# Patient Record
Sex: MALE | Race: WHITE | NOT HISPANIC OR LATINO | Employment: OTHER | URBAN - METROPOLITAN AREA
[De-identification: names, ages, dates, MRNs, and addresses within clinical notes are randomized per-mention and may not be internally consistent; named-entity substitution may affect disease eponyms.]

---

## 2017-01-03 ENCOUNTER — TELEPHONE (OUTPATIENT)
Dept: NEUROSURGERY | Facility: CLINIC | Age: 72
End: 2017-01-03

## 2017-01-03 DIAGNOSIS — M54.16 LUMBAR RADICULAR PAIN: Primary | ICD-10-CM

## 2017-01-04 ENCOUNTER — TELEPHONE (OUTPATIENT)
Dept: PALLIATIVE MEDICINE | Facility: CLINIC | Age: 72
End: 2017-01-04

## 2017-01-04 ENCOUNTER — MYC MEDICAL ADVICE (OUTPATIENT)
Dept: INTERNAL MEDICINE | Facility: CLINIC | Age: 72
End: 2017-01-04

## 2017-01-04 ENCOUNTER — RADIOLOGY INJECTION OFFICE VISIT (OUTPATIENT)
Dept: PALLIATIVE MEDICINE | Facility: CLINIC | Age: 72
End: 2017-01-04
Payer: COMMERCIAL

## 2017-01-04 ENCOUNTER — RADIANT APPOINTMENT (OUTPATIENT)
Dept: GENERAL RADIOLOGY | Facility: CLINIC | Age: 72
End: 2017-01-04
Attending: ANESTHESIOLOGY
Payer: COMMERCIAL

## 2017-01-04 VITALS — SYSTOLIC BLOOD PRESSURE: 139 MMHG | HEART RATE: 61 BPM | DIASTOLIC BLOOD PRESSURE: 85 MMHG | OXYGEN SATURATION: 96 %

## 2017-01-04 DIAGNOSIS — M54.16 LUMBAR RADICULAR PAIN: ICD-10-CM

## 2017-01-04 DIAGNOSIS — M54.16 LUMBAR RADICULOPATHY: Primary | ICD-10-CM

## 2017-01-04 PROCEDURE — 62323 NJX INTERLAMINAR LMBR/SAC: CPT | Mod: 25 | Performed by: ANESTHESIOLOGY

## 2017-01-04 ASSESSMENT — PAIN SCALES - GENERAL: PAINLEVEL: SEVERE PAIN (7)

## 2017-01-04 NOTE — TELEPHONE ENCOUNTER
Spoke with Sindy Jones CNP, who would like to refer patient to receive second KYLE. Will order through [facility]. Patient and his wife were notified and in agreement.

## 2017-01-04 NOTE — PROGRESS NOTES
[facility] - Procedure Note    Date of Visit: 1/4/2017    Procedure performed: Lumbar L4-5 interlaminar epidural steroid injection  Diagnosis: Lumbar spondylosis; Lumbar radiculitis/radiculopathy  : Gill Garcia MD   Anesthesia: none    Indications: Elier Barber is a 71 year old male who is seen at the request of Sindy Jones CNP for a lumbar epidural steroid injection. The patient describes low back pain that radiates into the left leg. The patient has been exhibiting symptoms consistent with lumbar intraspinal inflammation and radiculopathy. Symptoms have been persistent, disabling, and intermittently severe. The patient reports minimal improvement with conservative treatment, including PT and medications.  Previous lumbar KYLE about a year ago at an outside facility gave good relief until now.   .  Lumbar MRI was done on 1/14/2016 which showed   FINDINGS:  Five lumbar vertebrae are assumed. There are multilevel  degenerative disc disease changes, most prominent at L2-L3, L3-L4.  There is grade 1 degenerative anterolisthesis at L4-L5, L5-S1.     Findings by specific level:     There is facet degenerative change as follows:       Mild bilateral L1-L2, mild bilateral L3-L4, moderate bilateral L4-L5,  prominent right and moderate left L5-S1.     T12-L1: No disc herniation or stenosis.     L1-L2: Mild ligamentum flavum thickening. Left asymmetric disc bulging  including a foraminal component. There is mild-moderate left foraminal  stenosis. The right neural foramen appears adequate and there is no  significant central stenosis.     L2-L3: Disc bulging and osteophytosis with bilateral foraminal  osteophyte-disc complexes. There is a small right posterocentral disc  extrusion with caudal extension of disc material 1.1 cm below the disc  level. There is some mass effect with respect to the right L3 nerve  root. There is no significant overall central stenosis. There is  moderate to  "moderate-severe bilateral foraminal stenosis.     L3-L4: Disc bulging and osteophytosis with bilateral foraminal  osteophyte-disc complexes. Ligamentum flavum thickening. No  significant overall central stenosis. Moderate to moderate-severe  bilateral foraminal stenosis.     L4-L5: Ligamentum flavum flavum thickening. Centrally there is a  broad-based disc herniation which is slightly right asymmetric. In the  left parasagittal region, there is cranial extension of disc material  extending 1.6 cm above the disc level. There is some mass effect with  respect to both L5 nerve roots. There is disc bulging elsewhere  including foraminal components. There is mild central stenosis. There  is severe left foraminal stenosis medially. There is moderate-severe  right foraminal stenosis. There is some synovial cyst formation at the  posterior aspect of the left facet joint.     L5-S1: Mild disc bulging. There is mild synovial cyst formation at the  anterior aspect of the left facet joint. This contributes to moderate  left foraminal stenosis. There is no central or right foraminal  stenosis.       IMPRESSION:  1. Multilevel degenerative disc and facet disease.  2. L2-L3: Small right posterocentral disc extrusion, with mass effect  on the L3 nerve root.  3. L4-L5: Moderate-large left asymmetric disc extrusion. Mass effect  on both L5 nerve roots. Mild central stenosis. Severe left foraminal  stenosis. Moderate-severe right foraminal stenosis.    Allergies:      Allergies   Allergen Reactions     Bee Venom      Penicillins      \"HIVES\"        Vitals:  /77 mmHg  Pulse 69  SpO2 96%    Review of Systems: The patient denies recent fever, chills, illness, use of antibiotics or anticoagulants. All other 10-point review of systems negative.     Procedure: The procedure and risks were explained, and informed written consent was obtained from the patient. Risks include but are not limited to: infection, bleeding, increased pain, " and damage to soft tissue, nerve, muscle, and vasculature structures. After getting informed consent, patient was brought into the procedure suite and was placed in a prone position on the procedure table. A Pause for the Cause was performed. Patient was prepped and draped in sterile fashion.     The L4-5 interspace was identified with use of fluoroscopy in AP view. A 25-gauge, 1.5 inch needle was used to anesthetize the skin and subcutaneous tissue entry site with a total of 2 ml of 1% lidocaine. Under fluoroscopic visualization, a 22-gauge, 4.5 inch Tuohy epidural needle was slowly advanced towards the epidural space a few millimeters left of midline. The latter part of the needle advancement was guided with fluoroscopy in the lateral view. The epidural space was identified using loss of resistance technique. After negative aspiration for heme and cerebrospinal fluid, a total of 1 mL of non-ionic contrast was injected to confirm needle placement with 9 mL of contrast wasted. Epidurogram confirmed spread within the posterior epidural space. 2 ml of 40mg/ml of triamcinolone, 2 ml of 1% lidocaine, and 1 ml of preservative free saline was injected. The needle was removed.  Images were saved to PACS.    The patient tolerated the procedure well, and there was no evidence of procedural complications. No new sensory or motor deficits were noted following the procedure. The patient was stable and able to ambulate on discharge home. Post-procedure instructions were provided.     Pre-procedure pain score: 7/10 in the back, 3/10 in the leg  Post-procedure pain score: 2/10 in the back, 0/10 in the leg    Assessment/Plan: Elier Barber is a 71 year old male s/p lumbar interlaminar epidural steroid injection today for lumbar spondylosis and radiculitis/radiculopathy.     1. Following today's procedure, the patient was advised to contact the [facility] for any of the following:   Fever, chills, or night  sweats   New onset of pain, numbness, or weakness   Any questions/concerns regarding the procedure  If unable to contact the Pain Center, the patient was instructed to go to a local Emergency Room for any complications.   2. The patient will receive a follow-up call in 1 week.   3. Follow-up with Dr. Blanc's office in 2 weeks for post-procedure evaluation.    [facility] 1/4/2017

## 2017-01-04 NOTE — TELEPHONE ENCOUNTER
Contacted patient and spoke to his spouse, Kelli. Patient is experiencing right leg pain. This happened last year at this time as well, and he received an KYLE that provided relief. Patient and Kelli would like to have the KYLE again if possible. They are open to going to [place]. Will communicate message with Sindy Jones CNP for recommendation.

## 2017-01-04 NOTE — NURSING NOTE
Discharge Information    IV Discontiued Time:  NA    Amount of Fluid Infused:  NA    Discharge Criteria = When patient returns to baseline or as per MD order    Consciousness:  Pt is fully awake    Circulation:  BP +/- 20% of pre-procedure level    Respiration:  Patient is able to breathe deeply    O2 Sat:  Patient is able to maintain O2 Sat >92% on room air    Activity:  Moves 4 extremities on command    Ambulation:  Patient is able to stand and walk     Dressing:  Clean/dry or No Dressing    Notes:   Discharge instructions and AVS given to patient    Patient meets criteria for discharge?  YES    Admitted to PCU?  No    Responsible adult present to accompany patient home?  Yes    Signature/Title:    Lucero Nava RN Care Coordinator  [facility]

## 2017-01-04 NOTE — MR AVS SNAPSHOT
After Visit Summary   1/4/2017    Elier Barber    MRN: [id number]           Patient Information     Date Of Birth          1945        Visit Information        Provider Department      1/4/2017 1:30 PM Gill Garcia MD [facility]        Care Instructions    [facility] Procedure Discharge Instructions    Today you saw:  Dr. Gill Garcia    Your procedure:  Lumbar epidural steroid injection       Medications used:  Lidocaine (anesthetic)    Kenalog (steroid)    Omnipaque (contrast)               Be cautious when walking as numbness and/or weakness in the legs may occur up to 6-8 hours after the procedure due to effect of the local anesthetic    Do not drive for 6 hours. The effect of the local anesthetic could slow your reflexes.     Avoid strenuous activity for the first 24 hours. You may resume your regular activities after that.     You may shower, however avoid swimming, tub baths or hot tubs for 24 hours following your procedure    You may have a mild to moderate increase in pain for several days following the injection.      You may use ice packs for 10-15 minutes, 3 to 4 times a day at the injection site for comfort    You may use anti-inflammatory medications (such as Ibuprofen/Advil or Aleve) or Tylenol for pain control if necessary    It may take up to 14 days for the steroid medication to start working although you may feel the effect as early as a few days after the procedure.       If you experience any of the following, call the pain center nursing line during work hours at [phone] or on-call physician after hours at [phone]:  -Fever over 100 degree F  -Swelling, bleeding, redness, drainage, warmth at the injection site  -Progressive weakness or numbness in your legs   -Loss of bowel or bladder function  -Unusual headache that is not relieved by Tylenol  -Unusual new onset of pain that is not improving    Phone #s:  Appointment scheduling line:  [phone]          Follow-ups after your visit        Your next 10 appointments already scheduled     May 18, 2017 10:00 AM   PFT VISIT with ADAM CHUN   [facility] Pulmonary Function Testing ([facility])    [address] [address] [phone]            May 18, 2017 10:15 AM   (Arrive by 10:00 AM)   Return ALS/Motor [place] with Gary Tejada MD   [facility] Neurology ([facility])    [address] [address] [phone]              Who to contact     If you have questions or need follow up information about today's clinic visit or your schedule please contact [facility] directly at [phone].  Normal or non-critical lab and imaging results will be communicated to you by MyChart, letter or phone within 4 business days after the clinic has received the results. If you do not hear from us within 7 days, please contact the clinic through [employer] or phone. If you have a critical or abnormal lab result, we will notify you by phone as soon as possible.  Submit refill requests through [employer] or call your pharmacy and they will forward the refill request to us. Please allow 3 business days for your refill to be completed.          Additional Information About Your Visit        [employer] Information     [employer] gives you secure access to your electronic health record. If you see a primary care provider, you can also send messages to your care team and make appointments. If you have questions, please call your primary care clinic.  If you do not have a primary care provider, please call [phone] and they will assist you.        Care EveryWhere ID     This is your Care EveryWhere ID. This could be used by other organizations to access your [facility] medical records  [id number]        Your Vitals Were     Pulse Pulse Oximetry                69 96%           Blood Pressure from Last 3  Encounters:   01/04/17 128/77   11/03/16 129/69   09/27/16 118/78    Weight from Last 3 Encounters:   11/03/16 73.483 kg (162 lb)   09/27/16 71.668 kg (158 lb)   06/07/16 72.122 kg (159 lb)              Today, you had the following     No orders found for display       Primary Care Provider Office Phone # Fax #    Lida Ray [phone]       [facility] [address] [facility] [address]  [facility] [address]        Thank you!     Thank you for choosing [facility]  for your care. Our goal is always to provide you with excellent care. Hearing back from our patients is one way we can continue to improve our services. Please take a few minutes to complete the written survey that you may receive in the mail after your visit with us. Thank you!             Your Updated Medication List - Protect others around you: Learn how to safely use, store and throw away your medicines at www.disposemymeds.org.          This list is accurate as of: 1/4/17  1:30 PM.  Always use your most recent med list.                   Brand Name Dispense Instructions for use    albuterol 108 (90 BASE) MCG/ACT Inhaler    PROAIR HFA/PROVENTIL HFA/VENTOLIN HFA    1 Inhaler    Inhale 2 puffs into the lungs every 6 hours as needed for shortness of breath / dyspnea or wheezing       ASPIRIN PO      Take 325 mg by mouth       baclofen (LIORESAL) in NaCl 0.9% 100 mL intrathecal infusion      by Intrathecal route continuous Pump filled by [facility] [phone] Pump Serial Number: [id number], Pump Model Number: 8637-20 Last fill:  3/25/2014 Next fill: 6/10/2014 [place] Alarm Date: 6/28/2014 [place] Volume: 4 mL Conc: 2000 mcg/ml Flex schedule delivers 264.8 mcg/day       enalapril 5 MG tablet    VASOTEC    90 tablet    Take 1 tablet (5 mg) by mouth daily       EPINEPHrine 0.3 MG/0.3ML injection    EPIPEN    1 each    Inject 0.3 mLs (0.3 mg) into the muscle once as needed for  anaphylaxis       gentamicin 0.1 % ointment    GARAMYCIN     Apply topically 2 times daily       ketoconazole 2 % shampoo    NIZORAL    120 mL    Shampoo every 2-3 days as needed       oxybutynin 10 MG 24 hr tablet    DITROPAN XL    90 tablet    Take 1 tablet (10 mg) by mouth daily       potassium chloride 10 MEQ tablet    K-TAB,KLOR-CON    180 tablet    Take 2 tablets (20 mEq) by mouth daily GIVE THE GENERIC: THIS IS NOT A REQUEST FOR BRAND NAME

## 2017-01-04 NOTE — TELEPHONE ENCOUNTER
Pre-screening questions for Radiology Injections:    Injection to be done at which interventional clinic site? [facility]    Procedure ordered by Dr. Sindy Jones     Procedure ordered? Lumbar Epidural Steroid Injection    What insurance would patient like us to bill for this procedure? [employer]      Worker's comp-Any injection DO NOT SCHEDULE and route to Michelle Hilliard.      [employer] insurance - If scheduling an SI joint injection DO NOT SCHEDULE and route Michelle Hilliard.    [employer]- MBB's must be scheduled at LEAST two weeks apart      [employer] - Any injection besides hip/shoulder/knee joint DO NOT SCHEDULE and route to Michelle Hilliard. She will obtain PA and call pt back to schedule procedure or notify pt of denial.     Is an  needed? No     Patient has a drive home? (mandatory) YES     Is patient taking any blood thinners (plavix, coumadin, jantoven, warfarin, heparin, pradaxa or dabigatran )? No   (If so, do not schedule, contact RN and/or MD)     Is patient taking any aspirin products? No   (If more than 325mg/day do not schedule; Contact RN/MD. For all non-cervical interventional procedures if patient is taking MORE than 325mg/day, limit aspirin to 81-325mg/day x 1 week. No hold required day of procedure.  For CERVICAL procedures, hold all aspirin products for 6 days.)      Does the patient have a bleeding or clotting disorder? No   (If yes, okay to schedule, but contact RN/MD).  **For any patients with platelet count <100, must be forwarded to provider**    Is patient diabetic?  No  If YES, have them bring their glucometer.    Does patient have an active infection or treated for one within the past week? No     Is patient currently taking any antibiotics?  No   For patients on chronic, preventative, or prophylactic antibiotics, procedures can be scheduled.   For patients on antibiotics for active or recent infection:  Andrew Hall, Grzegorz-antibiotic course must have been  completed for 4 days  Padmini Laura-antibiotic course must have been completed for 7 days    Is patient currently taking any steroid medications? (i.e. Prednisone, Medrol)  No   For patients on steroid medications:  Andrew Hall [place]-steroid course must have been completed for 4 days  Padmini Laura-steroid course must have been completed for 7 days  Review with patient:  If you are started on any steroids or antibiotics between now and your appointment, you must contact us because it may affect our ability to perform your procedure informed    Is patient actively being treated for cancer or immunocompromised, including the spleen having been removed? No  **For Dr. Bundy patients without spleens should have the chart sent to her**  (If YES, do NOT schedule and route to RN)    Are you able to get on and off an exam table with minimal or no assistance? No  (If NO, do NOT schedule and route to RN)  Are you able to roll over and lay on your stomach with minimal or no assistance? Yes  (If NO, do NOT schedule and route to RN)         Any allergies to contrast dye, iodine, shellfish, or numbing and steroid medications? No  (If so, inform nursing and note in scheduling comments.)    Allergies: Bee venom and Penicillins      Any chance of pregnancy?NO    Has the patient had a flu shot or any other vaccinations within 7 days before or after the procedure.  No       Does patient have an MRI/CT?  YES  (SI joint, hip injections, lumbar sympathetic blocks, and stellate ganglion blocks do not require an MRI)    If so, was it done at [facility]? Yes      If not, where was it done? N/A     Was the MRI done w/in the last 3 years?  Yes   If MRI was not done at [facility], [facility] or [facility] do NOT schedule. Route to nursing.  (If pt has disc the injection can be scheduled but pt has to bring disc to appt. If they show up w/out disc the injection cannot be done)    Reminders (please tell patient if  applicable):       Instructed pt to arrive 30 minutes early for IV start if this is for a cervical procedure, ALL sympathetic (stellate ganglion, hypogastric, or lumbar sympathetic block) and all sedation procedures (RFA, spinal cord stimulation trials).  Not Applicable    -IVs are not routinely placed for [place] and [place] cervical case       If NPO for sedation, informed patient that it is okay to take medications with sips of water (except if they are to hold blood thinners).  Not Applicable   *DO take blood pressure medication if it is prescribed*      If this is for a cervical KYLE, informed patient that aspirin needs to be held for 6 days.   Not Applicable      Do not schedule procedures requiring IV placement in the first appointment of the day or first appointment after lunch na        For patients 85 or older we recommend having an adult stay w/ them for the remainder of the day.   na      Does the patient have any questions?  NO      Juana Ernandez  [facility]

## 2017-01-04 NOTE — NURSING NOTE
Injection intake:    If this procedure is requiring IV sedation has patient been NPO for 6  Hours? NA    Is patient on coumadin, plavix or other prescribed blood thinner?   No    If patient is on coumadin was it held for 5 days?   NA    If patient is on plavix was it held for 7 days?    NA     Does patient take aspirin?  Yes -    mg  If this is for a cervical procedure and patient is on aspirin has it been held for 6 days?   NA    Any allergies to contrast dye, iodine, steroid and/or numbing medications?  NO    Is patient currently taking antibiotics or have an active infection?  NO    Does patient have a ? Yes       Is patient pregnant or breastfeeding?  Not Applicable    Are the vital signs normal?  Yes

## 2017-01-04 NOTE — PATIENT INSTRUCTIONS
[facility] Procedure Discharge Instructions    Today you saw:  Dr. Gill Garcia    Your procedure:  Lumbar epidural steroid injection       Medications used:  Lidocaine (anesthetic)    Kenalog (steroid)    Omnipaque (contrast)               Be cautious when walking as numbness and/or weakness in the legs may occur up to 6-8 hours after the procedure due to effect of the local anesthetic    Do not drive for 6 hours. The effect of the local anesthetic could slow your reflexes.     Avoid strenuous activity for the first 24 hours. You may resume your regular activities after that.     You may shower, however avoid swimming, tub baths or hot tubs for 24 hours following your procedure    You may have a mild to moderate increase in pain for several days following the injection.      You may use ice packs for 10-15 minutes, 3 to 4 times a day at the injection site for comfort    You may use anti-inflammatory medications (such as Ibuprofen/Advil or Aleve) or Tylenol for pain control if necessary    It may take up to 14 days for the steroid medication to start working although you may feel the effect as early as a few days after the procedure.       If you experience any of the following, call the pain center nursing line during work hours at [phone] or on-call physician after hours at [phone]:  -Fever over 100 degree F  -Swelling, bleeding, redness, drainage, warmth at the injection site  -Progressive weakness or numbness in your legs   -Loss of bowel or bladder function  -Unusual headache that is not relieved by Tylenol  -Unusual new onset of pain that is not improving    Phone #s:  Appointment scheduling line: [phone]

## 2017-01-11 ENCOUNTER — TELEPHONE (OUTPATIENT)
Dept: PALLIATIVE MEDICINE | Facility: CLINIC | Age: 72
End: 2017-01-11

## 2017-01-11 NOTE — TELEPHONE ENCOUNTER
Patient had a lumbar epidural injection on 01/04/17.  Called patient for an update.      Pt reported the following details:  Patient states that he is feeling pain relief from the injection.   Told patient that the information will be forwarded to her provider.  Also explained that, if a steroid medication was used, it could take up to 14 days to feel the full effect and if pt has any further questions or concerns pt should call the nurse line at [phone].    Radha MEHTA)

## 2017-01-13 ENCOUNTER — TELEPHONE (OUTPATIENT)
Dept: INTERNAL MEDICINE | Facility: CLINIC | Age: 72
End: 2017-01-13

## 2017-01-13 ENCOUNTER — TELEPHONE (OUTPATIENT)
Dept: NEUROSURGERY | Facility: CLINIC | Age: 72
End: 2017-01-13

## 2017-01-13 ENCOUNTER — HOSPITAL ENCOUNTER (EMERGENCY)
Facility: CLINIC | Age: 72
Discharge: HOME | End: 2017-01-13
Attending: NURSE PRACTITIONER | Admitting: NURSE PRACTITIONER
Payer: COMMERCIAL

## 2017-01-13 ENCOUNTER — APPOINTMENT (OUTPATIENT)
Dept: CT IMAGING | Facility: CLINIC | Age: 72
End: 2017-01-13
Attending: NURSE PRACTITIONER
Payer: COMMERCIAL

## 2017-01-13 VITALS
TEMPERATURE: 97.6 F | OXYGEN SATURATION: 97 % | SYSTOLIC BLOOD PRESSURE: 141 MMHG | RESPIRATION RATE: 18 BRPM | HEART RATE: 62 BPM | DIASTOLIC BLOOD PRESSURE: 86 MMHG

## 2017-01-13 DIAGNOSIS — G12.23 PRIMARY LATERAL SCLEROSES (H): ICD-10-CM

## 2017-01-13 DIAGNOSIS — R35.0 URINARY FREQUENCY: ICD-10-CM

## 2017-01-13 LAB
ALBUMIN UR-MCNC: NEGATIVE MG/DL
ANION GAP SERPL CALCULATED.3IONS-SCNC: 9 MMOL/L (ref 3–14)
APPEARANCE UR: CLEAR
BILIRUB UR QL STRIP: NEGATIVE
BUN SERPL-MCNC: 22 MG/DL (ref 7–30)
CALCIUM SERPL-MCNC: 8.7 MG/DL (ref 8.5–10.1)
CHLORIDE SERPL-SCNC: 104 MMOL/L (ref 94–109)
CO2 SERPL-SCNC: 26 MMOL/L (ref 20–32)
COLOR UR AUTO: YELLOW
CREAT SERPL-MCNC: 0.73 MG/DL (ref 0.66–1.25)
ERYTHROCYTE [DISTWIDTH] IN BLOOD BY AUTOMATED COUNT: 13.2 % (ref 10–15)
GFR SERPL CREATININE-BSD FRML MDRD: ABNORMAL ML/MIN/1.7M2
GLUCOSE SERPL-MCNC: 101 MG/DL (ref 70–99)
GLUCOSE UR STRIP-MCNC: NEGATIVE MG/DL
HCT VFR BLD AUTO: 45.8 % (ref 40–53)
HGB BLD-MCNC: 15.4 G/DL (ref 13.3–17.7)
HGB UR QL STRIP: NEGATIVE
KETONES UR STRIP-MCNC: NEGATIVE MG/DL
LEUKOCYTE ESTERASE UR QL STRIP: NEGATIVE
MCH RBC QN AUTO: 30.7 PG (ref 26.5–33)
MCHC RBC AUTO-ENTMCNC: 33.6 G/DL (ref 31.5–36.5)
MCV RBC AUTO: 91 FL (ref 78–100)
NITRATE UR QL: NEGATIVE
PH UR STRIP: 6 PH (ref 5–7)
PLATELET # BLD AUTO: 184 10E9/L (ref 150–450)
POTASSIUM SERPL-SCNC: 4 MMOL/L (ref 3.4–5.3)
RBC # BLD AUTO: 5.02 10E12/L (ref 4.4–5.9)
SODIUM SERPL-SCNC: 139 MMOL/L (ref 133–144)
SP GR UR STRIP: 1.02 (ref 1–1.03)
URN SPEC COLLECT METH UR: NORMAL
UROBILINOGEN UR STRIP-MCNC: NORMAL MG/DL (ref 0–2)
WBC # BLD AUTO: 10.5 10E9/L (ref 4–11)

## 2017-01-13 PROCEDURE — 85027 COMPLETE CBC AUTOMATED: CPT | Performed by: NURSE PRACTITIONER

## 2017-01-13 PROCEDURE — 25500064 ZZH RX 255 OP 636: Performed by: NURSE PRACTITIONER

## 2017-01-13 PROCEDURE — 74177 CT ABD & PELVIS W/CONTRAST: CPT

## 2017-01-13 PROCEDURE — 25000128 H RX IP 250 OP 636: Performed by: NURSE PRACTITIONER

## 2017-01-13 PROCEDURE — 80048 BASIC METABOLIC PNL TOTAL CA: CPT | Performed by: NURSE PRACTITIONER

## 2017-01-13 PROCEDURE — 81003 URINALYSIS AUTO W/O SCOPE: CPT | Performed by: NURSE PRACTITIONER

## 2017-01-13 PROCEDURE — 99285 EMERGENCY DEPT VISIT HI MDM: CPT | Mod: 25

## 2017-01-13 RX ORDER — IOPAMIDOL 755 MG/ML
500 INJECTION, SOLUTION INTRAVASCULAR ONCE
Status: COMPLETED | OUTPATIENT
Start: 2017-01-13 | End: 2017-01-13

## 2017-01-13 RX ADMIN — IOPAMIDOL 81 ML: 755 INJECTION, SOLUTION INTRAVENOUS at 15:56

## 2017-01-13 RX ADMIN — SODIUM CHLORIDE 60 ML: 9 INJECTION, SOLUTION INTRAVENOUS at 15:56

## 2017-01-13 ASSESSMENT — ENCOUNTER SYMPTOMS
DYSURIA: 0
ABDOMINAL PAIN: 0
DIARRHEA: 0
VOMITING: 1
FLANK PAIN: 0
HEMATURIA: 0
FREQUENCY: 1

## 2017-01-13 NOTE — ED PROVIDER NOTES
History     Chief Complaint:  Urinary Frequency    HPI   Elier Barber is a 71 year old male with a history of prostate cancer, primary lateral sclerosis and hypertension who presents to the emergency department today for evaluation of urinary frequency. The patient has had mild urinary incontinence in the past with some leakage and a couple of episodes where he describes that he has been able to make it to the bathroom however, he presents to the ED today with progressively worsening urinary incontinence and nocturia since Monday 1/9. He states that he gets the urge to urinate and is unable to make it to the bathroom. He has no dysuria, hematuria or abnormal discharge. He also feels bloated and had one episode of vomiting early this morning. He has no diarrhea but notes that he has had issues with constipation in the past. His last bowel movement was yesterday. He denies abdominal pain or flank pain. Of note, the patient had onset of back pain on 12/31, sought treatment and got a steroid injection that helped relieve some of his pain.     Allergies:  Bee Venom  Penicillins       Medications:    Potassium Chloride   Vasotec   Baclofen   Albuterol   Ditropan   Gentamicin   Aspirin   Epipen      Past Medical History:    Essential hypertension, benign   Primary Lateral Sclerosis   Prostate CA  Basal cell carcinoma   Hearing loss  Hoarseness of voice  CVA   Vertigo     Past Surgical History:    Prostatectomy (2008)   Colonoscopy (2001)   Hernia, right side (2006)   T+A     Family History:    Maternal Grandfather - CAD  Maternal Uncle - Cerebrovascular Disease   Mother - CHF    Social History:  Smoking Status: Former Smoker, Quit in 1976  Smokeless Tobacco: Negative  Alcohol Use: Positive   Marital Status:   [2]     Review of Systems   Gastrointestinal: Positive for vomiting. Negative for abdominal pain and diarrhea.   Genitourinary: Positive for frequency. Negative for dysuria, hematuria, flank pain and  discharge.   All other systems reviewed and are negative.    Physical Exam   Vitals:   Patient Vitals for the past 24 hrs:   BP Temp Temp src Pulse Resp SpO2   01/13/17 1620 141/86 mmHg - - 62 18 98 %   01/13/17 1303 (!) 144/100 mmHg 97.6  F (36.4  C) Oral 62 16 98 %       Physical Exam  General: Alert, No obvious discomfort, well kept  Eyes: PERRL, conjunctivae pink no scleral icterus or conjunctival injection  ENT:   Moist mucus membranes, posterior oropharynx clear without erythema or exudates, No lymphadenopathy, Normal voice  Resp:  Lungs clear to auscultation bilaterally, no crackles/rubs/wheezes. Good air movement  CV:  Normal rate and rhythm, no murmurs/rubs/gallops  GI:  Abdomen soft and non-distended.  Normoactive BS.  No tenderness, guarding or rebound, No masses. Nontender suprapubic area, nondistended abdomen. Baclofen pump right lower abdomen. No CVA tenderness.   Skin:  Warm, dry.  No rashes or petechiae  Musculoskeletal: No peripheral edema or calf tenderness, Normal gross ROM   Neuro: Alert and oriented to person/place/time, normal sensation  Psychiatric: Normal affect, cooperative, good eye contact    Emergency Department Course     Imaging:  Radiology findings were communicated with the patient who voiced understanding of the findings.    CT Abdomen Pelvis w Contrast:   IMPRESSION:  1. Small benign cyst lower pole right kidney.  2. Status post prostatectomy.  3. Intraspinal infusion pump noted.  Reading per radiology    Laboratory:  Laboratory findings were communicated with the patient who voiced understanding of the findings.    CBC: AWNL. (WBC 10.5, HGB 15.4, )   BMP: Glucose 101 (H) (Creatinine 0.73)    UA reflex to Microscopic: AWNL     Emergency Department Course:  Nursing notes and vitals reviewed.  I performed an exam of the patient as documented above.  IV was inserted and blood was drawn for laboratory testing, results above.  The patient provided a urine sample here in the  emergency department. This was sent for laboratory testing, findings above.  The patient was sent for a CT while in the emergency department, results above.   At 1622 the patient was rechecked and was updated on the results of his laboratory and imaging studies.   I discussed the treatment plan with the patient. He expressed understanding of this plan and consented to discharge. He will be discharged home with instructions for care and follow up. In addition, the patient will return to the emergency department if their symptoms persist, worsen, if new symptoms arise or if there is any concern.  All questions were answered.  I personally reviewed the laboratory and imaging results with the patient and answered all related questions prior to discharge.    Impression & Plan      Medical Decision Making:  Elier Barber is a 71 year old male who presented for evaluation of urinary frequency. He has had increased incontinence for the last 5 days. He also had one episode of vomiting last evening and stated that he had been belching. No significant diarrhea. He actually describes being slightly constipated. His evaluation today shows no evidence for urinary tract infection, a normal white cell count, normal electrolytes and a nonacute abdominal CT. At this point his symptoms are more likely related to his PLS and unlikely to be an infection.  No other new neurologic changes. No indication for advanced spinal imagery at this time. He will follow up with his primary care provider, urologist and neurologist calling on Monday to schedule follow up. He appears to be safe and appropriate for discharge. There have been no new or focal neurologic changes. No advanced imaging is indicated at this time. He is ambulatory at his baseline. He did describe some constipation so we discussed over the counter remedies for this. They will attempt these and follow up again as above. Return to the ER if he develops increasing pain, weakness,  vomiting, fevers, or for other concerns.     Diagnosis:    ICD-10-CM    1. Urinary frequency R35.0    2. Primary lateral scleroses (H) G12.29      Disposition:   Discharge to home    Scribe Disclosure:  I, Zina Nathan, am serving as a scribe at 1:21 PM on 1/13/2017 to document services personally performed by Levi Qiu APRN, based on my observations and the provider's statements to me.    1/13/2017   [facility] EMERGENCY DEPARTMENT        Levi Qiu APRN CNP  01/13/17 1714

## 2017-01-13 NOTE — ED NOTES
States increased issue with incontinence since Monday.  Vomited x1 at 0300.  States legs feel heavier than usual.

## 2017-01-13 NOTE — TELEPHONE ENCOUNTER
Left a message at number below - very concerned about symptoms, left the scheduling number  and told wife, Kelli, to ask to speak to a nurse right away. [facility] message to Dr. Garcia and she called back. Discussed message from Elier' wife below. Dr. Garcia advised patient to seek immediate attention at the emergency room. Left a 2nd message letting them know I'd spoken to Dr. Garcia who wants them to go to the emergency room to get these symptoms evaluated. Will keep encounter open and continue to attempt to reach or review for any [facility] ED arrivals.     Shabnam Nava, MSN, RN-BC  Care Coordinator  [facility]

## 2017-01-13 NOTE — ED AVS SNAPSHOT
[facility] Emergency Department    [address]    [facility] [address]    Phone:  [phone]    Fax:  [phone]                                       Elier Barber   MRN: [id number]    Department:  [facility] Emergency Department   Date of Visit:  1/13/2017           After Visit Summary Signature Page     I have received my discharge instructions, and my questions have been answered. I have discussed any challenges I see with this plan with the nurse or doctor.    ..........................................................................................................................................  Patient/Patient Representative Signature      ..........................................................................................................................................  Patient Representative Print Name and Relationship to Patient    ..................................................               ................................................  Date                                            Time    ..........................................................................................................................................  Reviewed by Signature/Title    ...................................................              ..............................................  Date                                                            Time

## 2017-01-13 NOTE — TELEPHONE ENCOUNTER
Patients wife called stating that he had a lumbar injection on 01/04/17, and is now experiencing incontinence. Kelli (spouse) thinks the incontinence is a side effect from the injection, and is concerned.

## 2017-01-13 NOTE — TELEPHONE ENCOUNTER
Patient's wife called for Elier. He had an injection on 1/4/16 with Gill Garcia. Since the injection the patient has gotten significantly weaker and is also having more incontinence issues, and in fact is sometimes not making it to the bathroom. If we could call them back at [phone].  Rosette TRAORE (R)

## 2017-01-13 NOTE — TELEPHONE ENCOUNTER
Pt and wife call reporting, that he had back injection L4-L5 on 1/4. Worked right away for the pain.   Then started on 1/9, Monday started getting urinary incontinence, getting worse since. Once starts, can't stop.   No burning or discomfort at all. No discoloration.     No fevers.   Did vomit last night, in the middle of the night. No flank pain. No other pain.     Has PLS. No history of Kidney stones.     They called Neurosurgery and they told him to go to ED or call PCP.     Please advise.

## 2017-01-13 NOTE — TELEPHONE ENCOUNTER
Returned phone call to Kelli (spouse). Patient has lumbar KYLE on 1/4/17 which provided relief right away. Then on 1/9/17 he started experiencing weakness, bladder incontinence, and vomiting. Wife called today to report symptoms. Spoke with Sindy Jones CNP who recommends patient visit nearest ED. Kelli agreed to bring patient to [facility] today. Also advised Kelli to notify patient's PCP as well.

## 2017-01-13 NOTE — TELEPHONE ENCOUNTER
Chart review shows patient at [facility] Dr. Garcia notified via [employer] message and acknowledged her receipt of my lync.    Shabnam Nava, MSN, RN-BC  Care Coordinator  [facility]

## 2017-01-13 NOTE — ED AVS SNAPSHOT
[facility] Emergency Department    [address]    [facility] [address]    Phone:  [phone]    Fax:  [phone]                                       Elier Barber   MRN: [id number]    Department:  [facility] Emergency Department   Date of Visit:  1/13/2017           Patient Information     Date Of Birth          1945        Your diagnoses for this visit were:     Urinary frequency     Primary lateral scleroses (H)        You were seen by Levi Qiu, CITLALY CNP.      Follow-up Information     Follow up with your Neurologist and/or Urologist call on Monday to schedule.        Follow up with [facility] Emergency Department.    Specialty:  EMERGENCY MEDICINE    Why:  If symptoms worsen    Contact information:    [address]  [facility] [id number] [phone]        Discharge Instructions       Try Senna, or Miralax for constipation        Future Appointments        Provider Department Dept Phone Center    5/18/2017 10:00 AM Pulmonary Function Lab [facility] Pulmonary Function Testing [phone] [facility]    5/18/2017 10:15 AM Gary Tejada MD [facility] Neurology [phone] [facility]      24 Hour Appointment Hotline       To make an appointment at any [facility] clinic, call [phone] ([phone]). If you don't have a family doctor or clinic, we will help you find one. [facility] clinics are conveniently located to serve the needs of you and your family.             Review of your medicines      Our records show that you are taking the medicines listed below. If these are incorrect, please call your family doctor or clinic.        Dose / Directions Last dose taken    albuterol 108 (90 BASE) MCG/ACT Inhaler   Commonly known as:  PROAIR HFA/PROVENTIL HFA/VENTOLIN HFA   Dose:  2 puff   Quantity:  1 Inhaler        Inhale 2 puffs into the lungs every 6 hours as needed for shortness of breath / dyspnea or wheezing   Refills:  11         ASPIRIN PO   Dose:  325 mg        Take 325 mg by mouth   Refills:  0        baclofen (LIORESAL) in NaCl 0.9% 100 mL intrathecal infusion        by Intrathecal route continuous Pump filled by [facility] [phone] Pump Serial Number: [id number], Pump Model Number: [id number] Last fill:  3/25/2014 Next fill: 6/10/2014 [place] Alarm Date: 6/28/2014 [place] Volume: 4 mL Conc: 2000 mcg/ml Flex schedule delivers 264.8 mcg/day   Refills:  0        enalapril 5 MG tablet   Commonly known as:  VASOTEC   Dose:  5 mg   Quantity:  90 tablet        Take 1 tablet (5 mg) by mouth daily   Refills:  1        EPINEPHrine 0.3 MG/0.3ML injection   Commonly known as:  EPIPEN   Dose:  0.3 mg   Quantity:  1 each        Inject 0.3 mLs (0.3 mg) into the muscle once as needed for anaphylaxis   Refills:  11        gentamicin 0.1 % ointment   Commonly known as:  GARAMYCIN        Apply topically 2 times daily   Refills:  0        ketoconazole 2 % shampoo   Commonly known as:  NIZORAL   Quantity:  120 mL        Shampoo every 2-3 days as needed   Refills:  5        oxybutynin 10 MG 24 hr tablet   Commonly known as:  DITROPAN XL   Dose:  10 mg   Quantity:  90 tablet        Take 1 tablet (10 mg) by mouth daily   Refills:  3        potassium chloride 10 MEQ tablet   Commonly known as:  K-TAB,KLOR-CON   Dose:  20 mEq   Quantity:  180 tablet        Take 2 tablets (20 mEq) by mouth daily GIVE THE GENERIC: THIS IS NOT A REQUEST FOR BRAND NAME   Refills:  1                Procedures and tests performed during your visit     Basic metabolic panel    CBC (platelets, no diff)    CT Abdomen Pelvis w Contrast    UA reflex to Microscopic      Orders Needing Specimen Collection     None      Pending Results     Date and Time Order Name Status Description    1/13/2017 1433 CT Abdomen Pelvis w Contrast Preliminary             Pending Culture Results     No orders found from 1/12/2017 to 1/14/2017.       Test Results from your  hospital stay           1/13/2017  2:25 PM - Interface, Flexilab Results      Component Results     Component Value Ref Range & Units Status    Color Urine Yellow  Final    Appearance Urine Clear  Final    Glucose Urine Negative NEG mg/dL Final    Bilirubin Urine Negative NEG Final    Ketones Urine Negative NEG mg/dL Final    Specific Gravity Urine 1.018 1.003 - 1.035 Final    Blood Urine Negative NEG Final    pH Urine 6.0 5.0 - 7.0 pH Final    Protein Albumin Urine Negative NEG mg/dL Final    Urobilinogen mg/dL Normal 0.0 - 2.0 mg/dL Final    Nitrite Urine Negative NEG Final    Leukocyte Esterase Urine Negative NEG Final    Source Midstream Urine  Final         1/13/2017  4:19 PM - Interface, Radiant Ib      Narrative     CT ABDOMEN AND PELVIS WITH CONTRAST 1/13/2017 4:07 PM    HISTORY: Urinary incontinence.    TECHNIQUE: Helical axial scans from dome of liver through pubic  symphysis with 81 mL Isovue-370 IV contrast. Radiation dose for this  scan was reduced using automated exposure control, adjustment of the  mA and/or kV according to patient size, or iterative reconstruction  technique.    COMPARISON: None.    FINDINGS: The liver, spleen, pancreas, bilateral adrenal glands and  kidneys bilaterally appear normal with the exception of a 1 cm benign  cyst in the lower pole of the right kidney. The bowel and mesentery in  the upper abdomen are unremarkable.    Scans through the pelvis show no acute abnormalities. Numerous  surgical clips are seen in the lower pelvis bilaterally. There appears  to have been a previous prostatectomy. Degenerative changes in the  lumbar spine. No aggressive-appearing bony lesions. Infusion pump is  seen in the right lower quadrant anterior subcutaneous fat with a  small catheter extending around the right flank and entering the  spinal canal at L1-L2.        Impression     IMPRESSION:  1. Small benign cyst lower pole right kidney.  2. Status post prostatectomy.  3. Intraspinal  infusion pump noted.         1/13/2017  3:13 PM - Interface, Flexilab Results      Component Results     Component Value Ref Range & Units Status    WBC 10.5 4.0 - 11.0 10e9/L Final    RBC Count 5.02 4.4 - 5.9 10e12/L Final    Hemoglobin 15.4 13.3 - 17.7 g/dL Final    Hematocrit 45.8 40.0 - 53.0 % Final    MCV 91 78 - 100 fl Final    MCH 30.7 26.5 - 33.0 pg Final    MCHC 33.6 31.5 - 36.5 g/dL Final    RDW 13.2 10.0 - 15.0 % Final    Platelet Count 184 150 - 450 10e9/L Final         1/13/2017  3:32 PM - Interface, Flexilab Results      Component Results     Component Value Ref Range & Units Status    Sodium 139 133 - 144 mmol/L Final    Potassium 4.0 3.4 - 5.3 mmol/L Final    Chloride 104 94 - 109 mmol/L Final    Carbon Dioxide 26 20 - 32 mmol/L Final    Anion Gap 9 3 - 14 mmol/L Final    Glucose 101 (H) 70 - 99 mg/dL Final    Urea Nitrogen 22 7 - 30 mg/dL Final    Creatinine 0.73 0.66 - 1.25 mg/dL Final    GFR Estimate >90  Non  GFR Calc   >60 mL/min/1.7m2 Final    GFR Estimate If Black >90   GFR Calc   >60 mL/min/1.7m2 Final    Calcium 8.7 8.5 - 10.1 mg/dL Final                Clinical Quality Measure: Blood Pressure Screening     Your blood pressure was checked while you were in the emergency department today. The last reading we obtained was  BP: 141/86 mmHg . Please read the guidelines below about what these numbers mean and what you should do about them.  If your systolic blood pressure (the top number) is less than 120 and your diastolic blood pressure (the bottom number) is less than 80, then your blood pressure is normal. There is nothing more that you need to do about it.  If your systolic blood pressure (the top number) is 120-139 or your diastolic blood pressure (the bottom number) is 80-89, your blood pressure may be higher than it should be. You should have your blood pressure rechecked within a year by a primary care provider.  If your systolic blood pressure (the top  number) is 140 or greater or your diastolic blood pressure (the bottom number) is 90 or greater, you may have high blood pressure. High blood pressure is treatable, but if left untreated over time it can put you at risk for heart attack, stroke, or kidney failure. You should have your blood pressure rechecked by a primary care provider within the next 4 weeks.  If your provider in the emergency department today gave you specific instructions to follow-up with your doctor or provider even sooner than that, you should follow that instruction and not wait for up to 4 weeks for your follow-up visit.        Thank you for choosing [facility]       Thank you for choosing [facility] for your care. Our goal is always to provide you with excellent care. Hearing back from our patients is one way we can continue to improve our services. Please take a few minutes to complete the written survey that you may receive in the mail after you visit with us. Thank you!        [employer] Information     [employer] gives you secure access to your electronic health record. If you see a primary care provider, you can also send messages to your care team and make appointments. If you have questions, please call your primary care clinic.  If you do not have a primary care provider, please call [phone] and they will assist you.        Care EveryWhere ID     This is your Care EveryWhere ID. This could be used by other organizations to access your [facility] medical records  [id number]        After Visit Summary       This is your record. Keep this with you and show to your community pharmacist(s) and doctor(s) at your next visit.

## 2017-01-16 ENCOUNTER — TELEPHONE (OUTPATIENT)
Dept: UROLOGY | Facility: CLINIC | Age: 72
End: 2017-01-16

## 2017-01-16 NOTE — TELEPHONE ENCOUNTER
pts wife jose d called and said pt was in [facility] on 1-13-17. Notes in epic.  Pt has appt on 2-1-17 with [facility].  Wife wants a sooner appt.  Would you like pt squeezed in sooner or wait to see you on feb 1st?  Pt is seeing neurologist nurse to get his baclofen pump reduced. Thinking that may help.  Pt gets up at night to void and pt has leg problems. Wife gets up with him and then he rushes to the bathroom and falls often.  MICHAEL Ortiz, CMA

## 2017-01-24 ENCOUNTER — TRANSFERRED RECORDS (OUTPATIENT)
Dept: HEALTH INFORMATION MANAGEMENT | Facility: CLINIC | Age: 72
End: 2017-01-24

## 2017-01-26 ENCOUNTER — DOCUMENTATION ONLY (OUTPATIENT)
Dept: CARE COORDINATION | Facility: CLINIC | Age: 72
End: 2017-01-26

## 2017-01-27 NOTE — PROGRESS NOTES
[facility] now requests orders and shares plan of care/discharge summaries for some patients through [employer].  Please REPLY TO THIS MESSAGE in order to give authorization for orders when needed.  This is considered a verbal order, you will still receive a faxed copy of orders for signature.  Thank you for your assistance in improving collaboration for our patients.    ORDER   PT 1w9, no visits week of 2/19,2/26 and 3/5    MD SUMMARY/PLAN OF CARE  Patient being seen in PT for strength, gait, balance, flexibility, education, HEP.  Patient very motivated and consistent with completing HEP daily.  progress limited due to nature of disease but has shown improvements in balance.  continues to have functional weakness more noticable on the left and significant tightness in lower extremities that effects foot clearance.  patient  would benefit from continued PT to maintain/improve ROM and strength, ability to perform transfers and ambulation and to continue to improve balance/stability.      Initial Goals Readdressed  1. Patient will demonstrate reduction in falls by having no falls in a 1 month period. ongoing  2. Patient will be safe and independent with HEP for ongoing strength, mobility and balance. ongoing  3. Pt will demonstrate safe ambulation including awareness of B toes dragging on floor during swing phase of gait and be able to self correct without cuing to improve safety and consistency of gait.  ongoing

## 2017-01-30 ENCOUNTER — TELEPHONE (OUTPATIENT)
Dept: INTERNAL MEDICINE | Facility: CLINIC | Age: 72
End: 2017-01-30

## 2017-01-30 NOTE — TELEPHONE ENCOUNTER
James Jon from [facility] left message on vm today @ 2:36p.  Asking for con't of care for private pay for:    SNV and HHA thru 4-1-17.    Last OV 9-27-16    I ok'd orders--left detailed message on James's vm.

## 2017-02-01 ENCOUNTER — TELEPHONE (OUTPATIENT)
Dept: INTERNAL MEDICINE | Facility: CLINIC | Age: 72
End: 2017-02-01

## 2017-02-03 ENCOUNTER — TELEPHONE (OUTPATIENT)
Dept: INTERNAL MEDICINE | Facility: CLINIC | Age: 72
End: 2017-02-03

## 2017-02-03 DIAGNOSIS — Z53.9 DIAGNOSIS NOT YET DEFINED: Primary | ICD-10-CM

## 2017-02-03 PROCEDURE — G0179 MD RECERTIFICATION HHA PT: HCPCS | Performed by: INTERNAL MEDICINE

## 2017-02-03 NOTE — TELEPHONE ENCOUNTER
Fax received from [facility] for review and signature.  Put in Dr. Ray's in basket.

## 2017-02-06 DIAGNOSIS — I10 ESSENTIAL HYPERTENSION, BENIGN: Primary | ICD-10-CM

## 2017-02-06 NOTE — TELEPHONE ENCOUNTER
VASOTEC      Last Written Prescription Date: 09/27/16  Last Fill Quantity: 90, # refills: 1  Last Office Visit with [facility], [facility] or [facility] prescribing provider: 09/27/16       POTASSIUM   Date Value Ref Range Status   01/13/2017 4.0 3.4 - 5.3 mmol/L Final     CREATININE   Date Value Ref Range Status   01/13/2017 0.73 0.66 - 1.25 mg/dL Final     BP Readings from Last 3 Encounters:   01/13/17 141/86   01/04/17 139/85   11/03/16 129/69     KLOR CON      Last Written Prescription Date: 09/27/16  Last Fill Quantity: 180, # refills: 1  Last Office Visit with [facility], [facility] or [facility] prescribing provider: 09/27/16       POTASSIUM   Date Value Ref Range Status   01/13/2017 4.0 3.4 - 5.3 mmol/L Final     CREATININE   Date Value Ref Range Status   01/13/2017 0.73 0.66 - 1.25 mg/dL Final     BP Readings from Last 3 Encounters:   01/13/17 141/86   01/04/17 139/85   11/03/16 129/69

## 2017-02-08 RX ORDER — ENALAPRIL MALEATE 5 MG/1
5 TABLET ORAL DAILY
Qty: 90 TABLET | Refills: 0 | Status: SHIPPED | OUTPATIENT
Start: 2017-02-08 | End: 2017-05-25

## 2017-02-08 RX ORDER — POTASSIUM CHLORIDE 750 MG/1
20 TABLET, EXTENDED RELEASE ORAL DAILY
Qty: 180 TABLET | Refills: 0 | Status: SHIPPED | OUTPATIENT
Start: 2017-02-08 | End: 2017-05-25

## 2017-02-16 ENCOUNTER — TELEPHONE (OUTPATIENT)
Dept: PALLIATIVE MEDICINE | Facility: CLINIC | Age: 72
End: 2017-02-16

## 2017-02-16 ENCOUNTER — TELEPHONE (OUTPATIENT)
Dept: NEUROSURGERY | Facility: CLINIC | Age: 72
End: 2017-02-16

## 2017-02-16 DIAGNOSIS — M54.16 LUMBAR RADICULAR PAIN: Primary | ICD-10-CM

## 2017-02-16 DIAGNOSIS — M54.16 LUMBAR RADICULOPATHY: Primary | ICD-10-CM

## 2017-02-16 RX ORDER — METHYLPREDNISOLONE 4 MG
TABLET, DOSE PACK ORAL
Qty: 21 TABLET | Refills: 0 | Status: SHIPPED | OUTPATIENT
Start: 2017-02-16 | End: 2017-05-25

## 2017-02-16 NOTE — TELEPHONE ENCOUNTER
Spoke to Kelli patient's wife. They would like to try medrol dose pack. Advised to also take OTC tylenol for pain , alternate ice/heat.Rx sent to [facility] in [place] and scheduling will set up appt with Dr. Blanc to discuss surgery when patient gets back from vacation. Advised to call if any further questions or concerns. Kelli verbalized understanding.

## 2017-02-16 NOTE — TELEPHONE ENCOUNTER
Pt's wife called. States pt is experiencing pain again after short-lived relief from injection. Going on vacation Saturday. Please advise.

## 2017-02-16 NOTE — TELEPHONE ENCOUNTER
I left a message with Kelli (after reviewing the schedule) letting her know it would be unlikely that there there would be an appointment available in the next 2 days but that Elier could expect to hear back from us about the injection soon.    VIVEK WestN, RN  Care Coordinator  [facility]

## 2017-02-16 NOTE — TELEPHONE ENCOUNTER
Called and left detailed message. Per Sindy Jones, MORAIMA not much we can do except prescribe medrol dose pack since he's leaving so soon for vacation. Also, offered appt with Dr. Blanc to discuss surgery. Awaiting patient call back.

## 2017-02-16 NOTE — TELEPHONE ENCOUNTER
Called and spoke to Kelli patient's wife. Patient had KYLE on 1/4/17. Got temporary relief. Pain is back. Pain is in low back and down both legs. Patient has baclofen pump. 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 Patient is leaving this Saturday 2/18/17 for Vacation for 3 weeks in [place]. Call routed to Sindy Jones CNP for recommendation.

## 2017-02-16 NOTE — TELEPHONE ENCOUNTER
Nurse Line call 2/16/17 8:28 am     Kelli De La Garza called requesting that Elier be able to repeat the Lumbar L4-5 interlaminar epidural steroid injection he had on 1/4/17. She reports it worked wonderfully until now when the low back and leg symptoms have returned. She reports they are leaving for vacation on Saturday and is hoping that he can have the injection today or tomorrow.    VIVEK WestN, RN  Care Coordinator  [facility]

## 2017-02-17 NOTE — TELEPHONE ENCOUNTER
Message left on Thursday at 1713:    Wife states that they got 2 messages about an injection. Got one message about not being able to do the injection and then a message was left about scheduling. Please call back to clarify.   ------------------  Called pt and spoke with pt's wife. Let her know the reason for 2 calls; one was for scheduling of the injection from the order that Gill sent and the other was from the nurse letting them know that an injection before this Saturday would not be possible. She said that she called Dr. Blanc's office and they did not want him to have an injection anyway before going on vacation. They gave him a steroid to try.  Kelli will schedule an injection for when they return from their 3 week vacation and if Nader is feeling much better they will cancel it.  Discussed that, in the future, it would be best for him to call the referring provider to request another injection rather than calling the Pain Clinic since we are not managing Nader's overall care. She stated understanding.     Alyce Garcia, BSN, RN-BC  Patient Care Supervisor/Care Coordinator  [facility]

## 2017-03-03 ENCOUNTER — TELEPHONE (OUTPATIENT)
Dept: INTERNAL MEDICINE | Facility: CLINIC | Age: 72
End: 2017-03-03

## 2017-03-03 NOTE — TELEPHONE ENCOUNTER
Fax received from [facility] for review and signature.  Put in Dr. Ray's in basket.

## 2017-03-06 DIAGNOSIS — Z53.9 DIAGNOSIS NOT YET DEFINED: Primary | ICD-10-CM

## 2017-03-06 PROCEDURE — G0179 MD RECERTIFICATION HHA PT: HCPCS | Performed by: INTERNAL MEDICINE

## 2017-03-14 ENCOUNTER — TELEPHONE (OUTPATIENT)
Dept: PALLIATIVE MEDICINE | Facility: CLINIC | Age: 72
End: 2017-03-14

## 2017-03-14 NOTE — TELEPHONE ENCOUNTER
Left message on patient's phone reminding patient of upcoming injection appointment. Also reminded patient to have a .  Shabnam Beltran, [facility]

## 2017-03-15 ENCOUNTER — RADIOLOGY INJECTION OFFICE VISIT (OUTPATIENT)
Dept: PALLIATIVE MEDICINE | Facility: CLINIC | Age: 72
End: 2017-03-15
Payer: COMMERCIAL

## 2017-03-15 ENCOUNTER — OFFICE VISIT (OUTPATIENT)
Dept: NEUROSURGERY | Facility: CLINIC | Age: 72
End: 2017-03-15
Attending: NEUROLOGICAL SURGERY
Payer: COMMERCIAL

## 2017-03-15 ENCOUNTER — RADIANT APPOINTMENT (OUTPATIENT)
Dept: GENERAL RADIOLOGY | Facility: CLINIC | Age: 72
End: 2017-03-15
Attending: ANESTHESIOLOGY
Payer: COMMERCIAL

## 2017-03-15 VITALS — HEART RATE: 58 BPM | SYSTOLIC BLOOD PRESSURE: 159 MMHG | DIASTOLIC BLOOD PRESSURE: 86 MMHG | OXYGEN SATURATION: 98 %

## 2017-03-15 VITALS
TEMPERATURE: 97 F | HEIGHT: 70 IN | WEIGHT: 160 LBS | OXYGEN SATURATION: 94 % | DIASTOLIC BLOOD PRESSURE: 82 MMHG | HEART RATE: 79 BPM | BODY MASS INDEX: 22.9 KG/M2 | SYSTOLIC BLOOD PRESSURE: 150 MMHG

## 2017-03-15 DIAGNOSIS — M54.16 LUMBAR RADICULOPATHY: Primary | ICD-10-CM

## 2017-03-15 DIAGNOSIS — M54.16 LUMBAR RADICULAR PAIN: Primary | ICD-10-CM

## 2017-03-15 DIAGNOSIS — M54.16 LUMBAR RADICULOPATHY: ICD-10-CM

## 2017-03-15 PROCEDURE — 62323 NJX INTERLAMINAR LMBR/SAC: CPT | Performed by: ANESTHESIOLOGY

## 2017-03-15 PROCEDURE — 99211 OFF/OP EST MAY X REQ PHY/QHP: CPT | Performed by: NURSE PRACTITIONER

## 2017-03-15 PROCEDURE — 99214 OFFICE O/P EST MOD 30 MIN: CPT | Performed by: NURSE PRACTITIONER

## 2017-03-15 ASSESSMENT — PAIN SCALES - GENERAL
PAINLEVEL: MILD PAIN (3)
PAINLEVEL: SEVERE PAIN (7)
PAINLEVEL: SEVERE PAIN (6)

## 2017-03-15 NOTE — PROGRESS NOTES
[facility]  Neurosurgery followup:    HPI: Mr. Barber is a 71 year old male that has been seen in our clinic in the past for lumbar radicular pain.  He returns today due to increased lumbar radicular pain on the left.  He states that in the morning his leg pain is very severe in the morning.  He states that he is not sure if he would be open to surgery but he has an injection this afternoon at 3pm.  He is able to walk at this time with a walker and does not feel weak.  The pt also suffers from primary lateral sclerosis as well and this affects his muscles and walking. He currently has a baclofen pump in for this as well.         Exam:  Constitutional:  Alert, well nourished, NAD.  HEENT: Normocephalic, atraumatic.   Pulm:  Without shortness of breath   CV:  No pitting edema of BLE.      Neurological:  Awake  Alert  Oriented x 3  Motor exam:        IP Q DF PF EHL  R   5  5   5   5    5  L   5  5   5   5    5     Able to spontaneously move L/E bilaterally  Sensation intact throughout all L/E dermatomes       Imaging: IMPRESSION:  1. Multilevel degenerative disc and facet disease.  2. L2-L3: Small right posterocentral disc extrusion, with mass effect  on the L3 nerve root.  3. L4-L5: Moderate-large left asymmetric disc extrusion. Mass effect  on both L5 nerve roots. Mild central stenosis. Severe left foraminal  stenosis. Moderate-severe right foraminal stenosis.     A/P: Mr. Barber is a 71 year old male that has been seen in our clinic in the past for lumbar radicular pain.  He returns today due to increased lumbar radicular pain on the left.  He states that in the morning his leg pain is very severe in the morning.  He states that he is not sure if he would be open to surgery but he has an injection this afternoon at 3pm.  He is able to walk at this time with a walker and does not feel weak.  The pt also suffers from primary lateral sclerosis as well and this affects his muscles and walking. He currently  has a baclofen pump in for this as well.  The pts MRI was reviewed in detail. He has degeneration, stenosis and a left L4-5 disc extrusion.  We discussed that if the injection does not help his pain then we would have him return to see Dr. Choco Blanc.  They agreed with the POC. The pt was also seen by Dr. Choco Blanc who agreed with the POC.        Plan:  - Please contact the clinic if pain persists at [phone]. Then schedule with Dr. Choco Blanc  - Injection today and let us know if not affective   - Call the clinic at [phone] for increased pain or any other questions and concerns.      Sindy Jones [facility]  Sp[facility] Clinic  [address] [address]    Tel [phone]  Pager [phone]

## 2017-03-15 NOTE — PATIENT INSTRUCTIONS
[facility] Procedure Discharge Instructions    Today you saw:      Dr. Gill Garcia    Your procedure:  Epidural steroid injection       Medications used:  Lidocaine (anesthetic)   Kenalog (steroid)  Omnipaque (contrast)                Be cautious when walking as numbness and/or weakness in the legs may occur up to 6-8 hours after the procedure due to effect of the local anesthetic    Do not drive for 6 hours. The effect of the local anesthetic could slow your reflexes.     Avoid strenuous activity for the first 24 hours. You may resume your regular activities after that.     You may shower, however avoid swimming, tub baths or hot tubs for 24 hours following your procedure    You may have a mild to moderate increase in pain for several days following the injection.      You may use ice packs for 10-15 minutes, 3 to 4 times a day at the injection site for comfort    Do not use heat to painful areas for 6 to 8 hours. This will give the local anesthetic time to wear off and prevent you from accidentally burning your skin.    You may use anti-inflammatory medications (such as Ibuprofen/Advil or Aleve) or Tylenol for pain control if necessary    It may take up to 14 days for the steroid medication to start working although you may feel the effect as early as a few days after the procedure.       If you experience any of the following, call the pain center nursing line during work hours at [phone] or on-call physician after hours at [phone]:  -Fever over 100 degree F  -Swelling, bleeding, redness, drainage, warmth at the injection site  -Progressive weakness or numbness in your legs or arms  -Loss of bowel or bladder function  -Unusual headache that is not relieved by Tylenol  -Unusual new onset of pain that is not improving    Phone #s:  Appointment scheduling line: [phone]

## 2017-03-15 NOTE — PATIENT INSTRUCTIONS
Plan:  - Please contact the clinic if pain persists at [phone]. Then schedule with Dr. Choco Blanc  - Injection today and let us know if not affective   - Call the clinic at [phone] for increased pain or any other questions and concerns.  - You have an extruded disc on the left at L4-5

## 2017-03-15 NOTE — MR AVS SNAPSHOT
After Visit Summary   3/15/2017    Elier Barber    MRN: [id number]           Patient Information     Date Of Birth          1945        Visit Information        Provider Department      3/15/2017 3:00 PM Gill Garcia MD [facility]        Today's Diagnoses     Lumbar radiculopathy    -  1      Care Instructions    [facility] Procedure Discharge Instructions    Today you saw:      Dr. Gill Garcia    Your procedure:  Epidural steroid injection       Medications used:  Lidocaine (anesthetic)   Kenalog (steroid)  Omnipaque (contrast)                Be cautious when walking as numbness and/or weakness in the legs may occur up to 6-8 hours after the procedure due to effect of the local anesthetic    Do not drive for 6 hours. The effect of the local anesthetic could slow your reflexes.     Avoid strenuous activity for the first 24 hours. You may resume your regular activities after that.     You may shower, however avoid swimming, tub baths or hot tubs for 24 hours following your procedure    You may have a mild to moderate increase in pain for several days following the injection.      You may use ice packs for 10-15 minutes, 3 to 4 times a day at the injection site for comfort    Do not use heat to painful areas for 6 to 8 hours. This will give the local anesthetic time to wear off and prevent you from accidentally burning your skin.    You may use anti-inflammatory medications (such as Ibuprofen/Advil or Aleve) or Tylenol for pain control if necessary    It may take up to 14 days for the steroid medication to start working although you may feel the effect as early as a few days after the procedure.       If you experience any of the following, call the pain center nursing line during work hours at [phone] or on-call physician after hours at [phone]:  -Fever over 100 degree F  -Swelling, bleeding, redness, drainage, warmth at the injection site  -Progressive  weakness or numbness in your legs or arms  -Loss of bowel or bladder function  -Unusual headache that is not relieved by Tylenol  -Unusual new onset of pain that is not improving    Phone #s:  Appointment scheduling line: [phone]        Follow-ups after your visit        Your next 10 appointments already scheduled     Mar 15, 2017  3:00 PM CDT   Radiology Injections with Gill Garcia MD   [facility] ([facility])    [address]  [address]  [place] [address]   [phone]            Mar 15, 2017  3:00 PM CDT   XR LUMBAR EPIDURAL INJECTION with [email or web address]   [facility] Xray ([facility])    [address] [facility] [address] [address]  [address]  [place] [id number] [phone]           For nerve root injection, please send or bring copies of any MRIs or other scans you have had.  Bring a list of your current medicines to your exam. (Include vitamins, minerals and over-the-counter medicines.) Leave your valuables at home.  Plan to have someone drive you home afterward.  Stop taking the following medicines (but talk to your doctor first):   If you take blood thinners, you may need to stop taking them a few days before treatment. Talk to your doctor before stopping these medicines.Stop taking Coumadin (warfarin) 3 days before treatment. Restart the day after treatment.   If you take Plavix, Ticlid, Pletal or Persantine, please ask your doctor if you should stop these medicines. You may need extra tests on the morning of your scan. You may take your other medicines as normal.  Stop all food and drink (including water) 3 hours before your test or treatment.  Please tell the doctor:   If you are allergic to X-ray dye (contrast fluid).   If you may be pregnant.  Injections take about 30 to 45 minutes. Most people spend up to 2 hours in the clinic or hospital.  Please call the Imaging Department at your exam site with any questions             May 18, 2017 10:00 AM CDT   PFT VISIT with ADAM CHUN   [facility] Pulmonary Function Testing ([facility])    [address] [place] [address] [phone]            May 18, 2017 10:15 AM CDT   (Arrive by 10:00 AM)   Return ALS/Motor [place] with Gary Tejada MD   [facility] Neurology ([facility])    [address] [place] [address] [phone]              Who to contact     If you have questions or need follow up information about today's clinic visit or your schedule please contact [facility] directly at [phone].  Normal or non-critical lab and imaging results will be communicated to you by Evverhart, letter or phone within 4 business days after the clinic has received the results. If you do not hear from us within 7 days, please contact the clinic through Loandeskt or phone. If you have a critical or abnormal lab result, we will notify you by phone as soon as possible.  Submit refill requests through FiNC or call your pharmacy and they will forward the refill request to us. Please allow 3 business days for your refill to be completed.          Additional Information About Your Visit        FiNC Information     Fi[facility] gives you secure access to your electronic health record. If you see a primary care provider, you can also send messages to your care team and make appointments. If you have questions, please call your primary care clinic.  If you do not have a primary care provider, please call [phone] and they will assist you.        Care EveryWhere ID     This is your Care EveryWhere ID. This could be used by other organizations to access your [place] medical records  [id number]        Your Vitals Were     Pulse Pulse Oximetry                66 95%           Blood Pressure from Last 3 Encounters:   03/15/17 136/75   03/15/17 150/82   01/13/17 141/86    Weight from Last 3 Encounters:    03/15/17 72.6 kg (160 lb)   11/03/16 73.5 kg (162 lb)   09/27/16 71.7 kg (158 lb)              Today, you had the following     No orders found for display       Primary Care Provider Office Phone # Fax #    Lida Ray [phone]       [facility] [address] [facility] [address]  [facility] [address]        Thank you!     Thank you for choosing [facility]  for your care. Our goal is always to provide you with excellent care. Hearing back from our patients is one way we can continue to improve our services. Please take a few minutes to complete the written survey that you may receive in the mail after your visit with us. Thank you!             Your Updated Medication List - Protect others around you: Learn how to safely use, store and throw away your medicines at www.disposemymeds.org.          This list is accurate as of: 3/15/17  2:17 PM.  Always use your most recent med list.                   Brand Name Dispense Instructions for use    albuterol 108 (90 BASE) MCG/ACT Inhaler    PROAIR HFA/PROVENTIL HFA/VENTOLIN HFA    1 Inhaler    Inhale 2 puffs into the lungs every 6 hours as needed for shortness of breath / dyspnea or wheezing       ASPIRIN PO      Take 325 mg by mouth       baclofen (LIORESAL) in NaCl 0.9% 100 mL intrathecal infusion      by Intrathecal route continuous Pump filled by [facility] [phone] Pump Serial Number: [id number], Pump Model Number: 8637-20 Last fill:  3/25/2014 Next fill: 6/10/2014 [place] Alarm Date: 6/28/2014 [place]erv[place] Volume: 4 mL Conc: 2000 mcg/ml Flex schedule delivers 264.8 mcg/day       enalapril 5 MG tablet    VASOTEC    90 tablet    Take 1 tablet (5 mg) by mouth daily       EPINEPHrine 0.3 MG/0.3ML injection    EPIPEN    1 each    Inject 0.3 mLs (0.3 mg) into the muscle once as needed for anaphylaxis       gentamicin 0.1 % ointment    GARAMYCIN     Apply topically 2 times daily       ketoconazole 2  % shampoo    NIZORAL    120 mL    Shampoo every 2-3 days as needed       methylPREDNISolone 4 MG tablet    MEDROL DOSEPAK    21 tablet    Follow package instructions       oxybutynin 10 MG 24 hr tablet    DITROPAN XL    90 tablet    Take 1 tablet (10 mg) by mouth daily       potassium chloride 10 MEQ tablet    K-TAB,KLOR-CON    180 tablet    Take 2 tablets (20 mEq) by mouth daily GIVE THE GENERIC: THIS IS NOT A REQUEST FOR BRAND NAME

## 2017-03-15 NOTE — NURSING NOTE
Injection intake:    If this procedure is requiring IV sedation has patient been NPO for 6  Hours? NA    Is patient on coumadin, plavix or other prescribed blood thinner?   No    If patient is on coumadin was it held for 5 days?   NA    If patient is on plavix was it held for 7 days?    NA     Does patient take aspirin?  Yes -   ASA    If this is for a cervical procedure and patient is on aspirin has it been held for 6 days?   NA    Any allergies to contrast dye, iodine, steroid and/or numbing medications?  NO    Is patient currently taking antibiotics or have an active infection?  NO    Does patient have a ? Yes       Is patient pregnant or breastfeeding?  Not Applicable    Are the vital signs normal?  Yes

## 2017-03-15 NOTE — MR AVS SNAPSHOT
After Visit Summary   3/15/2017    Elier Barber    MRN: [id number]           Patient Information     Date Of Birth          1945        Visit Information        Provider Department      3/15/2017 1:00 PM Choco Blanc MD [facility]        Today's Diagnoses     Lumbar radicular pain    -  1      Care Instructions    Plan:  - Please contact the clinic if pain persists at [phone]. Then schedule with Dr. Choco Blanc  - Injection today and let us know if not affective   - Call the clinic at [phone] for increased pain or any other questions and concerns.  - You have an extruded disc on the left at L4-5               Follow-ups after your visit        Your next 10 appointments already scheduled     Mar 15, 2017  3:00 PM CDT   Radiology Injections with Gill Garcia MD   [facility] ([facility])    [address] [facility]  [address] [place] [id number] [phone]            Mar 15, 2017  3:00 PM CDT   XR LUMBAR EPIDURAL INJECTION with [email or web address]   [facility] ([facility])    [address] [facility]  [address] [place] [id number]   [phone]           For nerve root injection, please send or bring copies of any MRIs or other scans you have had.  Bring a list of your current medicines to your exam. (Include vitamins, minerals and over-the-counter medicines.) Leave your valuables at home.  Plan to have someone drive you home afterward.  Stop taking the following medicines (but talk to your doctor first):   If you take blood thinners, you may need to stop taking them a few days before treatment. Talk to your doctor before stopping these medicines.Stop taking Coumadin (warfarin) 3 days before treatment. Restart the day after treatment.   If you take Plavix, Ticlid, Pletal or Persantine, please ask your doctor if you should stop these medicines. You may need extra  tests on the morning of your scan. You may take your other medicines as normal.  Stop all food and drink (including water) 3 hours before your test or treatment.  Please tell the doctor:   If you are allergic to X-ray dye (contrast fluid).   If you may be pregnant.  Injections take about 30 to 45 minutes. Most people spend up to 2 hours in the clinic or hospital.  Please call the Imaging Department at your exam site with any questions            May 18, 2017 10:00 AM CDT   PFT VISIT with  PFL C   [facility] Pulmonary Function Testing ([facility])    [address] [place] [id number] [phone]            May 18, 2017 10:15 AM CDT   (Arrive by 10:00 AM)   Return ALS/Motor Neuron with Gary Tejada MD   [facility] Neurology ([facility])    [address] [place] [address] [phone]              Who to contact     If you have questions or need follow up information about today's clinic visit or your schedule please contact [facility] directly at [phone].  Normal or non-critical lab and imaging results will be communicated to you by [employer], letter or phone within 4 business days after the clinic has received the results. If you do not hear from us within 7 days, please contact the clinic through [employer] or phone. If you have a critical or abnormal lab result, we will notify you by phone as soon as possible.  Submit refill requests through [employer] or call your pharmacy and they will forward the refill request to us. Please allow 3 business days for your refill to be completed.          Additional Information About Your Visit        [employer] Information     [employer] gives you secure access to your electronic health record. If you see a primary care provider, you can also send messages to your care team and make appointments. If you have questions, please call your primary care clinic.  If you  "do not have a primary care provider, please call [phone] and they will assist you.        Care EveryWhere ID     This is your Care EveryWhere ID. This could be used by other organizations to access your [facility] medical records  [id number]        Your Vitals Were     Pulse Temperature Height Pulse Oximetry BMI (Body Mass Index)       79 97  F (36.1  C) (Oral) 5' 10\" (1.778 m) 94% 22.96 kg/m2        Blood Pressure from Last 3 Encounters:   03/15/17 150/82   01/13/17 141/86   01/04/17 139/85    Weight from Last 3 Encounters:   03/15/17 160 lb (72.6 kg)   11/03/16 162 lb (73.5 kg)   09/27/16 158 lb (71.7 kg)              Today, you had the following     No orders found for display       Primary Care Provider Office Phone # Fax #    Lida Ray [phone]       [facility] [address]  [facility] [address]        Thank you!     Thank you for choosing [facility]  for your care. Our goal is always to provide you with excellent care. Hearing back from our patients is one way we can continue to improve our services. Please take a few minutes to complete the written survey that you may receive in the mail after your visit with us. Thank you!             Your Updated Medication List - Protect others around you: Learn how to safely use, store and throw away your medicines at www.disposemymeds.org.          This list is accurate as of: 3/15/17  1:27 PM.  Always use your most recent med list.                   Brand Name Dispense Instructions for use    albuterol 108 (90 BASE) MCG/ACT Inhaler    PROAIR HFA/PROVENTIL HFA/VENTOLIN HFA    1 Inhaler    Inhale 2 puffs into the lungs every 6 hours as needed for shortness of breath / dyspnea or wheezing       ASPIRIN PO      Take 325 mg by mouth       baclofen (LIORESAL) in NaCl 0.9% 100 mL intrathecal infusion      by Intrathecal route continuous Pump filled by [facility] [phone] " Pump Serial Number: [id number], Pump Model Number: 8637-20 Last fill:  3/25/2014 Next fill: 6/10/2014 Low [place] Alarm Date: 6/28/2014 Reservoir Volume: 4 mL Conc: 2000 mcg/ml Flex schedule delivers 264.8 mcg/day       enalapril 5 MG tablet    VASOTEC    90 tablet    Take 1 tablet (5 mg) by mouth daily       EPINEPHrine 0.3 MG/0.3ML injection    EPIPEN    1 each    Inject 0.3 mLs (0.3 mg) into the muscle once as needed for anaphylaxis       gentamicin 0.1 % ointment    GARAMYCIN     Apply topically 2 times daily       ketoconazole 2 % shampoo    NIZORAL    120 mL    Shampoo every 2-3 days as needed       methylPREDNISolone 4 MG tablet    MEDROL DOSEPAK    21 tablet    Follow package instructions       oxybutynin 10 MG 24 hr tablet    DITROPAN XL    90 tablet    Take 1 tablet (10 mg) by mouth daily       potassium chloride 10 MEQ tablet    K-TAB,KLOR-CON    180 tablet    Take 2 tablets (20 mEq) by mouth daily GIVE THE GENERIC: THIS IS NOT A REQUEST FOR BRAND NAME

## 2017-03-15 NOTE — NURSING NOTE
Discharge Information    IV Discontiued Time:  NA    Amount of Fluid Infused:  NA    Discharge Criteria = When patient returns to baseline or as per MD order    Consciousness:  Pt is fully awake    Circulation:  BP +/- 20% of pre-procedure level    Respiration:  Patient is able to breathe deeply    O2 Sat:  Patient is able to maintain O2 Sat >92% on room air    Activity:  Moves 4 extremities on command    Ambulation:  Patient is able to stand and walk or stand and pivot into wheelchair    Dressing:  Clean/dry or No Dressing    Notes:   Discharge instructions and AVS given to patient    Patient meets criteria for discharge?  YES    Admitted to PCU?  No    Responsible adult present to accompany patient home?  Yes    Signature/Title:    Jenny Ybarra RN Care Coordinator  [facility]

## 2017-03-15 NOTE — PROGRESS NOTES
[facility] - Procedure Note    Date of Visit: 3/15/2017    Procedure performed: Lumbar L4-5 interlaminar epidural steroid injection  Diagnosis: Lumbar spondylosis; Lumbar radiculitis/radiculopathy  : Gill Garcia MD   Anesthesia: none    Indications: Elier Barber is a 71 year old male who is seen at the request of Sindy Jones CNP for a lumbar epidural steroid injection. The patient describes low back pain that radiates into the left leg. The patient has been exhibiting symptoms consistent with lumbar intraspinal inflammation and radiculopathy. Symptoms have been persistent, disabling, and intermittently severe. The patient reports minimal improvement with conservative treatment, including PT and medications.  Previous lumbar KYLE about a year ago at an outside facility gave good relief until now.  Previous L4-5 interlaminar KYLE on 1/4/2017 gave him great pain relief for about 2 months.     Lumbar MRI was done on 1/14/2016 which showed   FINDINGS:  Five lumbar vertebrae are assumed. There are multilevel  degenerative disc disease changes, most prominent at L2-L3, L3-L4.  There is grade 1 degenerative anterolisthesis at L4-L5, L5-S1.     Findings by specific level:     There is facet degenerative change as follows:       Mild bilateral L1-L2, mild bilateral L3-L4, moderate bilateral L4-L5,  prominent right and moderate left L5-S1.     T12-L1: No disc herniation or stenosis.     L1-L2: Mild ligamentum flavum thickening. Left asymmetric disc bulging  including a foraminal component. There is mild-moderate left foraminal  stenosis. The right neural foramen appears adequate and there is no  significant central stenosis.     L2-L3: Disc bulging and osteophytosis with bilateral foraminal  osteophyte-disc complexes. There is a small right posterocentral disc  extrusion with caudal extension of disc material 1.1 cm below the disc  level. There is some mass effect with respect to the right L3  "nerve  root. There is no significant overall central stenosis. There is  moderate to moderate-severe bilateral foraminal stenosis.     L3-L4: Disc bulging and osteophytosis with bilateral foraminal  osteophyte-disc complexes. Ligamentum flavum thickening. No  significant overall central stenosis. Moderate to moderate-severe  bilateral foraminal stenosis.     L4-L5: Ligamentum flavum flavum thickening. Centrally there is a  broad-based disc herniation which is slightly right asymmetric. In the  left parasagittal region, there is cranial extension of disc material  extending 1.6 cm above the disc level. There is some mass effect with  respect to both L5 nerve roots. There is disc bulging elsewhere  including foraminal components. There is mild central stenosis. There  is severe left foraminal stenosis medially. There is moderate-severe  right foraminal stenosis. There is some synovial cyst formation at the  posterior aspect of the left facet joint.     L5-S1: Mild disc bulging. There is mild synovial cyst formation at the  anterior aspect of the left facet joint. This contributes to moderate  left foraminal stenosis. There is no central or right foraminal  stenosis.       IMPRESSION:  1. Multilevel degenerative disc and facet disease.  2. L2-L3: Small right posterocentral disc extrusion, with mass effect  on the L3 nerve root.  3. L4-L5: Moderate-large left asymmetric disc extrusion. Mass effect  on both L5 nerve roots. Mild central stenosis. Severe left foraminal  stenosis. Moderate-severe right foraminal stenosis.    Allergies:      Allergies   Allergen Reactions     Bee Venom      Penicillins      \"HIVES\"        Vitals:  /75  Pulse 66  SpO2 95%    Review of Systems: The patient denies recent fever, chills, illness, use of antibiotics or anticoagulants. All other 10-point review of systems negative.     Procedure: The procedure and risks were explained, and informed written consent was obtained from the " patient. Risks include but are not limited to: infection, bleeding, increased pain, and damage to soft tissue, nerve, muscle, and vasculature structures. After getting informed consent, patient was brought into the procedure suite and was placed in a prone position on the procedure table. A Pause for the Cause was performed. Patient was prepped and draped in sterile fashion.     The L4-5 interspace was identified with use of fluoroscopy in AP view. A 25-gauge, 1.5 inch needle was used to anesthetize the skin and subcutaneous tissue entry site with a total of 2 ml of 1% lidocaine. Under fluoroscopic visualization, a 22-gauge, 4.5 inch Tuohy epidural needle was slowly advanced towards the epidural space a few millimeters left of midline. The latter part of the needle advancement was guided with fluoroscopy in the lateral view. The epidural space was identified using loss of resistance technique. After negative aspiration for heme and cerebrospinal fluid, a total of 1 mL of non-ionic contrast was injected to confirm needle placement with 9 mL of contrast wasted. Epidurogram confirmed spread within the posterior epidural space. 2 ml of 40mg/ml of triamcinolone, 2 ml of 1% lidocaine, and 1 ml of preservative free saline was injected. The needle was removed.  Images were saved to PACS.    The patient tolerated the procedure well, and there was no evidence of procedural complications. No new sensory or motor deficits were noted following the procedure. The patient was stable and able to ambulate on discharge home. Post-procedure instructions were provided.     Pre-procedure pain score: 3/10 in the back, 6/10 in the leg  Post-procedure pain score: 3/10 in the back, 5/10 in the leg    Assessment/Plan: Elier Barber is a 71 year old male s/p lumbar interlaminar epidural steroid injection today for lumbar spondylosis and radiculitis/radiculopathy.     1. Following today's procedure, the patient was advised to contact the  [facility] for any of the following:   Fever, chills, or night sweats   New onset of pain, numbness, or weakness   Any questions/concerns regarding the procedure  If unable to contact the Pain Center, the patient was instructed to go to a local Emergency Room for any complications.   2. The patient will receive a follow-up call in 1 week.   3. Follow-up with Dr. Blanc's office in 2 weeks for post-procedure evaluation.    [facility] 3/15/2017

## 2017-03-15 NOTE — NURSING NOTE
"Elier Barber is a 71 year old male who presents for:  Chief Complaint   Patient presents with     Neurologic Problem     Low back and left leg pain, trouble walking         Initial Vitals:  /82 (BP Location: Right arm)  Pulse 79  Temp 97  F (36.1  C) (Oral)  Ht 5' 10\" (1.778 m)  Wt 160 lb (72.6 kg)  SpO2 94%  BMI 22.96 kg/m2 Estimated body mass index is 22.96 kg/(m^2) as calculated from the following:    Height as of this encounter: 5' 10\" (1.778 m).    Weight as of this encounter: 160 lb (72.6 kg).. Body surface area is 1.89 meters squared. BP completed using cuff size: regular  Severe Pain (7)    Do you feel safe in your environment?  Yes  Do you need any refills today? No    Nursing Comments: Low back and left leg pain, trouble walking.  Patient rates his pain today as 7      5 min. nursing intake time  Sridevi Melendez MA       Discharge plan: - Please contact the clinic if pain persists at [phone]. Then schedule with Dr. Choco Blanc  - Injection today and let us know if not affective   - Call the clinic at [phone] for increased pain or any other questions and concerns.  - You have an extruded disc on the left at L4-5   2 min. nursing discharge time  Sridevi Melendez MA        "

## 2017-03-22 ENCOUNTER — TELEPHONE (OUTPATIENT)
Dept: PALLIATIVE MEDICINE | Facility: CLINIC | Age: 72
End: 2017-03-22

## 2017-03-22 NOTE — TELEPHONE ENCOUNTER
Patient had a lumbar epidural injection on 03/15/17.  Called patient for an update.      Pt reported the following details:  Patient states that he is feeling about 80% pain relief.   Told patient that the information will be forwarded to her provider.  Also explained that, if a steroid medication was used, it could take up to 14 days to feel the full effect and if pt has any further questions or concerns pt should call the nurse line at [phone].    Radha MEHTA)

## 2017-03-29 ENCOUNTER — TELEPHONE (OUTPATIENT)
Dept: INTERNAL MEDICINE | Facility: CLINIC | Age: 72
End: 2017-03-29

## 2017-03-29 NOTE — TELEPHONE ENCOUNTER
Fax received from [facility] for review and signature.  Put in Dr. Ray's in basket.

## 2017-04-03 ENCOUNTER — DOCUMENTATION ONLY (OUTPATIENT)
Dept: CARE COORDINATION | Facility: CLINIC | Age: 72
End: 2017-04-03

## 2017-04-03 NOTE — PROGRESS NOTES
[facility] now requests orders and shares plan of care/discharge summaries for some patients through [employer]  Please REPLY TO THIS MESSAGE in order to give authorization for orders when needed.  This is considered a verbal order, you will still receive a faxed copy of orders for signature.  Thank you for your assistance in improving collaboration for our patients.    ORDER  PT 1w9 for strength, gait, balance, flexibility    MD SUMMARY/PLAN OF CARE   Patient being seen in PT for strength, gait, balance, flexibility, education, HEP.  Patient very motivated and consistent with completing HEP daily.  progress limited due to nature of disease and he continues to have functional weakness more noticable on the left and significant tightness in lower extremities that effects foot clearance.  patient  would benefit from continued PT to maintain/improve ROM and strength, ability to perform transfers and ambulation and to continue to improve balance/stability.

## 2017-04-04 ENCOUNTER — TELEPHONE (OUTPATIENT)
Dept: NEUROSURGERY | Facility: CLINIC | Age: 72
End: 2017-04-04

## 2017-04-04 DIAGNOSIS — M54.16 LUMBAR RADICULOPATHY: Primary | ICD-10-CM

## 2017-04-06 ENCOUNTER — TELEPHONE (OUTPATIENT)
Dept: INTERNAL MEDICINE | Facility: CLINIC | Age: 72
End: 2017-04-06

## 2017-04-06 NOTE — TELEPHONE ENCOUNTER
Fax received from [facility] for review and signature.  Put in Dr. Ray's in basket.

## 2017-04-13 ENCOUNTER — THERAPY VISIT (OUTPATIENT)
Dept: PHYSICAL THERAPY | Facility: CLINIC | Age: 72
End: 2017-04-13
Payer: COMMERCIAL

## 2017-04-13 DIAGNOSIS — M54.42 CHRONIC BILATERAL LOW BACK PAIN WITH LEFT-SIDED SCIATICA: Primary | ICD-10-CM

## 2017-04-13 DIAGNOSIS — G89.29 CHRONIC BILATERAL LOW BACK PAIN WITH LEFT-SIDED SCIATICA: Primary | ICD-10-CM

## 2017-04-13 PROCEDURE — 97110 THERAPEUTIC EXERCISES: CPT | Mod: GP | Performed by: PHYSICAL THERAPIST

## 2017-04-13 PROCEDURE — G8978 MOBILITY CURRENT STATUS: HCPCS | Mod: GP | Performed by: PHYSICAL THERAPIST

## 2017-04-13 PROCEDURE — 97161 PT EVAL LOW COMPLEX 20 MIN: CPT | Mod: GP | Performed by: PHYSICAL THERAPIST

## 2017-04-13 PROCEDURE — G8979 MOBILITY GOAL STATUS: HCPCS | Mod: GP | Performed by: PHYSICAL THERAPIST

## 2017-04-13 NOTE — PROGRESS NOTES
Subjective:    Patient is a 71 year old male presenting with History Reported by Patient and rehab back hpi.           Pt reports an insidious onset of LBP with left LE pain the end of December 2016 (12/27/16).  He received a cortizone injection in his back on 1/4/17 and his sxs resolved for 6 weeks.  They returned and he had a second injection on 3/14/16 that did not help as much.  His sxs have since returned, but are not as bad as initially.  He describes intermittent LBP and stiffness with intermittent pain in the lateral left calf.  Worse in the morning or if walking or standing too long..    Patient reports pain:  Lower lumbar spine.    Pain is described as aching and is intermittent and reported as 5/10.  Associated symptoms:  Loss of motion/stiffness. Pain is worse in the A.M..  Symptoms are exacerbated by walking and standing and relieved by activity/movement.          General health as reported by patient is fair.                  Barriers include:  Requires assistance with ADL's.    Red flags:  None as reported by the patient.                        Objective:      Gait:    Assistive Devices:  Walker      Flexibility/Screens:   Negative screens: Hip     Lower Extremity:  Decreased left lower extremity flexibility:Quadriceps; Hamstrings and Gastroc    Decreased right lower extremity flexibility:  Quadriceps; Hamstrings and Gastroc               Lumbar/SI Evaluation  ROM:    AROM Lumbar:   Flexion:          Full.  Repeated flexion in sitting and lying abolish the leg and LBP  Ext:                    Significant loss   Side Bend:        Left:  Moderate loss    Right:  Moderate loss  Rotation:           Left:     Right:   Side Glide:        Left:     Right:           Lumbar Myotomes:  not assessed            Lumbar DTR's:  not assessed          Neural Tension/Mobility:  Lumbar:  Normal                                                             General     ROS    Assessment/Plan:      Patient is a 71 year old  male with lumbar complaints.    Patient has the following significant findings with corresponding treatment plan.                Diagnosis 1:  Lumbar stenosis with left LE radiculopathy  Pain -  hot/cold therapy, education and home program  Decreased ROM/flexibility - manual therapy, therapeutic exercise and home program  Decreased strength - therapeutic exercise, therapeutic activities and home program    Therapy Evaluation Codes:   1) History comprised of:   Personal factors that impact the plan of care:      None.    Comorbidity factors that impact the plan of care are:      Stroke and PLS.     Medications impacting care: None.  2) Examination of Body Systems comprised of:   Body structures and functions that impact the plan of care:      Lumbar spine.   Activity limitations that impact the plan of care are:      Standing and Walking.  3) Clinical presentation characteristics are:   Stable/Uncomplicated.  4) Decision-Making    Low complexity using standardized patient assessment instrument and/or measureable assessment of functional outcome.  Cumulative Therapy Evaluation is: Low complexity.    Previous and current functional limitations:  (See Goal Flow Sheet for this information)    Short term and Long term goals: (See Goal Flow Sheet for this information)     Communication ability:  Patient appears to be able to clearly communicate and understand verbal and written communication and follow directions correctly.  Treatment Explanation - The following has been discussed with the patient:   RX ordered/plan of care  Anticipated outcomes  Possible risks and side effects  This patient would benefit from PT intervention to resume normal activities.   Rehab potential is good.    Frequency:  1 X week, once daily  Duration:  for 4 weeks  Discharge Plan:  Achieve all LTG.  Independent in home treatment program.  Reach maximal therapeutic benefit.    Please refer to the daily flowsheet for treatment today, total treatment  time and time spent performing 1:1 timed codes.           Answers for HPI/ROS submitted by the patient on 4/10/2017   Where?: for unknown reasons, other  Number scale: 5/10  Pain quality: aching  Frequency: intermittent  Pain is: worse in the A.M.  Progression since onset: unchanged  Special tests: x-ray, MRI  Previous treatment: physical therapy, chiropractic  Improvement with previous treatment: moderate  General health as reported by patient: fair  Please check all that apply to your current or past medical history: high blood pressure, stroke, implanted device, other  Other surgeries: cancer surgery, other  Medications you are currently taking: high blood pressure medication, other  Medical allergies: yes  What are your primary job tasks: prolonged sitting, other  Red flags: progressive neurological deficits, incontinence

## 2017-04-19 ENCOUNTER — THERAPY VISIT (OUTPATIENT)
Dept: PHYSICAL THERAPY | Facility: CLINIC | Age: 72
End: 2017-04-19
Payer: COMMERCIAL

## 2017-04-19 DIAGNOSIS — M54.42 CHRONIC BILATERAL LOW BACK PAIN WITH LEFT-SIDED SCIATICA: ICD-10-CM

## 2017-04-19 DIAGNOSIS — G89.29 CHRONIC BILATERAL LOW BACK PAIN WITH LEFT-SIDED SCIATICA: ICD-10-CM

## 2017-04-19 PROCEDURE — 97010 HOT OR COLD PACKS THERAPY: CPT | Mod: GP | Performed by: PHYSICAL THERAPIST

## 2017-04-19 PROCEDURE — 97110 THERAPEUTIC EXERCISES: CPT | Mod: GP | Performed by: PHYSICAL THERAPIST

## 2017-04-19 PROCEDURE — 97140 MANUAL THERAPY 1/> REGIONS: CPT | Mod: GP | Performed by: PHYSICAL THERAPIST

## 2017-04-27 ENCOUNTER — THERAPY VISIT (OUTPATIENT)
Dept: PHYSICAL THERAPY | Facility: CLINIC | Age: 72
End: 2017-04-27
Payer: COMMERCIAL

## 2017-04-27 DIAGNOSIS — M54.42 CHRONIC BILATERAL LOW BACK PAIN WITH LEFT-SIDED SCIATICA: ICD-10-CM

## 2017-04-27 DIAGNOSIS — G89.29 CHRONIC BILATERAL LOW BACK PAIN WITH LEFT-SIDED SCIATICA: ICD-10-CM

## 2017-04-27 PROCEDURE — 97010 HOT OR COLD PACKS THERAPY: CPT | Mod: GP | Performed by: PHYSICAL THERAPIST

## 2017-04-27 PROCEDURE — 97110 THERAPEUTIC EXERCISES: CPT | Mod: GP | Performed by: PHYSICAL THERAPIST

## 2017-05-04 ASSESSMENT — ENCOUNTER SYMPTOMS
DECREASED APPETITE: 0
POLYDIPSIA: 0
POLYPHAGIA: 0
JOINT SWELLING: 0
MYALGIAS: 1
FATIGUE: 0
NECK PAIN: 1
BACK PAIN: 1
INCREASED ENERGY: 0
DYSURIA: 0
STIFFNESS: 1
CHILLS: 0
ALTERED TEMPERATURE REGULATION: 0
POOR WOUND HEALING: 0
MUSCLE WEAKNESS: 0
WEIGHT LOSS: 0
ARTHRALGIAS: 0
NAIL CHANGES: 0
DIFFICULTY URINATING: 0
MUSCLE CRAMPS: 0
NIGHT SWEATS: 0
SKIN CHANGES: 0
FEVER: 0
HEMATURIA: 0
HALLUCINATIONS: 0
FLANK PAIN: 0
WEIGHT GAIN: 0

## 2017-05-09 PROBLEM — M54.42 CHRONIC BILATERAL LOW BACK PAIN WITH LEFT-SIDED SCIATICA: Status: RESOLVED | Noted: 2017-04-13 | Resolved: 2017-05-09

## 2017-05-09 PROBLEM — G89.29 CHRONIC BILATERAL LOW BACK PAIN WITH LEFT-SIDED SCIATICA: Status: RESOLVED | Noted: 2017-04-13 | Resolved: 2017-05-09

## 2017-05-09 NOTE — PROGRESS NOTES
Subjective:    HPI                    Objective:    System    Physical Exam    General     ROS    Assessment/Plan:      DISCHARGE REPORT    Progress reporting period is from 4/13/17 to 4/27/17 (3 visits).       SUBJECTIVE  Subjective changes noted by patient:    Subjective: Most mornings is having pain in the left leg.  Resolves in approximately 2 hours.  Saw chiropractor today and last Tuesday.  He felt significantly better after the first appointment on Tuesday and had no back or leg pain the next morning.      Current pain level is NA  .     Previous pain level was   Initial Pain level: 5/10.   Changes in function:  Yes (See Goal flowsheet attached for changes in current functional level)  Adverse reaction to treatment or activity: None    OBJECTIVE  Changes noted in objective findings:  Significant improvement in pain level with and ability to perform transfers.  Trunk ROM was painfree.        ASSESSMENT/PLAN  Updated problem list and treatment plan: Diagnosis 1:  LBP with left LE radiculopathy    STG/LTGs have been met or progress has been made towards goals:  Yes (See Goal flow sheet completed today.)  Assessment of Progress: The patient's condition is improving.  Self Management Plans:  Patient has been instructed in a home treatment program.    Elier continues to require the following intervention to meet STG and LTG's:  PT intervention is no longer required to meet STG/LTG.    Recommendations:  This patient is ready to be discharged from therapy and continue their home treatment program.    Please refer to the daily flowsheet for treatment today, total treatment time and time spent performing 1:1 timed codes.

## 2017-05-12 ENCOUNTER — TRANSFERRED RECORDS (OUTPATIENT)
Dept: HEALTH INFORMATION MANAGEMENT | Facility: CLINIC | Age: 72
End: 2017-05-12

## 2017-05-12 ENCOUNTER — TELEPHONE (OUTPATIENT)
Dept: NEUROLOGY | Facility: CLINIC | Age: 72
End: 2017-05-12

## 2017-05-12 NOTE — TELEPHONE ENCOUNTER
"I called Nader after receiving an email about worsening weakness. I spoke with him and Kelli by phone. They report the following:    January - radicular pain; responded to KYLE. Recurrent LBP; KYLE on March 15; felt \"blah\" several days later. Dr Calvert reduced baclofen dose, thinking it was due to excess baclofen effect. This seemed to help, but he has since increased baclofen dose in two increments. Because of persistent leg pain he subsequently saw a chiropractor - adjustment; pain resolved. Massage, pain returned. This was a month ago. Another adjustment - felt better (leg pain better). A week ago another adjustment and long massage.    Since then he has had progressive difficulty with tightness in hamstrings and leg \"spasms,\" then buckling when standing. One fall and several near-falls and he and his wife do not feel he is safe at home. No intercurrent illnesses, no general medical symptoms, modest increase in urinary incontinence, no sensory symptoms, no upper extremity symptoms, no LBP. Kelli felt he may have hurt his neck with a fall last week but he has no neck pain and no pain with neck flexion. Also has some swelling in the feet and possibly in one calf after a car trip to [place]. Symptoms worsening from day to day.    He will go to the ED and chose to go to [facility]. Will likely need inpatient eval and management. Discussed with [facility] ED staff.  "

## 2017-05-13 ENCOUNTER — TRANSFERRED RECORDS (OUTPATIENT)
Dept: HEALTH INFORMATION MANAGEMENT | Facility: CLINIC | Age: 72
End: 2017-05-13

## 2017-05-18 ENCOUNTER — OFFICE VISIT (OUTPATIENT)
Dept: NEUROLOGY | Facility: CLINIC | Age: 72
End: 2017-05-18

## 2017-05-18 ENCOUNTER — THERAPY VISIT (OUTPATIENT)
Dept: SPEECH THERAPY | Facility: CLINIC | Age: 72
End: 2017-05-18

## 2017-05-18 ENCOUNTER — DOCUMENTATION ONLY (OUTPATIENT)
Dept: CARE COORDINATION | Facility: CLINIC | Age: 72
End: 2017-05-18

## 2017-05-18 ENCOUNTER — THERAPY VISIT (OUTPATIENT)
Dept: OCCUPATIONAL THERAPY | Facility: CLINIC | Age: 72
End: 2017-05-18

## 2017-05-18 VITALS
DIASTOLIC BLOOD PRESSURE: 82 MMHG | HEART RATE: 58 BPM | WEIGHT: 160 LBS | SYSTOLIC BLOOD PRESSURE: 154 MMHG | BODY MASS INDEX: 22.9 KG/M2 | OXYGEN SATURATION: 99 % | HEIGHT: 70 IN

## 2017-05-18 DIAGNOSIS — G12.21 ALS (AMYOTROPHIC LATERAL SCLEROSIS) (H): ICD-10-CM

## 2017-05-18 DIAGNOSIS — R13.12 OROPHARYNGEAL DYSPHAGIA: ICD-10-CM

## 2017-05-18 DIAGNOSIS — G12.21 ALS (AMYOTROPHIC LATERAL SCLEROSIS) (H): Primary | ICD-10-CM

## 2017-05-18 DIAGNOSIS — R47.1 DYSARTHRIA: Primary | ICD-10-CM

## 2017-05-18 RX ORDER — OXYCODONE AND ACETAMINOPHEN 5; 325 MG/1; MG/1
TABLET ORAL EVERY 4 HOURS PRN
COMMUNITY
End: 2017-11-02

## 2017-05-18 ASSESSMENT — REVISED AMYOTROPHIC LATERAL SCLEROSIS FUNCTIONAL RATING SCALE (ALSFRS-R)
BULBAR_SUBTOTAL: 10
SALIVATION: 4
SALIVATION: 4 - NORMAL
ORTHOPENA: 4
HANDWRITING: 3 - SLOW OR SLOPPY: ALL WORDS ARE LEGIBLE
TURNING_IN_BED_AND_ADJUSTING_BED_CLOTHES: 3
RESPIRATORY_INSUFFICIENCY: 4
SPEECH: 2
RESPIRATORY_INSUFFICIENCY: 4 - NONE
RESPIRATORY_SUBTOTAL_SCORE: 12
SIX_ITEM_SUBTOTAL: 15
SWALLOWING: 4
CLIMBING_STAIRS: 1
DRESSING_AND_HYGEINE: 1 - NEEDS ATTENDANT FOR SELF-CARE
WALKING: 2
DYSPNEA: 4
CLIMBING_STAIRS: 1 - NEEDS ASSISTANCE
TURNING_IN_BED_AND_ADJUSTING_BED_CLOTHES: 3 - SOMEWHAT SLOW AND CLUMSY, BUT NO HELP NEEDED
ORTHOPENA: 4 - NONE
DYSPNEA: 4 - NONE
SWALLOWING: 4 - NORMAL EATING HABITS
FINE_MOTOR_SUBTOTAL_SCORE: 7
WALKING: 2 - WALKS WITH ASSISTANCE
GROSS_MOTOR_SUBTOTAL_SCORE: 6
CUTTING_FOOD_AND_HANDLING_UTENSILES: 3
ALSFRS_TOTAL_SCORE: 35
DRESSING_AND_HYGEINE: 1
SPEECH: 2 - INTELLIGIBLE WITH REPEATING
HANDWRITING: 3

## 2017-05-18 NOTE — PATIENT INSTRUCTIONS
Home Accessibility  and Design and Equipment Resources  Resource information provided by The ALS Association, [place] Chapter does NOT mean endorsement of providers.    Ability Quisic  [facility] [place] [address]  [phone]  www.abilitylight.net  lifts, ramps, elevators, ceiling lifts    Tangerine [employer], Fiber OptionsDirk  Lizz Finch,   [address] [place] [address]  [phone]  www.Manhattan [employer]s is a design and  company specialized in home access for those with disabilities and seniors.  They do not complete construction but manage it.    "Tixie (Tenth [employer]".   and Mrs. Behling [place], [place]  [phone]  [phone]  info@[email or web address]  www.Revisu  Remodeling and design firm dedicated to all phases of accessible environments.   Ceiling lift systems serving [place], [place], [place], [place] and [place].  Available for monthly fee or other options.                    Accessible Homes  Celeste Durand, Co-owners  [phone]  www.AccessibleHomesLLC.vWise  [facility]   Build and modify homes for people of all abilities.  Our service goal is to create environments that maximize accessibility, safety, independence and social participation.      [facility]  [address]  [phone]  Stair lifts only   straight runs only    Beyond [employer]riers  Brody Estevez  [address] [place] [address]  [phone]  [phone]  www.Gigle Networks.com  Serves [place], [place], [place], [place], [place]  Beyond [employer]riers is a  licensed in the State of [place].  We specialize in accessibility construction and remodeling both in commercial and residential applications.                      Tumacacori Dominic Varela  [address] [id number]  (will travel  throughout [place], [place], [place])  Customized curb-less showers are specialty  [phone]  [email or web address]  Provides free estimates within 100 miles or send photo.  Will remove existing hazardous bath tub and construct zero threshold/curb-less roll-in shower.      Pam Orozco  [address] [place] [address]  [email or web address]  Phone: [phone]    [employer]    [address] [place] [address]  www.[email or web address]  [phone]    [facility]  The Ramp Project  Contact:  Ignacia Perez  [place], [place]  [email or web address]  [phone]  [email or web address].[email or web address]/programs/ramp-[email or web address]    [employer]  [address] [place] [id number] [phone]  Fax: [phone]  www.Kid Care Years.[employer]  Access solutions, home modifications & design consultation services   [facility] Accessories  Safe Patient Handling and Home Modification Department  Serves [place]   [address] [place], [place] [address] [phone] (James Oviedo)  [email or web address]  Provides home assessments by [facility] qualified staff, stair lifts, vertical lifts, elevators, ceiling lifts, ramps, walk-in tubs and accessible showers, rental of overhead lift systems and motors, vehicle lifts for scooters.    [facility]:  Dean [email or web address]  Cell   [phone]  Serve all of [place],  Northern half of [place]  Western portion of [place]  Patient lifts, ceiling lifts, high/low beds, walk in showers, walk in bathtubs, stair lifts, etc.      [facility]  [address]  [place] [address]  Yannick [email or web address]  [email or web address]  [phone]  4 [facility] provides home evaluations and modifications.  It specializes in home modifications and serves a broad area of [place].

## 2017-05-18 NOTE — LETTER
5/18/2017       RE: Elier Barber  [address] [facility] [address]     Dear Colleague,    Thank you for referring your patient, Elier Barber, to the [facility] NEUROLOGY at [facility]. Please see a copy of my visit note below.    SUBJECTIVE:  Today I saw Nader De La Garza under the supervision of Dr. Tejada at the [facility], where he has been seen routinely for management of his primary lateral sclerosis. In January, Nader experienced radicular pain in the left lower extremity that improved after epidural steroid injections in the lower back. Since then, this pain has recurred and Nader has been managing the pain weekly with chiropractor appointments and daily Tylenol. Last week, Nader experienced increased spasms and tightness in his thighs, which caused his legs to buckle often. As a result of his legs buckling, Nader had a fall and several near-falls. Last Friday, he went to the ED at [facility] and was incidentally evaluated for bilateral leg edema and diagnosed with a blood clot in his right leg. He has been on blood thinner medication since then. Nader is currently on oral baclofen and is capable of adjusting the dose received through his pump at any time. He has been experiencing less leg spasms recently. He reports that his strength is stable and his swallowing and breathing have not changed much. He says that his sleep is adequate and that he feels mentally sharp.     OBJECTIVE:  General: Patient is alert, cooperative, and energetic. He has a pronounced spastic dysarthria.  Cranial: Smile is symmetric. Pterygoids are strong. Neck flexors and extensors are strong. Tongue bulk and strength are normal, while coordination is slow.   Motor: Increased tone in the limbs. Equivocal hand muscle atrophy. Strength scores on manual muscle testing are as follows (right/left):    Shoulder abduction: 5/5  Elbow  flexion: 5/5  Elbow extension: 5/5  Wrist extension: 5/5  Finger extension: 5/5  First dorsal interosseous: 4/4+  Hip flexion: 5/5  Knee flexion: 5/5  Knee extension: 5/5  Ankle plantarflexion: 5/5  Ankle dorsiflexion: 5/5    ASSESSMENT AND PLAN:  Nader's PLS has been stable since his last visit 6 months ago. In the last year, his ALSFRS-R score has dropped modestly from 38 to 35. The functional areas that show change mostly indicate a decline in truncal strength. Neuromuscular examination is stable. Nader will return for followup in another 6 months. All questions and concerns were addressed.    Gilles Overton, Medical Student    I personally examined the patient. The student's findings reflect mine. He acted as scribe.    Gary Tejada M.D.

## 2017-05-18 NOTE — MR AVS SNAPSHOT
After Visit Summary   5/18/2017    Elier Barber    MRN: [id number]           Patient Information     Date Of Birth          1945        Visit Information        Provider Department      5/18/2017 10:15 AM David Mane OT M [facility] Occupational Therapy and Rehab        Today's Diagnoses     ALS (amyotrophic lateral sclerosis) (H)          Care Instructions    Home Accessibility  and Design and Equipment Resources  Resource information provided by The ALS Association, [place] Chapter does NOT mean endorsement of providers.    CareShare  [address] [place] [address]  [phone]  www.[email or web address].net  lifts, ramps, elevators, ceiling lifts    SkyRank  Lizz Finch, Business Development  [address] [place] [address]  [phone]  www.Beta Cat Pharmaceuticals is a design and  company specialized in home access for those with disabilities and seniors.  They do not complete construction but manage it.    CakeStyle.   and Mrs. Behling [place], [place]  [phone]  [phone]  info@Emgo  www.AutoSpot  Remodeling and design firm dedicated to all phases of accessible environments.   Ceiling lift systems serving [place], [place], [place], [place] and [place].  Available for monthly fee or other options.                    Accessible Homes  Celeste Durand, Co-owners  [phone]  www.Accessible\[facility]\"".[employer]  [facility]   Build and modify homes for people of all abilities.  Our service goal is to create environments that maximize accessibility, safety, independence and social participation.      [facility]  [address] [facility]  [address]  [place], [place]  [phone]  Stair lifts only - straight runs only    Beyond [employer]riers  Brody Estevez  [address] [place]  [address]  [phone]  [phone]  www.beyondDataLocker.com  Serves [place], [place], [place], [place], [place]  [employer] is a  licensed in the [facility].  We specialize in accessibility construction and remodeling both in commercial and residential applications.                      [place]ustavo Varela  [address] [place] [address]  (will travel throughout [place], [place], [place])  Customized curb-less showers are specialty  [phone]  [email or web address]  Provides free estimates within 100 miles or send photo.  Will remove existing hazardous bath tub and construct zero threshold/curb-less roll-in shower.      Pam Orozco  [address] [place] [address]  [email or web address]  Phone: [phone]    [employer]    [address] [place] [address]  www.Mailsuite  [phone]    [facility]  The Ramp Project  Contact:  Ignacia Perez  [place], [place]  [email or web address]  [phone]  http://www.[facility].org/programs/ramp-project    [employer]oommy Fashion  [address] [place] [address] [phone]  Fax: [phone]  www.[email or web address]  Access solutions, home modifications & design consultation services   [facility] Accessories  Safe Patient Handling and Home Modification Department  Serves [place]   [address] [place], [place] [address] [phone] (James Oviedo)  [email or web address]  Provides home assessments by [facility] qualified staff, stair lifts, vertical lifts, elevators, ceiling lifts, ramps, walk-in tubs and accessible showers, rental of overhead lift systems and motors, vehicle lifts for scooters.    [facility]:  Dean [email or web address]  Cell - [phone]  Serve all of [place],  [place] half of [place]  [place] portion of [place]  Patient lifts, ceiling lifts, high/low beds, walk in showers, walk in bathtubs,  stair lifts, etc.       [employer]  [address]  [place] [address]  Yannick [email or web address]  [email or web address]  [phone]  4 [employer] provides home evaluations and modifications.  It specializes in home modifications and serves a broad area of [place].        Follow-ups after your visit        Your next 10 appointments already scheduled     May 25, 2017  2:40 PM CDT   Office Visit with Lida Ray MD   [facility] ([facility])    [address]  [facility] [address] [phone]           Bring a current list of meds and any records pertaining to this visit.  For Physicals, please bring immunization records and any forms needing to be filled out.  Please arrive 10 minutes early to complete paperwork.              Who to contact     Please call your clinic at [phone] to:    Ask questions about your health    Make or cancel appointments    Discuss your medicines    Learn about your test results    Speak to your doctor   If you have compliments or concerns about an experience at your clinic, or if you wish to file a complaint, please contact [facility] Physicians Patient Relations at [phone] or email us at [email or web address]         Additional Information About Your Visit        Digital Music Indiahart Information     Stem CentRx gives you secure access to your electronic health record. If you see a primary care provider, you can also send messages to your care team and make appointments. If you have questions, please call your primary care clinic.  If you do not have a primary care provider, please call [phone] and they will assist you.      Stem CentRx is an electronic gateway that provides easy, online access to your medical records. With Stem CentRx, you can request a clinic appointment, read your test results, renew a prescription or communicate with your care team.     To access your existing account, please contact your [facility]  [place] [facility] or call [phone] for assistance.        Care EveryWhere ID     This is your Care EveryWhere ID. This could be used by other organizations to access your [place] medical records  [id number]         Blood Pressure from Last 3 Encounters:   05/18/17 154/82   03/15/17 159/86   03/15/17 150/82    Weight from Last 3 Encounters:   05/18/17 72.6 kg (160 lb)   03/15/17 72.6 kg (160 lb)   11/03/16 73.5 kg (162 lb)              We Performed the Following     OCCUPATIONAL THERAPY REFERRAL        Primary Care Provider Office Phone # Fax #    Lida Ray [phone]       [facility] [address] [facility] [address]  [facility] [address]        Thank you!     Thank you for choosing [facility] OCCUPATIONAL THERAPY AND REHAB  for your care. Our goal is always to provide you with excellent care. Hearing back from our patients is one way we can continue to improve our services. Please take a few minutes to complete the written survey that you may receive in the mail after your visit with us. Thank you!             Your Updated Medication List - Protect others around you: Learn how to safely use, store and throw away your medicines at www.disposemymeds.org.          This list is accurate as of: 5/18/17 10:58 AM.  Always use your most recent med list.                   Brand Name Dispense Instructions for use    albuterol 108 (90 BASE) MCG/ACT Inhaler    PROAIR HFA/PROVENTIL HFA/VENTOLIN HFA    1 Inhaler    Inhale 2 puffs into the lungs every 6 hours as needed for shortness of breath / dyspnea or wheezing       ASPIRIN PO      Take 81 mg by mouth daily       baclofen (LIORESAL) in NaCl 0.9% 100 mL intrathecal infusion      by Intrathecal route continuous Pump filled by [facility] [phone] Pump Serial Number: [id number], Pump Model Number: 8637-20 Last fill:  3/25/2014 Next fill: 6/10/2014 Low [place] Alarm Date: 6/28/2014 Reservoir Volume: 4 mL  Conc: 2000 mcg/ml Flex schedule delivers 264.8 mcg/day       enalapril 5 MG tablet    VASOTEC    90 tablet    Take 1 tablet (5 mg) by mouth daily       EPINEPHrine 0.3 MG/0.3ML injection    EPIPEN    1 each    Inject 0.3 mLs (0.3 mg) into the muscle once as needed for anaphylaxis       gentamicin 0.1 % ointment    GARAMYCIN     Apply topically 2 times daily       ketoconazole 2 % shampoo    NIZORAL    120 mL    Shampoo every 2-3 days as needed       methylPREDNISolone 4 MG tablet    MEDROL DOSEPAK    21 tablet    Follow package instructions       oxybutynin 10 MG 24 hr tablet    DITROPAN XL    90 tablet    Take 1 tablet (10 mg) by mouth daily       oxyCODONE-acetaminophen 5-325 MG per tablet    PERCOCET     Take by mouth every 4 hours as needed for moderate to severe pain       PANTOPRAZOLE SODIUM PO      Take 40 mg by mouth every morning (before breakfast)       potassium chloride 10 MEQ tablet    K-TAB,KLOR-CON    180 tablet    Take 2 tablets (20 mEq) by mouth daily GIVE THE GENERIC: THIS IS NOT A REQUEST FOR BRAND NAME       rivaroxaban ANTICOAGULANT 15 MG Tabs tablet    XARELTO     Take by mouth 2 times daily       TYLENOL PO      Take 325 mg by mouth every 4 hours as needed for mild pain or fever

## 2017-05-18 NOTE — PROGRESS NOTES
"    Outpatient Speech Language Pathology Neurology Clinic Evaluation  Elier Barber YOB: 1945 [id number]    Visit Date: 5/18/2017    Age: 71 year old    Medical Diagnosis: Primary lateral sclerosis (PLS)    Date of Diagnosis: 2012    Referring MD: Gary Tejada    PMH: Refer to Medical Chart    Fall Risk:Refer to physician report.    Others at Clinic Visit:  Spouse    Living Situation:   With others  with spouse    Patient Concerns/Goals:  Increased speech deficits    Observations: Pt pleasant; states he has been talking a lot today and his speech because of that is worse.    Current Mode of Nutrition:   Oral diet    Weight: 160 lbs    Functional Rating Scale (ALS-FRS): 35/48      Speech Intelligibility/Functional Communication   Methods of communication: Verbal    Dysarthria: Moderate    Speech:   Deficits in phonation- Hoarse quality  Deficits in articulation- Decreased precision of articulation and Slow effortful rate  Deficits in resonance- Hyponasal  Intelligibility- 75%   Pt and spouse asking about whether he needs to be in speech therapy again.  He has had therapy in the past and states he learned several compensatory strategies such as using slow rate, over articulation, and 'chunking my words'.  States he has been talking all morning, and has had to concentrate on his strategies more.  Pt reinforcing the need for his strategies, which I concurred, but also educated them regarding supplementing his speech with writing where appropriate and increasing his efficiency with his message.  Shown the boogie board and educated regarding other AAC tools such as tablet, gareth's, SGD.  Pt concluding with \"I have to communicate smarter\".    Speech Intelligibility/Communication:  Communicates functionally    Augmentative and Alternative Communication (AAC):   Does not have an AAC device    Clinical Impression/Plan of Care  Treatment Diagnosis: Dysarthia     Recommendations:  Continue compensatory speech " strategies.  Continue / increase multi-modality communication.    Plan of Care:  Evaluation only.    Goals: Based on today's evaluation session patient and/or caregiver will have understanding of current communication and/or swallowing status and recommendations for management.    Educational Assessment:  Learners- Patient and Significant other  Barriers to Learning- No barriers.    Education provided/response:   Speech  AAC  Verbalized understanding  Risks and benefits of evaluation/treatment have been explained.    Patient, family and/or caregiver are in agreement with Plan of Care.    Evaluation Time: 8 minutes speech evaluation    Total Contact Time: 8 minutes

## 2017-05-18 NOTE — PROGRESS NOTES
SUBJECTIVE:  Today I saw Nader De La Garza under the supervision of Dr. Tejada at the [facility], where he has been seen routinely for management of his primary lateral sclerosis. In January, Nader experienced radicular pain in the left lower extremity that improved after epidural steroid injections in the lower back. Since then, this pain has recurred and Nader has been managing the pain weekly with chiropractor appointments and daily Tylenol. Last week, Nader experienced increased spasms and tightness in his thighs, which caused his legs to buckle often. As a result of his legs buckling, Nader had a fall and several near-falls. Last Friday, he went to the ED at [facility] and was incidentally evaluated for bilateral leg edema and diagnosed with a blood clot in his right leg. He has been on blood thinner medication since then. Nader is currently on oral baclofen and is capable of adjusting the dose received through his pump at any time. He has been experiencing less leg spasms recently. He reports that his strength is stable and his swallowing and breathing have not changed much. He says that his sleep is adequate and that he feels mentally sharp.     OBJECTIVE:  General: Patient is alert, cooperative, and energetic. He has a pronounced spastic dysarthria.  Cranial: Smile is symmetric. Pterygoids are strong. Neck flexors and extensors are strong. Tongue bulk and strength are normal, while coordination is slow.   Motor: Increased tone in the limbs. Equivocal hand muscle atrophy. Strength scores on manual muscle testing are as follows (right/left):    Shoulder abduction: 5/5  Elbow flexion: 5/5  Elbow extension: 5/5  Wrist extension: 5/5  Finger extension: 5/5  First dorsal interosseous: 4/4+  Hip flexion: 5/5  Knee flexion: 5/5  Knee extension: 5/5  Ankle plantarflexion: 5/5  Ankle dorsiflexion: 5/5    ASSESSMENT AND PLAN:  Nader's PLS has been stable since his  last visit 6 months ago. In the last year, his ALSFRS-R score has dropped modestly from 38 to 35. The functional areas that show change mostly indicate a decline in truncal strength. Neuromuscular examination is stable. Nader will return for followup in another 6 months. All questions and concerns were addressed.    Gilles Overton, Medical Student    I personally examined the patient. The student's findings reflect mine. He acted as scribe.    Gary Tejada M.D.

## 2017-05-18 NOTE — MR AVS SNAPSHOT
After Visit Summary   5/18/2017    Elier Barber    MRN: [id number]           Patient Information     Date Of Birth          1945        Visit Information        Provider Department      5/18/2017 10:15 AM Bebe Jose SLP [facility] Speech and Language        Today's Diagnoses     Dysarthria    -  1    ALS (amyotrophic lateral sclerosis) (H)           Follow-ups after your visit        Your next 10 appointments already scheduled     May 25, 2017  2:40 PM CDT   Office Visit with Lida Ray MD   [facility] ([facility])    [address]  [facility] [address] [phone]           Bring a current list of meds and any records pertaining to this visit.  For Physicals, please bring immunization records and any forms needing to be filled out.  Please arrive 10 minutes early to complete paperwork.            Nov 02, 2017  1:00 PM CDT   (Arrive by 12:45 PM)   Return ALS/Motor Neuron with Gary Tejada MD   [facility] Neurology ([facility])    [address] [place] [address] [phone]              Who to contact     Please call your clinic at [phone] to:    Ask questions about your health    Make or cancel appointments    Discuss your medicines    Learn about your test results    Speak to your doctor   If you have compliments or concerns about an experience at your clinic, or if you wish to file a complaint, please contact [facility] Physicians Patient Relations at [phone] or email us at [email or web address]         Additional Information About Your Visit        MyChart Information     [employer] gives you secure access to your electronic health record. If you see a primary care provider, you can also send messages to your care team and make appointments. If you have questions, please call your primary care clinic.  If you do not have a primary care provider, please call  [phone] and they will assist you.      [employer] is an electronic gateway that provides easy, online access to your medical records. With [employer], you can request a clinic appointment, read your test results, renew a prescription or communicate with your care team.     To access your existing account, please contact your [facility] Clinic or call [phone] for assistance.        Care EveryWhere ID     This is your Care EveryWhere ID. This could be used by other organizations to access your [place] medical records  [id number]         Blood Pressure from Last 3 Encounters:   05/18/17 154/82   03/15/17 159/86   03/15/17 150/82    Weight from Last 3 Encounters:   05/18/17 72.6 kg (160 lb)   03/15/17 72.6 kg (160 lb)   11/03/16 73.5 kg (162 lb)              Today, you had the following     No orders found for display       Primary Care Provider Office Phone # Fax #    Lida Ray [phone]       [facility] [address]  [facility] [address]        Thank you!     Thank you for choosing  [facility]  for your care. Our goal is always to provide you with excellent care. Hearing back from our patients is one way we can continue to improve our services. Please take a few minutes to complete the written survey that you may receive in the mail after your visit with us. Thank you!             Your Updated Medication List - Protect others around you: Learn how to safely use, store and throw away your medicines at www.disposemymeds.org.          This list is accurate as of: 5/18/17 12:51 PM.  Always use your most recent med list.                   Brand Name Dispense Instructions for use    albuterol 108 (90 BASE) MCG/ACT Inhaler    PROAIR HFA/PROVENTIL HFA/VENTOLIN HFA    1 Inhaler    Inhale 2 puffs into the lungs every 6 hours as needed for shortness of breath / dyspnea or wheezing       ASPIRIN PO      Take 81 mg by mouth daily        baclofen (LIORESAL) in NaCl 0.9% 100 mL intrathecal infusion      by Intrathecal route continuous Pump filled by [facility] [phone] Pump Serial Number: [id number], Pump Model Number: 8637-20 Last fill:  3/25/2014 Next fill: 6/10/2014 Low [place] Alarm Date: 6/28/2014 Reservoir Volume: 4 mL Conc: 2000 mcg/ml Flex schedule delivers 264.8 mcg/day       enalapril 5 MG tablet    VASOTEC    90 tablet    Take 1 tablet (5 mg) by mouth daily       EPINEPHrine 0.3 MG/0.3ML injection    EPIPEN    1 each    Inject 0.3 mLs (0.3 mg) into the muscle once as needed for anaphylaxis       gentamicin 0.1 % ointment    GARAMYCIN     Apply topically 2 times daily       ketoconazole 2 % shampoo    NIZORAL    120 mL    Shampoo every 2-3 days as needed       methylPREDNISolone 4 MG tablet    MEDROL DOSEPAK    21 tablet    Follow package instructions       oxybutynin 10 MG 24 hr tablet    DITROPAN XL    90 tablet    Take 1 tablet (10 mg) by mouth daily       oxyCODONE-acetaminophen 5-325 MG per tablet    PERCOCET     Take by mouth every 4 hours as needed for moderate to severe pain       PANTOPRAZOLE SODIUM PO      Take 40 mg by mouth every morning (before breakfast)       potassium chloride 10 MEQ tablet    K-TAB,KLOR-CON    180 tablet    Take 2 tablets (20 mEq) by mouth daily GIVE THE GENERIC: THIS IS NOT A REQUEST FOR BRAND NAME       rivaroxaban ANTICOAGULANT 15 MG Tabs tablet    XARELTO     Take by mouth 2 times daily       TYLENOL PO      Take 325 mg by mouth every 4 hours as needed for mild pain or fever

## 2017-05-18 NOTE — PROGRESS NOTES
[facility] now requests orders and shares plan of care/discharge summaries for some patients through [facility].  Please REPLY TO THIS MESSAGE in order to give authorization for orders when needed.  This is considered a verbal order, you will still receive a faxed copy of orders for signature.  Thank you for your assistance in improving collaboration for our patients.    ORDER  Home health aide 1w2, home nurse 1w1, PT 1w1    MD SUMMARY/PLAN OF CARE  Patient has been receiving private pay home nursing, aide and PT visits.  Orders canceled due to recent hospitalization.  Request verbal orders to resume home aide, nursing and PT visits.

## 2017-05-18 NOTE — NURSING NOTE
Chief Complaint   Patient presents with     RECHECK     [facility] RETURN - ALS/MOTOR NEURON     Brandee Carrillo MA

## 2017-05-18 NOTE — MR AVS SNAPSHOT
After Visit Summary   5/18/2017    Elier Barber    MRN: [id number]           Patient Information     Date Of Birth          1945        Visit Information        Provider Department      5/18/2017 10:15 AM Gary Tejada MD [facility] Neurology        Today's Diagnoses     ALS (amyotrophic lateral sclerosis) (H)    -  1    Oropharyngeal dysphagia           Follow-ups after your visit        Additional Services     OCCUPATIONAL THERAPY REFERRAL       OT Clinician to evaluate and treat patient in ALS Clinic.            PHYSICAL THERAPY REFERRAL       PT Clinician to evaluate and treat patient in ALS Clinic.            SPEECH THERAPY REFERRAL       Speech Language Pathologist to evaluate and treat patient in ALS Clinic.                  Your next 10 appointments already scheduled     May 25, 2017  2:40 PM CDT   Office Visit with Lida Ray MD   [facility] ([facility])    [address] [place] [address] [phone]           Bring a current list of meds and any records pertaining to this visit.  For Physicals, please bring immunization records and any forms needing to be filled out.  Please arrive 10 minutes early to complete paperwork.            Nov 02, 2017  1:00 PM CDT   (Arrive by 12:45 PM)   Return ALS/Motor Neuron with Gary Tejada MD   [facility] Neurology ([facility])    [address] [address] [phone]              Who to contact     Please call your clinic at [phone] to:    Ask questions about your health    Make or cancel appointments    Discuss your medicines    Learn about your test results    Speak to your doctor   If you have compliments or concerns about an experience at your clinic, or if you wish to file a complaint, please contact [facility] Physicians Patient Relations at [phone] or email us at [email or web address]          "Additional Information About Your Visit        [employer] Information     [employer] gives you secure access to your electronic health record. If you see a primary care provider, you can also send messages to your care team and make appointments. If you have questions, please call your primary care clinic.  If you do not have a primary care provider, please call [phone] and they will assist you.      [employer] is an electronic gateway that provides easy, online access to your medical records. With [employer], you can request a clinic appointment, read your test results, renew a prescription or communicate with your care team.     To access your existing account, please contact your [facility] or call [phone] for assistance.        Care EveryWhere ID     This is your Care EveryWhere ID. This could be used by other organizations to access your [facility] medical records  [id number]        Your Vitals Were     Pulse Height Pulse Oximetry BMI (Body Mass Index)          58 1.778 m (5' 10\") 99% 22.96 kg/m2         Blood Pressure from Last 3 Encounters:   05/18/17 154/82   03/15/17 159/86   03/15/17 150/82    Weight from Last 3 Encounters:   05/18/17 72.6 kg (160 lb)   03/15/17 72.6 kg (160 lb)   11/03/16 73.5 kg (162 lb)               Primary Care Provider Office Phone # Fax #    Lida Ray [phone]       [facility] [address] [place] [address]        Thank you!     Thank you for choosing [facility] NEUROLOGY  for your care. Our goal is always to provide you with excellent care. Hearing back from our patients is one way we can continue to improve our services. Please take a few minutes to complete the written survey that you may receive in the mail after your visit with us. Thank you!             Your Updated Medication List - Protect others around you: Learn how to safely use, store and throw away your medicines at www.disposemymeds.org.        "   This list is accurate as of: 5/18/17 12:25 PM.  Always use your most recent med list.                   Brand Name Dispense Instructions for use    albuterol 108 (90 BASE) MCG/ACT Inhaler    PROAIR HFA/PROVENTIL HFA/VENTOLIN HFA    1 Inhaler    Inhale 2 puffs into the lungs every 6 hours as needed for shortness of breath / dyspnea or wheezing       ASPIRIN PO      Take 81 mg by mouth daily       baclofen (LIORESAL) in NaCl 0.9% 100 mL intrathecal infusion      by Intrathecal route continuous Pump filled by [facility] [phone] Pump Serial Number: [id number], Pump Model Number: 8637-20 Last fill:  3/25/2014 Next fill: 6/10/2014 Low [place] Alarm Date: 6/28/2014 Reservoir Volume: 4 mL Conc: 2000 mcg/ml Flex schedule delivers 264.8 mcg/day       enalapril 5 MG tablet    VASOTEC    90 tablet    Take 1 tablet (5 mg) by mouth daily       EPINEPHrine 0.3 MG/0.3ML injection    EPIPEN    1 each    Inject 0.3 mLs (0.3 mg) into the muscle once as needed for anaphylaxis       gentamicin 0.1 % ointment    GARAMYCIN     Apply topically 2 times daily       ketoconazole 2 % shampoo    NIZORAL    120 mL    Shampoo every 2-3 days as needed       methylPREDNISolone 4 MG tablet    MEDROL DOSEPAK    21 tablet    Follow package instructions       oxybutynin 10 MG 24 hr tablet    DITROPAN XL    90 tablet    Take 1 tablet (10 mg) by mouth daily       oxyCODONE-acetaminophen 5-325 MG per tablet    PERCOCET     Take by mouth every 4 hours as needed for moderate to severe pain       PANTOPRAZOLE SODIUM PO      Take 40 mg by mouth every morning (before breakfast)       potassium chloride 10 MEQ tablet    K-TAB,KLOR-CON    180 tablet    Take 2 tablets (20 mEq) by mouth daily GIVE THE GENERIC: THIS IS NOT A REQUEST FOR BRAND NAME       rivaroxaban ANTICOAGULANT 15 MG Tabs tablet    XARELTO     Take by mouth 2 times daily       TYLENOL PO      Take 325 mg by mouth every 4 hours as needed for mild pain or  fever

## 2017-05-18 NOTE — PROGRESS NOTES
OUTPATIENT OCCUPATIONAL THERAPY CLINIC NOTE    Type of visit:  Evaluation            Date of Service: 17 and last visit: 11/3/16     Referring provider: Dr. Gary Tejada    Others present at visit:  Spouse / significant other, Kelli    Medical diagnosis:   Primary lateral sclerosis (PLS)     Date of diagnosis:      Pertinent medical history:  H/O BPPV with 2 recent episodes, stroke with L king 2014; recent fall 2017 with LE DVT; baclofen pump  Additional Occupational Profile Information (patterns of daily living, interests, values and needs):  and retired with long history of PLS        Cardio-respiratory status:  FVC: see chart    Living environment:  House-uses basement 2x week and Kelli now helps patient on the stairs  Walk-in shower main level; grab bars and shower chair with shower door  Higher toilets and vanity near to hold; second bathroom with vanity he pulls forward on.    Living environment barriers:  2 stairs to enter (1 railing present)      Current assistance/living environment:  Lives with wife who assists with LE dressing/bathing   Home PT 1x/week and [facility] one time a week for showering and helps with cleaning    Current mobility equipment:  4 wheeled walker with seat in home  Scooter for distance , breaks down into 4 pieces so wife can transport in vehicle  Transport wheelchair    Current ADL equipment:  Shower/tub chair  Shower/tub grab bar  Wall grab bar     Technology used: computer    Patient concerns/goals: Pt notes he needs to pull self up on vanity in upper bathroom to get up from toilet, falls 1-2 times per month, falls backward if loses rearward balance or if turns or legs fatigued.    Evaluation   Interview completed.   Pain assessment:  Pain denied    Range of motion:  L handed: WFL BUE AROM    Manual muscle testin/5 shoulders and elbows;  and lateral and palmar pinch are fairly strong and symmetrical, except L is weaker    Cognition:   WFL  Dysarthric with slowed  speech  ADL; sits to dress and ind with all; help to don socks and compression stockings with am LE stiffness; uses shower chair to sit and shower and ind with other ADL per report. Wife notes that pt only showers and does stairs when she is home for fall prevention.  IADL: In past drives locally; no freeways, wife also drives. NT today    Fall Risk Screen:   Has the patient fallen 2 or more times in the last year? Yes,1-2 x/month      Has the patient fallen and had an injury in the past year? yes       Timed Up and Go Score: defer to PT    Is the patient a fall risk? Yes, department fall risk interventions implemented.      Impairments:  Fatigue  Muscle atrophy  Coordination  dysathria     Treatment diagnosis:  Impaired activities of daily living  Assessment of Occupational Performance: 1-3 Performance Deficits  Identified Performance Deficits (ie: feeding, social skills): toilet and shower transfers  Clinical Decision Making (Complexity): Low complexity     Recommendations/Plan of care:  Patient would benefit from interventions to enhance safety and independence.  Rehab potential good for stated goals.  Occupational therapy intervention for  self care/home management.  1 session evaluation & treatment.    Goals:   Target date: today  Patient, family and/or caregiver will verbalize understanding of evaluation results and implications for functional performance.  Patient, family and/or caregiver will verbalize/demonstrate understanding of compensatory methods /equipment to enhance functional independence and safety.  Patient, family and/or caregiver will verbalize energy management techniques appropriate for status and setting.      Educational assessment/barriers to learning:  No barriers noted      Treatment provided this date:   Self care/home management, 10 minutes of training in:  -purpose of TSF for upper level toilet and open to this and therapist requested from [facility]  -stairglide and platform lift options and  pros/cons with disease progression, local resources for new and used given  -home accessibility resources to look in to bathroom remodel and chanding walk-in shower with lip and jacuzzi tub to roll in shower; list given to start getting bids and input  -purpose of energy management and seating and wheeled mobility eval process for powered mobility to prevent falls           Response to treatment/recommendations: thankful    Goal attainment:  All goals met    Risks and benefits of evaluation/treatment have been explained.  Patient, family and/or caregiver are in agreement with Plan of Care.   ALS FRS-R (ALS Functional Rating Scale-Revised) completed today. Please see separate note.    Evaluation time: 10  Treatment time: 10  Total contact time: 20      Signature: GEORGE Spaulding/SARAH, MSCS     Date:5/18/17    Certification: [employer] Advantage Plan     Addended 6/15/17 as learned g-codes required      Medicare G-code:  Self Care  Current Status , Goal ,  Discharge   1 session only, modifier the same for all G-codes.  CI: 1-19% impairment  Modifier determined by clinical judgment in conjunction with objective data and subjective report.    Certification:  Onset date: 5/18/17  Start of care date: 5/18/2017  Certification date from 5/18/2017 to 5/8/17    I CERTIFY THE NEED FOR THESE SERVICES FURNISHED UNDER        THIS PLAN OF TREATMENT AND WHILE UNDER MY CARE     (Physician attestation of this document indicates review and certification of the therapy plan).

## 2017-05-23 ENCOUNTER — TELEPHONE (OUTPATIENT)
Dept: INTERNAL MEDICINE | Facility: CLINIC | Age: 72
End: 2017-05-23

## 2017-05-23 NOTE — TELEPHONE ENCOUNTER
Form received from: [facility]    Form requesting following info/need: HH orders    MEENAKSHI needed?: No    Location of form: Dr. Rya's in basket    When completed the route for return: Fax

## 2017-05-25 ENCOUNTER — OFFICE VISIT (OUTPATIENT)
Dept: INTERNAL MEDICINE | Facility: CLINIC | Age: 72
End: 2017-05-25
Payer: COMMERCIAL

## 2017-05-25 VITALS
HEIGHT: 70 IN | DIASTOLIC BLOOD PRESSURE: 68 MMHG | HEART RATE: 61 BPM | BODY MASS INDEX: 22.19 KG/M2 | WEIGHT: 155 LBS | SYSTOLIC BLOOD PRESSURE: 126 MMHG | TEMPERATURE: 97.6 F | OXYGEN SATURATION: 97 %

## 2017-05-25 DIAGNOSIS — I82.491 ACUTE DEEP VEIN THROMBOSIS (DVT) OF OTHER SPECIFIED VEIN OF RIGHT LOWER EXTREMITY (H): Primary | ICD-10-CM

## 2017-05-25 DIAGNOSIS — Z23 ENCOUNTER FOR IMMUNIZATION: ICD-10-CM

## 2017-05-25 DIAGNOSIS — G12.23 PRIMARY LATERAL SCLEROSIS (H): ICD-10-CM

## 2017-05-25 DIAGNOSIS — T63.441A BEE STING REACTION, ACCIDENTAL OR UNINTENTIONAL, INITIAL ENCOUNTER: ICD-10-CM

## 2017-05-25 DIAGNOSIS — I10 ESSENTIAL HYPERTENSION, BENIGN: ICD-10-CM

## 2017-05-25 PROCEDURE — 90471 IMMUNIZATION ADMIN: CPT | Performed by: INTERNAL MEDICINE

## 2017-05-25 PROCEDURE — 90732 PPSV23 VACC 2 YRS+ SUBQ/IM: CPT | Performed by: INTERNAL MEDICINE

## 2017-05-25 PROCEDURE — 99214 OFFICE O/P EST MOD 30 MIN: CPT | Mod: 25 | Performed by: INTERNAL MEDICINE

## 2017-05-25 RX ORDER — POLYETHYLENE GLYCOL 3350 17 G/17G
1 POWDER, FOR SOLUTION ORAL DAILY PRN
Status: ON HOLD | COMMUNITY
End: 2021-09-24

## 2017-05-25 RX ORDER — EPINEPHRINE 0.3 MG/.3ML
0.3 INJECTION SUBCUTANEOUS PRN
Qty: 0.3 ML | Refills: 3 | Status: ON HOLD | OUTPATIENT
Start: 2017-05-25 | End: 2022-01-01

## 2017-05-25 RX ORDER — POTASSIUM CHLORIDE 750 MG/1
20 TABLET, EXTENDED RELEASE ORAL DAILY
Qty: 180 TABLET | Refills: 3 | Status: SHIPPED | OUTPATIENT
Start: 2017-05-25 | End: 2017-06-01

## 2017-05-25 RX ORDER — ENALAPRIL MALEATE 5 MG/1
5 TABLET ORAL DAILY
Qty: 90 TABLET | Refills: 3 | Status: SHIPPED | OUTPATIENT
Start: 2017-05-25 | End: 2017-06-01

## 2017-05-25 NOTE — MR AVS SNAPSHOT
After Visit Summary   5/25/2017    Elier Barber    MRN: [id number]           Patient Information     Date Of Birth          1945        Visit Information        Provider Department      5/25/2017 2:40 PM Lida Ray MD [facility]        Today's Diagnoses     Acute deep vein thrombosis (DVT) of other specified vein of right lower extremity (H)    -  1    Primary lateral sclerosis (HCC)        Essential hypertension, benign        Bee sting reaction, accidental or unintentional, initial encounter        Encounter for immunization           Follow-ups after your visit        Your next 10 appointments already scheduled     Nov 02, 2017  1:00 PM CDT   (Arrive by 12:45 PM)   Return ALS/Motor Neuron with Gary Tejada MD   [facility]rology ([facility] [place])    [address] [place] [address]  3rd [facility] [address] [phone]              Who to contact     If you have questions or need follow up information about today's clinic visit or your schedule please contact [facility] directly at [phone].  Normal or non-critical lab and imaging results will be communicated to you by BRANDiD - Shop. Like a Man.hart, letter or phone within 4 business days after the clinic has received the results. If you do not hear from us within 7 days, please contact the clinic through NeoCodext or phone. If you have a critical or abnormal lab result, we will notify you by phone as soon as possible.  Submit refill requests through Kno or call your pharmacy and they will forward the refill request to us. Please allow 3 business days for your refill to be completed.          Additional Information About Your Visit        BRANDiD - Shop. Like a Man.hart Information     Kno gives you secure access to your electronic health record. If you see a primary care provider, you can also send messages to your care team and make appointments. If you have questions, please call your primary care clinic.  If you do  "not have a primary care provider, please call [phone] and they will assist you.        Care EveryWhere ID     This is your Care EveryWhere ID. This could be used by other organizations to access your [facility] medical records  [id number]        Your Vitals Were     Pulse Temperature Height Pulse Oximetry BMI (Body Mass Index)       61 97.6  F (36.4  C) (Oral) 5' 10\" (1.778 m) 97% 22.24 kg/m2        Blood Pressure from Last 3 Encounters:   05/25/17 126/68   05/18/17 154/82   03/15/17 159/86    Weight from Last 3 Encounters:   05/25/17 155 lb (70.3 kg)   05/18/17 160 lb (72.6 kg)   03/15/17 160 lb (72.6 kg)              We Performed the Following     PNEUMOCOCCAL VACCINE,ADULT,SQ OR IM          Today's Medication Changes          These changes are accurate as of: 5/25/17 11:59 PM.  If you have any questions, ask your nurse or doctor.               These medicines have changed or have updated prescriptions.        Dose/Directions    EPINEPHrine 0.3 MG/0.3ML injection   This may have changed:  when to take this   Used for:  Bee sting reaction, accidental or unintentional, initial encounter   Changed by:  Lida Ray MD        Dose:  0.3 mg   Inject 0.3 mLs (0.3 mg) into the muscle as needed for anaphylaxis   Quantity:  0.3 mL   Refills:  3       * rivaroxaban ANTICOAGULANT 15 MG Tabs tablet   Commonly known as:  XARELTO   This may have changed:  Another medication with the same name was added. Make sure you understand how and when to take each.   Changed by:  Gary Tejada MD        Take by mouth 2 times daily   Refills:  0       * rivaroxaban ANTICOAGULANT 20 MG Tabs tablet   Commonly known as:  XARELTO   This may have changed:  You were already taking a medication with the same name, and this prescription was added. Make sure you understand how and when to take each.   Used for:  Acute deep vein thrombosis (DVT) of other specified vein of right lower extremity (H)   Changed by:  Lida Ray MD        Dose:  " 20 mg   Take 1 tablet (20 mg) by mouth daily (with dinner)   Quantity:  30 tablet   Refills:  2       * Notice:  This list has 2 medication(s) that are the same as other medications prescribed for you. Read the directions carefully, and ask your doctor or other care provider to review them with you.      Stop taking these medicines if you haven't already. Please contact your care team if you have questions.     albuterol 108 (90 BASE) MCG/ACT Inhaler   Commonly known as:  PROAIR HFA/PROVENTIL HFA/VENTOLIN HFA   Stopped by:  Lida Ray MD           methylPREDNISolone 4 MG tablet   Commonly known as:  MEDROL DOSEPAK   Stopped by:  Lida Ray MD                Where to get your medicines      These medications were sent to [facility]/pharmacy #[id number] - [place], [place] - [address] [address] [facility] [address]  [address] [facility] [address]    Hours:  [facility] Phone:  [phone]     enalapril 5 MG tablet    EPINEPHrine 0.3 MG/0.3ML injection    potassium chloride 10 MEQ tablet    rivaroxaban ANTICOAGULANT 20 MG Tabs tablet                Primary Care Provider Office Phone # Fax #    Lida Ray [phone]       [facility] [address]  [facility] [address]        Thank you!     Thank you for choosing [facility]  for your care. Our goal is always to provide you with excellent care. Hearing back from our patients is one way we can continue to improve our services. Please take a few minutes to complete the written survey that you may receive in the mail after your visit with us. Thank you!             Your Updated Medication List - Protect others around you: Learn how to safely use, store and throw away your medicines at www.disposemymeds.org.          This list is accurate as of: 5/25/17 11:59 PM.  Always use your most recent med list.                   Brand Name Dispense Instructions for use    ASPIRIN PO      Take 81 mg by mouth daily       baclofen  (LIORESAL) in NaCl 0.9% 100 mL intrathecal infusion      by Intrathecal route continuous Pump filled by [facility] [phone] Pump Serial Number: [id number], Pump Model Number: 8637-20 Last fill:  3/25/2014 Next fill: 6/10/2014 Low [place] Alarm Date: 6/28/2014 Reservoir Volume: 4 mL Conc: 2000 mcg/ml Flex schedule delivers 264.8 mcg/day       BACLOFEN PO      Take 10 mg by mouth 3 times daily       enalapril 5 MG tablet    VASOTEC    90 tablet    Take 1 tablet (5 mg) by mouth daily       EPINEPHrine 0.3 MG/0.3ML injection     0.3 mL    Inject 0.3 mLs (0.3 mg) into the muscle as needed for anaphylaxis       gentamicin 0.1 % ointment    GARAMYCIN     Apply topically 2 times daily       ketoconazole 2 % shampoo    NIZORAL    120 mL    Shampoo every 2-3 days as needed       oxybutynin 10 MG 24 hr tablet    DITROPAN XL    90 tablet    Take 1 tablet (10 mg) by mouth daily       oxyCODONE-acetaminophen 5-325 MG per tablet    PERCOCET     Take by mouth every 4 hours as needed for moderate to severe pain       PANTOPRAZOLE SODIUM PO      Take 40 mg by mouth every morning (before breakfast)       polyethylene glycol Packet    MIRALAX/GLYCOLAX     Take 1 packet by mouth daily       potassium chloride 10 MEQ tablet    K-TAB,KLOR-CON    180 tablet    Take 2 tablets (20 mEq) by mouth daily GIVE THE GENERIC: THIS IS NOT A REQUEST FOR BRAND NAME       * rivaroxaban ANTICOAGULANT 15 MG Tabs tablet    XARELTO     Take by mouth 2 times daily       * rivaroxaban ANTICOAGULANT 20 MG Tabs tablet    XARELTO    30 tablet    Take 1 tablet (20 mg) by mouth daily (with dinner)       TYLENOL PO      Take 325 mg by mouth every 4 hours as needed for mild pain or fever       * Notice:  This list has 2 medication(s) that are the same as other medications prescribed for you. Read the directions carefully, and ask your doctor or other care provider to review them with you.

## 2017-05-25 NOTE — PROGRESS NOTES
SUBJECTIVE:                                                    Elier Barber is a 71 year old male who presents to clinic today for the following health issues:          Hospital Follow-up Visit:    Hospital/Nursing Home/IP Rehab Facility: [facility]   Date of Admission: 5/12/17  Date of Discharge: 5/15/17  Reason(s) for Admission: issues with leg spasms, baclofen pump, found to have a DVT right proximal calf, not to popliteal fossa.   Back pain: had MRIs            Problems taking medications regularly:  None       Medication changes since discharge: none       Problems adhering to non-medication therapy:  None    Summary of hospitalization:  [facility] discharge summary reviewed  Diagnostic Tests/Treatments reviewed.  Follow up needed: none  Other Healthcare Providers Involved in Patient s Care:         Specialist appointment - [facility] for his Lateral sclerosis, sees spine next week  Update since discharge: improved leg spasms.    Concerns:   1. Will need xarelto 20 mg rx later, has enough for first month treatment. No work up was done.   2. Constipation: some questions about treatments.   3. Questions about pneumovax.   4. Questions about power wheelchair  Patient Active Problem List   Diagnosis     Essential hypertension, benign     Primary lateral sclerosis (HCC)     Prostate CA (HCC)     CARDIOVASCULAR SCREENING; LDL GOAL LESS THAN 160     HCD (health care directive)     Vertigo     Cerebral infarction (H)     Advanced directives, counseling/discussion     Current Outpatient Prescriptions   Medication Sig Dispense Refill     BACLOFEN PO Take 10 mg by mouth 3 times daily       polyethylene glycol (MIRALAX/GLYCOLAX) Packet Take 1 packet by mouth daily       potassium chloride (K-TAB,KLOR-CON) 10 MEQ tablet Take 2 tablets (20 mEq) by mouth daily GIVE THE GENERIC: THIS IS NOT A REQUEST FOR BRAND NAME 180 tablet 3     enalapril (VASOTEC) 5 MG tablet Take 1 tablet (5 mg) by mouth daily 90  "tablet 3     rivaroxaban ANTICOAGULANT (XARELTO) 20 MG TABS tablet Take 1 tablet (20 mg) by mouth daily (with dinner) 30 tablet 2     EPINEPHrine 0.3 MG/0.3ML injection Inject 0.3 mLs (0.3 mg) into the muscle as needed for anaphylaxis 0.3 mL 3     Acetaminophen (TYLENOL PO) Take 325 mg by mouth every 4 hours as needed for mild pain or fever       oxyCODONE-acetaminophen (PERCOCET) 5-325 MG per tablet Take by mouth every 4 hours as needed for moderate to severe pain       PANTOPRAZOLE SODIUM PO Take 40 mg by mouth every morning (before breakfast)       rivaroxaban ANTICOAGULANT (XARELTO) 15 MG TABS tablet Take by mouth 2 times daily       ketoconazole (NIZORAL) 2 % shampoo Shampoo every 2-3 days as needed 120 mL 5     oxybutynin (DITROPAN XL) 10 MG 24 hr tablet Take 1 tablet (10 mg) by mouth daily 90 tablet 3     gentamicin (GARAMYCIN) 0.1 % ointment Apply topically 2 times daily       ASPIRIN PO Take 81 mg by mouth daily        baclofen (LIORESAL) in NaCl 0.9% 100 mL intrathecal infusion by Intrathecal route continuous Pump filled by [facility] [phone]  Pump Serial Number: [id number], Pump Model Number: 8637-20  Last fill:  3/25/2014  Next fill: 6/10/2014  Low [place] Alarm Date: 6/28/2014  Reservoir Volume: 4 mL  Conc: 2000 mcg/ml  Flex schedule delivers 264.8 mcg/day       Social History   Substance Use Topics     Smoking status: Former Smoker     Packs/day: 0.30     Years: 6.00     Types: Cigarettes     Quit date: 10/5/1976     Smokeless tobacco: Never Used     Alcohol use Yes      Comment: 3 PER WEEK      ROS: no fever, chills, dyspnea    Objective:  Patient alert in NAD  /68  Pulse 61  Temp 97.6  F (36.4  C) (Oral)  Ht 5' 10\" (1.778 m)  Wt 155 lb (70.3 kg)  SpO2 97%  BMI 22.24 kg/m2     Not significant edema right leg     ASSESSMENT:   (I82.491) Acute deep vein thrombosis (DVT) of other specified vein of right lower extremity (H)  (primary encounter " diagnosis)  Comment: was below knee, treated  Plan: rivaroxaban ANTICOAGULANT (XARELTO) 20 MG TABS         tablet        Continue med, will consider evaluation after treatment completed    (G12.29) Primary lateral sclerosis (HCC)  Comment: improved spasms  Plan: per neurology    (I10) Essential hypertension, benign  Comment: controlled  Plan: potassium chloride (K-TAB,KLOR-CON) 10 MEQ         tablet, enalapril (VASOTEC) 5 MG tablet            (T63.441A) Bee sting reaction, accidental or unintentional, initial encounter  Comment:   Plan: EPINEPHrine 0.3 MG/0.3ML injection            (Z23) Encounter for immunization  Comment:   Plan: PNEUMOCOCCAL VACCINE,ADULT,SQ OR IM, CANCELED:         PNEUMOVAX - MEDICARE, CANCELED: PNEUMOVAX -         MEDICARE                Post Discharge Medication Reconciliation: discharge medications reconciled, continue medications without change.  Plan of care communicated with patient and family     Coding guidelines for this visit:  Type of Medical   Decision Making Face-to-Face Visit       within 7 Days of discharge Face-to-Face Visit        within 14 days of discharge   Moderate Complexity [id number]   High Complexity [id number]          Lida Ray MD  [facility]

## 2017-05-31 ENCOUNTER — TRANSFERRED RECORDS (OUTPATIENT)
Dept: HEALTH INFORMATION MANAGEMENT | Facility: CLINIC | Age: 72
End: 2017-05-31

## 2017-06-01 ENCOUNTER — MYC MEDICAL ADVICE (OUTPATIENT)
Dept: INTERNAL MEDICINE | Facility: CLINIC | Age: 72
End: 2017-06-01

## 2017-06-01 DIAGNOSIS — I10 ESSENTIAL HYPERTENSION, BENIGN: ICD-10-CM

## 2017-06-01 RX ORDER — ENALAPRIL MALEATE 5 MG/1
5 TABLET ORAL DAILY
Qty: 90 TABLET | Refills: 3 | Status: SHIPPED | OUTPATIENT
Start: 2017-06-01 | End: 2017-06-14

## 2017-06-01 RX ORDER — POTASSIUM CHLORIDE 750 MG/1
20 TABLET, EXTENDED RELEASE ORAL DAILY
Qty: 180 TABLET | Refills: 3 | Status: SHIPPED | OUTPATIENT
Start: 2017-06-01 | End: 2017-06-14

## 2017-06-01 NOTE — TELEPHONE ENCOUNTER
Pt is asking for his refills to [facility]. Not [facility], Enalapril and Potassium. Have to hand fax, since no e-prescribing option.

## 2017-06-04 ENCOUNTER — MYC MEDICAL ADVICE (OUTPATIENT)
Dept: NEUROLOGY | Facility: CLINIC | Age: 72
End: 2017-06-04

## 2017-06-05 ENCOUNTER — HOSPITAL ENCOUNTER (INPATIENT)
Facility: CLINIC | Age: 72
LOS: 1 days | Discharge: HOME HEALTH | DRG: 552 | End: 2017-06-07
Attending: HOSPITALIST | Admitting: HOSPITALIST
Payer: COMMERCIAL

## 2017-06-05 ENCOUNTER — APPOINTMENT (OUTPATIENT)
Dept: MRI IMAGING | Facility: CLINIC | Age: 72
DRG: 552 | End: 2017-06-05
Attending: PHYSICIAN ASSISTANT
Payer: COMMERCIAL

## 2017-06-05 ENCOUNTER — OFFICE VISIT (OUTPATIENT)
Dept: FAMILY MEDICINE | Facility: CLINIC | Age: 72
End: 2017-06-05
Payer: COMMERCIAL

## 2017-06-05 VITALS
HEIGHT: 70 IN | OXYGEN SATURATION: 96 % | TEMPERATURE: 97.6 F | BODY MASS INDEX: 22.62 KG/M2 | SYSTOLIC BLOOD PRESSURE: 127 MMHG | WEIGHT: 158 LBS | HEART RATE: 66 BPM | DIASTOLIC BLOOD PRESSURE: 80 MMHG

## 2017-06-05 DIAGNOSIS — R29.898 WEAKNESS OF RIGHT LEG: ICD-10-CM

## 2017-06-05 DIAGNOSIS — G12.23 PRIMARY LATERAL SCLEROSIS (H): Primary | ICD-10-CM

## 2017-06-05 DIAGNOSIS — M51.26 DISPLACEMENT OF LUMBAR INTERVERTEBRAL DISC WITHOUT MYELOPATHY: ICD-10-CM

## 2017-06-05 LAB
ANION GAP SERPL CALCULATED.3IONS-SCNC: 6 MMOL/L (ref 3–14)
BUN SERPL-MCNC: 18 MG/DL (ref 7–30)
CALCIUM SERPL-MCNC: 9 MG/DL (ref 8.5–10.1)
CHLORIDE SERPL-SCNC: 101 MMOL/L (ref 94–109)
CO2 SERPL-SCNC: 29 MMOL/L (ref 20–32)
CREAT SERPL-MCNC: 0.72 MG/DL (ref 0.66–1.25)
ERYTHROCYTE [DISTWIDTH] IN BLOOD BY AUTOMATED COUNT: 12.5 % (ref 10–15)
FOLATE SERPL-MCNC: 13.7 NG/ML
GFR SERPL CREATININE-BSD FRML MDRD: ABNORMAL ML/MIN/1.7M2
GLUCOSE SERPL-MCNC: 100 MG/DL (ref 70–99)
HCT VFR BLD AUTO: 43.6 % (ref 40–53)
HGB BLD-MCNC: 14.7 G/DL (ref 13.3–17.7)
MCH RBC QN AUTO: 31.3 PG (ref 26.5–33)
MCHC RBC AUTO-ENTMCNC: 33.7 G/DL (ref 31.5–36.5)
MCV RBC AUTO: 93 FL (ref 78–100)
PLATELET # BLD AUTO: 230 10E9/L (ref 150–450)
POTASSIUM SERPL-SCNC: 4 MMOL/L (ref 3.4–5.3)
RBC # BLD AUTO: 4.7 10E12/L (ref 4.4–5.9)
SODIUM SERPL-SCNC: 136 MMOL/L (ref 133–144)
TSH SERPL DL<=0.005 MIU/L-ACNC: 2.04 MU/L (ref 0.4–4)
VIT B12 SERPL-MCNC: 293 PG/ML (ref 193–986)
WBC # BLD AUTO: 6.8 10E9/L (ref 4–11)

## 2017-06-05 PROCEDURE — 99207 ZZC OFFICE-HOSPITAL ADMIT: CPT | Performed by: FAMILY MEDICINE

## 2017-06-05 PROCEDURE — 99220 ZZC INITIAL OBSERVATION CARE,LEVL III: CPT | Performed by: PHYSICIAN ASSISTANT

## 2017-06-05 PROCEDURE — 82607 VITAMIN B-12: CPT | Performed by: PHYSICIAN ASSISTANT

## 2017-06-05 PROCEDURE — 80048 BASIC METABOLIC PNL TOTAL CA: CPT | Performed by: PHYSICIAN ASSISTANT

## 2017-06-05 PROCEDURE — 70553 MRI BRAIN STEM W/O & W/DYE: CPT

## 2017-06-05 PROCEDURE — 82746 ASSAY OF FOLIC ACID SERUM: CPT | Performed by: PHYSICIAN ASSISTANT

## 2017-06-05 PROCEDURE — G0378 HOSPITAL OBSERVATION PER HR: HCPCS

## 2017-06-05 PROCEDURE — 84443 ASSAY THYROID STIM HORMONE: CPT | Performed by: PHYSICIAN ASSISTANT

## 2017-06-05 PROCEDURE — 25000128 H RX IP 250 OP 636: Performed by: HOSPITALIST

## 2017-06-05 PROCEDURE — 25000132 ZZH RX MED GY IP 250 OP 250 PS 637: Performed by: PHYSICIAN ASSISTANT

## 2017-06-05 PROCEDURE — 85027 COMPLETE CBC AUTOMATED: CPT | Performed by: PHYSICIAN ASSISTANT

## 2017-06-05 PROCEDURE — A9585 GADOBUTROL INJECTION: HCPCS | Performed by: HOSPITALIST

## 2017-06-05 PROCEDURE — 36415 COLL VENOUS BLD VENIPUNCTURE: CPT | Performed by: PHYSICIAN ASSISTANT

## 2017-06-05 RX ORDER — LIDOCAINE 40 MG/G
CREAM TOPICAL
Status: DISCONTINUED | OUTPATIENT
Start: 2017-06-05 | End: 2017-06-07 | Stop reason: HOSPADM

## 2017-06-05 RX ORDER — ONDANSETRON 4 MG/1
4 TABLET, ORALLY DISINTEGRATING ORAL EVERY 6 HOURS PRN
Status: DISCONTINUED | OUTPATIENT
Start: 2017-06-05 | End: 2017-06-07 | Stop reason: HOSPADM

## 2017-06-05 RX ORDER — ACETAMINOPHEN 325 MG/1
650 TABLET ORAL EVERY 4 HOURS PRN
Status: DISCONTINUED | OUTPATIENT
Start: 2017-06-05 | End: 2017-06-07 | Stop reason: HOSPADM

## 2017-06-05 RX ORDER — OXYCODONE AND ACETAMINOPHEN 5; 325 MG/1; MG/1
1 TABLET ORAL EVERY 4 HOURS PRN
Status: DISCONTINUED | OUTPATIENT
Start: 2017-06-05 | End: 2017-06-07 | Stop reason: HOSPADM

## 2017-06-05 RX ORDER — ENALAPRIL MALEATE 2.5 MG/1
5 TABLET ORAL DAILY
Status: DISCONTINUED | OUTPATIENT
Start: 2017-06-06 | End: 2017-06-07 | Stop reason: HOSPADM

## 2017-06-05 RX ORDER — PANTOPRAZOLE SODIUM 40 MG/1
40 TABLET, DELAYED RELEASE ORAL
Status: DISCONTINUED | OUTPATIENT
Start: 2017-06-06 | End: 2017-06-07 | Stop reason: HOSPADM

## 2017-06-05 RX ORDER — NALOXONE HYDROCHLORIDE 0.4 MG/ML
.1-.4 INJECTION, SOLUTION INTRAMUSCULAR; INTRAVENOUS; SUBCUTANEOUS
Status: DISCONTINUED | OUTPATIENT
Start: 2017-06-05 | End: 2017-06-07 | Stop reason: HOSPADM

## 2017-06-05 RX ORDER — ONDANSETRON 2 MG/ML
4 INJECTION INTRAMUSCULAR; INTRAVENOUS EVERY 6 HOURS PRN
Status: DISCONTINUED | OUTPATIENT
Start: 2017-06-05 | End: 2017-06-07 | Stop reason: HOSPADM

## 2017-06-05 RX ORDER — GADOBUTROL 604.72 MG/ML
7.5 INJECTION INTRAVENOUS ONCE
Status: COMPLETED | OUTPATIENT
Start: 2017-06-05 | End: 2017-06-05

## 2017-06-05 RX ORDER — ASPIRIN 81 MG/1
81 TABLET, CHEWABLE ORAL AT BEDTIME
Status: DISCONTINUED | OUTPATIENT
Start: 2017-06-05 | End: 2017-06-07 | Stop reason: HOSPADM

## 2017-06-05 RX ADMIN — ACETAMINOPHEN 650 MG: 325 TABLET, FILM COATED ORAL at 23:00

## 2017-06-05 RX ADMIN — GADOBUTROL 7 ML: 604.72 INJECTION INTRAVENOUS at 21:38

## 2017-06-05 NOTE — PLAN OF CARE
Problem: Discharge Planning  Goal: Discharge Planning (Adult, OB, Behavioral, Peds)  ROOM # 212-2     Living Situation (if not independent, order SW consult): independently in a rambler  Facility name:  : Kelli (spouse)     Activity level at baseline: 2 weeks ago, independent with walker. 1 week ago - assist of 1-2  Activity level on admit: assist of 2        Patient registered to observation; given Patient Bill of Rights; given the opportunity to ask questions about observation status and their plan of care.  Patient has been oriented to the observation room, bathroom and call light is in place.     Discussed discharge goals and expectations with patient/family.

## 2017-06-05 NOTE — MR AVS SNAPSHOT
After Visit Summary   6/5/2017    Elier Barber    MRN: [id number]           Patient Information     Date Of Birth          1945        Visit Information        Provider Department      6/5/2017 3:15 PM Johnson Morillo MD [facility]        Today's Diagnoses     Primary lateral sclerosis (HCC)    -  1    Weakness of right leg           Follow-ups after your visit        Your next 10 appointments already scheduled     Nov 02, 2017  1:00 PM CDT   (Arrive by 12:45 PM)   Return ALS/Motor Neuron with Gary Tejada MD   [facility]rology ([facility] [place])    [address] [place] [address]  3rd [facility] [address] [phone]              Who to contact     If you have questions or need follow up information about today's clinic visit or your schedule please contact [facility] directly at [phone].  Normal or non-critical lab and imaging results will be communicated to you by MyChart, letter or phone within 4 business days after the clinic has received the results. If you do not hear from us within 7 days, please contact the clinic through MyChart or phone. If you have a critical or abnormal lab result, we will notify you by phone as soon as possible.  Submit refill requests through Yvolver or call your pharmacy and they will forward the refill request to us. Please allow 3 business days for your refill to be completed.          Additional Information About Your Visit        MyChart Information     Yvolver gives you secure access to your electronic health record. If you see a primary care provider, you can also send messages to your care team and make appointments. If you have questions, please call your primary care clinic.  If you do not have a primary care provider, please call [phone] and they will assist you.        Care EveryWhere ID     This is your Care EveryWhere ID. This could be used by other organizations to access your  "[facility] medical records  [id number]        Your Vitals Were     Pulse Temperature Height Pulse Oximetry BMI (Body Mass Index)       66 97.6  F (36.4  C) (Oral) 5' 10\" (1.778 m) 96% 22.67 kg/m2        Blood Pressure from Last 3 Encounters:   06/05/17 127/80   05/25/17 126/68   05/18/17 154/82    Weight from Last 3 Encounters:   06/05/17 158 lb (71.7 kg)   05/25/17 155 lb (70.3 kg)   05/18/17 160 lb (72.6 kg)              Today, you had the following     No orders found for display         Today's Medication Changes          These changes are accurate as of: 6/5/17  4:04 PM.  If you have any questions, ask your nurse or doctor.               These medicines have changed or have updated prescriptions.        Dose/Directions    rivaroxaban ANTICOAGULANT 20 MG Tabs tablet   Commonly known as:  XARELTO   This may have changed:  Another medication with the same name was removed. Continue taking this medication, and follow the directions you see here.   Used for:  Acute deep vein thrombosis (DVT) of other specified vein of right lower extremity (H)   Changed by:  Lida Ray MD        Dose:  20 mg   Take 1 tablet (20 mg) by mouth daily (with dinner)   Quantity:  30 tablet   Refills:  2                Primary Care Provider Office Phone # Fax #    Lida Ray [phone]       [facility] [address]  [facility] [address]        Thank you!     Thank you for choosing [facility]  for your care. Our goal is always to provide you with excellent care. Hearing back from our patients is one way we can continue to improve our services. Please take a few minutes to complete the written survey that you may receive in the mail after your visit with us. Thank you!             Your Updated Medication List - Protect others around you: Learn how to safely use, store and throw away your medicines at www.disposemymeds.org.          This list is accurate as of: 6/5/17  4:04 PM.  " Always use your most recent med list.                   Brand Name Dispense Instructions for use    ASPIRIN PO      Take 81 mg by mouth daily       baclofen (LIORESAL) in NaCl 0.9% 100 mL intrathecal infusion      by Intrathecal route continuous Pump filled by [facility] [phone] Pump Serial Number: [id number], Pump Model Number: 8637-20 Last fill:  3/25/2014 Next fill: 6/10/2014 Low [place] Alarm Date: 6/28/2014 Reservoir Volume: 4 mL Conc: 2000 mcg/ml Flex schedule delivers 264.8 mcg/day       BACLOFEN PO      Take 10 mg by mouth 3 times daily       enalapril 5 MG tablet    VASOTEC    90 tablet    Take 1 tablet (5 mg) by mouth daily       EPINEPHrine 0.3 MG/0.3ML injection     0.3 mL    Inject 0.3 mLs (0.3 mg) into the muscle as needed for anaphylaxis       gentamicin 0.1 % ointment    GARAMYCIN     Apply topically 2 times daily       ketoconazole 2 % shampoo    NIZORAL    120 mL    Shampoo every 2-3 days as needed       oxybutynin 10 MG 24 hr tablet    DITROPAN XL    90 tablet    Take 1 tablet (10 mg) by mouth daily       oxyCODONE-acetaminophen 5-325 MG per tablet    PERCOCET     Take by mouth every 4 hours as needed for moderate to severe pain       PANTOPRAZOLE SODIUM PO      Take 40 mg by mouth every morning (before breakfast)       polyethylene glycol Packet    MIRALAX/GLYCOLAX     Take 1 packet by mouth daily       potassium chloride 10 MEQ tablet    K-TAB,KLOR-CON    180 tablet    Take 2 tablets (20 mEq) by mouth daily GIVE THE GENERIC: THIS IS NOT A REQUEST FOR BRAND NAME       rivaroxaban ANTICOAGULANT 20 MG Tabs tablet    XARELTO    30 tablet    Take 1 tablet (20 mg) by mouth daily (with dinner)       TYLENOL PO      Take 325 mg by mouth every 4 hours as needed for mild pain or fever

## 2017-06-05 NOTE — IP AVS SNAPSHOT
[place] [address] [facility]    [address]    [facility] [address]    Phone:  [phone]    Fax:  [phone]                                       After Visit Summary   6/5/2017    Elier Barber    MRN: [id number]           After Visit Summary Signature Page     I have received my discharge instructions, and my questions have been answered. I have discussed any challenges I see with this plan with the nurse or doctor.    ..........................................................................................................................................  Patient/Patient Representative Signature      ..........................................................................................................................................  Patient Representative Print Name and Relationship to Patient    ..................................................               ................................................  Date                                            Time    ..........................................................................................................................................  Reviewed by Signature/Title    ...................................................              ..............................................  Date                                                            Time

## 2017-06-05 NOTE — NURSING NOTE
"Chief Complaint   Patient presents with     right leg weakness - unable to walk       Initial /80 (BP Location: Right arm, Patient Position: Chair, Cuff Size: Adult Large)  Pulse 66  Temp 97.6  F (36.4  C) (Oral)  Ht 5' 10\" (1.778 m)  Wt 158 lb (71.7 kg)  SpO2 96%  BMI 22.67 kg/m2 Estimated body mass index is 22.67 kg/(m^2) as calculated from the following:    Height as of this encounter: 5' 10\" (1.778 m).    Weight as of this encounter: 158 lb (71.7 kg).  Medication Reconciliation: complete Marsha Rasheed CMA    "

## 2017-06-05 NOTE — TELEPHONE ENCOUNTER
Left V/M for pt letting him know PCP has no openings and to go to  or call us back to see if any docs can see him.

## 2017-06-05 NOTE — H&P
[facility] Outpatient / Observation Unit  History and Physical Exam     Elier Barber MRN# [id number]   YOB: 1945 Age: 71 year old      Date of Admission:  6/5/2017    Primary care provider: Lida Ray   Elier Barber is a 71 year old male with a PMH significant for HTN, primary lateral sclerosis, CVA, lumbar disc herniation and hx of prostate ca, who is referred for direct admission from Dr. Morillo due to right lower extremity weakness.   Patient will be registered to Observation for further work-up and evaluation.     1. R LE weakness - pt reports sudden onset ~1 week ago, unable to bear weight on right leg, painless. Now able to bear weight but unable initiate walking. Has full strength on strength testing while in bed. Preceded by exacerbation of low back pain, spasms and incontinence, all of which have improved with chiropractor care. Called primary neurologist, concerned about possible CVA vs baclofen pump issue vs other musculoskeletal issue. Will get brain MRI, PT consult in AM and neurology consult. If MRI negative, morning rounder may consider neurosurgery consult as well. Will get basic labs, TSH, B12 and folate.   Of note, pt has Baclofen pump which will turn off in MRI (should turn back on on its own). [employer] is aware and planning to come check pump around noon tomorrow to ensure it has turned back on (needs to be checked within 24 hours). Eliu at [phone]  2. HTN - resume home meds  3. Primary lateral sclerosis - pt states he ambulates independently with a walker at baseline.   4. Hx of CVA - minimal sx at onset, no residual sx. On Xarelto, continue         Plan     1. Registered to Observation  2. MRI brain    3. Continue Xarelto   4. PT consult  5. Neurology consult  6. DVT prophylaxis: continue Xarelto, encourage ambulation                   Chief Complaint:   R LE weakness         History of Present Illness:   Elier Barber is a 71 year old male with a PMH  significant for HTN, primary lateral sclerosis, CVA, lumbar disc herniation and hx of prostate ca, who is referred for direct admission from Dr. Morillo due to right lower extremity weakness. Pt reports 1-2 weeks of right lower extremity weakness and inability to ambulate. The weakness was initially associated with low back pain and leg spasms. He was seen at [facility] on 5/12 for sx and incidentally noted to have a R proximal calf DVT and started on Xarelto. He also noted urine incontinence with initial sx. He was seen by chiropractor following this and states his back pain, muscle spasms and incontinence improved and resolved. He now is able to stand on his right leg without pain, his wife states he remains unsteady, but is unable to initiate or coordinate walking. He is able to flex his hips and extend his knees while sitting but is unable to flex hip while standing. He normally walks with a walker independently at baseline. He denies fever, chills, chest pain, SOB, abd pain, nausea, vomiting, diarrhea, or dysuria. Pt did reach out to primary neurologist who recommended further medical work up. Per telephone records, pt's chiropractor is requesting/recommending repeat lumbar spine MRI and EMG. Pt's last lumbar MRI was 5/13/17 at [facility].              Past Medical History:     Past Medical History:   Diagnosis Date     Basal cell carcinoma nos     sees derm     CVA (cerebral infarction) 6/14    small vessel right int capsule stroke     Essential hypertension, benign      Hearing loss      Hoarseness of voice     assoc with PLS     Primary Lateral Sclerosis 2002    has baclofen pump through Dr. Calvert, [place], sees Dr. Fink, [facility]     Prostate CA (H) 2008    prostatectomy               Past Surgical History:     Past Surgical History:   Procedure Laterality Date     ABDOMEN SURGERY  11/12    Hernia     BIOPSY  4/16    Cancerous growth on leg. Removed by MOHs treatment     C NONSPECIFIC PROCEDURE  " 6/08     prostatectomy      C NONSPECIFIC PROCEDURE  11/01    colonoscopy     C NONSPECIFIC PROCEDURE  11/2006    hernia, right side     C NONSPECIFIC PROCEDURE      T + A     HERNIA REPAIR  11/12    Repaired               Social History:     Social History     Social History     Marital status:      Spouse name: Kelli     Number of children: 2     Years of education: N/A     Occupational History     [employer] .. sales      retired 2000     Social History Main Topics     Smoking status: Former Smoker     Packs/day: 0.30     Years: 6.00     Types: Cigarettes     Start date: 4/1/1970     Quit date: 10/5/1976     Smokeless tobacco: Never Used     Alcohol use Yes      Comment: 3 PER WEEK     Drug use: No     Sexual activity: Not Currently     Other Topics Concern     Parent/Sibling W/ Cabg, Mi Or Angioplasty Before 65f 55m? No     Social History Narrative               Family History:     Family History   Problem Relation Age of Onset     HEART DISEASE Mother      CHF     Hypertension Mother      Family History Negative Father      C.A.D. Maternal Grandfather      CEREBROVASCULAR DISEASE Maternal Uncle      45              Allergies:      Allergies   Allergen Reactions     Bee Venom      Penicillins      \"HIVES\"               Medications:     Prior to Admission medications    Medication Sig Last Dose Taking? Auth Provider   potassium chloride (K-TAB,KLOR-CON) 10 MEQ tablet Take 2 tablets (20 mEq) by mouth daily GIVE THE GENERIC: THIS IS NOT A REQUEST FOR BRAND NAME   Lida Ray MD   enalapril (VASOTEC) 5 MG tablet Take 1 tablet (5 mg) by mouth daily   Lida Ray MD   BACLOFEN PO Take 10 mg by mouth 3 times daily   Reported, Patient   polyethylene glycol (MIRALAX/GLYCOLAX) Packet Take 1 packet by mouth daily   Reported, Patient   rivaroxaban ANTICOAGULANT (XARELTO) 20 MG TABS tablet Take 1 tablet (20 mg) by mouth daily (with dinner)   Lida Ray MD   EPINEPHrine 0.3 MG/0.3ML injection Inject 0.3 mLs " (0.3 mg) into the muscle as needed for anaphylaxis   Lida Ray MD   Acetaminophen (TYLENOL PO) Take 325 mg by mouth every 4 hours as needed for mild pain or fever   Reported, Patient   oxyCODONE-acetaminophen (PERCOCET) 5-325 MG per tablet Take by mouth every 4 hours as needed for moderate to severe pain   Reported, Patient   PANTOPRAZOLE SODIUM PO Take 40 mg by mouth every morning (before breakfast)   Reported, Patient   ketoconazole (NIZORAL) 2 % shampoo Shampoo every 2-3 days as needed   Lida Ray MD   oxybutynin (DITROPAN XL) 10 MG 24 hr tablet Take 1 tablet (10 mg) by mouth daily   Lida Ray MD   gentamicin (GARAMYCIN) 0.1 % ointment Apply topically 2 times daily   Reported, Patient   ASPIRIN PO Take 81 mg by mouth daily    Reported, Patient   baclofen (LIORESAL) in NaCl 0.9% 100 mL intrathecal infusion by Intrathecal route continuous Pump filled by [facility] [phone]  Pump Serial Number: [id number], Pump Model Number: 8637-20  Last fill:  3/25/2014  Next fill: 6/10/2014  Low [place] Alarm Date: 6/28/2014  Reservoir Volume: 4 mL  Conc: 2000 mcg/ml  Flex schedule delivers 264.8 mcg/day   Reported, Patient              Review of Systems:   A Comprehensive greater than 10 system review of systems was carried out.  Pertinent positives and negatives are noted above.  Otherwise negative for contributory information.     C: NEGATIVE for fever, chills, change in weight  E/M: NEGATIVE for ear, mouth and throat problems  R: NEGATIVE for significant cough or SOB  CV: NEGATIVE for chest pain, palpitations or peripheral edema    NEURO ROS: negative except for weakness             Physical Exam:   Blood pressure 170/77, temperature 96.9  F (36.1  C), temperature source Oral, resp. rate 20, SpO2 96 %.    GENERAL:  Comfortable.  PSYCH: pleasant, oriented, No acute distress.  HEENT:  Atraumatic, normocephalic. Normal conjunctiva, normal hearing, and oropharynx is normal.  NECK:   Supple, no neck vein distention, adenopathy or bruits, normal thyroid.  HEART:  Normal S1, S2 with no murmur, no pericardial rub, gallops or S3 or S4.  LUNGS:  Clear to auscultation, normal Respiratory effort. No wheezing, rales or ronchi.  GI:  Soft, no hepatosplenomegaly, normal bowel sounds. Non-tender, non distended.   EXTREMITIES:  No pedal edema, +2 pulses bilateral and equal.  SKIN:  Dry to touch, No rash, wound or ulcerations.  NEUROLOGIC:  CN 2-12 intact, BL 5/5 symmetric upper and lower extremity strength, sensation is intact with no focal deficits.             Data:       Recent Labs  Lab 06/05/17 1925   WBC 6.8   HGB 14.7   HCT 43.6   MCV 93          Recent Labs  Lab 06/05/17 1925      POTASSIUM 4.0   CHLORIDE 101   CO2 29   ANIONGAP 6   *   BUN 18   CR 0.72   GFRESTIMATED >90Non  GFR Calc   GFRESTBLACK >90African American GFR Calc   AMOS 9.0       Recent Labs  Lab 06/05/17 1925   TSH 2.04       Recent Results (from the past 48 hour(s))   MR Brain w/o & w Contrast    Narrative    MRI OF THE BRAIN WITHOUT AND WITH CONTRAST  6/5/2017  9:45 PM     COMPARISON: Brain MR 8/14/2014.    HISTORY:  Right lower extremity weakness.    TECHNIQUE: Axial diffusion-weighted with ADC map, axial T2-weighted  with fat saturation, axial T1-weighted, axial turboFLAIR and coronal  T1-weighted images of the brain were acquired without intravenous  contrast.  Following intravenous administration of gadolinium (7 mL  Gadavist), axial T1-weighted images of the brain were acquired.     FINDINGS: There is moderate diffuse cerebral volume loss. There are  extensive confluent periventricular areas of abnormal T2 signal  hyperintensity in the cerebral white matter bilaterally that are  consistent with sequela of chronic small vessel ischemic disease.    The ventricles and basal cisterns are within normal limits in  configuration given the degree of cerebral volume loss.  There is no  midline  shift. There are no extra-axial fluid collections. There is no  evidence for stroke or acute intracranial hemorrhage. There is no  abnormal contrast enhancement in the brain or its coverings.    There is no sinusitis or mastoiditis.      Impression    IMPRESSION: Diffuse cerebral volume loss and cerebral white matter  changes consistent with chronic small vessel ischemic disease. No  evidence for acute intracranial pathology. No change from the  comparison study.    MD Lauren BENITEZ PA-C

## 2017-06-05 NOTE — PROGRESS NOTES
SUBJECTIVE:                                                    Elier Barber is a 71 year old male who presents to clinic today for the following health issues:      Right leg weakness        Problem list and histories reviewed & adjusted, as indicated.  Additional history:         Reviewed and updated as needed this visit by clinical staff  Tobacco  Allergies  Meds  Med Hx  Surg Hx  Fam Hx  Soc Hx      Reviewed and updated as needed this visit by Provider         Primary lateral sclerosis (HCC)  (primary encounter diagnosis)  Weakness of right leg 1 week painless right leg weakness with a few falls. Leg strength symmetric in wheelchair, cannot bear weight.    Has been treated by chiropractor since mid May for LBP with radicular symptoms, these have resolved    Past Medical History:   Diagnosis Date     Basal cell carcinoma nos     sees derm     CVA (cerebral infarction) 6/14    small vessel right int capsule stroke     Essential hypertension, benign      Hearing loss      Hoarseness of voice     assoc with PLS     Primary Lateral Sclerosis 2002    has baclofen pump through Dr. Calvert, [place] [facility], sees Dr. Fink, [facility]     Prostate CA (H) 2008    prostatectomy       Past Surgical History:   Procedure Laterality Date     ABDOMEN SURGERY  11/12    Hernia     BIOPSY  4/16    Cancerous growth on leg. Removed by MOHs treatment     C NONSPECIFIC PROCEDURE  6/08     prostatectomy      C NONSPECIFIC PROCEDURE  11/01    colonoscopy     C NONSPECIFIC PROCEDURE  11/2006    hernia, right side     C NONSPECIFIC PROCEDURE      T + A     HERNIA REPAIR  11/12    Repaired       Family History   Problem Relation Age of Onset     HEART DISEASE Mother      CHF     Hypertension Mother      Family History Negative Father      C.A.D. Maternal Grandfather      CEREBROVASCULAR DISEASE Maternal Uncle      45       Social History   Substance Use Topics     Smoking status: Former Smoker     Packs/day: 0.30     Years: 6.00     Types:  Cigarettes     Start date: 4/1/1970     Quit date: 10/5/1976     Smokeless tobacco: Never Used     Alcohol use Yes      Comment: 3 PER WEEK       Patient Active Problem List   Diagnosis     Essential hypertension, benign     Primary lateral sclerosis (HCC)     Prostate CA (HCC)     CARDIOVASCULAR SCREENING; LDL GOAL LESS THAN 160     HCD (health care directive)     Vertigo     Cerebral infarction (H)     Advanced directives, counseling/discussion       Current Outpatient Prescriptions   Medication     potassium chloride (K-TAB,KLOR-CON) 10 MEQ tablet     enalapril (VASOTEC) 5 MG tablet     BACLOFEN PO     polyethylene glycol (MIRALAX/GLYCOLAX) Packet     rivaroxaban ANTICOAGULANT (XARELTO) 20 MG TABS tablet     EPINEPHrine 0.3 MG/0.3ML injection     Acetaminophen (TYLENOL PO)     oxyCODONE-acetaminophen (PERCOCET) 5-325 MG per tablet     PANTOPRAZOLE SODIUM PO     ketoconazole (NIZORAL) 2 % shampoo     oxybutynin (DITROPAN XL) 10 MG 24 hr tablet     gentamicin (GARAMYCIN) 0.1 % ointment     ASPIRIN PO     baclofen (LIORESAL) in NaCl 0.9% 100 mL intrathecal infusion     [DISCONTINUED] rivaroxaban ANTICOAGULANT (XARELTO) 15 MG TABS tablet     No current facility-administered medications for this visit.        From [facility] neurology  I called the patient. He reports fairly abrupt painless loss of the ability to walk on the right leg despite having no change in strength and no other new symptoms. No headache. Past history of stroke; currently on xarelto for DVT. This happened a week ago and may be improving (I did not clarify fully; no answer when I called for further clarification). Assuming it had to do with his lumbar disk derangement they sought care from his chiropractor.     ? Recurrent stroke or hemorrhage in left frontal lobe vs change in baclofen effect vs some musculoskeletal problem. I do not recall which leg has the DVT. I discussed some of these possibilities with Kelli and Nader by phone and will forward my note  "to the physician he is seeing on an urgent basis today. He may need to be re-admitted to hospital or at minimum undergo brain imaging unless evaluation identifies a clear diagnosis.      Some of the difficulty is the fragmentation of care; as is often the case he might do best to start with primary care when new symptoms develop. I am out of the office most of this week and have a full schedule now tomorrow at [place], unfortunately, but if necessary there is good neurology coverage at [facility] and elsewhere through [facility] in the [facility].      ROS no LOC, other neuro symptoms. Some abrasions on leg from falls  /80 (BP Location: Right arm, Patient Position: Chair, Cuff Size: Adult Large)  Pulse 66  Temp 97.6  F (36.4  C) (Oral)  Ht 5' 10\" (1.778 m)  Wt 158 lb (71.7 kg)  SpO2 96%  BMI 22.67 kg/m2  Cheerful,   Dysarthric  RRR  No resp distress  No DTRs in legs  GIII strength to hip flexion in chair, foot flexion  CN II-XII grossly symmetric         ASSESSMENT / PLAN:  (G12.29) Primary lateral sclerosis (HCC)  (primary encounter diagnosis)  Comment:[facility] neurology care  Plan:     (M62.81) Weakness of right leg  Comment: subacute now:  Dx is broad  Plan:Pt  To [facility]. Will attempt direct admit  ED informed          Johnson Morillo MD        "

## 2017-06-05 NOTE — IP AVS SNAPSHOT
MRN:[id number]                      After Visit Summary   6/5/2017    Elier Barber    MRN: [id number]           Thank you!     Thank you for choosing [facility] for your care. Our goal is always to provide you with excellent care. Hearing back from our patients is one way we can continue to improve our services. Please take a few minutes to complete the written survey that you may receive in the mail after you visit. If you would like to speak to someone directly about your visit please contact Patient Relations at [phone]. Thank you!          Patient Information     Date Of Birth          1945        Designated Caregiver       Most Recent Value    Caregiver    Will someone help with your care after discharge? yes    Name of designated caregiver Kelli    Phone number of caregiver [phone]    Caregiver address same      About your hospital stay     You were admitted on:  June 5, 2017 You last received care in the:  [place] [address] [facility]    You were discharged on:  June 7, 2017        Reason for your hospital stay       You were hospitalized for right leg weakness.  This is due to a large disc herniation in your lumbar spine.  Neurosurgery has recommended consideration for surgery, however you have declined at this time.  We will have home PT services resumed.                  Who to Call     For medical emergencies, please call 911.  For non-urgent questions about your medical care, please call your primary care provider or clinic, [phone]          Attending Provider     Provider Specialty    Edward Hull MD Internal Medicine       Primary Care Provider Office Phone # Fax #    Lida Ray [phone]      After Care Instructions     Activity       Your activity upon discharge: activity as tolerated            Diet       Follow this diet upon discharge: Orders Placed This Encounter      Regular Diet Adult                   Follow-up Appointments     Follow-up and recommended labs and tests        If you do want to discuss surgical intervention in the future please call [facility] at [phone].                  Your next 10 appointments already scheduled     Nov 02, 2017  1:00 PM CDT   (Arrive by 12:45 PM)   Return ALS/[place] with Gary Tejada MD   [facility]rology ([facility])    [address]  [address] [facility] [address] [phone]              Additional Services     Home Care PT Referral for Hospital Discharge       PT to eval and treat    Your provider has ordered home care - physical therapy. If you have not been contacted within 2 days of your discharge please call the department phone number listed on the top of this document.            Home care nursing referral       Resume previous HC services.    Your provider has ordered home care nursing services. If you have not been contacted within 2 days of your discharge please call the inpatient department phone number at [phone] .                  Further instructions from your care team         Weakness (Uncertain Cause)  Based on your exam today, the exact cause of your weakness is not certain. However, your weakness does not seem to be a sign of a serious illness at this time. Keep an eye on your symptoms and get medical advice as instructed below.  Home care    Rest at home today. Do not over-exert yourself.    Take any medicine as prescribed.    For the next few days, drink extra fluids (unless your healthcare provider wants you to restrict fluids for other reasons). Do not skip meals.  Follow-up care  Follow up with your healthcare provider or as advised.  When to seek medical advice  Call your healthcare provider for any of the following    Worsening of your symptoms    Symptoms don't start getting better within 2 days    Fever of 100.4  F (38  C) or higher, or as directed by your healthcare  provider     Call 911  Get emergency medical care for any of these:    Chest, arm, neck, jaw or upper back pain    Trouble breathing    Numbness or weakness of the face, one arm or one leg    Slurred speech, confusion, trouble speaking, walking or seeing    Blood in vomit or stool (black or red color)    Loss of consciousness    7056-5091 [employer]. [address] [place] [address]38. All rights reserved. This information is not intended as a substitute for professional medical care. Always follow your healthcare professional's instructions.                               Pending Results     No orders found from 6/3/2017 to 6/6/2017.            Statement of Approval     Ordered          06/07/17 1539  I have reviewed and agree with all the recommendations and orders detailed in this document.  EFFECTIVE NOW     Approved and electronically signed by:  Floyd Blanton MD             Admission Information     Date & Time Department Dept. Phone    6/5/2017 [place] [address] [facility] [phone]      Your Vitals Were     Blood Pressure Pulse Temperature Respirations Pulse Oximetry       149/97 (BP Location: Left arm) 61 97.9  F (36.6  C) (Oral) 18 95%       MyChart Information     [employer] gives you secure access to your electronic health record. If you see a primary care provider, you can also send messages to your care team and make appointments. If you have questions, please call your primary care clinic.  If you do not have a primary care provider, please call [phone] and they will assist you.        Care EveryWhere ID     This is your Care EveryWhere ID. This could be used by other organizations to access your [place] medical records  [id number]           Review of your medicines      CONTINUE these medicines which have NOT CHANGED        Dose / Directions    ASPIRIN PO        Dose:  81 mg   Take 81 mg by mouth daily   Refills:  0       baclofen (LIORESAL) in NaCl 0.9%  100 mL intrathecal infusion        by Intrathecal route continuous Pump filled by [facility] [phone] Pump Serial Number: [id number], Pump Model Number: 8637-20 Last fill:  Last week Next fill: October Low [place] Alarm Date: 10/13/17 Reservoir Volume: 19.2 ml Conc: 2000 mcg/ml Flex schedule delivers 266.5 mcg/day Basal rate 11 mcg/hr    For 3 hours starting at 0500 increases to 11.9 mcg/hr   Refills:  0       BACLOFEN PO        Dose:  10 mg   Take 10 mg by mouth 3 times daily as needed   Refills:  0       enalapril 5 MG tablet   Commonly known as:  VASOTEC   Used for:  Essential hypertension, benign        Dose:  5 mg   Take 1 tablet (5 mg) by mouth daily   Quantity:  90 tablet   Refills:  3       EPINEPHrine 0.3 MG/0.3ML injection   Used for:  Bee sting reaction, accidental or unintentional, initial encounter        Dose:  0.3 mg   Inject 0.3 mLs (0.3 mg) into the muscle as needed for anaphylaxis   Quantity:  0.3 mL   Refills:  3       ketoconazole 2 % shampoo   Commonly known as:  NIZORAL   Used for:  Dermatitis        Shampoo every 2-3 days as needed   Quantity:  120 mL   Refills:  5       oxybutynin 10 MG 24 hr tablet   Commonly known as:  DITROPAN XL   Used for:  Prostate CA (H)        Dose:  10 mg   Take 1 tablet (10 mg) by mouth daily   Quantity:  90 tablet   Refills:  3       oxyCODONE-acetaminophen 5-325 MG per tablet   Commonly known as:  PERCOCET        Take by mouth every 4 hours as needed for moderate to severe pain   Refills:  0       PANTOPRAZOLE SODIUM PO        Dose:  40 mg   Take 40 mg by mouth daily (with dinner)   Refills:  0       polyethylene glycol Packet   Commonly known as:  MIRALAX/GLYCOLAX        Dose:  1 packet   Take 1 packet by mouth daily   Refills:  0       potassium chloride 10 MEQ tablet   Commonly known as:  K-TAB,KLOR-CON   Used for:  Essential hypertension, benign        Dose:  20 mEq   Take 2 tablets (20 mEq) by mouth daily GIVE THE GENERIC:  THIS IS NOT A REQUEST FOR BRAND NAME   Quantity:  180 tablet   Refills:  3       rivaroxaban ANTICOAGULANT 20 MG Tabs tablet   Commonly known as:  XARELTO   Used for:  Acute deep vein thrombosis (DVT) of other specified vein of right lower extremity (H)        Dose:  20 mg   Take 1 tablet (20 mg) by mouth daily (with dinner)   Quantity:  30 tablet   Refills:  2       TYLENOL PO        Dose:  325 mg   Take 325 mg by mouth every 4 hours as needed for mild pain or fever   Refills:  0                Protect others around you: Learn how to safely use, store and throw away your medicines at www.disposemymeds.org.             Medication List: This is a list of all your medications and when to take them. Check marks below indicate your daily home schedule. Keep this list as a reference.      Medications           Morning Afternoon Evening Bedtime As Needed    ASPIRIN PO   Take 81 mg by mouth daily   Last time this was given:  81 mg on 6/6/2017  8:09 PM   Next Dose Due:  06/08                                   baclofen (LIORESAL) in NaCl 0.9% 100 mL intrathecal infusion   by Intrathecal route continuous Pump filled by [facility] [phone] Pump Serial Number: [id number], Pump Model Number: 8637-20 Last fill:  Last week Next fill: October Low [place] Alarm Date: 10/13/17 Reservoir Volume: 19.2 ml Conc: 2000 mcg/ml Flex schedule delivers 266.5 mcg/day Basal rate 11 mcg/hr    For 3 hours starting at 0500 increases to 11.9 mcg/hr                                BACLOFEN PO   Take 10 mg by mouth 3 times daily as needed                                   enalapril 5 MG tablet   Commonly known as:  VASOTEC   Take 1 tablet (5 mg) by mouth daily   Last time this was given:  5 mg on 6/7/2017  8:03 AM   Next Dose Due:  06/08                                   EPINEPHrine 0.3 MG/0.3ML injection   Inject 0.3 mLs (0.3 mg) into the muscle as needed for anaphylaxis                                    ketoconazole 2 % shampoo   Commonly known as:  NIZORAL   Shampoo every 2-3 days as needed                                   oxybutynin 10 MG 24 hr tablet   Commonly known as:  DITROPAN XL   Take 1 tablet (10 mg) by mouth daily   Last time this was given:  10 mg on 6/6/2017  6:48 PM   Next Dose Due:  06/07                                   oxyCODONE-acetaminophen 5-325 MG per tablet   Commonly known as:  PERCOCET   Take by mouth every 4 hours as needed for moderate to severe pain   Last time this was given:  1 tablet on 6/6/2017  5:28 AM                                   PANTOPRAZOLE SODIUM PO   Take 40 mg by mouth daily (with dinner)   Last time this was given:  40 mg on 6/6/2017  6:36 PM   Next Dose Due:  06/07                                   polyethylene glycol Packet   Commonly known as:  MIRALAX/GLYCOLAX   Take 1 packet by mouth daily   Last time this was given:  17 g on 6/7/2017  8:03 AM   Next Dose Due:  06/08                                   potassium chloride 10 MEQ tablet   Commonly known as:  K-TAB,KLOR-CON   Take 2 tablets (20 mEq) by mouth daily GIVE THE GENERIC: THIS IS NOT A REQUEST FOR BRAND NAME   Last time this was given:  20 mEq on 6/7/2017  8:03 AM   Next Dose Due:  06/08                                     rivaroxaban ANTICOAGULANT 20 MG Tabs tablet   Commonly known as:  XARELTO   Take 1 tablet (20 mg) by mouth daily (with dinner)   Last time this was given:  20 mg on 6/6/2017  6:36 PM   Next Dose Due:  06/07                                     TYLENOL PO   Take 325 mg by mouth every 4 hours as needed for mild pain or fever   Last time this was given:  650 mg on 6/5/2017 11:00 PM

## 2017-06-06 ENCOUNTER — APPOINTMENT (OUTPATIENT)
Dept: MRI IMAGING | Facility: CLINIC | Age: 72
DRG: 552 | End: 2017-06-06
Attending: HOSPITALIST
Payer: COMMERCIAL

## 2017-06-06 PROBLEM — R29.898 LEG WEAKNESS: Status: ACTIVE | Noted: 2017-06-06

## 2017-06-06 PROCEDURE — 40000914 ZZH STATISTIC SITTER, DAY HOURS

## 2017-06-06 PROCEDURE — 72146 MRI CHEST SPINE W/O DYE: CPT

## 2017-06-06 PROCEDURE — 12000000 ZZH R&B MED SURG/OB

## 2017-06-06 PROCEDURE — 25000128 H RX IP 250 OP 636: Performed by: HOSPITALIST

## 2017-06-06 PROCEDURE — 25000132 ZZH RX MED GY IP 250 OP 250 PS 637: Performed by: HOSPITALIST

## 2017-06-06 PROCEDURE — 25000125 ZZHC RX 250: Performed by: PHYSICIAN ASSISTANT

## 2017-06-06 PROCEDURE — 72148 MRI LUMBAR SPINE W/O DYE: CPT

## 2017-06-06 PROCEDURE — G0378 HOSPITAL OBSERVATION PER HR: HCPCS

## 2017-06-06 PROCEDURE — 25000132 ZZH RX MED GY IP 250 OP 250 PS 637: Performed by: PHYSICIAN ASSISTANT

## 2017-06-06 PROCEDURE — 99222 1ST HOSP IP/OBS MODERATE 55: CPT | Performed by: NURSE PRACTITIONER

## 2017-06-06 PROCEDURE — 99232 SBSQ HOSP IP/OBS MODERATE 35: CPT | Performed by: HOSPITALIST

## 2017-06-06 PROCEDURE — 25000128 H RX IP 250 OP 636: Performed by: PHYSICIAN ASSISTANT

## 2017-06-06 RX ORDER — POTASSIUM CHLORIDE 750 MG/1
20 TABLET, EXTENDED RELEASE ORAL DAILY
Status: DISCONTINUED | OUTPATIENT
Start: 2017-06-06 | End: 2017-06-07 | Stop reason: HOSPADM

## 2017-06-06 RX ORDER — MORPHINE SULFATE 2 MG/ML
2 INJECTION, SOLUTION INTRAMUSCULAR; INTRAVENOUS ONCE
Status: COMPLETED | OUTPATIENT
Start: 2017-06-06 | End: 2017-06-06

## 2017-06-06 RX ORDER — OXYBUTYNIN CHLORIDE 5 MG/1
10 TABLET, EXTENDED RELEASE ORAL EVERY EVENING
Status: DISCONTINUED | OUTPATIENT
Start: 2017-06-06 | End: 2017-06-07 | Stop reason: HOSPADM

## 2017-06-06 RX ORDER — HYDROMORPHONE HYDROCHLORIDE 1 MG/ML
0.5 INJECTION, SOLUTION INTRAMUSCULAR; INTRAVENOUS; SUBCUTANEOUS ONCE
Status: COMPLETED | OUTPATIENT
Start: 2017-06-06 | End: 2017-06-06

## 2017-06-06 RX ORDER — POLYETHYLENE GLYCOL 3350 17 G/17G
17 POWDER, FOR SOLUTION ORAL 2 TIMES DAILY PRN
Status: DISCONTINUED | OUTPATIENT
Start: 2017-06-06 | End: 2017-06-07 | Stop reason: HOSPADM

## 2017-06-06 RX ADMIN — OXYCODONE HYDROCHLORIDE AND ACETAMINOPHEN 1 TABLET: 5; 325 TABLET ORAL at 05:28

## 2017-06-06 RX ADMIN — MORPHINE SULFATE 2 MG: 2 INJECTION, SOLUTION INTRAMUSCULAR; INTRAVENOUS at 11:45

## 2017-06-06 RX ADMIN — HYDROMORPHONE HYDROCHLORIDE 0.5 MG: 1 INJECTION, SOLUTION INTRAMUSCULAR; INTRAVENOUS; SUBCUTANEOUS at 12:29

## 2017-06-06 RX ADMIN — ASPIRIN 81 MG CHEWABLE TABLET 81 MG: 81 TABLET CHEWABLE at 02:01

## 2017-06-06 RX ADMIN — OXYBUTYNIN CHLORIDE 10 MG: 5 TABLET, EXTENDED RELEASE ORAL at 18:48

## 2017-06-06 RX ADMIN — ENALAPRIL MALEATE 5 MG: 2.5 TABLET ORAL at 09:58

## 2017-06-06 RX ADMIN — POTASSIUM CHLORIDE 20 MEQ: 750 TABLET, FILM COATED, EXTENDED RELEASE ORAL at 13:46

## 2017-06-06 RX ADMIN — POLYETHYLENE GLYCOL 3350 17 G: 17 POWDER, FOR SOLUTION ORAL at 13:47

## 2017-06-06 RX ADMIN — RIVAROXABAN 20 MG: 20 TABLET, FILM COATED ORAL at 18:36

## 2017-06-06 RX ADMIN — ASPIRIN 81 MG CHEWABLE TABLET 81 MG: 81 TABLET CHEWABLE at 20:09

## 2017-06-06 RX ADMIN — PANTOPRAZOLE SODIUM 40 MG: 40 TABLET, DELAYED RELEASE ORAL at 18:36

## 2017-06-06 RX ADMIN — ONDANSETRON 4 MG: 2 INJECTION INTRAMUSCULAR; INTRAVENOUS at 13:44

## 2017-06-06 RX ADMIN — RIVAROXABAN 20 MG: 20 TABLET, FILM COATED ORAL at 02:01

## 2017-06-06 RX ADMIN — ONDANSETRON 4 MG: 4 TABLET, ORALLY DISINTEGRATING ORAL at 19:31

## 2017-06-06 NOTE — CONSULTS
[facility]    Neurosurgery Consultation     Date of Admission:  6/5/2017  Date of Consult (When I saw the patient): 06/06/17    Assessment & Plan   Elier Barber is a 71 year old male who was admitted on 6/5/2017. I was asked to see the patient for RLE weakness. PMH is notable for primary lateral sclerosis, CVA, lumbar disc herniation and prostate cancer. Patient and wife report for the last week that he has been unable to bear weight on his RLE causing them to seek medical care. Today they state he is able to bear weight but unable to initiate walking. He uses a walker at baseline. He has an occasional brief muscle spasm in his low back and thighs but otherwise denies low back pain, LE pain/tingling. Sensation is intact and strength is 5/5 in bilateral LE when in bed. He denies any recent changes to bowel or bladder patterns. Patient does have a history of past low back pain and LE weakness and has tried both KYLE's and Medrol dose packs in the past with temporary relief. However, he felt the KYLE's interacted with his intrathecal baclofen pump and he felt very fatigued for several days afterwards and had to have his baclofen dose adjusted because of this. Notably, patient was recently hospitalized at [facility] 5/12/17- 5/15/17 with increasing LE weakness and right leg swelling after a driving trip to [place]. He was found to have right proximal calf DVT and was started on Xarelto. At that time he was also having increased urine incontinence. Per [facility] discharge summary MRI of lumbar spine performed at that time showed severe canal stenosis, disc herniation, foraminal stenosis worst at L4-L5. Patient was seen by Dr. Headley who recommended surgery but patient preferred to try conservative measures. For the past 3 weeks he has been seeing a chiropractor 3-4x/ week and reports his low back pain, muscle spasms and incontinence have all improved and have nearly resolved. MRI of brain  completed here 6/5/17 was negative for any acute intracranial pathology. MRI of lumbar spine today revealed large central disc extrusion resulting in severe central canal and moderate to severe bilateral neural foraminal stenosis. MRI of thoracic spine revealed moderate-sized central disc protrusion at T6-T7, resulting in mild central stenosis but no neural foraminal narrowing. I had a lengthy discussion with patient and his wife stating that we would recommend surgery to correct the severe lumbar disc herniation that is likely causing his RLE weakness. However, patient and his wife both feel that with his complex medical history they would like to avoid surgery at this time. He has been having good results from his chiropractic care and they would like to continue that and perhaps start PT as well. If they are interested in pursuing surgery in the future they should call to make an appointment at [facility] at [phone]. If he did undergo surgery, he would need to be off blood thinners for at least 72 hours, likely need a IVC filter placed and would likely be put on Sub Q Heparin but no Heparin drip.            Active Problems:    Weakness of right lower extremity    Assessment: Large central disc herniation at L4-L5 causing severe central canal stenosis and moderate to severe bilateral foraminal stenosis.     Plan: Surgical intervention is recommended however patient does not wish to pursue surgery at this time. He will plan to continue chiropractic care. Recommend PT consultation. If he does want to discuss surgical intervention in the future he should call [facility] at [phone].         I have discussed the following assessment and plan with the Dr. Choco Blanc who is in agreement with initial plan and will follow up with further consultation recommendations.    CITLALY Ji [facility]  [facility]  [facility]  [address] [facility]  [address] [address]    Tel [phone]  Pager [phone]        Code Status    Full Code    Reason for Consult   Reason for consult: I was asked by Dr. Hull to evaluate this patient for increasing RLE weakness.    Primary Care Physician   Lida Ray    Chief Complaint   RLE weakness.     History is obtained from the patient and his spouse.     History of Present Illness   Elier Barber is a 71 year old male who presents with increasing RLE. Patient and wife report for the last week that he has been unable to bear weight on his RLE causing them to seek medical care. Today they state he is able to bear weight but unable to initiate walking. He uses a walker at baseline. He has an occasional brief muscle spasm in his low back and thighs but otherwise denies low back pain, LE pain/tingling. Sensation is intact and strength is 5/5 in bilateral LE when in bed. He denies any recent changes to bowel or bladder patterns. Patient does have a history of past low back pain and LE weakness and has tried both KYLE's and Medrol dose packs in the past with temporary relief. However, he felt the KYLE's interacted with his intrathecal baclofen pump and he felt very fatigued for several days afterwards and had to have his baclofen dose adjusted because of this. Notably, patient was recently hospitalized at [facility] 5/12/17- 5/15/17 with increasing LE weakness and right leg swelling after a driving trip to [place]. He was found to have right proximal calf DVT and was started on Xarelto. At that time he was also having increased urine incontinence. Per [facility] discharge summary MRI of lumbar spine performed at that time showed severe canal stenosis, disc herniation, foraminal stenosis worst at L4-L5. Patient was seen by Dr. Headley who recommended surgery but patient preferred to try conservation measures. For the past 3 weeks he has been seeing a chiropractor 3-4x/ week and reports his low back pain,  muscle spasms and incontinence have all improved and have nearly resolved.    Past Medical History   I have reviewed this patient's medical history and updated it with pertinent information if needed.   Past Medical History:   Diagnosis Date     Basal cell carcinoma nos     sees derm     CVA (cerebral infarction) 6/14    small vessel right int capsule stroke     Essential hypertension, benign      Hearing loss      Hoarseness of voice     assoc with PLS     Primary Lateral Sclerosis 2002    has baclofen pump through Dr. Cavlert, CECY Mem, sees Stefania Preston     Prostate CA (H) 2008    prostatectomy       Past Surgical History   I have reviewed this patient's surgical history and updated it with pertinent information if needed.  Past Surgical History:   Procedure Laterality Date     ABDOMEN SURGERY  11/12    Hernia     BIOPSY  4/16    Cancerous growth on leg. Removed by MOHs treatment     C NONSPECIFIC PROCEDURE  6/08     prostatectomy      C NONSPECIFIC PROCEDURE  11/01    colonoscopy     C NONSPECIFIC PROCEDURE  11/2006    hernia, right side     C NONSPECIFIC PROCEDURE      T + A     HERNIA REPAIR  11/12    Repaired       Prior to Admission Medications   Prior to Admission Medications   Prescriptions Last Dose Informant Patient Reported? Taking?   ASPIRIN PO 6/4/2017 at hs  Yes Yes   Sig: Take 81 mg by mouth daily    Acetaminophen (TYLENOL PO)   Yes Yes   Sig: Take 325 mg by mouth every 4 hours as needed for mild pain or fever   BACLOFEN PO   Yes Yes   Sig: Take 10 mg by mouth 3 times daily as needed    EPINEPHrine 0.3 MG/0.3ML injection   No No   Sig: Inject 0.3 mLs (0.3 mg) into the muscle as needed for anaphylaxis   PANTOPRAZOLE SODIUM PO 6/4/2017 at dinner  Yes Yes   Sig: Take 40 mg by mouth daily (with dinner)    baclofen (LIORESAL) in NaCl 0.9% 100 mL intrathecal infusion   Yes Yes   Sig: by Intrathecal route continuous Pump filled by [facility] [phone]  Pump Serial Number:  "[id number], Pump Model Number: 8637-20  Last fill:  Last week  Next fill: October  [place] Alarm Date: 10/13/17  Reservoir Volume: 19.2 ml  Conc: 2000 mcg/ml  Flex schedule delivers 266.5 mcg/day  Basal rate 11 mcg/hr     For 3 hours starting at 0500 increases to 11.9 mcg/hr   enalapril (VASOTEC) 5 MG tablet 6/5/2017 at Unknown time  No Yes   Sig: Take 1 tablet (5 mg) by mouth daily   ketoconazole (NIZORAL) 2 % shampoo   No Yes   Sig: Shampoo every 2-3 days as needed   oxyCODONE-acetaminophen (PERCOCET) 5-325 MG per tablet   Yes Yes   Sig: Take by mouth every 4 hours as needed for moderate to severe pain   oxybutynin (DITROPAN XL) 10 MG 24 hr tablet 6/4/2017 at dinner  No Yes   Sig: Take 1 tablet (10 mg) by mouth daily   polyethylene glycol (MIRALAX/GLYCOLAX) Packet 6/4/2017 at hs  Yes Yes   Sig: Take 1 packet by mouth daily   potassium chloride (K-TAB,KLOR-CON) 10 MEQ tablet 6/5/2017 at Unknown time  No Yes   Sig: Take 2 tablets (20 mEq) by mouth daily GIVE THE GENERIC: THIS IS NOT A REQUEST FOR BRAND NAME   rivaroxaban ANTICOAGULANT (XARELTO) 20 MG TABS tablet 6/4/2017 at Unknown time  No Yes   Sig: Take 1 tablet (20 mg) by mouth daily (with dinner)      Facility-Administered Medications: None     Allergies   Allergies   Allergen Reactions     Bee Venom      Penicillins      \"HIVES\"       Social History   I have reviewed this patient's social history and updated it with pertinent information if needed. Elier Barber  reports that he quit smoking about 40 years ago. His smoking use included Cigarettes. He started smoking about 47 years ago. He has a 1.80 pack-year smoking history. He has never used smokeless tobacco. He reports that he drinks alcohol. He reports that he does not use illicit drugs.    Family History   I have reviewed this patient's family history and updated it with pertinent information if needed.   Family History   Problem Relation Age of Onset     HEART DISEASE Mother      CHF     " Hypertension Mother      Family History Negative Father      C.A.D. Maternal Grandfather      CEREBROVASCULAR DISEASE Maternal Uncle      45       Review of Systems   C: NEGATIVE for fever, chills, change in weight  I: NEGATIVE for worrisome rashes, moles or lesions  E: NEGATIVE for vision changes or irritation  E/M: NEGATIVE for ear, mouth and throat problems  R: NEGATIVE for significant cough or SOB  B: NEGATIVE for masses, tenderness or discharge  CV: NEGATIVE for chest pain, palpitations or peripheral edema  GI: NEGATIVE for nausea, abdominal pain, heartburn, or change in bowel habits  : NEGATIVE for frequency, dysuria, or hematuria  M: NEGATIVE for significant arthralgias or myalgia  N: POSITIVE for RLE weakness. NEGATIVE for dizziness or paresthesias  E: NEGATIVE for temperature intolerance, skin/hair changes  H: POSITIVE for recent DVT RLE. NEGATIVE for bleeding problems  P: NEGATIVE for changes in mood or affect    Physical Exam   Temp: 97.7  F (36.5  C) Temp src: Oral BP: 176/76 Pulse: 61 Heart Rate: 59 Resp: 16 SpO2: 93 % O2 Device: None (Room air)    Vital Signs with Ranges  Temp:  [95.7  F (35.4  C)-97.7  F (36.5  C)] 97.7  F (36.5  C)  Pulse:  [60-63] 61  Heart Rate:  [59-63] 59  Resp:  [16-20] 16  BP: (141-176)/(76-96) 176/76  SpO2:  [93 %-97 %] 93 %  0 lbs 0 oz    Heart Rate: 59, Blood pressure 176/76, pulse 61, temperature 97.7  F (36.5  C), resp. rate 16, SpO2 93 %.  0 lbs 0 oz  HEENT:  Normocephalic, atraumatic.  PERRLA.  EOM s intact.   Neck:  Supple, non-tender, without lymphadenopathy.  Heart:  No peripheral edema  Lungs:  No SOB  Abdomen:  Soft, non-tender, non-distended.  Normal bowel sounds.  Skin:  Warm and dry, good capillary refill.  Extremities:  Good radial and dorsalis pedis pulses bilaterally, no edema, cyanosis or clubbing.    NEUROLOGICAL EXAMINATION:   Mental status:  Alert and Oriented x 3, dysarthria with speech    Cranial nerves:  II-XII intact.   Motor:  Strength is 5/5  throughout the upper and lower extremities  Shoulder Abduction:  Right:  5/5   Left:  5/5  Biceps:                      Right:  5/5   Left:  5/5  Triceps:                     Right:  5/5   Left:  5/5  Wrist Extensors:       Right:  5/5   Left:  5/5  Wrist Flexors:           Right:  5/5   Left:  5/5  interosseus :            Right:  5/5   Left:  5/5   Hip Flexor:                Right: 5/5  Left:  5/5  Hip Adductor:             Right:  5/5  Left:  5/5  Hip Abductor:             Right:  5/5  Left:  5/5  Gastroc Soleus:        Right:  5/5  Left:  5/5  Tib/Ant:                      Right:  5/5  Left:  5/5  EHL:                     Right:  5/5  Left:  5/5  Sensation:  intact  Reflexes:   Negative Babinski.  Negative Clonus.    Gait:  Deferred. Resting in bed      Data   All new lab and imaging data was personally reviewed by me.  MRI:   MRI OF THE BRAIN WITHOUT AND WITH CONTRAST  6/5/2017  9:45 PM      COMPARISON: Brain MR 8/14/2014.     HISTORY:  Right lower extremity weakness.     TECHNIQUE: Axial diffusion-weighted with ADC map, axial T2-weighted  with fat saturation, axial T1-weighted, axial turboFLAIR and coronal  T1-weighted images of the brain were acquired without intravenous  contrast.  Following intravenous administration of gadolinium (7 mL  Gadavist), axial T1-weighted images of the brain were acquired.      FINDINGS: There is moderate diffuse cerebral volume loss. There are  extensive confluent periventricular areas of abnormal T2 signal  hyperintensity in the cerebral white matter bilaterally that are  consistent with sequela of chronic small vessel ischemic disease.     The ventricles and basal cisterns are within normal limits in  configuration given the degree of cerebral volume loss.  There is no  midline shift. There are no extra-axial fluid collections. There is no  evidence for stroke or acute intracranial hemorrhage. There is no  abnormal contrast enhancement in the brain or its coverings.     There  is no sinusitis or mastoiditis.   Impression   IMPRESSION: Diffuse cerebral volume loss and cerebral white matter  changes consistent with chronic small vessel ischemic disease. No  evidence for acute intracranial pathology. No change from the  comparison study.     AILIN LÓPEZ MD       MRI LUMBAR SPINE WITHOUT CONTRAST   6/6/2017 12:39 PM      HISTORY: Two weeks of right low back pain with right leg pain and  weakness.     COMPARISON: An MRI on 1/14/2016.     TECHNIQUE: Sagittal T1, T2, and STIR, coronal T1, and transverse  proton density and T2-weighted images were obtained through the lumbar  spine.     FINDINGS: Numbering of the levels is based on what appear to be five  lumbar type vertebral bodies. Again seen is a degenerative grade 1  spondylolisthesis at both L4-L5 and L5-S1. Vertebral body alignment is  otherwise normal and unchanged. No fracture is seen. No pars  interarticularis defect is demonstrated. No osseous lesion is seen. No  abnormal marrow signal intensity is identified. The conus medullaris  terminates at the level of the L1 vertebral body. No intrathecal  abnormality is seen. The adjacent soft tissues are unremarkable.     Findings by specific level:     T12-L1: The disc and facet joints are normal. No stenosis is seen.     L1-L2: There is disc dehydration. The disc height is well preserved.  There is a mild disc bulge with no herniation seen. There are mild  degenerative changes in the facet joints and prominence of the  ligamentum flava. No central canal stenosis is present. There is mild  to moderate narrowing of the left neural foramen with mild narrowing  of the right neural foramen. This level is unchanged.     L2-L3: There is disc dehydration with marked disc height loss. There  is a diffuse disc bulge with no focal herniation seen. There are mild  degenerative changes in the facet joints and prominence of the  ligamentum flava. There is a mild degree of central canal stenosis  and  moderate bilateral neural foraminal stenosis. This level is unchanged.     L3-L4: There is disc dehydration with marked disc height loss. There  is a diffuse disc bulge with no focal herniation seen. There are  moderate degenerative changes in the facet joints and prominence of  the ligamentum flava. There is mild central canal stenosis with  moderate bilateral neural foraminal stenosis. This level is unchanged.     L4-L5: There is disc dehydration with moderate posterior disc height  loss. There has been progression of what is now a large central disc  extrusion filling the entire central canal resulting in severe central  canal stenosis. There is moderate to severe bilateral neural foraminal  stenosis. There are prominent degenerative changes in the facet  joints.     L5-S1: There is disc dehydration with mild diffuse disc height loss.  There is a mild disc bulge with no focal herniation seen. There are  moderate degenerative changes in the facet joints. No stenosis is  seen.         IMPRESSION:   1. At L4-L5 there has been progression of what is now a large central  disc extrusion resulting in severe central canal and moderate to  severe bilateral neural foraminal stenosis.  2. Degenerative changes elsewhere with no other disc herniation seen.  There is moderate foraminal stenosis bilaterally at L2-L3 and L3-L4.  Less extensive stenosis is seen elsewhere as described above.     AMBER QUIÑONES MD      MRI THORACIC WITHOUT CONTRAST  6/6/2017 12:52 PM      HISTORY: Two weeks of right back pain with right leg pain and  weakness.     TECHNIQUE: Sagittal T1, T2, gradient echo, and STIR, and transverse  T2-weighted images were obtained through the thoracic spine.      COMPARISON: None.     FINDINGS: Vertebral body alignment is normal. No fracture is seen. No  osseous lesion is seen. No abnormal marrow signal intensity is  identified. No spinal cord or intrathecal abnormality is seen. The  adjacent soft tissues  are unremarkable.     At T6-T7 there is a moderate-sized focal left central disc protrusion  resulting in mass effect upon the thecal sac and a mild degree of  overall central canal stenosis. No cord compression is seen and no  neural foraminal narrowing is demonstrated.     The remaining disc levels demonstrate dehydration and some disc height  loss in the superior aspect of the thoracic spine. No disc herniation  or extensive bulging is seen with no other area of stenosis  demonstrated.          IMPRESSION: At T6-T7 there is a moderate-sized left central disc  protrusion resulting in mild central canal stenosis.     AMBER QUIÑONES MD            CBC RESULTS:   Recent Labs   Lab Test  06/05/17 1925   WBC  6.8   RBC  4.70   HGB  14.7   HCT  43.6   MCV  93   MCH  31.3   MCHC  33.7   RDW  12.5   PLT  230     Basic Metabolic Panel:  Lab Results   Component Value Date     06/05/2017      Lab Results   Component Value Date    POTASSIUM 4.0 06/05/2017     Lab Results   Component Value Date    CHLORIDE 101 06/05/2017     Lab Results   Component Value Date    AMOS 9.0 06/05/2017     Lab Results   Component Value Date    CO2 29 06/05/2017     Lab Results   Component Value Date    BUN 18 06/05/2017     Lab Results   Component Value Date    CR 0.72 06/05/2017     Lab Results   Component Value Date     06/05/2017

## 2017-06-06 NOTE — PROVIDER NOTIFICATION
"MD paged: \"Pt and wife are requesting a EMG..will you please discuss with them when able. Thank you.\"  "

## 2017-06-06 NOTE — PLAN OF CARE
Problem: Goal Outcome Summary  Goal: Goal Outcome Summary  PT: Per discussion with MD, will hold on PT currently, will have further MRI for thoracic and lumbar spine.

## 2017-06-06 NOTE — PHARMACY-ADMISSION MEDICATION HISTORY
Made changes to baclofen pump entry in pta med list.     [employer] came to interrogate pump per that rep and print out provided from scan    Total daily dose is 266.5 mcg/day    Basal rate 11 mcg/hr  For 3 hours starting at 5 am runs at 11.9 mcg/hr    Changed pump serial number and last fill and alarm date

## 2017-06-06 NOTE — PLAN OF CARE
Problem: Activity Intolerance (Adult)  Goal: Identify Related Risk Factors and Signs and Symptoms  Related risk factors and signs and symptoms are identified upon initiation of Human Response Clinical Practice Guideline (CPG)  Hypertensive. No tele. Zofran given x1 with decrease in nausea. [employer] here this afternoon to turn Baclofen pump back on after MRI-Baclofen pump confirmed as on. See MRI results. Pt has declined presence of pain since receiving meds for MRI.

## 2017-06-06 NOTE — PROGRESS NOTES
-Attempted to see patient but he was down in MRI. Will plan to return to see him later this afternoon.

## 2017-06-06 NOTE — PROVIDER NOTIFICATION
"MD paged: \"Pt is requesting home meds potassium and miralax to be reordered. MRI results are also back. Thank you.\"   "

## 2017-06-06 NOTE — PHARMACY-ADMISSION MEDICATION HISTORY
Admission medication history interview status for this patient is complete. See [facility] admission navigator for allergy information, prior to admission medications and immunization status.     Medication history interview source(s):Patient and Family  Medication history resources (including written lists, pill bottles, clinic record):med list  Primary pharmacy:[facility]    Changes made to PTA medication list:  Added: -  Deleted: gent oint,   Changed: baclofen po to prn  Pt has baclofen pump fill at [facility].    Actions taken by pharmacist (provider contacted, etc):None     Additional medication history information: none    Medication reconciliation/reorder completed by provider prior to medication history? No    For patients on insulin therapy: No   Lantus/levemir/NPH/Mix 70/30 dose:  _____   in AM/PM  or twice daily   Sliding scale Novolog Y/N  If Yes, do you have a baseline novolog pre-meal dose:  ______units with meals   Patients eat three meals a day:   Y/N     Any Barriers to therapy:  cost of medications/comfortable with giving injections (if applicable)/ comfortable and confident with current diabetes regimen       Prior to Admission medications    Medication Sig Last Dose Taking? Auth Provider   potassium chloride (K-TAB,KLOR-CON) 10 MEQ tablet Take 2 tablets (20 mEq) by mouth daily GIVE THE GENERIC: THIS IS NOT A REQUEST FOR BRAND NAME 6/5/2017 at Unknown time Yes Lida Ray MD   enalapril (VASOTEC) 5 MG tablet Take 1 tablet (5 mg) by mouth daily 6/5/2017 at Unknown time Yes Lida Ray MD   BACLOFEN PO Take 10 mg by mouth 3 times daily as needed   Yes Reported, Patient   polyethylene glycol (MIRALAX/GLYCOLAX) Packet Take 1 packet by mouth daily 6/4/2017 at hs Yes Reported, Patient   rivaroxaban ANTICOAGULANT (XARELTO) 20 MG TABS tablet Take 1 tablet (20 mg) by mouth daily (with dinner) 6/4/2017 at Unknown time Yes Lida Ray MD   Acetaminophen (TYLENOL PO) Take 325 mg by mouth every 4  hours as needed for mild pain or fever  Yes Reported, Patient   oxyCODONE-acetaminophen (PERCOCET) 5-325 MG per tablet Take by mouth every 4 hours as needed for moderate to severe pain  Yes Reported, Patient   PANTOPRAZOLE SODIUM PO Take 40 mg by mouth daily (with dinner)  6/4/2017 at dinner Yes Reported, Patient   ketoconazole (NIZORAL) 2 % shampoo Shampoo every 2-3 days as needed  Yes Lida Ray MD   oxybutynin (DITROPAN XL) 10 MG 24 hr tablet Take 1 tablet (10 mg) by mouth daily 6/4/2017 at dinner Yes Lida Ray MD   ASPIRIN PO Take 81 mg by mouth daily  6/4/2017 at hs Yes Reported, Patient   baclofen (LIORESAL) in NaCl 0.9% 100 mL intrathecal infusion by Intrathecal route continuous Pump filled by [facility] [phone]  Pump Serial Number: [id number], Pump Model Number: 8637-20  Last fill:  3/25/2014  Next fill: 6/10/2014  [place] Alarm Date: 6/28/2014  Reservoir Volume: 4 mL  Conc: 2000 mcg/ml  Flex schedule delivers 264.8 mcg/day  Yes Reported, Patient   EPINEPHrine 0.3 MG/0.3ML injection Inject 0.3 mLs (0.3 mg) into the muscle as needed for anaphylaxis   Lida Ray MD

## 2017-06-06 NOTE — CONSULTS
reviewed chart ,examined patient , this am ,I  was then  told by dr sanders  around 9 30 am that neurological consultation has been cancelled .as this patient is a complex neurological patient will discuss with dr Major , head of hospitalists.

## 2017-06-06 NOTE — PLAN OF CARE
Problem: Discharge Planning  Goal: Discharge Planning (Adult, OB, Behavioral, Peds)  Outcome: No Change  PRIMARY DIAGNOSIS: TIA  OUTPATIENT/OBSERVATION GOALS TO BE MET BEFORE DISCHARGE:     1. Diagnostic testing complete & WNL: MRI normal  2. Cleared by neurology consultant (if involved): No, neuro consult in  3. Patient at neurological baseline: Yes  4. ADLs back to baseline? No, baseline is independent with walker  5. Activity and level of assistance: Assist of 2 w/gait belt, A-1 to stand a bedside.  6. Pain status: c/o of chronic lower back pain.   7. Barriers to discharge noted: Yes - neuro, PT and SW consults  8. Interpretation of rhythm per telemetry tech: N/A        Patient is resting in bed, VSS except BP slightly elevated,  A&Ox4, regular diet, pleasant and cooperative, skin rick/red with scattered bruises and abrasion on right and left knee and shins from previous fall, hoarse speech, neuros intact, baclofen pump in place, neuro/PT/SW consults,  MRI show nothing acute, IV site loss due to catheter damage. Will not be replacing at this time, unless needed. Up with 1 assist to stand at bed side to use urinal. Patient does strain to void. C/o of chronic back pain 5/10 tylenol given.  PT, SW and neurology consults scheduled. Will continue to monitor and provide supportive cares.

## 2017-06-06 NOTE — PLAN OF CARE
Problem: Discharge Planning  Goal: Discharge Planning (Adult, OB, Behavioral, Peds)  Outcome: No Change  PRIMARY DIAGNOSIS: Weakness  OUTPATIENT/OBSERVATION GOALS TO BE MET BEFORE DISCHARGE:     1. Tolerating PO medications Yes  2. Return to near baseline physical activity (including ADL and safe ambulation) No  3. Cleared for discharge by consultants (if involved) No  4. Safe discharge environment identified by care coordination (if unable to fully meet other goals) No     Interpretation of rhythm per telemetry tech: NA     Patient is alert and oriented, but has slurred speech at baseline. Patient is complaining of pain in back which is chronic and in right leg. Patient says he was diagnosed with DVT in right leg about 3 weeks ago and was started on zarelto. Patient lives at home with his wife and is independent at baseline, but is now an assist of 2 with walker. Patient has PT consult and SW consult in place. Patient also has baclofen pump in place. Will continue to monitor.

## 2017-06-06 NOTE — PLAN OF CARE
Problem: Discharge Planning  Goal: Discharge Planning (Adult, OB, Behavioral, Peds)  Outcome: No Change  PRIMARY DIAGNOSIS: TIA  OUTPATIENT/OBSERVATION GOALS TO BE MET BEFORE DISCHARGE:     1. Diagnostic testing complete & WNL: MRI normal  2. Cleared by neurology consultant (if involved): No, neuro consult in  3. Patient at neurological baseline: Yes  4. ADLs back to baseline? No, baseline is independent with walker  5. Activity and level of assistance: Assist of 2 w/gait belt  6. Pain status: denies pain  7. Barriers to discharge noted: Yes - neuro, PT and SW consults  8. Interpretation of rhythm per telemetry tech: N/A        Patient is resting in bed, VSS except BP slightly elevated,  A&Ox4, regular diet, pleasant and cooperative, skin rick/red with scattered bruises and abrasion on right and left knee and shins from previous fall, hoarse speech, neuros intact, baclofen pump in place, neuro/PT/SW consults,  MRI show nothing acute, IV site loss due to catheter damage. Will not be replacing at this time, unless needed. Up with 1 assist to stand at bed side to use urinal. Patient does strain to void. C/o of back pain 5/10 tylenol given.  Will continue to monitor and provide supportive cares.

## 2017-06-06 NOTE — PROGRESS NOTES
[facility]    Hospitalist Progress Note    Date of Service (when I saw the patient): 06/06/2017    Assessment & Plan   Elier Barber is a 71 year old male with HTN, primary lateral sclerosis (diagnosed 15 yrs ago, slowly progressive, on baclofen pump, Dr. Calvert is his neurologist), CVA, prostate CA, DVT (diagnosed in May) who was admitted on 6/5/2017 with progressive right lower extrem weakness progressive over the past 2 weeks.    1. Neuro- right leg progressive weakness over 2 weeks. MRI brain neg for acute event. Had recent admission to [facility] for progression of weakness due to his PLS. Had MRI of spine at that time showing severe stenosis. It seems at this point his symptoms are coming from his lumbar spine. I already personally spoke with Sindy Jones from Neurosurgery. We will obtain another MRI of his thoracic and lumbar spine. Fernando is requesting pre-medication due to pain in his back from lying on the MRI machine. Neurosurg will see patient. Dr. Calvert was contacted in the ED on admission. Had recommended rule out for stroke.  2. CVS- HTN- cont home meds  3. Pulm- no issues  4. GI- regular diet  5. ID- no current issues  6. Renal- no issues  7. Heme/onc- history prostate CA. Recent diagnosis of R DVT. On xarelto  8. Endo- not diabetic  9. Musculoskel- will need to be seen by PT after MRI/Neurosurgery consult  10. DVT prophylaxis- on xarelto  11. Sat with wife and patient. All questions answered. They agree with the plan  12. No labs needed in am      Code Status: Full Code    Disposition: Expected discharge unclear    Edward Hull    Interval History   Patient in chair. Wife in room. He feels well today. No chest pain, sob, abdo pain, n/v/d. He still has weakness in his right leg. No numbness or tingling. No back pain. No leg pain. Of note he has been getting spinal manipulations with a chiropractor since his DC from [facility] in May.    -Data reviewed today: I reviewed all  new labs and imaging results over the last 24 hours. I personally reviewed no images or EKG's today.    Physical Exam   Temp: 95.9  F (35.5  C) Temp src: Oral BP: 168/83 Pulse: 63 Heart Rate: 59 Resp: 16 SpO2: 97 % O2 Device: None (Room air)    There were no vitals filed for this visit.  Vital Signs with Ranges  Temp:  [95.7  F (35.4  C)-97.6  F (36.4  C)] 95.9  F (35.5  C)  Pulse:  [63-66] 63  Heart Rate:  [59-63] 59  Resp:  [16-20] 16  BP: (127-171)/(77-96) 168/83  SpO2:  [95 %-97 %] 97 %  I/O last 3 completed shifts:  In: -   Out: 650 [Urine:650]    Constitutional: Awake, alert, cooperative, no apparent distress   Respiratory: Clear to auscultation bilaterally, no crackles or wheezing   Cardiovascular: Regular rate and rhythm, normal S1 and S2, and no murmur noted   Abdomen: Normal bowel sounds, soft, non-distended, non-tender   Skin: No rashes, no cyanosis, dry to touch   Neuro: Alert and oriented x3, no weakness, numbness, memory loss   Extremities: No edema, normal range of motion. Strength appears to be 5/5 in both lower erxtrem. Perhaps a 4/5 strength with knee flex/ext. No tenderness   Other(s):        All other systems: Negative     Medications        oxybutynin  10 mg Oral QPM     sodium chloride (PF)  3 mL Intracatheter Q8H     aspirin chewable tablet 81 mg  81 mg Oral At Bedtime     enalapril  5 mg Oral Daily     pantoprazole (PROTONIX) EC tablet 40 mg  40 mg Oral Daily with supper     rivaroxaban ANTICOAGULANT  20 mg Oral Daily with supper       Data     Recent Labs  Lab 06/05/17  1925   WBC 6.8   HGB 14.7   MCV 93         POTASSIUM 4.0   CHLORIDE 101   CO2 29   BUN 18   CR 0.72   ANIONGAP 6   AMOS 9.0   *       Recent Results (from the past 24 hour(s))   MR Brain w/o & w Contrast    Narrative    MRI OF THE BRAIN WITHOUT AND WITH CONTRAST  6/5/2017  9:45 PM     COMPARISON: Brain MR 8/14/2014.    HISTORY:  Right lower extremity weakness.    TECHNIQUE: Axial diffusion-weighted with ADC  map, axial T2-weighted  with fat saturation, axial T1-weighted, axial turboFLAIR and coronal  T1-weighted images of the brain were acquired without intravenous  contrast.  Following intravenous administration of gadolinium (7 mL  Gadavist), axial T1-weighted images of the brain were acquired.     FINDINGS: There is moderate diffuse cerebral volume loss. There are  extensive confluent periventricular areas of abnormal T2 signal  hyperintensity in the cerebral white matter bilaterally that are  consistent with sequela of chronic small vessel ischemic disease.    The ventricles and basal cisterns are within normal limits in  configuration given the degree of cerebral volume loss.  There is no  midline shift. There are no extra-axial fluid collections. There is no  evidence for stroke or acute intracranial hemorrhage. There is no  abnormal contrast enhancement in the brain or its coverings.    There is no sinusitis or mastoiditis.      Impression    IMPRESSION: Diffuse cerebral volume loss and cerebral white matter  changes consistent with chronic small vessel ischemic disease. No  evidence for acute intracranial pathology. No change from the  comparison study.    AIILN LÓPEZ MD

## 2017-06-06 NOTE — PLAN OF CARE
Problem: Discharge Planning  Goal: Discharge Planning (Adult, OB, Behavioral, Peds)  Outcome: No Change  PRIMARY DIAGNOSIS: TIA  OUTPATIENT/OBSERVATION GOALS TO BE MET BEFORE DISCHARGE:     1. Diagnostic testing complete & WNL: MRI pending  2. Cleared by neurology consultant (if involved): No, neuro consult in  3. Patient at neurological baseline: Yes  4. ADLs back to baseline? No, baseline is independent with walker  5. Activity and level of assistance: Assist of 2 w/gait belt  6. Pain status: denies pain  7. Barriers to discharge noted: Yes - neuro, PT and SW consults  8. Interpretation of rhythm per telemetry tech: N/A        BP elevated, will recheck after pt settled, A&Ox4, regular diet, pleasant and cooperative, skin rick/red with scattered bruises and abrasion on right knee from previous fall, hoarse speech, neuros intact, baclofen pump in place, neuro/PT/SW consults, wife at bedside, MRI tonight, will continue to monitor and provide supportive cares.

## 2017-06-07 ENCOUNTER — APPOINTMENT (OUTPATIENT)
Dept: PHYSICAL THERAPY | Facility: CLINIC | Age: 72
DRG: 552 | End: 2017-06-07
Attending: HOSPITALIST
Payer: COMMERCIAL

## 2017-06-07 VITALS
TEMPERATURE: 97.9 F | HEART RATE: 61 BPM | RESPIRATION RATE: 18 BRPM | SYSTOLIC BLOOD PRESSURE: 149 MMHG | OXYGEN SATURATION: 95 % | DIASTOLIC BLOOD PRESSURE: 97 MMHG

## 2017-06-07 PROCEDURE — 97110 THERAPEUTIC EXERCISES: CPT | Mod: GP

## 2017-06-07 PROCEDURE — 97161 PT EVAL LOW COMPLEX 20 MIN: CPT | Mod: GP

## 2017-06-07 PROCEDURE — 40000193 ZZH STATISTIC PT WARD VISIT

## 2017-06-07 PROCEDURE — 97530 THERAPEUTIC ACTIVITIES: CPT | Mod: GP

## 2017-06-07 PROCEDURE — 25000132 ZZH RX MED GY IP 250 OP 250 PS 637: Performed by: HOSPITALIST

## 2017-06-07 PROCEDURE — 25000132 ZZH RX MED GY IP 250 OP 250 PS 637: Performed by: INTERNAL MEDICINE

## 2017-06-07 PROCEDURE — 25000132 ZZH RX MED GY IP 250 OP 250 PS 637: Performed by: PHYSICIAN ASSISTANT

## 2017-06-07 PROCEDURE — 99239 HOSP IP/OBS DSCHRG MGMT >30: CPT | Performed by: INTERNAL MEDICINE

## 2017-06-07 RX ORDER — BISACODYL 10 MG
10 SUPPOSITORY, RECTAL RECTAL DAILY PRN
Status: DISCONTINUED | OUTPATIENT
Start: 2017-06-07 | End: 2017-06-07 | Stop reason: HOSPADM

## 2017-06-07 RX ADMIN — ENALAPRIL MALEATE 5 MG: 2.5 TABLET ORAL at 08:03

## 2017-06-07 RX ADMIN — POLYETHYLENE GLYCOL 3350 17 G: 17 POWDER, FOR SOLUTION ORAL at 08:03

## 2017-06-07 RX ADMIN — MAGNESIUM HYDROXIDE 30 ML: 400 SUSPENSION ORAL at 11:56

## 2017-06-07 RX ADMIN — POTASSIUM CHLORIDE 20 MEQ: 750 TABLET, FILM COATED, EXTENDED RELEASE ORAL at 08:03

## 2017-06-07 NOTE — DISCHARGE INSTRUCTIONS
Weakness (Uncertain Cause)  Based on your exam today, the exact cause of your weakness is not certain. However, your weakness does not seem to be a sign of a serious illness at this time. Keep an eye on your symptoms and get medical advice as instructed below.  Home care    Rest at home today. Do not over-exert yourself.    Take any medicine as prescribed.    For the next few days, drink extra fluids (unless your healthcare provider wants you to restrict fluids for other reasons). Do not skip meals.  Follow-up care  Follow up with your healthcare provider or as advised.  When to seek medical advice  Call your healthcare provider for any of the following    Worsening of your symptoms    Symptoms don't start getting better within 2 days    Fever of 100.4  F (38  C) or higher, or as directed by your healthcare provider     Call 911  Get emergency medical care for any of these:    Chest, arm, neck, jaw or upper back pain    Trouble breathing    Numbness or weakness of the face, one arm or one leg    Slurred speech, confusion, trouble speaking, walking or seeing    Blood in vomit or stool (black or red color)    Loss of consciousness    [id number] [employer]. [address] [place] [id number]. All rights reserved. This information is not intended as a substitute for professional medical care. Always follow your healthcare professional's instructions.

## 2017-06-07 NOTE — PLAN OF CARE
Problem: Goal Outcome Summary  Goal: Goal Outcome Summary  Outcome: No Change  Patient Progress:  No Change  Outcome Summary:  Temp: 97.3  F (36.3  C) Temp src: Oral BP: 135/89  Heart Rate: 68 Resp: 16 SpO2: 96 % O2 Device: None (Room air)       VSS, A&O x4, up with jose steady. Denies numbness and tingling. CMS intact, continue current plan of care.

## 2017-06-07 NOTE — PLAN OF CARE
Problem: Goal Outcome Summary  Goal: Goal Outcome Summary  Discharged to home with wife. Home care orders resumed. No new meds. All belongings packed and sent.

## 2017-06-07 NOTE — PLAN OF CARE
Problem: Goal Outcome Summary  Goal: Goal Outcome Summary  Outcome: Improving     BP elevated in AM, recheck improved. Afebrile. 94% on RA. Pt c/o lower back pain, declines intervention. Neurosurgery recommends surgery, pt continues to decline. Pt is A/Ox4, up with A2 (ren and jose steady). PT consulted. Family at bedside, patient calls appropriately. PRN Miralax and MOM given with relief. Lg BM today. Discharge pending BM and PT recommendations, possibly today. Will continue to monitor.

## 2017-06-07 NOTE — PLAN OF CARE
Problem: Goal Outcome Summary  Goal: Goal Outcome Summary  Outcome: No Change  Up to chair with Bebe Alaniz. Denies pain, Zofran for nausea given. Continue current POC.

## 2017-06-07 NOTE — CONSULTS
Care Transition Initial Assessment -   Reason For Consult: discharge planning  Met with: Patient and Family    Active Problems:    Weakness of right lower extremity    Leg weakness         DATA  Lives With: spouse  Living Arrangements: house  Description of Support System: Supportive, Involved  Who is your support system?: Wife  Support Assessment: Adequate family and caregiver support, Adequate social supports. Pt has a LTC policy that he is using to have assistance in the home. Pt has [facility] for HHA support once per week for bathing. At time he has home PT as well./ Pt is planning to increase HHA support to 3 times per week> PT's neighbors assist with getting pt in and out of the home.  Identified issues/concerns regarding health management: PT has ALS. He has ongoing support for this and is trying to have the home assessed for a Ramp.       Resources List: Home Care  [facility]     Quality Of Family Relationships: supportive     ASSESSMENT  Cognitive Status:  awake, alert and oriented  Concerns to be addressed: Pt is ready to dc to home. He has a WC for in the home and a transport chair. Pt's wife uses a transfer belt to assist pt. He has a shower chair. Pt is limited on leaving his home.  Pt and wife are in the process of trying to get their LTC insurance to pay for a ramp to get pt in/out of the home        PLAN  Discharge Planner   Discharge Plans in progress: Will need Resumption of his [facility] support,.     Follow up plan: Provided pt and wife with rental options for a Ramp for the home and simple options to purchase a smaller ramp for the deck and garage entrance.        Entered by: Corinne C. White 06/07/2017 10:32 AM

## 2017-06-07 NOTE — DISCHARGE SUMMARY
[facility]  Discharge Summary  Name: Elier Barber    MRN: [id number]  YOB: 1945    Age: 71 year old  Date of Discharge:  6/7/2017  Date of Admission: 6/5/2017  Primary Care Provider: Lida Ray  Discharge Physician:  Floyd Blanton MD  Discharging Service:  Hospitalist      Discharge Diagnoses:  RLE weakness  Large L4-L5 disk herniation  Peripheral lateral sclerosis  HTN  CVA  Prostate cancer  Recent DVT     Hospital Course:  Elier Barber is a 72 yo male with primary lateral sclerosis, recent DVT on Xarelto, HTN, CVA, and prostate cancer who was admitted to obs unit on 6/5/2017 for 2 weeks of progressive RLE weakness and low back pain.  Also with some bilateral leg pain with pins and needles.  Interestingly his strength exam is fairly ok on the R side compared to the L, but he is just unable to initiate walking very well.  His neurologist was contacted from the ED who recommended stroke rule out.  His brain MRI did not show an acute stroke.  Strong suspicion fro primary spinal cord pathology given his low back pain so thoracic and lumbar spine MRI obtained.  Has large L4-5 disk herniation.  He was transferred to inpatient service as he continued to not be able to walk.  Neurosurgery service was consulted and recommended surgical intervention with a L4-5 decompression w/ microdiscectomy,however the patient declined.  If he changes his mind he is to call the [facility].  PT was consulted.  He has difficulty with walking only a few steps.  Does have 2 wheelchairs at home.  Has multiple assistive devices in place at home due to his primary lateral sclerosis.  Patient and his spouse are confident they can continue to manage at home so he will discharge with home PT through [facility].  He has had this service in the past.  His BP was intermittently elevated here so he will follow up with his pcp if continues to be elevated at home.  Additionally I recommended  "he see his neurologist at some point if his symptoms are not improving to make sure no contribution form his PLS, although his disk herniation is more likely.     Discharge Disposition:  Discharged to home     Allergies:  Allergies   Allergen Reactions     Bee Venom      Penicillins      \"HIVES\"        Discharge Medications:   Current Discharge Medication List      CONTINUE these medications which have NOT CHANGED    Details   potassium chloride (K-TAB,KLOR-CON) 10 MEQ tablet Take 2 tablets (20 mEq) by mouth daily GIVE THE GENERIC: THIS IS NOT A REQUEST FOR BRAND NAME  Qty: 180 tablet, Refills: 3    Associated Diagnoses: Essential hypertension, benign      enalapril (VASOTEC) 5 MG tablet Take 1 tablet (5 mg) by mouth daily  Qty: 90 tablet, Refills: 3    Associated Diagnoses: Essential hypertension, benign      BACLOFEN PO Take 10 mg by mouth 3 times daily as needed       polyethylene glycol (MIRALAX/GLYCOLAX) Packet Take 1 packet by mouth daily      rivaroxaban ANTICOAGULANT (XARELTO) 20 MG TABS tablet Take 1 tablet (20 mg) by mouth daily (with dinner)  Qty: 30 tablet, Refills: 2    Associated Diagnoses: Acute deep vein thrombosis (DVT) of other specified vein of right lower extremity (H)      Acetaminophen (TYLENOL PO) Take 325 mg by mouth every 4 hours as needed for mild pain or fever      oxyCODONE-acetaminophen (PERCOCET) 5-325 MG per tablet Take by mouth every 4 hours as needed for moderate to severe pain      PANTOPRAZOLE SODIUM PO Take 40 mg by mouth daily (with dinner)       ketoconazole (NIZORAL) 2 % shampoo Shampoo every 2-3 days as needed  Qty: 120 mL, Refills: 5    Associated Diagnoses: Dermatitis      oxybutynin (DITROPAN XL) 10 MG 24 hr tablet Take 1 tablet (10 mg) by mouth daily  Qty: 90 tablet, Refills: 3    Associated Diagnoses: Prostate CA (H)      ASPIRIN PO Take 81 mg by mouth daily       baclofen (LIORESAL) in NaCl 0.9% 100 mL intrathecal infusion by Intrathecal route continuous Pump filled by " [facility] [phone]  Pump Serial Number: [id number], Pump Model Number: 8637-20  Last fill:  Last week  Next fill: October  Low [place] Alarm Date: 10/13/17  Reservoir Volume: 19.2 ml  Conc: 2000 mcg/ml  Flex schedule delivers 266.5 mcg/day  Basal rate 11 mcg/hr     For 3 hours starting at 0500 increases to 11.9 mcg/hr      EPINEPHrine 0.3 MG/0.3ML injection Inject 0.3 mLs (0.3 mg) into the muscle as needed for anaphylaxis  Qty: 0.3 mL, Refills: 3    Associated Diagnoses: Bee sting reaction, accidental or unintentional, initial encounter              Condition on Discharge:  Discharge condition: Stable   Discharge vitals: Blood pressure 154/83, pulse 61, temperature 98.7  F (37.1  C), temperature source Oral, resp. rate 18, SpO2 95 %.   Code status on discharge: Full Code     History of Illness:  See detailed admission note for full details.    Physical Exam:  Blood pressure 154/83, pulse 61, temperature 98.7  F (37.1  C), temperature source Oral, resp. rate 18, SpO2 95 %.  Wt Readings from Last 1 Encounters:   06/05/17 71.7 kg (158 lb)     Constitutional: Awake, NAD  Eyes: sclera white   HEENT:  MMM  Respiratory: no respiratory distress, lungs cta bilaterally, no crackles or wheeze  Cardiovascular: RRR.  No murmur   GI: non-tender, not distended, bowel sounds present  Skin: no rash or lesions, acyanotic  Musculoskeletal/extremities: atraumatic, no major deformities. No edema  Neurologic: A&Ox3, speech dysarthric.  Actually near normal strength in the RLE only slightly decreased from LLE  Psychiatric: calm, cooperative     Procedures other than Imaging:  None     Imaging:  Results for orders placed or performed during the hospital encounter of 06/05/17   MR Brain w/o & w Contrast    Narrative    MRI OF THE BRAIN WITHOUT AND WITH CONTRAST  6/5/2017  9:45 PM     COMPARISON: Brain MR 8/14/2014.    HISTORY:  Right lower extremity weakness.    TECHNIQUE: Axial diffusion-weighted with  ADC map, axial T2-weighted  with fat saturation, axial T1-weighted, axial turboFLAIR and coronal  T1-weighted images of the brain were acquired without intravenous  contrast.  Following intravenous administration of gadolinium (7 mL  Gadavist), axial T1-weighted images of the brain were acquired.     FINDINGS: There is moderate diffuse cerebral volume loss. There are  extensive confluent periventricular areas of abnormal T2 signal  hyperintensity in the cerebral white matter bilaterally that are  consistent with sequela of chronic small vessel ischemic disease.    The ventricles and basal cisterns are within normal limits in  configuration given the degree of cerebral volume loss.  There is no  midline shift. There are no extra-axial fluid collections. There is no  evidence for stroke or acute intracranial hemorrhage. There is no  abnormal contrast enhancement in the brain or its coverings.    There is no sinusitis or mastoiditis.      Impression    IMPRESSION: Diffuse cerebral volume loss and cerebral white matter  changes consistent with chronic small vessel ischemic disease. No  evidence for acute intracranial pathology. No change from the  comparison study.    AILIN LÓPEZ MD   MR Lumbar Spine w/o Contrast    Narrative    MRI LUMBAR SPINE WITHOUT CONTRAST   6/6/2017 12:39 PM     HISTORY: Two weeks of right low back pain with right leg pain and  weakness.    COMPARISON: An MRI on 1/14/2016.    TECHNIQUE: Sagittal T1, T2, and STIR, coronal T1, and transverse  proton density and T2-weighted images were obtained through the lumbar  spine.    FINDINGS: Numbering of the levels is based on what appear to be five  lumbar type vertebral bodies. Again seen is a degenerative grade 1  spondylolisthesis at both L4-L5 and L5-S1. Vertebral body alignment is  otherwise normal and unchanged. No fracture is seen. No pars  interarticularis defect is demonstrated. No osseous lesion is seen. No  abnormal marrow signal intensity is  identified. The conus medullaris  terminates at the level of the L1 vertebral body. No intrathecal  abnormality is seen. The adjacent soft tissues are unremarkable.    Findings by specific level:    T12-L1: The disc and facet joints are normal. No stenosis is seen.    L1-L2: There is disc dehydration. The disc height is well preserved.  There is a mild disc bulge with no herniation seen. There are mild  degenerative changes in the facet joints and prominence of the  ligamentum flava. No central canal stenosis is present. There is mild  to moderate narrowing of the left neural foramen with mild narrowing  of the right neural foramen. This level is unchanged.    L2-L3: There is disc dehydration with marked disc height loss. There  is a diffuse disc bulge with no focal herniation seen. There are mild  degenerative changes in the facet joints and prominence of the  ligamentum flava. There is a mild degree of central canal stenosis and  moderate bilateral neural foraminal stenosis. This level is unchanged.    L3-L4: There is disc dehydration with marked disc height loss. There  is a diffuse disc bulge with no focal herniation seen. There are  moderate degenerative changes in the facet joints and prominence of  the ligamentum flava. There is mild central canal stenosis with  moderate bilateral neural foraminal stenosis. This level is unchanged.    L4-L5: There is disc dehydration with moderate posterior disc height  loss. There has been progression of what is now a large central disc  extrusion filling the entire central canal resulting in severe central  canal stenosis. There is moderate to severe bilateral neural foraminal  stenosis. There are prominent degenerative changes in the facet  joints.    L5-S1: There is disc dehydration with mild diffuse disc height loss.  There is a mild disc bulge with no focal herniation seen. There are  moderate degenerative changes in the facet joints. No stenosis is  seen.       Impression    IMPRESSION:   1. At L4-L5 there has been progression of what is now a large central  disc extrusion resulting in severe central canal and moderate to  severe bilateral neural foraminal stenosis.  2. Degenerative changes elsewhere with no other disc herniation seen.  There is moderate foraminal stenosis bilaterally at L2-L3 and L3-L4.  Less extensive stenosis is seen elsewhere as described above.    AMBER QUIÑONES MD   MR Thoracic Spine w/o Contrast    Narrative    MRI THORACIC WITHOUT CONTRAST  6/6/2017 12:52 PM     HISTORY: Two weeks of right back pain with right leg pain and  weakness.    TECHNIQUE: Sagittal T1, T2, gradient echo, and STIR, and transverse  T2-weighted images were obtained through the thoracic spine.     COMPARISON: None.    FINDINGS: Vertebral body alignment is normal. No fracture is seen. No  osseous lesion is seen. No abnormal marrow signal intensity is  identified. No spinal cord or intrathecal abnormality is seen. The  adjacent soft tissues are unremarkable.    At T6-T7 there is a moderate-sized focal left central disc protrusion  resulting in mass effect upon the thecal sac and a mild degree of  overall central canal stenosis. No cord compression is seen and no  neural foraminal narrowing is demonstrated.    The remaining disc levels demonstrate dehydration and some disc height  loss in the superior aspect of the thoracic spine. No disc herniation  or extensive bulging is seen with no other area of stenosis  demonstrated.       Impression    IMPRESSION: At T6-T7 there is a moderate-sized left central disc  protrusion resulting in mild central canal stenosis.    AMBER QUIÑONES MD        Consultations:  Consultation during this admission received from neurosurgery and PT.       Recent Lab Results:    Recent Labs  Lab 06/05/17 1925   WBC 6.8   HGB 14.7   HCT 43.6   MCV 93          Recent Labs  Lab 06/05/17 1925      POTASSIUM 4.0   CHLORIDE 101   CO2 29    ANIONGAP 6   *   BUN 18   CR 0.72   GFRESTIMATED >90Non  GFR Calc   GFRESTBLACK >90African American GFR Calc   AMOS 9.0          Pending Results:    Unresulted Labs Ordered in the Past 30 Days of this Admission     No orders found from 4/6/2017 to 6/6/2017.         These results will be followed up by patient's primary care provider.    Discharge Instructions and Follow-Up:     Discharge Procedure Orders  Home Care PT Referral for Hospital Discharge   Referral Type: Home Health Therapies & Aides     Reason for your hospital stay   Order Comments: You were hospitalized for right leg weakness.  This is due to a large disc herniation in your lumbar spine.  Neurosurgery has recommended consideration for surgery, however you have declined at this time.  We will have home PT services resumed.     Follow-up and recommended labs and tests    Order Comments: If you do want to discuss surgical intervention in the future please call [facility] at [phone].     Activity   Order Comments: Your activity upon discharge: activity as tolerated   Order Specific Question Answer Comments   Is discharge order? Yes      MD face to face encounter   Order Comments: Documentation of Face to Face and Certification for Home Health Services    I certify that patient: Elier Barber is under my care and that I, or a nurse practitioner or physician's assistant working with me, had a face-to-face encounter that meets the physician face-to-face encounter requirements with this patient on: 6/7/2017.    This encounter with the patient was in whole, or in part, for the following medical condition, which is the primary reason for home health care: primary lateral sclerosis.    I certify that, based on my findings, the following services are medically necessary home health services: Nursing and Physical Therapy.    My clinical findings support the need for the above services because:  Physical Therapy  Services are needed to assess and treat the following functional impairments: primary lateral sclerosis.    Further, I certify that my clinical findings support that this patient is homebound (i.e. absences from home require considerable and taxing effort and are for medical reasons or [employer] services or infrequently or of short duration when for other reasons) because: Requires assistance of another person or specialized equipment to access medical services because patient: Is unable to operate assistive equipment on their own. and Is unable to walk greater than 50 feet without rest...    Based on the above findings. I certify that this patient is confined to the home and needs intermittent skilled nursing care, physical therapy and/or speech therapy.  The patient is under my care, and I have initiated the establishment of the plan of care.  This patient will be followed by a physician who will periodically review the plan of care.  Physician/Provider to provide follow up care: Lida Ray    Attending hospital physician (the Medicare certified [facility] provider): Floyd Blanton  Physician Signature: See electronic signature associated with these discharge orders.  Date: 6/7/2017     Full Code     Diet   Order Comments: Follow this diet upon discharge: Orders Placed This Encounter     Regular Diet Adult   Order Specific Question Answer Comments   Is discharge order? Yes          Recommendations:  -patient can call [facility] if he wishes to desire surgical intervention  -home PT referral  -f/u with pcp for elevated BP here    I, Floyd Blanton, personally saw the patient today and spent greater than 30 minutes discharging this patient.    Floyd Blanton MD

## 2017-06-07 NOTE — PROGRESS NOTES
Discharged to home after stating understanding of medications. No new meds. All belongings packed and sent.

## 2017-06-07 NOTE — PLAN OF CARE
Problem: Goal Outcome Summary  Goal: Goal Outcome Summary     PT: Patient seen by physical therapy for evaluation and treatment.  Patient reports right LE weakness that causes him to buckle at his right knee when standing or walking.  Per medical chart, likely secondary to central stenosis in lumbar spine with herniated disc; patient also diagnosed with ALS.  Patient reports that he goes to the chiropractor (which has decreased the radiating pain in LEs) and performs stretching of low back, piriformis, and hamstrings.  Patient reports that while this has helped his pain, his LE function has not improved.  Patient lives with his wife in single story home; are working with SW to rent a WC ramp to access home (currently has 3 steps to enter home with neighbors providing assistance).  Patient is interested in LE strengthening program and setting up a PT plan.     Discharge Planner PT   Patient plan for discharge: home with wife, return to chiropractor  Current status: Requires mod A for sit to stand with 2WW.  Able to pivot with mod A and 2WW, unable to take a step forward without bilateral LE buckling.  Wife reports this has been his baseline for the past 14 days; wife has been pushing patient in WC or on seat of wheeled walker.  Patient and wife educated in LE PT exercises, handouts provided.    Barriers to return to prior living situation:stairs to enter home, patient requiring significant assist with all mobility      Recommendations for discharge: Discussed TCU; recommend DC to home at this time.  Patient cannot tolerate sleeping in regular bed secondary to back pain/ back spasms.  Will benefit from sleeping in recliner (baseline) and attending chiropractic care. Wife would benefit from some support in caring for patient at home; recommend use of WC or wheeled walker for all mobility within home.  Patient would benefit from WC ramp to enter home.   Discussed home PT; patient and wife would like to work solely with  the chiropractor right now, then may be interested in home PT referral.  Also suggested aquatic physical therapy for return to standing and walking in a body weight eliminated setting.     Rationale for recommendations: Patient demonstrating decreased bilateral LE functioning in standing with history of ALS and disc herniation with central spinal stenosis.          Entered by: Malini Swanson 06/07/2017 4:17 PM   Physical Therapy Discharge Summary     Reason for therapy discharge:    Anticipate DC to home with wife     Progress towards therapy goal(s). See goals on Care Plan in [facility] electronic health record for goal details.  Goals partially met.  Barriers to achieving goals:   limited tolerance for therapy.     Therapy recommendation(s):    Continued therapy is recommended.  Rationale/Recommendations:  decreased strength and independence with mobliiy.  Continue home exercise program.

## 2017-06-07 NOTE — PROGRESS NOTES
[facility]  Patient is currently open to home care services with [facility].  The patient is currently receiving Private Pay HC services.  [facility]  and team have been notified of patient admission.  [facility] liaison will continue to follow patient during stay.  If appropriate provide orders to resume home care RN/HHA at time of discharge.

## 2017-06-08 ENCOUNTER — MYC MEDICAL ADVICE (OUTPATIENT)
Dept: INTERNAL MEDICINE | Facility: CLINIC | Age: 72
End: 2017-06-08

## 2017-06-08 ENCOUNTER — TELEPHONE (OUTPATIENT)
Dept: INTERNAL MEDICINE | Facility: CLINIC | Age: 72
End: 2017-06-08

## 2017-06-08 NOTE — TELEPHONE ENCOUNTER
Probably faxed to [facility]. [facility]

## 2017-06-08 NOTE — TELEPHONE ENCOUNTER
STEPHANI F/U    Date: 06/07/17  Diagnosis: Lateral sclerosis  Is patient active in care coordination? No  Was patient in TCU? No

## 2017-06-09 NOTE — TELEPHONE ENCOUNTER
"ED/Discharge Protocol    \"Hi, my name is Luz Guillermo, a registered nurse, and I am calling on behalf of Dr. Ray's office at [place].  I am calling to follow up and see how things are going for you after your recent visit.\"    \"I see that you were in the (ER/UC/IP) on 6/5/17.    How are you doing now that you are home?\" pt with wife on the phone, state she is doing \"ok\", he will start PT in home next week and will follow-up with his chiropractor on Tuesday.Pt stated his chiropractor has helped in the past alleviate some pain. Pt continues to take Xarelto 1 tablet daily as advised for previous DVT.     Is patient experiencing symptoms that may require a hospital visit?  None.    Discharge Instructions    \"Let's review your discharge instructions.  What is/are the follow-up recommendations?  Pt. Response: Follow up with PT home care.    \"Were you instructed to make a follow-up appointment?\"  Pt. Response: No.       \"When you see the provider, I would recommend that you bring your discharge instructions with you.    Medications    \"How many new medications are you on since your hospitalization/ED visit?\"    0-1  \"How many of your current medicines changed (dose, timing, name, etc.) while you were in the hospital/ED visit?\"   0-1  \"Do you have questions about your medications?\"   No  \"Were you newly diagnosed with heart failure, COPD, diabetes or did you have a heart attack?\"   No  For patients on insulin: \"Did you start on insulin in the hospital or did you have your insulin dose changed?\"   No    Medication reconciliation completed? Yes    Was MTM referral placed (*Make sure to put transitions as reason for referral)?   No    Call Summary    \"Do you have any questions or concerns about your condition or care plan at the moment?\"    No  Triage nurse advice given: Follow- up with PT home care as advised.    \"If you have questions or things don't continue to improve, we encourage you contact us through the main " "clinic number,  [phone].  Even if the clinic is not open, triage nurses are available 24/7 to help you.     We would like you to know that our clinic has extended hours (provide information).  We also have urgent care (provide details on closest location and hours/contact info)\"      \"Thank you for your time and take care!\"        "

## 2017-06-11 ENCOUNTER — MYC MEDICAL ADVICE (OUTPATIENT)
Dept: INTERNAL MEDICINE | Facility: CLINIC | Age: 72
End: 2017-06-11

## 2017-06-12 ENCOUNTER — MYC MEDICAL ADVICE (OUTPATIENT)
Dept: INTERNAL MEDICINE | Facility: CLINIC | Age: 72
End: 2017-06-12

## 2017-06-14 ENCOUNTER — TELEPHONE (OUTPATIENT)
Dept: INTERNAL MEDICINE | Facility: CLINIC | Age: 72
End: 2017-06-14

## 2017-06-14 ENCOUNTER — MYC MEDICAL ADVICE (OUTPATIENT)
Dept: INTERNAL MEDICINE | Facility: CLINIC | Age: 72
End: 2017-06-14

## 2017-06-14 ENCOUNTER — DOCUMENTATION ONLY (OUTPATIENT)
Dept: CARE COORDINATION | Facility: CLINIC | Age: 72
End: 2017-06-14

## 2017-06-14 DIAGNOSIS — I10 ESSENTIAL HYPERTENSION, BENIGN: ICD-10-CM

## 2017-06-15 DIAGNOSIS — K21.9 GASTROESOPHAGEAL REFLUX DISEASE WITHOUT ESOPHAGITIS: Primary | ICD-10-CM

## 2017-06-15 RX ORDER — ENALAPRIL MALEATE 5 MG/1
5 TABLET ORAL DAILY
Qty: 90 TABLET | Refills: 3 | Status: SHIPPED | OUTPATIENT
Start: 2017-06-15 | End: 2018-05-31

## 2017-06-15 RX ORDER — POTASSIUM CHLORIDE 750 MG/1
20 TABLET, EXTENDED RELEASE ORAL DAILY
Qty: 180 TABLET | Refills: 3 | Status: SHIPPED | OUTPATIENT
Start: 2017-06-15 | End: 2018-05-31

## 2017-06-15 RX ORDER — PANTOPRAZOLE SODIUM 40 MG/1
TABLET, DELAYED RELEASE ORAL
Qty: 90 TABLET | Refills: 1 | Status: SHIPPED | OUTPATIENT
Start: 2017-06-15 | End: 2017-12-30

## 2017-06-15 NOTE — PROGRESS NOTES
[facility] now requests orders and shares plan of care/discharge summaries for some patients through [employer].  Please REPLY TO THIS MESSAGE in order to give authorization for orders when needed.  This is considered a verbal order, you will still receive a faxed copy of orders for signature.  Thank you for your assistance in improving collaboration for our patients.    ORDER  PT 1w6, LOU trevizo MD SUMMARY/PLAN OF CARE  patient shows good overall strength with MMT but demonstates increased weakness/difficulty with sit to stand transfers as well as inability to ambulate.  patient has has multiple episodes of legs giving out on him and requires assist for all ambulation and mobility now.  patient would benefit from resumption of PT visits for strength, gait, transfers.  Patient would also benefit from OT santhosh for equipment needs/ADLs

## 2017-06-15 NOTE — TELEPHONE ENCOUNTER
Omeprazole      Last Written Prescription Date: na  Last Fill Quantity: na,  # refills: na   [place] Visit [place] [facility] or [facility] prescribing provider: 5/25/17    Routing refill request to provider for review/approval because:  Medication is reported/historical

## 2017-06-16 PROCEDURE — G0179 MD RECERTIFICATION HHA PT: HCPCS | Performed by: INTERNAL MEDICINE

## 2017-06-21 ENCOUNTER — TELEPHONE (OUTPATIENT)
Dept: INTERNAL MEDICINE | Facility: CLINIC | Age: 72
End: 2017-06-21

## 2017-06-21 NOTE — TELEPHONE ENCOUNTER
Fax received from [facility] for review and signature.  Put in Dr. Ray's in basket.

## 2017-06-26 ENCOUNTER — TELEPHONE (OUTPATIENT)
Dept: INTERNAL MEDICINE | Facility: CLINIC | Age: 72
End: 2017-06-26

## 2017-06-26 NOTE — TELEPHONE ENCOUNTER
VM received from Alayna Weston [facility] OT requesting OT orders for 3x/month for 1 month to assess functional mobility. Call back. Verbal order given.

## 2017-06-30 ENCOUNTER — TELEPHONE (OUTPATIENT)
Dept: INTERNAL MEDICINE | Facility: CLINIC | Age: 72
End: 2017-06-30

## 2017-06-30 NOTE — TELEPHONE ENCOUNTER
Fax received from [facility] for review and signature.  Put in Dr. Ray's in basket.

## 2017-07-03 ENCOUNTER — MYC MEDICAL ADVICE (OUTPATIENT)
Dept: NEUROLOGY | Facility: CLINIC | Age: 72
End: 2017-07-03

## 2017-07-03 DIAGNOSIS — M48.02 CERVICAL STENOSIS OF SPINAL CANAL: Primary | ICD-10-CM

## 2017-07-05 ENCOUNTER — TELEPHONE (OUTPATIENT)
Dept: NEUROSURGERY | Facility: CLINIC | Age: 72
End: 2017-07-05

## 2017-07-05 DIAGNOSIS — M48.02 CERVICAL STENOSIS OF SPINAL CANAL: Primary | ICD-10-CM

## 2017-07-05 DIAGNOSIS — M54.16 LUMBAR RADICULAR PAIN: ICD-10-CM

## 2017-07-05 NOTE — TELEPHONE ENCOUNTER
Per Sindy Jones CNP, Recommend EMG of arms and legs.  Then return to see Dr. Choco Blanc if he would like to discuss surgery with Dr. Choco Blanc to his LOW BACK.   We only saw him as a consult in hospital. Called and informed patient and his wife. Order for BUE and BLE EMG placed with [facility] and faxed [phone] on 7/5/17. Patient will call back to schedule appt with Dr. Blanc to review EMG results and discuss surgery with Dr. Blanc for his low back. Patient and his wife verbalized understanding.

## 2017-07-06 ENCOUNTER — MYC MEDICAL ADVICE (OUTPATIENT)
Dept: NEUROLOGY | Facility: CLINIC | Age: 72
End: 2017-07-06

## 2017-07-07 ENCOUNTER — DOCUMENTATION ONLY (OUTPATIENT)
Dept: CARE COORDINATION | Facility: CLINIC | Age: 72
End: 2017-07-07

## 2017-07-07 NOTE — PROGRESS NOTES
[facility] now requests orders and shares plan of care/discharge summaries for some patients through [employer].  Please REPLY TO THIS MESSAGE in order to give authorization for orders when needed.  This is considered a verbal order, you will still receive a faxed copy of orders for signature.  Thank you for your assistance in improving collaboration for our patients.    ORDER  PT visits 6M1 for strength, gait, transfers    MD SUMMARY/PLAN OF CARE  Patient requesting increased visits from 1 time/week to 2 times/week.  Visits covered by long term care insurance, no skilled need required.   Request cancel existing orders and begin new orders starting week of 7/9/19.

## 2017-07-23 NOTE — PLAN OF CARE
[facility]      OUTPATIENT PHYSICAL THERAPY EVALUATION  PLAN OF TREATMENT FOR OUTPATIENT REHABILITATION  (COMPLETE FOR INITIAL CLAIMS ONLY)  Patient's Last Name, First Name, M.I.  YOB: 1945  Elier Barber                        Provider's Name  [facility] Medical Record No.  [id number]                               Onset Date:      Start of Care Date:    6/7/17     Type:     _X_PT   ___OT   ___SLP Medical Diagnosis:                        Mechanical fall   PT Diagnosis:     Decreased independence with mobility Visits from [facility]:  1   _________________________________________________________________________________  Plan of Treatment/Functional Goals  Patient would like to be able to return to home with assist of wife for all mobility.      Planned Interventions:  ,     ,       Goals: See Physical Therapy Goals on Care Plan in [facility] electronic health record.    Therapy Frequency:    Predicted Duration of Therapy Intervention:    _________________________________________________________________________________    I CERTIFY THE NEED FOR THESE SERVICES FURNISHED UNDER        THIS PLAN OF TREATMENT AND WHILE UNDER MY CARE     (Physician co-signature of this document indicates review and certification of the therapy plan).                   ,                   Initial Assessment        See Physical Therapy evaluation dated   in Epic electronic health record.

## 2017-07-24 ENCOUNTER — TRANSFERRED RECORDS (OUTPATIENT)
Dept: HEALTH INFORMATION MANAGEMENT | Facility: CLINIC | Age: 72
End: 2017-07-24

## 2017-07-26 ENCOUNTER — TELEPHONE (OUTPATIENT)
Dept: INTERNAL MEDICINE | Facility: CLINIC | Age: 72
End: 2017-07-26

## 2017-07-26 NOTE — TELEPHONE ENCOUNTER
Levi RN from [facility] calls, requesting order to recertify pt for 60 day period for private pay aide and nurse visits per pt request. Re-cert would be t/ Sept 28.    Called Levi, gave verbal authorization.

## 2017-07-28 ENCOUNTER — TELEPHONE (OUTPATIENT)
Dept: NEUROSURGERY | Facility: CLINIC | Age: 72
End: 2017-07-28

## 2017-07-28 NOTE — TELEPHONE ENCOUNTER
EMG of arms and legs.  Difficult according to note due to his inability to lie still.  Most likely shows peripheral neuropathy.

## 2017-07-31 ENCOUNTER — DOCUMENTATION ONLY (OUTPATIENT)
Dept: CARE COORDINATION | Facility: CLINIC | Age: 72
End: 2017-07-31

## 2017-07-31 ENCOUNTER — TELEPHONE (OUTPATIENT)
Dept: INTERNAL MEDICINE | Facility: CLINIC | Age: 72
End: 2017-07-31

## 2017-07-31 NOTE — PROGRESS NOTES
[facility] now requests orders and shares plan of care/discharge summaries for some patients through [employer].  Please REPLY TO THIS MESSAGE in order to give authorization for orders when needed.  This is considered a verbal order, you will still receive a faxed copy of orders for signature.  Thank you for your assistance in improving collaboration for our patients.    ORDER  PT 2w9 for gait, strength, transfers, ambulation    MD SUMMARY/PLAN OF CARE  patient continues to have limited ambulation and increased difficulty with transfers. he is still trying chiropractic and acupuncture to see if this helps, he also underwent an EMG and is awaiting these results. patient has good strength but this does not carry over to ambulation and transfers.  plan to continue therapy for strength, gait, trasnfers and overall mobility.  Patient is private pay through long term care insurance.

## 2017-08-01 ENCOUNTER — TELEPHONE (OUTPATIENT)
Dept: INTERNAL MEDICINE | Facility: CLINIC | Age: 72
End: 2017-08-01

## 2017-08-01 NOTE — TELEPHONE ENCOUNTER
Alayna OT  with [facility] ([phone]) calling.  She had received previous order for home visit with this patient but it  while waiting for the blueprints to be completed for the home modification.  Now asking for 1 visit to assist with home modifications.  Order approved per RN protocol.  ARELY Hua R.N.

## 2017-08-04 ENCOUNTER — TELEPHONE (OUTPATIENT)
Dept: INTERNAL MEDICINE | Facility: CLINIC | Age: 72
End: 2017-08-04

## 2017-08-04 NOTE — TELEPHONE ENCOUNTER
Fax received from [facility] for review and signature.  Put in Dr. Ray's in basket.

## 2017-08-06 ENCOUNTER — MYC MEDICAL ADVICE (OUTPATIENT)
Dept: INTERNAL MEDICINE | Facility: CLINIC | Age: 72
End: 2017-08-06

## 2017-08-11 ENCOUNTER — TELEPHONE (OUTPATIENT)
Dept: INTERNAL MEDICINE | Facility: CLINIC | Age: 72
End: 2017-08-11

## 2017-08-11 NOTE — TELEPHONE ENCOUNTER
Fax received from [facility] for review and signature.  Put in Dr. Ray's in basket.

## 2017-08-16 ENCOUNTER — TELEPHONE (OUTPATIENT)
Dept: INTERNAL MEDICINE | Facility: CLINIC | Age: 72
End: 2017-08-16

## 2017-08-17 DIAGNOSIS — I82.491 ACUTE DEEP VEIN THROMBOSIS (DVT) OF OTHER SPECIFIED VEIN OF RIGHT LOWER EXTREMITY (H): ICD-10-CM

## 2017-08-17 RX ORDER — RIVAROXABAN 20 MG/1
TABLET, FILM COATED ORAL
Qty: 30 TABLET | Refills: 2 | Status: SHIPPED | OUTPATIENT
Start: 2017-08-17 | End: 2017-11-02

## 2017-08-17 NOTE — TELEPHONE ENCOUNTER
xarelto           Last Written Prescription Date: 5/25/17  Last Fill Quantity: 30, # refills: 2    Last Office Visit with G, P or [facility] prescribing provider:  5/25/17   Future Office Visit:       Lab Results   Component Value Date    WBC 6.8 06/05/2017     Lab Results   Component Value Date    RBC 4.70 06/05/2017     Lab Results   Component Value Date    HGB 14.7 06/05/2017     Lab Results   Component Value Date    HCT 43.6 06/05/2017     No components found for: MCT  Lab Results   Component Value Date    MCV 93 06/05/2017     Lab Results   Component Value Date    MCH 31.3 06/05/2017     Lab Results   Component Value Date    MCHC 33.7 06/05/2017     Lab Results   Component Value Date    RDW 12.5 06/05/2017     Lab Results   Component Value Date     06/05/2017     Lab Results   Component Value Date    AST 34 08/11/2014     Lab Results   Component Value Date    ALT 31 08/11/2014     Creatinine   Date Value Ref Range Status   06/05/2017 0.72 0.66 - 1.25 mg/dL Final   ]    Labs showing if normal/abnormal  Lab Results   Component Value Date    WBC 6.8 06/05/2017    RBC 4.70 06/05/2017    HGB 14.7 06/05/2017    HCT 43.6 06/05/2017    MCV 93 06/05/2017    MCH 31.3 06/05/2017    MCHC 33.7 06/05/2017    RDW 12.5 06/05/2017     06/05/2017    DTYP Automated Method 06/05/2014    NEUTROPHIL 65.6 06/05/2014    LYMPH 24.1 06/05/2014    MONOCYTE 8.5 06/05/2014    EOSINOPHIL 1.2 06/05/2014    BASOPHIL 0.2 06/05/2014    IG 0.4 06/05/2014    ANEU 3.3 06/05/2014    ALYM 1.2 06/05/2014    RAYA 0.4 06/05/2014    AEOS 0.1 06/05/2014    ABAS 0.0 06/05/2014    AIG 0.0 06/05/2014      Lab Results   Component Value Date    AST 34 08/11/2014    ALT 31 08/11/2014

## 2017-08-18 DIAGNOSIS — Z53.9 DIAGNOSIS NOT YET DEFINED: Primary | ICD-10-CM

## 2017-08-18 PROCEDURE — G0179 MD RECERTIFICATION HHA PT: HCPCS | Performed by: INTERNAL MEDICINE

## 2017-08-23 ENCOUNTER — TELEPHONE (OUTPATIENT)
Dept: INTERNAL MEDICINE | Facility: CLINIC | Age: 72
End: 2017-08-23

## 2017-08-23 NOTE — TELEPHONE ENCOUNTER
[facility].     Pt is Private Pay for PT.     Pt is requesting to go to 1 x a week for PT instead of 2 x a week.   So they are cancelling the one visit a week.     Verbal order given to cancel the one visit until certification received for MD signature.

## 2017-08-24 ENCOUNTER — TELEPHONE (OUTPATIENT)
Dept: NEUROLOGY | Facility: CLINIC | Age: 72
End: 2017-08-24

## 2017-09-12 NOTE — TELEPHONE ENCOUNTER
Elier was evaluated at [facility]. They focused mostly on the incontinent issues. Wife Kelli, spoke to their neurologist at [facility] who did not think it was related to the injection. His recommendation was to reduce the Baclofen, maybe he was getting too much.     They are still working with Elier' other providers to help determine cause of new onset of weakness and bladder issues.    Kelli wanted us to know she appreciated the calls back and be assured that neuro did not think related to procedure.    Routed to Dr. Garcia for review.    Shabnam Nava, MSN, RN-BC  Care Coordinator  [facility]     aspirin (ECOTRIN) 325 MG EC tablet  furosemide (LASIX) 40 MG tablet  Multiple Vitamins-Minerals (MULTIVITAMIN ADULTS) Tab  omeprazole (PRILOSEC) 20 MG capsule    Patient, Derick Centeno calling for medication refill. Medication(s) set up as pending orders from medication list.    Caller has been advised that their call does not guarantee an immediate refill. This refill will be reviewed within 24-48 hours by a qualified provider who will determine whether he or she can refill the medication.    Patient has contacted the pharmacy?  Yes    Patient advised he or she will receive a call back.    Additional information:     Patient is completely out of medications    Patient’s preferred pharmacy has been noted and populated.     [facility] [Patient Preferred]   [phone]    Thanks  Rocío Ly

## 2017-09-25 ENCOUNTER — MYC MEDICAL ADVICE (OUTPATIENT)
Dept: INTERNAL MEDICINE | Facility: CLINIC | Age: 72
End: 2017-09-25

## 2017-09-26 NOTE — TELEPHONE ENCOUNTER
Home care nurse calls for continuing orders.    Recert for 60 days.   RANDAL and NV.     Verbal order given to approve visits until certification received for MD signature.

## 2017-10-04 ENCOUNTER — DOCUMENTATION ONLY (OUTPATIENT)
Dept: CARE COORDINATION | Facility: CLINIC | Age: 72
End: 2017-10-04

## 2017-10-04 ENCOUNTER — TRANSFERRED RECORDS (OUTPATIENT)
Dept: HEALTH INFORMATION MANAGEMENT | Facility: CLINIC | Age: 72
End: 2017-10-04

## 2017-10-04 NOTE — PROGRESS NOTES
[facility] now requests orders and shares plan of care/discharge summaries for some patients through [employer].  Please REPLY TO THIS MESSAGE in order to give authorization for orders when needed.  This is considered a verbal order, you will still receive a faxed copy of orders for signature.  Thank you for your assistance in improving collaboration for our patients.    ORDER  PT 1w8. for gait, transfers, strength, balance, HEP.      MD SUMMARY/PLAN OF CARE  Patient has been receiving private pay PT visits through long term care insurance, he is showing improved ambulation tolerance and transfers, he has good strength but this does not carry over functionally.  patient also limited due to muscle tone and back problems.  patient currently seeing chiropractor to address back.  overall patient has shown good progress the last month.  plan to continue therapy through for strength, gait, trasnfers and overall mobility.

## 2017-10-10 ENCOUNTER — TRANSFERRED RECORDS (OUTPATIENT)
Dept: HEALTH INFORMATION MANAGEMENT | Facility: CLINIC | Age: 72
End: 2017-10-10

## 2017-10-11 ENCOUNTER — TELEPHONE (OUTPATIENT)
Dept: INTERNAL MEDICINE | Facility: CLINIC | Age: 72
End: 2017-10-11

## 2017-10-11 NOTE — TELEPHONE ENCOUNTER
Fax received from [facility] for review and signature.  Put in Dr. Ray's in basket.

## 2017-10-12 ENCOUNTER — TELEPHONE (OUTPATIENT)
Dept: INTERNAL MEDICINE | Facility: CLINIC | Age: 72
End: 2017-10-12

## 2017-10-12 DIAGNOSIS — Z53.9 DIAGNOSIS NOT YET DEFINED: Primary | ICD-10-CM

## 2017-10-12 PROCEDURE — G0179 MD RECERTIFICATION HHA PT: HCPCS | Performed by: INTERNAL MEDICINE

## 2017-10-12 NOTE — TELEPHONE ENCOUNTER
Form received from: [facility]    Form requesting following info/need: PT orders    MEENAKSHI needed?: No    Location of form: Dr. Ray's in basket    When completed the route for return: Fax

## 2017-10-19 ASSESSMENT — ENCOUNTER SYMPTOMS
NAUSEA: 0
JOINT SWELLING: 0
RECTAL BLEEDING: 0
SMELL DISTURBANCE: 0
HOARSE VOICE: 0
MUSCLE CRAMPS: 0
BLOOD IN STOOL: 0
SORE THROAT: 0
SINUS PAIN: 0
MUSCLE WEAKNESS: 0
DIARRHEA: 0
ARTHRALGIAS: 0
RECTAL PAIN: 0
TROUBLE SWALLOWING: 1
TASTE DISTURBANCE: 0
JAUNDICE: 0
BACK PAIN: 1
NECK PAIN: 1
STIFFNESS: 0
POOR WOUND HEALING: 1
CONSTIPATION: 0
BOWEL INCONTINENCE: 0
SKIN CHANGES: 0
ABDOMINAL PAIN: 0
NAIL CHANGES: 0
VOMITING: 0
NECK MASS: 0
HEARTBURN: 1
MYALGIAS: 0
BLOATING: 0
SINUS CONGESTION: 0

## 2017-10-31 DIAGNOSIS — L30.9 DERMATITIS: ICD-10-CM

## 2017-10-31 RX ORDER — KETOCONAZOLE 20 MG/ML
SHAMPOO TOPICAL
Qty: 120 ML | Refills: 5 | Status: SHIPPED | OUTPATIENT
Start: 2017-10-31 | End: 2018-05-31

## 2017-11-02 ENCOUNTER — RESULTS ONLY (OUTPATIENT)
Dept: LAB | Age: 72
End: 2017-11-02

## 2017-11-02 ENCOUNTER — OFFICE VISIT (OUTPATIENT)
Dept: NEUROLOGY | Facility: CLINIC | Age: 72
End: 2017-11-02

## 2017-11-02 ENCOUNTER — THERAPY VISIT (OUTPATIENT)
Dept: SPEECH THERAPY | Facility: CLINIC | Age: 72
End: 2017-11-02

## 2017-11-02 VITALS
SYSTOLIC BLOOD PRESSURE: 147 MMHG | WEIGHT: 171 LBS | HEART RATE: 65 BPM | DIASTOLIC BLOOD PRESSURE: 74 MMHG | HEIGHT: 70 IN | OXYGEN SATURATION: 97 % | BODY MASS INDEX: 24.48 KG/M2

## 2017-11-02 DIAGNOSIS — G12.23 PRIMARY LATERAL SCLEROSIS (H): Primary | ICD-10-CM

## 2017-11-02 DIAGNOSIS — R13.12 OROPHARYNGEAL DYSPHAGIA: Primary | ICD-10-CM

## 2017-11-02 LAB
CK SERPL-CCNC: 285 U/L (ref 30–300)
CREAT SERPL-MCNC: 0.84 MG/DL (ref 0.66–1.25)
CREAT UR-MCNC: 91 MG/DL
GFR SERPL CREATININE-BSD FRML MDRD: >90 ML/MIN/1.7M2
MICROALBUMIN UR-MCNC: 7 MG/L
MICROALBUMIN/CREAT UR: 7.27 MG/G CR (ref 0–17)
URATE SERPL-MCNC: 5.4 MG/DL (ref 3.5–7.2)

## 2017-11-02 ASSESSMENT — REVISED AMYOTROPHIC LATERAL SCLEROSIS FUNCTIONAL RATING SCALE (ALSFRS-R)
HANDWRITING: 3
CLIMBING_STAIRS: 1
SIX_ITEM_SUBTOTAL: 18
SALIVATION: 3 - SLIGHT BUT DEFINITE EXCESS OF SALIVA IN MOUTH; MAY HAVE NIGHTTIME DROOLING
RESPIRATORY_INSUFFICIENCY: 4 - NONE
GROSS_MOTOR_SUBTOTAL_SCORE: 7
RESPIRATORY_INSUFFICIENCY: 4
WALKING: 2 - WALKS WITH ASSISTANCE
RESPIRATORY_SUBTOTAL_SCORE: 12
WALKING: 2
DRESSING_AND_HYGEINE: 3
DYSPNEA: 4
FINE_MOTOR_SUBTOTAL_SCORE: 9
CLIMBING_STAIRS: 1 - NEEDS ASSISTANCE
ORTHOPENA: 4 - NONE
SALIVATION: 3
DYSPNEA: 4 - NONE
SWALLOWING: 3
HANDWRITING: 3 - SLOW OR SLOPPY: ALL WORDS ARE LEGIBLE
ALSFRS_TOTAL_SCORE: 36
TURNING_IN_BED_AND_ADJUSTING_BED_CLOTHES: 4 - NORMAL
SPEECH: 2 - INTELLIGIBLE WITH REPEATING
ORTHOPENA: 4
SPEECH: 2
TURNING_IN_BED_AND_ADJUSTING_BED_CLOTHES: 4
BULBAR_SUBTOTAL: 8
SWALLOWING: 3 - EARLY EATING PROBLEMS - OCCASIONAL CHOKING
CUTTING_FOOD_AND_HANDLING_UTENSILES: 3
DRESSING_AND_HYGEINE: 3 - INDEPENDENT AND COMPLETE SELF-CARE WITH EFFORT OR DECREASED EFFICIENCY

## 2017-11-02 ASSESSMENT — PAIN SCALES - GENERAL: PAINLEVEL: NO PAIN (0)

## 2017-11-02 NOTE — MR AVS SNAPSHOT
After Visit Summary   11/2/2017    Elier Barber    MRN: [id number]           Patient Information     Date Of Birth          1945        Visit Information        Provider Department      11/2/2017 1:00 PM Aida Burch, SLP [facility] Speech and Language        Today's Diagnoses     Oropharyngeal dysphagia    -  1       Follow-ups after your visit        Your next 10 appointments already scheduled     May 03, 2018  1:00 PM CDT   PFT VISIT with ADAM TOBAR   [facility] Pulmonary Function Testing ([facility])    [address] [place] [id number] [phone]            May 03, 2018  1:45 PM CDT   (Arrive by 1:30 PM)   Return ALS/Motor Neuron with Gary Tejada MD   [facility] Neurology ([facility])    [address] [place] [address] [phone]              Who to contact     Please call your clinic at [phone] to:    Ask questions about your health    Make or cancel appointments    Discuss your medicines    Learn about your test results    Speak to your doctor   If you have compliments or concerns about an experience at your clinic, or if you wish to file a complaint, please contact [facility] Patient Relations at [phone] or email us at [email or web address]         Additional Information About Your Visit        MyChart Information     [employer] gives you secure access to your electronic health record. If you see a primary care provider, you can also send messages to your care team and make appointments. If you have questions, please call your primary care clinic.  If you do not have a primary care provider, please call [phone] and they will assist you.      [employer] is an electronic gateway that provides easy, online access to your medical records. With [employer], you can request a clinic appointment, read your test results, renew a prescription or  communicate with your care team.     To access your existing account, please contact your [facility] or call [phone] for assistance.        Care EveryWhere ID     This is your Care EveryWhere ID. This could be used by other organizations to access your [place] medical records  [id number]         Blood Pressure from Last 3 Encounters:   11/02/17 147/74   06/07/17 (!) 149/97   06/05/17 127/80    Weight from Last 3 Encounters:   11/02/17 77.6 kg (171 lb)   06/05/17 71.7 kg (158 lb)   05/25/17 70.3 kg (155 lb)              Today, you had the following     No orders found for display       Primary Care Provider Office Phone # Fax #    Lida Ray [phone]       [address]  [facility] [address]        Equal Access to Services     [facility]: Hadii aad ku hadasho Soomaali, waaxda luqadaha, qaybta kaalmada adeegyada, jairo cortes haytigist mendenhall . So [facility] [phone].    ATENCIÓN: Si habla español, tiene a gant disposición servicios gratuitos de asistencia lingüística. Llame al [phone].    We comply with applicable federal civil rights laws and [place] laws. We do not discriminate on the basis of race, color, national origin, age, disability, sex, sexual orientation, or gender identity.            Thank you!     Thank you for choosing [facility] SPEECH AND LANGUAGE  for your care. Our goal is always to provide you with excellent care. Hearing back from our patients is one way we can continue to improve our services. Please take a few minutes to complete the written survey that you may receive in the mail after your visit with us. Thank you!             Your Updated Medication List - Protect others around you: Learn how to safely use, store and throw away your medicines at www.disposemymeds.org.          This list is accurate as of: 11/2/17  4:33 PM.  Always use your most recent med list.                   Brand Name Dispense  Instructions for use Diagnosis    ASPIRIN PO      Take 81 mg by mouth daily        baclofen (LIORESAL) in NaCl 0.9% 100 mL intrathecal infusion      by Intrathecal route continuous Pump filled by [facility] [phone] Pump Serial Number: [id number], Pump Model Number: 8637-20 Last fill:  Last week Next fill: October Low [place] Alarm Date: 10/13/17 Reservoir Volume: 19.2 ml Conc: 2000 mcg/ml Flex schedule delivers 266.5 mcg/day Basal rate 11 mcg/hr    For 3 hours starting at 0500 increases to 11.9 mcg/hr        enalapril 5 MG tablet    VASOTEC    90 tablet    Take 1 tablet (5 mg) by mouth daily    Essential hypertension, benign       EPINEPHrine 0.3 MG/0.3ML injection 2-pack    EPIPEN/ADRENACLICK/or ANY BX GENERIC EQUIV    0.3 mL    Inject 0.3 mLs (0.3 mg) into the muscle as needed for anaphylaxis    Bee sting reaction, accidental or unintentional, initial encounter       ketoconazole 2 % shampoo    NIZORAL    120 mL    SHAMPOO EVERY 2-3 DAYS AS  NEEDED    Dermatitis       oxybutynin 10 MG 24 hr tablet    DITROPAN XL    90 tablet    Take 1 tablet (10 mg) by mouth daily    Prostate CA (H)       pantoprazole 40 MG EC tablet    PROTONIX    90 tablet    TAKE 1 TABLET BY MOUTH ONCE DAILY    Gastroesophageal reflux disease without esophagitis       polyethylene glycol Packet    MIRALAX/GLYCOLAX     Take 1 packet by mouth daily        potassium chloride 10 MEQ tablet    K-TAB,KLOR-CON    180 tablet    Take 2 tablets (20 mEq) by mouth daily GIVE THE GENERIC: THIS IS NOT A REQUEST FOR BRAND NAME    Essential hypertension, benign

## 2017-11-02 NOTE — MR AVS SNAPSHOT
After Visit Summary   11/2/2017    Elier Barber    MRN: [id number]           Patient Information     Date Of Birth          1945        Visit Information        Provider Department      11/2/2017 1:00 PM Gary Tejada MD [facility] Neurology        Today's Diagnoses     Primary lateral sclerosis    -  1       Follow-ups after your visit        Your next 10 appointments already scheduled     Nov 02, 2017  3:00 PM CDT   LAB with  LAB   [facility] Lab ([facility])    [address] [place] [id number] [phone]           Please do not eat 10-12 hours before your appointment if you are coming in fasting for labs on lipids, cholesterol, or glucose (sugar). This does not apply to pregnant women. Water, hot tea and black coffee (with nothing added) are okay. Do not drink other fluids, diet soda or chew gum.            May 03, 2018  1:00 PM CDT   PFT VISIT with  PFL D   [facility] Pulmonary Function Testing ([facility])    [address] [place] [address] [phone]            May 03, 2018  1:45 PM CDT   (Arrive by 1:30 PM)   Return ALS/Motor Neuron with Gary Tejada MD   [facility] Neurology ([facility])    [address] [place] [address] [phone]              Who to contact     Please call your clinic at [phone] to:    Ask questions about your health    Make or cancel appointments    Discuss your medicines    Learn about your test results    Speak to your doctor   If you have compliments or concerns about an experience at your clinic, or if you wish to file a complaint, please contact [facility] Patient Relations at [phone] or email us at [email or web address]         Additional Information About Your Visit        MyChart Information     The [employer] gives you secure access to your electronic health  "record. If you see a primary care provider, you can also send messages to your care team and make appointments. If you have questions, please call your primary care clinic.  If you do not have a primary care provider, please call [phone] and they will assist you.      [employer] is an electronic gateway that provides easy, online access to your medical records. With [employer], you can request a clinic appointment, read your test results, renew a prescription or communicate with your care team.     To access your existing account, please contact your [facility] or call [phone] for assistance.        Care EveryWhere ID     This is your Care EveryWhere ID. This could be used by other organizations to access your [employer] medical records  [id number]        Your Vitals Were     Pulse Height Pulse Oximetry BMI (Body Mass Index)          65 1.778 m (5' 10\") 97% 24.54 kg/m2         Blood Pressure from Last 3 Encounters:   11/02/17 147/74   06/07/17 (!) 149/97   06/05/17 127/80    Weight from Last 3 Encounters:   11/02/17 77.6 kg (171 lb)   06/05/17 71.7 kg (158 lb)   05/25/17 70.3 kg (155 lb)              We Performed the Following     Albumin Random Urine Quantitative with Creat Ratio     Methylmalonic acid        Primary Care Provider Office Phone # Fax #    Lida Ray [phone]       [address] [facility] [address]  [facility] [address]        Equal Access to Services     [facility] : Hadii aad ku hadasho Soomaali, waaxda luqadaha, qaybta kaalmada adeegyada, jairo mendenhall . So [facility] [phone].    ATENCIÓN: Si habla español, tiene a gant disposición servicios gratuitos de asistencia lingüística. Llame al [phone].    We comply with applicable federal civil rights laws and [place] laws. We do not discriminate on the basis of race, color, national origin, age, disability, sex, sexual orientation, or gender identity.            Thank you!  "    Thank you for choosing [facility] NEUROLOGY  for your care. Our goal is always to provide you with excellent care. Hearing back from our patients is one way we can continue to improve our services. Please take a few minutes to complete the written survey that you may receive in the mail after your visit with us. Thank you!             Your Updated Medication List - Protect others around you: Learn how to safely use, store and throw away your medicines at www.disposemymeds.org.          This list is accurate as of: 11/2/17  2:56 PM.  Always use your most recent med list.                   Brand Name Dispense Instructions for use Diagnosis    ASPIRIN PO      Take 81 mg by mouth daily        baclofen (LIORESAL) in NaCl 0.9% 100 mL intrathecal infusion      by Intrathecal route continuous Pump filled by [facility] [phone] Pump Serial Number: [id number], Pump Model Number: 8637-20 Last fill:  Last week Next fill: October Low [place] Alarm Date: 10/13/17 Reservoir Volume: 19.2 ml Conc: 2000 mcg/ml Flex schedule delivers 266.5 mcg/day Basal rate 11 mcg/hr    For 3 hours starting at 0500 increases to 11.9 mcg/hr        enalapril 5 MG tablet    VASOTEC    90 tablet    Take 1 tablet (5 mg) by mouth daily    Essential hypertension, benign       EPINEPHrine 0.3 MG/0.3ML injection 2-pack    EPIPEN/ADRENACLICK/or ANY BX GENERIC EQUIV    0.3 mL    Inject 0.3 mLs (0.3 mg) into the muscle as needed for anaphylaxis    Bee sting reaction, accidental or unintentional, initial encounter       ketoconazole 2 % shampoo    NIZORAL    120 mL    SHAMPOO EVERY 2-3 DAYS AS  NEEDED    Dermatitis       oxybutynin 10 MG 24 hr tablet    DITROPAN XL    90 tablet    Take 1 tablet (10 mg) by mouth daily    Prostate CA (H)       pantoprazole 40 MG EC tablet    PROTONIX    90 tablet    TAKE 1 TABLET BY MOUTH ONCE DAILY    Gastroesophageal reflux disease without esophagitis       polyethylene glycol Packet     MIRALAX/GLYCOLAX     Take 1 packet by mouth daily        potassium chloride 10 MEQ tablet    K-TAB,KLOR-CON    180 tablet    Take 2 tablets (20 mEq) by mouth daily GIVE THE GENERIC: THIS IS NOT A REQUEST FOR BRAND NAME    Essential hypertension, benign

## 2017-11-02 NOTE — PROGRESS NOTES
[id number]  Answers for HPI/ROS submitted by the patient on 10/19/2017   General Symptoms: No  Skin Symptoms: Yes  HENT Symptoms: Yes  EYE SYMPTOMS: No  HEART SYMPTOMS: No  LUNG SYMPTOMS: No  INTESTINAL SYMPTOMS: Yes  URINARY SYMPTOMS: No  REPRODUCTIVE SYMPTOMS: No  SKELETAL SYMPTOMS: Yes  BLOOD SYMPTOMS: No  NERVOUS SYSTEM SYMPTOMS: No  MENTAL HEALTH SYMPTOMS: No  Changes in hair: No  Changes in moles/birth marks: No  Itching: No  Rashes: No  Changes in nails: No  Acne: No  Change in facial hair: No  Warts: No  Non-healing sores: Yes  Scarring: No  Flaking of skin: No  Color changes of hands/feet in cold : No  Sun sensitivity: No  Skin thickening: No  Ear pain: No  Ear discharge: No  Hearing loss: No  Tinnitus: No  Nosebleeds: No  Congestion: No  Sinus pain: No  Trouble swallowing: Yes   Voice hoarseness: No  Mouth sores: No  Sore throat: No  Tooth pain: Yes  Gum tenderness: No  Bleeding gums: No  Change in taste: No  Change in sense of smell: No  Dry mouth: No  Hearing aid used: No  Neck lump: No  Heart burn or indigestion: Yes  Nausea: No  Vomiting: No  Abdominal pain: No  Bloating: No  Constipation: No  Diarrhea: No  Blood in stool: No  Black stools: No  Rectal or Anal pain: No  Fecal incontinence: No  Rectal bleeding: No  Yellowing of skin or eyes: No  Vomit with blood: No  Change in stools: No  Hemorrhoids: No  Back pain: Yes  Muscle aches: No  Neck pain: Yes  Swollen joints: No  Joint pain: No  Bone pain: No  Muscle cramps: No  Muscle weakness: No  Joint stiffness: No  Bone fracture: No

## 2017-11-02 NOTE — LETTER
2017       RE: Elier Barber  [address] [facility] [address]     Dear Colleague,    Thank you for referring your patient, Elier Barber, to the [facility] NEUROLOGY at [facility]. Please see a copy of my visit note below.    [id number]      2017        RE: Elier De La Garza   MRN: [id number]   : 1945      Dear Doctors:      I saw Nader De La Garza with his wife, Kelli, in followup at the [facility] where he has been seen for years for evaluation and management of PLS.  Nader had worsening lower extremity function earlier this year which was ascribed to a lumbar disk herniation.  He chose to manage conservatively and has improved substantially.  Nader also choked on a sandwich earlier this month, requiring a Heimlich.  He otherwise denies dysphagia, however.  Nader denies orthopnea or nonrestorative sleep.      EXAMINATION:  Vital signs are normal.  Pulmonary function studies demonstrate acceptable values, although there has been a modest reduction in his MIP and FVC since prior values.  Speech is dysarthric and 70% comprehensible.  Tongue bulk is normal.  There is mild slowing of tongue movement and mild weakness of cranial muscles.  Examination of the extremities demonstrates mild increase in tone in the upper limbs.  Reflexes are preserved.  Strength is full except for mild weakness of right APB and FDI.  Timed vibration is 4 seconds at the malleoli and 0 at the toes.  Pinprick perception is preserved.      Nader's condition is stable.  I am obtaining a methylmalonic acid level as he has a stable mild reduction in vibration perception at the toes and has had repeatedly low normal vitamin B12 levels.  Nader will meet with other members of our multidisciplinary care team today and will return in 6 months.      Sincerely,       Gary Tejada MD        D: 2017 13:50   T: 2017 14:28   MT: AKA       Name:     ANIL MELGAR   MRN:      [id number]        Account:      [id number]   :      1945           Service Date: 2017      Document: [id number]        Again, thank you for allowing me to participate in the care of your patient.      Sincerely,    Gary Tejada MD    cc:   Bartolo Calvert MD   [facility]    [address] [place] [address] [address]   [place] [address]        Lida Ray MD   [facility]    [address]   [place] [address]

## 2017-11-02 NOTE — PROGRESS NOTES
Outpatient Speech Language Pathology Neurology Clinic Evaluation  Elier Barber YOB: 1945 [id number]    Visit Date: 11/2/2017    Age: 72 year old    Medical Diagnosis: Primary lateral sclerosis (PLS)    Date of Diagnosis: approx 2008    Referring MD: Dr Tejada    PMH: Refer to Medical Chart    Fall Risk:Refer to physician report.    Others at Clinic Visit:  Spouse Kelli    Living Situation:   With others    Patient Concerns/Goals:  Increased swallowing difficulty  Increased speech deficits    Observations: Patient seen with wife present. Both are pleasant and cooperative. He reports choking on a sandwich to extent that manual intervention was required. EAT10 score of 4/10 suggests he is at increased risk for aspiration/penetration.    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 He independently reports use of small bites/sips, extra chewing, and no talking while eating which appear to resolve issues. SLP educated re chin tuck to be used if needed in future. He reports pills are tolerated if he gives himself enough time. He completes oral cares BID, educated re importance and encouraged to continue. Recommend he continue regular solids  and thin liquids with mindful eating and monitoring for tolerance.    Speech Intelligibility/Functional Communication   Methods of communication: Verbal    Dysarthria: Moderate-severe    Speech:   Deficits in speech respiration- coughing noted with talking  Deficits in phonation- Strained-strangled quality (spastic)  Deficits in articulation- Decreased precision of articulation and Slow effortful rate  Intelligibility- approx 60% intelligible to this skilled listener in quiet environmnent. Wife reports he is typically clearer that he is during visit  He is noted to laugh throughout visit, both appropriately and inappropriately, laughing while talking reduced intelligibility    Speech Intelligibility/Communication:  Impacts daily activities, Impacts social activities and Impacts phone usage    Augmentative and Alternative Communication (AAC):   He reports he has an iPad with AAC apps but has never used.   SLP encouraged him to initiate use  Educated re use of nonverbal communication to supplement verbal speech    Clinical Impression/Plan of Care  Treatment Diagnosis: Oropharyngeal Dysphagia and Dysarthia     Recommendations:  Oral diet.  Continue use of compensatory strategies.  Continue compensatory speech strategies.  Initiate use of AAC device.    Plan of Care:  Evaluation only.    Goals: Based on today's evaluation session patient and/or caregiver will have understanding of current communication and/or swallowing status and recommendations for management.    Educational Assessment:  Learners- Patient  Barriers to Learning- No barriers.    Education provided/response:   Speech  Swallowing  AAC  Verbalized understanding    Risks and benefits of evaluation/treatment have been explained.  Patient, family and/or caregiver are in agreement with Plan of Care.    Total Treatment Time (sum of timed and untimed services): 35 minutes    Medicare G-code:  Swallowing  Swallowing: Current Status , Goal , Discharge    1 session only, modifier the same for all G-codes.  CI: 1-19% impairment  Modifier determined by clinical judgment in conjunction with objective data and subjective report.      Certification:  Onset date: 11/2/17  Start of care date: 11/2/2017  Certification date from 11/2/2017 to 11/2/17    I CERTIFY THE NEED FOR THESE SERVICES FURNISHED UNDER        THIS PLAN OF TREATMENT AND WHILE UNDER MY CARE     (Physician attestation of this document indicates review and certification of the therapy plan).

## 2017-11-04 LAB — METHYLMALONATE SERPL-SCNC: 0.25 UMOL/L (ref 0–0.4)

## 2017-11-05 DIAGNOSIS — C61 PROSTATE CA (H): ICD-10-CM

## 2017-11-06 ENCOUNTER — TELEPHONE (OUTPATIENT)
Dept: INTERNAL MEDICINE | Facility: CLINIC | Age: 72
End: 2017-11-06

## 2017-11-06 NOTE — TELEPHONE ENCOUNTER
Form received from [employer] for review and signature.  Put in Dr. Ray's in basket.

## 2017-11-06 NOTE — PROGRESS NOTES
2017      Lida Ray MD   [facility]    [address] [facility] [address]   [place] [address]      RE: Elier De La Garza   MRN: [id number]   : 1945      Dear Doctors:      I saw Nader De La Garza with his wife, Kelil, in followup at the [facility] where he has been seen for years for evaluation and management of PLS.  Nader had worsening lower extremity function earlier this year which was ascribed to a lumbar disk herniation.  He chose to manage conservatively and has improved substantially.  Nader also choked on a sandwich earlier this month, requiring a Heimlich.  He otherwise denies dysphagia, however.  Nader denies orthopnea or nonrestorative sleep.      EXAMINATION:  Vital signs are normal.  Pulmonary function studies demonstrate acceptable values, although there has been a modest reduction in his MIP and FVC since prior values.  Speech is dysarthric and 70% comprehensible.  Tongue bulk is normal.  There is mild slowing of tongue movement and mild weakness of cranial muscles.  Examination of the extremities demonstrates mild increase in tone in the upper limbs.  Reflexes are preserved.  Strength is full except for mild weakness of right APB and FDI.  Timed vibration is 4 seconds at the malleoli and 0 at the toes.  Pinprick perception is preserved.      Nader's condition is stable.  I am obtaining a methylmalonic acid level as he has a stable mild reduction in vibration perception at the toes and has had repeatedly low normal vitamin B12 levels.  Nader will meet with other members of our multidisciplinary care team today and will return in 6 months.      Sincerely,       Ninfa Cordoba MD      cc:   Bartolo Calvert MD   [facility]    [address] [place] [address] [address]   [place] [address]         NINFA CORDOBA MD             D: 2017 13:50   T: 2017 14:28   MT: KRISTINE      Name:     ELIER MELGAR   MRN:      [id number]         Account:      [id number]   :      1945           Service Date: 2017      Document: [id number]

## 2017-11-07 ENCOUNTER — MYC MEDICAL ADVICE (OUTPATIENT)
Dept: INTERNAL MEDICINE | Facility: CLINIC | Age: 72
End: 2017-11-07

## 2017-11-07 DIAGNOSIS — C61 PROSTATE CA (H): ICD-10-CM

## 2017-11-07 RX ORDER — OXYBUTYNIN CHLORIDE 10 MG/1
TABLET, EXTENDED RELEASE ORAL
Qty: 90 TABLET | Refills: 0 | Status: SHIPPED | OUTPATIENT
Start: 2017-11-07 | End: 2017-11-07

## 2017-11-07 RX ORDER — OXYBUTYNIN CHLORIDE 10 MG/1
10 TABLET, EXTENDED RELEASE ORAL DAILY
Qty: 90 TABLET | Refills: 2 | Status: SHIPPED | OUTPATIENT
Start: 2017-11-07 | End: 2018-05-24

## 2017-11-07 NOTE — TELEPHONE ENCOUNTER
Last OV 5/25/17.   When should pt return for OV?     BP Readings from Last 3 Encounters:   11/02/17 147/74   06/07/17 (!) 149/97   06/05/17 127/80     Potassium   Date Value Ref Range Status   06/05/2017 4.0 3.4 - 5.3 mmol/L Final   ]  Creatinine   Date Value Ref Range Status   11/02/2017 0.84 0.66 - 1.25 mg/dL Final   ]

## 2017-11-14 LAB
EXPTIME-PRE: 6.83 SEC
FEF2575-%PRED-PRE: 108 %
FEF2575-PRE: 2.5 L/SEC
FEF2575-PRED: 2.31 L/SEC
FEFMAX-%PRED-PRE: 100 %
FEFMAX-PRE: 8.06 L/SEC
FEFMAX-PRED: 8.04 L/SEC
FEV1-%PRED-PRE: 78 %
FEV1-PRE: 2.43 L
FEV1FEV6-PRE: 79 %
FEV1FEV6-PRED: 77 %
FEV1FVC-PRE: 81 %
FEV1FVC-PRED: 76 %
FIFMAX-PRE: 5.68 L/SEC
FVC-%PRED-PRE: 72 %
FVC-PRE: 2.99 L
FVC-PRED: 4.13 L
MEP-PRE: 60 CMH2O
MIP-PRE: -60 CMH2O

## 2017-11-21 ENCOUNTER — DOCUMENTATION ONLY (OUTPATIENT)
Dept: CARE COORDINATION | Facility: CLINIC | Age: 72
End: 2017-11-21

## 2017-11-21 NOTE — PROGRESS NOTES
[facility] now requests orders and shares plan of care/discharge summaries for some patients through [employer].  Please REPLY TO THIS MESSAGE in order to give authorization for orders when needed.  This is considered a verbal order, you will still receive a faxed copy of orders for signature.  Thank you for your assistance in improving collaboration for our patients.    ORDER  PT 1w9 for gait, strength, balance, transfers, flexibility    MD SUMMARY/PLAN OF CARE  patient slowly progressing back to baseline with ambulation and transfers, he continues to have flexed posture because if he stands up too straight his knee gives out on him.  patient is also limited due to muscle tone/tightness making mobility more difficult.  patient continues to see chiropractor to address back.  plan to continue therapy for strength, gait, trasnfers and overall mobility through private pay/loong term care insurance

## 2017-11-29 ENCOUNTER — TELEPHONE (OUTPATIENT)
Dept: INTERNAL MEDICINE | Facility: CLINIC | Age: 72
End: 2017-11-29

## 2017-11-29 NOTE — TELEPHONE ENCOUNTER
Fax received from [facility] for review and signature.  Put in Dr. Ray's in basket.

## 2017-11-30 ENCOUNTER — TELEPHONE (OUTPATIENT)
Dept: INTERNAL MEDICINE | Facility: CLINIC | Age: 72
End: 2017-11-30

## 2017-11-30 PROCEDURE — G0179 MD RECERTIFICATION HHA PT: HCPCS | Performed by: INTERNAL MEDICINE

## 2017-12-06 ENCOUNTER — TRANSFERRED RECORDS (OUTPATIENT)
Dept: HEALTH INFORMATION MANAGEMENT | Facility: CLINIC | Age: 72
End: 2017-12-06

## 2017-12-30 DIAGNOSIS — K21.9 GASTROESOPHAGEAL REFLUX DISEASE WITHOUT ESOPHAGITIS: ICD-10-CM

## 2018-01-05 RX ORDER — PANTOPRAZOLE SODIUM 40 MG/1
TABLET, DELAYED RELEASE ORAL
Qty: 90 TABLET | Refills: 1 | Status: SHIPPED | OUTPATIENT
Start: 2018-01-05 | End: 2018-08-25

## 2018-01-05 NOTE — TELEPHONE ENCOUNTER
Requested Prescriptions   Pending Prescriptions Disp Refills     pantoprazole (PROTONIX) 40 MG EC tablet [Pharmacy Med Name: PANTOPRAZOLE SOD DR 40 MG TAB] 90 tablet 1     Sig: TAKE 1 TABLET BY MOUTH ONCE DAILY    PPI Protocol Passed    12/30/2017 10:04 AM       Passed - Not on Clopidogrel (unless Pantoprazole ordered)       Passed - No diagnosis of osteoporosis on record       Passed - Recent or future visit with authorizing provider's specialty    Patient had office visit in the last year or has a visit in the next 30 days with authorizing provider.  See chart review.   Last OV: 05/25/17       Passed - Patient is age 18 or older        Prescription approved per [facility] Refill Protocol.

## 2018-01-26 ENCOUNTER — TELEPHONE (OUTPATIENT)
Dept: INTERNAL MEDICINE | Facility: CLINIC | Age: 73
End: 2018-01-26

## 2018-01-26 NOTE — TELEPHONE ENCOUNTER
[facility] nurse calling requesting verbal order on 60 day re-certification of Private Pay HHA and Private Pay Nurse, SNV.    Requesting for both HHA and SNV:  0-24 hours per day as requested by patient and family to increase/decrease services as they wish due to private pay. No change to services.    Provider please review and advise. Thank you.

## 2018-01-30 ENCOUNTER — TRANSFERRED RECORDS (OUTPATIENT)
Dept: HEALTH INFORMATION MANAGEMENT | Facility: CLINIC | Age: 73
End: 2018-01-30

## 2018-02-01 ENCOUNTER — TELEPHONE (OUTPATIENT)
Dept: INTERNAL MEDICINE | Facility: CLINIC | Age: 73
End: 2018-02-01

## 2018-02-01 NOTE — TELEPHONE ENCOUNTER
Fax received from [facility] for review and signature.  Put in Dr. Ray's in basket.

## 2018-02-06 ENCOUNTER — TELEPHONE (OUTPATIENT)
Dept: INTERNAL MEDICINE | Facility: CLINIC | Age: 73
End: 2018-02-06

## 2018-02-09 ENCOUNTER — TELEPHONE (OUTPATIENT)
Dept: INTERNAL MEDICINE | Facility: CLINIC | Age: 73
End: 2018-02-09

## 2018-02-09 DIAGNOSIS — Z53.9 DIAGNOSIS NOT YET DEFINED: Primary | ICD-10-CM

## 2018-02-09 PROCEDURE — 99207 C MD CERTIFICATION HHA PATIENT: CPT | Performed by: INTERNAL MEDICINE

## 2018-02-12 ENCOUNTER — OFFICE VISIT (OUTPATIENT)
Dept: FAMILY MEDICINE | Facility: CLINIC | Age: 73
End: 2018-02-12
Payer: COMMERCIAL

## 2018-02-12 VITALS
SYSTOLIC BLOOD PRESSURE: 138 MMHG | RESPIRATION RATE: 16 BRPM | DIASTOLIC BLOOD PRESSURE: 74 MMHG | TEMPERATURE: 97.7 F | OXYGEN SATURATION: 97 % | HEART RATE: 73 BPM | HEIGHT: 70 IN | WEIGHT: 171 LBS | BODY MASS INDEX: 24.48 KG/M2

## 2018-02-12 DIAGNOSIS — J20.9 ACUTE BRONCHITIS, UNSPECIFIED ORGANISM: Primary | ICD-10-CM

## 2018-02-12 PROCEDURE — 99213 OFFICE O/P EST LOW 20 MIN: CPT | Performed by: PHYSICIAN ASSISTANT

## 2018-02-12 RX ORDER — AZITHROMYCIN 250 MG/1
250 TABLET, FILM COATED ORAL DAILY
Qty: 6 TABLET | Refills: 0 | Status: SHIPPED | OUTPATIENT
Start: 2018-02-12 | End: 2018-05-03

## 2018-02-12 NOTE — PROGRESS NOTES
SUBJECTIVE:   Elier Barber is a 72 year old male who presents to clinic today for the following health issues:      Acute Illness- [facility] on Saturday, question about Ziashtyn, has high BP   Acute illness concerns: x 3 days  Onset: see below    Fever: no     Chills/Sweats: no     Headache (location?): no     Sinus Pressure:no    Conjunctivitis:  no    Ear Pain: no    Rhinorrhea: no     Congestion: no     Sore Throat: no      Cough: YES - dry, hard to get rid of the phlem    Wheeze: YES- chest is tight    Decreased Appetite: no     Nausea: no     Vomiting: no     Diarrhea:  no     Dysuria/Freq.: no     Fatigue/Achiness: no     Sick/Strep Exposure: no      Therapies Tried and outcome: wal tussin DM at night, wal-itin 24 hour alergy          Problem list and histories reviewed & adjusted, as indicated.  Additional history: as documented    Current Outpatient Prescriptions   Medication Sig Dispense Refill     azithromycin (ZITHROMAX) 250 MG tablet Take 1 tablet (250 mg) by mouth daily 6 tablet 0     pantoprazole (PROTONIX) 40 MG EC tablet TAKE 1 TABLET BY MOUTH ONCE DAILY 90 tablet 1     oxybutynin (DITROPAN-XL) 10 MG 24 hr tablet Take 1 tablet (10 mg) by mouth daily 90 tablet 2     ketoconazole (NIZORAL) 2 % shampoo SHAMPOO EVERY 2-3 DAYS AS  NEEDED 120 mL 5     potassium chloride (K-TAB,KLOR-CON) 10 MEQ tablet Take 2 tablets (20 mEq) by mouth daily GIVE THE GENERIC: THIS IS NOT A REQUEST FOR BRAND NAME 180 tablet 3     enalapril (VASOTEC) 5 MG tablet Take 1 tablet (5 mg) by mouth daily 90 tablet 3     polyethylene glycol (MIRALAX/GLYCOLAX) Packet Take 1 packet by mouth daily       EPINEPHrine 0.3 MG/0.3ML injection Inject 0.3 mLs (0.3 mg) into the muscle as needed for anaphylaxis 0.3 mL 3     ASPIRIN PO Take 81 mg by mouth daily        baclofen (LIORESAL) in NaCl 0.9% 100 mL intrathecal infusion by Intrathecal route continuous Pump filled by [facility] [phone]  Pump  "Serial Number: [id number], Pump Model Number: 8637-20  Last fill:  Last week  Next fill: October  Low [place] Alarm Date: 10/13/17  Reservoir Volume: 19.2 ml  Conc: 2000 mcg/ml  Flex schedule delivers 266.5 mcg/day  Basal rate 11 mcg/hr     For 3 hours starting at 0500 increases to 11.9 mcg/hr       BP Readings from Last 3 Encounters:   02/12/18 138/74   11/02/17 147/74   06/07/17 (!) 149/97    Wt Readings from Last 3 Encounters:   02/12/18 171 lb (77.6 kg)   11/02/17 171 lb (77.6 kg)   06/05/17 158 lb (71.7 kg)                    Reviewed and updated as needed this visit by clinical staff       Reviewed and updated as needed this visit by Provider         ROS:  Constitutional, HEENT, cardiovascular, pulmonary, gi and gu systems are negative, except as otherwise noted.    OBJECTIVE:                                                    /74 (BP Location: Right arm, Patient Position: Chair, Cuff Size: Adult Regular)  Pulse 73  Temp 97.7  F (36.5  C) (Oral)  Resp 16  Ht 5' 10\" (1.778 m)  Wt 171 lb (77.6 kg)  SpO2 97%  BMI 24.54 kg/m2  Body mass index is 24.54 kg/(m^2).  GENERAL APPEARANCE: healthy, alert and no distress  HENT: ear canals and TM's normal and nose and mouth without ulcers or lesions  RESP: lungs clear to auscultation - no rales, rhonchi or wheezes  CV: regular rates and rhythm, normal S1 S2, no S3 or S4 and no murmur, click or rub  LYMPHATICS: normal ant/post cervical and supraclavicular nodes    Diagnostic test results:  Diagnostic Test Results:  none      ASSESSMENT/PLAN:                                                    1. Acute bronchitis, unspecified organism  The patient is advised to push fluids, rest, gargle warm salt water, use acetaminophen, ibuprofen as needed and Return office visit if symptoms persist or worsen.    - azithromycin (ZITHROMAX) 250 MG tablet; Take 1 tablet (250 mg) by mouth daily  Dispense: 6 tablet; Refill: 0      See Patient Instructions    Caterina La" Aaseby-Aguilera, PA-C  [facility]

## 2018-02-12 NOTE — NURSING NOTE
"Chief Complaint   Patient presents with     Cough       Initial /74 (BP Location: Right arm, Patient Position: Chair, Cuff Size: Adult Regular)  Pulse 73  Temp 97.7  F (36.5  C) (Oral)  Resp 16  Ht 5' 10\" (1.778 m)  Wt 171 lb (77.6 kg)  SpO2 97%  BMI 24.54 kg/m2 Estimated body mass index is 24.54 kg/(m^2) as calculated from the following:    Height as of this encounter: 5' 10\" (1.778 m).    Weight as of this encounter: 171 lb (77.6 kg).  Medication Reconciliation: complete     Bayron Pace CMA      "

## 2018-02-12 NOTE — PATIENT INSTRUCTIONS
(J20.9) Acute bronchitis, unspecified organism  (primary encounter diagnosis)  Comment:   Plan: azithromycin (ZITHROMAX) 250 MG tablet        The patient is advised to push fluids, rest, gargle warm salt water, use vaporizer or mist needed , use acetaminophen, ibuprofen as needed and Return office visit if symptoms persist or worsen.

## 2018-02-12 NOTE — MR AVS SNAPSHOT
After Visit Summary   2/12/2018    Elier Barber    MRN: [id number]           Patient Information     Date Of Birth          1945        Visit Information        Provider Department      2/12/2018 2:00 PM Aaseby-Aguilera, Ramona Ann, PA-C [facility]        Today's Diagnoses     Acute bronchitis, unspecified organism    -  1      Care Instructions    (J20.9) Acute bronchitis, unspecified organism  (primary encounter diagnosis)  Comment:   Plan: azithromycin (ZITHROMAX) 250 MG tablet        The patient is advised to push fluids, rest, gargle warm salt water, use vaporizer or mist needed , use acetaminophen, ibuprofen as needed and Return office visit if symptoms persist or worsen.                Follow-ups after your visit        Your next 10 appointments already scheduled     May 03, 2018  9:30 AM CDT   PFT VISIT with  PFL SANTA   [facility] Pulmonary Function Testing ([facility])    [address] [place] [address] [id number] [phone]            May 03, 2018 10:15 AM CDT   (Arrive by 10:00 AM)   Return ALS/Motor [place] with Gary Tejada MD   [facility] Neurology ([facility])    [address] [place] [address] [id number] [phone]              Who to contact     If you have questions or need follow up information about today's clinic visit or your schedule please contact [facility] directly at [phone].  Normal or non-critical lab and imaging results will be communicated to you by MyChart, letter or phone within 4 business days after the clinic has received the results. If you do not hear from us within 7 days, please contact the clinic through MyChart or phone. If you have a critical or abnormal lab result, we will notify you by phone as soon as possible.  Submit refill requests through [employer] or call your pharmacy and they will forward the refill request to us. Please allow  "3 business days for your refill to be completed.          Additional Information About Your Visit        MyChart Information     [employer] gives you secure access to your electronic health record. If you see a primary care provider, you can also send messages to your care team and make appointments. If you have questions, please call your primary care clinic.  If you do not have a primary care provider, please call [phone] and they will assist you.        Care EveryWhere ID     This is your Care EveryWhere ID. This could be used by other organizations to access your [place] medical records  [id number]        Your Vitals Were     Pulse Temperature Respirations Height Pulse Oximetry BMI (Body Mass Index)    73 97.7  F (36.5  C) (Oral) 16 5' 10\" (1.778 m) 97% 24.54 kg/m2       Blood Pressure from Last 3 Encounters:   02/12/18 138/74   11/02/17 147/74   06/07/17 (!) 149/97    Weight from Last 3 Encounters:   02/12/18 171 lb (77.6 kg)   11/02/17 171 lb (77.6 kg)   06/05/17 158 lb (71.7 kg)              Today, you had the following     No orders found for display         Today's Medication Changes          These changes are accurate as of 2/12/18  2:43 PM.  If you have any questions, ask your nurse or doctor.               Start taking these medicines.        Dose/Directions    azithromycin 250 MG tablet   Commonly known as:  ZITHROMAX   Used for:  Acute bronchitis, unspecified organism   Started by:  Aaseby-Aguilera, Ramona Ann, PA-C        Dose:  250 mg   Take 1 tablet (250 mg) by mouth daily   Quantity:  6 tablet   Refills:  0            Where to get your medicines      These medications were sent to [facility]/pharmacy #[id number] - [place], [place] - [address] [facility]  [address] [facility] [address]    Hours:  [facility] [place] Phone:  [phone]     azithromycin 250 MG tablet                Primary Care Provider Office Phone # Fax #    Lida Ray [phone]       [address]  [facility] [address]        Equal Access to Services     [place] campus[place]ITCHIE : Hadii clair ku dipti Coleman, waaxda luqadaha, qaybta kaalmada delfina, jairo muhammadfunmilayodeion rabago. So [facility] [phone].    ATENCIÓN: Si habla español, tiene a gant disposición servicios gratuitos de asistencia lingüística. Keviname al [phone].    We comply with applicable federal civil rights laws and [place] laws. We do not discriminate on the basis of race, color, national origin, age, disability, sex, sexual orientation, or gender identity.            Thank you!     Thank you for choosing [facility]  for your care. Our goal is always to provide you with excellent care. Hearing back from our patients is one way we can continue to improve our services. Please take a few minutes to complete the written survey that you may receive in the mail after your visit with us. Thank you!             Your Updated Medication List - Protect others around you: Learn how to safely use, store and throw away your medicines at www.disposemymeds.org.          This list is accurate as of 2/12/18  2:43 PM.  Always use your most recent med list.                   Brand Name Dispense Instructions for use Diagnosis    ASPIRIN PO      Take 81 mg by mouth daily        azithromycin 250 MG tablet    ZITHROMAX    6 tablet    Take 1 tablet (250 mg) by mouth daily    Acute bronchitis, unspecified organism       baclofen (LIORESAL) in NaCl 0.9% 100 mL intrathecal infusion      by Intrathecal route continuous Pump filled by [facility] [phone] Pump Serial Number: [id number], Pump Model Number: 8637-20 Last fill:  Last week Next fill: October Low [place] Alarm Date: 10/13/17 Reservoir Volume: 19.2 ml Conc: 2000 mcg/ml Flex schedule delivers 266.5 mcg/day Basal rate 11 mcg/hr    For 3 hours starting at 0500 increases to 11.9 mcg/hr        enalapril 5 MG tablet    VASOTEC    90 tablet    Take 1 tablet  (5 mg) by mouth daily    Essential hypertension, benign       EPINEPHrine 0.3 MG/0.3ML injection 2-pack    EPIPEN/ADRENACLICK/or ANY BX GENERIC EQUIV    0.3 mL    Inject 0.3 mLs (0.3 mg) into the muscle as needed for anaphylaxis    Bee sting reaction, accidental or unintentional, initial encounter       ketoconazole 2 % shampoo    NIZORAL    120 mL    SHAMPOO EVERY 2-3 DAYS AS  NEEDED    Dermatitis       oxybutynin 10 MG 24 hr tablet    DITROPAN-XL    90 tablet    Take 1 tablet (10 mg) by mouth daily    Prostate CA (H)       pantoprazole 40 MG EC tablet    PROTONIX    90 tablet    TAKE 1 TABLET BY MOUTH ONCE DAILY    Gastroesophageal reflux disease without esophagitis       polyethylene glycol Packet    MIRALAX/GLYCOLAX     Take 1 packet by mouth daily        potassium chloride 10 MEQ tablet    K-TAB,KLOR-CON    180 tablet    Take 2 tablets (20 mEq) by mouth daily GIVE THE GENERIC: THIS IS NOT A REQUEST FOR BRAND NAME    Essential hypertension, benign

## 2018-02-22 ENCOUNTER — TRANSFERRED RECORDS (OUTPATIENT)
Dept: HEALTH INFORMATION MANAGEMENT | Facility: CLINIC | Age: 73
End: 2018-02-22

## 2018-03-27 ENCOUNTER — DOCUMENTATION ONLY (OUTPATIENT)
Dept: CARE COORDINATION | Facility: CLINIC | Age: 73
End: 2018-03-27

## 2018-03-27 NOTE — PROGRESS NOTES
[facility] now requests orders and shares plan of care/discharge summaries for some patients through [employer].  Please REPLY TO THIS MESSAGE in order to give authorization for orders when needed.  This is considered a verbal order, you will still receive a faxed copy of orders for signature.  Thank you for your assistance in improving collaboration for our patients.    ORDER hourly nurse and hourly home health aide 0 to 24 hours/day per patient family request. Supervision per agency protocol.    MD SUMMARY/PLAN OF CARE    Situation patient remains in private family home with his wife. Wife is primary caregiver, though assistance she must provide as primary caregiver are reported as less lately since patient is able to complete more adls/iadls since his back is not as stiff. Patient has a great sense of humor and positive outlook on disposition.  Background. Patient has a PLS, hx of prostate CA, vertigo, cerebral infarct, weakness of RLE, leg weakness  Assessment. Patient is at risk for falls d/t inability to complete all adls/iadls such as bathing/showering  Recommendation. Continue homecare for HN and HH 0 to 24 hours/day per patient/family request.    Note..unable to complete vital signs d/t equipment malfunction. Patient declined to have further attempts of v/s taken.

## 2018-04-04 ENCOUNTER — TELEPHONE (OUTPATIENT)
Dept: INTERNAL MEDICINE | Facility: CLINIC | Age: 73
End: 2018-04-04

## 2018-04-05 DIAGNOSIS — Z53.9 DIAGNOSIS NOT YET DEFINED: Primary | ICD-10-CM

## 2018-04-05 PROCEDURE — 99207 C MD CERTIFICATION HHA PATIENT: CPT | Performed by: INTERNAL MEDICINE

## 2018-04-10 ENCOUNTER — TRANSFERRED RECORDS (OUTPATIENT)
Dept: HEALTH INFORMATION MANAGEMENT | Facility: CLINIC | Age: 73
End: 2018-04-10

## 2018-04-19 ASSESSMENT — ENCOUNTER SYMPTOMS
BACK PAIN: 0
SKIN CHANGES: 0
MUSCLE CRAMPS: 0
NAIL CHANGES: 0
NECK PAIN: 0
STIFFNESS: 0
MUSCLE WEAKNESS: 0
MYALGIAS: 0
JOINT SWELLING: 0
POOR WOUND HEALING: 0
ARTHRALGIAS: 0

## 2018-05-03 ENCOUNTER — THERAPY VISIT (OUTPATIENT)
Dept: OCCUPATIONAL THERAPY | Facility: CLINIC | Age: 73
End: 2018-05-03

## 2018-05-03 ENCOUNTER — OFFICE VISIT (OUTPATIENT)
Dept: NEUROLOGY | Facility: CLINIC | Age: 73
End: 2018-05-03

## 2018-05-03 VITALS
HEIGHT: 70 IN | BODY MASS INDEX: 24.01 KG/M2 | OXYGEN SATURATION: 97 % | HEART RATE: 60 BPM | TEMPERATURE: 97.5 F | RESPIRATION RATE: 24 BRPM | DIASTOLIC BLOOD PRESSURE: 94 MMHG | SYSTOLIC BLOOD PRESSURE: 181 MMHG | WEIGHT: 167.7 LBS

## 2018-05-03 DIAGNOSIS — G12.23 PRIMARY LATERAL SCLEROSES (H): ICD-10-CM

## 2018-05-03 DIAGNOSIS — Z78.9 IMPAIRED INSTRUMENTAL ACTIVITIES OF DAILY LIVING (IADL): ICD-10-CM

## 2018-05-03 DIAGNOSIS — Z74.09 IMPAIRED MOBILITY AND ADLS: ICD-10-CM

## 2018-05-03 DIAGNOSIS — Z78.9 IMPAIRED MOBILITY AND ADLS: ICD-10-CM

## 2018-05-03 DIAGNOSIS — R13.12 OROPHARYNGEAL DYSPHAGIA: ICD-10-CM

## 2018-05-03 DIAGNOSIS — G12.23 PRIMARY LATERAL SCLEROSES (H): Primary | ICD-10-CM

## 2018-05-03 DIAGNOSIS — G12.23 PRIMARY LATERAL SCLEROSIS (H): Primary | ICD-10-CM

## 2018-05-03 PROBLEM — M79.89 SWELLING OF LOWER EXTREMITY: Status: ACTIVE | Noted: 2017-05-12

## 2018-05-03 PROBLEM — I82.4Z3: Status: ACTIVE | Noted: 2017-05-12

## 2018-05-03 PROBLEM — L57.0 MULTIPLE ACTINIC KERATOSES: Status: ACTIVE | Noted: 2017-05-12

## 2018-05-03 PROBLEM — R53.1 WEAKNESS: Status: ACTIVE | Noted: 2017-05-12

## 2018-05-03 ASSESSMENT — REVISED AMYOTROPHIC LATERAL SCLEROSIS FUNCTIONAL RATING SCALE (ALSFRS-R)
TURNING_IN_BED_AND_ADJUSTING_BED_CLOTHES: 3
SWALLOWING: EARLY EATING PROBLEMS - OCCASIONAL CHOKING
WALKING: WALKS WITH ASSISTANCE
SALIVATION: 3
TURNING_IN_BED_AND_ADJUSTING_BED_CLOTHES: SOMEWHAT SLOW AND CLUMSY, BUT NO HELP NEEDED
FINE_MOTOR_SUBTOTAL_SCORE: 9
CLIMBING_STAIRS: 1
DYSPNEA: 4
GROSS_MOTOR_SUBTOTAL_SCORE: 6
ORTHOPENA: 4
RESPIRATORY_INSUFFICIENCY: 4
SPEECH: 2
SIX_ITEM_SUBTOTAL: 17
SPEECH: INTELLIGIBLE WITH REPEATING
RESPIRATORY_SUBTOTAL_SCORE: 12
ALSFRS_TOTAL_SCORE: 35
SWALLOWING: 3
CUTTING_FOOD_AND_HANDLING_UTENSILES: 3
HANDWRITING: NORMAL
SALIVATION: SLIGHT BUT DEFINITE EXCESS OF SALIVA IN MOUTH; MAY HAVE NIGHTTIME DROOLING
DRESSING_AND_HYGEINE: INTERMITTENT ASSISTANCE OR SUBSTITUTE METHODS
DRESSING_AND_HYGEINE: 2
HANDWRITING: 4
CLIMBING_STAIRS: NEEDS ASSISTANCE
WALKING: 2
BULBAR_SUBTOTAL: 8

## 2018-05-03 ASSESSMENT — PAIN SCALES - GENERAL: PAINLEVEL: NO PAIN (0)

## 2018-05-03 NOTE — PROGRESS NOTES
[id number]  Answers for HPI/ROS submitted by the patient on 4/19/2018   General Symptoms: No  Skin Symptoms: Yes  HENT Symptoms: No  EYE SYMPTOMS: No  HEART SYMPTOMS: No  LUNG SYMPTOMS: No  INTESTINAL SYMPTOMS: No  URINARY SYMPTOMS: No  REPRODUCTIVE SYMPTOMS: No  SKELETAL SYMPTOMS: Yes  BLOOD SYMPTOMS: No  NERVOUS SYSTEM SYMPTOMS: No  MENTAL HEALTH SYMPTOMS: No  Changes in hair: No  Changes in moles/birth marks: No  Itching: No  Rashes: No  Changes in nails: No  Acne: No  Change in facial hair: No  Warts: No  Non-healing sores: No  Scarring: No  Flaking of skin: Yes  Color changes of hands/feet in cold : No  Sun sensitivity: No  Skin thickening: No  Back pain: No  Muscle aches: No  Neck pain: No  Swollen joints: No  Joint pain: No  Bone pain: No  Muscle cramps: No  Muscle weakness: No  Joint stiffness: No  Bone fracture: No

## 2018-05-03 NOTE — PROGRESS NOTES
OUTPATIENT OCCUPATIONAL THERAPY CLINIC NOTE    Type of visit:  Evaluation            Date of Service:  5/3/18 and last OT visit:  5/18/17     Referring provider: Dr. Gary Tejada    Others present at visit:  Spouse / significant other, Kelli    Medical diagnosis:   Primary lateral sclerosis (PLS)     Date of diagnosis: 2001     Pertinent medical history:  H/O BPPV with 2 recent episodes, stroke with L king June 2014; recent fall 5/2017 with LE DVT; baclofen pump  Additional Occupational Profile Information (patterns of daily living, interests, values and needs):  and retired with long history of PLS    Cardio-respiratory status:  FVC: 75%    Living environment:  House-uses basement 2x week and Kelli now helps patient on the stairs, pt side steps down with rail  Pt now has master bath remodeled with a emily shower and grab bars and fold down seat and also another seating area to dress once out of shower, Walk-in shower main level; grab bars and shower chair with shower door  Higher toilets and vanity near to hold; second bathroom with vanity he pulls forward on.    Living environment barriers:  2 stairs to enter (1 railing present)    Now has ramp from garage an to deck    Current assistance/living environment:  Lives with wife who assists with donning socks as needed/bathing       Current mobility equipment:  4 wheeled walker with seat in home  Scooter for distance , breaks down into 4 pieces so wife can transport in vehicle  Transport wheelchair    Current ADL equipment:  Shower/tub grab bar  Wall grab bar     Technology used: computer    Patient concerns/goals: Pt notes back pain gone now and mobility much improved with PT, no falls in recent months, needs to move frequently as stiffens up, has noted some new L foot catching with walking    Evaluation   Interview completed.   Pain assessment:  Pain denied    Range of motion:  L handed: WFL BUE AROM    Manual muscle testing: L UE: 5/5 shoulder and elbow; R  "UE: 4-5 shoulder and 5/5 elbow;  and lateral and palmar pinch are fairly strong and symmetrical, except R is weaker with pinch today    Cognition:   ALS CBS (ALS Cognitive Behavioral Screen) completed today:    Total Cognitive score (assessed by clinician): 12/20    *Cognitive scores ranging from 17-20 do not support the presence of clear cognitive impairment  *Scores below 16 raise suspicion of cognitive impairment with this suspicion increasing significantly for scores falling below 12 suggesting need for further evaluation  *Scores below 10 raise considerable suspicion for ALS-FTD or other dementia and suggests need for comprehensive evaluation    Cognitive screening comments/detail: 0/2 syllables, 6/8 anti-saccades; able to repeat up to 4 digits in reverse; one error with alternating digit/number; reduced word fluency 3 words in 1 min    Total Behavioral Score (completed by patient's caregiver):  44/45   ALS Caregiver Behavioral Questionnaire, completed by spouse    *Behavioral score of 36 or less suspicious of behavioral impairment that may be endorsed on comprehensive evaluation    *Behavioral score equal to or less than 32 suspicious of FTD (Frontotemporal dementia) and suggests need for comprehensive evaluation    Behavioral screening comments/details: wife answers small change in in less ability to deal with frustration/stress and answers no to all questions related to depression, anxiety, fatigue and lability    Dysarthric with slowed speech  ADL; sits to dress and ind with all; help to don socks and compression stockings from wife some days or takes increased time with am LE stiffness; uses built in seat to sit and shower and ind with other ADL per report. Wife notes that pt only showers and does stairs when she is home for fall prevention.  IADL: In past drives locally; no freeways, wife also drives. NT today    Foot Tap Test: 3 trials: 1: 10 taps 8 \" hitting barrier. Then two trials 10 taps in 6\" " "clearing barrier (norm is 10 taps in 6\" or less)  Fall Risk Screen:   Has the patient fallen 2 or more times in the last year? Yes,1-2 x/ in past year      Has the patient fallen and had an injury in the past year? Yes, notes no falls in recent 6 months       Timed Up and Go Score: NT today, is getting PT as an outpatient currently    Is the patient a fall risk? Yes, department fall risk interventions implemented.      Impairments:  Fatigue  Muscle atrophy  Coordination  dysathria  Balance  spasticity     Treatment diagnosis:  Impaired activities of daily living/IADL and mobility    Assessment of Occupational Performance: 1-3 Performance Deficits  Identified Performance Deficits (ie: feeding, social skills): functional amb with some L foot catching  Clinical Decision Making (Complexity): Low complexity     Recommendations/Plan of care:  Patient would benefit from interventions to enhance safety and independence.  Rehab potential good for stated goals.  Occupational therapy intervention for  self care/home management.  1 session evaluation & treatment.    Goals:   Target date: today  Patient, family and/or caregiver will verbalize understanding of evaluation results and implications for functional performance.  Patient, family and/or caregiver will verbalize/demonstrate understanding of compensatory methods /equipment to enhance functional independence and safety.  Patient, family and/or caregiver will verbalize energy management techniques appropriate for status and setting.      Educational assessment/barriers to learning:  No barriers noted      Treatment provided this date:   Self care/home management, 8 minutes of training in:  -purpose of [place] Foot up, pt will check with his local OP PT about trial of this  -monitor for R LE driving reaction time/foot control over time to assure safe driving and continue his self restriction of freeway/highway driving  -f/u on home remodel and using new accessibile shower " which is working well for pt      Response to treatment/recommendations:receptive    Goal attainment:  All goals met    Risks and benefits of evaluation/treatment have been explained.  Patient, family and/or caregiver are in agreement with Plan of Care.   ALS FRS-R (ALS Functional Rating Scale-Revised) completed today. Please see separate note.     Timed Code Treatment Minutes: 8  Total Treatment Time (sum of timed and untimed services): 23      Signature: JOANNA Spaulding, MSCS     Date:5/3/18    Certification: [employer] Advantage Plan     Medicare G-code:  Self Care  Current Status , Goal ,  Discharge   1 session only, modifier the same for all G-codes.  CI: 1-19% impairment  Modifier determined by clinical judgment in conjunction with objective data and subjective report.

## 2018-05-03 NOTE — MR AVS SNAPSHOT
After Visit Summary   5/3/2018    Elier Barber    MRN: [id number]           Patient Information     Date Of Birth          1945        Visit Information        Provider Department      5/3/2018 10:15 AM David Mane OT [facility] Occupational Therapy and Rehab        Today's Diagnoses     Primary lateral sclerosis    -  1    Impaired mobility and ADLs        Impaired instrumental activities of daily living (IADL)           Follow-ups after your visit        Your next 10 appointments already scheduled     May 04, 2018 12:40 PM CDT   [facility]    [address]  [facility] [id number] [phone]              Future tests that were ordered for you today     Open Future Orders        Priority Expected Expires Ordered    PHYSICAL THERAPY REFERRAL Routine 5/2/2018 5/2/2019 5/2/2018    OCCUPATIONAL THERAPY REFERRAL Routine 5/3/2018 5/2/2019 5/2/2018    SPEECH THERAPY REFERRAL Routine 5/8/2018 5/2/2019 5/2/2018            Who to contact     Please call your clinic at [phone] to:    Ask questions about your health    Make or cancel appointments    Discuss your medicines    Learn about your test results    Speak to your doctor            Additional Information About Your Visit        [employer] Information     [employer] gives you secure access to your electronic health record. If you see a primary care provider, you can also send messages to your care team and make appointments. If you have questions, please call your primary care clinic.  If you do not have a primary care provider, please call [phone] and they will assist you.      [employer] is an electronic gateway that provides easy, online access to your medical records. With [employer], you can request a clinic appointment, read your test results, renew a prescription or communicate with your care team.     To access your existing account, please contact  your [facility] or call [phone] for assistance.        Care EveryWhere ID     This is your Care EveryWhere ID. This could be used by other organizations to access your [facility] medical records  [id number]         Blood Pressure from Last 3 Encounters:   05/03/18 (!) 181/94   02/12/18 138/74   11/02/17 147/74    Weight from Last 3 Encounters:   05/03/18 76.1 kg (167 lb 11.2 oz)   02/12/18 77.6 kg (171 lb)   11/02/17 77.6 kg (171 lb)              Today, you had the following     No orders found for display       Primary Care Provider Office Phone # Fax #    Lida Ray [phone]       [address] [place] [address]        Equal Access to Services     [facility]ADDIE [facility] Hospital[facility] : Hadii clair fernandez hadasho Soomaali, waaxda luqadaha, qaybta kaalmada adeegyada, jairo mendenhall . So [place] [phone].    ATENCIÓN: Si habla español, tiene a gant disposición servicios gratuitos de asistencia lingüística. Nory al [phone].    We comply with applicable federal civil rights laws and [place] laws. We do not discriminate on the basis of race, color, national origin, age, disability, sex, sexual orientation, or gender identity.            Thank you!     Thank you for choosing [facility] OCCUPATIONAL THERAPY AND REHAB  for your care. Our goal is always to provide you with excellent care. Hearing back from our patients is one way we can continue to improve our services. Please take a few minutes to complete the written survey that you may receive in the mail after your visit with us. Thank you!             Your Updated Medication List - Protect others around you: Learn how to safely use, store and throw away your medicines at www.disposemymeds.org.          This list is accurate as of 5/3/18 11:45 AM.  Always use your most recent med list.                   Brand Name Dispense Instructions for use Diagnosis    ASPIRIN PO      Take 81 mg by mouth daily         baclofen (LIORESAL) in NaCl 0.9% 100 mL intrathecal infusion      by Intrathecal route continuous Pump filled by [facility] [phone] Pump Serial Number: [id number], Pump Model Number: 8637-20 Last fill:  Last week Next fill: October Low [place] Alarm Date: 10/13/17 Reservoir Volume: 19.2 ml Conc: 2000 mcg/ml Flex schedule delivers 266.5 mcg/day Basal rate 11 mcg/hr    For 3 hours starting at 0500 increases to 11.9 mcg/hr        enalapril 5 MG tablet    VASOTEC    90 tablet    Take 1 tablet (5 mg) by mouth daily    Essential hypertension, benign       EPINEPHrine 0.3 MG/0.3ML injection 2-pack    EPIPEN/ADRENACLICK/or ANY BX GENERIC EQUIV    0.3 mL    Inject 0.3 mLs (0.3 mg) into the muscle as needed for anaphylaxis    Bee sting reaction, accidental or unintentional, initial encounter       ketoconazole 2 % shampoo    NIZORAL    120 mL    SHAMPOO EVERY 2-3 DAYS AS  NEEDED    Dermatitis       oxybutynin 10 MG 24 hr tablet    DITROPAN-XL    90 tablet    Take 1 tablet (10 mg) by mouth daily    Prostate CA (H)       pantoprazole 40 MG EC tablet    PROTONIX    90 tablet    TAKE 1 TABLET BY MOUTH ONCE DAILY    Gastroesophageal reflux disease without esophagitis       polyethylene glycol Packet    MIRALAX/GLYCOLAX     Take 1 packet by mouth daily        potassium chloride 10 MEQ tablet    K-TAB,KLOR-CON    180 tablet    Take 2 tablets (20 mEq) by mouth daily GIVE THE GENERIC: THIS IS NOT A REQUEST FOR BRAND NAME    Essential hypertension, benign

## 2018-05-03 NOTE — PATIENT INSTRUCTIONS
Please check your blood pressure again at home. Let your PCP know the numbers.    You can reach our clinic by calling [phone], option #3 and ask for Flor in the ALS Clinic.    Please call the number above, option #1 to update your insurance information.    [facility] [phone]

## 2018-05-03 NOTE — MR AVS SNAPSHOT
After Visit Summary   5/3/2018    Elier Barber    MRN: [id number]           Patient Information     Date Of Birth          1945        Visit Information        Provider Department      5/3/2018 10:15 AM Gary Tejada MD [facility] Neurology        Today's Diagnoses     Primary lateral scleroses    -  1    Oropharyngeal dysphagia          Care Instructions    Please check your blood pressure again at home. Let your PCP know the numbers.    You can reach our clinic by calling [phone], option #3 and ask for Flor in the ALS Clinic.    Please call the number above, option #1 to update your insurance information.    [facility] [phone]          Follow-ups after your visit        Additional Services     PHYSICAL THERAPY REFERRAL       PT Clinician to evaluate and treat patient in ALS Clinic.            OCCUPATIONAL THERAPY REFERRAL       OT Clinician to evaluate and treat patient in ALS Clinic.            SPEECH THERAPY REFERRAL       Speech Language Pathologist to evaluate and treat patient in ALS Clinic.                  Your next 10 appointments already scheduled     May 04, 2018 12:40 PM CDT   PHYSICAL with Lida Ray MD   [facility]    [address] [facility] [id number] [phone]            Nov 01, 2018 10:00 AM CDT   PFT VISIT with  PFL D   [facility] Pulmonary Function Testing ([facility])    [address] [address] [phone]            Nov 01, 2018 10:15 AM CDT   (Arrive by 10:00 AM)   Return ALS/Motor [place] with Gary Tejada MD   [facility] Neurology ([facility])    [address] [id number] [phone]              Future tests that were ordered for you today     Open Future Orders        Priority Expected Expires Ordered    PHYSICAL THERAPY REFERRAL Routine 5/2/2018 5/2/2019 5/2/2018    OCCUPATIONAL  "THERAPY REFERRAL Routine 5/3/2018 5/2/2019 5/2/2018    SPEECH THERAPY REFERRAL Routine 5/8/2018 5/2/2019 5/2/2018            Who to contact     Please call your clinic at [phone] to:    Ask questions about your health    Make or cancel appointments    Discuss your medicines    Learn about your test results    Speak to your doctor            Additional Information About Your Visit        [employer] Information     [employer] gives you secure access to your electronic health record. If you see a primary care provider, you can also send messages to your care team and make appointments. If you have questions, please call your primary care clinic.  If you do not have a primary care provider, please call [phone] and they will assist you.      [employer] is an electronic gateway that provides easy, online access to your medical records. With [employer], you can request a clinic appointment, read your test results, renew a prescription or communicate with your care team.     To access your existing account, please contact your [facility] or call [phone] for assistance.        Care EveryWhere ID     This is your Care EveryWhere ID. This could be used by other organizations to access your [facility] medical records  [id number]        Your Vitals Were     Pulse Temperature Respirations Height Pulse Oximetry BMI (Body Mass Index)    60 97.5  F (36.4  C) (Oral) 24 1.778 m (5' 10\") 97% 24.06 kg/m2       Blood Pressure from Last 3 Encounters:   05/03/18 (!) 181/94   02/12/18 138/74   11/02/17 147/74    Weight from Last 3 Encounters:   05/03/18 76.1 kg (167 lb 11.2 oz)   02/12/18 77.6 kg (171 lb)   11/02/17 77.6 kg (171 lb)               Primary Care Provider Office Phone # Fax #    Lida Ray [phone]       [address] [place] [address]        Equal Access to Services     ZAINAB FORD AH: Hadii clair Coleman, waaxda luqadaha, qaybta kamiquel mathis, " jairo blandmikey mccarty'aan ah. So [facility] [phone].    ATENCIÓN: Si crescenciola elizabet, tiene a gant disposición servicios gratuitos de asistencia lingüística. Nory nugent [phone].    We comply with applicable federal civil rights laws and [place] laws. We do not discriminate on the basis of race, color, national origin, age, disability, sex, sexual orientation, or gender identity.            Thank you!     Thank you for choosing [facility] NEUROLOGY  for your care. Our goal is always to provide you with excellent care. Hearing back from our patients is one way we can continue to improve our services. Please take a few minutes to complete the written survey that you may receive in the mail after your visit with us. Thank you!             Your Updated Medication List - Protect others around you: Learn how to safely use, store and throw away your medicines at www.disposemymeds.org.          This list is accurate as of 5/3/18 12:27 PM.  Always use your most recent med list.                   Brand Name Dispense Instructions for use Diagnosis    ASPIRIN PO      Take 81 mg by mouth daily        baclofen (LIORESAL) in NaCl 0.9% 100 mL intrathecal infusion      by Intrathecal route continuous Pump filled by [facility] [phone] Pump Serial Number: [id number], Pump Model Number: 8637-20 Last fill:  Last week Next fill: October Low [place] Alarm Date: 10/13/17 Reservoir Volume: 19.2 ml Conc: 2000 mcg/ml Flex schedule delivers 266.5 mcg/day Basal rate 11 mcg/hr    For 3 hours starting at 0500 increases to 11.9 mcg/hr        enalapril 5 MG tablet    VASOTEC    90 tablet    Take 1 tablet (5 mg) by mouth daily    Essential hypertension, benign       EPINEPHrine 0.3 MG/0.3ML injection 2-pack    EPIPEN/ADRENACLICK/or ANY BX GENERIC EQUIV    0.3 mL    Inject 0.3 mLs (0.3 mg) into the muscle as needed for anaphylaxis    Bee sting reaction, accidental or unintentional, initial encounter        ketoconazole 2 % shampoo    NIZORAL    120 mL    SHAMPOO EVERY 2-3 DAYS AS  NEEDED    Dermatitis       oxybutynin 10 MG 24 hr tablet    DITROPAN-XL    90 tablet    Take 1 tablet (10 mg) by mouth daily    Prostate CA (H)       pantoprazole 40 MG EC tablet    PROTONIX    90 tablet    TAKE 1 TABLET BY MOUTH ONCE DAILY    Gastroesophageal reflux disease without esophagitis       polyethylene glycol Packet    MIRALAX/GLYCOLAX     Take 1 packet by mouth daily        potassium chloride 10 MEQ tablet    K-TAB,KLOR-CON    180 tablet    Take 2 tablets (20 mEq) by mouth daily GIVE THE GENERIC: THIS IS NOT A REQUEST FOR BRAND NAME    Essential hypertension, benign

## 2018-05-03 NOTE — LETTER
5/3/2018     RE: Elier Barber  [address] [facility] [address]     Dear Colleague,    Thank you for referring your patient, Elier Barber, to the [facility] NEUROLOGY at [facility]. Please see a copy of my visit note below.    [id number]  Answers for HPI/ROS submitted by the patient on 2018   General Symptoms: No  Skin Symptoms: Yes  HENT Symptoms: No  EYE SYMPTOMS: No  HEART SYMPTOMS: No  LUNG SYMPTOMS: No  INTESTINAL SYMPTOMS: No  URINARY SYMPTOMS: No  REPRODUCTIVE SYMPTOMS: No  SKELETAL SYMPTOMS: Yes  BLOOD SYMPTOMS: No  NERVOUS SYSTEM SYMPTOMS: No  MENTAL HEALTH SYMPTOMS: No  Changes in hair: No  Changes in moles/birth marks: No  Itching: No  Rashes: No  Changes in nails: No  Acne: No  Change in facial hair: No  Warts: No  Non-healing sores: No  Scarring: No  Flaking of skin: Yes  Color changes of hands/feet in cold : No  Sun sensitivity: No  Skin thickening: No  Back pain: No  Muscle aches: No  Neck pain: No  Swollen joints: No  Joint pain: No  Bone pain: No  Muscle cramps: No  Muscle weakness: No  Joint stiffness: No  Bone fracture: No      Service Date: 2018      Lida Ray MD   [facility]    [address] [place] [address]      RE: Elier De La Garza    MRN: [id number]   : 1945      Dear Dr. Ray:      I saw Nader De La Garza in followup at the [facility] where he has been seen for management of primary lateral sclerosis.  Nader reports there has been no substantial change in his condition since he was last seen.  His back problem is substantially better with chiropractic treatment and therapy.  He feels his baclofen pump dose is appropriate.  He does report less energy towards the end of the day.  He denies symptoms of nonrestorative sleep.  We reviewed his medication list and I see nothing that would likely be a substantial contributor.  He is on oxybutynin, but it  is at a low dose and has not changed in years.  His thyroid function was normal last year.  His weight is stable.      EXAMINATION:  Blood pressures are documented in the electronic medical record, but his initial systolic was in the 180s, which was performed immediately after pulmonary function studies.  It was repeated at the end of the visit and it was in the 160s.  He is on medication for hypertension.  He has no symptoms currently related to hypertension such as chest pain, dyspnea or headache.  Blood pressures are typically lower at home, according to Nader and Kelli.  He is seeing you tomorrow for a followup visit.      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  He reports no change in his dysphagia or weight.  Tongue movements are slow but tongue strength is within broad normal limits.  Extremity examination demonstrates mild-to-moderate increase in tone, greater on the left, and full strength throughout.      No interventions are indicated currently.  As noted, Nader will discuss his blood pressure with you.      Sincerely,        Ninfa Cordoba MD      ADDENDUM:  25 minutes, the majority in counseling and coordination of care.    NINFA CORDOBA MD        D: 2018   T: 2018   MT: KRISTINE    Name:     ANIL MELGAR   MRN:      [id number]        Account:      [id number]   :      1945           Service Date: 2018   Document: [id number]

## 2018-05-04 ENCOUNTER — OFFICE VISIT (OUTPATIENT)
Dept: INTERNAL MEDICINE | Facility: CLINIC | Age: 73
End: 2018-05-04
Payer: COMMERCIAL

## 2018-05-04 VITALS
HEART RATE: 68 BPM | OXYGEN SATURATION: 95 % | TEMPERATURE: 96.9 F | WEIGHT: 165.8 LBS | DIASTOLIC BLOOD PRESSURE: 84 MMHG | SYSTOLIC BLOOD PRESSURE: 138 MMHG | BODY MASS INDEX: 23.79 KG/M2

## 2018-05-04 DIAGNOSIS — K21.9 GASTROESOPHAGEAL REFLUX DISEASE WITHOUT ESOPHAGITIS: ICD-10-CM

## 2018-05-04 DIAGNOSIS — C61 PROSTATE CA (H): ICD-10-CM

## 2018-05-04 DIAGNOSIS — G12.23 PRIMARY LATERAL SCLEROSIS (H): ICD-10-CM

## 2018-05-04 DIAGNOSIS — I10 ESSENTIAL HYPERTENSION, BENIGN: Primary | ICD-10-CM

## 2018-05-04 DIAGNOSIS — R39.15 URINARY URGENCY: ICD-10-CM

## 2018-05-04 DIAGNOSIS — Z23 NEED FOR SHINGLES VACCINE: ICD-10-CM

## 2018-05-04 DIAGNOSIS — R60.9 EDEMA, UNSPECIFIED TYPE: ICD-10-CM

## 2018-05-04 PROBLEM — R53.1 WEAKNESS: Status: RESOLVED | Noted: 2017-05-12 | Resolved: 2018-05-04

## 2018-05-04 PROBLEM — M62.838 MUSCLE SPASTICITY: Status: ACTIVE | Noted: 2018-05-04

## 2018-05-04 PROBLEM — I82.4Z3: Status: RESOLVED | Noted: 2017-05-12 | Resolved: 2018-05-04

## 2018-05-04 PROBLEM — M79.89 SWELLING OF LOWER EXTREMITY: Status: RESOLVED | Noted: 2017-05-12 | Resolved: 2018-05-04

## 2018-05-04 PROBLEM — R29.898 WEAKNESS OF RIGHT LOWER EXTREMITY: Status: RESOLVED | Noted: 2017-06-05 | Resolved: 2018-05-04

## 2018-05-04 PROBLEM — R29.898 LEG WEAKNESS: Status: RESOLVED | Noted: 2017-06-06 | Resolved: 2018-05-04

## 2018-05-04 PROBLEM — L57.0 MULTIPLE ACTINIC KERATOSES: Status: RESOLVED | Noted: 2017-05-12 | Resolved: 2018-05-04

## 2018-05-04 PROCEDURE — 90471 IMMUNIZATION ADMIN: CPT | Performed by: INTERNAL MEDICINE

## 2018-05-04 PROCEDURE — 82043 UR ALBUMIN QUANTITATIVE: CPT | Performed by: INTERNAL MEDICINE

## 2018-05-04 PROCEDURE — 90750 HZV VACC RECOMBINANT IM: CPT | Performed by: INTERNAL MEDICINE

## 2018-05-04 PROCEDURE — 99214 OFFICE O/P EST MOD 30 MIN: CPT | Mod: 25 | Performed by: INTERNAL MEDICINE

## 2018-05-04 ASSESSMENT — ACTIVITIES OF DAILY LIVING (ADL)
I_NEED_ASSISTANCE_FOR_THE_FOLLOWING_DAILY_ACTIVITIES:: TRANSPORTATION
CURRENT_FUNCTION: TRANSPORTATION REQUIRES ASSISTANCE

## 2018-05-04 NOTE — PATIENT INSTRUCTIONS
Orthotics: [phone]    The oxybutinin comes in 15 mg if 20 is too much, or I can change to 20 mg if that helps, just let me know.

## 2018-05-04 NOTE — PROGRESS NOTES
SUBJECTIVE:   Elier Barber is a 72 year old male who presents to clinic today for the following health issues:      Hypertension Follow-up      Outpatient blood pressures:   It was very high at neurology clinic yesterday but that may be in part related to checking right after very significant exertion including spirometry and climbing up walking a long distance.    Low Salt Diet: no added salt      Amount of exercise or physical activity: PT and lifetime fitness-     Problems taking medications regularly: No    Medication side effects: none    Diet: low salt    Other problems:   1. PLS: He has had continued gradual decline in function, decreased stamina with activity.  2.  Acid reflux: He has been having some issues with this, has been taking pantoprazole every other day not daily.  3.  Keratoses of the legs: He may be started back on chemo cream in the fall.  He has been having some increasing edema overall and when he had the chemo cream in the past he had a lot of edema, slices very concerned about managing this.  4.  Urinary urgency: On oxybutynin without optimal control  5. CAP: stable     Current Concerns:   Questions about shingles vaccine.  He has had some hearing decrease, may be taking in for audiology    Patient Active Problem List   Diagnosis     Essential hypertension, benign     Primary lateral sclerosis (HCC)     Prostate CA (HCC)     CARDIOVASCULAR SCREENING; LDL GOAL LESS THAN 160     HCD (health care directive)     Vertigo     Cerebral infarction (H)     Advanced directives, counseling/discussion     Muscle spasticity       Current Outpatient Prescriptions   Medication Sig Dispense Refill     ASPIRIN PO Take 325 mg by mouth daily        baclofen (LIORESAL) in NaCl 0.9% 100 mL intrathecal infusion by Intrathecal route continuous Pump filled by [facility] [phone]  Pump Serial Number: [id number], Pump Model Number: 8637-20  Last fill:  Last week  Next fill:  October  Low [place] Alarm Date: 10/13/17  Reservoir Volume: 19.2 ml  Conc: 2000 mcg/ml  Flex schedule delivers 266.5 mcg/day  Basal rate 11 mcg/hr     For 3 hours starting at 0500 increases to 11.9 mcg/hr       enalapril (VASOTEC) 5 MG tablet Take 1 tablet (5 mg) by mouth daily 90 tablet 3     EPINEPHrine 0.3 MG/0.3ML injection Inject 0.3 mLs (0.3 mg) into the muscle as needed for anaphylaxis 0.3 mL 3     ketoconazole (NIZORAL) 2 % shampoo SHAMPOO EVERY 2-3 DAYS AS  NEEDED 120 mL 5     oxybutynin (DITROPAN-XL) 10 MG 24 hr tablet Take 1 tablet (10 mg) by mouth daily 90 tablet 2     pantoprazole (PROTONIX) 40 MG EC tablet TAKE 1 TABLET BY MOUTH ONCE DAILY 90 tablet 1     polyethylene glycol (MIRALAX/GLYCOLAX) Packet Take 1 packet by mouth daily       potassium chloride (K-TAB,KLOR-CON) 10 MEQ tablet Take 2 tablets (20 mEq) by mouth daily GIVE THE GENERIC: THIS IS NOT A REQUEST FOR BRAND NAME 180 tablet 3         Social History   Substance Use Topics     Smoking status: Former Smoker     Packs/day: 0.30     Years: 6.00     Types: Cigarettes     Start date: 4/1/1970     Quit date: 10/5/1976     Smokeless tobacco: Never Used     Alcohol use Yes      Comment: 1 drink/week          Reviewed and updated as needed this visit by clinical staff  Tobacco  Allergies  Meds  Med Hx  Surg Hx  Fam Hx  Soc Hx      Reviewed and updated as needed this visit by Provider         ROS:  No fever, chills, no dyspnea on exertion, no chest pain, palpitations, PND, orthopnea, edema, syncope, headache, abdominal pain     OBJECTIVE:     /84  Pulse 68  Temp 96.9  F (36.1  C) (Oral)  Wt 165 lb 12.8 oz (75.2 kg)  SpO2 95%  BMI 23.79 kg/m2  Body mass index is 23.79 kg/(m^2).    Lungs: clear  CV: normal S1, S2 without murmur, S3 or S4.   Trace edema       ASSESSMENT/PLAN:             1. Essential hypertension, benign  Blood pressure reading today is good, recommend some more outside readings at different times of day and call them  in.  - Albumin Random Urine Quantitative with Creat Ratio; Future      2. Prostate CA (HCC)  Stable without evidence of recurrence    3. Primary lateral sclerosis (HCC)  Slowly decreasing, continue follow-up with neurology    4. Gastroesophageal reflux disease without esophagitis  Recommend use medication daily, discussed how rebound can occur    5. Edema, unspecified type  Advised can use some diuretic if the edema becomes increased    6. Urinary urgency  Discussed dosing of the oxybutynin and he can start working up the dose gradually.  Can call for refill on the changed dose.    7. Need for shingles vaccine    - ZOSTER VACCINE RECOMBINANT ADJUVANTED IM NJX (SHINGRIX)        Lida Ray MD  [facility]

## 2018-05-04 NOTE — PROGRESS NOTES
Service Date: 2018      Lida Ray MD   [facility]    [address] [facility] [address]   [place] [address]      RE: Elier De La Garza    MRN: [id number]   : 1945      Dear Dr. Ray:      I saw Nader De La Garza in followup at the [facility] where he has been seen for management of primary lateral sclerosis.  Nader reports there has been no substantial change in his condition since he was last seen.  His back problem is substantially better with chiropractic treatment and therapy.  He feels his baclofen pump dose is appropriate.  He does report less energy towards the end of the day.  He denies symptoms of nonrestorative sleep.  We reviewed his medication list and I see nothing that would likely be a substantial contributor.  He is on oxybutynin, but it is at a low dose and has not changed in years.  His thyroid function was normal last year.  His weight is stable.      EXAMINATION:  Blood pressures are documented in the electronic medical record, but his initial systolic was in the 180s, which was performed immediately after pulmonary function studies.  It was repeated at the end of the visit and it was in the 160s.  He is on medication for hypertension.  He has no symptoms currently related to hypertension such as chest pain, dyspnea or headache.  Blood pressures are typically lower at home, according to Nader and Kelli.  He is seeing you tomorrow for a followup visit.      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  He reports no change in his dysphagia or weight.  Tongue movements are slow but tongue strength is within broad normal limits.  Extremity examination demonstrates mild-to-moderate increase in tone, greater on the left, and full strength throughout.      No interventions are indicated currently.  As  noted, Nader will discuss his blood pressure with you.      Sincerely,        Ninfa Cordoba MD      ADDENDUM:  25 minutes, the majority in counseling and coordination of care.         NINFA CORDOBA MD             D: 2018   T: 2018   MT: KRISTINE      Name:     ANIL MELGAR   MRN:      [id number]        Account:      [id number]   :      1945           Service Date: 2018      Document: [id number]

## 2018-05-04 NOTE — MR AVS SNAPSHOT
After Visit Summary   5/4/2018    Elier Barber    MRN: [id number]           Patient Information     Date Of Birth          1945        Visit Information        Provider Department      5/4/2018 12:40 PM Lida Ray MD [facility]        Care Instructions    Orthotics: [phone]    The oxybutinin comes in 15 mg if 20 is too much, or I can change to 20 mg if that helps, just let me know.           Follow-ups after your visit        Your next 10 appointments already scheduled     Nov 01, 2018 10:00 AM CDT   PFT VISIT with  PFL AMADOU   [facility] Pulmonary Function Testing ([facility])    [address] [address] [phone]            Nov 01, 2018 10:15 AM CDT   (Arrive by 10:00 AM)   Return ALS/Motor Neuron with Gary Tejada MD   [facility] Neurology ([facility])    [address] [place] [address] [phone]              Future tests that were ordered for you today     Open Future Orders        Priority Expected Expires Ordered    PHYSICAL THERAPY REFERRAL Routine 5/2/2018 5/2/2019 5/2/2018    OCCUPATIONAL THERAPY REFERRAL Routine 5/3/2018 5/2/2019 5/2/2018    SPEECH THERAPY REFERRAL Routine 5/8/2018 5/2/2019 5/2/2018            Who to contact     If you have questions or need follow up information about today's clinic visit or your schedule please contact [facility] directly at [phone].  Normal or non-critical lab and imaging results will be communicated to you by MyChart, letter or phone within 4 business days after the clinic has received the results. If you do not hear from us within 7 days, please contact the clinic through [employer] or phone. If you have a critical or abnormal lab result, we will notify you by phone as soon as possible.  Submit refill requests through [employer] or call your pharmacy and they will forward the refill request to  us. Please allow 3 business days for your refill to be completed.          Additional Information About Your Visit        MyChart Information     [employer] gives you secure access to your electronic health record. If you see a primary care provider, you can also send messages to your care team and make appointments. If you have questions, please call your primary care clinic.  If you do not have a primary care provider, please call [phone] and they will assist you.        Care EveryWhere ID     This is your Care EveryWhere ID. This could be used by other organizations to access your [place] medical records  [id number]        Your Vitals Were     Pulse Temperature Pulse Oximetry BMI (Body Mass Index)          68 96.9  F (36.1  C) (Oral) 95% 23.79 kg/m2         Blood Pressure from Last 3 Encounters:   05/04/18 138/84   05/03/18 (!) 181/94   02/12/18 138/74    Weight from Last 3 Encounters:   05/04/18 165 lb 12.8 oz (75.2 kg)   05/03/18 167 lb 11.2 oz (76.1 kg)   02/12/18 171 lb (77.6 kg)              Today, you had the following     No orders found for display       Primary Care Provider Office Phone # Fax #    Lida Ray [phone]       [address]  [facility] [address]        Equal Access to Services     [facility]: Hadii aad ku hadasho Soomaali, waaxda luqadaha, qaybta kaalmada adeegyada, jairo cortes haytigist mendenhall . So [facility] [phone].    ATENCIÓN: Si habla español, tiene a gant disposición servicios gratuitos de asistencia lingüística. Llame al [phone].    We comply with applicable federal civil rights laws and [place] laws. We do not discriminate on the basis of race, color, national origin, age, disability, sex, sexual orientation, or gender identity.            Thank you!     Thank you for choosing [facility]  for your care. Our goal is always to provide you with excellent care. Hearing back from our patients is one way we can  continue to improve our services. Please take a few minutes to complete the written survey that you may receive in the mail after your visit with us. Thank you!             Your Updated Medication List - Protect others around you: Learn how to safely use, store and throw away your medicines at www.disposemymeds.org.          This list is accurate as of 5/4/18  1:36 PM.  Always use your most recent med list.                   Brand Name Dispense Instructions for use Diagnosis    ASPIRIN PO      Take 325 mg by mouth daily        baclofen (LIORESAL) in NaCl 0.9% 100 mL intrathecal infusion      by Intrathecal route continuous Pump filled by [facility] [phone] Pump Serial Number: [id number], Pump Model Number: 8637-20 Last fill:  Last week Next fill: October Low [place] Alarm Date: 10/13/17 Reservoir Volume: 19.2 ml Conc: 2000 mcg/ml Flex schedule delivers 266.5 mcg/day Basal rate 11 mcg/hr    For 3 hours starting at 0500 increases to 11.9 mcg/hr        enalapril 5 MG tablet    VASOTEC    90 tablet    Take 1 tablet (5 mg) by mouth daily    Essential hypertension, benign       EPINEPHrine 0.3 MG/0.3ML injection 2-pack    EPIPEN/ADRENACLICK/or ANY BX GENERIC EQUIV    0.3 mL    Inject 0.3 mLs (0.3 mg) into the muscle as needed for anaphylaxis    Bee sting reaction, accidental or unintentional, initial encounter       ketoconazole 2 % shampoo    NIZORAL    120 mL    SHAMPOO EVERY 2-3 DAYS AS  NEEDED    Dermatitis       oxybutynin 10 MG 24 hr tablet    DITROPAN-XL    90 tablet    Take 1 tablet (10 mg) by mouth daily    Prostate CA (H)       pantoprazole 40 MG EC tablet    PROTONIX    90 tablet    TAKE 1 TABLET BY MOUTH ONCE DAILY    Gastroesophageal reflux disease without esophagitis       polyethylene glycol Packet    MIRALAX/GLYCOLAX     Take 1 packet by mouth daily        potassium chloride 10 MEQ tablet    K-TAB,KLOR-CON    180 tablet    Take 2 tablets (20 mEq) by mouth daily GIVE  THE GENERIC: THIS IS NOT A REQUEST FOR BRAND NAME    Essential hypertension, benign

## 2018-05-05 LAB
CREAT UR-MCNC: 148 MG/DL
MICROALBUMIN UR-MCNC: 10 MG/L
MICROALBUMIN/CREAT UR: 6.7 MG/G CR (ref 0–17)

## 2018-05-10 PROBLEM — K21.9 GASTROESOPHAGEAL REFLUX DISEASE WITHOUT ESOPHAGITIS: Status: ACTIVE | Noted: 2018-05-10

## 2018-05-10 PROBLEM — R60.9 EDEMA, UNSPECIFIED TYPE: Status: ACTIVE | Noted: 2018-05-10

## 2018-05-10 LAB
EXPTIME-PRE: 6.89 SEC
FEF2575-%PRED-PRE: 144 %
FEF2575-PRE: 3.3 L/SEC
FEF2575-PRED: 2.29 L/SEC
FEFMAX-%PRED-PRE: 77 %
FEFMAX-PRE: 6.16 L/SEC
FEFMAX-PRED: 7.99 L/SEC
FEV1-%PRED-PRE: 84 %
FEV1-PRE: 2.61 L
FEV1FEV6-PRE: 85 %
FEV1FEV6-PRED: 77 %
FEV1FVC-PRE: 83 %
FEV1FVC-PRED: 73 %
FIFMAX-PRE: 5.99 L/SEC
FVC-%PRED-PRE: 75 %
FVC-PRE: 3.13 L
FVC-PRED: 4.12 L
MEP-PRE: 65 CMH2O
MIP-PRE: -60 CMH2O

## 2018-05-24 ENCOUNTER — HOSPITAL ENCOUNTER (INPATIENT)
Facility: CLINIC | Age: 73
LOS: 2 days | Discharge: HOME | DRG: 065 | End: 2018-05-26
Attending: EMERGENCY MEDICINE | Admitting: INTERNAL MEDICINE
Payer: COMMERCIAL

## 2018-05-24 ENCOUNTER — APPOINTMENT (OUTPATIENT)
Dept: CT IMAGING | Facility: CLINIC | Age: 73
DRG: 065 | End: 2018-05-24
Attending: EMERGENCY MEDICINE
Payer: COMMERCIAL

## 2018-05-24 ENCOUNTER — APPOINTMENT (OUTPATIENT)
Dept: MRI IMAGING | Facility: CLINIC | Age: 73
DRG: 065 | End: 2018-05-24
Attending: EMERGENCY MEDICINE
Payer: COMMERCIAL

## 2018-05-24 DIAGNOSIS — I63.519 CEREBROVASCULAR ACCIDENT (CVA) DUE TO OCCLUSION OF MIDDLE CEREBRAL ARTERY, UNSPECIFIED BLOOD VESSEL LATERALITY (H): Primary | ICD-10-CM

## 2018-05-24 DIAGNOSIS — G12.23 PRIMARY LATERAL SCLEROSIS (H): ICD-10-CM

## 2018-05-24 DIAGNOSIS — M62.838 MUSCLE SPASTICITY: ICD-10-CM

## 2018-05-24 DIAGNOSIS — R29.898 WEAKNESS OF LEFT LOWER EXTREMITY: ICD-10-CM

## 2018-05-24 DIAGNOSIS — L03.116 LEFT LEG CELLULITIS: ICD-10-CM

## 2018-05-24 DIAGNOSIS — R53.1 GENERAL WEAKNESS: ICD-10-CM

## 2018-05-24 PROBLEM — I63.9 CVA (CEREBRAL VASCULAR ACCIDENT) (H): Status: ACTIVE | Noted: 2018-05-24

## 2018-05-24 LAB
ALBUMIN UR-MCNC: NEGATIVE MG/DL
ANION GAP SERPL CALCULATED.3IONS-SCNC: 11 MMOL/L (ref 3–14)
APPEARANCE UR: CLEAR
APTT PPP: 28 SEC (ref 22–37)
BASOPHILS # BLD AUTO: 0 10E9/L (ref 0–0.2)
BASOPHILS NFR BLD AUTO: 0.2 %
BILIRUB UR QL STRIP: NEGATIVE
BUN SERPL-MCNC: 18 MG/DL (ref 7–30)
CALCIUM SERPL-MCNC: 8.8 MG/DL (ref 8.5–10.1)
CHLORIDE SERPL-SCNC: 104 MMOL/L (ref 94–109)
CO2 SERPL-SCNC: 21 MMOL/L (ref 20–32)
COLOR UR AUTO: YELLOW
CREAT SERPL-MCNC: 0.97 MG/DL (ref 0.66–1.25)
DIFFERENTIAL METHOD BLD: ABNORMAL
EOSINOPHIL # BLD AUTO: 0 10E9/L (ref 0–0.7)
EOSINOPHIL NFR BLD AUTO: 0 %
ERYTHROCYTE [DISTWIDTH] IN BLOOD BY AUTOMATED COUNT: 13.5 % (ref 10–15)
GFR SERPL CREATININE-BSD FRML MDRD: 76 ML/MIN/1.7M2
GLUCOSE BLDC GLUCOMTR-MCNC: 145 MG/DL (ref 70–99)
GLUCOSE SERPL-MCNC: 148 MG/DL (ref 70–99)
GLUCOSE UR STRIP-MCNC: NEGATIVE MG/DL
HCT VFR BLD AUTO: 46.3 % (ref 40–53)
HGB BLD-MCNC: 15.6 G/DL (ref 13.3–17.7)
HGB UR QL STRIP: NEGATIVE
IMM GRANULOCYTES # BLD: 0.1 10E9/L (ref 0–0.4)
IMM GRANULOCYTES NFR BLD: 0.4 %
INR PPP: 1.23 (ref 0.86–1.14)
KETONES UR STRIP-MCNC: NEGATIVE MG/DL
LEUKOCYTE ESTERASE UR QL STRIP: NEGATIVE
LYMPHOCYTES # BLD AUTO: 0.5 10E9/L (ref 0.8–5.3)
LYMPHOCYTES NFR BLD AUTO: 2.3 %
MCH RBC QN AUTO: 30.5 PG (ref 26.5–33)
MCHC RBC AUTO-ENTMCNC: 33.7 G/DL (ref 31.5–36.5)
MCV RBC AUTO: 91 FL (ref 78–100)
MONOCYTES # BLD AUTO: 0.9 10E9/L (ref 0–1.3)
MONOCYTES NFR BLD AUTO: 4.3 %
NEUTROPHILS # BLD AUTO: 18.7 10E9/L (ref 1.6–8.3)
NEUTROPHILS NFR BLD AUTO: 92.8 %
NITRATE UR QL: NEGATIVE
NRBC # BLD AUTO: 0 10*3/UL
NRBC BLD AUTO-RTO: 0 /100
PH UR STRIP: 6 PH (ref 5–7)
PLATELET # BLD AUTO: 197 10E9/L (ref 150–450)
POTASSIUM SERPL-SCNC: 3.7 MMOL/L (ref 3.4–5.3)
RBC # BLD AUTO: 5.11 10E12/L (ref 4.4–5.9)
RBC #/AREA URNS AUTO: 2 /HPF (ref 0–2)
SODIUM SERPL-SCNC: 136 MMOL/L (ref 133–144)
SOURCE: NORMAL
SP GR UR STRIP: 1.01 (ref 1–1.03)
TROPONIN I SERPL-MCNC: <0.015 UG/L (ref 0–0.04)
UROBILINOGEN UR STRIP-MCNC: 0 MG/DL (ref 0–2)
WBC # BLD AUTO: 20.2 10E9/L (ref 4–11)
WBC #/AREA URNS AUTO: <1 /HPF (ref 0–5)

## 2018-05-24 PROCEDURE — 70498 CT ANGIOGRAPHY NECK: CPT

## 2018-05-24 PROCEDURE — 85730 THROMBOPLASTIN TIME PARTIAL: CPT | Performed by: EMERGENCY MEDICINE

## 2018-05-24 PROCEDURE — 81001 URINALYSIS AUTO W/SCOPE: CPT | Performed by: EMERGENCY MEDICINE

## 2018-05-24 PROCEDURE — 70450 CT HEAD/BRAIN W/O DYE: CPT | Mod: XS

## 2018-05-24 PROCEDURE — 93005 ELECTROCARDIOGRAM TRACING: CPT

## 2018-05-24 PROCEDURE — 12000007 ZZH R&B INTERMEDIATE

## 2018-05-24 PROCEDURE — 80048 BASIC METABOLIC PNL TOTAL CA: CPT | Performed by: EMERGENCY MEDICINE

## 2018-05-24 PROCEDURE — A9585 GADOBUTROL INJECTION: HCPCS | Performed by: RADIOLOGY

## 2018-05-24 PROCEDURE — 85610 PROTHROMBIN TIME: CPT | Performed by: EMERGENCY MEDICINE

## 2018-05-24 PROCEDURE — 87040 BLOOD CULTURE FOR BACTERIA: CPT | Performed by: INTERNAL MEDICINE

## 2018-05-24 PROCEDURE — 25000128 H RX IP 250 OP 636: Performed by: EMERGENCY MEDICINE

## 2018-05-24 PROCEDURE — 25000132 ZZH RX MED GY IP 250 OP 250 PS 637: Performed by: EMERGENCY MEDICINE

## 2018-05-24 PROCEDURE — 99285 EMERGENCY DEPT VISIT HI MDM: CPT | Mod: 25

## 2018-05-24 PROCEDURE — 25000128 H RX IP 250 OP 636: Performed by: RADIOLOGY

## 2018-05-24 PROCEDURE — 00000146 ZZHCL STATISTIC GLUCOSE BY METER IP

## 2018-05-24 PROCEDURE — 99207 ZZC CDG-MDM COMPONENT: MEETS LOW - DOWN CODED: CPT | Performed by: INTERNAL MEDICINE

## 2018-05-24 PROCEDURE — 99222 1ST HOSP IP/OBS MODERATE 55: CPT | Mod: AI | Performed by: INTERNAL MEDICINE

## 2018-05-24 PROCEDURE — 36415 COLL VENOUS BLD VENIPUNCTURE: CPT | Performed by: INTERNAL MEDICINE

## 2018-05-24 PROCEDURE — 84484 ASSAY OF TROPONIN QUANT: CPT | Performed by: EMERGENCY MEDICINE

## 2018-05-24 PROCEDURE — 96374 THER/PROPH/DIAG INJ IV PUSH: CPT

## 2018-05-24 PROCEDURE — 85025 COMPLETE CBC W/AUTO DIFF WBC: CPT | Performed by: EMERGENCY MEDICINE

## 2018-05-24 PROCEDURE — 70553 MRI BRAIN STEM W/O & W/DYE: CPT

## 2018-05-24 RX ORDER — PANTOPRAZOLE SODIUM 40 MG/1
40 TABLET, DELAYED RELEASE ORAL DAILY
Status: DISCONTINUED | OUTPATIENT
Start: 2018-05-25 | End: 2018-05-26 | Stop reason: HOSPADM

## 2018-05-24 RX ORDER — MORPHINE SULFATE 4 MG/ML
4 INJECTION, SOLUTION INTRAMUSCULAR; INTRAVENOUS ONCE
Status: COMPLETED | OUTPATIENT
Start: 2018-05-24 | End: 2018-05-24

## 2018-05-24 RX ORDER — OXYBUTYNIN CHLORIDE 5 MG/1
10 TABLET, EXTENDED RELEASE ORAL ONCE
Status: DISCONTINUED | OUTPATIENT
Start: 2018-05-24 | End: 2018-05-24

## 2018-05-24 RX ORDER — ONDANSETRON 2 MG/ML
4 INJECTION INTRAMUSCULAR; INTRAVENOUS EVERY 6 HOURS PRN
Status: DISCONTINUED | OUTPATIENT
Start: 2018-05-24 | End: 2018-05-26 | Stop reason: HOSPADM

## 2018-05-24 RX ORDER — CEFAZOLIN SODIUM 1 G/50ML
1 INJECTION, SOLUTION INTRAVENOUS EVERY 8 HOURS
Status: DISCONTINUED | OUTPATIENT
Start: 2018-05-25 | End: 2018-05-25

## 2018-05-24 RX ORDER — GADOBUTROL 604.72 MG/ML
7.5 INJECTION INTRAVENOUS ONCE
Status: COMPLETED | OUTPATIENT
Start: 2018-05-24 | End: 2018-05-24

## 2018-05-24 RX ORDER — CLOPIDOGREL BISULFATE 75 MG/1
300 TABLET ORAL ONCE
Status: COMPLETED | OUTPATIENT
Start: 2018-05-24 | End: 2018-05-24

## 2018-05-24 RX ORDER — OXYBUTYNIN CHLORIDE 10 MG/1
20 TABLET, EXTENDED RELEASE ORAL AT BEDTIME
COMMUNITY
End: 2018-07-16

## 2018-05-24 RX ORDER — CLOPIDOGREL BISULFATE 75 MG/1
75 TABLET ORAL DAILY
Status: DISCONTINUED | OUTPATIENT
Start: 2018-05-25 | End: 2018-05-26 | Stop reason: HOSPADM

## 2018-05-24 RX ORDER — ONDANSETRON 4 MG/1
4 TABLET, ORALLY DISINTEGRATING ORAL EVERY 6 HOURS PRN
Status: DISCONTINUED | OUTPATIENT
Start: 2018-05-24 | End: 2018-05-26 | Stop reason: HOSPADM

## 2018-05-24 RX ORDER — PANTOPRAZOLE SODIUM 40 MG/1
40 TABLET, DELAYED RELEASE ORAL ONCE
Status: COMPLETED | OUTPATIENT
Start: 2018-05-24 | End: 2018-05-24

## 2018-05-24 RX ORDER — CLOPIDOGREL BISULFATE 75 MG/1
300 TABLET ORAL DAILY
Status: DISCONTINUED | OUTPATIENT
Start: 2018-05-25 | End: 2018-05-24

## 2018-05-24 RX ORDER — CEPHALEXIN 500 MG/1
500 CAPSULE ORAL 4 TIMES DAILY
Qty: 28 CAPSULE | Refills: 0 | Status: SHIPPED | OUTPATIENT
Start: 2018-05-24 | End: 2018-05-26

## 2018-05-24 RX ORDER — CEPHALEXIN 500 MG/1
500 CAPSULE ORAL ONCE
Status: COMPLETED | OUTPATIENT
Start: 2018-05-24 | End: 2018-05-24

## 2018-05-24 RX ORDER — IOPAMIDOL 755 MG/ML
500 INJECTION, SOLUTION INTRAVASCULAR ONCE
Status: COMPLETED | OUTPATIENT
Start: 2018-05-24 | End: 2018-05-24

## 2018-05-24 RX ORDER — NALOXONE HYDROCHLORIDE 0.4 MG/ML
.1-.4 INJECTION, SOLUTION INTRAMUSCULAR; INTRAVENOUS; SUBCUTANEOUS
Status: DISCONTINUED | OUTPATIENT
Start: 2018-05-24 | End: 2018-05-26 | Stop reason: HOSPADM

## 2018-05-24 RX ORDER — ACETAMINOPHEN 325 MG/1
650 TABLET ORAL EVERY 4 HOURS PRN
Status: DISCONTINUED | OUTPATIENT
Start: 2018-05-24 | End: 2018-05-26 | Stop reason: HOSPADM

## 2018-05-24 RX ORDER — OXYBUTYNIN CHLORIDE 5 MG/1
20 TABLET, EXTENDED RELEASE ORAL ONCE
Status: COMPLETED | OUTPATIENT
Start: 2018-05-24 | End: 2018-05-24

## 2018-05-24 RX ORDER — SODIUM CHLORIDE 9 MG/ML
INJECTION, SOLUTION INTRAVENOUS CONTINUOUS
Status: DISCONTINUED | OUTPATIENT
Start: 2018-05-24 | End: 2018-05-25

## 2018-05-24 RX ORDER — CLINDAMYCIN PHOSPHATE 900 MG/50ML
900 INJECTION, SOLUTION INTRAVENOUS EVERY 8 HOURS
Status: DISCONTINUED | OUTPATIENT
Start: 2018-05-25 | End: 2018-05-25

## 2018-05-24 RX ADMIN — OXYBUTYNIN CHLORIDE 20 MG: 5 TABLET, EXTENDED RELEASE ORAL at 20:53

## 2018-05-24 RX ADMIN — GADOBUTROL 7.5 ML: 604.72 INJECTION INTRAVENOUS at 19:04

## 2018-05-24 RX ADMIN — CEPHALEXIN 500 MG: 500 CAPSULE ORAL at 21:02

## 2018-05-24 RX ADMIN — SODIUM CHLORIDE 80 ML: 9 INJECTION, SOLUTION INTRAVENOUS at 16:56

## 2018-05-24 RX ADMIN — IOPAMIDOL 70 ML: 755 INJECTION, SOLUTION INTRAVENOUS at 16:56

## 2018-05-24 RX ADMIN — PANTOPRAZOLE SODIUM 40 MG: 40 TABLET, DELAYED RELEASE ORAL at 20:54

## 2018-05-24 RX ADMIN — MORPHINE SULFATE 4 MG: 4 INJECTION INTRAVENOUS at 18:19

## 2018-05-24 RX ADMIN — CLOPIDOGREL 300 MG: 75 TABLET, FILM COATED ORAL at 22:26

## 2018-05-24 ASSESSMENT — ENCOUNTER SYMPTOMS
WEAKNESS: 1
NAUSEA: 0
SPEECH DIFFICULTY: 1
VOMITING: 0
NUMBNESS: 0

## 2018-05-24 ASSESSMENT — ACTIVITIES OF DAILY LIVING (ADL)
TOILETING: 1-->ASSISTIVE EQUIPMENT
TRANSFERRING: 1-->ASSISTIVE EQUIPMENT
AMBULATION: 1-->ASSISTIVE EQUIPMENT
FALL_HISTORY_WITHIN_LAST_SIX_MONTHS: NO
RETIRED_COMMUNICATION: 2-->DIFFICULTY SPEAKING (NOT RELATED TO LANGUAGE BARRIER)
BATHING: 1-->ASSISTIVE EQUIPMENT
RETIRED_EATING: 0-->INDEPENDENT
DRESS: 2-->ASSISTIVE PERSON
SWALLOWING: 0-->SWALLOWS FOODS/LIQUIDS WITHOUT DIFFICULTY
COGNITION: 0 - NO COGNITION ISSUES REPORTED
TRANSFERRING: 3 - ASSISTIVE EQUIPMENT AND PERSON

## 2018-05-24 NOTE — ED NOTES
MRI tech informed that RN unable to get a hold of [employer]. Phone number and pt's card sent down with pt to MRI.

## 2018-05-24 NOTE — ED TRIAGE NOTES
Pt here with ambulatory issues. Wife states pt can normally ambulate with a walker. Noticed since about midnight, pt unable to bear weight. Pt notes this is similar to his previous CVA, he had ambulatory issues. Pt no longer on Xerelto. Hx ALS. Wife states his Speech not quite as clear as normal. No facial droop, equal hand . ABC intact.

## 2018-05-24 NOTE — ED NOTES
[employer] faxing over outline settings for pt's stimulator. Representative states they are unable to fill cartridges, it is something pt's MD must do. Dr. Wilder and wife updated.

## 2018-05-24 NOTE — IP AVS SNAPSHOT
[place] [address] [facility]    [address]    [facility] [address]    Phone:  [phone]    Fax:  [phone]                                       After Visit Summary   5/24/2018    Elier Barber    MRN: [id number]           After Visit Summary Signature Page     I have received my discharge instructions, and my questions have been answered. I have discussed any challenges I see with this plan with the nurse or doctor.    ..........................................................................................................................................  Patient/Patient Representative Signature      ..........................................................................................................................................  Patient Representative Print Name and Relationship to Patient    ..................................................               ................................................  Date                                            Time    ..........................................................................................................................................  Reviewed by Signature/Title    ...................................................              ..............................................  Date                                                            Time

## 2018-05-24 NOTE — IP AVS SNAPSHOT
MRN:[id number]                      After Visit Summary   5/24/2018    Elier Barber    MRN: [id number]           Thank you!     Thank you for choosing [facility] for your care. Our goal is always to provide you with excellent care. Hearing back from our patients is one way we can continue to improve our services. Please take a few minutes to complete the written survey that you may receive in the mail after you visit. If you would like to speak to someone directly about your visit please contact Patient Relations at [phone]. Thank you!          Patient Information     Date Of Birth          1945        Designated Caregiver       Most Recent Value    Caregiver    Will someone help with your care after discharge? yes    Name of designated caregiver Kelli    Phone number of caregiver In chart    Caregiver address Same as pt      About your hospital stay     You were admitted on:  May 24, 2018 You last received care in the:  [place] [address] [facility]    You were discharged on:  May 26, 2018       Who to Call     For medical emergencies, please call 911.  For non-urgent questions about your medical care, please call your primary care provider or clinic, [phone]          Attending Provider     Provider Specialty    Ricardo Wilder MD Emergency Medicine    Jose A Do MD Internal Medicine       Primary Care Provider Office Phone # Fax #    Lida Ray [phone]      After Care Instructions     Activity       Your activity upon discharge: activity as tolerated            Diet       Follow this diet upon discharge:      Regular Diet Adult                  Follow-up Appointments     Follow-up and recommended labs and tests        See primary MD in 5-7 days for routine follow up after hospital stay    Resume Aspirin after Plavix is completed                  Your next 10 appointments already scheduled     Nov 01, 2018 10:00 AM TEAT   PFT  "VISIT with  PFSARAH TOBAR   [facility] Pulmonary Function Testing ([facility])    [address]  [address] [facility] [id number] [phone]            Nov 01, 2018 11:00 AM CDT   (Arrive by 10:45 AM)   Return ALS/Motor [place] with Gary Tejada MD   [facility] Neurology ([facility])    [address] [id number] [phone]              Additional Services     Occupational Therapy Referral       *This therapy referral will be filtered to a centralized scheduling office at [facility] and the patient will receive a call to schedule an appointment at a [place] location most convenient for them. *     [facility] provides Occupational Therapy evaluation and treatment and many specialty services across the [place] system.  If requesting a specialty program, please choose from the list below.    If you have not heard from the scheduling office within 2 business days, please call [phone] for all locations, with the exception of [place], please call [phone] and [facility], please call [phone]    Treatment: Evaluation & Treatment  Special Instructions/Modalities: none  Special Programs: Cognition Assessment  and None    Please be aware that coverage of these services is subject to the terms and limitations of your health insurance plan.  Call member services at your health plan with any benefit or coverage questions.      **Note to Provider:  If you are referring outside of [place] for the therapy appointment, please list the name of the location in the \"special instructions\" above, print the referral and give to the patient to schedule the appointment.            PHYSICAL THERAPY REFERRAL       *This therapy referral will be filtered to a centralized scheduling office at [facility] and the patient will receive a call to schedule an appointment at a " "[place] location most convenient for them. *     [facility] provides Physical Therapy evaluation and treatment and many specialty services across the [place] system.  If requesting a specialty program, please choose from the list below.    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  Special Programs: Neuro re-ed following new CVA with hx of PLS    Please be aware that coverage of these services is subject to the terms and limitations of your health insurance plan.  Call member services at your health plan with any benefit or coverage questions.      **Note to Provider:  If you are referring outside of [place] for the therapy appointment, please list the name of the location in the \"special instructions\" above, print the referral and give to the patient to schedule the appointment.            Physical Therapy Referral       *This therapy referral will be filtered to a centralized scheduling office at [facility] and the patient will receive a call to schedule an appointment at a [place] location most convenient for them. *     [facility] provides Physical Therapy evaluation and treatment and many specialty services across the [place] system.  If requesting a specialty program, please choose from the list below.    If you have not heard from the scheduling office within 2 business days, please call [phone] for all locations, with the exception of [place], please call [phone] and [place], please call [phone]  Treatment: Evaluation & Treatment  Special " "Instructions/Modalities: none  Special Programs: Balance/Vestibular  None    Please be aware that coverage of these services is subject to the terms and limitations of your health insurance plan.  Call member services at your health plan with any benefit or coverage questions.      **Note to Provider:  If you are referring outside of [place] for the therapy appointment, please list the name of the location in the \"special instructions\" above, print the referral and give to the patient to schedule the appointment.            Speech Therapy Referral       *This therapy referral will be filtered to a centralized scheduling office at [facility] and the patient will receive a call to schedule an appointment at a [place] location most convenient for them. *     [facility] provides Speech Therapy evaluation and treatment and many specialty services across the [place] system.  If requesting a specialty program, please choose from the list below.  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  Call member services at your health plan with any benefit or coverage questions.      **Note to Provider:  If you are referring outside of [place] for the therapy appointment, please list the name of the location in the \"special instructions\" above, print the referral and give to the patient to schedule the appointment.                  Further instructions from your care team       Please make an appointment to follow up with your primary care " provider in 2-3 days if not improving.        Discharge Instructions for Cellulitis  You have been diagnosed with cellulitis. This is an infection in the deepest layer of the skin. In some cases, the infection also affects the muscle. Cellulitis is caused by bacteria. The bacteria can enter the body through broken skin. This can happen with a cut, scratch, animal bite, or an insect bite that has been scratched. You may have been treated in the hospital with antibiotics and fluids. You will likely be given a prescription for antibiotics to take at home. This sheet will help you take care of yourself at home.  Home care  When you are home:    Take the prescribed antibiotic medicine you are given as directed until it is gone. Take it even if you feel better. It treats the infection and stops it from returning. Not taking all the medicine can make future infections hard to treat.    Keep the infected area clean.    When possible, raise the infected area above the level of your heart. This helps keep swelling down.    Talk with your healthcare provider if you are in pain. Ask what kind of over-the-counter medicine you can take for pain.    Apply clean bandages as advised.    Take your temperature once a day for a week.    Wash your hands often to prevent spreading the infection.  In the future, wash your hands before and after you touch cuts, scratches, or bandages. This will help prevent infection.   When to call your healthcare provider  Call your healthcare provider immediately if you have any of the following:    Difficulty or pain when moving the joints above or below the infected area    Discharge or pus draining from the area    Fever of 100.4 F (38 C) or higher, or as directed by your healthcare provider    Pain that gets worse in or around the infected     Redness that gets worse in or around the infected area, particularly if the area of redness expands to a wider area    Shaking chills    Swelling of the  infected area    Vomiting   Date Last Reviewed: 8/1/2016 2000-2017 [employer]. [address] [place] [address]. All rights reserved. This information is not intended as a substitute for professional medical care. Always follow your healthcare professional's instructions.          Weakness with Uncertain Cause  Based on your exam today, the exact cause of your weakness is not certain. But your weakness does not seem to be a sign of a serious illness at this time. Keep an eye on your symptoms and get medical advice as instructed below.  Home care    Rest at home today. Don't over-exert yourself.    Take any medicine as prescribed.    For the next few days, drink extra fluids (unless your healthcare provider wants you to restrict fluids for other reasons). Don't skip meals.    Unless otherwise directed, continue to take any prescription medicines.    Contact your healthcare provider if you have any questions or concerns.  Follow-up care  Follow up with your healthcare provider, or as advised.  When to seek medical advice  Call your healthcare provider right away for any of the following:    Symptoms get worse    Symptoms don't start getting better within 2 days    Fever of 100.4  F (38  C) or higher, or as directed by your healthcare provider  Call 911  Call 911 for any of these:    Chest, arm, neck, jaw, or upper back pain    Trouble breathing    Numbness or weakness of the face, one arm, or one leg    Slurred speech, confusion, or trouble speaking, walking, or seeing    Blood in vomit or stool (black or red color)    Loss of consciousness    Severe headache  Date Last Reviewed: 10/1/2017    1300-4388 [employer]. [address] [place] [address]. All rights reserved. This information is not intended as a substitute for professional medical care. Always follow your healthcare professional's instructions.            Pending Results     Date and Time Order Name Status  Description    5/24/2018 2333 Blood culture Preliminary     5/24/2018 2333 Blood culture Preliminary             Statement of Approval     Ordered          05/26/18 0959  I have reviewed and agree with all the recommendations and orders detailed in this document.  EFFECTIVE NOW     Approved and electronically signed by:  Timur Sam MD             Admission Information     Date & Time Provider Department Dept. Phone    5/24/2018 Jose A Do MD [place] [address] [facility] [phone]      Your Vitals Were     Blood Pressure Temperature Respirations Pulse Oximetry          122/71 (BP Location: Left arm) 98.5  F (36.9  C) (Oral) 16 93%        MyChart Information     [employer] gives you secure access to your electronic health record. If you see a primary care provider, you can also send messages to your care team and make appointments. If you have questions, please call your primary care clinic.  If you do not have a primary care provider, please call [phone] and they will assist you.        Care EveryWhere ID     This is your Care EveryWhere ID. This could be used by other organizations to access your [place] medical records  [id number]        Equal Access to Services     [facility] : Hadii clair Coleman, wajacksonda zeynep, qaybta kaaljairo burrell. So [facility] [phone].    ATENCIÓN: Si habla español, tiene a gant disposición servicios gratuitos de asistencia lingüística. Nory al [phone].    We comply with applicable federal civil rights laws and [place] laws. We do not discriminate on the basis of race, color, national origin, age, disability, sex, sexual orientation, or gender identity.               Review of your medicines      START taking        Dose / Directions    atorvastatin 20 MG tablet   Commonly known as:  LIPITOR        Dose:  20 mg   Take 1 tablet (20 mg) by mouth daily   Quantity:  30 tablet   Refills:  1        cephALEXin 500 MG capsule   Commonly known as:  KEFLEX   Used for:  Left leg cellulitis        Dose:  500 mg   Take 1 capsule (500 mg) by mouth 4 times daily   Quantity:  28 capsule   Refills:  0       clopidogrel 75 MG tablet   Commonly known as:  PLAVIX        Dose:  75 mg   Start taking on:  5/27/2018   Take 1 tablet (75 mg) by mouth daily for 6 days   Quantity:  6 tablet   Refills:  0         CONTINUE these medicines which may have CHANGED, or have new prescriptions. If we are uncertain of the size of tablets/capsules you have at home, strength may be listed as something that might have changed.        Dose / Directions    aspirin 325 MG tablet   This may have changed:    - medication strength  - additional instructions        Dose:  325 mg   Take 1 tablet (325 mg) by mouth daily Resume this mediciation in 1 week after you complete Plavix   Quantity:  300 tablet   Refills:  0         CONTINUE these medicines which have NOT CHANGED        Dose / Directions    baclofen (LIORESAL) in NaCl 0.9% 100 mL intrathecal infusion        by Intrathecal route continuous Pump filled by [facility] stroke [place] [phone] Pump Model: [employer] Last fill:  1/30/2018 Next fill: 5/29/18 [place] Alarm Date: 5/29/18 Reservoir Volume: unknown Conc: 2000 mcg/ml Flex schedule delivers 266.5 mcg/day Basal rate:unknown, pt states basal rate increased from 05:00-08:00   Refills:  0       enalapril 5 MG tablet   Commonly known as:  VASOTEC   Used for:  Essential hypertension, benign        Dose:  5 mg   Take 1 tablet (5 mg) by mouth daily   Quantity:  90 tablet   Refills:  3       EPINEPHrine 0.3 MG/0.3ML injection 2-pack   Commonly known as:  EPIPEN/ADRENACLICK/or ANY BX GENERIC EQUIV   Used for:  Bee sting reaction, accidental or unintentional, initial encounter        Dose:  0.3 mg   Inject 0.3 mLs (0.3 mg) into the muscle as needed for anaphylaxis   Quantity:  0.3 mL   Refills:  3        ketoconazole 2 % shampoo   Commonly known as:  NIZORAL   Used for:  Dermatitis        SHAMPOO EVERY 2-3 DAYS AS  NEEDED   Quantity:  120 mL   Refills:  5       oxybutynin 10 MG 24 hr tablet   Commonly known as:  DITROPAN-XL        Dose:  20 mg   Take 20 mg by mouth At Bedtime   Refills:  0       pantoprazole 40 MG EC tablet   Commonly known as:  PROTONIX   Used for:  Gastroesophageal reflux disease without esophagitis        TAKE 1 TABLET BY MOUTH ONCE DAILY   Quantity:  90 tablet   Refills:  1       polyethylene glycol Packet   Commonly known as:  MIRALAX/GLYCOLAX        Dose:  1 packet   Take 1 packet by mouth every other day   Refills:  0       potassium chloride 10 MEQ tablet   Commonly known as:  K-TAB,KLOR-CON   Used for:  Essential hypertension, benign        Dose:  20 mEq   Take 2 tablets (20 mEq) by mouth daily GIVE THE GENERIC: THIS IS NOT A REQUEST FOR BRAND NAME   Quantity:  180 tablet   Refills:  3            Where to get your medicines      These medications were sent to [facility] [address] [address] [address]     Phone:  [phone]     atorvastatin 20 MG tablet    cephALEXin 500 MG capsule    clopidogrel 75 MG tablet         Some of these will need a paper prescription and others can be bought over the counter. Ask your nurse if you have questions.     You don't need a prescription for these medications     aspirin 325 MG tablet                Protect others around you: Learn how to safely use, store and throw away your medicines at www.disposemymeds.org.        ANTIBIOTIC INSTRUCTION     You've Been Prescribed an Antibiotic - Now What?  Your healthcare team thinks that you or your loved one might have an infection. Some infections can be treated with antibiotics, which are powerful, life-saving drugs. Like all medications, antibiotics have side effects and should only be used when necessary. There are some important things  you should know about your antibiotic treatment.      Your healthcare team may run tests before you start taking an antibiotic.    Your team may take samples (e.g., from your blood, urine or other areas) to run tests to look for bacteria. These test can be important to determine if you need an antibiotic at all and, if you do, which antibiotic will work best.      Within a few days, your healthcare team might change or even stop your antibiotic.    Your team may start you on an antibiotic while they are working to find out what is making you sick.    Your team might change your antibiotic because test results show that a different antibiotic would be better to treat your infection.    In some cases, once your team has more information, they learn that you do not need an antibiotic at all. They may find out that you don't have an infection, or that the antibiotic you're taking won't work against your infection. For example, an infection caused by a virus can't be treated with antibiotics. Staying on an antibiotic when you don't need it is more likely to be harmful than helpful.      You may experience side effects from your antibiotic.    Like all medications, antibiotics have side effects. Some of these can be serious.    Let you healthcare team know if you have any known allergies when you are admitted to the hospital.    One significant side effect of nearly all antibiotics is the risk of severe and sometimes deadly diarrhea caused by Clostridium difficile (C. Difficile). This occurs when a person takes antibiotics because some good germs are destroyed. Antibiotic use allows C. diificile to take over, putting patients at high risk for this serious infection.    As a patient or caregiver, it is important to understand your or your loved one's antibiotic treatment. It is especially important for caregivers to speak up when patients can't speak for themselves. Here are some important questions to ask your healthcare  team.    What infection is this antibiotic treating and how do you know I have that infection?    What side effects might occur from this antibiotic?    How long will I need to take this antibiotic?    Is it safe to take this antibiotic with other medications or supplements (e.g., vitamins) that I am taking?     Are there any special directions I need to know about taking this antibiotic? For example, should I take it with food?    How will I be monitored to know whether my infection is responding to the antibiotic?    What tests may help to make sure the right antibiotic is prescribed for me?      Information provided by:  www.cdc.gov/getsmart  U.S. Department of Health and Human Services  Centers for disease Control and Prevention  National Center for Emerging and Zoonotic Infectious Diseases  Division of Healthcare Quality Promotion             Medication List: This is a list of all your medications and when to take them. Check marks below indicate your daily home schedule. Keep this list as a reference.      Medications           Morning Afternoon Evening Bedtime As Needed    aspirin 325 MG tablet   Take 1 tablet (325 mg) by mouth daily Resume this mediciation in 1 week after you complete Plavix                                atorvastatin 20 MG tablet   Commonly known as:  LIPITOR   Take 1 tablet (20 mg) by mouth daily   Last time this was given:  20 mg on 5/25/2018  7:34 PM                                baclofen (LIORESAL) in NaCl 0.9% 100 mL intrathecal infusion   by Intrathecal route continuous Pump filled by [facility] stroke [place] [phone] Pump Model: [employer] Last fill:  1/30/2018 Next fill: 5/29/18 [place] Alarm Date: 5/29/18 [place]erv[place] Volume: unknown Conc: 2000 mcg/ml Flex schedule delivers 266.5 mcg/day Basal rate:unknown, pt states basal rate increased from 05:00-08:00                                cephALEXin 500 MG capsule   Commonly known as:  KEFLEX    Take 1 capsule (500 mg) by mouth 4 times daily   Last time this was given:  500 mg on 5/24/2018  9:02 PM                                clopidogrel 75 MG tablet   Commonly known as:  PLAVIX   Take 1 tablet (75 mg) by mouth daily for 6 days   Start taking on:  5/27/2018   Last time this was given:  75 mg on 5/26/2018  8:43 AM                                enalapril 5 MG tablet   Commonly known as:  VASOTEC   Take 1 tablet (5 mg) by mouth daily                                EPINEPHrine 0.3 MG/0.3ML injection 2-pack   Commonly known as:  EPIPEN/ADRENACLICK/or ANY BX GENERIC EQUIV   Inject 0.3 mLs (0.3 mg) into the muscle as needed for anaphylaxis                                ketoconazole 2 % shampoo   Commonly known as:  NIZORAL   SHAMPOO EVERY 2-3 DAYS AS  NEEDED                                oxybutynin 10 MG 24 hr tablet   Commonly known as:  DITROPAN-XL   Take 20 mg by mouth At Bedtime   Last time this was given:  20 mg on 5/24/2018  8:53 PM                                pantoprazole 40 MG EC tablet   Commonly known as:  PROTONIX   TAKE 1 TABLET BY MOUTH ONCE DAILY   Last time this was given:  40 mg on 5/26/2018  8:43 AM                                polyethylene glycol Packet   Commonly known as:  MIRALAX/GLYCOLAX   Take 1 packet by mouth every other day   Last time this was given:  17 g on 5/25/2018  8:54 PM                                potassium chloride 10 MEQ tablet   Commonly known as:  K-TAB,KLOR-CON   Take 2 tablets (20 mEq) by mouth daily GIVE THE GENERIC: THIS IS NOT A REQUEST FOR BRAND NAME                                          More Information        Patient Education    Cephalexin Hydrochloride Oral tablet    Cephalexin Monohydrate Oral capsule    Cephalexin Monohydrate Oral suspension    Cephalexin Monohydrate Oral tablet  Cephalexin Hydrochloride Oral tablet  What is this medicine?  CEPHALEXIN (sef a SHERRY in) is a cephalosporin antibiotic. It is used to treat certain kinds of bacterial  infections It will not work for colds, flu, or other viral infections.  This medicine may be used for other purposes; ask your health care provider or pharmacist if you have questions.  What should I tell my health care provider before I take this medicine?  They need to know if you have any of these conditions:    kidney disease    stomach or intestine problems, especially colitis    an unusual or allergic reaction to cephalexin, other cephalosporins, penicillins, other antibiotics, medicines, foods, dyes or preservatives    pregnant or trying to get pregnant    breast-feeding  How should I use this medicine?  Take this medicine by mouth with a full glass of water. Follow the directions on the prescription label. This medicine can be taken with or without food. Take your medicine at regular intervals. Do not take your medicine more often than directed. Take all of your medicine as directed even if you think you are better. Do not skip doses or stop your medicine early.  Talk to your pediatrician regarding the use of this medicine in children. While this drug may be prescribed for selected conditions, precautions do apply.  Overdosage: If you think you have taken too much of this medicine contact a poison control center or emergency room at once.  NOTE: This medicine is only for you. Do not share this medicine with others.  What if I miss a dose?  If you miss a dose, take it as soon as you can. If it is almost time for your next dose, take only that dose. Do not take double or extra doses. There should be at least 4 to 6 hours between doses.  What may interact with this medicine?    probenecid    some other antibiotics  This list may not describe all possible interactions. Give your health care provider a list of all the medicines, herbs, non-prescription drugs, or dietary supplements you use. Also tell them if you smoke, drink alcohol, or use illegal drugs. Some items may interact with your medicine.  What should I  watch for while using this medicine?  Tell your doctor or health care professional if your symptoms do not begin to improve in a few days.  Do not treat diarrhea with over the counter products. Contact your doctor if you have diarrhea that lasts more than 2 days or if it is severe and watery.  If you have diabetes, you may get a false-positive result for sugar in your urine. Check with your doctor or health care professional.  What side effects may I notice from receiving this medicine?  Side effects that you should report to your doctor or health care professional as soon as possible:    allergic reactions like skin rash, itching or hives, swelling of the face, lips, or tongue    breathing problems    pain or trouble passing urine    redness, blistering, peeling or loosening of the skin, including inside the mouth    severe or watery diarrhea    unusually weak or tired    yellowing of the eyes, skin  Side effects that usually do not require medical attention (report to your doctor or health care professional if they continue or are bothersome):    gas or heartburn    genital or anal irritation    headache    joint or muscle pain    nausea, vomiting  This list may not describe all possible side effects. Call your doctor for medical advice about side effects. You may report side effects to FDA at 5-265-FDA-9279.  Where should I keep my medicine?  Keep out of the reach of children.  Store at room temperature between 59 and 86 degrees F (15 and 30 degrees C). Throw away any unused medicine after the expiration date.  NOTE:This sheet is a summary. It may not cover all possible information. If you have questions about this medicine, talk to your doctor, pharmacist, or health care provider. Copyright  2016 [employer]                Discharge Instructions for Cellulitis  You have been diagnosed with cellulitis. This is an infection in the deepest layer of the skin. In some cases, the infection also affects the muscle.  Cellulitis is caused by bacteria. The bacteria can enter the body through broken skin. This can happen with a cut, scratch, animal bite, or an insect bite that has been scratched. You may have been treated in the hospital with antibiotics and fluids. You will likely be given a prescription for antibiotics to take at home. This sheet will help you take care of yourself at home.  Home care  When you are home:    Take the prescribed antibiotic medicine you are given as directed until it is gone. Take it even if you feel better. It treats the infection and stops it from returning. Not taking all the medicine can make future infections hard to treat.    Keep the infected area clean.    When possible, raise the infected area above the level of your heart. This helps keep swelling down.    Talk with your healthcare provider if you are in pain. Ask what kind of over-the-counter medicine you can take for pain.    Apply clean bandages as advised.    Take your temperature once a day for a week.    Wash your hands often to prevent spreading the infection.  In the future, wash your hands before and after you touch cuts, scratches, or bandages. This will help prevent infection.   When to call your healthcare provider  Call your healthcare provider immediately if you have any of the following:    Difficulty or pain when moving the joints above or below the infected area    Discharge or pus draining from the area    Fever of 100.4 F (38 C) or higher, or as directed by your healthcare provider    Pain that gets worse in or around the infected     Redness that gets worse in or around the infected area, particularly if the area of redness expands to a wider area    Shaking chills    Swelling of the infected area    Vomiting   Date Last Reviewed: 8/1/2016 2000-2017 [employer]. [address] [place] [address], [place] [address]. All rights reserved. This information is not intended as a substitute for professional medical care.  Always follow your healthcare professional's instructions.

## 2018-05-24 NOTE — ED PROVIDER NOTES
"  History     Chief Complaint:  Leg weakness     HPI   Elier Barber is a 72 year old male with history of stroke and DVT who presents with his spouse for evaluation of leg weakness. The wife states she noticed that the patient has been \"acting differently\" since 0200 this morning and has been more tired today. He has been experiencing increased bilateral leg weakness. He can normally walk around with the assistance of his walker but he was unable to do so this morning. The patient notes this is similar to his previous stroke which prompted him to come to the ED this afternoon. Here, the patient does have speech difficulty but wife notes this is at his baseline. Wife denies nausea, vomiting, numbness, or any additional symptoms.     Allergies:  Bee venom   Penicillins     Medications:    ASPIRIN PO  baclofen (LIORESAL) in NaCl 0.9% 100 mL intrathecal infusion  enalapril (VASOTEC) 5 MG tablet  EPINEPHrine 0.3 MG/0.3ML injection  ketoconazole (NIZORAL) 2 % shampoo  oxybutynin (DITROPAN-XL) 10 MG 24 hr tablet  pantoprazole (PROTONIX) 40 MG EC tablet  polyethylene glycol (MIRALAX/GLYCOLAX) Packet  potassium chloride (K-TAB,KLOR-CON) 10 MEQ tablet     Past Medical History:    Hyperlipidemia   Prostate cancer   Hypertension   Lateral sclerosis   CVA  GERD  Edema  Basal cell carcinoma  Hoarseness of voice   Lumbar radiculopathy     Past Surgical History:    Hernia repair x2  Biopsy leg   Prostatectomy  Colonoscopy   T + A     Family History:    CHF - mother  Hypertension - mother    Social History:  Smoking status: Former smoker  Alcohol use: Yes   Marital Status:        Review of Systems   Gastrointestinal: Negative for nausea and vomiting.   Neurological: Positive for speech difficulty (chronic) and weakness. Negative for numbness.   All other systems reviewed and are negative.    Physical Exam   First Vitals:   BP Heart Rate Resp SpO2   05/24/18 2030 119/71 80 - 95 %   05/24/18 2015 - - - 96 %   05/24/18 2000 " 134/75 76 - 92 %   05/24/18 1945 135/73 81 - 92 %   05/24/18 1930 132/76 82 - 94 %   05/24/18 1815 121/67 - - -   05/24/18 1800 127/62 - - -   05/24/18 1745 136/62 - - -   05/24/18 1730 127/72 80 - 96 %   05/24/18 1715 132/82 - 28 96 %   05/24/18 1706 (!) 110/96 98 18 95 %   05/24/18 1700 134/75 92 25 96 %      Physical Exam   HENT:   Head: Atraumatic.   Right Ear: External ear normal.   Left Ear: External ear normal.   Nose: Nose normal.   Eyes: Conjunctivae, EOM and lids are normal. Pupils are equal, round, and reactive to light. No scleral icterus.   Neck: Neck supple. No tracheal deviation present.   Cardiovascular: Regular rhythm and intact distal pulses.    Pulmonary/Chest: Breath sounds normal. No respiratory distress.   Abdominal: Soft. There is no tenderness. There is no rebound and no guarding.   Musculoskeletal:   1+ bilateral lower extremity edema left lower extremity slightly worse than the right lower extremity.  Left lower extremity distal tibia there is chronic dry scaly appearing skin with associated blanching erythema anteriorly there is 2 superficial open sores asymmetric warmth is present no significant tenderness or   Neurological:   Alert speech at baseline according to patient and wife will to name simple objects oriented to person place and time.      5 out of 5 strength bilateral upper and lower extremities sensation intact all 4 extremities    MAEE, no gross focal motor or sensory deficit   Skin: Skin is warm and dry. He is not diaphoretic.   Psychiatric: He has a normal mood and affect.   Nursing note and vitals reviewed.      National [employer] Stroke Scale  Exam Interval: Baseline   Score    Level of consciousness: (0)   Alert, keenly responsive    LOC questions: (0)   Answers both questions correctly    LOC commands: (0)   Performs both tasks correctly    Best gaze: (0)   Normal    Visual: (0)   No visual loss    Facial palsy: (0)   Normal symmetrical movements    Motor arm  (left): (0)   No drift    Motor arm (right): (0)   No drift    Motor leg (left): (0)   No drift    Motor leg (right): (0)   No drift    Limb ataxia: (0)   Absent    Sensory: (0)   Normal- no sensory loss    Best language: (0)   Normal- no aphasia    Dysarthria: (0)   Normal    Extinction and inattention: (0)   No abnormality        Total Score:  0     Emergency Department Course   ECG (20:57:06):  Rate 84 bpm. OK interval 168. QRS duration 88. QT/QTc 376/444. P-R-T axes 83 28 15. Sinus rhythm with premature atrial complexes. Otherwise normal ECG. Interpreted at 2107 by Ricardo Wilder MD.     Imaging:  Radiographic findings were communicated with the patient and family who voiced understanding of the findings.    CT-scan head w/o contrast:  1. No evidence of acute intracranial hemorrhage, mass, or herniation.  2. There is generalized atrophy of the brain. White matter changes are  present in the cerebral hemispheres that are consistent with small  vessel ischemic disease in this age patient.   As read by Radiology.      CTA angiogram head neck:  1. Patent arteries in the neck without evidence of dissection. Mild  atherosclerotic disease in the carotid arteries bilaterally without  significant stenosis by NASCET criteria.  2. Patent proximal major intracranial arteries without vascular  cutoff. Moderate to severe focal stenosis of the proximal right P2  segment likely due to intracranial atherosclerotic disease. No  aneurysm identified.   As read by Radiology.    MR brain w/o & w contrast:  1. Motion degraded images with probable area of restricted diffusion  in the right frontal white matter concerning for infarct.  2. Diffuse parenchymal volume loss and white matter changes likely due  to chronic microvascular ischemic disease.  3. No evidence of acute intracranial hemorrhage, mass, or herniation.   As read by Radiology.    Laboratory:  CBC: 20.2(H), o/w WNL (HGB 15.6, )   BMP: Glucose 148(H), o/w  WNL (Creatinine 0.97)  1621 Glucose 145(H)  1622 INR: 1.23(H)   1622 PTT: 28  1622 Troponin: <0.015   UA: Negative     Interventions:  1819 Morphine 4 mg IV   Oxybutynin 20 mg PO   Protonix 40 mg PO   Keflex 500 mg PO  Plavix 300 mg PO ordered     Emergency Department Course:  Past medical records, nursing notes, and vitals reviewed.  1618: I performed an exam of the patient and obtained history, as documented above.   1621: Code stroke called.   1625: I discussed the case with Dr. Hodges of neurology regarding the patient.   The patient was sent for CT and CTA  while in the emergency department, findings above.   1627: Code stroke deescalated  1709: I discussed the case with Radiology regarding the patient.       173: I rechecked the patient. Explained findings to patient and spouse.   Findings and plan explained to the Patient and spouse who consents to admission.   : I discussed the case with Dr. Hodges regarding the patient.   : I discussed the case with Dr. Hodges regarding the patient.   : Discussed the patient with Dr. Do, who will admit the patient to a medical bed for further monitoring, evaluation, and treatment.      Impression & Plan      Medical Decision Makin-year-old gentleman here with new weakness primarily in the lower extremities starting at 2 AM yesterday similar presentation to his previous stroke.  Also has a complex past medical history related to his primary lateral sclerosis.  Differential here TIA CVA versus electrolyte abnormality symptomatic anemia he has no low back or abdominal pain no focal deficits on my examination here.  He is not an IV TPA candidate.  CT CTA were unremarkable for acute stroke or hemorrhage.  Will do MRI for increased sensitivity.  He does have a baclofen pump which will need the rep to confirm after MRI at that it is working he is also due for refill and we will see if they can accommodate this.  If the workup is entirely negative  will do a road test and see if he can safely be discharged home if he is too weak to go home the plan would be for her to observation admission.    He does have leukocytosis reports no sore throat cough or subjective fevers he has no dysuria we will check a urinalysis and also his wife reports his left lower extremity chronic redness appears to be larger and more red and he has had multiple recent superficial source due to running into things.  It is possible that he has acute on chronic dermatitis of the left lower extremity and cellulitis is responsible for his leukocytosis.    Initial CT scan was unremarkable for acute pathology but do show stenosis in his right P2. I followed up with MRI which showed restricted diffusion in right frontal lobe concerning for an infarct. Will admit him for further evaluation and management to the hospital services here. He already had his Aspirin today. Will defer decision of stronger anticoagulant to inpatient service. It is possible that his leukocytosis is a margination from his recent infarct; however, given the lower extremity changes described above, I will treat him for cellulitis.     With his stroke neurologist again who reviewed the MRI images and given the small area of ischemia recommends dual antiplatelet therapy in the form of Plavix loading dose 300 mg followed by 6 more days of 75 mg daily and then continue on his full strength aspirin.  EKG normal sinus rhythm here, he has had a previous echocardiogram 2014 which was negative for a bubble study.    Diagnosis:    ICD-10-CM    1. General weakness R53.1    2. Bilateral leg weakness R29.898    3. Left leg cellulitis L03.116    4. Muscle spasticity M62.838    5. 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       Ricardo Wilder MD  05/24/18 3820

## 2018-05-25 ENCOUNTER — APPOINTMENT (OUTPATIENT)
Dept: ULTRASOUND IMAGING | Facility: CLINIC | Age: 73
DRG: 065 | End: 2018-05-25
Attending: INTERNAL MEDICINE
Payer: COMMERCIAL

## 2018-05-25 ENCOUNTER — APPOINTMENT (OUTPATIENT)
Dept: PHYSICAL THERAPY | Facility: CLINIC | Age: 73
DRG: 065 | End: 2018-05-25
Attending: INTERNAL MEDICINE
Payer: COMMERCIAL

## 2018-05-25 ENCOUNTER — APPOINTMENT (OUTPATIENT)
Dept: SPEECH THERAPY | Facility: CLINIC | Age: 73
DRG: 065 | End: 2018-05-25
Attending: INTERNAL MEDICINE
Payer: COMMERCIAL

## 2018-05-25 ENCOUNTER — APPOINTMENT (OUTPATIENT)
Dept: OCCUPATIONAL THERAPY | Facility: CLINIC | Age: 73
DRG: 065 | End: 2018-05-25
Attending: INTERNAL MEDICINE
Payer: COMMERCIAL

## 2018-05-25 ENCOUNTER — APPOINTMENT (OUTPATIENT)
Dept: CARDIOLOGY | Facility: CLINIC | Age: 73
DRG: 065 | End: 2018-05-25
Attending: INTERNAL MEDICINE
Payer: COMMERCIAL

## 2018-05-25 LAB
ANION GAP SERPL CALCULATED.3IONS-SCNC: 5 MMOL/L (ref 3–14)
BASOPHILS # BLD AUTO: 0 10E9/L (ref 0–0.2)
BASOPHILS NFR BLD AUTO: 0.3 %
BUN SERPL-MCNC: 24 MG/DL (ref 7–30)
CALCIUM SERPL-MCNC: 8.4 MG/DL (ref 8.5–10.1)
CHLORIDE SERPL-SCNC: 103 MMOL/L (ref 94–109)
CO2 SERPL-SCNC: 29 MMOL/L (ref 20–32)
CREAT SERPL-MCNC: 1 MG/DL (ref 0.66–1.25)
DIFFERENTIAL METHOD BLD: ABNORMAL
EOSINOPHIL # BLD AUTO: 0 10E9/L (ref 0–0.7)
EOSINOPHIL NFR BLD AUTO: 0.1 %
ERYTHROCYTE [DISTWIDTH] IN BLOOD BY AUTOMATED COUNT: 14 % (ref 10–15)
GFR SERPL CREATININE-BSD FRML MDRD: 73 ML/MIN/1.7M2
GLUCOSE SERPL-MCNC: 119 MG/DL (ref 70–99)
HCT VFR BLD AUTO: 42.5 % (ref 40–53)
HGB BLD-MCNC: 14.3 G/DL (ref 13.3–17.7)
IMM GRANULOCYTES # BLD: 0.1 10E9/L (ref 0–0.4)
IMM GRANULOCYTES NFR BLD: 0.7 %
INTERPRETATION ECG - MUSE: NORMAL
LYMPHOCYTES # BLD AUTO: 0.6 10E9/L (ref 0.8–5.3)
LYMPHOCYTES NFR BLD AUTO: 3.8 %
MCH RBC QN AUTO: 31 PG (ref 26.5–33)
MCHC RBC AUTO-ENTMCNC: 33.6 G/DL (ref 31.5–36.5)
MCV RBC AUTO: 92 FL (ref 78–100)
MONOCYTES # BLD AUTO: 0.5 10E9/L (ref 0–1.3)
MONOCYTES NFR BLD AUTO: 3.2 %
NEUTROPHILS # BLD AUTO: 14 10E9/L (ref 1.6–8.3)
NEUTROPHILS NFR BLD AUTO: 91.9 %
NRBC # BLD AUTO: 0 10*3/UL
NRBC BLD AUTO-RTO: 0 /100
PLATELET # BLD AUTO: 148 10E9/L (ref 150–450)
POTASSIUM SERPL-SCNC: 4 MMOL/L (ref 3.4–5.3)
RBC # BLD AUTO: 4.61 10E12/L (ref 4.4–5.9)
SODIUM SERPL-SCNC: 137 MMOL/L (ref 133–144)
WBC # BLD AUTO: 15.2 10E9/L (ref 4–11)

## 2018-05-25 PROCEDURE — 93306 TTE W/DOPPLER COMPLETE: CPT

## 2018-05-25 PROCEDURE — 40000133 ZZH STATISTIC OT WARD VISIT

## 2018-05-25 PROCEDURE — 40000193 ZZH STATISTIC PT WARD VISIT: Performed by: PHYSICAL THERAPIST

## 2018-05-25 PROCEDURE — 97535 SELF CARE MNGMENT TRAINING: CPT | Mod: GO

## 2018-05-25 PROCEDURE — 99232 SBSQ HOSP IP/OBS MODERATE 35: CPT | Performed by: INTERNAL MEDICINE

## 2018-05-25 PROCEDURE — 93306 TTE W/DOPPLER COMPLETE: CPT | Mod: 26 | Performed by: INTERNAL MEDICINE

## 2018-05-25 PROCEDURE — 92610 EVALUATE SWALLOWING FUNCTION: CPT | Mod: GN

## 2018-05-25 PROCEDURE — 80048 BASIC METABOLIC PNL TOTAL CA: CPT | Performed by: INTERNAL MEDICINE

## 2018-05-25 PROCEDURE — 25000128 H RX IP 250 OP 636: Performed by: INTERNAL MEDICINE

## 2018-05-25 PROCEDURE — 40000225 ZZH STATISTIC SLP WARD VISIT

## 2018-05-25 PROCEDURE — 97165 OT EVAL LOW COMPLEX 30 MIN: CPT | Mod: GO

## 2018-05-25 PROCEDURE — 97162 PT EVAL MOD COMPLEX 30 MIN: CPT | Mod: GP | Performed by: PHYSICAL THERAPIST

## 2018-05-25 PROCEDURE — 99207 ZZC CDG-MDM COMPONENT: MEETS LOW - DOWN CODED: CPT | Performed by: INTERNAL MEDICINE

## 2018-05-25 PROCEDURE — 25000132 ZZH RX MED GY IP 250 OP 250 PS 637: Performed by: INTERNAL MEDICINE

## 2018-05-25 PROCEDURE — 25500064 ZZH RX 255 OP 636: Performed by: INTERNAL MEDICINE

## 2018-05-25 PROCEDURE — 25000125 ZZHC RX 250: Performed by: INTERNAL MEDICINE

## 2018-05-25 PROCEDURE — 97116 GAIT TRAINING THERAPY: CPT | Mod: GP | Performed by: PHYSICAL THERAPIST

## 2018-05-25 PROCEDURE — 12000007 ZZH R&B INTERMEDIATE

## 2018-05-25 PROCEDURE — 85025 COMPLETE CBC W/AUTO DIFF WBC: CPT | Performed by: INTERNAL MEDICINE

## 2018-05-25 PROCEDURE — 93971 EXTREMITY STUDY: CPT | Mod: LT

## 2018-05-25 PROCEDURE — 92507 TX SP LANG VOICE COMM INDIV: CPT | Mod: GN

## 2018-05-25 PROCEDURE — 36415 COLL VENOUS BLD VENIPUNCTURE: CPT | Performed by: INTERNAL MEDICINE

## 2018-05-25 PROCEDURE — 97530 THERAPEUTIC ACTIVITIES: CPT | Mod: GP | Performed by: PHYSICAL THERAPIST

## 2018-05-25 RX ORDER — POLYETHYLENE GLYCOL 3350 17 G/17G
17 POWDER, FOR SOLUTION ORAL 2 TIMES DAILY PRN
Status: DISCONTINUED | OUTPATIENT
Start: 2018-05-25 | End: 2018-05-26 | Stop reason: HOSPADM

## 2018-05-25 RX ORDER — ACYCLOVIR 200 MG/1
30 CAPSULE ORAL ONCE
Status: DISCONTINUED | OUTPATIENT
Start: 2018-05-25 | End: 2018-05-26 | Stop reason: HOSPADM

## 2018-05-25 RX ORDER — ATORVASTATIN CALCIUM 20 MG/1
20 TABLET, FILM COATED ORAL
Status: DISCONTINUED | OUTPATIENT
Start: 2018-05-25 | End: 2018-05-26 | Stop reason: HOSPADM

## 2018-05-25 RX ORDER — CEFAZOLIN SODIUM 2 G/100ML
2 INJECTION, SOLUTION INTRAVENOUS EVERY 8 HOURS
Status: DISCONTINUED | OUTPATIENT
Start: 2018-05-25 | End: 2018-05-26 | Stop reason: HOSPADM

## 2018-05-25 RX ADMIN — SODIUM CHLORIDE: 9 INJECTION, SOLUTION INTRAVENOUS at 01:23

## 2018-05-25 RX ADMIN — CEFAZOLIN SODIUM 1 G: 1 INJECTION, SOLUTION INTRAVENOUS at 08:57

## 2018-05-25 RX ADMIN — POLYETHYLENE GLYCOL 3350 17 G: 17 POWDER, FOR SOLUTION ORAL at 20:54

## 2018-05-25 RX ADMIN — ACETAMINOPHEN 650 MG: 325 TABLET ORAL at 16:12

## 2018-05-25 RX ADMIN — ATORVASTATIN CALCIUM 20 MG: 20 TABLET, FILM COATED ORAL at 19:34

## 2018-05-25 RX ADMIN — CLINDAMYCIN PHOSPHATE 900 MG: 18 INJECTION, SOLUTION INTRAVENOUS at 03:43

## 2018-05-25 RX ADMIN — ACETAMINOPHEN 650 MG: 325 TABLET ORAL at 00:03

## 2018-05-25 RX ADMIN — CEFAZOLIN SODIUM 2 G: 2 INJECTION, SOLUTION INTRAVENOUS at 16:12

## 2018-05-25 RX ADMIN — CLOPIDOGREL 75 MG: 75 TABLET, FILM COATED ORAL at 08:57

## 2018-05-25 RX ADMIN — ACETAMINOPHEN 650 MG: 325 TABLET ORAL at 20:41

## 2018-05-25 RX ADMIN — HUMAN ALBUMIN MICROSPHERES AND PERFLUTREN 3 ML: 10; .22 INJECTION, SOLUTION INTRAVENOUS at 08:45

## 2018-05-25 RX ADMIN — CLINDAMYCIN PHOSPHATE 900 MG: 18 INJECTION, SOLUTION INTRAVENOUS at 09:43

## 2018-05-25 RX ADMIN — PANTOPRAZOLE SODIUM 40 MG: 40 TABLET, DELAYED RELEASE ORAL at 08:57

## 2018-05-25 RX ADMIN — CEFAZOLIN SODIUM 1 G: 1 INJECTION, SOLUTION INTRAVENOUS at 03:06

## 2018-05-25 ASSESSMENT — ACTIVITIES OF DAILY LIVING (ADL)
ADLS_ACUITY_SCORE: 20
ADLS_ACUITY_SCORE: 20
ADLS_ACUITY_SCORE: 21
ADLS_ACUITY_SCORE: 19
ADLS_ACUITY_SCORE: 19
ADLS_ACUITY_SCORE: 20

## 2018-05-25 NOTE — PROGRESS NOTES
"  [facility]  Hospitalist Progress Note  Timur Sam MD 05/25/2018    Reason for Stay (Diagnosis): CVA, cellulitis         Assessment and Plan:      Summary of Stay: Elier Barber is a 72-year-old gentleman with a history of PLS, primary lateral sclerosis, on a baclofen pump, prior history of prostate cancer, prior history of CVA, at baseline has a hoarse voice and ambulates with a walker due to weakness in his legs.  He presented with worsening LLE weakness and redness of his LLE.  He was found to have acute ischemic CVA on brain MRI and cellulitis of LLE    1.  Acute cerebrovascular accident with resultant left LE weakness.  much improved.  Will follow neurology recommendations to treat with Plavix x 1 week then resume ASA thereafter.  Will start statin in setting of CVA.  PT/OT consult appreciated.  Monitor on tele; ECG shows NSR.  TTE pending but doubt embolic source here.  Neck angiogram without significant stenosis.    2.  Left lower extremity cellulitis.  continue IV Ancef.  Stop clindamycin.  Stable/improved  3.  Primary lateral sclerosis.  The patient is due to see his neurologist on Tuesday for a baclofen pump refill.     # Pain Assessment:  Current Pain Score 5/25/2018   Patient currently in pain? denies   Pain score (0-10) -   Pain location -   Elier s pain level was assessed and he currently denies pain.        DVT Prophylaxis: Pneumatic Compression Devices  Code Status: Full Code  Discharge Dispo: sofya  Estimated Disch Date / # of Days until Disch: 1-2 days when cellulitis improved        Interval History (Subjective):      Pt and his wife feel like LLE weakness \"100% improved\".  Able to ambulate with walker today                  Physical Exam:      Last Vital Signs:  /63 (BP Location: Left arm)  Temp 99.4  F (37.4  C) (Oral)  Resp 16  SpO2 94%      Intake/Output Summary (Last 24 hours) at 05/25/18 1340  Last data filed at 05/25/18 1244   Gross per 24 hour   Intake  "             360 ml   Output                0 ml   Net              360 ml       Constitutional:  Awake, alert, cooperative, no apparent distress.  Wife present   Respiratory: Clear to auscultation bilaterally, no crackles or wheezing   Cardiovascular: Regular rate and rhythm, normal S1 and S2, and no murmur noted   Abdomen: Normal bowel sounds, soft, non-distended, non-tender   Skin: No rashes, no cyanosis, dry to touch   Neuro: Alert and oriented x3, no weakness, numbness, memory loss   Extremities: No edema, normal range of motion   Other(s):        All other systems: Negative          Medications:      All current medications were reviewed with changes reflected in problem list.         Data:      All new lab and imaging data was reviewed.   Labs:    Recent Labs  Lab 05/25/18  0655   WBC 15.2*   HGB 14.3   HCT 42.5   MCV 92   *      Imaging:   Recent Results (from the past 24 hour(s))   CT Head w/o Contrast    Narrative    CT SCAN OF THE HEAD WITHOUT CONTRAST   5/24/2018 4:34 PM     HISTORY: Code stroke.    TECHNIQUE:  Axial images of the head and coronal reformations without  IV contrast material. Radiation dose for this scan was reduced using  automated exposure control, adjustment of the mA and/or kV according  to patient size, or iterative reconstruction technique.    COMPARISON: Brain MR 6/5/2017.    FINDINGS: There is no evidence of intracranial hemorrhage, mass, or  anomaly. There is generalized atrophy of the brain. There is low  attenuation in the white matter of the cerebral hemispheres consistent  with sequelae of small vessel ischemic disease. Ventricular size  within normal limits without evidence of hydrocephalus.     The visualized portions of the sinuses and mastoids appear normal. The  bony calvarium and bones of the skull base appear intact.       Impression    IMPRESSION:     1. No evidence of acute intracranial hemorrhage, mass, or herniation.  2. There is generalized atrophy of the  brain. White matter changes are  present in the cerebral hemispheres that are consistent with small  vessel ischemic disease in this age patient.     ZAFAR PAYAN MD   CTA Angiogram Head Neck    Narrative    CT ANGIOGRAM OF THE HEAD AND NECK WITH CONTRAST  5/24/2018 4:59 PM     HISTORY: Code stroke.    TECHNIQUE:  CT angiography with an injection of 70mL [id number] IV  with scans through the head and neck. Images were transferred to a  separate 3-D workstation where multiplanar reformations and 3-D images  were created. Estimates of carotid stenoses are made relative to the  distal internal carotid artery diameters except as noted. Radiation  dose for this scan was reduced using automated exposure control,  adjustment of the mA and/or kV according to patient size, or iterative  reconstruction technique.    COMPARISON: MRA head and neck 8/14/2014.     CT HEAD FINDINGS: No contrast enhancing lesions. Cerebral blood flow  is grossly normal.     CT ANGIOGRAM HEAD FINDINGS:  The major intracranial arteries including  the proximal branches of the anterior cerebral, middle cerebral, and  posterior cerebral arteries appear patent without vascular cutoff. No  aneurysm identified.      Small right P1 segment with a patent right posterior communicating  artery. The proximal right P2 segment of the posterior cerebral artery  demonstrates moderate to severe focal stenosis.    CT ANGIOGRAM NECK FINDINGS:   Normal origin of the great vessels from the aortic arch.     Right carotid artery: The right common and internal carotid arteries  are patent. Mild atherosclerotic disease at the carotid bifurcation  without stenosis.     Left carotid artery: The left common and internal carotid arteries are  patent. Mild atherosclerotic disease at the carotid bifurcation  without stenosis.     Vertebral arteries: Vertebral arteries are patent without evidence of  dissection. No significant stenosis.     Other findings: Multilevel degenerative  changes in the spine.       Impression    IMPRESSION:   1. Patent arteries in the neck without evidence of dissection. Mild  atherosclerotic disease in the carotid arteries bilaterally without  significant stenosis by NASCET criteria.  2. Patent proximal major intracranial arteries without vascular  cutoff. Moderate to severe focal stenosis of the proximal right P2  segment likely due to intracranial atherosclerotic disease. No  aneurysm identified.     Results discussed with Ricardo Wilder at 5:10 PM on 5/24/2018.    ZAFAR PAYAN MD   MR Brain w/o & w Contrast    Narrative    MRI BRAIN WITHOUT AND WITH CONTRAST  5/24/2018 7:11 PM    HISTORY:  AMS, leg weakness, history of stroke, also has primary  lateral sclerosis.      TECHNIQUE:  Multiplanar, multisequence MRI of the brain without and  with 7.5 mL Gadavist.    COMPARISON: Head CT from earlier the same day. Brain MR 6/5/2017.    FINDINGS: Images are moderately degraded by motion artifact.    Subtle region of restricted diffusion in the right frontal white  matter could represent an acute area of ischemia. No evidence of  intracranial hemorrhage. No mass effect or midline shift. There is  diffuse parenchymal volume loss. Confluent periventricular as well as  patchy deep and subcortical white matter T2 hyperintensities are  nonspecific, but likely related to chronic microvascular ischemic  disease. Ventricular size is unchanged without evidence of  hydrocephalus.      Impression    IMPRESSION:    1. Motion degraded images with probable area of restricted diffusion  in the right frontal white matter concerning for infarct.  2. Diffuse parenchymal volume loss and white matter changes likely due  to chronic microvascular ischemic disease.  3. No evidence of acute intracranial hemorrhage, mass, or herniation.     ZAFAR PAYAN MD   US Lower Extremity Venous Duplex Left    Narrative    US LOWER EXTREMITY VENOUS DUPLEX LEFT   5/25/2018 1:13 AM     HISTORY: Left leg pain,  swelling and redness.    COMPARISON: None.    FINDINGS: Gray-scale, color and Doppler spectral analysis ultrasound  was performed of the left leg. Compression and augmentation imaging  was performed.    There is no evidence for deep venous thrombosis. The veins compress  and augment normally.      Impression    IMPRESSION: No DVT.    SALVADOR SALEH MD

## 2018-05-25 NOTE — PLAN OF CARE
Problem: Patient Care Overview  Goal: Plan of Care/Patient Progress Review  SLP: Bedside swallow eval orders received. Attempted to see pt to complete swallow eval, however pt receiving ECHO at time of attempt. Will follow up as appropriate.

## 2018-05-25 NOTE — PROGRESS NOTES
05/25/18 0900   Quick Adds   Type of Visit Initial PT Evaluation   Living Environment   Lives With spouse   Living Arrangements house   Home Accessibility ramps present at home;stairs to enter home;stairs within home   Number of Stairs to Enter Home 3  (ramp)   Number of Stairs Within Home 12  (R rail descending)   Transportation Available family or friend will provide   Living Environment Comment Pt lives with wife in a rambler with a basement where his office is located.  Pt has a ramp to enter fromt the garage and to the deck.     Self-Care   Dominant Hand left   Usual Activity Tolerance good   Current Activity Tolerance moderate   Regular Exercise yes   Activity/Exercise Type biking;strength training   Exercise Amount/Frequency 3-5 times/wk  (lifetime fitness 3x/wk: weights, stationary bike)   Equipment Currently Used at Home grab bar;raised toilet;tub bench;walker, rolling;wheelchair, manual  (bedrail.)   Activity/Exercise/Self-Care Comment Pt reports being I with all transfers, bed mobility and amb with 4WW.  Wife supervises pt ambulating on the stairs.  In the morning and evening pt transfers to the  and his wife pushes him into the bathroom.  Pt has Home PT once per week to work on balance, gait and ther ex.   Functional Level Prior   Ambulation 1-->assistive equipment   Transferring 1-->assistive equipment   Toileting 1-->assistive equipment   Bathing 3-->assistive equipment and person  (handicap accessible bathroom)   Dressing 2-->assistive person   Eating 0-->independent   Communication 2-->difficulty speaking (not related to language barrier)   Swallowing 0-->swallows foods/liquids without difficulty   Cognition 0 - no cognition issues reported   Fall history within last six months no   Which of the above functional risks had a recent onset or change? ambulation;transferring   Prior Functional Level Comment Wife assists with buttoning pants and donning socks; otherwise pt is I.    General Information  "  Onset of Illness/Injury or Date of Surgery - Date 05/24/18   Referring Physician Dr. Jose A Do   Patient/Family Goals Statement \"walk better\"   Pertinent History of Current Problem (include personal factors and/or comorbidities that impact the POC) This is a 72-year-old gentleman with a history of PLS, primary lateral sclerosis, on a baclofen pump, prior history of prostate cancer, prior history of CVA, at baseline has a hoarse voice and ambulates with a walker due to weakness in his legs.  Felt more weak yesterday when he went to bed and the weakness has persisted such that he could not get up on his own without any help.  Pt was broungt to ED.  MRI showed infarct in R frontal area.  Pt also has L LE cellulitis.   Precautions/Limitations fall precautions   Weight-Bearing Status - LLE full weight-bearing   Weight-Bearing Status - RLE full weight-bearing   General Observations Pt awake in bed with wife present   General Info Comments Vitals in supine: /53, HR 75 bpm, O2 sats on RA 95%.  Sitting /62   Cognitive Status Examination   Orientation orientation to person, place and time   Level of Consciousness alert   Follows Commands and Answers Questions 100% of the time;able to follow single-step instructions   Personal Safety and Judgment intact   Memory intact   Pain Assessment   Patient Currently in Pain Yes, see Vital Sign flowsheet  (4/10 back pain (prior herniated discs), )   Integumentary/Edema   Integumentary/Edema Comments L LE cellulitis lower leg   Posture    Posture Forward head position;Protracted shoulders   Range of Motion (ROM)   ROM Comment tight B hip adductors, hamstring and gastrocs B with SLR to ~ 50 deg, R ankle DF 0 deg and L -~ 10 deg.  Limited end range active shld flex and abd to ~ 110 deg.   Strength   Strength Comments B hip flex 4 to 4+/5, B knee ext 5/5, R ankle DF 5/5, L 4+/5, B UEs 5/5 all major mms groups except R shld abd 4+/5.   Bed Mobility   Bed Mobility Comments Sup " > sit from [place]erney with bedrail and SBA.     Transfer Skills   Transfer Comments Sit <> stand from bed to FWW with Min A first attempt and CGA second attempt with decreased forward trunk lean and retro LOB.     Gait   Gait Comments Pt amb 60' with FWW and CGA with mildly spastic gait with narrow JIGAR with R step to L pattern.  Forefoot intial contact on L and foot flat on R.     Balance   Balance Comments Pt able to sit on EOB and maintain midline independently.  Pt requires B UE support on FWW to maintain standing balance.     Sensory Examination   Sensory Perception Comments Pt reports decreased sensation on bottom of feet.   Coordination   Coordination Comments Decreased coordination noted with gait with decreased dissociation between hip and knee movments with LEs adducting during swing.   Muscle Tone   Muscle Tone Comments increased LE tone into extension and adduction   General Therapy Interventions   Planned Therapy Interventions balance training;bed mobility training;gait training;motor coordination training;neuromuscular re-education;ROM;strengthening;stretching;transfer training;risk factor education;home program guidelines;progressive activity/exercise   Clinical Impression   Criteria for Skilled Therapeutic Intervention yes, treatment indicated   PT Diagnosis impaired gait pattern   Influenced by the following impairments increased LE tone, impaired LE strength and balance, decreased activity tolerance, decreased ROM   Functional limitations due to impairments assisted transfers and gait, unable to negotiate stairs or longer household or community distances.   Clinical Presentation Evolving/Changing   Clinical Presentation Rationale resolving sxs from CVA on top of PLS   Clinical Decision Making (Complexity) Moderate complexity   Therapy Frequency` daily   Predicted Duration of Therapy Intervention (days/wks) 3 days   Anticipated Discharge Disposition Home with Outpatient Therapy   Risk & Benefits of  "therapy have been explained Yes   Patient, Family & other staff in agreement with plan of care Yes   [facility] AM-PAC TM \"6 Clicks\"   2016, Trustees of [facility], under license to [employer].  All rights reserved.   6 Clicks Short Forms Basic Mobility Inpatient Short Form   [facility] AM-PAC  \"6 Clicks\" V.2 Basic Mobility Inpatient Short Form   1. Turning from your back to your side while in a flat bed without using bedrails? 4 - None   2. Moving from lying on your back to sitting on the side of a flat bed without using bedrails? 4 - None   3. Moving to and from a bed to a chair (including a wheelchair)? 3 - A Little   4. Standing up from a chair using your arms (e.g., wheelchair, or bedside chair)? 3 - A Little   5. To walk in hospital room? 3 - A Little   6. Climbing 3-5 steps with a railing? 2 - A Lot   Basic Mobility Raw Score (Score out of 24.Lower scores equate to lower levels of function) 19   Total Evaluation Time   Total Evaluation Time (Minutes) 15     "

## 2018-05-25 NOTE — ED NOTES
"[facility]  ED Nurse Handoff Report    Elier Barber is a 72 year old male   ED Chief complaint: Generalized Weakness  . ED Diagnosis:   Final diagnoses:   General weakness   Left leg cellulitis   Muscle spasticity   Primary lateral sclerosis (HCC)   Weakness of left lower extremity     Allergies:   Allergies   Allergen Reactions     No Clinical Screening - See Comments Hives     Starts swelling and hives     Bee Venom      Penicillins Hives     \"HIVES\"       Code Status: Full Code  Activity level - Baseline/Home:  Stand with assist and walker. Activity Level - Current:   Lift/walker with assist of 2-gait belt.  Weak. Lift room needed: Yes. Bariatric: No   Needed: No   Isolation: No. Infection: Not Applicable.     Vital Signs:   Vitals:    05/24/18 1745 05/24/18 1800 05/24/18 1815 05/24/18 1930   BP: 136/62 127/62 121/67 132/76   Resp:       SpO2:    94%       Cardiac Rhythm:  ,      Pain level:    Patient confused: No. Patient Falls Risk: Yes.   Elimination Status: Has voided   Patient Report - Initial Complaint: Pt here with ambulatory issues. Wife states pt can normally ambulate with a walker. Noticed since about midnight, pt unable to bear weight. Pt notes this is similar to his previous CVA, he had ambulatory issues. Pt no longer on Xerelto. Hx ALS. Wife states his Speech not quite as clear as normal. No facial droop, equal hand . ABC intact    Focused Assessment: Cognitive/Perceptual/Neuro - Best Language: 0 - No aphasia Headache: None LUE Sensation: no numbness; no tingling RUE Sensation: no tingling; no numbness LLE Sensation: no numbness; no tingling RLE Sensation: no tingling; no numbness  Pupils (CN II) - Pupil PERRLA: yes  Motor Strength - Left Upper: 5 - active movement against gravity and full resistance Right Upper: 5 - active movement against gravity and full resistance Left Lower: 5 - active movement against gravity and full resistance Right Lower: 5 - active movement " against gravity and full resistance  NIH Stroke Scale - Interval: baseline 1a. Level Of Consciousness: 0-->Alert: keenly responsive 1b. LOC Questions: 0-->Answers both questions correctly 1c. LOC Commands: 0-->Performs both tasks correctly 2. Best Gaze: 0-->Normal 3. Visual: 0-->No visual loss 4. Facial Palsy: 0-->Normal symmetrical movements 5a. Motor Arm, Left: 0-->No drift: limb holds 90 (or 45) degrees for full 10 secs 5b. Motor Arm, Right: 0-->No drift: limb holds 90 (or 45) degrees for full 10 secs 6a. Motor Leg, Left: 0-->No drift: leg holds 30 degree position for full 5 secs 6b. Motor Leg, Right: 0-->No drift: leg holds 30 degree position for full 5 secs 7. Limb Ataxia: 0-->Absent 8. Sensory: 0-->Normal: no sensory loss 9. Best Language: 0-->No aphasia: normal 10. Dysarthria: 0-->Normal 11. Extinction and Inattention (formerly Neglect): 0-->No abnormality Total (NIH Stroke Scale): 0     Skin - Skin WDL: color; all Skin Color/Characteristics: redness blanchable (LLE, abasion noted as well. CMS intact. ) Skin Temperature: warm Skin Integrity: intact  Tests Performed: See Epic  Abnormal Results:   Labs Ordered and Resulted from Time of ED Arrival Up to the Time of Departure from the ED   BASIC METABOLIC PANEL - Abnormal; Notable for the following:        Result Value    Glucose 148 (*)     All other components within normal limits   CBC WITH PLATELETS DIFFERENTIAL - Abnormal; Notable for the following:     WBC 20.2 (*)     Absolute Neutrophil 18.7 (*)     Absolute Lymphocytes 0.5 (*)     All other components within normal limits   INR - Abnormal; Notable for the following:     INR 1.23 (*)     All other components within normal limits   GLUCOSE BY METER - Abnormal; Notable for the following:     Glucose 145 (*)     All other components within normal limits   PARTIAL THROMBOPLASTIN TIME   TROPONIN I   ROUTINE UA WITH MICROSCOPIC     MR Brain w/o & w Contrast   Final Result   IMPRESSION:     1. Motion degraded  images with probable area of restricted diffusion   in the right frontal white matter concerning for infarct.   2. Diffuse parenchymal volume loss and white matter changes likely due   to chronic microvascular ischemic disease.   3. No evidence of acute intracranial hemorrhage, mass, or herniation.       ZAFAR PAYAN MD      CTA Angiogram Head Neck   Final Result   IMPRESSION:    1. Patent arteries in the neck without evidence of dissection. Mild   atherosclerotic disease in the carotid arteries bilaterally without   significant stenosis by NASCET criteria.   2. Patent proximal major intracranial arteries without vascular   cutoff. Moderate to severe focal stenosis of the proximal right P2   segment likely due to intracranial atherosclerotic disease. No   aneurysm identified.       Results discussed with Ricardo Wilder at 5:10 PM on 5/24/2018.      ZAFAR PAYAN MD      CT Head w/o Contrast   Final Result   IMPRESSION:      1. No evidence of acute intracranial hemorrhage, mass, or herniation.   2. There is generalized atrophy of the brain. White matter changes are   present in the cerebral hemispheres that are consistent with small   vessel ischemic disease in this age patient.       ZAFAR PAYAN MD        Treatments provided: See Epic  Family Comments: Wife in room with patient  OBS brochure/video discussed/provided to patient:  N/A  ED Medications:   Medications   cephALEXin (KEFLEX) capsule 500 mg (not administered)   pantoprazole (PROTONIX) EC tablet 40 mg (not administered)   oxybutynin (DITROPAN-XL) 24 hr tablet 20 mg (not administered)   0.9% sodium chloride BOLUS (0 mLs Intravenous Stopped 5/24/18 1658)   iopamidol (ISOVUE-370) solution 500 mL (70 mLs Intravenous Given 5/24/18 1656)   morphine (PF) injection 4 mg (4 mg Intravenous Given 5/24/18 1819)   gadobutrol (GADAVIST) injection 7.5 mL (7.5 mLs Intravenous Given 5/24/18 1904)     Drips infusing:  No  For the majority of the shift, the patient's  behavior Green. Interventions performed were None.     Severe Sepsis OR Septic Shock Diagnosis Present: No      ED Nurse Name/Phone Number: Brittani Prasad,   8:19 PM    RECEIVING UNIT ED HANDOFF REVIEW    Above ED Nurse Handoff Report was reviewed: Yes  Reviewed by: Sathish Lima on May 24, 2018 at 11:03 PM

## 2018-05-25 NOTE — H&P
Admitted:     2018      CHIEF COMPLAINT:  Left leg weakness.      HISTORY OF PRESENT ILLNESS:  This is a 72-year-old gentleman with a history of PLS, primary lateral sclerosis, on a baclofen pump, prior history of prostate cancer, prior history of CVA, at baseline has a hoarse voice and ambulates with a walker due to weakness in his legs.  Felt more weak yesterday when he went to bed and the weakness has persisted such that he could not get up on his own without any help.  He denies any upper extremity weakness.  He denies any paresthesias.  He denies any vision changes.  His wife feels that his balance is also off and hence she brought him into the ER for further evaluation.  Of note, the patient and his wife just got back from a flight from [place] roughly 90 minutes to [place] the day before yesterday.  In the ER over here, the patient was seen by Dr. Ricardo Wilder, was noted to have a stroke.  Dr. Wilder spoke to the neurologist who felt that this could be treated over here and he recommended loading him with aspirin and subsequently starting him on aspirin 75 mg tomorrow.      PAST MEDICAL HISTORY:  Significant for prostate cancer, primary lateral sclerosis with a baclofen pump with lumbar radiculopathy, prior history of CVA, basal cell cancer.      PAST SURGICAL HISTORY:  Significant for hernia repair, prostatectomy, colonoscopy, hernia repair on the right side.      FAMILY HISTORY:  Mother  from heart disease.      SOCIAL HISTORY:  The patient does not smoke.  He seldom drinks alcohol.      ALLERGIES:  PENICILLIN, HE GETS HIVES.      HOME MEDICATIONS:  Include his baclofen pump, aspirin, Vasotec, Protonix, MiraLax.      REVIEW OF SYSTEMS:   CONSTITUTIONAL:  The patient denies any fevers or chills.  He denies any chest pain or shortness of breath.  He denies any headache or vision changes.  At baseline he has no difficulty swallowing.  All other systems are extensively reviewed and deemed  unremarkable and negative.      PHYSICAL EXAMINATION:   VITAL SIGNS:  Temperature is pending, but his pulse is 80, his blood pressure is 119/71.  Respiratory rate is 20.  O2 sat is 95% on room air.   GENERAL:  The patient is alert, awake, oriented, has a scanning speech which is baseline per his wife.   HEENT:  His pupils equal, round, react to light.  Pharynx, there is no exudate noted.   LUNGS:  Clear to auscultation.   HEART:  Regular rate, S1, S2 normal.  No murmurs or gallops.   ABDOMEN:  Soft, nontender, nondistended with normoactive bowel sounds.  He has a baclofen pump on his right lower quadrant.   EXTREMITIES:  On the left lower extremity, he has a scab with erythema, warmth, minimal tenderness.   NEUROLOGIC:  His cranial nerves II-XII are grossly within normal limits.  Motor:  He moves all his extremities.  He has some degree of spasticity.  There is no focal weakness that I can elicit.      LABORATORY:  Labwork obtained here shows the following:  Urinalysis does not show any evidence of a UTI.  His basic metabolic panel and his troponin are grossly within normal limits.  On his CBC, his white cell count is 20.2, hemoglobin 15.6, hematocrit 46.3, absolute neutrophil count of 18.7.  His INR is 1.23.      IMAGING:  He had the following imaging studies here in the ER:  CT of the head without contrast which showed generalized atrophy of the brain.  CT angiogram of the head which showed patent arteries in the neck without evidence of dissection, moderate to severe focal stenosis in the proximal right P2 segment, likely due to intracranial atherosclerotic disease, no aneurysm identified.  He subsequently went on to have an MRI of the brain without and with contrast, which showed probable area of restricted diffusion in the right frontal white matter concerning for infarct.      ASSESSMENT AND PLAN:   1.  Acute cerebrovascular accident with resultant left-sided weakness.  We will admit him as an inpatient.  We  will monitor him on telemetry.  We will get an echocardiogram.  Neurology for now has recommended switching him to Plavix 75 mg tomorrow.  We will check a lipid panel in the morning.  We will get PT, OT as well as a swallow evaluation.   2.  Left lower extremity cellulitis.  He was given Keflex over here in the ER.  I will go ahead and start him on IV Ancef and clindamycin.   3.  Primary lateral sclerosis.  The patient is due to see his neurologist on Tuesday for a baclofen pump refill.      CODE STATUS:  Full code.      He will be admitted as an inpatient.         GERRY AREVALO MD             D: 2018   T: 2018   MT: DRU      Name:     ANIL MELGAR   MRN:      [id number]        Account:      [id number]   :      1945        Admitted:     2018                   Document: [id number]

## 2018-05-25 NOTE — PROGRESS NOTES
I was alerted the patient was off the floor around 0400 for an exam. When he returned to the floor his wife insisted he stay on the transport gurney and the patient remains on it at this time. I spoke with the wife to explain how this is not good for his skin and that we would like to order him a specialty bed. The wife agreed with that plan.

## 2018-05-25 NOTE — PROGRESS NOTES
" 05/25/18 1400   Quick Adds   Type of Visit Initial Occupational Therapy Evaluation   Living Environment   Lives With spouse   Living Arrangements house   Home Accessibility ramps present at home;stairs to enter home;stairs within home   Number of Stairs to Enter Home 3   Number of Stairs Within Home 12   Transportation Available family or friend will provide   Living Environment Comment Pt resides wihthis wife in a rambler with a basement. He has a ramp to enter from the garage and to the deck   Self-Care   Dominant Hand left   Usual Activity Tolerance good   Current Activity Tolerance moderate   Regular Exercise yes   Activity/Exercise Type biking;strength training   Exercise Amount/Frequency 3-5 times/wk   Equipment Currently Used at Home grab bar;raised toilet;tub bench;walker, rolling;wheelchair, manual  (built in shower bench)   Activity/Exercise/Self-Care Comment Pt hasa HHS, built in shower bench and grab bars, Safety frame at toilet. Has a reacher and uses theraband as leg .    Functional Level Prior   Ambulation 1-->assistive equipment   Transferring 1-->assistive equipment   Toileting 1-->assistive equipment   Bathing 3-->assistive equipment and person   Dressing 2-->assistive person   Eating 0-->independent   Communication 2-->difficulty speaking (not related to language barrier)   Swallowing 0-->swallows foods/liquids without difficulty   Cognition 0 - no cognition issues reported   Fall history within last six months no   Which of the above functional risks had a recent onset or change? ambulation;transferring   Prior Functional Level Comment wife assists with LE dressing   General Information   Onset of Illness/Injury or Date of Surgery - Date 05/24/18   Referring Physician Jose A Do MD   Patient/Family Goals Statement to return home and return to [facility]   Additional Occupational Profile Info/Pertinent History of Current Problem Per EMR pt is \"a 72-year-old gentleman with a history of PLS, " primary lateral sclerosis, on a baclofen pump, prior history of prostate cancer, prior history of CVA, at baseline has a hoarse voice and ambulates with a walker due to weakness in his legs.  He presented with worsening LLE weakness and redness of his LLE.  He was found to have acute ischemic CVA on brain MRI and cellulitis of LLE   Precautions/Limitations fall precautions   Cognitive Status Examination   Orientation orientation to person, place and time   Visual Perception   Visual Perception Wears glasses   Sensory Examination   Sensory Comments at baseline   Pain Assessment   Patient Currently in Pain No   Integumentary/Edema   Integumentary/Edema Comments LLEswollen d/t cellulitis   Posture   Posture forward head position;protracted shoulders   Range of Motion (ROM)   ROM Comment BUE WNL   Strength   Strength Comments BUE 5/5   Hand Strength   Hand Strength Comments 5/5   Coordination   Coordination Comments some delay with fingertip to thumb, but at baseline   Transfer Skill: Sit to Stand   Level of [place]: Sit/Stand contact guard   Physical Assist/Nonphysical Assist: Sit/Stand supervision;verbal cues   Transfer Skill: Toilet Transfer   Level of [place]: Toilet contact guard   Physical Assist/Nonphysical Assist: Toilet supervision;verbal cues   Assistive Device grab bars;standard walker   Lower Body Dressing   Level of [place]: Dress Lower Body minimum assist (75% patients effort)   Toileting   Level of [place]: Toilet stand-by assist   Physical Assist/Nonphysical Assist: Toilet supervision   Grooming   Level of [place]: Grooming contact guard   Physical Assist/Nonphysical Assist: Grooming supervision;verbal cues   Activities of Daily Living Analysis   Impairments Contributing to Impaired Activities of Daily Living strength decreased;balance impaired   Clinical Impression   Criteria for Skilled Therapeutic Interventions Met yes, treatment indicated   OT Diagnosis decreased independence  "with ADLs/IADLs   Influenced by the following impairments decreased balance, decreased strength   Assessment of Occupational Performance 1-3 Performance Deficits   Identified Performance Deficits bathing, g/h, toileting   Clinical Decision Making (Complexity) Moderate complexity   Therapy Frequency 5 times/wk   Predicted Duration of Therapy Intervention (days/wks) 3   Anticipated Discharge Disposition Home with Assist   Risks and Benefits of Treatment have been explained. Yes   Patient, Family & other staff in agreement with plan of care Yes   [facility] AM-PAC  \"6 Clicks\" Daily Activity Inpatient Short Form   1. Putting on and taking off regular lower body clothing? 3 - A Little   2. Bathing (including washing, rinsing, drying)? 3 - A Little   3. Toileting, which includes using toilet, bedpan or urinal? 3 - A Little   4. Putting on and taking off regular upper body clothing? 3 - A Little   5. Taking care of personal grooming such as brushing teeth? 3 - A Little   6. Eating meals? 4 - None   Daily Activity Raw Score (Score out of 24.Lower scores equate to lower levels of function) 19   Total Evaluation Time   Total Evaluation Time (Minutes) 8     "

## 2018-05-25 NOTE — PLAN OF CARE
Problem: Infection, Risk/Actual (Adult)  Goal: Identify Related Risk Factors and Signs and Symptoms  Related risk factors and signs and symptoms are identified upon initiation of Human Response Clinical Practice Guideline (CPG).   Pt a/o x4, up with assist of 2 and walker.  Pt has specialty bed in the room as the standard bed was causing him pain, also does well in recliner.  Speech eval preformed, regular/thin diet ordered.  Being treated with Ancef and Cleocin for LLE cellulitis.  VSS.  Ultrasound, MRI and CT negative.  PT/OT/SP following.  Pt speech is garbled and has left sided weakness baseline.  Afebrile.  Tele SR.  NS at 100ml/hr.  Neuro's intact except pt's baseline speech and weakness.  History of CVA and PLS.  Will continue POC.

## 2018-05-25 NOTE — PLAN OF CARE
Problem: Infection, Risk/Actual (Adult)  Goal: Identify Related Risk Factors and Signs and Symptoms  Related risk factors and signs and symptoms are identified upon initiation of Human Response Clinical Practice Guideline (CPG).  Outcome: No Change  Pt admitted to floor at approx 2300.  Pt arrived with wife.  Pt c/o pain in back, receiving tylenol.  After returning to floor from ultrasound, pt stated that he was very comfortable on cart and requested to stay there, accomidations were made within the room to fit.  Pt sleeping well this shift with slight fever, all other VSS and tele SR.  Continue POC.

## 2018-05-25 NOTE — PLAN OF CARE
"Problem: Patient Care Overview  Goal: Plan of Care/Patient Progress Review  OT orders received, eval completed and treatment initiated. Per EMR pt is \"a 72-year-old gentleman with a history of PLS, primary lateral sclerosis, on a baclofen pump, prior history of prostate cancer, prior history of CVA, at baseline has a hoarse voice and ambulates with a walker due to weakness in his legs.  He presented with worsening LLE weakness and redness of his LLE.  He was found to have acute ischemic CVA on brain MRI and cellulitis of LLE. Pt resides with his spouse in a one level rambler. He does have a ramp into the home. He uses a walker and w/c at baseline. He is very active, working out at the gym several times a week. His wife A with threading LE for dressing, donning socks and fastening buttons. He has elastic shoelacesHe has a walk-in shower with grab bars, HHS and built in bench. He has safety frame with his toilet seat. Pt does have a reacher, although he hasn't used it for LE dressing in the past. Wife was present for eval.   Discharge Planner OT   Patient plan for discharge: Home with Assist  Current status: Pt is CGA with vc's for hand placement for functional transfer to the toilet, including clothing management and pericares. Pt needing cues to stay close to the walker. Completed 1 g/h task while standing at sink. Educated in LE dressing, able to demo mod I.   Barriers to return to prior living situation: decreased balance and weakness of LE  Recommendations for discharge: home with assist from spouse for ADL's and supervision during functional transfers  Rationale for recommendations: Pt has adequate home set-up for him to be safe with participation of ADLs with level of supervision wife is able to provide.        Entered by: Cecile Salomon 05/25/2018 3:04 PM             "

## 2018-05-25 NOTE — PLAN OF CARE
Problem: Patient Care Overview  Goal: Plan of Care/Patient Progress Review  Discharge Planner PT   PT eval completed and treatment initiated. This is a 72-year-old gentleman with a history of PLS, primary lateral sclerosis, on a baclofen pump, prior history of prostate cancer, prior history of CVA, at baseline has a hoarse voice and ambulates with a walker due to weakness in his legs.  Felt more weak yesterday when he went to bed and the weakness has persisted such that he could not get up on his own without any help.  Pt was broungt to ED.  MRI showed infarct in R frontal area.  Pt also has L LE cellulitis.  Pt lives with his wife in a rambler with basement with all living areas on main level and fully handicap accessible home. Pt has a ramp to enter the home and uses a 4WW for mobility.  At baseline Pt is I with amb in his home on level surfaces and has wife supervise gait on stairs.  Wife also assists with donning socks and buttoning pants.   Patient plan for discharge: Home with wife  Current status: Pt able to transfer sit <> stand with CGA/Min A.  Pt amb ~ 60' with FWW and CGA with slow mildly ataxic gait pattern with narrow JIGAR and decreased feet clearance with step to pattern.  Pt demonstrates 5/5 strength of B UE major mms groups except slight giving way with R shld abd.  B hip flex 4 to 4+/5 and L ankle DF 4+/5; otherwise B knee ext and R ankle DF 5/5.  Barriers to return to prior living situation: none  Recommendations for discharge: Home with OP PT progressing to Home PT  Rationale for recommendations: Continued skilled PT intervention in the OP setting to improve gait pattern, increase strength and balance and enable pt to return to modified independence with all functional mobility.       Entered by: Leeanne Foy 05/25/2018 11:10 AM

## 2018-05-25 NOTE — PROGRESS NOTES
"   05/25/18 1150   General Information   Onset Date 05/24/18   Start of Care Date 05/25/18   Referring Physician Jose A Do MD   Patient Profile Review/OT: Additional Occupational Profile Info See Profile for full history and prior level of function   Patient/Family Goals Statement Pt would like to eat   Swallowing Evaluation Bedside swallow evaluation   Behaviorial Observations WFL (within functional limits)   Mode of current nutrition NPO   Respiratory Status Room air   Comments This is a 72-year-old gentleman with a history of PLS, primary lateral sclerosis, on a baclofen pump, prior history of prostate cancer, prior history of CVA, at baseline has a hoarse voice and ambulates with a walker due to weakness in his legs.  Felt more weak yesterday when he went to bed and the weakness has persisted such that he could not get up on his own without any help.  Pt was broungt to ED.  MRI showed infarct in R frontal area. Pt seen by ST in the past at ALS clinic but has been on regular diet and thin liquids at home. Pt with baseline dysarthria which pts wife states is \"slightly\" worse since admission. Pt denies any trouble swallowing PTA. Bedside swallow eval completed per MD orders to further assess oropharyngeal swallow function.    Clinical Swallow Evaluation   Oral Musculature generally intact   Structural Abnormalities none present   Dentition present and adequate   Mucosal Quality adequate   Mandibular Strength and Mobility intact   Oral Labial Strength and Mobility impaired coordination;impaired seal   Lingual Strength and Mobility impaired anterior elevation   Velar Elevation intact   Buccal Strength and Mobility impaired   Laryngeal Function Cough;Throat clear;Swallow;Voicing initiated   Oral Musculature Comments Baseline dysarthria   Additional Documentation Yes   Clinical Swallow Eval: Thin Liquid Texture Trial   Mode of Presentation, Thin Liquids cup;self-fed;spoon   Volume of Liquid or Food Presented 5 " oz   Oral Phase of Swallow Premature pharyngeal entry   Pharyngeal Phase of Swallow impaired;throat clearing   Successful Strategies Trialed During Procedure chin tuck   Diagnostic Statement Thin liquids via cup resulted in inconsistent overt s/sx of aspiration marked by throat clearing. Chin tuck effective in minimizing s/sx of aspiration   Clinical Swallow Eval: Puree Solid Texture Trial   Mode of Presentation, Puree spoon;self-fed   Volume of Puree Presented 5 tbp   Oral Phase, Puree WFL   Pharyngeal Phase, Puree intact   Diagnostic Statement Pt tolerated pureed textures via spoon with no overt s/sx of aspiration   Clinical Swallow Eval: Solid Food Texture Trial   Mode of Presentation, Solid self-fed   Volume of Solid Food Presented 4 tbsp   Oral Phase, Solid other (see comments)  (mildly prolonged but functional time for mastication)   Oral Residue, Solid mid posterior tongue   Pharyngeal Phase, Solid intact   Diagnostic Statement Regular solid textures required prolonged mastication time which resulted in mild oral residuals. Pt was able to clear independently with liquid wash. No overt s/sx of aspiration   VFSS Evaluation   VFSS Additional Documentation No   FEES Evaluation   Additional Documentation No   Swallow Compensations   Swallow Compensations Alternate viscosity of consistencies;Effortful swallow;Chin tuck;Pacing;Reduce amounts;Multiple swallow   Results Oral difficulties only;Suspect aspiration   General Therapy Interventions   Planned Therapy Interventions Dysphagia Treatment   Dysphagia treatment Compensatory strategies for swallowing;Instruction of safe swallow strategies;Modified diet education   Swallow Eval: Clinical Impressions   Skilled Criteria for Therapy Intervention Skilled criteria met.  Treatment indicated.   Functional Assessment Scale (FAS) 5   Treatment Diagnosis mild oropharyngeal dysphagia   Diet texture recommendations Regular diet;Thin liquids   Recommended Feeding/Eating  Techniques alternate between small bites and sips of food/liquid;check mouth frequently for oral residue/pocketing;hard swallow w/ each bite or sip;maintain upright posture during/after eating for 30 mins;small sips/bites;tuck chin during every swallow   Demonstrates Need for Referral to Another Service occupational therapy;physical therapy;respiratory therapy;dietitian   Therapy Frequency 5 times/wk   Predicted Duration of Therapy Intervention (days/wks) 1 week   Anticipated Discharge Disposition home w/ home health   Risks and Benefits of Treatment have been explained. Yes   Patient, family and/or staff in agreement with Plan of Care Yes   Clinical Impression Comments SLP: Clinical swallow eval completed per MD orders. Pt presents with mild oropharyngeal dysphagia s/p CVA. Pt with baseline dysarthria d/t PLS. Thin liquids via cup resulted in inconsistent overt s/sx of aspiration marked by throat clearing, however pt with improved tolerance and no overt s/sx of aspiration with cues to use chin tuck. Pt tolerated pureed textures and regular solid textures with no overt s/sx of aspiration. Regular solid textures resulted in prolonged time for mastication with minimal oral residuals. Pt was able to clear with liquid wash x1. Recommend pt initiate regular textures and thin liquids with chin tuck. Pt should self-select softer textures, pace self, chin tuck every sip, and take small single sips/bites. ST to continue to follow for diet tolerance and train safe swallow strategies. Pt would benefit from ongoing ST upon return home for dysphagia.    Total Evaluation Time   Total Evaluation Time (Minutes) 8

## 2018-05-25 NOTE — PHARMACY-ADMISSION MEDICATION HISTORY
Updated baclofen pump details as able.  Clinic pt receives care at closed today thru memorial day.  Need to confirm basal rate(s) and reservoir volume on Tuesday.

## 2018-05-25 NOTE — PHARMACY-ADMISSION MEDICATION HISTORY
Admission medication history interview status for this patient is complete. See [facility] admission navigator for allergy information, prior to admission medications and immunization status.     Medication history interview source(s):Patient  Medication history resources (including written lists, pill bottles, clinic record):None    Changes made to PTA medication list:  Added: none  Deleted: none  Changed: ditropan-XL    Actions taken by pharmacist (provider contacted, etc):None     Additional medication history information:Taken a dose of protonix and ditropan XL in the ED tonight    Medication reconciliation/reorder completed by provider prior to medication history? No    For patients on insulin therapy: no (Yes/No)   Lantus/levemir/NPH/Mix 70/30 dose: ___ in AM/PM or twice daily   Sliding scale Novolog Y/N   If Yes, do you have a baseline novolog pre-meal dose: ______units with meals   Patients eat three meals a day: Y/N ---  How many episodes of hypoglycemia (low blood glucose) do you have weekly: ---   How many missed doses do you have a week: ---  How many times do you check your blood glucose per day: ---  Any Barriers to therapy: cost of medications/comfortable with giving injections (if applicable)/ comfortable and confident with current diabetes regimen ---      Prior to Admission medications    Medication Sig Last Dose Taking? Auth Provider   ASPIRIN PO Take 325 mg by mouth daily  5/24/2018 at am Yes Reported, Patient   baclofen (LIORESAL) in NaCl 0.9% 100 mL intrathecal infusion by Intrathecal route continuous Pump filled by [facility] [phone]  Pump Serial Number: [id number], Pump Model Number: 8637-20  Last fill:  Last week  Next fill: October  Low [place] Alarm Date: 10/13/17  Reservoir Volume: 19.2 ml  Conc: 2000 mcg/ml  Flex schedule delivers 266.5 mcg/day  Basal rate 11 mcg/hr     For 3 hours starting at 0500 increases to 11.9 mcg/hr active Yes Reported, Patient    cephALEXin (KEFLEX) 500 MG capsule Take 1 capsule (500 mg) by mouth 4 times daily for 7 days  Yes Ricardo Wilder MD   enalapril (VASOTEC) 5 MG tablet Take 1 tablet (5 mg) by mouth daily 5/24/2018 at am Yes Lida Ray MD   EPINEPHrine 0.3 MG/0.3ML injection Inject 0.3 mLs (0.3 mg) into the muscle as needed for anaphylaxis  Yes Lida Ray MD   ketoconazole (NIZORAL) 2 % shampoo SHAMPOO EVERY 2-3 DAYS AS  NEEDED  Yes Lida Ray MD   oxybutynin (DITROPAN-XL) 10 MG 24 hr tablet Take 20 mg by mouth At Bedtime 5/24/2018 at ED Yes Unknown, Entered By History   pantoprazole (PROTONIX) 40 MG EC tablet TAKE 1 TABLET BY MOUTH ONCE DAILY 5/23/2018 at hs Yes Lida Ray MD   polyethylene glycol (MIRALAX/GLYCOLAX) Packet Take 1 packet by mouth every other day  5/23/2018 at Unknown time Yes Reported, Patient   potassium chloride (K-TAB,KLOR-CON) 10 MEQ tablet Take 2 tablets (20 mEq) by mouth daily GIVE THE GENERIC: THIS IS NOT A REQUEST FOR BRAND NAME 5/24/2018 at am Yes Lida Ray MD

## 2018-05-25 NOTE — PLAN OF CARE
Problem: Patient Care Overview  Goal: Plan of Care/Patient Progress Review  Discharge Planner SLP   Patient plan for discharge: none stated  Current status: Clinical swallow eval completed today. Pt presents with mild oropharyngeal dysphagia s/p CVA. Pt with baseline dysarthria d/t PLS. Thin liquids via cup resulted in inconsistent overt s/sx of aspiration marked by throat clearing, however pt with improved tolerance and no overt s/sx of aspiration with cues to use chin tuck. Pt tolerated pureed textures and regular solid textures with no overt s/sx of aspiration. Regular solid textures resulted in prolonged time for mastication with minimal oral residuals. Pt was able to clear with liquid wash x1. Recommend pt initiate regular textures and thin liquids with chin tuck. Pt should self-select softer textures, pace self, chin tuck every sip, and take small single sips/bites.   Barriers to return to prior living situation: dysphagia; dysarthria   Recommendations for discharge: home with home ST pending progress  Rationale for recommendations: pt would benefit from continued ST to assess diet tolerance and train strategies to minimize risk for aspiration        Entered by: Yulia Reed 05/25/2018 12:03 PM

## 2018-05-26 ENCOUNTER — APPOINTMENT (OUTPATIENT)
Dept: SPEECH THERAPY | Facility: CLINIC | Age: 73
DRG: 065 | End: 2018-05-26
Payer: COMMERCIAL

## 2018-05-26 ENCOUNTER — MYC MEDICAL ADVICE (OUTPATIENT)
Dept: INTERNAL MEDICINE | Facility: CLINIC | Age: 73
End: 2018-05-26

## 2018-05-26 VITALS
SYSTOLIC BLOOD PRESSURE: 122 MMHG | OXYGEN SATURATION: 93 % | TEMPERATURE: 98.5 F | RESPIRATION RATE: 16 BRPM | DIASTOLIC BLOOD PRESSURE: 71 MMHG

## 2018-05-26 LAB
BASOPHILS # BLD AUTO: 0 10E9/L (ref 0–0.2)
BASOPHILS NFR BLD AUTO: 0.2 %
DIFFERENTIAL METHOD BLD: ABNORMAL
EOSINOPHIL # BLD AUTO: 0.3 10E9/L (ref 0–0.7)
EOSINOPHIL NFR BLD AUTO: 2.3 %
ERYTHROCYTE [DISTWIDTH] IN BLOOD BY AUTOMATED COUNT: 13.8 % (ref 10–15)
HCT VFR BLD AUTO: 40.5 % (ref 40–53)
HGB BLD-MCNC: 13.7 G/DL (ref 13.3–17.7)
IMM GRANULOCYTES # BLD: 0.1 10E9/L (ref 0–0.4)
IMM GRANULOCYTES NFR BLD: 0.6 %
LYMPHOCYTES # BLD AUTO: 0.6 10E9/L (ref 0.8–5.3)
LYMPHOCYTES NFR BLD AUTO: 4.5 %
MCH RBC QN AUTO: 31.1 PG (ref 26.5–33)
MCHC RBC AUTO-ENTMCNC: 33.8 G/DL (ref 31.5–36.5)
MCV RBC AUTO: 92 FL (ref 78–100)
MONOCYTES # BLD AUTO: 0.4 10E9/L (ref 0–1.3)
MONOCYTES NFR BLD AUTO: 3.2 %
NEUTROPHILS # BLD AUTO: 11.9 10E9/L (ref 1.6–8.3)
NEUTROPHILS NFR BLD AUTO: 89.2 %
NRBC # BLD AUTO: 0 10*3/UL
NRBC BLD AUTO-RTO: 0 /100
PLATELET # BLD AUTO: 131 10E9/L (ref 150–450)
RBC # BLD AUTO: 4.41 10E12/L (ref 4.4–5.9)
WBC # BLD AUTO: 13.3 10E9/L (ref 4–11)

## 2018-05-26 PROCEDURE — 40000225 ZZH STATISTIC SLP WARD VISIT: Performed by: SPEECH-LANGUAGE PATHOLOGIST

## 2018-05-26 PROCEDURE — 25000128 H RX IP 250 OP 636: Performed by: INTERNAL MEDICINE

## 2018-05-26 PROCEDURE — 99239 HOSP IP/OBS DSCHRG MGMT >30: CPT | Performed by: INTERNAL MEDICINE

## 2018-05-26 PROCEDURE — 36415 COLL VENOUS BLD VENIPUNCTURE: CPT | Performed by: INTERNAL MEDICINE

## 2018-05-26 PROCEDURE — 92526 ORAL FUNCTION THERAPY: CPT | Mod: GN | Performed by: SPEECH-LANGUAGE PATHOLOGIST

## 2018-05-26 PROCEDURE — 85025 COMPLETE CBC W/AUTO DIFF WBC: CPT | Performed by: INTERNAL MEDICINE

## 2018-05-26 PROCEDURE — 25000132 ZZH RX MED GY IP 250 OP 250 PS 637: Performed by: INTERNAL MEDICINE

## 2018-05-26 RX ORDER — ASPIRIN 325 MG
325 TABLET ORAL DAILY
Qty: 300 TABLET | Refills: 0 | COMMUNITY
Start: 2018-05-26 | End: 2018-05-31

## 2018-05-26 RX ORDER — CEPHALEXIN 500 MG/1
500 CAPSULE ORAL 4 TIMES DAILY
Qty: 28 CAPSULE | Refills: 0 | Status: SHIPPED | OUTPATIENT
Start: 2018-05-26 | End: 2018-05-31

## 2018-05-26 RX ORDER — CLOPIDOGREL BISULFATE 75 MG/1
75 TABLET ORAL DAILY
Qty: 6 TABLET | Refills: 0 | Status: SHIPPED | OUTPATIENT
Start: 2018-05-27 | End: 2018-07-09

## 2018-05-26 RX ORDER — ATORVASTATIN CALCIUM 20 MG/1
20 TABLET, FILM COATED ORAL DAILY
Qty: 30 TABLET | Refills: 1 | Status: SHIPPED | OUTPATIENT
Start: 2018-05-26 | End: 2018-06-18

## 2018-05-26 RX ADMIN — CLOPIDOGREL 75 MG: 75 TABLET, FILM COATED ORAL at 08:43

## 2018-05-26 RX ADMIN — CEFAZOLIN SODIUM 2 G: 2 INJECTION, SOLUTION INTRAVENOUS at 08:43

## 2018-05-26 RX ADMIN — CEFAZOLIN SODIUM 2 G: 2 INJECTION, SOLUTION INTRAVENOUS at 00:47

## 2018-05-26 RX ADMIN — PANTOPRAZOLE SODIUM 40 MG: 40 TABLET, DELAYED RELEASE ORAL at 08:43

## 2018-05-26 RX ADMIN — ACETAMINOPHEN 650 MG: 325 TABLET ORAL at 00:47

## 2018-05-26 RX ADMIN — ACETAMINOPHEN 650 MG: 325 TABLET ORAL at 05:12

## 2018-05-26 ASSESSMENT — ACTIVITIES OF DAILY LIVING (ADL)
ADLS_ACUITY_SCORE: 17

## 2018-05-26 NOTE — PLAN OF CARE
Problem: Patient Care Overview  Goal: Plan of Care/Patient Progress Review  Occupational Therapy Discharge Summary    Reason for therapy discharge:    Discharged to home with outpatient therapy.    Progress towards therapy goal(s). See goals on Care Plan in [facility] electronic health record for goal details.  Goals partially met.  Barriers to achieving goals:   needing CGA for functional transfers and setup for ADLs.    Therapy recommendation(s):    Pt. to discharge home with continued support and setup from his family.  Pt. Not seen by this reporting therapist.

## 2018-05-26 NOTE — PLAN OF CARE
Problem: Patient Care Overview  Goal: Plan of Care/Patient Progress Review  Physical Therapy Discharge Summary    Reason for therapy discharge:    Discharged to home with outpatient therapy.    Progress towards therapy goal(s). See goals on Care Plan in [facility] electronic health record for goal details.  Goals partially met.  Barriers to achieving goals:   discharge on same date as initial evaluation.    Therapy recommendation(s):    Continued therapy is recommended.  Rationale/Recommendations:  continue PT to maximize functional mobility skills and strength.

## 2018-05-26 NOTE — PLAN OF CARE
Problem: SLP General Care Plan  Goal: Swallow Goal: Safely tolerate diet without aspiration (SLP)  Safely tolerate diet without signs/symptoms of aspiration   Discharge Planner SLP   Patient plan for discharge: home with OP PT at [facility]  Current status: Patient is tolerating thin liquids and is independent with use of chin tuck strategy. Family has no further concerns re: swallowing.  Barriers to return to prior living situation: None identified  Recommendations for discharge: No further ST  Rationale for recommendations: Patient is tolerating his baseline diet with strategies       Entered by: Dinesh Gore 05/26/2018 10:42 AM

## 2018-05-26 NOTE — PROGRESS NOTES
Pt seen and examined.  Neurologic status stable.  Decreased redness of LLE.  Appears stable for d/c today, both pt and wife comfortable with discharge home today.  They would like OP rehab ordered    Will d/c on Plavix x 6 days, statin, and keflex x 7 days

## 2018-05-26 NOTE — PLAN OF CARE
Problem: Patient Care Overview  Goal: Plan of Care/Patient Progress Review  Outcome: No Change  Tmax 101.0, Tylenol given, recheck 95.6. Wife at bedside, Shower given, up in recliner with feet elevated with pillows, ice applied per wife request, denies pain, Tele SR, 2 runs of 4 beats or less of PSVT noted, PIV SL, Ancef for LLE cellulitis, baclofen pump in place RL abd/pelvic area, Ax1 walker, Regular diet with thin liquids, PT, OT, Speech following.   Miralax requested by wife for no BM in 2 days. Given PRN.

## 2018-05-26 NOTE — DISCHARGE SUMMARY
Admit Date:     05/24/2018   Discharge Date:     05/26/2018      DISCHARGE SUMMARY      PRINCIPAL FINAL DIAGNOSES:   1.  Acute ischemic cerebrovascular accident affecting left lower extremity with symptoms much improved during hospitalization.  CVA confirmed with brain MRI imaging this admission.   2.  Left lower extremity cellulitis, improved with IV Ancef.  Being discharged on Keflex to complete the clinical course.      PAST MEDICAL HISTORY:   1.  History of primary lateral sclerosis, maintained on a baclofen pump.   2.  History of prostate cancer.   3.  History of previous cerebrovascular accident.   4.  History of weakness including speech, weakness with hoarse voice as well as requiring a walker for ambulation at baseline.      PROCEDURES THIS ADMISSION:   1.  Echocardiogram showing ejection fraction 60-65%.  Bubble study negative.   2.  Lower extremity ultrasound showing no evidence of deep venous thrombosis.   3.  Brain MRI showing restricted diffusion in the right frontal white matter concerning for infarct.   4.  CTA angiogram of the head and neck showing no significant stenoses.   5.  Head CT scan showing no hemorrhage.      REASON FOR ADMISSION:  Please see dictated history and physical.  In brief, Mr. Barber is a 72-year-old male with the above medical history who presented to the hospital with concerns about left lower extremity weakness as well as left lower extremity redness.  Please see dictated history and physical for details.      HOSPITAL COURSE:   1.  Left lower extremity weakness:  Acute onset approximately 24 hours prior to presentation.  Workup included brain MRI showing what appeared to be an acute ischemic CVA, likely related to atherosclerotic disease.  No evidence of embolic source.  The patient had been taking aspirin prior to admission.  On presentation to the ER, ER staff spoke to Neurology who recommended Plavix for 1 week, then transitioned back to full dose aspirin again.  The  patient was also started on a statin medication this admission given his presentation of acute CVA;   2.  Lower extremity cellulitis:  Responded well to IV antibiotics.  He was treated with IV Ancef while hospitalized and being discharged on Keflex to complete his care.      The patient was discharged home today with outpatient therapies ordered.  He already received some home physical therapy in the setting of his chronic neurologic disease, primary lateral sclerosis.      DISCHARGE MEDICATIONS:   1.  Atorvastatin 20 mg daily.  New medication in the setting of stroke.   2.  Keflex 500 mg 4 times a day for 7 days in the setting of cellulitis.   3.  Plavix 75 mg daily for 6 days.   4.  Aspirin 325 mg daily.  Resume this medication in 1 week after you complete Plavix.   5.  Baclofen pump.   6.  Enalapril 5 mg daily.   7.  Epinephrine as needed for anaphylaxis.   8.  Ketaconazole shampoo.   9.  Oxybutynin XL 20 mg at bedtime.   10.  Pantoprazole 40 mg daily.   11.  MiraLax 1 packet every other day.   12.  Potassium chloride 20 mEq daily.      Outpatient PT, OT, and speech were ordered.      FOLLOWUP INSTRUCTIONS:     1.  See primary MD in 5-7 days for routine followup after hospital stay.   2.  Resume aspirin after Plavix is completed.      I saw and examined the patient on day of discharge.  I spent greater than 30 minutes discharging this patient from the hospital on the day of discharge.         RAYA PERDOMO MD             D: 2018   T: 2018   MT: JUNIOR      Name:     ANIL MELGAR   MRN:      [id number]        Account:        [id number]   :      1945           Admit Date:     2018                                  Discharge Date: 2018      Document: [id number]

## 2018-05-26 NOTE — PLAN OF CARE
Problem: SLP General Care Plan  Goal: Swallow Goal: Safely tolerate diet without aspiration (SLP)  Safely tolerate diet without signs/symptoms of aspiration   Speech Language Therapy Discharge Summary    Reason for therapy discharge:    Discharged to home with outpatient therapy.    Progress towards therapy goal(s). See goals on Care Plan in [facility] electronic health record for goal details.  Goals met    Therapy recommendation(s):    No further therapy is recommended.     Patient is tolerating a Regular diet with thin liquids with use of a chin tuck strategy. Dysarthria due to ALS which is at baseline. OP PT planned at [facility].

## 2018-05-26 NOTE — PROVIDER NOTIFICATION
"Text paged MD \"Pt's wife requesting miralax as needed for constipation, no bm in 2 days. Thanks\"   PRN ordered.  "

## 2018-05-26 NOTE — PLAN OF CARE
Problem: Patient Care Overview  Goal: Plan of Care/Patient Progress Review  Pt a/o x4, up with assist of 1 and walker.  LLE redness and swelling improving.  2 BM's today.  VSS.  Tele SR. Neuro's intact except for baseline left sided weakness and gabbled speech.  MD okay'd to discharge, wife will transport home.  Discharge paperwork gone over with pt and wife with understanding, all belongings packed up to be sent home with pt.  Atorvastatin, Cephalexin, and Clopidogrel sent with pt from [facility] pharmacy.   Will continue POC.

## 2018-05-26 NOTE — PLAN OF CARE
Problem: Patient Care Overview  Goal: Plan of Care/Patient Progress Review  Outcome: No Change  A&O. Complains of hip pain, PRN Tylenol given. A1 with a walker/belt. LLE continues to be red/swollen. Small open area on left shin, JOSE ANTONIO. Ice applied to area. [place] q4h. Tele:

## 2018-05-29 ENCOUNTER — OFFICE VISIT (OUTPATIENT)
Dept: INTERNAL MEDICINE | Facility: CLINIC | Age: 73
End: 2018-05-29
Payer: COMMERCIAL

## 2018-05-29 VITALS
HEIGHT: 70 IN | WEIGHT: 165 LBS | HEART RATE: 66 BPM | OXYGEN SATURATION: 99 % | DIASTOLIC BLOOD PRESSURE: 78 MMHG | TEMPERATURE: 98.3 F | SYSTOLIC BLOOD PRESSURE: 128 MMHG | RESPIRATION RATE: 16 BRPM | BODY MASS INDEX: 23.62 KG/M2

## 2018-05-29 DIAGNOSIS — L03.119 CELLULITIS AND ABSCESS OF LEG: Primary | ICD-10-CM

## 2018-05-29 DIAGNOSIS — L02.419 CELLULITIS AND ABSCESS OF LEG: Primary | ICD-10-CM

## 2018-05-29 DIAGNOSIS — I63.9 CEREBRAL INFARCTION, UNSPECIFIED MECHANISM (H): ICD-10-CM

## 2018-05-29 PROCEDURE — 99214 OFFICE O/P EST MOD 30 MIN: CPT | Performed by: NURSE PRACTITIONER

## 2018-05-29 NOTE — MR AVS SNAPSHOT
After Visit Summary   5/29/2018    Elier Barber    MRN: [id number]           Patient Information     Date Of Birth          1945        Visit Information        Provider Department      5/29/2018 4:00 PM Shakira Vincent NP [facility]        Today's Diagnoses     Cellulitis and abscess of leg    -  1    Cerebral infarction, unspecified mechanism (H)           Follow-ups after your visit        Your next 10 appointments already scheduled     Jun 08, 2018  2:30 PM CDT   Neuro Eval with Anastasiya Elliott OTR   [facility] CO Occupational Therapy ([facility])    [address] [facility] [address]   [phone]            Nov 01, 2018 10:00 AM CDT   PFT VISIT with  PFL AMADOU   [facility] Pulmonary Function Testing ([facility])    [address] [place] [address] [phone]            Nov 01, 2018 11:00 AM CDT   (Arrive by 10:45 AM)   Return ALS/Motor Neuron with Gary Tejada MD   [facility] Neurology ([facility])    [address] [place] [address] [phone]              Who to contact     If you have questions or need follow up information about today's clinic visit or your schedule please contact [facility] directly at [phone].  Normal or non-critical lab and imaging results will be communicated to you by MyChart, letter or phone within 4 business days after the clinic has received the results. If you do not hear from us within 7 days, please contact the clinic through MyChart or phone. If you have a critical or abnormal lab result, we will notify you by phone as soon as possible.  Submit refill requests through Kaiam or call your pharmacy and they will forward the refill request to us. Please allow 3 business days for your refill to be completed.          Additional Information About Your Visit        MyChart Information   "   MEDEM gives you secure access to your electronic health record. If you see a primary care provider, you can also send messages to your care team and make appointments. If you have questions, please call your primary care clinic.  If you do not have a primary care provider, please call [phone] and they will assist you.        Care EveryWhere ID     This is your Care EveryWhere ID. This could be used by other organizations to access your [facility] medical records  [id number]        Your Vitals Were     Pulse Temperature Respirations Height Pulse Oximetry BMI (Body Mass Index)    66 98.3  F (36.8  C) (Oral) 16 5' 10\" (1.778 m) 99% 23.68 kg/m2       Blood Pressure from Last 3 Encounters:   05/29/18 128/78   05/26/18 122/71   05/04/18 138/84    Weight from Last 3 Encounters:   05/29/18 165 lb (74.8 kg)   05/04/18 165 lb 12.8 oz (75.2 kg)   05/03/18 167 lb 11.2 oz (76.1 kg)              Today, you had the following     No orders found for display       Primary Care Provider Office Phone # Fax #    Lida Ray [phone]       [address]  [place] [id number]        Equal Access to Services     MADDIE FORD : Hadii aad ku hadasho Soomaali, waaxda luqadaha, qaybta kaalmada adeegyada, waxay idiin hayaan adetg mills la'tigist . So [facility] [place] [phone].    ATENCIÓN: Si habla español, tiene a gant disposición servicios gratuitos de asistencia lingüística. Llame al [phone].    We comply with applicable federal civil rights laws and [place] laws. We do not discriminate on the basis of race, color, national origin, age, disability, sex, sexual orientation, or gender identity.            Thank you!     Thank you for choosing [facility]  for your care. Our goal is always to provide you with excellent care. Hearing back from our patients is one way we can continue to improve our services. Please take a few minutes to complete the written survey that you may receive in the " mail after your visit with us. Thank you!             Your Updated Medication List - Protect others around you: Learn how to safely use, store and throw away your medicines at www.disposemymeds.org.          This list is accurate as of 5/29/18 11:59 PM.  Always use your most recent med list.                   Brand Name Dispense Instructions for use Diagnosis    aspirin 325 MG tablet     300 tablet    Take 1 tablet (325 mg) by mouth daily Resume this mediciation in 1 week after you complete Plavix    Cerebrovascular accident (CVA) due to occlusion of middle cerebral artery, unspecified blood vessel laterality (H)       atorvastatin 20 MG tablet    LIPITOR    30 tablet    Take 1 tablet (20 mg) by mouth daily    Cerebrovascular accident (CVA) due to occlusion of middle cerebral artery, unspecified blood vessel laterality (H)       baclofen (LIORESAL) in NaCl 0.9% 100 mL intrathecal infusion      by Intrathecal route continuous Pump filled by [facility] stroke [place] [phone] Pump Model: Smart Panel Last fill:  1/30/2018 Next fill: 5/29/18 Low [place] Alarm Date: 5/29/18 Reservoir Volume: unknown Conc: 2000 mcg/ml Flex schedule delivers 266.5 mcg/day Basal rate:unknown, pt states basal rate increased from 05:00-08:00        cephALEXin 500 MG capsule    KEFLEX    28 capsule    Take 1 capsule (500 mg) by mouth 4 times daily    Left leg cellulitis       clopidogrel 75 MG tablet    PLAVIX    6 tablet    Take 1 tablet (75 mg) by mouth daily for 6 days    Cerebrovascular accident (CVA) due to occlusion of middle cerebral artery, unspecified blood vessel laterality (H)       enalapril 5 MG tablet    VASOTEC    90 tablet    Take 1 tablet (5 mg) by mouth daily    Essential hypertension, benign       EPINEPHrine 0.3 MG/0.3ML injection 2-pack    EPIPEN/ADRENACLICK/or ANY BX GENERIC EQUIV    0.3 mL    Inject 0.3 mLs (0.3 mg) into the muscle as needed for anaphylaxis    Bee sting reaction,  accidental or unintentional, initial encounter       ketoconazole 2 % shampoo    NIZORAL    120 mL    SHAMPOO EVERY 2-3 DAYS AS  NEEDED    Dermatitis       oxybutynin 10 MG 24 hr tablet    DITROPAN-XL     Take 20 mg by mouth At Bedtime        pantoprazole 40 MG EC tablet    PROTONIX    90 tablet    TAKE 1 TABLET BY MOUTH ONCE DAILY    Gastroesophageal reflux disease without esophagitis       polyethylene glycol Packet    MIRALAX/GLYCOLAX     Take 1 packet by mouth every other day        potassium chloride 10 MEQ tablet    K-TAB,KLOR-CON    180 tablet    Take 2 tablets (20 mEq) by mouth daily GIVE THE GENERIC: THIS IS NOT A REQUEST FOR BRAND NAME    Essential hypertension, benign

## 2018-05-29 NOTE — PROGRESS NOTES
SUBJECTIVE:   Elier Barber is a 72 year old male who presents to clinic today for the following health issues:          Hospital Follow-up Visit:    Hospital/Nursing Home/IP Rehab Facility: [facility]  Date of Admission: 05/24/18  Date of Discharge: 05/26/18  Reason(s) for Admission: CVA and Cellulitis            Problems taking medications regularly:  None       Medication changes since discharge: None       Problems adhering to non-medication therapy:  None    Summary of hospitalization:  [facility] discharge summary reviewed  Diagnostic Tests/Treatments reviewed.  Follow up needed: none  Other Healthcare Providers Involved in Patient s Care:         Homecare  Update since discharge: stable.     Post Discharge Medication Reconciliation: discharge medications reconciled, continue medications without change.  Plan of care communicated with patient and family     Coding guidelines for this visit:  Type of Medical   Decision Making Face-to-Face Visit       within 7 Days of discharge Face-to-Face Visit        within 14 days of discharge   Moderate Complexity [id number]   High Complexity 55999 51844                Patient Active Problem List   Diagnosis     Essential hypertension, benign     Primary lateral sclerosis (HCC)     Prostate CA (HCC)     CARDIOVASCULAR SCREENING; LDL GOAL LESS THAN 160     HCD (health care directive)     Vertigo     Cerebral infarction (H)     Advanced directives, counseling/discussion     Muscle spasticity     Edema, unspecified type     Gastroesophageal reflux disease without esophagitis     CVA (cerebral vascular accident) (H)     Past Surgical History:   Procedure Laterality Date     ABDOMEN SURGERY  11/12    Hernia     BIOPSY  4/16    Cancerous growth on leg. Removed by MOHs treatment     C NONSPECIFIC PROCEDURE  6/08     prostatectomy      C NONSPECIFIC PROCEDURE  11/01    colonoscopy     C NONSPECIFIC PROCEDURE  11/2006    hernia, right side     C NONSPECIFIC  PROCEDURE      T + A     HERNIA REPAIR  11/12    Repaired       Social History   Substance Use Topics     Smoking status: Former Smoker     Packs/day: 0.30     Years: 6.00     Types: Cigarettes     Start date: 4/1/1970     Quit date: 10/5/1976     Smokeless tobacco: Never Used     Alcohol use Yes      Comment: 1 drink/week     Family History   Problem Relation Age of Onset     HEART DISEASE Mother      CHF     Hypertension Mother      Family History Negative Father      C.A.D. Maternal Grandfather      CEREBROVASCULAR DISEASE Maternal Uncle      45     CEREBROVASCULAR DISEASE Maternal Uncle          Current Outpatient Prescriptions   Medication Sig Dispense Refill     atorvastatin (LIPITOR) 20 MG tablet Take 1 tablet (20 mg) by mouth daily 30 tablet 1     baclofen (LIORESAL) in NaCl 0.9% 100 mL intrathecal infusion by Intrathecal route continuous Pump filled by [facility] stroke [place] [phone]  Pump Model: InstantLuxe  Last fill:  1/30/2018  Next fill: 5/29/18  Low [place] Alarm Date: 5/29/18  Reservoir Volume: unknown  Conc: 2000 mcg/ml  Flex schedule delivers 266.5 mcg/day  Basal rate:unknown, pt states basal rate increased from 05:00-08:00       cephALEXin (KEFLEX) 500 MG capsule Take 1 capsule (500 mg) by mouth 4 times daily 28 capsule 0     clopidogrel (PLAVIX) 75 MG tablet Take 1 tablet (75 mg) by mouth daily for 6 days 6 tablet 0     enalapril (VASOTEC) 5 MG tablet Take 1 tablet (5 mg) by mouth daily 90 tablet 3     oxybutynin (DITROPAN-XL) 10 MG 24 hr tablet Take 20 mg by mouth At Bedtime       pantoprazole (PROTONIX) 40 MG EC tablet TAKE 1 TABLET BY MOUTH ONCE DAILY 90 tablet 1     polyethylene glycol (MIRALAX/GLYCOLAX) Packet Take 1 packet by mouth every other day        potassium chloride (K-TAB,KLOR-CON) 10 MEQ tablet Take 2 tablets (20 mEq) by mouth daily GIVE THE GENERIC: THIS IS NOT A REQUEST FOR BRAND NAME 180 tablet 3     aspirin 325 MG tablet Take 1 tablet (325  "mg) by mouth daily Resume this mediciation in 1 week after you complete Plavix (Patient not taking: Reported on 5/29/2018) 300 tablet 0     EPINEPHrine 0.3 MG/0.3ML injection Inject 0.3 mLs (0.3 mg) into the muscle as needed for anaphylaxis (Patient not taking: Reported on 5/29/2018) 0.3 mL 3     ketoconazole (NIZORAL) 2 % shampoo SHAMPOO EVERY 2-3 DAYS AS  NEEDED 120 mL 5     BP Readings from Last 3 Encounters:   05/29/18 128/78   05/26/18 122/71   05/04/18 138/84    Wt Readings from Last 3 Encounters:   05/29/18 165 lb (74.8 kg)   05/04/18 165 lb 12.8 oz (75.2 kg)   05/03/18 167 lb 11.2 oz (76.1 kg)                    Reviewed and updated as needed this visit by clinical staff  Tobacco  Allergies  Meds  Med Hx  Surg Hx  Fam Hx  Soc Hx      Reviewed and updated as needed this visit by Provider         ROS:  CONSTITUTIONAL: NEGATIVE for fever, chills, change in weight  ENT/MOUTH: NEGATIVE for ear, mouth and throat problems  RESP: NEGATIVE for significant cough or SOB  CV: POSITIVE for lower extremity edema and NEGATIVE for chest pain/chest pressure, irregular heart beat and palpitations    OBJECTIVE:     /78 (BP Location: Right arm, Patient Position: Sitting, Cuff Size: Adult Large)  Pulse 66  Temp 98.3  F (36.8  C) (Oral)  Resp 16  Ht 5' 10\" (1.778 m)  Wt 165 lb (74.8 kg)  SpO2 99%  BMI 23.68 kg/m2  Body mass index is 23.68 kg/(m^2).  GENERAL: alert, no distress, frail and elderly, in WC  NECK: no adenopathy, no asymmetry, masses, or scars and thyroid normal to palpation  RESP: lungs clear to auscultation - no rales, rhonchi or wheezes  CV: regular rates and rhythm, normal S1 S2, no S3 or S4 and no peripheral edema  SKIN: LLE, knee to toes 2+ nonpitting edema, red cellulitis with 6-8 areas dried yellow drainage.        ASSESSMENT/PLAN:               ICD-10-CM    1. Cellulitis and abscess of leg L03.119     L02.419    2. Cerebral infarction, unspecified mechanism (H) I63.9        F/u 2 days on " cellulitis  Leg elevation  Continue current meds.    Shakira Vincent, DHARMESH  [facility]

## 2018-05-31 ENCOUNTER — OFFICE VISIT (OUTPATIENT)
Dept: INTERNAL MEDICINE | Facility: CLINIC | Age: 73
End: 2018-05-31
Payer: COMMERCIAL

## 2018-05-31 VITALS
RESPIRATION RATE: 16 BRPM | DIASTOLIC BLOOD PRESSURE: 82 MMHG | OXYGEN SATURATION: 93 % | HEART RATE: 69 BPM | TEMPERATURE: 98.6 F | SYSTOLIC BLOOD PRESSURE: 144 MMHG

## 2018-05-31 DIAGNOSIS — R35.0 URINARY FREQUENCY: ICD-10-CM

## 2018-05-31 DIAGNOSIS — L03.116 LEFT LEG CELLULITIS: Primary | ICD-10-CM

## 2018-05-31 DIAGNOSIS — I63.9 CEREBRAL INFARCTION, UNSPECIFIED MECHANISM (H): ICD-10-CM

## 2018-05-31 DIAGNOSIS — I10 ESSENTIAL HYPERTENSION, BENIGN: ICD-10-CM

## 2018-05-31 DIAGNOSIS — L30.9 DERMATITIS: ICD-10-CM

## 2018-05-31 DIAGNOSIS — I67.2 CEREBRAL ATHEROSCLEROSIS: ICD-10-CM

## 2018-05-31 LAB
BACTERIA SPEC CULT: NO GROWTH
BACTERIA SPEC CULT: NO GROWTH
Lab: NORMAL
Lab: NORMAL
SPECIMEN SOURCE: NORMAL
SPECIMEN SOURCE: NORMAL

## 2018-05-31 PROCEDURE — 99214 OFFICE O/P EST MOD 30 MIN: CPT | Performed by: INTERNAL MEDICINE

## 2018-05-31 RX ORDER — OXYBUTYNIN CHLORIDE 10 MG/1
20 TABLET, EXTENDED RELEASE ORAL DAILY
Qty: 180 TABLET | Refills: 3 | Status: SHIPPED | OUTPATIENT
Start: 2018-05-31 | End: 2018-07-16 | Stop reason: DRUGHIGH

## 2018-05-31 RX ORDER — ENALAPRIL MALEATE 5 MG/1
5 TABLET ORAL DAILY
Qty: 90 TABLET | Refills: 3 | Status: SHIPPED | OUTPATIENT
Start: 2018-05-31 | End: 2018-09-14

## 2018-05-31 RX ORDER — CEPHALEXIN 500 MG/1
500 CAPSULE ORAL 4 TIMES DAILY
Qty: 28 CAPSULE | Refills: 0 | Status: SHIPPED | OUTPATIENT
Start: 2018-05-31 | End: 2018-07-09

## 2018-05-31 RX ORDER — POTASSIUM CHLORIDE 750 MG/1
20 TABLET, EXTENDED RELEASE ORAL DAILY
Qty: 180 TABLET | Refills: 3 | Status: SHIPPED | OUTPATIENT
Start: 2018-05-31 | End: 2019-01-18

## 2018-05-31 RX ORDER — KETOCONAZOLE 20 MG/ML
SHAMPOO TOPICAL
Qty: 120 ML | Refills: 11 | Status: SHIPPED | OUTPATIENT
Start: 2018-05-31 | End: 2018-12-02

## 2018-05-31 NOTE — MR AVS SNAPSHOT
After Visit Summary   5/31/2018    Elier Barber    MRN: [id number]           Patient Information     Date Of Birth          1945        Visit Information        Provider Department      5/31/2018 4:40 PM Lida Ray MD [facility]        Today's Diagnoses     Left leg cellulitis    -  1    Cerebral infarction, unspecified mechanism (H)        Cerebral atherosclerosis        Essential hypertension, benign        Urinary frequency        Dermatitis           Follow-ups after your visit        Your next 10 appointments already scheduled     Jun 08, 2018  2:30 PM CDT   Neuro Eval with GEORGE Veilz   [facility] CO Occupational Therapy ([facility])    [address] [facility] [address]   [phone]            Nov 01, 2018 10:00 AM CDT   PFT VISIT with ADAM PFL AMADOU   [facility] Pulmonary Function Testing ([facility])    [address] [place] [address] [phone]            Nov 01, 2018 11:00 AM CDT   (Arrive by 10:45 AM)   Return ALS/Motor Neuron with Gary Tejada MD   [facility] Neurology ([facility])    [address] [place] [address] [phone]              Future tests that were ordered for you today     Open Future Orders        Priority Expected Expires Ordered    Lipid panel reflex to direct LDL Fasting Routine 7/31/2018 8/31/2018 5/31/2018    Hepatic panel Routine 7/31/2018 8/31/2018 5/31/2018            Who to contact     If you have questions or need follow up information about today's clinic visit or your schedule please contact [facility] directly at [phone].  Normal or non-critical lab and imaging results will be communicated to you by MyChart, letter or phone within 4 business days after the clinic has received the results. If you do not hear from us within 7 days, please contact the clinic through Purplehart or  phone. If you have a critical or abnormal lab result, we will notify you by phone as soon as possible.  Submit refill requests through KIDOZ or call your pharmacy and they will forward the refill request to us. Please allow 3 business days for your refill to be completed.          Additional Information About Your Visit        JamStarhart Information     KIDOZ gives you secure access to your electronic health record. If you see a primary care provider, you can also send messages to your care team and make appointments. If you have questions, please call your primary care clinic.  If you do not have a primary care provider, please call [phone] and they will assist you.        Care EveryWhere ID     This is your Care EveryWhere ID. This could be used by other organizations to access your [facility] medical records  [id number]        Your Vitals Were     Pulse Temperature Respirations Pulse Oximetry          69 98.6  F (37  C) (Oral) 16 93%         Blood Pressure from Last 3 Encounters:   05/31/18 144/82   05/29/18 128/78   05/26/18 122/71    Weight from Last 3 Encounters:   05/29/18 165 lb (74.8 kg)   05/04/18 165 lb 12.8 oz (75.2 kg)   05/03/18 167 lb 11.2 oz (76.1 kg)                 Today's Medication Changes          These changes are accurate as of 5/31/18  7:53 PM.  If you have any questions, ask your nurse or doctor.               These medicines have changed or have updated prescriptions.        Dose/Directions    aspirin 81 MG tablet   This may have changed:  Another medication with the same name was removed. Continue taking this medication, and follow the directions you see here.   Changed by:  Lida Ray MD        Dose:  81 mg   Take 81 mg by mouth daily   Refills:  0       * oxybutynin 10 MG 24 hr tablet   Commonly known as:  DITROPAN-XL   This may have changed:  Another medication with the same name was added. Make sure you understand how and when to take each.   Changed by:  Lida Ray MD         Dose:  20 mg   Take 20 mg by mouth At Bedtime   Refills:  0       * oxybutynin 10 MG 24 hr tablet   Commonly known as:  DITROPAN XL   This may have changed:  You were already taking a medication with the same name, and this prescription was added. Make sure you understand how and when to take each.   Used for:  Urinary frequency   Changed by:  Lida Ray MD        Dose:  20 mg   Take 2 tablets (20 mg) by mouth daily   Quantity:  180 tablet   Refills:  3       * Notice:  This list has 2 medication(s) that are the same as other medications prescribed for you. Read the directions carefully, and ask your doctor or other care provider to review them with you.         Where to get your medicines      These medications were sent to [facility]/pharmacy #[id number] - [place], [place] - [address] [address]  [address] [address] [address]    Hours:  [facility] [employer] Phone:  [phone]     enalapril 5 MG tablet    ketoconazole 2 % shampoo    oxybutynin 10 MG 24 hr tablet    potassium chloride 10 MEQ tablet         Some of these will need a paper prescription and others can be bought over the counter.  Ask your nurse if you have questions.     Bring a paper prescription for each of these medications     cephALEXin 500 MG capsule                Primary Care Provider Office Phone # Fax #    Lida Ray [phone]       [address]  [facility] [address]        Equal Access to Services     ZAINAB FORD AH: Hadii aad ku hadasho Soomaali, waaxda luqadaha, qaybta kaalmada adeegyada, jairo rabago. So [facility] [phone].    ATENCIÓN: Si habla español, tiene a gant disposición servicios gratuitos de asistencia lingüística. Llame al [phone].    We comply with applicable federal civil rights laws and [place] laws. We do not discriminate on the basis of race, color, national origin, age, disability, sex, sexual orientation, or gender identity.            Thank you!      Thank you for choosing [facility]  for your care. Our goal is always to provide you with excellent care. Hearing back from our patients is one way we can continue to improve our services. Please take a few minutes to complete the written survey that you may receive in the mail after your visit with us. Thank you!             Your Updated Medication List - Protect others around you: Learn how to safely use, store and throw away your medicines at www.disposemymeds.org.          This list is accurate as of 5/31/18  7:53 PM.  Always use your most recent med list.                   Brand Name Dispense Instructions for use Diagnosis    aspirin 81 MG tablet      Take 81 mg by mouth daily        atorvastatin 20 MG tablet    LIPITOR    30 tablet    Take 1 tablet (20 mg) by mouth daily    Cerebrovascular accident (CVA) due to occlusion of middle cerebral artery, unspecified blood vessel laterality (H)       baclofen (LIORESAL) in NaCl 0.9% 100 mL intrathecal infusion      by Intrathecal route continuous Pump filled by [facility] stroke [place] [phone] Pump Model: Autosprite Last fill:  1/30/2018 Next fill: 5/29/18 [place] Alarm Date: 5/29/18 Reservoir Volume: unknown Conc: 2000 mcg/ml Flex schedule delivers 266.5 mcg/day Basal rate:unknown, pt states basal rate increased from 05:00-08:00        cephALEXin 500 MG capsule    KEFLEX    28 capsule    Take 1 capsule (500 mg) by mouth 4 times daily    Left leg cellulitis       clopidogrel 75 MG tablet    PLAVIX    6 tablet    Take 1 tablet (75 mg) by mouth daily for 6 days    Cerebrovascular accident (CVA) due to occlusion of middle cerebral artery, unspecified blood vessel laterality (H)       enalapril 5 MG tablet    VASOTEC    90 tablet    Take 1 tablet (5 mg) by mouth daily    Essential hypertension, benign       EPINEPHrine 0.3 MG/0.3ML injection 2-pack    EPIPEN/ADRENACLICK/or ANY BX GENERIC EQUIV    0.3 mL    Inject  0.3 mLs (0.3 mg) into the muscle as needed for anaphylaxis    Bee sting reaction, accidental or unintentional, initial encounter       ketoconazole 2 % shampoo    NIZORAL    120 mL    SHAMPOO EVERY 2-3 DAYS AS  NEEDED    Dermatitis       * oxybutynin 10 MG 24 hr tablet    DITROPAN-XL     Take 20 mg by mouth At Bedtime        * oxybutynin 10 MG 24 hr tablet    DITROPAN XL    180 tablet    Take 2 tablets (20 mg) by mouth daily    Urinary frequency       pantoprazole 40 MG EC tablet    PROTONIX    90 tablet    TAKE 1 TABLET BY MOUTH ONCE DAILY    Gastroesophageal reflux disease without esophagitis       polyethylene glycol Packet    MIRALAX/GLYCOLAX     Take 1 packet by mouth every other day        potassium chloride 10 MEQ tablet    K-TAB,KLOR-CON    180 tablet    Take 2 tablets (20 mEq) by mouth daily GIVE THE GENERIC: THIS IS NOT A REQUEST FOR BRAND NAME    Essential hypertension, benign       * Notice:  This list has 2 medication(s) that are the same as other medications prescribed for you. Read the directions carefully, and ask your doctor or other care provider to review them with you.

## 2018-05-31 NOTE — PROGRESS NOTES
SUBJECTIVE:   Elier Barber is a 72 year old male who presents to clinic today for the following health issues:    1.  Follow-up cellulitis left lower leg: He was hospitalized for this and for stroke, did see nurse practitioner here in clinic 2 days ago and was showing some improvement.  He is back for recheck again today.  He reports he has had continued improvement in the redness and swelling of the left lower leg.  He is due to finish his antibiotics and 1-1/2 days.  His wife is a little concerned about this finishing on the weekend.  He has never had any pain in the leg.  He does have a few open abrasions without significant drainage.  Because of the very dry skin his wife is wondering if she could use some lotion or something on the skin.    2. Follow-up stroke: He has had no more neurologic symptoms.  When he was discharged she was told to take Plavix for 6 days and then go back to full strength aspirin which is what he was on prior to this admission.  He has a neurologist at [facility] that he follows, has been managing his pump for spasticity.  The neurologist spoke with his wife on the phone and recommended he take the aspirin 81 mg and stay on the Plavix.  They have some questions about whether I agree with that plan.  He was started on Lipitor because of atherosclerosis as the cause of his stroke, has questions about how that can work as his last LDL was 62.      3.  Diarrhea: He reports some very mild diarrhea since being on the antibiotic.  No pain or cramping.    4.  Urinary frequency: He increase the Ditropan to 20 mg and that is doing better though he still has some frequency.  They were told that the ED that this dose seemed high.    Patient Active Problem List   Diagnosis     Essential hypertension, benign     Primary lateral sclerosis (HCC)     Prostate CA (HCC)     CARDIOVASCULAR SCREENING; LDL GOAL LESS THAN 160     HCD (health care directive)     Vertigo     Cerebral infarction (H)      Advanced directives, counseling/discussion     Muscle spasticity     Edema, unspecified type     Gastroesophageal reflux disease without esophagitis     CVA (cerebral vascular accident) (H)     Current Outpatient Prescriptions   Medication Sig Dispense Refill     aspirin 81 MG tablet Take 81 mg by mouth daily       atorvastatin (LIPITOR) 20 MG tablet Take 1 tablet (20 mg) by mouth daily 30 tablet 1     cephALEXin (KEFLEX) 500 MG capsule Take 1 capsule (500 mg) by mouth 4 times daily 28 capsule 0     clopidogrel (PLAVIX) 75 MG tablet Take 1 tablet (75 mg) by mouth daily for 6 days 6 tablet 0     enalapril (VASOTEC) 5 MG tablet Take 1 tablet (5 mg) by mouth daily 90 tablet 3     oxybutynin (DITROPAN-XL) 10 MG 24 hr tablet Take 20 mg by mouth At Bedtime       pantoprazole (PROTONIX) 40 MG EC tablet TAKE 1 TABLET BY MOUTH ONCE DAILY 90 tablet 1     polyethylene glycol (MIRALAX/GLYCOLAX) Packet Take 1 packet by mouth every other day        potassium chloride (K-TAB,KLOR-CON) 10 MEQ tablet Take 2 tablets (20 mEq) by mouth daily GIVE THE GENERIC: THIS IS NOT A REQUEST FOR BRAND NAME 180 tablet 3     baclofen (LIORESAL) in NaCl 0.9% 100 mL intrathecal infusion by Intrathecal route continuous Pump filled by [facility] stroke [place] [phone]  Pump Model: Vehrity  Last fill:  1/30/2018  Next fill: 5/29/18  Low [place] Alarm Date: 5/29/18  Reservoir Volume: unknown  Conc: 2000 mcg/ml  Flex schedule delivers 266.5 mcg/day  Basal rate:unknown, pt states basal rate increased from 05:00-08:00       EPINEPHrine 0.3 MG/0.3ML injection Inject 0.3 mLs (0.3 mg) into the muscle as needed for anaphylaxis (Patient not taking: Reported on 5/29/2018) 0.3 mL 3     ketoconazole (NIZORAL) 2 % shampoo SHAMPOO EVERY 2-3 DAYS AS  NEEDED 120 mL 5        Reviewed and updated as needed this visit by clinical staff  Tobacco  Allergies  Meds  Med Hx  Surg Hx  Fam Hx  Soc Hx      Reviewed and updated as  needed this visit by Provider         ROS:  No fever, chills, abdominal pain, leg pain, new neurologic symptoms    OBJECTIVE:     /82  Pulse 69  Temp 98.6  F (37  C) (Oral)  Resp 16  SpO2 93%  There is no height or weight on file to calculate BMI.    Left lower leg: There is moderate erythema of the lower third of the leg and into the foot with 2+ pitting edema of the foot, 1-2+ at the ankle.  There are a few open areas mostly with eschars, no purulent exudate.      ASSESSMENT/PLAN:         1. Left leg cellulitis  Improving, strongly advised to elevate his leg at least 2 if not 3 times a day for 20 minutes and that means lying down with leg up on pillows to reduce edema.  Keep any open sores covered with a little bacitracin and gauze to help improve healing.  Can use Rosa cream on the dry skin as long as it is not open.  I did do a new prescription for antibiotic to start if any worsening over the weekend.  Can take a probiotic for diarrhea, if acute worsening may need to be tested for C. difficile.  - cephALEXin (KEFLEX) 500 MG capsule; Take 1 capsule (500 mg) by mouth 4 times daily  Dispense: 28 capsule; Refill: 0    2. Cerebral atherosclerosis/CVA  Advised that since his imaging showed atherosclerosis, he should be on a statin and advised what it may do.  Check labs in 2 months  - Lipid panel reflex to direct LDL Fasting; Future  - Hepatic panel; Future    3. Essential hypertension, benign  Slightly elevated today but has been good on other readings.  Continue current medication  - enalapril (VASOTEC) 5 MG tablet; Take 1 tablet (5 mg) by mouth daily  Dispense: 90 tablet; Refill: 3  - potassium chloride (K-TAB,KLOR-CON) 10 MEQ tablet; Take 2 tablets (20 mEq) by mouth daily GIVE THE GENERIC: THIS IS NOT A REQUEST FOR BRAND NAME  Dispense: 180 tablet; Refill: 3  - oxybutynin (DITROPAN XL) 10 MG 24 hr tablet; Take 2 tablets (20 mg) by mouth daily  Dispense: 180 tablet; Refill: 3    4.  Urinary  frequency  Advised that the Ditropan can go up to 30 mg.  He does not wish to increase it due to potential side effects but will stay at 20 mg          Lida Ray MD  [facility]

## 2018-06-04 ENCOUNTER — MYC MEDICAL ADVICE (OUTPATIENT)
Dept: INTERNAL MEDICINE | Facility: CLINIC | Age: 73
End: 2018-06-04

## 2018-06-05 ENCOUNTER — TELEPHONE (OUTPATIENT)
Dept: INTERNAL MEDICINE | Facility: CLINIC | Age: 73
End: 2018-06-05

## 2018-06-05 NOTE — TELEPHONE ENCOUNTER
Fax received from [facility] for review and signature.  Put in Dr. Ray's in basket.

## 2018-06-13 ENCOUNTER — TELEPHONE (OUTPATIENT)
Dept: INTERNAL MEDICINE | Facility: CLINIC | Age: 73
End: 2018-06-13

## 2018-06-13 NOTE — TELEPHONE ENCOUNTER
Fax received from [facility] for review and signature.  Put in Dr. Ray's in basket.

## 2018-06-14 DIAGNOSIS — Z53.9 DIAGNOSIS NOT YET DEFINED: Primary | ICD-10-CM

## 2018-06-14 PROCEDURE — G0180 MD CERTIFICATION HHA PATIENT: HCPCS | Performed by: INTERNAL MEDICINE

## 2018-06-16 ENCOUNTER — MYC MEDICAL ADVICE (OUTPATIENT)
Dept: INTERNAL MEDICINE | Facility: CLINIC | Age: 73
End: 2018-06-16

## 2018-06-16 DIAGNOSIS — I63.519 CEREBROVASCULAR ACCIDENT (CVA) DUE TO OCCLUSION OF MIDDLE CEREBRAL ARTERY, UNSPECIFIED BLOOD VESSEL LATERALITY (H): ICD-10-CM

## 2018-06-18 NOTE — TELEPHONE ENCOUNTER
"Per 5/26 hosp d/c-Atorvastatin 20 mg daily.  New medication in the setting of stroke    Requested Prescriptions   Pending Prescriptions Disp Refills     atorvastatin (LIPITOR) 20 MG tablet 30 tablet 1     Sig: Take 1 tablet (20 mg) by mouth daily    Statins Protocol Failed    6/18/2018  7:50 AM       Failed - LDL on file in past 12 months    Recent Labs   Lab Test  08/11/14   1111   LDL  62            Passed - No abnormal creatine kinase in past 12 months    Recent Labs   Lab Test  11/02/17   1510   CKT  285               Passed - Recent (12 mo) or future (30 days) visit within the authorizing provider's specialty    Patient had office visit in the last 12 months or has a visit in the next 30 days with authorizing provider or within the authorizing provider's specialty.  See \"Patient Info\" tab in inbasket, or \"Choose Columns\" in Meds & Orders section of the refill encounter.           Passed - Patient is age 18 or older          "

## 2018-06-19 RX ORDER — ATORVASTATIN CALCIUM 20 MG/1
20 TABLET, FILM COATED ORAL DAILY
Qty: 90 TABLET | Refills: 1 | Status: SHIPPED | OUTPATIENT
Start: 2018-06-19 | End: 2018-07-09

## 2018-06-20 ENCOUNTER — MYC MEDICAL ADVICE (OUTPATIENT)
Dept: INTERNAL MEDICINE | Facility: CLINIC | Age: 73
End: 2018-06-20

## 2018-06-28 ENCOUNTER — TRANSFERRED RECORDS (OUTPATIENT)
Dept: HEALTH INFORMATION MANAGEMENT | Facility: CLINIC | Age: 73
End: 2018-06-28

## 2018-07-06 ENCOUNTER — MYC MEDICAL ADVICE (OUTPATIENT)
Dept: INTERNAL MEDICINE | Facility: CLINIC | Age: 73
End: 2018-07-06

## 2018-07-09 ENCOUNTER — MYC MEDICAL ADVICE (OUTPATIENT)
Dept: INTERNAL MEDICINE | Facility: CLINIC | Age: 73
End: 2018-07-09

## 2018-07-09 ENCOUNTER — OFFICE VISIT (OUTPATIENT)
Dept: INTERNAL MEDICINE | Facility: CLINIC | Age: 73
End: 2018-07-09
Payer: COMMERCIAL

## 2018-07-09 VITALS
BODY MASS INDEX: 23.59 KG/M2 | SYSTOLIC BLOOD PRESSURE: 126 MMHG | RESPIRATION RATE: 16 BRPM | WEIGHT: 164.4 LBS | TEMPERATURE: 98.7 F | HEART RATE: 54 BPM | DIASTOLIC BLOOD PRESSURE: 64 MMHG | OXYGEN SATURATION: 96 %

## 2018-07-09 DIAGNOSIS — I63.9 CEREBRAL INFARCTION, UNSPECIFIED MECHANISM (H): ICD-10-CM

## 2018-07-09 DIAGNOSIS — Z23 NEED FOR SHINGLES VACCINE: ICD-10-CM

## 2018-07-09 DIAGNOSIS — S80.812A: Primary | ICD-10-CM

## 2018-07-09 PROCEDURE — 99207 ZZC RSCC CODE FOR CODING REVIEW: CPT | Performed by: INTERNAL MEDICINE

## 2018-07-09 PROCEDURE — 99214 OFFICE O/P EST MOD 30 MIN: CPT | Mod: 25 | Performed by: INTERNAL MEDICINE

## 2018-07-09 PROCEDURE — 90471 IMMUNIZATION ADMIN: CPT | Performed by: INTERNAL MEDICINE

## 2018-07-09 PROCEDURE — 90750 HZV VACC RECOMBINANT IM: CPT | Performed by: INTERNAL MEDICINE

## 2018-07-09 RX ORDER — CLOPIDOGREL BISULFATE 75 MG/1
75 TABLET ORAL DAILY
COMMUNITY
End: 2019-01-18

## 2018-07-09 NOTE — MR AVS SNAPSHOT
After Visit Summary   7/9/2018    Elier Barber    MRN: [id number]           Patient Information     Date Of Birth          1945        Visit Information        Provider Department      7/9/2018 5:00 PM Lida Ray MD [facility]        Today's Diagnoses     Calf abrasion, left, initial encounter    -  1    Cerebral infarction, unspecified mechanism (H)        Need for shingles vaccine           Follow-ups after your visit        Your next 10 appointments already scheduled     Jul 19, 2018 10:00 AM CDT   Neuro Treatment with ZAHIDA Cintron   [facility] Speech Therapy ([facility])    [address] [address]   [phone]            Jul 26, 2018 10:15 AM CDT   Neuro Treatment with ZAHIDA Cintron   [facility] Speech Therapy ([facility])    [address] [address]   [phone]            Aug 02, 2018  2:15 PM CDT   Treatment with ZAHIDA Garay   [facility] Speech Therapy ([facility])    [address] [address]   [phone]            Aug 09, 2018  3:00 PM CDT   Treatment with ZAHIDA Garay  Speech Therapy ([facility])    [address] [address]   [phone]            Aug 15, 2018 10:15 AM CDT   Neuro Treatment with ZAHIDA Garay   [facility] Speech Therapy ([facility])    [address] [address]   [phone]            Aug 20, 2018  3:00 PM CDT   Neuro Treatment with ZAHIDA Garay   [facility] Speech Therapy ([facility])    [address] [address]   [phone]            Nov 01, 2018 10:00 AM CDT   PFT VISIT with  PFL D   [facility] Pulmonary Function Testing ([facility])    [address] [address]    [phone]            Nov 01, 2018 11:00 AM CDT   (Arrive by 10:45 AM)   Return ALS/Motor [place] with Gary Tejada MD   [facility] Neurology ([facility] [place])    [address] [place] [address] [phone]              Who to contact     If you have questions or need follow up information about today's clinic visit or your schedule please contact [facility] directly at [phone].  Normal or non-critical lab and imaging results will be communicated to you by Sv[employer], letter or phone within 4 business days after the clinic has received the results. If you do not hear from us within 7 days, please contact the clinic through [employer] or phone. If you have a critical or abnormal lab result, we will notify you by phone as soon as possible.  Submit refill requests through "[employer]" or call your pharmacy and they will forward the refill request to us. Please allow 3 business days for your refill to be completed.          Additional Information About Your Visit        "[employer]" Information     "[employer]" gives you secure access to your electronic health record. If you see a primary care provider, you can also send messages to your care team and make appointments. If you have questions, please call your primary care clinic.  If you do not have a primary care provider, please call [phone] and they will assist you.        Care EveryWhere ID     This is your Care EveryWhere ID. This could be used by other organizations to access your [facility] medical records  [id number]        Your Vitals Were     Pulse Temperature Respirations Pulse Oximetry BMI (Body Mass Index)       54 98.7  F (37.1  C) (Oral) 16 96% 23.59 kg/m2        Blood Pressure from Last 3 Encounters:   07/09/18 126/64   05/31/18 144/82   05/29/18 128/78    Weight from Last 3 Encounters:   07/09/18 164 lb 6.4 oz (74.6 kg)   05/29/18 165 lb (74.8 kg)   05/04/18 165 lb 12.8 oz (75.2 kg)              We  Performed the Following     HC ZOSTER VACCINE RECOMBINANT ADJUVANTED IM NJX          Today's Medication Changes          These changes are accurate as of 7/9/18 11:59 PM.  If you have any questions, ask your nurse or doctor.               Start taking these medicines.        Dose/Directions    STATIN NOT PRESCRIBED (INTENTIONAL)   Used for:  Cerebral infarction, unspecified mechanism (H)   Started by:  Lida Ray MD        Please choose reason not prescribed, below   Refills:  0         These medicines have changed or have updated prescriptions.        Dose/Directions    clopidogrel 75 MG tablet   Commonly known as:  PLAVIX   This may have changed:  Another medication with the same name was removed. Continue taking this medication, and follow the directions you see here.   Changed by:  Lida Ray MD        Dose:  75 mg   Take 75 mg by mouth daily   Refills:  0            Where to get your medicines      Some of these will need a paper prescription and others can be bought over the counter.  Ask your nurse if you have questions.     You don't need a prescription for these medications     STATIN NOT PRESCRIBED (INTENTIONAL)                Primary Care Provider Office Phone # Fax #    Lida Ray [phone]       [address] [address]        Equal Access to Services     [facility]: Hadii clair resendez Sorogelio, waaxda luqadaha, qaybta kaalradha mathis, jairo mendenhall . So [facility] [phone].    ATENCIÓN: Si habla español, tiene a gant disposición servicios gratuitos de asistencia lingüística. Nory al [phone].    We comply with applicable federal civil rights laws and [place] laws. We do not discriminate on the basis of race, color, national origin, age, disability, sex, sexual orientation, or gender identity.            Thank you!     Thank you for choosing [facility]  for your care. Our goal is always to provide you  with excellent care. Hearing back from our patients is one way we can continue to improve our services. Please take a few minutes to complete the written survey that you may receive in the mail after your visit with us. Thank you!             Your Updated Medication List - Protect others around you: Learn how to safely use, store and throw away your medicines at www.disposemymeds.org.          This list is accurate as of 7/9/18 11:59 PM.  Always use your most recent med list.                   Brand Name Dispense Instructions for use Diagnosis    aspirin 81 MG tablet      Take 81 mg by mouth daily        baclofen (LIORESAL) in NaCl 0.9% 100 mL intrathecal infusion      by Intrathecal route continuous Pump filled by [facility] stroke [place] [phone] Pump Model: Red Lambda Last fill:  1/30/2018 Next fill: 5/29/18 [place] Alarm Date: 5/29/18 Reservoir Volume: unknown Conc: 2000 mcg/ml Flex schedule delivers 266.5 mcg/day Basal rate:unknown, pt states basal rate increased from 05:00-08:00        clopidogrel 75 MG tablet    PLAVIX     Take 75 mg by mouth daily        enalapril 5 MG tablet    VASOTEC    90 tablet    Take 1 tablet (5 mg) by mouth daily    Essential hypertension, benign       EPINEPHrine 0.3 MG/0.3ML injection 2-pack    EPIPEN/ADRENACLICK/or ANY BX GENERIC EQUIV    0.3 mL    Inject 0.3 mLs (0.3 mg) into the muscle as needed for anaphylaxis    Bee sting reaction, accidental or unintentional, initial encounter       ketoconazole 2 % shampoo    NIZORAL    120 mL    SHAMPOO EVERY 2-3 DAYS AS  NEEDED    Dermatitis       * oxybutynin 10 MG 24 hr tablet    DITROPAN-XL     Take 20 mg by mouth At Bedtime        * oxybutynin 10 MG 24 hr tablet    DITROPAN XL    180 tablet    Take 2 tablets (20 mg) by mouth daily    Urinary frequency       pantoprazole 40 MG EC tablet    PROTONIX    90 tablet    TAKE 1 TABLET BY MOUTH ONCE DAILY    Gastroesophageal reflux disease without  esophagitis       polyethylene glycol Packet    MIRALAX/GLYCOLAX     Take 1 packet by mouth every other day        potassium chloride 10 MEQ tablet    K-TAB,KLOR-CON    180 tablet    Take 2 tablets (20 mEq) by mouth daily GIVE THE GENERIC: THIS IS NOT A REQUEST FOR BRAND NAME    Essential hypertension, benign       STATIN NOT PRESCRIBED (INTENTIONAL)      Please choose reason not prescribed, below    Cerebral infarction, unspecified mechanism (H)       * Notice:  This list has 2 medication(s) that are the same as other medications prescribed for you. Read the directions carefully, and ask your doctor or other care provider to review them with you.

## 2018-07-09 NOTE — NURSING NOTE
/64  Pulse 54  Temp 98.7  F (37.1  C) (Oral)  Resp 16  Wt 164 lb 6.4 oz (74.6 kg)  SpO2 96%  BMI 23.59 kg/m2

## 2018-07-09 NOTE — PROGRESS NOTES
SUBJECTIVE:   Elier Barber is a 72 year old male who presents to clinic today for the following health issues:      He recently injured his left calf with significant abraision and has had some increase in swelling and diffuse erythema of the left lower leg and is worried about possibility cellulitis.  He has had cellulitis previously in the lower leg.  No pain, fever or chills.    His neurologist had suggested discussing with me the possibility of doing cardiac workup because of a stroke.  His evaluation showed no significant carotid stenosis.  There was 1 area of significant atherosclerosis in one vessel and angiogram.  He has had no chest pain, pressure, palpitations, dyspnea on exertion.  No claudication.  He has had intolerance of the statin and the neurologist felt that he would be okay with not continuing it.      Patient Active Problem List   Diagnosis     Essential hypertension, benign     Primary lateral sclerosis (HCC)     Prostate CA (HCC)     CARDIOVASCULAR SCREENING; LDL GOAL LESS THAN 160     HCD (health care directive)     Vertigo     Cerebral infarction (H)     Advanced directives, counseling/discussion     Muscle spasticity     Edema, unspecified type     Gastroesophageal reflux disease without esophagitis     Cerebral atherosclerosis     Current Outpatient Prescriptions   Medication Sig Dispense Refill     clopidogrel (PLAVIX) 75 MG tablet Take 75 mg by mouth daily       oxybutynin (DITROPAN XL) 10 MG 24 hr tablet Take 2 tablets (20 mg) by mouth daily 180 tablet 3     STATIN NOT PRESCRIBED, INTENTIONAL, Please choose reason not prescribed, below       aspirin 81 MG tablet Take 81 mg by mouth daily       baclofen (LIORESAL) in NaCl 0.9% 100 mL intrathecal infusion by Intrathecal route continuous Pump filled by [facility] stroke center [phone]  Pump Model: [employer]  Last fill:  1/30/2018  Next fill: 5/29/18  [place] Alarm Date: 5/29/18  [place]  Volume: unknown  Conc: 2000 mcg/ml  Flex schedule delivers 266.5 mcg/day  Basal rate:unknown, pt states basal rate increased from 05:00-08:00       enalapril (VASOTEC) 5 MG tablet Take 1 tablet (5 mg) by mouth daily 90 tablet 3     EPINEPHrine 0.3 MG/0.3ML injection Inject 0.3 mLs (0.3 mg) into the muscle as needed for anaphylaxis (Patient not taking: Reported on 5/29/2018) 0.3 mL 3     ketoconazole (NIZORAL) 2 % shampoo SHAMPOO EVERY 2-3 DAYS AS  NEEDED 120 mL 11     oxybutynin (DITROPAN-XL) 10 MG 24 hr tablet Take 20 mg by mouth At Bedtime       pantoprazole (PROTONIX) 40 MG EC tablet TAKE 1 TABLET BY MOUTH ONCE DAILY 90 tablet 1     polyethylene glycol (MIRALAX/GLYCOLAX) Packet Take 1 packet by mouth every other day        potassium chloride (K-TAB,KLOR-CON) 10 MEQ tablet Take 2 tablets (20 mEq) by mouth daily GIVE THE GENERIC: THIS IS NOT A REQUEST FOR BRAND NAME 180 tablet 3      Social History   Substance Use Topics     Smoking status: Former Smoker     Packs/day: 0.30     Years: 6.00     Types: Cigarettes     Start date: 4/1/1970     Quit date: 10/5/1976     Smokeless tobacco: Never Used     Alcohol use Yes      Comment: 1 drink/week      Reviewed and updated as needed this visit by clinical staff  Tobacco  Allergies  Meds  Med Hx  Surg Hx  Fam Hx  Soc Hx      Reviewed and updated as needed this visit by Provider         ROS:  No fever, chills, leg pain, mild increased leg swelling, no chest pressure, pain, dyspnea on exertion, palpitations, syncope or near syncope      OBJECTIVE:     /64  Pulse 54  Temp 98.7  F (37.1  C) (Oral)  Resp 16  Wt 164 lb 6.4 oz (74.6 kg)  SpO2 96%  BMI 23.59 kg/m2  Body mass index is 23.59 kg/(m^2).    Left leg: There is slight duskiness, mild edema, there is a well-healed abrasion of the posterior calf.  There is no focal induration, marked erythema, warmth.  Full posterior tibial pulses    ASSESSMENT/PLAN:             1. Calf abrasion, left, initial  encounter  Reassured that there does not appear to be any associated cellulitis.  There is some increased swelling, likely related to the injury but recommend elevation.    2. Cerebral infarction, unspecified mechanism (H)  Discussed with the patient and his wife about atherosclerotic disease and the fact that it may be present in the heart, brain and peripheral arteries at the same time.  He has been intolerant of statin, his blood pressures well controlled, non-smoking so his risk factors are being modified as tolerated.  Advised he could go ahead with stress testing and discussed the limitations of stress testing in predicting acute events.  Advised we could just continue to monitor her symptoms.  Patient and his wife would prefer to not proceed with testing at this time.  Advised to call if any symptoms including dyspnea on exertion, chest pressure or pain.  - STATIN NOT PRESCRIBED, INTENTIONAL,; Please choose reason not prescribed, below    3. Need for shingles vaccine    - HC ZOSTER VACCINE RECOMBINANT ADJUVANTED IM NJX        Lida Ray MD  [facility]    25 minutes spent with the patient and his wife, >50% of time spent counseling about celluitis, CAD risk, testing, treatment.

## 2018-07-11 ENCOUNTER — HOSPITAL ENCOUNTER (OUTPATIENT)
Dept: SPEECH THERAPY | Facility: CLINIC | Age: 73
Setting detail: THERAPIES SERIES
End: 2018-07-11
Attending: INTERNAL MEDICINE
Payer: COMMERCIAL

## 2018-07-11 PROCEDURE — 92522 EVALUATE SPEECH PRODUCTION: CPT | Mod: GN | Performed by: SPEECH-LANGUAGE PATHOLOGIST

## 2018-07-11 PROCEDURE — G9186 MOTOR SPEECH GOAL STATUS: HCPCS | Mod: GN,CJ | Performed by: SPEECH-LANGUAGE PATHOLOGIST

## 2018-07-11 PROCEDURE — 40000211 ZZHC STATISTIC SLP  DEPARTMENT VISIT: Performed by: SPEECH-LANGUAGE PATHOLOGIST

## 2018-07-11 PROCEDURE — G8999 MOTOR SPEECH CURRENT STATUS: HCPCS | Mod: GN,CL | Performed by: SPEECH-LANGUAGE PATHOLOGIST

## 2018-07-11 PROCEDURE — 92507 TX SP LANG VOICE COMM INDIV: CPT | Mod: GN | Performed by: SPEECH-LANGUAGE PATHOLOGIST

## 2018-07-11 NOTE — PROGRESS NOTES
[facility]          OUTPATIENT SPEECH LANGUAGE PATHOLOGY LANGUAGE-COGNITION  EVALUATION  PLAN OF TREATMENT FOR OUTPATIENT REHABILITATION  (COMPLETE FOR INITIAL CLAIMS ONLY)  Patient's Last Name, First Name, M.I.  YOB: 1945  Elier Barber                        Provider s Name: [facility] Medical Record No.  [id number]     Onset Date:  05/24/18 (Date of CVA. )   Start of Care Date: 07/11/18   Type:     ___PT  __OT   _X_SLP    Medical Diagnosis:   CVA, Primary Lateral Sclerosis   Speech Language Pathology Diagnosis:   Moderate dysarthria     Visits from SOC: 1                                        ________________________________________________________________________________  Plan of Treatment/Functional Goals:   Planned Therapy Interventions: Communication-1x/week for 6 weeks.       Language / Cognition Goals  1. Goal Identifier: LTG 6-[id number] Intelligibility        Goal Description: Patient will learn, implement, and demonstrate independent use of speech intelligibility strategies to increase intelligibility at the sentence, extended reading and spontaneous conversation level to 80% accuracy in order to communicate verbally with familiar and unfamiliar listeners on a daily basis.        Target Date: 08/30/18                    Predicted Duration of Therapy Intervention (days/wks): 6 weeks pending progress.     Phyllis Brewster MA CCC-SLP       I CERTIFY THE NEED FOR THESE SERVICES FURNISHED UNDER        THIS PLAN OF TREATMENT AND WHILE UNDER MY CARE     (Physician co-signature of this document indicates review and certification of the therapy plan).                  Certification Date From:   7/11/2018  Certification Date To:    10/8/2018        Referring Physician:  Dr. KITA Sam MD     Initial Assessment        See Epic Evaluation Start Of Care Date: 07/11/18

## 2018-07-11 NOTE — PROGRESS NOTES
Speech-Language Pathology Department  [facility]            [phone]  07/11/18 1400       Present No   General Information   Type of Evaluation Dysarthria   Type Of Visit Initial   Start Of Care Date 07/11/18   Referring Physician Dr. KITA Sam MD    Orders Evaluate And Treat   Medical Diagnosis Primary Lateral Sclerosis/ CVA   Onset Of Illness/injury Or Date Of Surgery 05/24/18  (Date of CVA. )   Hearing Bilateral decline, no HA's.    Surgical/Medical history reviewed Yes   Pertinent History Of Current Problem Mr. Barber is a 72  y.o. 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 Most recently Pt was hospitalized in May (5/24-5/26) d/t a CVA. He was d/c'ed from the hospital on a regular diet with thin liquids using a recommended chin tuck strategy-no further dysphagia treatment was recommended by IP SLP. At this time Pt and his wife report changes in his speech intelligibility, while Pt has dysarthria at baseline he and his wife do feel his speech has declined further in recent months. Pt is followed by the [facility] ALS clinic every 6 months. Pt is currently receiving PT and chiropractic services.   Current Community Support  Family/friend caregiver;Therapy services   Patient Role/employment History Retired   Living environment [facility]  ([facility] )   Patient/family Goals Improve speech intelligibility.    Fall Risk Screen   Fall screen comments Pt in open POC w/ PT outside of [facility].    Pain Assessment   Pain Reported No   Speech   AIDS-words, % intelligible 76%   AIDS-sentences, % intelligible 75%   Speech Comments Pt demonstrates moderate dysarthria. Wife is noting that he often leaves off the final sound or syllable of words in conversation. In conversation SLP judges Pt to be ~65-70% intelligible to a trained but  unfamiliar listener.    Education Assessment   Barriers to Learning No barriers   Preferred Learning Style Listening;Reading;Demonstration;Pictures/video   General Therapy Interventions   Planned Therapy Interventions Communication   Communication Improve speech intelligibility   Clinical Impression, SLP Eval   Criteria for Skilled Therapeutic Interventions Met yes;treatment indicated   SLP Diagnosis Moderate Dysarthria    Influenced by the following factors/impairments Fatigue levels.    Functional limitations due to impairments Pt is experiencing difficulty in verbal communication with family, friends, and community members d/t impaired intelligibilty.    Rehab potential affected by Therapy attendance, and carry over with recommended home exercise program (HEP)-though SLP does not anticipate any difficulties.    Therapy Frequency 1 time;per week   Predicted Duration of Therapy Intervention (days/wks) 6 weeks pending progress.    Risks and Benefits of Treatment have been explained. Yes   Patient, Family & other staff in agreement with plan of care Yes   Clinical Impression Comments Nader Morrow, presents with moderate dysarthria characterized by reduced breath support, reduced pacing, and impaired oral motor movements as needed for speech intelligibility. At this time this impairment is negatively impacting his ability to communicate verbally, as he very much so enjoys doing, with family, friends, and community members. SLP recommends Pt to receive direct speech and language services on a 1x/week basis to train speech intelligibility strategies, and train Pt in implementation of recommended strategies and the sentence, extended reading, and conversational levels of speech.    Language/Cognition Goals   Language/Cognition Goals 1   Language/Cognition Goal 1   Goal Identifier LTG 2-Xzcjhhadkh-Eswvmg Intelligibility    Goal Description Patient will learn, implement, and demonstrate independent use of speech  intelligibility strategies to increase intelligibility at the sentence, extended reading and spontaneous conversation level to 80% accuracy in order to communicate verbally with familiar and unfamiliar listeners on a daily basis.    Target Date 08/30/18   Total Session Time   Total Evaluation Time 42 minutes (+8 min. tx).      Thank you for referring Elier Barber to outpatient therapy at [facility] in [place].  Please call Phlylis Brewster MA, SLP-CCC at [phone] or email [email or web address] with any questions or concerns.      Phyllis Brewster M.A., SLP-CCC  Speech-Language Pathologist

## 2018-07-16 ENCOUNTER — TELEPHONE (OUTPATIENT)
Dept: INTERNAL MEDICINE | Facility: CLINIC | Age: 73
End: 2018-07-16

## 2018-07-16 DIAGNOSIS — R35.0 URINARY FREQUENCY: Primary | ICD-10-CM

## 2018-07-16 RX ORDER — OXYBUTYNIN CHLORIDE 10 MG/1
20 TABLET, EXTENDED RELEASE ORAL AT BEDTIME
Qty: 180 TABLET | Refills: 3 | Status: SHIPPED | OUTPATIENT
Start: 2018-07-16 | End: 2019-07-02

## 2018-07-16 NOTE — TELEPHONE ENCOUNTER
Patient would like a prescription for 20 mg Oxybutynin instead of 10 mg's. Right now pt is taking 2 10 mg's for total of 20 mg daily.     Please send prescription to [facility] in [place] on [address].

## 2018-07-19 ENCOUNTER — HOSPITAL ENCOUNTER (OUTPATIENT)
Dept: SPEECH THERAPY | Facility: CLINIC | Age: 73
Setting detail: THERAPIES SERIES
End: 2018-07-19
Attending: INTERNAL MEDICINE
Payer: COMMERCIAL

## 2018-07-19 PROCEDURE — 92507 TX SP LANG VOICE COMM INDIV: CPT | Mod: GN | Performed by: SPEECH-LANGUAGE PATHOLOGIST

## 2018-07-19 PROCEDURE — 40000211 ZZHC STATISTIC SLP  DEPARTMENT VISIT: Performed by: SPEECH-LANGUAGE PATHOLOGIST

## 2018-07-23 ENCOUNTER — DOCUMENTATION ONLY (OUTPATIENT)
Dept: CARE COORDINATION | Facility: CLINIC | Age: 73
End: 2018-07-23

## 2018-07-23 NOTE — PROGRESS NOTES
[facility] now requests orders and shares plan of care/discharge summaries for some patients through [employer].  Please REPLY TO THIS MESSAGE OR ROUTE BACK TO THE AUTHOR in order to give authorization for orders when needed.  This is considered a verbal order, you will still receive a faxed copy of orders for signature.  Thank you for your assistance in improving collaboration for our patients.    ORDER hourly home health aide 0 to 24 hours/day per patient/family request. Supervision per agency protocol. Private pay.    MD SUMMARY    Patient does not report any major issues pertaining to his health. He did report a fall on 7/22 because he lost his balance while standing on the porch and two weeks ago on 7/9 because he was sore from doing pt and his legs gave out. Patient denies hitting his head or sustaining any major injuries from either one of these falls.     bp 108/60, hr 69, rr15, t97.8f, spo2 94 percent on ra. Patient complains of some shoulder pain that is relieved with his chiropractic appts, otherwise he doesn't take anything to mitigate or manage this pain.    pt has a hx of PLS, prostate CA, vertigo, cerebral infarct, weakness of RLE, leg weakness  patient is at risk for hospitalization d/t falls risk and an inability to complete adls/iadls without assistance.   Recommend...hh 0 to 24 hours/day per patient family request. supervision per agency protocol. payer is private  Estimated Length of Home Care Need ... 60 days

## 2018-07-26 ENCOUNTER — HOSPITAL ENCOUNTER (OUTPATIENT)
Dept: SPEECH THERAPY | Facility: CLINIC | Age: 73
Setting detail: THERAPIES SERIES
End: 2018-07-26
Attending: INTERNAL MEDICINE
Payer: COMMERCIAL

## 2018-07-26 PROCEDURE — 92507 TX SP LANG VOICE COMM INDIV: CPT | Mod: GN | Performed by: SPEECH-LANGUAGE PATHOLOGIST

## 2018-07-26 PROCEDURE — 40000211 ZZHC STATISTIC SLP  DEPARTMENT VISIT: Performed by: SPEECH-LANGUAGE PATHOLOGIST

## 2018-08-01 ENCOUNTER — TELEPHONE (OUTPATIENT)
Dept: INTERNAL MEDICINE | Facility: CLINIC | Age: 73
End: 2018-08-01

## 2018-08-02 DIAGNOSIS — Z53.9 DIAGNOSIS NOT YET DEFINED: Primary | ICD-10-CM

## 2018-08-02 PROCEDURE — G0179 MD RECERTIFICATION HHA PT: HCPCS | Performed by: INTERNAL MEDICINE

## 2018-08-03 ENCOUNTER — HOSPITAL ENCOUNTER (OUTPATIENT)
Dept: SPEECH THERAPY | Facility: CLINIC | Age: 73
Setting detail: THERAPIES SERIES
End: 2018-08-03
Attending: INTERNAL MEDICINE
Payer: COMMERCIAL

## 2018-08-03 ENCOUNTER — TELEPHONE (OUTPATIENT)
Dept: INTERNAL MEDICINE | Facility: CLINIC | Age: 73
End: 2018-08-03

## 2018-08-03 PROCEDURE — 92507 TX SP LANG VOICE COMM INDIV: CPT | Mod: GN | Performed by: SPEECH-LANGUAGE PATHOLOGIST

## 2018-08-03 PROCEDURE — 40000211 ZZHC STATISTIC SLP  DEPARTMENT VISIT: Performed by: SPEECH-LANGUAGE PATHOLOGIST

## 2018-08-03 NOTE — TELEPHONE ENCOUNTER
Fax received from [facility] for review and signature.  Put in Dr. Ray's in basket.

## 2018-08-10 ENCOUNTER — HOSPITAL ENCOUNTER (OUTPATIENT)
Dept: SPEECH THERAPY | Facility: CLINIC | Age: 73
Setting detail: THERAPIES SERIES
End: 2018-08-10
Attending: INTERNAL MEDICINE
Payer: COMMERCIAL

## 2018-08-10 PROCEDURE — 92507 TX SP LANG VOICE COMM INDIV: CPT | Mod: GN | Performed by: SPEECH-LANGUAGE PATHOLOGIST

## 2018-08-10 PROCEDURE — 40000211 ZZHC STATISTIC SLP  DEPARTMENT VISIT: Performed by: SPEECH-LANGUAGE PATHOLOGIST

## 2018-08-13 ENCOUNTER — TRANSFERRED RECORDS (OUTPATIENT)
Dept: HEALTH INFORMATION MANAGEMENT | Facility: CLINIC | Age: 73
End: 2018-08-13

## 2018-08-14 ENCOUNTER — HOSPITAL ENCOUNTER (EMERGENCY)
Facility: CLINIC | Age: 73
Discharge: HOME | End: 2018-08-14
Attending: INTERNAL MEDICINE | Admitting: INTERNAL MEDICINE
Payer: COMMERCIAL

## 2018-08-14 ENCOUNTER — TRANSFERRED RECORDS (OUTPATIENT)
Dept: HEALTH INFORMATION MANAGEMENT | Facility: CLINIC | Age: 73
End: 2018-08-14

## 2018-08-14 ENCOUNTER — TELEPHONE (OUTPATIENT)
Dept: INTERNAL MEDICINE | Facility: CLINIC | Age: 73
End: 2018-08-14

## 2018-08-14 ENCOUNTER — APPOINTMENT (OUTPATIENT)
Dept: CT IMAGING | Facility: CLINIC | Age: 73
End: 2018-08-14
Attending: INTERNAL MEDICINE
Payer: COMMERCIAL

## 2018-08-14 VITALS
RESPIRATION RATE: 16 BRPM | OXYGEN SATURATION: 95 % | DIASTOLIC BLOOD PRESSURE: 83 MMHG | TEMPERATURE: 97.3 F | SYSTOLIC BLOOD PRESSURE: 138 MMHG

## 2018-08-14 DIAGNOSIS — S09.90XA CLOSED HEAD INJURY, INITIAL ENCOUNTER: ICD-10-CM

## 2018-08-14 PROCEDURE — 70450 CT HEAD/BRAIN W/O DYE: CPT

## 2018-08-14 PROCEDURE — 99284 EMERGENCY DEPT VISIT MOD MDM: CPT | Mod: 25

## 2018-08-14 ASSESSMENT — ENCOUNTER SYMPTOMS
ABDOMINAL PAIN: 0
MYALGIAS: 0
WOUND: 1

## 2018-08-14 NOTE — TELEPHONE ENCOUNTER
Patient fell in his bathroom about 10:00 a.m. today, lost his balance and fell on bathtub striking his R temple.  No LOC, wife was at his side immediately.  Patient had a derm appointment and went to that as scheduled.  Denies HA, not lethargic or sleepy, no nausea/vomiting but does have a black and blue lump at R temple about the size of a egg.  Patient had PT in his home this afternoon and therapist mentioned that people who are on blood thinners and have a head injury are usually sent to ER for evaluation so wife called clinic for recommendation.  Advised wife that therapist is right, it is best if pt is checked out at ER and she will take him now.  ARELY Hua R.N.

## 2018-08-14 NOTE — ED AVS SNAPSHOT
[facility] Emergency Department    [address]    [facility] [address]    Phone:  [phone]    Fax:  [phone]                                       Elier Barber   MRN: [id number]    Department:  [facility] Emergency Department   Date of Visit:  8/14/2018           Patient Information     Date Of Birth          1945        Your diagnoses for this visit were:     Closed head injury, initial encounter        You were seen by Shala Andrade MD.        Discharge Instructions       Discharge Instructions  Head Injury    You have been seen today for a head injury. You were checked for serious problems, like bleeding on the brain, but these problems cannot always be found right away.  Due to this risk, you should not be alone for 24 hours after your injury. If you are taking a blood thinner, such as aspirin, Pradaxa  (dabigatran), Coumadin  (warfarin), or Plavix  (clopidogrel), you are at especially high risk for immediate or delayed bleeding, and need to re-check with a physician in 24 hours, or sooner if any of the symptoms below happen.     Return to the Emergency Department if:    You are confused, have amnesia, or you are not acting right.    Your headache gets worse or you start to have a really bad headache even with your recommended treatment plan.    You vomit more than once.    You have a convulsion or seizure.    You have trouble walking.    You have weakness or paralysis in an arm or a leg.    You have blood or fluid coming from your ears or nose.    You have new symptoms or anything that worries you.    Sleeping:  It is okay for you to sleep, but someone should wake you up as instructed by your doctor, and someone should check on you at your usual time to wake up.     Activity:    Do not drive for at least 24 hours.    Do not drive if you have dizzy spells or trouble concentrating, or remembering things.    Do not return to any contact sports until  cleared by your regular doctor.     Follow-up:  It is very important that you make an appointment with your clinic and go to the appointment.  If you do not follow-up with your regular doctor, it may result in missing an important development which could result in permanent injury or disability and/or lasting pain.  If there is any problem keeping your appointment, call your doctor or return to the Emergency Department.    MORE INFORMATION:    Concussion:  A concussion is a minor head injury that may cause temporary problems with the way your brain works.  Some symptoms include:  confusion, amnesia, nausea and vomiting, dizziness, fatigue, memory or concentration problems, irritability and sleep problems.    CT Scans: Your evaluation today may have included a CT scan (CAT scan) to look for things like bleeding or a skull fracture (break).  CT scans involve radiation and too many CT scans can cause serious health problems like cancer, especially in children.  Because of this, your doctor may not have ordered a CT scan today if they think you are at low risk for a serious or life threatening problem.    If you were given a prescription for medicine here today, be sure to read all of the information (including the package insert) that comes with your prescription.  This will include important information about the medicine, its side effects, and any warnings that you need to know about.  The pharmacist who fills the prescription can provide more information and answer questions you may have about the medicine.  If you have questions or concerns that the pharmacist cannot address, please call or return to the Emergency Department.     Remember that you can always come back to the Emergency Department if you are not able to see your regular doctor in the amount of time listed above, if you get any new symptoms, or if there is anything that worries you.          Your next 10 appointments already scheduled     Aug 17, 2018   1:15 PM CDT   Neuro Treatment with ZAHIDA Cintron   [facility] Speech Therapy ([facility])    [address] [address]   [phone]            Aug 20, 2018  3:00 PM CDT   Neuro Treatment with Phyllis Brewster, SLP   [facility] Speech Therapy ([facility])    [address] [facility] [address]   [phone]            Nov 01, 2018 10:00 AM CDT   PFT VISIT with  PFL D   [facility] Function Testing ([facility])    [address] [id number] [phone]            Nov 01, 2018 11:00 AM CDT   (Arrive by 10:45 AM)   Return ALS/Motor Neuron with Gary Tejada MD   [facility] Neurology ([facility])    [address] [id number] [phone]              24 Hour Appointment Hotline       To make an appointment at any [facility] clinic, call [phone] ([phone]). If you don't have a family doctor or clinic, we will help you find one. [facility] clinics are conveniently located to serve the needs of you and your family.             Review of your medicines      Our records show that you are taking the medicines listed below. If these are incorrect, please call your family doctor or clinic.        Dose / Directions Last dose taken    aspirin 81 MG tablet   Dose:  81 mg        Take 81 mg by mouth daily   Refills:  0        baclofen (LIORESAL) in NaCl 0.9% 100 mL intrathecal infusion        by Intrathecal route continuous Pump filled by [facility] [phone] Pump Model: Syncromed Last fill:  1/30/2018 Next fill: 5/29/18 Low [place] Alarm Date: 5/29/18 [place]erv[place] Volume: unknown Conc: 2000 mcg/ml Flex schedule delivers 266.5 mcg/day Basal rate:unknown, pt states basal rate increased from 05:00-08:00   Refills:  0        clopidogrel 75 MG tablet   Commonly known as:  PLAVIX    Dose:  75 mg        Take 75 mg by mouth daily   Refills:  0        enalapril 5 MG tablet   Commonly known as:  VASOTEC   Dose:  5 mg   Quantity:  90 tablet        Take 1 tablet (5 mg) by mouth daily   Refills:  3        EPINEPHrine 0.3 MG/0.3ML injection 2-pack   Commonly known as:  EPIPEN/ADRENACLICK/or ANY BX GENERIC EQUIV   Dose:  0.3 mg   Quantity:  0.3 mL        Inject 0.3 mLs (0.3 mg) into the muscle as needed for anaphylaxis   Refills:  3        ketoconazole 2 % shampoo   Commonly known as:  NIZORAL   Quantity:  120 mL        SHAMPOO EVERY 2-3 DAYS AS  NEEDED   Refills:  11        oxybutynin 10 MG 24 hr tablet   Commonly known as:  DITROPAN-XL   Dose:  20 mg   Quantity:  180 tablet        Take 2 tablets (20 mg) by mouth At Bedtime   Refills:  3        pantoprazole 40 MG EC tablet   Commonly known as:  PROTONIX   Quantity:  90 tablet        TAKE 1 TABLET BY MOUTH ONCE DAILY   Refills:  1        polyethylene glycol Packet   Commonly known as:  MIRALAX/GLYCOLAX   Dose:  1 packet        Take 1 packet by mouth every other day   Refills:  0        potassium chloride 10 MEQ tablet   Commonly known as:  K-TAB,KLOR-CON   Dose:  20 mEq   Quantity:  180 tablet        Take 2 tablets (20 mEq) by mouth daily GIVE THE GENERIC: THIS IS NOT A REQUEST FOR BRAND NAME   Refills:  3        STATIN NOT PRESCRIBED (INTENTIONAL)        Please choose reason not prescribed, below   Refills:  0                Procedures and tests performed during your visit     Head CT w/o contrast      Orders Needing Specimen Collection     None      Pending Results     No orders found from 8/12/2018 to 8/15/2018.            Pending Culture Results     No orders found from 8/12/2018 to 8/15/2018.            Pending Results Instructions     If you had any lab results that were not finalized at the time of your Discharge, you can call the ED Lab Result RN at [phone]. You will be contacted by this team for any positive Lab results or changes in  treatment. The nurses are available 7 days a week from 10A to 6:30P.  You can leave a message 24 hours per day and they will return your call.        Test Results From Your Hospital Stay        8/14/2018  4:35 PM      Narrative     CT SCAN OF THE HEAD WITHOUT CONTRAST   8/14/2018 4:28 PM     HISTORY: Fall, head injury.     TECHNIQUE: Axial images of the head and coronal reformations without  IV contrast material. Radiation dose for this scan was reduced using  automated exposure control, adjustment of the mA and/or kV according  to patient size, or iterative reconstruction technique.    COMPARISON: MRI 5/24/2018    FINDINGS: Moderate volume loss is present. White matter  hypoattenuation likely represents chronic small vessel ischemic  change, advanced for age. No evidence of acute ischemia, hemorrhage,  mass, mass effect, or hydrocephalus. The visualized calvarium, skull  base, and paranasal sinuses are unremarkable. Right frontal scalp  contusion is present without evidence of underlying skull fracture,  intracranial hemorrhage, or orbital injury.        Impression     IMPRESSION: Right frontal scalp contusion without evidence of  underlying skull fracture or intracranial hemorrhage.    ROBERT ALBARRAN MD                Clinical Quality Measure: Blood Pressure Screening     Your blood pressure was checked while you were in the emergency department today. The last reading we obtained was  BP: 161/77 . Please read the guidelines below about what these numbers mean and what you should do about them.  If your systolic blood pressure (the top number) is less than 120 and your diastolic blood pressure (the bottom number) is less than 80, then your blood pressure is normal. There is nothing more that you need to do about it.  If your systolic blood pressure (the top number) is 120-139 or your diastolic blood pressure (the bottom number) is 80-89, your blood pressure may be higher than it should be. You should have your  blood pressure rechecked within a year by a primary care provider.  If your systolic blood pressure (the top number) is 140 or greater or your diastolic blood pressure (the bottom number) is 90 or greater, you may have high blood pressure. High blood pressure is treatable, but if left untreated over time it can put you at risk for heart attack, stroke, or kidney failure. You should have your blood pressure rechecked by a primary care provider within the next 4 weeks.  If your provider in the emergency department today gave you specific instructions to follow-up with your doctor or provider even sooner than that, you should follow that instruction and not wait for up to 4 weeks for your follow-up visit.        Thank you for choosing [facility]       Thank you for choosing [facility] for your care. Our goal is always to provide you with excellent care. Hearing back from our patients is one way we can continue to improve our services. Please take a few minutes to complete the written survey that you may receive in the mail after you visit with us. Thank you!        [employer] Information     tic gives you secure access to your electronic health record. If you see a primary care provider, you can also send messages to your care team and make appointments. If you have questions, please call your primary care clinic.  If you do not have a primary care provider, please call [phone] and they will assist you.        Care EveryWhere ID     This is your Care EveryWhere ID. This could be used by other organizations to access your [facility] medical records  [id number]        Equal Access to Services     ZAINAB FORD : Hadii clair Coleman, wajaquan adhikari, qaybta jairo tomlin . So [facility] [phone].    ATENCIÓN: Si habla español, tiene a gant disposición servicios gratuitos de asistencia lingüística. Llame al [phone].    We comply with applicable federal civil rights  laws and [place] laws. We do not discriminate on the basis of race, color, national origin, age, disability, sex, sexual orientation, or gender identity.            After Visit Summary       This is your record. Keep this with you and show to your community pharmacist(s) and doctor(s) at your next visit.

## 2018-08-14 NOTE — ED PROVIDER NOTES
History     Chief Complaint:  Fall    HPI   Elier Barber is a 72 year old male, with a history of PLS, stroke, and HTN, who presents with his wife to the emergency department for evaluation of a fall. The patient reports he lost his balance, fell and hit his head in the bathroom. He denies any loss of consciousness, blood or fluid drainage from ears or nose, chest pain, abdominal pain, or myalgias. The patient's wife reports the patient experienced vertigo on Saturday but she was able to get the crystals back in place on her own.  She reports the are here due to the patient being on blood thinners. She notes the patient recently had a stroke on May 23, 2018.    Allergies:  Penicillins: hives     Medications:    81 mg Aspirin  Plavix  Vasotec  Oxybutynin  Protonix  Miralax  Potassium Chloride    Past Medical History:    Cerebral atherosclerosis  GERD  Cerebral infarction  Vertigo  HTN  Primary lateral sclerosis  Basal cell carcinoma  Hearing loss  Lumbar radiculopathy    Past Surgical History:    Hernia repair  Biopsy of leg  Prostatectomy  Hernia repair  T + A  Hernia repair    Family History:    CHF  HTN    Social History:  Smoking status: Former smoker of 0.30 ppd for 6 years  Alcohol use: Yes  The patient presents to the emergency department with his wife.  Marital Status:   [2]     Review of Systems   HENT: Negative for ear discharge and nosebleeds.    Cardiovascular: Negative for chest pain.   Gastrointestinal: Negative for abdominal pain.   Musculoskeletal: Negative for myalgias.   Skin: Positive for wound.   Neurological: Negative for syncope.   All other systems reviewed and are negative.    Physical Exam   Patient Vitals for the past 24 hrs:   BP Temp Temp src Heart Rate Resp SpO2   08/14/18 1645 138/83 - - - - 95 %   08/14/18 1615 141/75 - - - - 95 %   08/14/18 1549 161/77 97.3  F (36.3  C) Oral 60 16 95 %     Physical Exam   Constitutional: He is oriented to person, place, and time. No  distress.   HENT:   Head: Normocephalic. Head is without raccoon's eyes and without Shannon's sign.       Right Ear: External ear normal.   Left Ear: External ear normal.   Eyes: EOM are normal. Pupils are equal, round, and reactive to light.   Neck: Normal range of motion. No spinous process tenderness present.   Cardiovascular: Normal rate, regular rhythm and normal heart sounds.    Pulmonary/Chest: Breath sounds normal. No respiratory distress.   Abdominal: Soft. Bowel sounds are normal. There is no tenderness. There is no rebound.   Lymphadenopathy:     He has no cervical adenopathy.   Neurological: He is alert and oriented to person, place, and time. GCS eye subscore is 4. GCS verbal subscore is 5. GCS motor subscore is 6.   Skin: Ecchymosis noted.   Psychiatric: He has a normal mood and affect.       Emergency Department Course   Imaging:  Radiographic findings were communicated with the patient and family who voiced understanding of the findings.    Head CT w/o Contrast  IMPRESSION: Right frontal scalp contusion without evidence of  underlying skull fracture or intracranial hemorrhage.  As read by Radiology.    Emergency Department Course:  Past medical records, nursing notes, and vitals reviewed.  1550: I performed an exam of the patient and obtained history, as documented above.     The patient was sent for a head CT while in the emergency department, findings above.    1635: I rechecked the patient. Explained findings to the patient and his wife.    Findings and plan explained to the Patient and spouse. Patient discharged home with instructions regarding supportive care, medications, and reasons to return. The importance of close follow-up was reviewed.   Impression & Plan    Medical Decision Making:    Elier Barber is a 72 year old male chronically anticoagulated with Plavix who presents to the emergency department after head injury earlier today.  He is relatively asymptomatic.  No focal new findings  on exam.  CT shows no evidence of intracranial bleed, skull fracture, or other significant abnormality.  I think is appropriate for continued outpatient management.  He is here with his wife who is able to observe him.  He is discharged with head injury instructions, return if new signs or symptoms or other problems.      Diagnosis:    ICD-10-CM   1. Closed head injury, initial encounter S09.90XA     Disposition:  Discharged to home with his wife and instructions for close follow up.  Yoly Moscoso  8/14/2018   [facility] EMERGENCY DEPARTMENT  Scribe Disclosure:  I, Yoly Moscoso, am serving as a scribe at 3:50 PM on 8/14/2018 to document services personally performed by Shala Andrade MD based on my observations and the provider's statements to me.      Shala Andrade MD  08/14/18 3861

## 2018-08-14 NOTE — ED NOTES
Patient presents with fall around 2843-9281. Patient was in bathroom and lost his balance, striking his head on the bathtub, bruising/bump to right side of head. Patient has had multiple falls in the past 6 months. Patient on Plavix. ABCDs intact, alert and oriented x 4.

## 2018-08-14 NOTE — DISCHARGE INSTRUCTIONS
Discharge Instructions  Head Injury    You have been seen today for a head injury. You were checked for serious problems, like bleeding on the brain, but these problems cannot always be found right away.  Due to this risk, you should not be alone for 24 hours after your injury. If you are taking a blood thinner, such as aspirin, Pradaxa  (dabigatran), Coumadin  (warfarin), or Plavix  (clopidogrel), you are at especially high risk for immediate or delayed bleeding, and need to re-check with a physician in 24 hours, or sooner if any of the symptoms below happen.     Return to the Emergency Department if:    You are confused, have amnesia, or you are not acting right.    Your headache gets worse or you start to have a really bad headache even with your recommended treatment plan.    You vomit more than once.    You have a convulsion or seizure.    You have trouble walking.    You have weakness or paralysis in an arm or a leg.    You have blood or fluid coming from your ears or nose.    You have new symptoms or anything that worries you.    Sleeping:  It is okay for you to sleep, but someone should wake you up as instructed by your doctor, and someone should check on you at your usual time to wake up.     Activity:    Do not drive for at least 24 hours.    Do not drive if you have dizzy spells or trouble concentrating, or remembering things.    Do not return to any contact sports until cleared by your regular doctor.     Follow-up:  It is very important that you make an appointment with your clinic and go to the appointment.  If you do not follow-up with your regular doctor, it may result in missing an important development which could result in permanent injury or disability and/or lasting pain.  If there is any problem keeping your appointment, call your doctor or return to the Emergency Department.    MORE INFORMATION:    Concussion:  A concussion is a minor head injury that may cause temporary problems with the way  your brain works.  Some symptoms include:  confusion, amnesia, nausea and vomiting, dizziness, fatigue, memory or concentration problems, irritability and sleep problems.    CT Scans: Your evaluation today may have included a CT scan (CAT scan) to look for things like bleeding or a skull fracture (break).  CT scans involve radiation and too many CT scans can cause serious health problems like cancer, especially in children.  Because of this, your doctor may not have ordered a CT scan today if they think you are at low risk for a serious or life threatening problem.    If you were given a prescription for medicine here today, be sure to read all of the information (including the package insert) that comes with your prescription.  This will include important information about the medicine, its side effects, and any warnings that you need to know about.  The pharmacist who fills the prescription can provide more information and answer questions you may have about the medicine.  If you have questions or concerns that the pharmacist cannot address, please call or return to the Emergency Department.     Remember that you can always come back to the Emergency Department if you are not able to see your regular doctor in the amount of time listed above, if you get any new symptoms, or if there is anything that worries you.

## 2018-08-14 NOTE — ED AVS SNAPSHOT
[facility] Emergency Department    [address]    [facility] [address]    Phone:  [phone]    Fax:  [phone]                                       Elier Barber   MRN: [id number]    Department:  [facility] Emergency Department   Date of Visit:  8/14/2018           After Visit Summary Signature Page     I have received my discharge instructions, and my questions have been answered. I have discussed any challenges I see with this plan with the nurse or doctor.    ..........................................................................................................................................  Patient/Patient Representative Signature      ..........................................................................................................................................  Patient Representative Print Name and Relationship to Patient    ..................................................               ................................................  Date                                            Time    ..........................................................................................................................................  Reviewed by Signature/Title    ...................................................              ..............................................  Date                                                            Time

## 2018-08-17 ENCOUNTER — HOSPITAL ENCOUNTER (OUTPATIENT)
Dept: SPEECH THERAPY | Facility: CLINIC | Age: 73
Setting detail: THERAPIES SERIES
End: 2018-08-17
Attending: INTERNAL MEDICINE
Payer: COMMERCIAL

## 2018-08-17 PROCEDURE — 40000211 ZZHC STATISTIC SLP  DEPARTMENT VISIT: Performed by: SPEECH-LANGUAGE PATHOLOGIST

## 2018-08-17 PROCEDURE — 92507 TX SP LANG VOICE COMM INDIV: CPT | Mod: GN | Performed by: SPEECH-LANGUAGE PATHOLOGIST

## 2018-08-20 ENCOUNTER — HOSPITAL ENCOUNTER (OUTPATIENT)
Dept: SPEECH THERAPY | Facility: CLINIC | Age: 73
Setting detail: THERAPIES SERIES
End: 2018-08-20
Attending: INTERNAL MEDICINE
Payer: COMMERCIAL

## 2018-08-20 PROCEDURE — 40000211 ZZHC STATISTIC SLP  DEPARTMENT VISIT: Performed by: SPEECH-LANGUAGE PATHOLOGIST

## 2018-08-20 PROCEDURE — 92507 TX SP LANG VOICE COMM INDIV: CPT | Mod: GN | Performed by: SPEECH-LANGUAGE PATHOLOGIST

## 2018-08-25 DIAGNOSIS — K21.9 GASTROESOPHAGEAL REFLUX DISEASE WITHOUT ESOPHAGITIS: ICD-10-CM

## 2018-08-27 NOTE — TELEPHONE ENCOUNTER
"Requested Prescriptions   Pending Prescriptions Disp Refills     pantoprazole (PROTONIX) 40 MG EC tablet [Pharmacy Med Name: PANTOPRAZOLE SOD DR 40 MG TAB] 90 tablet 1    Last Written Prescription Date:  01/05/2018  Last Fill Quantity: 90,  # refills: 1   Last office visit: Department has no specialty with prescribing provider:     Future Office Visit:   Sig: TAKE 1 TABLET BY MOUTH ONCE DAILY    PPI Protocol Passed    8/25/2018 12:41 PM       Passed - Not on Clopidogrel (unless Pantoprazole ordered)       Passed - No diagnosis of osteoporosis on record       Passed - Recent (12 mo) or future (30 days) visit within the authorizing provider's specialty    Patient had office visit in the last 12 months or has a visit in the next 30 days with authorizing provider or within the authorizing provider's specialty.  See \"Patient Info\" tab in inbasket, or \"Choose Columns\" in Meds & Orders section of the refill encounter.           Passed - Patient is age 18 or older        "

## 2018-08-28 RX ORDER — PANTOPRAZOLE SODIUM 40 MG/1
TABLET, DELAYED RELEASE ORAL
Qty: 90 TABLET | Refills: 2 | Status: SHIPPED | OUTPATIENT
Start: 2018-08-28 | End: 2019-05-07

## 2018-09-14 DIAGNOSIS — I10 ESSENTIAL HYPERTENSION, BENIGN: ICD-10-CM

## 2018-09-14 NOTE — TELEPHONE ENCOUNTER
"Requested Prescriptions   Pending Prescriptions Disp Refills     KLOR-CON 10 MEQ CR tablet [Pharmacy Med Name: KLOR-CON M10 TAB 10MEQ ER] 180 tablet 3    Last Written Prescription Date:  05/31/2018  Last Fill Quantity: 180,  # refills: 3   Last office visit: 7/9/2018 with prescribing provider:     Future Office Visit:   Sig: TAKE 2 TABLETS (20 MEQ)    DAILY    Potassium Supplements Protocol Passed    9/14/2018  2:06 PM       Passed - Recent (12 mo) or future (30 days) visit within the authorizing provider's specialty    Patient had office visit in the last 12 months or has a visit in the next 30 days with authorizing provider or within the authorizing provider's specialty.  See \"Patient Info\" tab in inbasket, or \"Choose Columns\" in Meds & Orders section of the refill encounter.           Passed - Patient is age 18 or older       Passed - Normal serum potassium in past 12 months    Recent Labs   Lab Test  05/25/18   0655   POTASSIUM  4.0                    enalapril (VASOTEC) 5 MG tablet [Pharmacy Med Name: ENALAPRIL TAB 5MG] 90 tablet 3    Last Written Prescription Date:  05/31/2018  Last Fill Quantity: 90,  # refills: 3   Last office visit: 7/9/2018 with prescribing provider:     Future Office Visit:   Sig: TAKE 1 TABLET DAILY    ACE Inhibitors (Including Combos) Protocol Passed    9/14/2018  2:06 PM       Passed - Blood pressure under 140/90 in past 12 months    BP Readings from Last 3 Encounters:   08/14/18 138/83   07/09/18 126/64   05/31/18 144/82                Passed - Recent (12 mo) or future (30 days) visit within the authorizing provider's specialty    Patient had office visit in the last 12 months or has a visit in the next 30 days with authorizing provider or within the authorizing provider's specialty.  See \"Patient Info\" tab in inbasket, or \"Choose Columns\" in Meds & Orders section of the refill encounter.           Passed - Patient is age 18 or older       Passed - Normal serum creatinine on file in " past 12 months    Recent Labs   Lab Test  05/25/18   0655   CR  1.00            Passed - Normal serum potassium on file in past 12 months    Recent Labs   Lab Test  05/25/18   0655   POTASSIUM  4.0

## 2018-09-17 ENCOUNTER — MYC MEDICAL ADVICE (OUTPATIENT)
Dept: INTERNAL MEDICINE | Facility: CLINIC | Age: 73
End: 2018-09-17

## 2018-09-18 RX ORDER — POTASSIUM CHLORIDE 750 MG/1
TABLET, EXTENDED RELEASE ORAL
Qty: 180 TABLET | Refills: 2 | Status: SHIPPED | OUTPATIENT
Start: 2018-09-18 | End: 2019-06-13

## 2018-09-18 RX ORDER — ENALAPRIL MALEATE 5 MG/1
TABLET ORAL
Qty: 90 TABLET | Refills: 2 | Status: SHIPPED | OUTPATIENT
Start: 2018-09-18 | End: 2019-01-18

## 2018-09-19 NOTE — TELEPHONE ENCOUNTER
[facility]SE[facility] calls. FYI.     1. Pt fell on 9/10 outside on grass. No injuries.     2. BP a little elevated today. 150/80. Usually is not this elevated.     3. HR sounded a little irregular. Nurse could not give details.   Pt feeling fine, no symptoms.     4. [facility] orders. Private Pay.     Verbal order given to approve visits until certification received for MD signature.       Refills already faxed in.

## 2018-09-21 ENCOUNTER — MYC MEDICAL ADVICE (OUTPATIENT)
Dept: INTERNAL MEDICINE | Facility: CLINIC | Age: 73
End: 2018-09-21

## 2018-09-25 ENCOUNTER — TELEPHONE (OUTPATIENT)
Dept: INTERNAL MEDICINE | Facility: CLINIC | Age: 73
End: 2018-09-25

## 2018-09-25 NOTE — TELEPHONE ENCOUNTER
Fax received from [facility] for review and signature.  Put in Dr. Ray's in basket.

## 2018-09-26 ENCOUNTER — MYC MEDICAL ADVICE (OUTPATIENT)
Dept: INTERNAL MEDICINE | Facility: CLINIC | Age: 73
End: 2018-09-26

## 2018-09-27 ENCOUNTER — TELEPHONE (OUTPATIENT)
Dept: INTERNAL MEDICINE | Facility: CLINIC | Age: 73
End: 2018-09-27

## 2018-09-27 DIAGNOSIS — Z53.9 DIAGNOSIS NOT YET DEFINED: Primary | ICD-10-CM

## 2018-09-27 PROCEDURE — G0179 MD RECERTIFICATION HHA PT: HCPCS | Performed by: INTERNAL MEDICINE

## 2018-09-27 NOTE — TELEPHONE ENCOUNTER
BP readings, Sent in his my chart messages.     9/25.  152/90 (before exercise.).  154/88 (after exercise).        9/21.  152/86       9/19.  148/80.  150/96.      8/23.  130/80.     8/1.  132/81  7/23.  108/60.       5/25.   130/70.   130/80.

## 2018-10-02 ENCOUNTER — TELEPHONE (OUTPATIENT)
Dept: INTERNAL MEDICINE | Facility: CLINIC | Age: 73
End: 2018-10-02

## 2018-10-02 NOTE — TELEPHONE ENCOUNTER
Angelina, [facility] calls stating she got a drug-drug interaction with two of pts medications.     Enalapril and Potassium interaction. They are required to report this.   Any changes, can call pt directly.     Per micromedex:  Concurrent use of ENALAPRIL and POTASSIUM may result in hyperkalemia.

## 2018-10-17 ENCOUNTER — MYC MEDICAL ADVICE (OUTPATIENT)
Dept: INTERNAL MEDICINE | Facility: CLINIC | Age: 73
End: 2018-10-17

## 2018-10-23 ENCOUNTER — MYC MEDICAL ADVICE (OUTPATIENT)
Dept: INTERNAL MEDICINE | Facility: CLINIC | Age: 73
End: 2018-10-23

## 2018-10-26 NOTE — ADDENDUM NOTE
Encounter addended by: Airam Ballard, SLP on: 10/26/2018 10:48 AM<BR>     Actions taken: Episode resolved

## 2018-10-26 NOTE — ADDENDUM NOTE
Encounter addended by: Airam Ballard, SLP on: 10/26/2018 10:48 AM<BR>     Actions taken: Sign clinical note

## 2018-10-26 NOTE — PROGRESS NOTES
Outpatient Speech Language Pathology Discharge Note     Patient: Elier Barber  : 1945    Beginning/End Dates of Reporting Period:  2018 to 10/26/2018    Referring Provider: Dr. Sam    Therapy Diagnosis: dysarthria, Primary Lateral Sclerosis, CVA    Client Self Report: Elier Barber is a 73 year old y.o.male with a history of PLS and dysarthria.  Please refer to the initial outpatient speech-language pathology evaluation dated 18 for further background information.  Patient has been seen for 7 visits since the start of care addressing his speech clarity and intelligibility for communication.  Mr. Barber has been motivated and cooperative throughout treatment and has demonstrated steady progress towards all goals.  Mr. Barber has been seen on a weekly basis in July and 2018, last session on 18, with plan to continue completing home program independently and return in 2018 to assess carry-over of home program and progress of speech intelligibility.  Patient has decided not to return for scheduled session on 10/26/18 as he feels like things are going well and he does not need further therapy at this time.      Objective Measurements: Patient has decided to discharge at this time.  Progress below is based on last therapy session on 18  Goals:  Goal Identifier LTG 5-Ptsqflsohp-Sdonuh Intelligibility    Goal Description Patient will learn, implement, and demonstrate independent use of speech intelligibility strategies to increase intelligibility at the sentence, extended reading and spontaneous conversation level to 80% accuracy in order to communicate verbally with familiar and unfamiliar listeners on a daily basis.    Target Date 18   Date Met   10/26/18   Progress: Goal met. Patient has been trained on the following strategies to improve speech intelligibility and comprehensibility-chunking, pacing, and prosody training for sentences, paragraphs and  conversations.  He is 80% with cues.  Without use of strategies his speech intelligibility at the conversational level with familiar and unfamiliar listeners drops to 65%.       Progress Toward Goals:    Progress this reporting period: Mr. Barber has met his long-term goal to improve speech intelligibility for conversational speech.  He is comfortable with his current speech production function and would like to discharge from speech-language therapy services at this time.     Plan:  Discharge from therapy.    Discharge:    Reason for Discharge: Patient has met all goals.  Patient chooses to discontinue therapy.  Medicare G-code: Patient did not attend a final scheduled session prior to discharge. Unable to determine discharge functional status.    Discharge Plan: Patient to continue home program.    Thank you for the referral of this patient.  If you have any questions regarding the information in this report, please feel free to contact me at [phone].     Airam Ballard MA, CCC-SLP  Speech-Language Pathologist  [address] [address]  Fax:  [phone]

## 2018-10-27 ENCOUNTER — APPOINTMENT (OUTPATIENT)
Dept: CT IMAGING | Facility: CLINIC | Age: 73
End: 2018-10-27
Attending: EMERGENCY MEDICINE
Payer: COMMERCIAL

## 2018-10-27 ENCOUNTER — APPOINTMENT (OUTPATIENT)
Dept: MRI IMAGING | Facility: CLINIC | Age: 73
End: 2018-10-27
Attending: EMERGENCY MEDICINE
Payer: COMMERCIAL

## 2018-10-27 ENCOUNTER — HOSPITAL ENCOUNTER (EMERGENCY)
Facility: CLINIC | Age: 73
Discharge: HOME | End: 2018-10-27
Attending: EMERGENCY MEDICINE | Admitting: EMERGENCY MEDICINE
Payer: COMMERCIAL

## 2018-10-27 VITALS
DIASTOLIC BLOOD PRESSURE: 88 MMHG | SYSTOLIC BLOOD PRESSURE: 161 MMHG | BODY MASS INDEX: 23.24 KG/M2 | OXYGEN SATURATION: 95 % | TEMPERATURE: 97.5 F | RESPIRATION RATE: 19 BRPM | WEIGHT: 162 LBS

## 2018-10-27 DIAGNOSIS — R29.898 TRANSIENT LEG WEAKNESS: ICD-10-CM

## 2018-10-27 LAB
ANION GAP SERPL CALCULATED.3IONS-SCNC: 6 MMOL/L (ref 3–14)
APTT PPP: 27 SEC (ref 22–37)
BASOPHILS # BLD AUTO: 0.1 10E9/L (ref 0–0.2)
BASOPHILS NFR BLD AUTO: 0.8 %
BUN SERPL-MCNC: 17 MG/DL (ref 7–30)
CALCIUM SERPL-MCNC: 8.8 MG/DL (ref 8.5–10.1)
CHLORIDE SERPL-SCNC: 101 MMOL/L (ref 94–109)
CO2 SERPL-SCNC: 29 MMOL/L (ref 20–32)
CREAT SERPL-MCNC: 0.87 MG/DL (ref 0.66–1.25)
DIFFERENTIAL METHOD BLD: NORMAL
EOSINOPHIL # BLD AUTO: 0.1 10E9/L (ref 0–0.7)
EOSINOPHIL NFR BLD AUTO: 1.2 %
ERYTHROCYTE [DISTWIDTH] IN BLOOD BY AUTOMATED COUNT: 13.3 % (ref 10–15)
GFR SERPL CREATININE-BSD FRML MDRD: 86 ML/MIN/1.7M2
GLUCOSE BLDC GLUCOMTR-MCNC: 95 MG/DL (ref 70–99)
GLUCOSE SERPL-MCNC: 101 MG/DL (ref 70–99)
HCT VFR BLD AUTO: 48.2 % (ref 40–53)
HGB BLD-MCNC: 16.1 G/DL (ref 13.3–17.7)
IMM GRANULOCYTES # BLD: 0 10E9/L (ref 0–0.4)
IMM GRANULOCYTES NFR BLD: 0.4 %
INR PPP: 1.07 (ref 0.86–1.14)
LYMPHOCYTES # BLD AUTO: 2 10E9/L (ref 0.8–5.3)
LYMPHOCYTES NFR BLD AUTO: 25.1 %
MCH RBC QN AUTO: 30.6 PG (ref 26.5–33)
MCHC RBC AUTO-ENTMCNC: 33.4 G/DL (ref 31.5–36.5)
MCV RBC AUTO: 92 FL (ref 78–100)
MONOCYTES # BLD AUTO: 0.8 10E9/L (ref 0–1.3)
MONOCYTES NFR BLD AUTO: 10.1 %
NEUTROPHILS # BLD AUTO: 4.9 10E9/L (ref 1.6–8.3)
NEUTROPHILS NFR BLD AUTO: 62.4 %
NRBC # BLD AUTO: 0 10*3/UL
NRBC BLD AUTO-RTO: 0 /100
PLATELET # BLD AUTO: 241 10E9/L (ref 150–450)
POTASSIUM SERPL-SCNC: 4.1 MMOL/L (ref 3.4–5.3)
RBC # BLD AUTO: 5.26 10E12/L (ref 4.4–5.9)
SODIUM SERPL-SCNC: 136 MMOL/L (ref 133–144)
TROPONIN I SERPL-MCNC: <0.015 UG/L (ref 0–0.04)
WBC # BLD AUTO: 7.8 10E9/L (ref 4–11)

## 2018-10-27 PROCEDURE — 85730 THROMBOPLASTIN TIME PARTIAL: CPT | Performed by: EMERGENCY MEDICINE

## 2018-10-27 PROCEDURE — 96361 HYDRATE IV INFUSION ADD-ON: CPT

## 2018-10-27 PROCEDURE — 85025 COMPLETE CBC W/AUTO DIFF WBC: CPT | Performed by: EMERGENCY MEDICINE

## 2018-10-27 PROCEDURE — 25000128 H RX IP 250 OP 636: Performed by: EMERGENCY MEDICINE

## 2018-10-27 PROCEDURE — 00000146 ZZHCL STATISTIC GLUCOSE BY METER IP

## 2018-10-27 PROCEDURE — 70498 CT ANGIOGRAPHY NECK: CPT

## 2018-10-27 PROCEDURE — 84484 ASSAY OF TROPONIN QUANT: CPT | Performed by: EMERGENCY MEDICINE

## 2018-10-27 PROCEDURE — 96374 THER/PROPH/DIAG INJ IV PUSH: CPT | Mod: 59

## 2018-10-27 PROCEDURE — A9585 GADOBUTROL INJECTION: HCPCS | Performed by: EMERGENCY MEDICINE

## 2018-10-27 PROCEDURE — 80048 BASIC METABOLIC PNL TOTAL CA: CPT | Performed by: EMERGENCY MEDICINE

## 2018-10-27 PROCEDURE — 70450 CT HEAD/BRAIN W/O DYE: CPT

## 2018-10-27 PROCEDURE — 99285 EMERGENCY DEPT VISIT HI MDM: CPT | Mod: 25

## 2018-10-27 PROCEDURE — 85610 PROTHROMBIN TIME: CPT | Performed by: EMERGENCY MEDICINE

## 2018-10-27 PROCEDURE — 25500064 ZZH RX 255 OP 636: Performed by: EMERGENCY MEDICINE

## 2018-10-27 PROCEDURE — 70460 CT HEAD/BRAIN W/DYE: CPT

## 2018-10-27 PROCEDURE — 70553 MRI BRAIN STEM W/O & W/DYE: CPT

## 2018-10-27 RX ORDER — IOPAMIDOL 755 MG/ML
500 INJECTION, SOLUTION INTRAVASCULAR ONCE
Status: COMPLETED | OUTPATIENT
Start: 2018-10-27 | End: 2018-10-27

## 2018-10-27 RX ORDER — LIDOCAINE 40 MG/G
CREAM TOPICAL
Status: DISCONTINUED | OUTPATIENT
Start: 2018-10-27 | End: 2018-10-28 | Stop reason: HOSPADM

## 2018-10-27 RX ORDER — GADOBUTROL 604.72 MG/ML
7.5 INJECTION INTRAVENOUS ONCE
Status: COMPLETED | OUTPATIENT
Start: 2018-10-27 | End: 2018-10-27

## 2018-10-27 RX ORDER — LORAZEPAM 2 MG/ML
0.5 INJECTION INTRAMUSCULAR ONCE
Status: COMPLETED | OUTPATIENT
Start: 2018-10-27 | End: 2018-10-27

## 2018-10-27 RX ADMIN — SODIUM CHLORIDE 500 ML: 9 INJECTION, SOLUTION INTRAVENOUS at 19:20

## 2018-10-27 RX ADMIN — GADOBUTROL 7.5 ML: 604.72 INJECTION INTRAVENOUS at 20:02

## 2018-10-27 RX ADMIN — IOPAMIDOL 120 ML: 755 INJECTION, SOLUTION INTRAVENOUS at 18:03

## 2018-10-27 RX ADMIN — SODIUM CHLORIDE 80 ML: 9 INJECTION, SOLUTION INTRAVENOUS at 18:03

## 2018-10-27 RX ADMIN — LORAZEPAM 0.5 MG: 2 INJECTION INTRAMUSCULAR; INTRAVENOUS at 19:26

## 2018-10-27 ASSESSMENT — ENCOUNTER SYMPTOMS
ACTIVITY CHANGE: 1
NUMBNESS: 0

## 2018-10-27 NOTE — ED AVS SNAPSHOT
[facility] Emergency Department    [address]    [facility] [address]    Phone:  [phone]    Fax:  [phone]                                       Elier Barber   MRN: [id number]    Department:  [facility] Emergency Department   Date of Visit:  10/27/2018           After Visit Summary Signature Page     I have received my discharge instructions, and my questions have been answered. I have discussed any challenges I see with this plan with the nurse or doctor.    ..........................................................................................................................................  Patient/Patient Representative Signature      ..........................................................................................................................................  Patient Representative Print Name and Relationship to Patient    ..................................................               ................................................  Date                                   Time    ..........................................................................................................................................  Reviewed by Signature/Title    ...................................................              ..............................................  Date                                               Time          22EPIC Rev 08/18

## 2018-10-27 NOTE — ED PROVIDER NOTES
History     Chief Complaint:  Cerebrovascular Accident    HPI   History was aided by patient's wife.   Elier Barber is a 73 year old male who presents with suspicions of a minor stroke around 1700 today. The patient has had 3 previous minor strokes. The patient's wife noted that the patient was coming into [employer] when she noticed bilateral leg weakness to the point where he could hardly move his legs. The patient has primary lateral sclerosis so the patient uses a walker to get around but the patient's wife believes these symptoms are similar to his past strokes. Here in the E.R, the patient denies any recent falls, kidney problems, numbness in legs, or history of atrial fibrillation. Notably, patient's wife states that the patient's blood pressure is always a little high around 140.     Allergies:  Bee Venom  Penicillin     Medications:    Aspirin  Lioresal  Plavix  Vasotec  Nizoral  Klor-con  Ditropan  Protonix  Miralax  K-Tab    Past Medical History:    Basal cell carcinoma nos  Cerebral infarction  Essential hypertension  Hearing loss  Hoarseness of voice  Lumbar radiculopathy  Primary lateral sclerosis  Prostate CA  Edema    Past Surgical History:    Abdomen Surgery  Hernia Repair    Family History:    Heart Disease  Hypertension  C.A.D  Cerebrovascular disease    Social History:  Patient is a former smoker (0.30 packs/day for 6 years) who quit in 1976. The patient has about 1 drink per week.   Marital Status:   [2]     Review of Systems   Constitutional: Positive for activity change (trouble walking with bilateral leg weakness).   Neurological: Negative for numbness.   All other systems reviewed and are negative.    Physical Exam     Patient Vitals for the past 24 hrs:   BP Temp Temp src Heart Rate Resp SpO2 Weight   10/27/18 2130 - - - - - 95 % -   10/27/18 2115 161/88 - - 64 19 95 % -   10/27/18 2100 147/81 - - 58 12 96 % -   10/27/18 2045 (!) 153/94 - - - - - -   10/27/18 2030 180/88 - - 70 13  94 % -   10/27/18 2020 - 97.5  F (36.4  C) Oral - - - -   10/27/18 2015 - - - 86 21 94 % -   10/27/18 1930 151/87 - - 60 8 95 % -   10/27/18 1915 141/81 - - 61 13 95 % -   10/27/18 1900 146/82 - - 64 10 97 % -   10/27/18 1845 140/84 - - 63 17 95 % -   10/27/18 1830 145/83 - - 69 18 96 % -   10/27/18 1815 142/82 - - 79 13 96 % -   10/27/18 1730 (!) 152/103 - - - - - -   10/27/18 1700 - - - - - - 73.5 kg (162 lb)     Physical Exam    Vital signs and nursing notes reviewed.     Constitutional: laying on gurney appears comfortable  HENT: Oropharynx is clear and moist  Eyes: Conjunctivae are normal bilaterally. Pupils equal  Neck: normal range of motion  Cardiovascular: Normal rate, regular rhythm, normal heart sounds.   Pulmonary/Chest: Effort normal and breath sounds normal. No respiratory distress.   Abdominal: Soft. Bowel sounds are normal. No tenderness to palpation. No rebound or guarding.   Musculoskeletal: No joint swelling or edema.   Neurological: Alert and oriented. No focal weakness in upper or lower extremities. No drift.  Chronic speech change, no paresthesias reported.  No facial motor weakness, no facial paresthesias.  No ataxia  Skin: Skin is warm and dry. No rash noted.   Psych: normal affect    Emergency Department Course   Imaging:  Radiographic findings were communicated with the patient who voiced understanding of the findings.  CT Head w/o Contrast  IMPRESSION: Diffuse cerebral volume loss and cerebral white matter  changes consistent with chronic small vessel ischemic disease. No  evidence for acute intracranial pathology.    Radiation dose for this scan was reduced using automated exposure  control, adjustment of the mA and/or kV according to patient size, or  iterative reconstruction technique.   As read by Radiology.    CT Head w Contrast:  IMPRESSION: Normal CT perfusion of the brain.  Radiation dose for this scan was reduced using automated exposure  control, adjustment of the mA and/or kV  according to patient size, or  iterative reconstruction technique  As read by Radiology.    CTA Head Neck with Contrast  IMPRESSION:  1. Moderate atherosclerotic narrowing of the proximal P2 segment of  the right posterior cerebral artery again noted.  2. Otherwise, normal neck and head CTA. No change from the comparison  study.    Radiation dose for this scan was reduced using automated exposure  control, adjustment of the mA and/or kV according to patient size, or  iterative reconstruction technique  As read by Radiology.    MR Brain w/o & w Contrast  IMPRESSION: Diffuse cerebral volume loss and cerebral white matter  changes consistent with chronic small vessel ischemic disease. No  evidence for acute intracranial pathology.    As read by Radiology.    Laboratory:  Laboratory findings were communicated to the patient who voiced understanding of the findings.  CBC: WNL (WBC 7.8, HGB 16.1, )  BMP: Glucose 101 (H) o/w WNL (Creatinine 0.87)  INR: 1.07  Partial thromboplastin time: 27  Troponin I: <0.015  Glucose by meter: 95    Interventions:  1920:  mL IV Bolus  1926: Ativan 0.5 mg IV  2002: Gadavist 7.5 mL IV    Emergency Department Course:  Past medical records, nursing notes, and vitals reviewed.  1729: I performed an exam of the patient and obtained history, as documented above.  1734: IV inserted and blood drawn for laboratory tests, findings above.   1742: I spoke with Dr. Mclean regarding this patient.  1758: The patient was sent for a Head CT while in the emergency department, findings above.  1820: I rechecked the patient. Explained findings to patient.  1820: The patient was sent for a CTA Head Neck while in the emergency department, findings above.  1820: The patient was sent for a CT Head while in the emergency department, findings above.  1826: I spoke with Dr. Mclean regarding this patient.  2009: The patient was sent for a MRI Brain while in the emergency department, findings  above.  2214: I rechecked the patient. Findings and plan explained to the Patient. Patient discharged home with instructions regarding supportive care, medications, and reasons to return. The importance of close follow-up was reviewed.     Impression & Plan    Medical Decision Making:  Patient presents with sudden leg weakness when he was walking into [employer]. It was bilateral and described as a shuffling and hard to move type scenario. Wife was concerned as she reports he had similar symptoms in the past when he had a stroke. Code stroke was called.  At time of my evaluation he had a NIH stoke scale of 0. CT head and CTA head/neck was negative with no acute findings. Patient has no clear indication to recieve TPA. I discussed with the stroke neurologist who agreed. MRI was  obtained which showed no evidence of acute stroke. His lab tests were unremarkable. After multiple rechecks, he had no focal neurologic findings or other concerns. Patient is ambulatory in the emergency department. He does have a history of spinal stenosis but has no severe back pain so it is possible it could be more back related. His symptoms seem to be transient and he has had no reoccurrence of these here in the emergency department. I do not feel that it would be beneficial for him to be admitted. Patient is already on Plavix and Aspirin at this point and I do not feel that he s had a TA with positive symptoms. Discussed the plan with the patient and family and he was discharged.     Diagnosis:    ICD-10-CM    1. Transient leg weakness R29.898     bilateral       Disposition:  discharged to home    Discharge Medications:  No discharge medications were given.       Zechariah Stephens  10/27/2018   [facility] EMERGENCY DEPARTMENT  I, Zechariah Stephens, am serving as a scribe at 5:29 PM on 10/27/2018 to document services personally performed by Pavan Donaldson MD based on my observations and the provider's statements to me.          Pavan Donaldson MD  10/28/18 0131

## 2018-10-27 NOTE — ED TRIAGE NOTES
Presents with weakness in BLE x about 25 minutes after leaving [employer]. Hx of 2 strokes, last in May 2018 confirmed by MRI

## 2018-10-28 ENCOUNTER — MYC MEDICAL ADVICE (OUTPATIENT)
Dept: INTERNAL MEDICINE | Facility: CLINIC | Age: 73
End: 2018-10-28

## 2018-10-28 NOTE — DISCHARGE INSTRUCTIONS
If develop and severe back pain, fever, new numbness or weakness.  Bowel or bladder abnormalities specifically incontinence or urinary retention return to the Emergency department

## 2018-10-29 LAB — INTERPRETATION ECG - MUSE: NORMAL

## 2018-11-01 ENCOUNTER — OFFICE VISIT (OUTPATIENT)
Dept: NEUROLOGY | Facility: CLINIC | Age: 73
End: 2018-11-01
Payer: COMMERCIAL

## 2018-11-01 VITALS
HEIGHT: 69 IN | OXYGEN SATURATION: 96 % | BODY MASS INDEX: 24.07 KG/M2 | WEIGHT: 162.5 LBS | DIASTOLIC BLOOD PRESSURE: 81 MMHG | SYSTOLIC BLOOD PRESSURE: 143 MMHG | HEART RATE: 59 BPM

## 2018-11-01 DIAGNOSIS — G12.21 ALS (AMYOTROPHIC LATERAL SCLEROSIS) (H): Primary | ICD-10-CM

## 2018-11-01 DIAGNOSIS — G12.21 ALS (AMYOTROPHIC LATERAL SCLEROSIS) (H): ICD-10-CM

## 2018-11-01 DIAGNOSIS — R13.12 OROPHARYNGEAL DYSPHAGIA: ICD-10-CM

## 2018-11-01 RX ORDER — ATORVASTATIN CALCIUM 20 MG/1
20 TABLET, FILM COATED ORAL DAILY
COMMUNITY
Start: 2018-05-26 | End: 2018-07-01 | Stop reason: SINTOL

## 2018-11-01 ASSESSMENT — PAIN SCALES - GENERAL: PAINLEVEL: NO PAIN (0)

## 2018-11-01 NOTE — MR AVS SNAPSHOT
After Visit Summary   11/1/2018    Elier Barber    MRN: [id number]           Patient Information     Date Of Birth          1945        Visit Information        Provider Department      11/1/2018 11:00 AM Gary Tejada MD [facility] Neurology        Today's Diagnoses     ALS (amyotrophic lateral sclerosis) (H)    -  1    Oropharyngeal dysphagia           Follow-ups after your visit        Additional Services     OCCUPATIONAL THERAPY REFERRAL       OT Clinician to evaluate and treat patient in ALS Clinic.    If you have not heard from the scheduling office within 2 business days, please call [phone] for all locations, with the exception of [place], please call [phone] and [place], please call [phone].    Please be aware that coverage of these services is subject to the terms and limitations of your health insurance plan.  Call member services at your health plan with any benefit or coverage questions.            PHYSICAL THERAPY REFERRAL       PT Clinician to evaluate and treat patient in ALS Clinic.    If you have not heard from the scheduling office within 2 business days, please call [phone] for all locations, with the exception of [place], please call [phone] and [place], please call [phone].    Please be aware that coverage of these services is subject to the terms and limitations of your health insurance plan.  Call member services at your health plan with any benefit or coverage questions.            SPEECH THERAPY REFERRAL       Speech Language Pathologist to evaluate and treat patient in ALS Clinic.    If you have not heard from the scheduling office within 2 business days, please call [phone] for all locations, with the exception of [place], please call [phone] and [place], please call [phone].    Please be aware that coverage of these services is subject to the terms and limitations of your health insurance plan.  Call member services  "at your health plan with any benefit or coverage questions.                  Your next 10 appointments already scheduled     Jun 06, 2019 10:30 AM CDT   PFT VISIT with ADAM CHUN   [facility] Pulmonary Function Testing ([facility])    [address] [place] [id number] [phone]            Jun 06, 2019 11:00 AM CDT   (Arrive by 10:45 AM)   Return ALS/Motor [place]uron with Gary Tejada MD   [facility] Neurology ([facility])    [address] [place] [id number] [phone]              Who to contact     Please call your clinic at [phone] to:    Ask questions about your health    Make or cancel appointments    Discuss your medicines    Learn about your test results    Speak to your doctor            Additional Information About Your Visit        [employer] Information     [email or web address] gives you secure access to your electronic health record. If you see a primary care provider, you can also send messages to your care team and make appointments. If you have questions, please call your primary care clinic.  If you do not have a primary care provider, please call [phone] and they will assist you.      [email or web address] is an electronic gateway that provides easy, online access to your medical records. With [email or web address], you can request a clinic appointment, read your test results, renew a prescription or communicate with your care team.     To access your existing account, please contact your [facility] or call [phone] for assistance.        Care EveryWhere ID     This is your Care EveryWhere ID. This could be used by other organizations to access your [place] medical records  [id number]        Your Vitals Were     Pulse Height Pulse Oximetry BMI (Body Mass Index)          59 1.74 m (5' 8.5\") 96% 24.35 kg/m2         Blood Pressure from Last 3 Encounters:   11/01/18 143/81   10/27/18 161/88   08/14/18 " 138/83    Weight from Last 3 Encounters:   11/01/18 73.7 kg (162 lb 8 oz)   10/27/18 73.5 kg (162 lb)   07/09/18 74.6 kg (164 lb 6.4 oz)               Primary Care Provider Office Phone # Fax #    Lida Ray [phone]       [address] [facility] [address]  [facility] [address]        Equal Access to Services     [facility]: Hadii aad ku hadasho Soomaali, waaxda luqadaha, qaybta kaalmada adeegyada, waxay idiin hayaan adeeg falgunish lapaigen . So [facility] [phone].    ATENCIÓN: Si habla español, tiene a gant disposición servicios gratuitos de asistencia lingüística. Llame al [phone].    We comply with applicable federal civil rights laws and [place] laws. We do not discriminate on the basis of race, color, national origin, age, disability, sex, sexual orientation, or gender identity.            Thank you!     Thank you for choosing [facility] NEUROLOGY  for your care. Our goal is always to provide you with excellent care. Hearing back from our patients is one way we can continue to improve our services. Please take a few minutes to complete the written survey that you may receive in the mail after your visit with us. Thank you!             Your Updated Medication List - Protect others around you: Learn how to safely use, store and throw away your medicines at www.disposemymeds.org.          This list is accurate as of 11/1/18 11:59 PM.  Always use your most recent med list.                   Brand Name Dispense Instructions for use Diagnosis    aspirin 81 MG tablet      Take 81 mg by mouth daily        atorvastatin 20 MG tablet    LIPITOR     20 mg daily        baclofen (LIORESAL) in NaCl 0.9% 100 mL intrathecal infusion      by Intrathecal route continuous Pump filled by [facility] stroke center [phone] Pump Model: Syncromed Last fill:  1/30/2018 Next fill: 5/29/18 [place] Alarm Date: 5/29/18 [place] Volume: unknown Conc: 2000 mcg/ml Flex schedule delivers  266.5 mcg/day Basal rate:unknown, pt states basal rate increased from 05:00-08:00        clopidogrel 75 MG tablet    PLAVIX     Take 75 mg by mouth daily        enalapril 5 MG tablet    VASOTEC    90 tablet    TAKE 1 TABLET DAILY    Essential hypertension, benign       EPINEPHrine 0.3 MG/0.3ML injection 2-pack    EPIPEN/ADRENACLICK/or ANY BX GENERIC EQUIV    0.3 mL    Inject 0.3 mLs (0.3 mg) into the muscle as needed for anaphylaxis    Bee sting reaction, accidental or unintentional, initial encounter       ketoconazole 2 % shampoo    NIZORAL    120 mL    SHAMPOO EVERY 2-3 DAYS AS  NEEDED    Dermatitis       KLOR-CON 10 MEQ CR tablet   Generic drug:  potassium chloride SA     180 tablet    TAKE 2 TABLETS (20 MEQ)    DAILY    Essential hypertension, benign       oxybutynin 10 MG 24 hr tablet    DITROPAN-XL    180 tablet    Take 2 tablets (20 mg) by mouth At Bedtime    Urinary frequency       pantoprazole 40 MG EC tablet    PROTONIX    90 tablet    TAKE 1 TABLET BY MOUTH ONCE DAILY    Gastroesophageal reflux disease without esophagitis       polyethylene glycol Packet    MIRALAX/GLYCOLAX     Take 1 packet by mouth every other day        potassium chloride 10 MEQ tablet    K-TAB,KLOR-CON    180 tablet    Take 2 tablets (20 mEq) by mouth daily GIVE THE GENERIC: THIS IS NOT A REQUEST FOR BRAND NAME    Essential hypertension, benign       STATIN NOT PRESCRIBED (INTENTIONAL)      Please choose reason not prescribed, below    Cerebral infarction, unspecified mechanism (H)

## 2018-11-01 NOTE — LETTER
2018       RE: Elier Barber  [address] [facility] [address]     Dear Colleague,    Thank you for referring your patient, Elier Barber, to the [facility] NEUROLOGY at [facility]. Please see a copy of my visit note below.    Note dictated. I also note that he denied neck or back pain, discoloration of the feet, numbness, or sphincter disturbances at the time of his event last Saturday.    Service Date: 2018         RE: Elier De La Garza   MRN: [id number]   : 1945      Dear Doctors:      I saw Nader De La Garza in followup at the [facility] where I see him for management of PLS.  As you know, Nader has had fluctuating symptoms of stiffness and speech difficulty related to a recent lacunar infarction as well as changes in his intrathecal baclofen dosing.  In addition, he reports that 5 days ago he noted a while walking into [employer] with his walker that both legs were difficult to move and stiff.  He left in a wheelchair and was seen at the [facility] Emergency Department, where EKG, routine laboratory studies, brain MRI, CT perfusion, a CT angiogram, and general medical examination demonstrated no diagnostic findings.  Symptoms resolved over a short period of time.  He denies discoloration in the legs, neck, back or leg pain, an evident precipitating event, or sphincter disturbances.  He has not had a similar event previously or since other than his strokes, which on at least one occasion where did have a similar presentation.  He remains on aspirin and Plavix.      Examination today demonstrates an alert and cooperative gentleman.  He again demonstrates a moderately severe spastic dysarthria, although speech is largely comprehensible.  Tongue bulk is normal, although movement is slow.  Tone is increased in the lower extremities and acceptable in the upper extremities.  Rapid repetitive movements  are slightly slowed in the hands.  Reflexes are pathologically brisk throughout, including the jaw.      Mr. De aL Garza is doing well.  We discussed the potential role of therapies which he is obtaining at home through his long-term care.  There is no meaningful worsening of his bulbar function and this has been assessed in the past.  We will make no changes in his management and I will see him in followup in the spring.        D: 2018   T: 2018   MT: AKA      Name:     ANIL MELGAR   MRN:      [id number]        Account:      [id number]   :      1945           Service Date: 2018      Document: [id number]        Again, thank you for allowing me to participate in the care of your patient.      Sincerely,    Gary Tejada MD    cc:   Bartolo Calvert MD   [facility]    [address] [place] [address] [address]   [place] [address]    Lida Ray MD   [facility]    [address]   [place] [address]

## 2018-11-01 NOTE — PROGRESS NOTES
Note dictated. I also note that he denied neck or back pain, discoloration of the feet, numbness, or sphincter disturbances at the time of his event last Saturday.

## 2018-11-01 NOTE — NURSING NOTE
Chief Complaint   Patient presents with     RECHECK     [facility] RETURN ALS 6MO F/U       Cynthia Brown, EMT

## 2018-11-02 NOTE — PROGRESS NOTES
Service Date: 2018      Lida Ray MD   [facility]    [address] [facility] [address]   [place] [address]      RE: Elier De La Garza   MRN: [id number]   : 1945      Dear Doctors:      I saw Nader De La Garza in followup at the [facility] where I see him for management of PLS.  As you know, Nader has had fluctuating symptoms of stiffness and speech difficulty related to a recent lacunar infarction as well as changes in his intrathecal baclofen dosing.  In addition, he reports that 5 days ago he noted a while walking into [employer] with his walker that both legs were difficult to move and stiff.  He left in a wheelchair and was seen at the [facility] Emergency Department, where EKG, routine laboratory studies, brain MRI, CT perfusion, a CT angiogram, and general medical examination demonstrated no diagnostic findings.  Symptoms resolved over a short period of time.  He denies discoloration in the legs, neck, back or leg pain, an evident precipitating event, or sphincter disturbances.  He has not had a similar event previously or since other than his strokes, which on at least one occasion where did have a similar presentation.  He remains on aspirin and Plavix.      Examination today demonstrates an alert and cooperative gentleman.  He again demonstrates a moderately severe spastic dysarthria, although speech is largely comprehensible.  Tongue bulk is normal, although movement is slow.  Tone is increased in the lower extremities and acceptable in the upper extremities.  Rapid repetitive movements are slightly slowed in the hands.  Reflexes are pathologically brisk throughout, including the jaw.      Mr. De La Garza is doing well.  We discussed the potential role of therapies which he is obtaining at home through his long-term care.  There is no meaningful worsening of his bulbar function and this has been assessed in the past.  We will make no  changes in his management and I will see him in followup in the spring.      Sincerely,       Gary Cordoba MD      cc:   Bartolo Calvert MD   [facility] Neurology    [address] [place] [address] [address]   [place] [address]         GARY CORDOBA MD             D: 2018   T: 2018   MT: AKA      Name:     ANIL MELGAR   MRN:      [id number]        Account:      [id number]   :      1945           Service Date: 2018      Document: [id number]

## 2018-11-08 LAB
EXPTIME-PRE: 8.39 SEC
FEF2575-%PRED-PRE: 188 %
FEF2575-PRE: 4.27 L/SEC
FEF2575-PRED: 2.27 L/SEC
FEFMAX-%PRED-PRE: 101 %
FEFMAX-PRE: 8.02 L/SEC
FEFMAX-PRED: 7.93 L/SEC
FEV1-%PRED-PRE: 97 %
FEV1-PRE: 2.98 L
FEV1FEV6-PRE: 99 %
FEV1FEV6-PRED: 77 %
FEV1FVC-PRE: 84 %
FEV1FVC-PRED: 73 %
FIFMAX-PRE: 6.6 L/SEC
FVC-%PRED-PRE: 86 %
FVC-PRE: 3.54 L
FVC-PRED: 4.1 L
MEP-PRE: 64 CMH2O
MIP-PRE: -64 CMH2O

## 2018-11-20 ENCOUNTER — TELEPHONE (OUTPATIENT)
Dept: INTERNAL MEDICINE | Facility: CLINIC | Age: 73
End: 2018-11-20

## 2018-11-20 NOTE — TELEPHONE ENCOUNTER
Eliu, [facility] calls stating pt had a fall on Sunday. Fell in his bedroom. Tripped on a chair. Hit his head. Has a small bruise on the corner of his Right eye. No neuro changes, no other symptoms.     Eliu does not think he takes Plavix when this RN states it is still on his med list. Eliu states he thought it was only for 6 months, short term.   Eliu states pts wife monitors pt very closely for any changes.     Also asking for continued private pay, [facility].   Verbal order given to approve visits until certification received for MD signature.     DEBRA

## 2018-11-22 ENCOUNTER — MYC MEDICAL ADVICE (OUTPATIENT)
Dept: INTERNAL MEDICINE | Facility: CLINIC | Age: 73
End: 2018-11-22

## 2018-11-27 ENCOUNTER — TELEPHONE (OUTPATIENT)
Dept: INTERNAL MEDICINE | Facility: CLINIC | Age: 73
End: 2018-11-27

## 2018-11-27 DIAGNOSIS — Z53.9 DIAGNOSIS NOT YET DEFINED: Primary | ICD-10-CM

## 2018-11-27 PROCEDURE — G0179 MD RECERTIFICATION HHA PT: HCPCS | Performed by: INTERNAL MEDICINE

## 2018-11-27 NOTE — TELEPHONE ENCOUNTER
Fax received from [facility] for review and signature.  Put in Dr. Ray's in basket.

## 2018-11-27 NOTE — TELEPHONE ENCOUNTER
Fax received from [facility] for review and signature.  Put in Dr. Ray's in basket.

## 2018-11-28 ENCOUNTER — TELEPHONE (OUTPATIENT)
Dept: INTERNAL MEDICINE | Facility: CLINIC | Age: 73
End: 2018-11-28

## 2018-11-28 NOTE — TELEPHONE ENCOUNTER
Fax received from [facility] for review and signature.  Put in Dr. Ray's in basket.

## 2018-11-30 ENCOUNTER — HOSPITAL ENCOUNTER (EMERGENCY)
Facility: CLINIC | Age: 73
Discharge: HOME | End: 2018-12-01
Attending: EMERGENCY MEDICINE | Admitting: EMERGENCY MEDICINE
Payer: COMMERCIAL

## 2018-11-30 DIAGNOSIS — R39.198 DIFFICULTY URINATING: ICD-10-CM

## 2018-11-30 DIAGNOSIS — R21 SCROTAL RASH: ICD-10-CM

## 2018-11-30 LAB
ALBUMIN UR-MCNC: NEGATIVE MG/DL
ANION GAP SERPL CALCULATED.3IONS-SCNC: 6 MMOL/L (ref 3–14)
APPEARANCE UR: CLEAR
BASOPHILS # BLD AUTO: 0 10E9/L (ref 0–0.2)
BASOPHILS NFR BLD AUTO: 0.1 %
BILIRUB UR QL STRIP: NEGATIVE
BUN SERPL-MCNC: 23 MG/DL (ref 7–30)
CALCIUM SERPL-MCNC: 8.7 MG/DL (ref 8.5–10.1)
CHLORIDE SERPL-SCNC: 103 MMOL/L (ref 94–109)
CO2 SERPL-SCNC: 27 MMOL/L (ref 20–32)
COLOR UR AUTO: YELLOW
CREAT SERPL-MCNC: 0.81 MG/DL (ref 0.66–1.25)
DIFFERENTIAL METHOD BLD: ABNORMAL
EOSINOPHIL # BLD AUTO: 0 10E9/L (ref 0–0.7)
EOSINOPHIL NFR BLD AUTO: 0.1 %
ERYTHROCYTE [DISTWIDTH] IN BLOOD BY AUTOMATED COUNT: 13.2 % (ref 10–15)
GFR SERPL CREATININE-BSD FRML MDRD: >90 ML/MIN/1.7M2
GLUCOSE SERPL-MCNC: 136 MG/DL (ref 70–99)
GLUCOSE UR STRIP-MCNC: NEGATIVE MG/DL
HCT VFR BLD AUTO: 40.6 % (ref 40–53)
HGB BLD-MCNC: 13.7 G/DL (ref 13.3–17.7)
HGB UR QL STRIP: NEGATIVE
IMM GRANULOCYTES # BLD: 0 10E9/L (ref 0–0.4)
IMM GRANULOCYTES NFR BLD: 0.4 %
KETONES UR STRIP-MCNC: NEGATIVE MG/DL
LEUKOCYTE ESTERASE UR QL STRIP: NEGATIVE
LYMPHOCYTES # BLD AUTO: 0.5 10E9/L (ref 0.8–5.3)
LYMPHOCYTES NFR BLD AUTO: 7 %
MCH RBC QN AUTO: 30.7 PG (ref 26.5–33)
MCHC RBC AUTO-ENTMCNC: 33.7 G/DL (ref 31.5–36.5)
MCV RBC AUTO: 91 FL (ref 78–100)
MONOCYTES # BLD AUTO: 0.3 10E9/L (ref 0–1.3)
MONOCYTES NFR BLD AUTO: 4.3 %
MUCOUS THREADS #/AREA URNS LPF: PRESENT /LPF
NEUTROPHILS # BLD AUTO: 6.7 10E9/L (ref 1.6–8.3)
NEUTROPHILS NFR BLD AUTO: 88.1 %
NITRATE UR QL: NEGATIVE
NRBC # BLD AUTO: 0 10*3/UL
NRBC BLD AUTO-RTO: 0 /100
PH UR STRIP: 5 PH (ref 5–7)
PLATELET # BLD AUTO: 213 10E9/L (ref 150–450)
POTASSIUM SERPL-SCNC: 4.3 MMOL/L (ref 3.4–5.3)
RBC # BLD AUTO: 4.46 10E12/L (ref 4.4–5.9)
RBC #/AREA URNS AUTO: <1 /HPF (ref 0–2)
SODIUM SERPL-SCNC: 136 MMOL/L (ref 133–144)
SOURCE: ABNORMAL
SP GR UR STRIP: 1.02 (ref 1–1.03)
SQUAMOUS #/AREA URNS AUTO: <1 /HPF (ref 0–1)
UROBILINOGEN UR STRIP-MCNC: 0 MG/DL (ref 0–2)
WBC # BLD AUTO: 7.6 10E9/L (ref 4–11)
WBC #/AREA URNS AUTO: 1 /HPF (ref 0–5)

## 2018-11-30 PROCEDURE — 80048 BASIC METABOLIC PNL TOTAL CA: CPT | Performed by: EMERGENCY MEDICINE

## 2018-11-30 PROCEDURE — 99284 EMERGENCY DEPT VISIT MOD MDM: CPT | Mod: 25

## 2018-11-30 PROCEDURE — 85025 COMPLETE CBC W/AUTO DIFF WBC: CPT | Performed by: EMERGENCY MEDICINE

## 2018-11-30 PROCEDURE — 81001 URINALYSIS AUTO W/SCOPE: CPT | Performed by: EMERGENCY MEDICINE

## 2018-11-30 PROCEDURE — 99283 EMERGENCY DEPT VISIT LOW MDM: CPT

## 2018-11-30 PROCEDURE — 51798 US URINE CAPACITY MEASURE: CPT

## 2018-11-30 PROCEDURE — 36415 COLL VENOUS BLD VENIPUNCTURE: CPT | Performed by: EMERGENCY MEDICINE

## 2018-11-30 RX ORDER — FLUOROURACIL 50 MG/G
CREAM TOPICAL 2 TIMES DAILY
COMMUNITY
End: 2019-01-18

## 2018-11-30 ASSESSMENT — ENCOUNTER SYMPTOMS
FACIAL SWELLING: 1
COLOR CHANGE: 1
DIFFICULTY URINATING: 1
FATIGUE: 1

## 2018-11-30 NOTE — ED AVS SNAPSHOT
[facility] Emergency Department    [address]    [facility] [address]    Phone:  [phone]    Fax:  [phone]                                       Elire Barber   MRN: [id number]    Department:  [facility] Emergency Department   Date of Visit:  11/30/2018           Patient Information     Date Of Birth          1945        Your diagnoses for this visit were:     Scrotal rash     Difficulty urinating        You were seen by Hina Streeter MD.      Follow-up Information     Follow up with [facility].    Why:  As needed    Contact information:    [address]  [address]  [facility] [address] [phone]        Follow up with Your dermatologist.        Discharge Instructions       Use topical antibiotic ointment to area s.a. Neosporin.  If you have any sensitivity to this then simply use vaseline        Your next 10 appointments already scheduled     Jun 06, 2019 10:30 AM CDT   PFT VISIT with  PFL C   [facility] ([facility])    [address] [place] [address] [phone]            Jun 06, 2019 11:00 AM CDT   (Arrive by 10:45 AM)   Return ALS/Motor [place] with Gary Tejada MD   [facility] ([facility])    [address] [place] [address] [phone]              24 Hour Appointment Hotline       To make an appointment at any [facility], call [phone] ([phone]). If you don't have a family doctor or clinic, we will help you find one. [facility] are conveniently located to serve the needs of you and your family.             Review of your medicines      Our records show that you are taking the medicines listed below. If these are incorrect, please call your family doctor or clinic.        Dose / Directions Last dose taken    aspirin 81 MG tablet   Commonly  known as:  ASA   Dose:  81 mg        Take 81 mg by mouth daily   Refills:  0        atorvastatin 20 MG tablet   Commonly known as:  LIPITOR   Dose:  20 mg        20 mg daily   Refills:  0        baclofen (LIORESAL) in NaCl 0.9% 100 mL intrathecal infusion        by Intrathecal route continuous Pump filled by [facility] [phone] Pump Model: Syncromed Last fill:  1/30/2018 Next fill: 5/29/18 Low [place] Alarm Date: 5/29/18 Reservoir Volume: unknown Conc: 2000 mcg/ml Flex schedule delivers 266.5 mcg/day Basal rate:unknown, pt states basal rate increased from 05:00-08:00   Refills:  0        clopidogrel 75 MG tablet   Commonly known as:  PLAVIX   Dose:  75 mg        Take 75 mg by mouth daily   Refills:  0        enalapril 5 MG tablet   Commonly known as:  VASOTEC   Quantity:  90 tablet        TAKE 1 TABLET DAILY   Refills:  2        EPINEPHrine 0.3 MG/0.3ML injection 2-pack   Commonly known as:  EPIPEN/ADRENACLICK/or ANY BX GENERIC EQUIV   Dose:  0.3 mg   Quantity:  0.3 mL        Inject 0.3 mLs (0.3 mg) into the muscle as needed for anaphylaxis   Refills:  3        fluorouracil 5 % external cream   Commonly known as:  EFUDEX        Apply topically 2 times daily   Refills:  0        ketoconazole 2 % external shampoo   Commonly known as:  NIZORAL   Quantity:  120 mL        SHAMPOO EVERY 2-3 DAYS AS  NEEDED   Refills:  11        KLOR-CON 10 MEQ CR tablet   Quantity:  180 tablet   Generic drug:  potassium chloride ER        TAKE 2 TABLETS (20 MEQ)    DAILY   Refills:  2        oxybutynin ER 10 MG 24 hr tablet   Commonly known as:  DITROPAN-XL   Dose:  20 mg   Quantity:  180 tablet        Take 2 tablets (20 mg) by mouth At Bedtime   Refills:  3        pantoprazole 40 MG EC tablet   Commonly known as:  PROTONIX   Quantity:  90 tablet        TAKE 1 TABLET BY MOUTH ONCE DAILY   Refills:  2        polyethylene glycol packet   Commonly known as:  MIRALAX/GLYCOLAX   Dose:  1  packet        Take 1 packet by mouth every other day   Refills:  0        potassium chloride ER 10 MEQ CR tablet   Commonly known as:  K-TAB/KLOR-CON   Dose:  20 mEq   Quantity:  180 tablet        Take 2 tablets (20 mEq) by mouth daily GIVE THE GENERIC: THIS IS NOT A REQUEST FOR BRAND NAME   Refills:  3        STATIN NOT PRESCRIBED   Commonly known as:  INTENTIONAL        Please choose reason not prescribed, below   Refills:  0                Procedures and tests performed during your visit     Basic metabolic panel    Bladder scan    CBC with platelets differential    UA with Microscopic reflex to Culture      Orders Needing Specimen Collection     None      Pending Results     No orders found for last 3 day(s).            Pending Culture Results     No orders found for last 3 day(s).            Pending Results Instructions     If you had any lab results that were not finalized at the time of your Discharge, you can call the ED Lab Result RN at [phone]. You will be contacted by this team for any positive Lab results or changes in treatment. The nurses are available 7 days a week from 10A to 6:30P.  You can leave a message 24 hours per day and they will return your call.        Test Results From Your Hospital Stay        11/30/2018  8:57 PM      Component Results     Component Value Ref Range & Units Status    Color Urine Yellow  Final    Appearance Urine Clear  Final    Glucose Urine Negative NEG^Negative mg/dL Final    Bilirubin Urine Negative NEG^Negative Final    Ketones Urine Negative NEG^Negative mg/dL Final    Specific Gravity Urine 1.020 1.003 - 1.035 Final    Blood Urine Negative NEG^Negative Final    pH Urine 5.0 5.0 - 7.0 pH Final    Protein Albumin Urine Negative NEG^Negative mg/dL Final    Urobilinogen mg/dL 0.0 0.0 - 2.0 mg/dL Final    Nitrite Urine Negative NEG^Negative Final    Leukocyte Esterase Urine Negative NEG^Negative Final    Source Midstream Urine  Final    WBC Urine 1 0 - 5 /HPF Final     RBC Urine <1 0 - 2 /HPF Final    Squamous Epithelial /HPF Urine <1 0 - 1 /HPF Final    Mucous Urine Present (A) NEG^Negative /LPF Final         11/30/2018 11:46 PM      Component Results     Component Value Ref Range & Units Status    Sodium 136 133 - 144 mmol/L Final    Potassium 4.3 3.4 - 5.3 mmol/L Final    Chloride 103 94 - 109 mmol/L Final    Carbon Dioxide 27 20 - 32 mmol/L Final    Anion Gap 6 3 - 14 mmol/L Final    Glucose 136 (H) 70 - 99 mg/dL Final    Urea Nitrogen 23 7 - 30 mg/dL Final    Creatinine 0.81 0.66 - 1.25 mg/dL Final    GFR Estimate >90 >60 mL/min/1.7m2 Final    Non  GFR Calc    GFR Estimate If Black >90 >60 mL/min/1.7m2 Final    African American GFR Calc    Calcium 8.7 8.5 - 10.1 mg/dL Final         11/30/2018 11:31 PM      Component Results     Component Value Ref Range & Units Status    WBC 7.6 4.0 - 11.0 10e9/L Final    RBC Count 4.46 4.4 - 5.9 10e12/L Final    Hemoglobin 13.7 13.3 - 17.7 g/dL Final    Hematocrit 40.6 40.0 - 53.0 % Final    MCV 91 78 - 100 fl Final    MCH 30.7 26.5 - 33.0 pg Final    MCHC 33.7 31.5 - 36.5 g/dL Final    RDW 13.2 10.0 - 15.0 % Final    Platelet Count 213 150 - 450 10e9/L Final    Diff Method Automated Method  Final    % Neutrophils 88.1 % Final    % Lymphocytes 7.0 % Final    % Monocytes 4.3 % Final    % Eosinophils 0.1 % Final    % Basophils 0.1 % Final    % Immature Granulocytes 0.4 % Final    Nucleated RBCs 0 0 /100 Final    Absolute Neutrophil 6.7 1.6 - 8.3 10e9/L Final    Absolute Lymphocytes 0.5 (L) 0.8 - 5.3 10e9/L Final    Absolute Monocytes 0.3 0.0 - 1.3 10e9/L Final    Absolute Eosinophils 0.0 0.0 - 0.7 10e9/L Final    Absolute Basophils 0.0 0.0 - 0.2 10e9/L Final    Abs Immature Granulocytes 0.0 0 - 0.4 10e9/L Final    Absolute Nucleated RBC 0.0  Final                Clinical Quality Measure: Blood Pressure Screening     Your blood pressure was checked while you were in the emergency department today. The last reading we obtained  was  BP: 137/87 . Please read the guidelines below about what these numbers mean and what you should do about them.  If your systolic blood pressure (the top number) is less than 120 and your diastolic blood pressure (the bottom number) is less than 80, then your blood pressure is normal. There is nothing more that you need to do about it.  If your systolic blood pressure (the top number) is 120-139 or your diastolic blood pressure (the bottom number) is 80-89, your blood pressure may be higher than it should be. You should have your blood pressure rechecked within a year by a primary care provider.  If your systolic blood pressure (the top number) is 140 or greater or your diastolic blood pressure (the bottom number) is 90 or greater, you may have high blood pressure. High blood pressure is treatable, but if left untreated over time it can put you at risk for heart attack, stroke, or kidney failure. You should have your blood pressure rechecked by a primary care provider within the next 4 weeks.  If your provider in the emergency department today gave you specific instructions to follow-up with your doctor or provider even sooner than that, you should follow that instruction and not wait for up to 4 weeks for your follow-up visit.        Thank you for choosing [facility]       Thank you for choosing [facility] for your care. Our goal is always to provide you with excellent care. Hearing back from our patients is one way we can continue to improve our services. Please take a few minutes to complete the written survey that you may receive in the mail after you visit with us. Thank you!        [facility] Information     [employer] gives you secure access to your electronic health record. If you see a primary care provider, you can also send messages to your care team and make appointments. If you have questions, please call your primary care clinic.  If you do not have a primary care provider, please call [phone] and they  will assist you.        Care EveryWhere ID     This is your Care EveryWhere ID. This could be used by other organizations to access your [place] medical records  [id number]        Equal Access to Services     ZAINAB FORD : Mary Coleman, neelam adhikari, gladis mathis, jairo rabago. [facility] [phone].    ATENCIÓN: Si habla español, tiene a gant disposición servicios gratuitos de asistencia lingüística. Llame al [phone].    We comply with applicable federal civil rights laws and [place] laws. We do not discriminate on the basis of race, color, national origin, age, disability, sex, sexual orientation, or gender identity.            After Visit Summary       This is your record. Keep this with you and show to your community pharmacist(s) and doctor(s) at your next visit.

## 2018-11-30 NOTE — ED AVS SNAPSHOT
[facility] Emergency Department    [address]    [facility] [address]    Phone:  [phone]    Fax:  [phone]                                       Elier Barber   MRN: [id number]    Department:  [facility] Emergency Department   Date of Visit:  11/30/2018           After Visit Summary Signature Page     I have received my discharge instructions, and my questions have been answered. I have discussed any challenges I see with this plan with the nurse or doctor.    ..........................................................................................................................................  Patient/Patient Representative Signature      ..........................................................................................................................................  Patient Representative Print Name and Relationship to Patient    ..................................................               ................................................  Date                                   Time    ..........................................................................................................................................  Reviewed by Signature/Title    ...................................................              ..............................................  Date                                               Time          22EPIC Rev 08/18

## 2018-12-01 VITALS
BODY MASS INDEX: 24.57 KG/M2 | WEIGHT: 164 LBS | DIASTOLIC BLOOD PRESSURE: 87 MMHG | OXYGEN SATURATION: 94 % | RESPIRATION RATE: 18 BRPM | TEMPERATURE: 98 F | HEART RATE: 62 BPM | SYSTOLIC BLOOD PRESSURE: 137 MMHG

## 2018-12-01 RX ORDER — NEOMYCIN/BACITRACIN/POLYMYXINB 3.5-400-5K
OINTMENT (GRAM) TOPICAL 3 TIMES DAILY
Qty: 30 G | Refills: 0 | Status: SHIPPED | OUTPATIENT
Start: 2018-12-01 | End: 2019-01-18

## 2018-12-01 NOTE — ED PROVIDER NOTES
History     Chief Complaint:  Groin Swelling      HPI   Elier Barber is a 73 year old male with a history of prostate cancer, basal cell carcinoma, stroke, and hypertension who presents to the ED for evaluation of groin swelling. The patient's wife reports that he has been applying Carac chemotherapy cream to his right leg to remove the superficial layer of skin cancer cells. He stopped taking this 4 days ago, and since then he has developed diffuse rash, swelling, and erythema to his back, legs, and groin area. The patient also developed some lip swelling 2-3 days ago, which has since resolved. The patient was evaluated by his dermatologist yesterday and was started on prednisone at that time. He reports taking 2 pills yesterday and 2 today, and he notes some lethargy with taking this. Today, the patient reports that the swelling and erythema spread to his penis and scrotum, and thus they presented to the ED. Here, the patient denies having any pains associated with the rash and denies having any mouth sores. Of note, the patient's wife reports that the patient experienced ankle swelling and erythema some years ago after using the same chemotherapy cream but not the type of rash he has now (w/superficial areas of epidermal desquamation). Here, the patient additionally reports some difficulties urinating. He has a history of prostatectomy and denies any history of yeast infection. He has not obtained blood work since the onset of his rash.  No oral pain/sores, hematuria, blood in stools.    Allergies:  Bee Venom  Penicillins    Medications:    Efudex  Aspirin  Lipitor  Lioresal  Plavix  Vasotec  Epinephrine  Nizoral  Klor-Con  Ditropan  Protonix  Miralax  Carac    Past Medical History:    Essential hypertension  Primary lateral sclerosis   Prostate Cancer  Vertigo  Cerebral infarction (H)  Muscle spasticity  Edema  Gastroesophageal reflux disease   Cerebral atherosclerosis  Basal cell carcinoma  Hearing  loss  Lumbar radiculopathy    Past Surgical History:    Hernia repair  Biopsy  Prostatectomy  Colonoscopy  Hernia  T&A    Family History:    CHF  Hypertension    Social History:  Former smoker   Uses alcohol.   Marital Status:   [2]     Review of Systems   Constitutional: Positive for fatigue.   HENT: Positive for facial swelling. Negative for mouth sores.    Genitourinary: Positive for difficulty urinating, penile swelling and scrotal swelling. Negative for penile pain.   Skin: Positive for color change and rash.   Allergic/Immunologic: Positive for immunocompromised state.   All other systems reviewed and are negative.      Physical Exam     Patient Vitals for the past 24 hrs:   BP Temp Temp src Pulse Heart Rate Resp SpO2 Weight   12/01/18 0000 137/87 - - - - - 94 % -   11/30/18 2330 148/79 - - - - - 96 % -   11/30/18 2327 155/88 - - - - - 96 % -   11/30/18 2023 157/86 98  F (36.7  C) Temporal 62 62 18 98 % 74.4 kg (164 lb)        Physical Exam  Eyes:                                   Sclera white; Pupils are equal and round  ENT:                                    External ears and nares normal  CV:                                      Rate as above with regular rhythm   Resp:                                   Breath sounds clear and equal bilaterally  GI:                                       Abdomen is soft, non-tender, non-distended  :                                      Circumcised male, meatus and glans normal  MS:                                      Moves all extremities  Skin:                                    Warm and dry, multiple wounds and scabs to anterior BLE, bright red epidermal break down anterior scrotum on both sides.  No scrotal swelling or tenderness.  Neuro:                                 Alert, thick speech is patient's baseline       Emergency Department Course   Laboratory:  CBC: WBC: 7.6, HGB: 13.7, PLT: 213  BMP: Glucose 136 (H), o/w WNL (Creatinine: 0.81)    UA with  Microscopic: Mucous present (A), o/w WNL     Emergency Department Course:  Nursing notes and vitals reviewed. (2219) I performed an exam of the patient as documented above.     Blood drawn. This was sent to the lab for further testing, results above.    (0000) I rechecked the patient and discussed the results of his workup thus far.     Findings and plan explained to the Patient. Patient discharged home with instructions regarding supportive care, medications, and reasons to return. The importance of close follow-up was reviewed.     I personally reviewed the laboratory results with the Patient and answered all related questions prior to discharge.      Impression & Plan    Medical Decision Making:  Elier Barber is a 73 year old male who is here for evaluation of a chief complaint of groin swelling which is actually groin redness on the physical exam. There is no evidence for cellulitis, underlying orchitis, or underlying epididymitis. He has extensive lower extremity epidermal changes with a rash secondary to a medication he had. The appearance on the scrotum is very similar, just over a larger area. There is no evidence for a yeast or cellulitis. I feel that a barrier treatment such as an antibiotic ointment or Vaseline would be appropriate.  Using triamcinolone which was prescribed for the legs is not appropriate on this area.  Continue prednisone taper: patient and wife educated that all the pills can be taken at once, they do not need to be spread out throughout the day.  F/u dermatology after the weekend; already has appt.    He was also having some difficulty urinating, but UA shows no evidence of infection. A random bladder scan was 260 mls, after which time he was able to void some but not the full amount. This is certainly not an amount that is at risk for renal dysfunction or marked urinary retention, and I don't think that it would be appropriate to place a catheter at this time. This was discussed  with the patient, and he is in agreement with following up on an outpatient basis unless symptoms worsen.  Prior to leaving ED he voided nearly 300mL.    Diagnosis:    ICD-10-CM    1. Scrotal rash R21    2. Difficulty urinating R39.198        Disposition:  discharged to home    Scribe Disclosure:  I, Cyndy Boo, am serving as a scribe on 11/30/2018 at 10:19 PM to personally document services performed by Hina Streeter, * based on my observations and the provider's statements to me.      Cyndy Boo  11/30/2018   [facility] EMERGENCY DEPARTMENT       Hnia Streeter MD  12/01/18 1541

## 2018-12-01 NOTE — DISCHARGE INSTRUCTIONS
Use topical antibiotic ointment to area s.a. Neosporin.  If you have any sensitivity to this then simply use vaseline    Your prescription was sent to:  [facility]Y #[id number] - [place], [place] - [address] [address] [Patient Preferred]  [phone]

## 2018-12-02 DIAGNOSIS — L30.9 DERMATITIS: ICD-10-CM

## 2018-12-03 ENCOUNTER — TRANSFERRED RECORDS (OUTPATIENT)
Dept: HEALTH INFORMATION MANAGEMENT | Facility: CLINIC | Age: 73
End: 2018-12-03

## 2018-12-04 RX ORDER — KETOCONAZOLE 20 MG/ML
SHAMPOO TOPICAL
Qty: 120 ML | Refills: 3 | Status: SHIPPED | OUTPATIENT
Start: 2018-12-04 | End: 2019-12-16

## 2018-12-04 NOTE — TELEPHONE ENCOUNTER
"Requested Prescriptions   Pending Prescriptions Disp Refills     ketoconazole (NIZORAL) 2 % external shampoo [Pharmacy Med Name: KETOCONAZOLE SHA 2%]  Last Written Prescription Date:  5/31/2018  Last Fill Quantity: 120ml,  # refills: 11   Last office visit: 7/9/2018 with prescribing provider:     Future Office Visit:   120 mL 5     Sig: SHAMPOO EVERY 2-3 DAYS AS  NEEDED    Antifungal Agents Passed    12/2/2018  5:09 AM       Passed - Recent (12 mo) or future (30 days) visit within the authorizing provider's specialty    Patient had office visit in the last 12 months or has a visit in the next 30 days with authorizing provider or within the authorizing provider's specialty.  See \"Patient Info\" tab in inbasket, or \"Choose Columns\" in Meds & Orders section of the refill encounter.             Passed - Not Fluconazole or Terconazole     If oral Fluconazole or Terconazole, may refill if indicated in progress notes.           "

## 2018-12-31 ENCOUNTER — MYC MEDICAL ADVICE (OUTPATIENT)
Dept: INTERNAL MEDICINE | Facility: CLINIC | Age: 73
End: 2018-12-31

## 2019-01-18 ENCOUNTER — OFFICE VISIT (OUTPATIENT)
Dept: INTERNAL MEDICINE | Facility: CLINIC | Age: 74
End: 2019-01-18
Payer: COMMERCIAL

## 2019-01-18 VITALS
RESPIRATION RATE: 15 BRPM | HEART RATE: 86 BPM | BODY MASS INDEX: 24.14 KG/M2 | OXYGEN SATURATION: 98 % | SYSTOLIC BLOOD PRESSURE: 126 MMHG | HEIGHT: 68 IN | WEIGHT: 159.3 LBS | DIASTOLIC BLOOD PRESSURE: 82 MMHG | TEMPERATURE: 98.3 F

## 2019-01-18 DIAGNOSIS — E78.5 HYPERLIPIDEMIA LDL GOAL <100: ICD-10-CM

## 2019-01-18 DIAGNOSIS — G12.23 PRIMARY LATERAL SCLEROSIS (H): ICD-10-CM

## 2019-01-18 DIAGNOSIS — C61 PROSTATE CA (H): ICD-10-CM

## 2019-01-18 DIAGNOSIS — I10 ESSENTIAL HYPERTENSION, BENIGN: Primary | ICD-10-CM

## 2019-01-18 DIAGNOSIS — I63.9 CEREBRAL INFARCTION, UNSPECIFIED MECHANISM (H): ICD-10-CM

## 2019-01-18 PROBLEM — I69.154 HEMIPLEGIA AND HEMIPARESIS FOLLOWING NONTRAUMATIC INTRACEREBRAL HEMORRHAGE AFFECTING LEFT NON-DOMINANT SIDE (H): Status: RESOLVED | Noted: 2019-01-18 | Resolved: 2019-01-18

## 2019-01-18 PROBLEM — I69.154 HEMIPLEGIA AND HEMIPARESIS FOLLOWING NONTRAUMATIC INTRACEREBRAL HEMORRHAGE AFFECTING LEFT NON-DOMINANT SIDE (H): Status: ACTIVE | Noted: 2019-01-18

## 2019-01-18 PROCEDURE — 99214 OFFICE O/P EST MOD 30 MIN: CPT | Performed by: INTERNAL MEDICINE

## 2019-01-18 RX ORDER — ENALAPRIL MALEATE 5 MG/1
5 TABLET ORAL 2 TIMES DAILY
Qty: 180 TABLET | Refills: 3 | Status: SHIPPED | OUTPATIENT
Start: 2019-01-18 | End: 2020-01-06

## 2019-01-18 ASSESSMENT — MIFFLIN-ST. JEOR: SCORE: 1437.08

## 2019-01-18 NOTE — PROGRESS NOTES
SUBJECTIVE:   Elier Barber is a 73 year old male who presents to clinic today for the following health issues:      Hypertension Follow-up      Outpatient blood pressures are 126/82, 130/71, 136/80, 122/80.     Low Salt Diet: no added salt      Amount of exercise or physical activity: 2-3 days/week for an average of 45-60 minutes    Problems taking medications regularly: No    Medication side effects: none    Diet: low salt    Other problems:   1.  Hyperlipidemia: He was not able to tolerate the statin and does not wish to try another one.  2.  Primary lateral sclerosis: Gradually progressive but no unusual changes.  His spasticity is better managed with his baclofen pump which will be refilled next month.  3. Cerebral infarct: No new neurologic symptoms.    Current Concerns:   none    Patient Active Problem List   Diagnosis     Essential hypertension, benign     Primary lateral sclerosis (HCC)     Prostate CA (HCC)     CARDIOVASCULAR SCREENING; LDL GOAL LESS THAN 160     HCD (health care directive)     Vertigo     Cerebral infarction (H)     Advanced directives, counseling/discussion     Muscle spasticity     Edema, unspecified type     Gastroesophageal reflux disease without esophagitis     Cerebral atherosclerosis       Current Outpatient Medications   Medication Sig Dispense Refill     aspirin 81 MG tablet Take 81 mg by mouth daily       baclofen (LIORESAL) in NaCl 0.9% 100 mL intrathecal infusion by Intrathecal route continuous Pump filled by [facility] [phone]  Pump Model: Ark  Last fill:  1/30/2018  Next fill: 5/29/18  Low [place] Alarm Date: 5/29/18  Reservoir Volume: unknown  Conc: 2000 mcg/ml  Flex schedule delivers 266.5 mcg/day  Basal rate:unknown, pt states basal rate increased from 05:00-08:00       enalapril (VASOTEC) 5 MG tablet Take 1 tablet (5 mg) by mouth 2 times daily 180 tablet 3     EPINEPHrine 0.3 MG/0.3ML injection Inject 0.3  "mLs (0.3 mg) into the muscle as needed for anaphylaxis 0.3 mL 3     ketoconazole (NIZORAL) 2 % external shampoo SHAMPOO EVERY 2-3 DAYS AS  NEEDED 120 mL 3     KLOR-CON 10 MEQ CR tablet TAKE 2 TABLETS (20 MEQ)    DAILY 180 tablet 2     oxybutynin (DITROPAN-XL) 10 MG 24 hr tablet Take 2 tablets (20 mg) by mouth At Bedtime 180 tablet 3     pantoprazole (PROTONIX) 40 MG EC tablet TAKE 1 TABLET BY MOUTH ONCE DAILY 90 tablet 2     polyethylene glycol (MIRALAX/GLYCOLAX) Packet Take 1 packet by mouth every other day        STATIN NOT PRESCRIBED, INTENTIONAL, Please choose reason not prescribed, below           Social History     Tobacco Use     Smoking status: Former Smoker     Packs/day: 0.30     Years: 6.00     Pack years: 1.80     Types: Cigarettes     Start date: 1970     Last attempt to quit: 10/5/1976     Years since quittin.3     Smokeless tobacco: Never Used   Substance Use Topics     Alcohol use: Yes     Comment: 1 drink/week        ROS:  No fever, chills, no dyspnea on exertion, no chest pain, palpitations, PND, orthopnea, edema, syncope, headache, abdominal pain     OBJECTIVE:     /82   Pulse 86   Temp 98.3  F (36.8  C) (Oral)   Resp 15   Ht 1.727 m (5' 8\")   Wt 72.3 kg (159 lb 4.8 oz)   SpO2 98%   BMI 24.22 kg/m    Body mass index is 24.22 kg/m .    Lungs: clear  CV: normal S1, S2 without murmur, S3 or S4.   Stable mild edema      ASSESSMENT/PLAN:       1. Essential hypertension, benign  Improved control, continue current medication, follow-up 6 months  - enalapril (VASOTEC) 5 MG tablet; Take 1 tablet (5 mg) by mouth 2 times daily  Dispense: 180 tablet; Refill: 3    2. Primary lateral sclerosis (HCC)  Clinically stable, continue follow-up with neurology and continue on baclofen pump    3. Prostate CA (H)  Stable without evidence of recurrence    4.  Hyperlipidemia: He had side effects with statin and declines trying a different one, continue diet    5.  CVA: Clinically stable.        Lida" SMITA Ray MD  [facility]

## 2019-01-18 NOTE — NURSING NOTE
"/82   Pulse 86   Temp 98.3  F (36.8  C) (Oral)   Resp 15   Ht 1.727 m (5' 8\")   Wt 72.3 kg (159 lb 4.8 oz)   SpO2 98%   BMI 24.22 kg/m    Patient in for follow up on HTN.  Rosamaria Ge CMA    "

## 2019-01-22 ENCOUNTER — TELEPHONE (OUTPATIENT)
Dept: INTERNAL MEDICINE | Facility: CLINIC | Age: 74
End: 2019-01-22

## 2019-01-22 NOTE — TELEPHONE ENCOUNTER
Eliu calling back for A for personal cares, bathing.   For 60 days, private pay.     Verbal order given to approve visits until certification received for MD signature.

## 2019-01-22 NOTE — TELEPHONE ENCOUNTER
Reason for Call:  Home Health Care    Eliu  with [facility] called regarding (reason for call):maintenance private pay    Orders are needed for this patient. Na  maintenance    PT: na    OT: na    Skilled Nursing: na    Pt Provider: Flor    Phone Number Homecare Nurse can be reached at: [phone]    Can we leave a detailed message on this number? YES    Phone number patient can be reached at: Other phone number:  [phone]  Best Time: any    Call taken on 1/22/2019 at 10:53 AM by Griselda Campbell

## 2019-02-08 ENCOUNTER — TELEPHONE (OUTPATIENT)
Dept: INTERNAL MEDICINE | Facility: CLINIC | Age: 74
End: 2019-02-08

## 2019-02-08 DIAGNOSIS — Z53.9 DIAGNOSIS NOT YET DEFINED: Primary | ICD-10-CM

## 2019-02-08 PROCEDURE — G0179 MD RECERTIFICATION HHA PT: HCPCS | Performed by: INTERNAL MEDICINE

## 2019-02-08 NOTE — TELEPHONE ENCOUNTER
Fax received from [facility] for review and signature.  Put in Dr. Ray's in basket.

## 2019-03-20 ENCOUNTER — TELEPHONE (OUTPATIENT)
Dept: INTERNAL MEDICINE | Facility: CLINIC | Age: 74
End: 2019-03-20

## 2019-03-20 NOTE — TELEPHONE ENCOUNTER
[facility] now requests orders and shares plan of care/discharge summaries for some patients through [employer].  Please REPLY TO THIS MESSAGE OR ROUTE BACK TO THE AUTHOR in order to give authorization for orders when needed.  This is considered a verbal order, you will still receive a faxed copy of orders for signature.  Thank you for your assistance in improving collaboration for our patients.    ORDER request, new cert period beginning 3/23/19:  PT 1w10 for progressive HEP for balance, strength, endurance, gait/transfer safety  HHA hourly visits 1w9 for assist with bathing/personal cares and light housekeeping.   Through patient's long term care insurance.   Angelina Hickman [phone]

## 2019-03-21 NOTE — TELEPHONE ENCOUNTER
Angelina ferro regarding her message below.  States she is leaving on vacation x 2 weeks and needed an answer to the homecare orders she is requesting below.    Verbal order given to approve visits until certification received for MD signature.

## 2019-03-22 NOTE — TELEPHONE ENCOUNTER
Angelina calls back, needs name of covering provider to sign home care orders. Advised Dr. Mar is covering for this patient.

## 2019-03-27 ENCOUNTER — TELEPHONE (OUTPATIENT)
Dept: INTERNAL MEDICINE | Facility: CLINIC | Age: 74
End: 2019-03-27

## 2019-03-27 DIAGNOSIS — Z53.9 DIAGNOSIS NOT YET DEFINED: Primary | ICD-10-CM

## 2019-03-27 PROCEDURE — G0179 MD RECERTIFICATION HHA PT: HCPCS | Performed by: INTERNAL MEDICINE

## 2019-04-09 ENCOUNTER — TRANSFERRED RECORDS (OUTPATIENT)
Dept: HEALTH INFORMATION MANAGEMENT | Facility: CLINIC | Age: 74
End: 2019-04-09

## 2019-05-02 ENCOUNTER — OFFICE VISIT (OUTPATIENT)
Dept: INTERNAL MEDICINE | Facility: CLINIC | Age: 74
End: 2019-05-02
Payer: COMMERCIAL

## 2019-05-02 VITALS
DIASTOLIC BLOOD PRESSURE: 60 MMHG | HEIGHT: 68 IN | RESPIRATION RATE: 22 BRPM | TEMPERATURE: 98.1 F | BODY MASS INDEX: 24.71 KG/M2 | HEART RATE: 71 BPM | WEIGHT: 163 LBS | SYSTOLIC BLOOD PRESSURE: 118 MMHG | OXYGEN SATURATION: 94 %

## 2019-05-02 DIAGNOSIS — R05.9 COUGH IN ADULT PATIENT: Primary | ICD-10-CM

## 2019-05-02 DIAGNOSIS — Z76.0 MEDICATION REFILL: ICD-10-CM

## 2019-05-02 PROCEDURE — 99214 OFFICE O/P EST MOD 30 MIN: CPT | Performed by: FAMILY MEDICINE

## 2019-05-02 RX ORDER — AZITHROMYCIN 250 MG/1
TABLET, FILM COATED ORAL
Qty: 6 TABLET | Refills: 0 | Status: SHIPPED | OUTPATIENT
Start: 2019-05-02 | End: 2019-08-15

## 2019-05-02 RX ORDER — BENZONATATE 100 MG/1
100 CAPSULE ORAL 3 TIMES DAILY PRN
Qty: 30 CAPSULE | Refills: 0 | Status: SHIPPED | OUTPATIENT
Start: 2019-05-02 | End: 2019-08-15

## 2019-05-02 RX ORDER — GUAIFENESIN AND DEXTROMETHORPHAN HYDROBROMIDE 100; 10 MG/5ML; MG/5ML
5 SOLUTION ORAL EVERY 4 HOURS PRN
COMMUNITY
End: 2019-08-15

## 2019-05-02 ASSESSMENT — MIFFLIN-ST. JEOR: SCORE: 1453.86

## 2019-05-02 NOTE — PROGRESS NOTES
SUBJECTIVE:   Elier Barber is a 73 year old male with h/o CVA , htn , hoarseness of voice ,primary lateral sclerosis  presenting with a chief complaint of cough - non-productive. He is an established patient of [facility].  Onset of symptoms was 7 day(s) ago.he had symptoms of sorethroat and then it changed to coughing spells   He cannot cough much out due to his medical condition , pt was in wheel chair when was examined   Course of illness is waxing and waning.    Severity moderate  Current and Associated symptoms: cough - non-productive  Treatment measures tried include Antihistamine and OTC Cough med.  Predisposing factors include ill contact: Family member .    Past Medical History:   Diagnosis Date     Basal cell carcinoma nos     sees derm     CVA (cerebral infarction) 6/14    small vessel right int capsule stroke     Essential hypertension, benign      Hearing loss      Hoarseness of voice     assoc with PLS     Lumbar radiculopathy     2017     Primary Lateral Sclerosis 2002    has baclofen pump through Dr. Calvert, [place], [facility]ink[facility]     Prostate CA (H) 2008    prostatectomy     Current Outpatient Medications   Medication Sig Dispense Refill     aspirin 81 MG tablet Take 81 mg by mouth daily       azithromycin (ZITHROMAX) 250 MG tablet Take 2 tablets (500 mg) by mouth daily for 1 day, THEN 1 tablet (250 mg) daily for 4 days. 6 tablet 0     baclofen (LIORESAL) in NaCl 0.9% 100 mL intrathecal infusion by Intrathecal route continuous Pump filled by [facility] [phone]  Pump Model: Syncromed  Last fill:  1/30/2018  Next fill: 5/29/18  Low [place] Alarm Date: 5/29/18  Reservoir Volume: unknown  Conc: 2000 mcg/ml  Flex schedule delivers 266.5 mcg/day  Basal rate:unknown, pt states basal rate increased from 05:00-08:00       benzonatate (TESSALON) 100 MG capsule Take 1 capsule (100 mg) by mouth 3 times daily as needed for cough 30 capsule 0      "Dextromethorphan-guaiFENesin  MG/5ML syrup Take 5 mLs by mouth every 4 hours as needed for cough       enalapril (VASOTEC) 5 MG tablet Take 1 tablet (5 mg) by mouth 2 times daily 180 tablet 3     EPINEPHrine 0.3 MG/0.3ML injection Inject 0.3 mLs (0.3 mg) into the muscle as needed for anaphylaxis 0.3 mL 3     ketoconazole (NIZORAL) 2 % external shampoo SHAMPOO EVERY 2-3 DAYS AS  NEEDED 120 mL 3     KLOR-CON 10 MEQ CR tablet TAKE 2 TABLETS (20 MEQ)    DAILY 180 tablet 2     Loratadine (WAL-ITIN PO)        oxybutynin (DITROPAN-XL) 10 MG 24 hr tablet Take 2 tablets (20 mg) by mouth At Bedtime 180 tablet 3     pantoprazole (PROTONIX) 40 MG EC tablet TAKE 1 TABLET BY MOUTH ONCE DAILY 90 tablet 2     polyethylene glycol (MIRALAX/GLYCOLAX) Packet Take 1 packet by mouth every other day        STATIN NOT PRESCRIBED, INTENTIONAL, Please choose reason not prescribed, below       Social History     Tobacco Use     Smoking status: Former Smoker     Packs/day: 0.30     Years: 6.00     Pack years: 1.80     Types: Cigarettes     Start date: 1970     Last attempt to quit: 10/5/1976     Years since quittin.6     Smokeless tobacco: Never Used   Substance Use Topics     Alcohol use: Yes     Comment: 1 drink/week     Family History   Problem Relation Age of Onset     Heart Disease Mother         CHF     Hypertension Mother      Family History Negative Father      C.A.D. Maternal Grandfather      Cerebrovascular Disease Maternal Uncle         45     Cerebrovascular Disease Maternal Uncle          ROS:    10 point ROS of systems including Constitutional, Eyes,  Cardiovascular, Gastroenterology, Genitourinary, Integumentary, Muscularskeletal, Psychiatric were all negative except for pertinent positives noted in my HPI         OBJECTIVE:  /60 (BP Location: Left arm, Patient Position: Sitting, Cuff Size: Adult Large)   Pulse 71   Temp 98.1  F (36.7  C) (Oral)   Resp 22   Ht 1.727 m (5' 8\")   Wt 73.9 kg (163 lb)   " SpO2 94%   BMI 24.78 kg/m    GENERAL APPEARANCE: healthy, alert and no distress  EYES: EOMI,  PERRL, conjunctiva clear  HENT: ear canals and TM's normal.  Nose and mouth without ulcers, erythema or lesions  NECK: supple, nontender, no lymphadenopathy  RESP: lungs clear to auscultation - no rales, rhonchi or wheezes  CV: regular rates and rhythm, normal S1 S2, no murmur noted  ABDOMEN:  soft, nontender, no HSM or masses and bowel sounds normal  SKIN: no suspicious lesions or rashes  Physical Exam      X-Ray was not done.    Medical Decision Making:    Differential Diagnosis:  URI Adult/Peds:  Bronchitis-viral, Viral syndrome and Viral upper respiratory illness      ASSESSMENT:  Elier was seen today for cough.    Diagnoses and all orders for this visit:    Cough in adult patient  -     benzonatate (TESSALON) 100 MG capsule; Take 1 capsule (100 mg) by mouth 3 times daily as needed for cough    Medication refill  -     azithromycin (ZITHROMAX) 250 MG tablet; Take 2 tablets (500 mg) by mouth daily for 1 day, THEN 1 tablet (250 mg) daily for 4 days.          PLAN:  I think its viral or allergic which is making his cough worsen   Tylenol, Fluids, Rest, Vaporizer and continue on antihistamines   Due to his medical condition would recommend starting an antibiotic if symptoms worsen  Pt and his wife understood and agreed with plan   Follow up if  symptoms fail to improve or worsens   Pt understood and agreed with plan       Gianna Bravo MD     See orders in Epic

## 2019-05-07 DIAGNOSIS — K21.9 GASTROESOPHAGEAL REFLUX DISEASE WITHOUT ESOPHAGITIS: ICD-10-CM

## 2019-05-07 RX ORDER — PANTOPRAZOLE SODIUM 40 MG/1
TABLET, DELAYED RELEASE ORAL
Qty: 90 TABLET | Refills: 2 | Status: SHIPPED | OUTPATIENT
Start: 2019-05-07 | End: 2019-11-27

## 2019-05-07 NOTE — TELEPHONE ENCOUNTER
Last OV 5/2/19    Prescription approved per [facility] Refill Protocol.

## 2019-05-31 ENCOUNTER — TELEPHONE (OUTPATIENT)
Dept: INTERNAL MEDICINE | Facility: CLINIC | Age: 74
End: 2019-05-31

## 2019-06-03 DIAGNOSIS — Z53.9 DIAGNOSIS NOT YET DEFINED: Primary | ICD-10-CM

## 2019-06-03 PROCEDURE — G0179 MD RECERTIFICATION HHA PT: HCPCS | Performed by: INTERNAL MEDICINE

## 2019-06-05 DIAGNOSIS — G12.23 PRIMARY LATERAL SCLEROSIS (H): Primary | ICD-10-CM

## 2019-06-06 ENCOUNTER — OFFICE VISIT (OUTPATIENT)
Dept: NEUROLOGY | Facility: CLINIC | Age: 74
End: 2019-06-06
Payer: COMMERCIAL

## 2019-06-06 ENCOUNTER — THERAPY VISIT (OUTPATIENT)
Dept: OCCUPATIONAL THERAPY | Facility: CLINIC | Age: 74
End: 2019-06-06
Payer: COMMERCIAL

## 2019-06-06 ENCOUNTER — THERAPY VISIT (OUTPATIENT)
Dept: SPEECH THERAPY | Facility: CLINIC | Age: 74
End: 2019-06-06
Payer: COMMERCIAL

## 2019-06-06 VITALS
OXYGEN SATURATION: 98 % | WEIGHT: 156 LBS | HEART RATE: 65 BPM | DIASTOLIC BLOOD PRESSURE: 88 MMHG | SYSTOLIC BLOOD PRESSURE: 166 MMHG | BODY MASS INDEX: 23.64 KG/M2 | HEIGHT: 68 IN

## 2019-06-06 DIAGNOSIS — Z78.9 IMPAIRED MOBILITY AND ADLS: ICD-10-CM

## 2019-06-06 DIAGNOSIS — R13.12 OROPHARYNGEAL DYSPHAGIA: ICD-10-CM

## 2019-06-06 DIAGNOSIS — Z78.9 IMPAIRED INSTRUMENTAL ACTIVITIES OF DAILY LIVING (IADL): ICD-10-CM

## 2019-06-06 DIAGNOSIS — G12.23 PRIMARY LATERAL SCLEROSIS (H): Primary | ICD-10-CM

## 2019-06-06 DIAGNOSIS — Z74.09 IMPAIRED MOBILITY AND ADLS: ICD-10-CM

## 2019-06-06 DIAGNOSIS — G12.21 ALS (AMYOTROPHIC LATERAL SCLEROSIS) (H): ICD-10-CM

## 2019-06-06 DIAGNOSIS — R47.1 DYSARTHRIA: Primary | ICD-10-CM

## 2019-06-06 ASSESSMENT — REVISED AMYOTROPHIC LATERAL SCLEROSIS FUNCTIONAL RATING SCALE (ALSFRS-R)
WALKING: 2
TURNING_IN_BED_AND_ADJUSTING_BED_CLOTHES: 3
HANDWRITING: NORMAL
RESPIRATORY_INSUFFICIENCY: 4
CUTTING_FOOD_AND_HANDLING_UTENSILES: 2
SPEECH: INTELLIGIBLE WITH REPEATING
GROSS_MOTOR_SUBTOTAL_SCORE: 6
CLIMBING_STAIRS: 1
HANDWRITING: 4
ORTHOPENA: 4
SPEECH: 2
SWALLOWING: EARLY EATING PROBLEMS - OCCASIONAL CHOKING
DRESSING_AND_HYGEINE: 2
WALKING: WALKS WITH ASSISTANCE
TURNING_IN_BED_AND_ADJUSTING_BED_CLOTHES: SOMEWHAT SLOW AND CLUMSY, BUT NO HELP NEEDED
SIX_ITEM_SUBTOTAL: 17
FINE_MOTOR_SUBTOTAL_SCORE: 8
DYSPNEA: 4
CLIMBING_STAIRS: NEEDS ASSISTANCE
BULBAR_SUBTOTAL: 8
SALIVATION: SLIGHT BUT DEFINITE EXCESS OF SALIVA IN MOUTH; MAY HAVE NIGHTTIME DROOLING
RESPIRATORY_SUBTOTAL_SCORE: 12
DRESSING_AND_HYGEINE: INTERMITTENT ASSISTANCE OR SUBSTITUTE METHODS
SALIVATION: 3
ALSFRS_TOTAL_SCORE: 34
SWALLOWING: 3

## 2019-06-06 ASSESSMENT — PAIN SCALES - GENERAL: PAINLEVEL: NO PAIN (0)

## 2019-06-06 ASSESSMENT — MIFFLIN-ST. JEOR: SCORE: 1422.11

## 2019-06-06 NOTE — LETTER
"2019       RE: Elier Barber  [address] [facility] [address]     Dear Colleague,    Thank you for referring your patient, Elier Barber, to the [facility] NEUROLOGY at [facility]. Please see a copy of my visit note below.      DEPARTMENT OF NEUROLOGY  ALS CLINIC FOLLOW UP    Patient Name:  Elier Barber  MRN:  [id number]    :  1945  Date of Clinic Visit:  2019  Primary Care Provider:  Lida Ray        SUMMARY: Elier Barber is a 73 year old male who follows in ALS clinic for diagnosis of primary lateralizing sclerosis (PLS). He was diagnosed many years ago and has been clinically stable in follow up over the last several years. He has had issues with spastic dysarthria and spasticity in his lower extremities. He has a baclofen pump in place. Other medical issues include hypertension and history of stroke (most recent 18 - lacunar infarct in the right frontal white matter).    INTERVAL HISTORY: Mr. Barber was last seen in ALS clinic on 18. He complains of worsened stamina, stating that by the end of the day he feels that he feels worn out and fatigued. He falls about one time per month (last fall 3-4 weeks ago) and notes this mostly happens at the end of the day. He is undergoing home PT and exercises 2-3 times per week at the pool and tries to walk up and down the street every day. He reports good sleep, no snoring, no apneic episodes, wakes up feeling refreshed. Denies depression.    He feels that his spasticity has increased and his speech has worsened. This typically happens before his baclofen pump is refilled which is due to be done on . He is also reporting progressive weakness of his bilateral hands. This is causing some problems with using utensils.     Vital signs:                         Estimated body mass index is 24.78 kg/m  as calculated from the following:    Height as of 19: 1.727 m (5' 8\").    Weight as of " 5/2/19: 73.9 kg (163 lb).    Examination:  General: NAD  HEENT: sclera anicteric  Resp: breathing comfortably, no respiratory distress  Skin: warm and dry  Extremities: no edema    Neuro:  Mental Status: Fully alert, attentive and oriented. Language intact with spastic dysarthria.   Cranial Nerves: EOMI with normal smooth pursuit. Facial movements symmetric with full strength. Hearing intact to conversation. Palate elevation symmetric, uvula midline. Tongue protrusion midline with full strength.   Motor: Spasticity in LE>>UE.      Right Left   Shoulder abduction:       5 5   Elbow extension: 5 5   Elbow flexion:             5 5   Wrist extension:        5 5   Finger extension 4+ 4   FDI 4+ 4+   APB 4 4   Hip flexion 5 5   Knee flexion 5 5   Knee extension 5 5   Dorsiflexion 5 5   Plantar flexion 5 5     Reflexes: +Jaw jerk     Patella Ankle Biceps Brachiorad   Right 2+ 2+ 3+ 3+   Left 2+ 2+ 3+ 3+     Coordination: FNF intact without ataxia or dysmetria.   Station/Gait: Walks with walker.     IINVESTIGATIONS:  All available and relevant labs, imaging, and other procedures were reviewed personally.     IMPRESSION/RECOMMENDATIONS:     #Primary Lateralizing Sclerosis:  Mr. Barber is a 73 year old man who follows in ALS clinic for PLS which was diagnosed in 2002. On exam he does have increased spasticity and spastic dysarthria that are likely worsened due to running low on baclofen, which will be refilled 6/11. He does have bilateral hand weakness, which likely represents progression of PLS. Given that he is having some issues with using utensils he will be seen by OT today. Due to increased spasticity SLP will also evaluate today; he is not reporting issues with swallowing but there may be subtle dysphagia present. He was advised to call and schedule an earlier return to clinic if he is noticing worsening of any of these symptoms despite his baclofen being refilled.    #Fatigue:  He does not report any signs or symptoms  consistent with sleep apnea nor depression. It could be as he suspects that he is more active during the summer which causes greater fatigue. He had a TSH check in 2017 which was normal, could consider repeating this, although will defer this to his PCP.    RETURN TO CLINIC: 6 months    Patient care discussed with attending neurologist, Dr. Tejada, who agrees with my assessment and plan.    Denis De Los Santos MD  Neurology PGY3    I personally examined the patient and concur with the resident's note. Mr Frederic does demonstrate worsening dysarthria than in the past, as well as mild weakness of left intrinsic hand muscles. Although he has a history of lacunar stroke, he is confident that these symptoms did not develop abruptly and the change in speech is consistent in his experience with the need for a baclofen refill. The hand weakness is not severe and not associated with denervation atrophy; hence it is still in the range that can be seen with UMN pattern of weakness. If it worsens or he develops sensory symptoms we could perform EMG for possible ulnar neuropathy at the elbow. Progression to ALS at this point is extraordinarily unlikely.     Gary Tejada M.D.

## 2019-06-06 NOTE — PROGRESS NOTES
Outpatient Speech Language Pathology Neurology Clinic Evaluation  Elier Barber YOB: 1945 [id number]    Visit Date: 6/6/19, last seen by this SLP 11/2/2017 although did also have outpatient speech therapy in July and August of 2018    Age: 73 year old    Medical Diagnosis: Primary lateral sclerosis (PLS)    Date of Diagnosis: approx 2008    Referring MD: Dr Tejada    PMH: Refer to Medical Chart    Fall Risk:Refer to physician report.    Others at Clinic Visit: Spouse Kelli    Living Situation: With others    Patient Concerns/Goals: Increased speech deficits    Observations: Patient seen with wife present. Both are pleasant and cooperative although feeling fatigued after lengthy clinic visit.    Current Mode of Nutrition: Oral diet    Weight: 156 lbs    Cardio-Respiratory Status: Forced vital capacity: 74%    Functional Rating Scale (ALS-FRS): 34/48    Eating Assessment Tool (EAT10): 3/40   0  My swallowing problem has caused me to lose weight   0  My swallowing problem interferes with my ability to go out for meals   0  Swallowing liquids takes extra effort   1  Swallowing solids takes extra effort   1  Swallowing pills takes extra effort   0  Swallowing is painful   0  The pleasure of eating is affected by my swallowing   0  When I swallow food sticks in my throat   1  I cough when I eat   0  Swallowing is stressful      Oral Motor/Swallowing  Oral Motor Function: Anomalies present:    Labial anomaly- moderate weakness, reduced protrusion and retraction, poor seal  Lingual anomaly- moderate weakness, greater weakness on left side due to prior CVAs (per his report), rate of movement significantly slow    Volitional Abilities:  Cough- Functional  Throat Clear- Functional  Swallow- Present    Swallow Function:   Thin Liquid-  mildly reduced bolus control with reduced A-P propulsion, laryngeal elevation palpated to be adequate with appropriate timing however s/s aspiration noted when he laughed while  drinking. He admits this occurs occasionally and strategies to compensate were discussed.    Dysphagia Diagnosis: Mild dysphagia       Speech Intelligibility/Functional Communication   Methods of communication: Verbal    Dysarthria: Severe (he is due to have Baclofen pump refilled early next week and he feels his speech is directly impacted by this. He reports in the past when pump is due to be refilled his speech and walking are directly impacted and improved once pump refilled)    Speech:   Deficits in speech respiration- coughing noted with talking  Deficits in phonation- Strained-strangled quality (spastic)  Deficits in articulation- Decreased precision of articulation and Slow effortful rate  Intelligibility- approx 40% intelligible at the conversational level to this skilled listener in quiet environment. This improves to approx 80% intelligible with use of strategies  He is noted to laugh throughout visit, both appropriately and inappropriately, laughing while talking reduced intelligibility    Speech Intelligibility/Communication:  Impacts daily activities, Impacts social activities and Impacts phone usage    Augmentative and Alternative Communication (AAC):   He reports he has an iPad with AAC apps but has never used.   SLP encouraged him to initiate use  Educated re use of nonverbal communication to supplement verbal speech      Clinical Impression/Plan of Care  Treatment Diagnosis: Oropharyngeal Dysphagia and Dysarthia     Recommendations:  Oral diet of regular solids and thin liquids  Continue use of compensatory strategies listed below  Continue compensatory speech strategies listed below  Initiate use of high and low tech AAC device to supplement speech    Plan of Care:  1 session evaluation and treatment.    Goals: Based on today's evaluation session patient and/or caregiver will have understanding of current communication and/or swallowing status and recommendations for management.    Educational  "Assessment:  Learners- Patient and Significant other  Barriers to Learning- No barriers.    Education provided/response:   Speech  Swallowing  AAC  Verbalized understanding    Treatment provided this date:   Swallow intervention, 4 minutes  SLP educated re swallowing anatomy and risks of aspiration including s/s of respiratory compromise for which to monitor. Observation and patient report suggests that most coughing with liquids occurs when he laughs while drinking (he laughs throughout visit, question of possible mild pseudobulbar affect) Educated re safe swallow strategies including small bites/sips, slow rate, extra chewing, no talking while eating/drinking, chin tuck or neutral head position. He reports pills are tolerated if he gives himself enough time. Recommend he continue regular solids and thin liquids with mindful eating and monitoring for tolerance.  Communication intervention, 10 minutes   SLP educated re speech strategies with emphasis on SLOP acronym (Slow, Loud, Over-articulate, Pause) with addition of \"shortening message\" as he is noted to speak at length to express self, encouraged shorter statements to convey same message. Also educated re impact of laughing on speech intelligibility. With use of strategies his intelligibility significantly improves therefore strongly encouraged him to be mindful of using strategies. Also educated re benefit of using AAC techniques including use of iPad or low tech options such as writing, letter boards, gestures, etc.     Response to recommendations/treatment: verbalized understanding, agreed to POC    Goal attainment: All goals met    Risks and benefits of evaluation/treatment have been explained.  Patient, family and/or caregiver are in agreement with Plan of Care.    Timed Code Treatment Minutes: 0 minutes timed codes    Total Treatment Time (sum of timed and untimed services): 27 minutes                  "

## 2019-06-06 NOTE — PROGRESS NOTES
"  DEPARTMENT OF NEUROLOGY  ALS CLINIC FOLLOW UP    Patient Name:  Elier Barber  MRN:  [id number]    :  1945  Date of Clinic Visit:  2019  Primary Care Provider:  Lida Ray        SUMMARY: Elier Barber is a 73 year old male who follows in ALS clinic for diagnosis of primary lateralizing sclerosis (PLS). He was diagnosed many years ago and has been clinically stable in follow up over the last several years. He has had issues with spastic dysarthria and spasticity in his lower extremities. He has a baclofen pump in place. Other medical issues include hypertension and history of stroke (most recent 18 - lacunar infarct in the right frontal white matter).    INTERVAL HISTORY: Mr. Barber was last seen in ALS clinic on 18. He complains of worsened stamina, stating that by the end of the day he feels that he feels worn out and fatigued. He falls about one time per month (last fall 3-4 weeks ago) and notes this mostly happens at the end of the day. He is undergoing home PT and exercises 2-3 times per week at the pool and tries to walk up and down the street every day. He reports good sleep, no snoring, no apneic episodes, wakes up feeling refreshed. Denies depression.    He feels that his spasticity has increased and his speech has worsened. This typically happens before his baclofen pump is refilled which is due to be done on . He is also reporting progressive weakness of his bilateral hands. This is causing some problems with using utensils.     Vital signs:                         Estimated body mass index is 24.78 kg/m  as calculated from the following:    Height as of 19: 1.727 m (5' 8\").    Weight as of 19: 73.9 kg (163 lb).    Examination:  General: NAD  HEENT: sclera anicteric  Resp: breathing comfortably, no respiratory distress  Skin: warm and dry  Extremities: no edema    Neuro:  Mental Status: Fully alert, attentive and oriented. Language intact with spastic " dysarthria.   Cranial Nerves: EOMI with normal smooth pursuit. Facial movements symmetric with full strength. Hearing intact to conversation. Palate elevation symmetric, uvula midline. Tongue protrusion midline with full strength.   Motor: Spasticity in LE>>UE.      Right Left   Shoulder abduction:       5 5   Elbow extension: 5 5   Elbow flexion:             5 5   Wrist extension:        5 5   Finger extension 4+ 4   FDI 4+ 4+   APB 4 4   Hip flexion 5 5   Knee flexion 5 5   Knee extension 5 5   Dorsiflexion 5 5   Plantar flexion 5 5     Reflexes: +Jaw jerk     Patella Ankle Biceps Brachiorad   Right 2+ 2+ 3+ 3+   Left 2+ 2+ 3+ 3+     Coordination: FNF intact without ataxia or dysmetria.   Station/Gait: Walks with walker.     IINVESTIGATIONS:  All available and relevant labs, imaging, and other procedures were reviewed personally.     IMPRESSION/RECOMMENDATIONS:     #Primary Lateralizing Sclerosis:  Mr. Barber is a 73 year old man who follows in ALS clinic for PLS which was diagnosed in 2002. On exam he does have increased spasticity and spastic dysarthria that are likely worsened due to running low on baclofen, which will be refilled 6/11. He does have bilateral hand weakness, which likely represents progression of PLS. Given that he is having some issues with using utensils he will be seen by OT today. Due to increased spasticity SLP will also evaluate today; he is not reporting issues with swallowing but there may be subtle dysphagia present. He was advised to call and schedule an earlier return to clinic if he is noticing worsening of any of these symptoms despite his baclofen being refilled.    #Fatigue:  He does not report any signs or symptoms consistent with sleep apnea nor depression. It could be as he suspects that he is more active during the summer which causes greater fatigue. He had a TSH check in 2017 which was normal, could consider repeating this, although will defer this to his PCP.    RETURN TO  CLINIC: 6 months    Patient care discussed with attending neurologist, Dr. Tejada, who agrees with my assessment and plan.    Denis De Los Santos MD  Neurology PGY3    I personally examined the patient and concur with the resident's note. Mr De La Garza does demonstrate worsening dysarthria than in the past, as well as mild weakness of left intrinsic hand muscles. Although he has a history of lacunar stroke, he is confident that these symptoms did not develop abruptly and the change in speech is consistent in his experience with the need for a baclofen refill. The hand weakness is not severe and not associated with denervation atrophy; hence it is still in the range that can be seen with UMN pattern of weakness. If it worsens or he develops sensory symptoms we could perform EMG for possible ulnar neuropathy at the elbow. Progression to ALS at this point is extraordinarily unlikely.     Gary Tejada M.D.      Answers for HPI/ROS submitted by the patient on 5/23/2019   General Symptoms: No  Skin Symptoms: No  HENT Symptoms: No  EYE SYMPTOMS: No  HEART SYMPTOMS: No  LUNG SYMPTOMS: No  INTESTINAL SYMPTOMS: No  URINARY SYMPTOMS: No  REPRODUCTIVE SYMPTOMS: No  SKELETAL SYMPTOMS: No  BLOOD SYMPTOMS: No  NERVOUS SYSTEM SYMPTOMS: No  MENTAL HEALTH SYMPTOMS: No

## 2019-06-06 NOTE — PROGRESS NOTES
OUTPATIENT OCCUPATIONAL THERAPY CLINIC NOTE    Type of visit:  Evaluation            Date of Service:  6/6/19 and last OT eval in ALS clinic: 5/3/18     Referring provider: Dr. Gary Tejada    Others present at visit:  Spouse / significant other, Kelli    Medical diagnosis:   Primary lateral sclerosis (PLS)     Date of diagnosis: 2001     Pertinent medical history:  H/O BPPV with 2 recent episodes, stroke with L king June 2014; recent fall 5/2017 with LE DVT; baclofen pump  Additional Occupational Profile Information (patterns of daily living, interests, values and needs):  and retired with long history of PLS    Cardio-respiratory status:  FVC: 74 %    Living environment:  House-uses basement 2x week and Kelli now helps patient on the stairs, pt side steps down with rail  Pt now has master bath remodeled with a emily shower and grab bars and fold down seat and also another seating area to dress once out of shower, Walk-in shower main level; grab bars and shower chair with shower door  Higher toilets and vanity near to hold; second bathroom with vanity he pulls forward on.    Living environment barriers:  2 stairs to enter (1 railing present)    Now has ramp from garage on to deck    Current assistance/living environment:  Lives with wife who assists with donning socks as needed/bathing       Current mobility equipment:  4 wheeled walker with seat in home  Scooter for distance , breaks down into 4 pieces so wife can transport in vehicle  Transport wheelchair    Current ADL equipment:  Shower grab bar  Wall grab bar     Technology used: computer    Patient concerns/goals: Notes that baclofen pump is empty and will go in for pump refill soon and so noticing that his LE's are stiffer and that his speech is worse    Evaluation   Interview completed.   Pain assessment:  Pain denied    Range of motion:  L handed: WFL BUE AROM    Manual muscle testing: L UE: wrist ext: R 5, L 4, finger abd R 5, L 4;  and  "lateral and palmar pinch are fairly strong and symmetrical, except L is weaker with all today    Cognition:  See past OT evaluations for results of ALS CBS completed    Dysarthric with very unintelligible speech    ADL:   Feeding self:  Wife helps cut steak,    Grooming: Ind with electric toothbrush, shaving and hair  UE Dressing:  Ind, slow with buttons but can manage.  LE Dressing:  Elastic laces, wife helps with socks, long shoehorn, needs one UE to balance when pulling up pants so cannot fasten without assistance  Showering/bathing: uses built in seat to sit and shower   Toileting/transfer:  Ind with high toilet and grab bar    IADL:   Home management:  basics  Driving:  Locally no highways/freeways, wife does 95% of driving  Alternating Foot Tap Test: 9\"/10 taps ( BNL, norm 6\" or < for 10 taps)-slowed from last time  Occupation: retired      Fall Risk Screen:   Has the patient fallen 2 or more times in the last year? Yes,1-2 x/ in past year      Has the patient fallen and had an injury in the past year? Yes, notes no falls in recent 6 months       Timed Up and Go Score: NT today, is getting PT private pay home PT     Is the patient a fall risk? Yes, department fall risk interventions implemented.    6/6/19: 1 fall 1 month ago (May-turned in bathroom, happened at night when tired, w/c behind), no injury    Impairments:  Fatigue  Muscle atrophy  Coordination  dysathria  Balance  spasticity     Treatment diagnosis:  Impaired activities of daily living/IADL and mobility    Assessment of Occupational Performance: 3-5 Performance Deficits  Identified Performance Deficits (ie: feeding, social skills): driving, functional mobility, feeding self, dressing  Clinical Decision Making (Complexity): Mod complexity     Recommendations/Plan of care:  Patient would benefit from interventions to enhance safety and independence.  Rehab potential good for stated goals.  Occupational therapy intervention for  self care/home " "management.  1 session evaluation & treatment.    Goals:   Target date: today  Patient, family and/or caregiver will verbalize understanding of evaluation results and implications for functional performance.  Patient, family and/or caregiver will verbalize/demonstrate understanding of compensatory methods /equipment to enhance functional independence and safety.  Patient, family and/or caregiver will verbalize energy management techniques appropriate for status and setting.   Pt/family will verbalize understanding of driving cessation based on slowed reaction  times with R LE    Educational assessment/barriers to learning:  No barriers noted      Treatment provided this date:   Self care/home management, 10 minutes of training in:  -purpose of built-up foam to aid prehension on utensils with eating, pen for writing and toothbrush with travel to aid prehension with L hand due to weak pinch and this will be requested from [facility] for pt  -training in use of non-slip matting material also for improving prehension on smaller items as well and resources to acquire  -provided with a list of adaptive clothing resources as pt having more difficulty with getting pants fastened as he needs one hand to hold grab bar to balance self and training of purpose of loops on elatic shorts/pants to aid pulling up due to weak pinch  -purpose of considering driving cessation giving slowing reaction times with R LE. Pt felt that even with baclofen pump needing to be filled, his response time in R LE is typically slow as it was today and on last visit nearly one year ago his scores were slowing. He reports he is driving  A \"few times\" per week and wife  Drives all other times.      Response to treatment/recommendations:receptive    Goal attainment:  All goals met    Risks and benefits of evaluation/treatment have been explained.  Patient, family and/or caregiver are in agreement with Plan of Care.   ALS FRS-R (ALS Functional Rating " Scale-Revised) completed today. Please see separate note.     Timed Code Treatment Minutes: 10  Total Treatment Time (sum of timed and untimed services): 20 min      Signature: JOANNA Spaulding, [facility]     Date: 6/6/19

## 2019-06-10 LAB
EXPTIME-PRE: 1.21 SEC
FEF2575-%PRED-PRE: 220 %
FEF2575-PRE: 5.01 L/SEC
FEF2575-PRED: 2.27 L/SEC
FEFMAX-%PRED-PRE: 86 %
FEFMAX-PRE: 6.83 L/SEC
FEFMAX-PRED: 7.94 L/SEC
FEV1-%PRED-PRE: 95 %
FEV1-PRE: 2.93 L
FEV1FEV6-PRE: 99 %
FEV1FEV6-PRED: 77 %
FEV1FVC-PRE: 96 %
FEV1FVC-PRED: 75 %
FIFMAX-PRE: 5.63 L/SEC
FVC-%PRED-PRE: 74 %
FVC-PRE: 3.05 L
FVC-PRED: 4.1 L
MEP-PRE: 74 CMH2O
MIP-PRE: -74 CMH2O

## 2019-06-11 ENCOUNTER — TRANSFERRED RECORDS (OUTPATIENT)
Dept: HEALTH INFORMATION MANAGEMENT | Facility: CLINIC | Age: 74
End: 2019-06-11

## 2019-06-11 ENCOUNTER — MYC MEDICAL ADVICE (OUTPATIENT)
Dept: INTERNAL MEDICINE | Facility: CLINIC | Age: 74
End: 2019-06-11

## 2019-06-11 DIAGNOSIS — E78.5 HYPERLIPIDEMIA LDL GOAL <100: Primary | ICD-10-CM

## 2019-06-11 DIAGNOSIS — I10 ESSENTIAL HYPERTENSION, BENIGN: ICD-10-CM

## 2019-06-12 NOTE — TELEPHONE ENCOUNTER
See pts mychart message.     Once orders are placed, then lab appt can be scheduled.   Asking for [place] lab appt.     Asking for TSH and Lipids. Due for Microalbumin per .   Last Glucose was elevated.      TSH   Date Value Ref Range Status   06/05/2017 2.04 0.40 - 4.00 mU/L Final     Recent Labs   Lab Test 08/11/14  1111   CHOL 156   HDL 84   LDL 62   TRIG 50   CHOLHDLRATIO 1.9

## 2019-06-13 ENCOUNTER — MYC MEDICAL ADVICE (OUTPATIENT)
Dept: NEUROLOGY | Facility: CLINIC | Age: 74
End: 2019-06-13

## 2019-06-13 DIAGNOSIS — R53.83 OTHER FATIGUE: Primary | ICD-10-CM

## 2019-06-13 DIAGNOSIS — I10 ESSENTIAL HYPERTENSION, BENIGN: ICD-10-CM

## 2019-06-13 NOTE — TELEPHONE ENCOUNTER
"Requested Prescriptions   Pending Prescriptions Disp Refills     potassium chloride ER (K-TAB/KLOR-CON) 10 MEQ CR tablet [Pharmacy Med  Last Written Prescription Date:  9/18/2018  Last Fill Quantity: 180,  # refills:2  Last office visit: 5/2/2019 with prescribing provider:    Future Office Visit:   Name: POT CHLOR ER TAB 10MEQ ANDERSON] 180 tablet 2     Sig: TAKE 2 TABLETS (20MEQ)     DAILY       Potassium Supplements Protocol Failed - 6/13/2019  5:05 PM        Failed - Medication is active on med list        Passed - Recent (12 mo) or future (30 days) visit within the authorizing provider's specialty     Patient had office visit in the last 12 months or has a visit in the next 30 days with authorizing provider or within the authorizing provider's specialty.  See \"Patient Info\" tab in inbasket, or \"Choose Columns\" in Meds & Orders section of the refill encounter.              Passed - Patient is age 18 or older        Passed - Normal serum potassium in past 12 months     Recent Labs   Lab Test 11/30/18  2316   POTASSIUM 4.3                    "

## 2019-06-17 ENCOUNTER — DOCUMENTATION ONLY (OUTPATIENT)
Dept: INTERNAL MEDICINE | Facility: CLINIC | Age: 74
End: 2019-06-17

## 2019-06-17 RX ORDER — POTASSIUM CHLORIDE 750 MG/1
TABLET, EXTENDED RELEASE ORAL
Qty: 180 TABLET | Refills: 2 | Status: SHIPPED | OUTPATIENT
Start: 2019-06-17 | End: 2020-03-18
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
## 2019-06-18 DIAGNOSIS — R53.83 OTHER FATIGUE: ICD-10-CM

## 2019-06-18 PROCEDURE — 36415 COLL VENOUS BLD VENIPUNCTURE: CPT | Performed by: PSYCHIATRY & NEUROLOGY

## 2019-06-18 PROCEDURE — 84443 ASSAY THYROID STIM HORMONE: CPT | Performed by: PSYCHIATRY & NEUROLOGY

## 2019-06-19 LAB — TSH SERPL DL<=0.005 MIU/L-ACNC: 2.35 MU/L (ref 0.4–4)

## 2019-07-02 DIAGNOSIS — R35.0 URINARY FREQUENCY: ICD-10-CM

## 2019-07-02 RX ORDER — OXYBUTYNIN CHLORIDE 10 MG/1
20 TABLET, EXTENDED RELEASE ORAL AT BEDTIME
Qty: 180 TABLET | Refills: 0 | Status: SHIPPED | OUTPATIENT
Start: 2019-07-02 | End: 2019-08-15

## 2019-07-03 NOTE — TELEPHONE ENCOUNTER
"Medication is being filled for 1 time refill only due to:  Patient needs to be seen because per 1/18/19 office visit note: \"Return in about 6 months (around 7/18/2019) for BP follow up and fasting labs and urine\".     No future appointments schedule. Mychart message sent.     "

## 2019-07-24 ENCOUNTER — TELEPHONE (OUTPATIENT)
Dept: INTERNAL MEDICINE | Facility: CLINIC | Age: 74
End: 2019-07-24

## 2019-07-24 NOTE — TELEPHONE ENCOUNTER
Fax received from [facility] 07/21/19 for review and signature.  Put in Dr. Ray's in basket.

## 2019-07-25 DIAGNOSIS — Z53.9 DIAGNOSIS NOT YET DEFINED: Primary | ICD-10-CM

## 2019-07-25 PROCEDURE — G0179 MD RECERTIFICATION HHA PT: HCPCS | Performed by: INTERNAL MEDICINE

## 2019-08-13 ENCOUNTER — TRANSFERRED RECORDS (OUTPATIENT)
Dept: HEALTH INFORMATION MANAGEMENT | Facility: CLINIC | Age: 74
End: 2019-08-13

## 2019-08-15 ENCOUNTER — OFFICE VISIT (OUTPATIENT)
Dept: INTERNAL MEDICINE | Facility: CLINIC | Age: 74
End: 2019-08-15
Payer: COMMERCIAL

## 2019-08-15 VITALS
SYSTOLIC BLOOD PRESSURE: 130 MMHG | RESPIRATION RATE: 16 BRPM | HEIGHT: 70 IN | WEIGHT: 156.4 LBS | TEMPERATURE: 97.5 F | DIASTOLIC BLOOD PRESSURE: 75 MMHG | BODY MASS INDEX: 22.39 KG/M2 | OXYGEN SATURATION: 93 % | HEART RATE: 56 BPM

## 2019-08-15 DIAGNOSIS — R35.0 URINARY FREQUENCY: ICD-10-CM

## 2019-08-15 DIAGNOSIS — C61 PROSTATE CA (H): ICD-10-CM

## 2019-08-15 DIAGNOSIS — H25.9 AGE-RELATED CATARACT OF BOTH EYES, UNSPECIFIED AGE-RELATED CATARACT TYPE: ICD-10-CM

## 2019-08-15 DIAGNOSIS — G12.23 PRIMARY LATERAL SCLEROSIS (H): ICD-10-CM

## 2019-08-15 DIAGNOSIS — I10 ESSENTIAL HYPERTENSION, BENIGN: ICD-10-CM

## 2019-08-15 DIAGNOSIS — Z01.818 PREOP GENERAL PHYSICAL EXAM: Primary | ICD-10-CM

## 2019-08-15 PROCEDURE — 80048 BASIC METABOLIC PNL TOTAL CA: CPT | Performed by: INTERNAL MEDICINE

## 2019-08-15 PROCEDURE — 36415 COLL VENOUS BLD VENIPUNCTURE: CPT | Performed by: INTERNAL MEDICINE

## 2019-08-15 PROCEDURE — 99214 OFFICE O/P EST MOD 30 MIN: CPT | Performed by: INTERNAL MEDICINE

## 2019-08-15 PROCEDURE — 84153 ASSAY OF PSA TOTAL: CPT | Performed by: INTERNAL MEDICINE

## 2019-08-15 PROCEDURE — 82043 UR ALBUMIN QUANTITATIVE: CPT | Performed by: INTERNAL MEDICINE

## 2019-08-15 PROCEDURE — 99207 C PAF COMPLETED  NO CHARGE: CPT | Performed by: INTERNAL MEDICINE

## 2019-08-15 RX ORDER — OXYBUTYNIN CHLORIDE 10 MG/1
20 TABLET, EXTENDED RELEASE ORAL AT BEDTIME
Qty: 180 TABLET | Refills: 3 | Status: SHIPPED | OUTPATIENT
Start: 2019-08-15 | End: 2020-08-19

## 2019-08-15 ASSESSMENT — MIFFLIN-ST. JEOR: SCORE: 1455.68

## 2019-08-15 NOTE — PROGRESS NOTES
[place] [address]  [facility] [address]  [phone]  Dept: [phone]    PRE-OP EVALUATION:  Today's date: 8/15/2019    Elier Barber (: 1945) presents for pre-operative evaluation assessment as requested by Dr. Cal Cervantes.  He requires evaluation and anesthesia risk assessment prior to undergoing surgery/procedure for treatment of bilateral cataracts.    Fax number for surgical facility: [phone] on  and  [phone] on 9/10  Primary Physician: Lida Ray  Type of Anesthesia Anticipated: to be determined    Patient has a Health Care Directive or Living Will:  YES     Preop Questions 2019   Who is doing your surgery? Cal Cervantes   What are you having done? Cataract surgery on both eyes   Date of Surgery/Procedure:  @  10:30 am &  9/10 @ 11:30 am   Facility or Hospital where procedure/surgery will be performed: [facility]  - [facility].       -[facility]   1.  Do you have a history of Heart attack, stroke, stent, coronary bypass surgery, or other heart surgery? YES  - CVA in    2.  Do you ever have any pain or discomfort in your chest? No   3.  Do you have a history of  Heart Failure? No   4.   Are you troubled by shortness of breath when:  walking on a level surface, or up a slight hill, or at night? No   5.  Do you currently have a cold, bronchitis or other respiratory infection? No   6.  Do you have a cough, shortness of breath, or wheezing? No   7.  Do you sometimes get pains in the calves of your legs when you walk? No   8. Do you or anyone in your family have previous history of blood clots? YES - right DVT peroneal vein    9.  Do you or does anyone in your family have a serious bleeding problem such as prolonged bleeding following surgeries or cuts? No   10. Have you ever had problems with anemia or been told to take iron pills? No   11. Have you had any abnormal blood loss such as black, tarry or bloody stools?  No   12. Have you ever had a blood transfusion? No   13. Have you or any of your relatives ever had problems with anesthesia? No   14. Do you have sleep apnea, excessive snoring or daytime drowsiness? No   15. Do you have any prosthetic heart valves? No   16. Do you have prosthetic joints? No         HPI:     HPI related to upcoming procedure: He has bilateral cataracts affecting vision so will undergo excision contractions      HYPERTENSION - Patient has longstanding history of HTN , currently denies any symptoms referable to elevated blood pressure. Specifically denies chest pain, palpitations, dyspnea, orthopnea, PND or peripheral edema. Blood pressure readings have been in normal range. Current medication regimen is as listed below. Patient denies any side effects of medication.     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  Because of this he has ongoing aphasia and gait disturbance.    MEDICAL HISTORY:     Patient Active Problem List    Diagnosis Date Noted     Cerebral atherosclerosis 05/31/2018     Priority: Medium     Edema, unspecified type 05/10/2018     Priority: Medium     Gastroesophageal reflux disease without esophagitis 05/10/2018     Priority: Medium     Muscle spasticity 05/04/2018     Priority: Medium     Advanced directives, counseling/discussion 09/10/2014     Priority: Medium     Cerebral infarction (H) 06/05/2014     Priority: Medium     Vertigo 02/21/2013     Priority: Medium     HCD (health care directive) 02/02/2012     Priority: Medium     Hyperlipidemia LDL goal <100 11/08/2010     Priority: Medium     Prostate CA (HCC)      Priority: Medium     will have surgery 6/9/08       Essential hypertension, benign 03/07/2005     Priority: Medium     Primary lateral sclerosis (HCC) 03/07/2005     Priority: Medium      Past Medical History:   Diagnosis Date     Basal cell carcinoma nos     sees derm     CVA (cerebral infarction)  6/14    small vessel right int capsule stroke     Essential hypertension, benign      Hearing loss      Hoarseness of voice     assoc with PLS     Lumbar radiculopathy     2017     Primary Lateral Sclerosis 2002    has baclofen pump through Dr. Calvert, [place], sees Dr. Fink, [facility]     Prostate CA (H) 2008    prostatectomy     Past Surgical History:   Procedure Laterality Date     ABDOMEN SURGERY  11/12    Hernia     BIOPSY  4/16    Cancerous growth on leg. Removed by MOHs treatment     C NONSPECIFIC PROCEDURE  6/08     prostatectomy      C NONSPECIFIC PROCEDURE  11/01    colonoscopy     C NONSPECIFIC PROCEDURE  11/2006    hernia, right side     C NONSPECIFIC PROCEDURE      T + A     HERNIA REPAIR  11/12    Repaired     Current Outpatient Medications   Medication Sig Dispense Refill     aspirin 81 MG tablet Take 81 mg by mouth daily       baclofen (LIORESAL) in NaCl 0.9% 100 mL intrathecal infusion by Intrathecal route continuous Pump filled by [facility] [phone]  Pump Model: Linkyt  Last fill:  1/30/2018  Next fill: 5/29/18  Low Nikep Alarm Date: 5/29/18  Reservoir Volume: unknown  Conc: 2000 mcg/ml  Flex schedule delivers 266.5 mcg/day  Basal rate:unknown, pt states basal rate increased from 05:00-08:00       enalapril (VASOTEC) 5 MG tablet Take 1 tablet (5 mg) by mouth 2 times daily 180 tablet 3     Loratadine (WAL-ITIN PO)        oxybutynin ER (DITROPAN-XL) 10 MG 24 hr tablet TAKE 2 TABLETS (20 MG) BY MOUTH AT BEDTIME 180 tablet 0     pantoprazole (PROTONIX) 40 MG EC tablet TAKE 1 TABLET BY MOUTH EVERY DAY 90 tablet 2     polyethylene glycol (MIRALAX/GLYCOLAX) Packet Take 1 packet by mouth every other day        potassium chloride ER (K-TAB/KLOR-CON) 10 MEQ CR tablet TAKE 2 TABLETS (20MEQ)     DAILY 180 tablet 2     OTC products: None, except as noted above    Allergies   Allergen Reactions     No Clinical Screening - See Comments Hives     Starts  "swelling and hives     Bee Venom      Penicillins Hives     \"HIVES\"      Latex Allergy: NO    Social History     Tobacco Use     Smoking status: Former Smoker     Packs/day: 0.30     Years: 6.00     Pack years: 1.80     Types: Cigarettes     Start date: 1970     Last attempt to quit: 10/5/1976     Years since quittin.8     Smokeless tobacco: Never Used   Substance Use Topics     Alcohol use: Yes     Comment: 1 drink/week     History   Drug Use No       REVIEW OF SYSTEMS:   GENERAL: negative for, fever, chills, weight loss, weight gain  EYES: cataracts  ENT: negative  RESPIRATORY: No dyspnea on exertion and No cough  CARDIOVASCULAR: negative for, palpitations, tachycardia, irregular heart beat and chest pain  GI: negative for, nausea, vomiting, abdominal pain, melena and hematochezia  : negative  MUSCULOSKELETAL: negative  NEUROLOGIC: stable neurologic symptoms: He has significant aphasia so should have his wife available to use as possible, gait disturbance, muscle spasms are controlled, no headache  SKIN: negative  ENDOCRINE: negative         EXAM:   There were no vitals taken for this visit.    Patient is alert, oriented, cooperative in no acute distress.  /75 (BP Location: Left arm, Patient Position: Sitting, Cuff Size: Adult Regular)   Pulse 56   Temp 97.5  F (36.4  C) (Oral)   Resp 16   Ht 1.778 m (5' 10\")   Wt 70.9 kg (156 lb 6.4 oz)   SpO2 93%   BMI 22.44 kg/m      HEENT: PERRL, EOMI, TM's are normal. Oropharynx is clear.  NECK: No lymphadenopathy or thyromegaly. Carotid pulses full without bruits.  LUNGS: clear  CV: normal S1, S2 without murmur, S3 or S4 present. Pulses are 2/2 throughout. No JVD.  ABDOMEN: Bowel sounds present, nontender without hepatosplenomegaly. Liver is normal size to percussion.  EXTREMITIES: no edema present, unremarkable joints  NEUROLOGIC: Cranial nerves II-XII intact, reflexes 2/4 throughout, strength 5/5, sensation grossly intact  SKIN: without rashes or " significant lesions     DIAGNOSTICS:   No labs or EKG required for low risk surgery (cataract, skin procedure, breast biopsy, etc)    Recent Labs   Lab Test 11/30/18  2316 10/27/18  1734  05/24/18  1622   HGB 13.7 16.1   < > 15.6    241   < > 197   INR  --  1.07  --  1.23*    136   < > 136   POTASSIUM 4.3 4.1   < > 3.7   CR 0.81 0.87   < > 0.97    < > = values in this interval not displayed.        IMPRESSION:   Reason for surgery/procedure: cataracts  Diagnosis/reason for consult: preop    The proposed surgical procedure is considered LOW risk.    REVISED CARDIAC RISK INDEX  The patient has the following serious cardiovascular risks for perioperative complications such as (MI, PE, VFib and 3  AV Block):  No serious cardiac risks  INTERPRETATION: 0 risks: Class I (very low risk - 0.4% complication rate)    The patient has the following additional risks for perioperative complications:  No identified additional risks      ICD-10-CM    1. Preop general physical exam Z01.818    2. Age-related cataract of both eyes, unspecified age-related cataract type H25.9    3. Prostate CA (HCC) C61 PSA, tumor marker   4. Primary lateral sclerosis (HCC) G12.23    5. Essential hypertension, benign I10 Basic metabolic panel  (Ca, Cl, CO2, Creat, Gluc, K, Na, BUN)     Albumin Random Urine Quantitative with Creat Ratio   6. Urinary frequency R35.0 oxybutynin ER (DITROPAN-XL) 10 MG 24 hr tablet       RECOMMENDATIONS:         --Patient is to take all scheduled medications on the day of surgery EXCEPT for modifications listed below.    ACE Inhibitor or Angiotensin Receptor Blocker (ARB) Use  Ace inhibitor or Angiotensin Receptor Blocker (ARB) and should HOLD this medication for the 24 hours prior to surgery.    He will hold rest of his medications the morning of surgery.    APPROVAL GIVEN to proceed with proposed procedure, without further diagnostic evaluation       Signed Electronically by: Lida Ray MD    Copy of this  evaluation report is provided to requesting physician.    [place] Preop Guidelines    Revised Cardiac Risk Index

## 2019-08-15 NOTE — PATIENT INSTRUCTIONS
You should not take the enalapril the evening before or the morning of surgery.  You can take the other evening medications the day before surgery.  Do not take the potassium the morning of surgery, take it after.       Before Your Surgery      Call your surgeon if there is any change in your health. This includes signs of a cold or flu (such as a sore throat, runny nose, cough, rash or fever).    Do not smoke, drink alcohol or take over the counter medicine (unless your surgeon or primary care doctor tells you to) for the 24 hours before and after surgery.    If you take prescribed drugs: Follow your doctor s orders about which medicines to take and which to stop until after surgery.    Eating and drinking prior to surgery: follow the instructions from your surgeon    Take a shower or bath the night before surgery. Use the soap your surgeon gave you to gently clean your skin. If you do not have soap from your surgeon, use your regular soap. Do not shave or scrub the surgery site.  Wear clean pajamas and have clean sheets on your bed.   Before Your Surgery    Call your surgeon if there is any change in your health. This includes signs of a cold or flu (such as a sore throat, runny nose, cough, rash or fever).  Do not smoke, drink alcohol or take over the counter medicine (unless your surgeon or primary care doctor tells you to) for the 24 hours before and after surgery.  If you take prescribed drugs: Follow your doctor s orders about which medicines to take and which to stop until after surgery.  Eating and drinking prior to surgery: follow the instructions from your surgeon  Take a shower or bath the night before surgery. Use the soap your surgeon gave you to gently clean your skin. If you do not have soap from your surgeon, use your regular soap. Do not shave or scrub the surgery site.  Wear clean pajamas and have clean sheets on your bed.

## 2019-08-15 NOTE — NURSING NOTE
"/75 (BP Location: Left arm, Patient Position: Sitting, Cuff Size: Adult Regular)   Pulse 56   Temp 97.5  F (36.4  C) (Oral)   Resp 16   Ht 1.778 m (5' 10\")   Wt 70.9 kg (156 lb 6.4 oz)   SpO2 93%   BMI 22.44 kg/m    Patient is here for a pre op exam cataract surgery right eye first, [facility] 08/15/2019  "

## 2019-08-15 NOTE — PROGRESS NOTES
"[place] [address]  [facility] [address]  [phone]  Dept: [phone]    PRE-OP EVALUATION:  Today's date: 8/15/2019    Elier Barber (: 1945) presents for pre-operative evaluation assessment as requested by  ***.  He requires evaluation and anesthesia risk assessment prior to undergoing surgery/procedure for treatment of *** .    Fax number for surgical facility: ***  Primary Physician: Lida Ray  Type of Anesthesia Anticipated: {ANESTHESIA:692677}    Patient has a Health Care Directive or Living Will:  {YES/NO:903136::\"NO\"}    Preop Questions 2019   Who is doing your surgery? Cal Cervantes   What are you having done? Cataract surgery on both eyes   Date of Surgery/Procedure:  &  9/10   Facility or Hospital where procedure/surgery will be performed: [facility]  - .       -Catia   1.  Do you have a history of Heart attack, stroke, stent, coronary bypass surgery, or other heart surgery? YES  - ***   2.  Do you ever have any pain or discomfort in your chest? No   3.  Do you have a history of  Heart Failure? No   4.   Are you troubled by shortness of breath when:  walking on a level surface, or up a slight hill, or at night? No   5.  Do you currently have a cold, bronchitis or other respiratory infection? No   6.  Do you have a cough, shortness of breath, or wheezing? No   7.  Do you sometimes get pains in the calves of your legs when you walk? No   8. Do you or anyone in your family have previous history of blood clots? YES - ***   9.  Do you or does anyone in your family have a serious bleeding problem such as prolonged bleeding following surgeries or cuts? No   10. Have you ever had problems with anemia or been told to take iron pills? No   11. Have you had any abnormal blood loss such as black, tarry or bloody stools? No   12. Have you ever had a blood transfusion? No   13. Have you or any of your relatives ever had " problems with anesthesia? No   14. Do you have sleep apnea, excessive snoring or daytime drowsiness? No   15. Do you have any prosthetic heart valves? No   16. Do you have prosthetic joints? No         HPI:     HPI related to upcoming procedure: ***      {. Problems:479524}    MEDICAL HISTORY:     Patient Active Problem List    Diagnosis Date Noted     Cerebral atherosclerosis 05/31/2018     Priority: Medium     Edema, unspecified type 05/10/2018     Priority: Medium     Gastroesophageal reflux disease without esophagitis 05/10/2018     Priority: Medium     Muscle spasticity 05/04/2018     Priority: Medium     Advanced directives, counseling/discussion 09/10/2014     Priority: Medium     Advance Care Planning:   Receipt of ACP document:  Received: Health Care Directive which was witnessed or notarized on 1-.  Document not previously scanned.  Validation form completed and sent with document to be scanned.  Code Status reflects choices in most recent ACP document.  Confirmed/documented designated decision maker(s). See permanent comments section of demographics in clinical tab. View document(s) and details by clicking on code status.   Added by Airam Palacios on 9/10/2014.             Cerebral infarction (H) 06/05/2014     Priority: Medium     1 cm acute ischemic infarct in the posterior limb of the right internal capsule.  Pt did present with left hemiplegia.      Diagnosis updated by automated process. Provider to review and confirm.       Vertigo 02/21/2013     Priority: Medium     HCD (health care directive) 02/02/2012     Priority: Medium     Pt already has a HCD and will bring in a copy.     Karis Sutherland MA         Hyperlipidemia LDL goal <100 11/08/2010     Priority: Medium     Prostate CA (HCC)      Priority: Medium     will have surgery 6/9/08       Essential hypertension, benign 03/07/2005     Priority: Medium     Primary lateral sclerosis (HCC) 03/07/2005     Priority: Medium      Past  Medical History:   Diagnosis Date     Basal cell carcinoma nos     sees derm     CVA (cerebral infarction) 6/14    small vessel right int capsule stroke     Essential hypertension, benign      Hearing loss      Hoarseness of voice     assoc with PLS     Lumbar radiculopathy     2017     Primary Lateral Sclerosis 2002    has baclofen pump through Dr. Calvert, [facility], sees Dr. Fink, [facility]     Prostate CA (H) 2008    prostatectomy     Past Surgical History:   Procedure Laterality Date     ABDOMEN SURGERY  11/12    Hernia     BIOPSY  4/16    Cancerous growth on leg. Removed by MOHs treatment     C NONSPECIFIC PROCEDURE  6/08     prostatectomy      C NONSPECIFIC PROCEDURE  11/01    colonoscopy     C NONSPECIFIC PROCEDURE  11/2006    hernia, right side     C NONSPECIFIC PROCEDURE      T + A     HERNIA REPAIR  11/12    Repaired     Current Outpatient Medications   Medication Sig Dispense Refill     aspirin 81 MG tablet Take 81 mg by mouth daily       baclofen (LIORESAL) in NaCl 0.9% 100 mL intrathecal infusion by Intrathecal route continuous Pump filled by [facility] stroke [place] [phone]  Pump Model: Syncromed  Last fill:  1/30/2018  Next fill: 5/29/18  Low [place] Alarm Date: 5/29/18  Reservoir Volume: unknown  Conc: 2000 mcg/ml  Flex schedule delivers 266.5 mcg/day  Basal rate:unknown, pt states basal rate increased from 05:00-08:00       Dextromethorphan-guaiFENesin  MG/5ML syrup Take 5 mLs by mouth every 4 hours as needed for cough       enalapril (VASOTEC) 5 MG tablet Take 1 tablet (5 mg) by mouth 2 times daily 180 tablet 3     EPINEPHrine 0.3 MG/0.3ML injection Inject 0.3 mLs (0.3 mg) into the muscle as needed for anaphylaxis 0.3 mL 3     ketoconazole (NIZORAL) 2 % external shampoo SHAMPOO EVERY 2-3 DAYS AS  NEEDED 120 mL 3     Loratadine (WAL-ITIN PO)        oxybutynin ER (DITROPAN-XL) 10 MG 24 hr tablet TAKE 2 TABLETS (20 MG) BY MOUTH AT BEDTIME 180 tablet 0      "pantoprazole (PROTONIX) 40 MG EC tablet TAKE 1 TABLET BY MOUTH EVERY DAY 90 tablet 2     polyethylene glycol (MIRALAX/GLYCOLAX) Packet Take 1 packet by mouth every other day        potassium chloride ER (K-TAB/KLOR-CON) 10 MEQ CR tablet TAKE 2 TABLETS (20MEQ)     DAILY 180 tablet 2     STATIN NOT PRESCRIBED, INTENTIONAL, Please choose reason not prescribed, below       OTC products: {OTC ANALGESICS:798874}    Allergies   Allergen Reactions     No Clinical Screening - See Comments Hives     Starts swelling and hives     Bee Venom      Penicillins Hives     \"HIVES\"      Latex Allergy: {YES/NO WITH DEFAULT:046252::\"NO\"}    Social History     Tobacco Use     Smoking status: Former Smoker     Packs/day: 0.30     Years: 6.00     Pack years: 1.80     Types: Cigarettes     Start date: 1970     Last attempt to quit: 10/5/1976     Years since quittin.8     Smokeless tobacco: Never Used   Substance Use Topics     Alcohol use: Yes     Comment: 1 drink/week     History   Drug Use No       REVIEW OF SYSTEMS:   {ROS Preop Choices:831235}    EXAM:   There were no vitals taken for this visit.  {EXAM Preop Choices:303435}    DIAGNOSTICS:   {DIAGNOSTIC FOR PREOP:813670}    Recent Labs   Lab Test 18  2316 10/27/18  1734  18  1622   HGB 13.7 16.1   < > 15.6    241   < > 197   INR  --  1.07  --  1.23*    136   < > 136   POTASSIUM 4.3 4.1   < > 3.7   CR 0.81 0.87   < > 0.97    < > = values in this interval not displayed.        IMPRESSION:   {PREOP REASONS:079199::\"Reason for surgery/procedure: ***\",\"Diagnosis/reason for consult: ***\"}    The proposed surgical procedure is considered {HIGH=major cardiovascular or procedures requiring prolonged anesthesia >4 hours or large fluid shifts;    INTERMEDIATE=abdominal, most orthopedic and intrathoracic surgery; LOW= endoscopy, cataract and breast surgery:390023} risk.    REVISED CARDIAC RISK INDEX  The patient has the following serious cardiovascular risks for " "perioperative complications such as (MI, PE, VFib and 3  AV Block):  {PREOP REVISED CARDIAC INDEX (RCI):600179:p:\"No serious cardiac risks\"}  INTERPRETATION: {REVISED CARDIAC RISK INTERPRETATION:302955}    The patient has the following additional risks for perioperative complications:  {Additional perioperative risks:272409:p:\"No identified additional risks\"}      ICD-10-CM    1. Preop general physical exam Z01.818        RECOMMENDATIONS:     {IMPORTANT - Conditions - complete carefully!!:874121}    {IMPORTANT - Medications:298145::\"--Patient is to take all scheduled medications on the day of surgery EXCEPT for modifications listed below.\"}    {IMPORTANT - Approval:050113:p:\"APPROVAL GIVEN to proceed with proposed procedure, without further diagnostic evaluation\"}       Signed Electronically by: Lida Ray MD    Copy of this evaluation report is provided to requesting physician.    [facility] Preop Guidelines    Revised Cardiac Risk Index  "

## 2019-08-16 LAB
ANION GAP SERPL CALCULATED.3IONS-SCNC: 5 MMOL/L (ref 3–14)
BUN SERPL-MCNC: 16 MG/DL (ref 7–30)
CALCIUM SERPL-MCNC: 9.1 MG/DL (ref 8.5–10.1)
CHLORIDE SERPL-SCNC: 102 MMOL/L (ref 94–109)
CO2 SERPL-SCNC: 29 MMOL/L (ref 20–32)
CREAT SERPL-MCNC: 0.74 MG/DL (ref 0.66–1.25)
CREAT UR-MCNC: 63 MG/DL
GFR SERPL CREATININE-BSD FRML MDRD: >90 ML/MIN/{1.73_M2}
GLUCOSE SERPL-MCNC: 88 MG/DL (ref 70–99)
MICROALBUMIN UR-MCNC: <5 MG/L
MICROALBUMIN/CREAT UR: NORMAL MG/G CR (ref 0–17)
POTASSIUM SERPL-SCNC: 4.6 MMOL/L (ref 3.4–5.3)
PSA SERPL-MCNC: <0.01 UG/L (ref 0–4)
SODIUM SERPL-SCNC: 136 MMOL/L (ref 133–144)

## 2019-09-03 ENCOUNTER — MYC MEDICAL ADVICE (OUTPATIENT)
Dept: INTERNAL MEDICINE | Facility: CLINIC | Age: 74
End: 2019-09-03

## 2019-09-03 NOTE — TELEPHONE ENCOUNTER
See his Voxlit message. Please advise.       BP Readings from Last 3 Encounters:   08/15/19 130/75   06/06/19 166/88   05/02/19 118/60

## 2019-09-19 ENCOUNTER — TELEPHONE (OUTPATIENT)
Dept: INTERNAL MEDICINE | Facility: CLINIC | Age: 74
End: 2019-09-19

## 2019-09-19 DIAGNOSIS — Z53.9 DIAGNOSIS NOT YET DEFINED: Primary | ICD-10-CM

## 2019-09-19 PROCEDURE — G0179 MD RECERTIFICATION HHA PT: HCPCS | Performed by: INTERNAL MEDICINE

## 2019-10-15 ENCOUNTER — TRANSFERRED RECORDS (OUTPATIENT)
Dept: HEALTH INFORMATION MANAGEMENT | Facility: CLINIC | Age: 74
End: 2019-10-15

## 2019-11-04 ENCOUNTER — HEALTH MAINTENANCE LETTER (OUTPATIENT)
Age: 74
End: 2019-11-04

## 2019-11-14 ENCOUNTER — DOCUMENTATION ONLY (OUTPATIENT)
Dept: INTERNAL MEDICINE | Facility: CLINIC | Age: 74
End: 2019-11-14

## 2019-11-15 NOTE — PROGRESS NOTES
[facility] now requests orders and shares plan of care/discharge summaries for some patients through [employer].  Please REPLY TO THIS MESSAGE OR ROUTE BACK TO THE AUTHOR in order to give authorization for orders when needed.  This is considered a verbal order, you will still receive a faxed copy of orders for signature.  Thank you for your assistance in improving collaboration for our patients.    ORDER request at recertification:  HHA 1w8 visits to begin week of 11/18  Home Health Aide 0 to 24 hours/day to assist with bathing, personal cares, mobility, light housekeeping. May increase or decrease service per patient/caregiver request.  PT to supervise per agency protocol.  Payer is Private pay.     Visits to begin week of 11/18/19   PT 1w8 for progressive HEP for strengthening, stretching, endurance training, gait training and transfer training for home safety with mobility. The plan will also include falls risk assessment and falls prevention plan, monitor and treat pain, monitor skin integrity, monitor for s/s of depression,  to achieve the following goals: 1. Improve steadiness and falls risk insight so that pt has no more than 1 fall in upcoming 60 day cert period. MET, but remains valid goal.   2. patient will be able to demo appropriate HEP for ongoing strength/flexibility ONGOING  3. Improve Tinetti to 17 or higher in next 60 days to demo decreased falls risk.   Angelina Hickman, PT

## 2019-11-27 ENCOUNTER — TRANSFERRED RECORDS (OUTPATIENT)
Dept: HEALTH INFORMATION MANAGEMENT | Facility: CLINIC | Age: 74
End: 2019-11-27

## 2019-11-27 DIAGNOSIS — K21.9 GASTROESOPHAGEAL REFLUX DISEASE WITHOUT ESOPHAGITIS: ICD-10-CM

## 2019-11-29 RX ORDER — PANTOPRAZOLE SODIUM 40 MG/1
TABLET, DELAYED RELEASE ORAL
Qty: 90 TABLET | Refills: 2 | Status: SHIPPED | OUTPATIENT
Start: 2019-11-29 | End: 2020-09-08

## 2019-11-29 NOTE — TELEPHONE ENCOUNTER
"Requested Prescriptions   Pending Prescriptions Disp Refills     pantoprazole (PROTONIX) 40 MG EC tablet [Pharmacy Med Name:  Last Written Prescription Date:  5/7/2019  Last Fill Quantity: 90,  # refills: 2   Last office visit: 8/15/2019 with prescribing provider:     Future Office Visit:   PANTOPRAZOLE TAB 40MG DR] 90 tablet 0     Sig: TAKE 1 TABLET DAILY       PPI Protocol Passed - 11/27/2019 10:06 PM        Passed - Not on Clopidogrel (unless Pantoprazole ordered)        Passed - No diagnosis of osteoporosis on record        Passed - Recent (12 mo) or future (30 days) visit within the authorizing provider's specialty     Patient has had an office visit with the authorizing provider or a provider within the authorizing providers department within the previous 12 mos or has a future within next 30 days. See \"Patient Info\" tab in inbasket, or \"Choose Columns\" in Meds & Orders section of the refill encounter.              Passed - Medication is active on med list        Passed - Patient is age 18 or older        "

## 2019-11-29 NOTE — TELEPHONE ENCOUNTER
Patient requesting a pharmacy change. Per 8/15/19 office visit note, patient is not due for an appointment until 8/2020.    Prescription approved per [facility] Refill Protocol.

## 2019-12-04 ENCOUNTER — TELEPHONE (OUTPATIENT)
Dept: INTERNAL MEDICINE | Facility: CLINIC | Age: 74
End: 2019-12-04

## 2019-12-04 NOTE — TELEPHONE ENCOUNTER
Fax received from [facility] 11/18/19 for review and signature.  Put in Dr. Ray's in basket.

## 2019-12-05 DIAGNOSIS — Z53.9 DIAGNOSIS NOT YET DEFINED: Primary | ICD-10-CM

## 2019-12-05 PROCEDURE — G0179 MD RECERTIFICATION HHA PT: HCPCS | Performed by: INTERNAL MEDICINE

## 2019-12-16 DIAGNOSIS — L30.9 DERMATITIS: ICD-10-CM

## 2019-12-16 NOTE — TELEPHONE ENCOUNTER
"Requested Prescriptions   Pending Prescriptions Disp Refills     ketoconazole (NIZORAL) 2 % external shampoo [Pharmacy Med Name: KETOCONAZOLE SHA 2%] 120 mL 0     Sig: SHAMPOO EVERY 2-3 DAYS AS  NEEDED   Last Written Prescription Date:  12/04/2018  Last Fill Quantity: 120 mL,  # refills: 03   Last office visit: 8/15/2019 with prescribing provider:     Future Office Visit:      Antifungal Agents Passed - 12/16/2019  5:26 AM        Passed - Recent (12 mo) or future (30 days) visit within the authorizing provider's specialty     Patient has had an office visit with the authorizing provider or a provider within the authorizing providers department within the previous 12 mos or has a future within next 30 days. See \"Patient Info\" tab in inbasket, or \"Choose Columns\" in Meds & Orders section of the refill encounter.              Passed - Not Fluconazole or Terconazole      If oral Fluconazole or Terconazole, may refill if indicated in progress notes.           Passed - Medication is active on med list        "

## 2019-12-17 RX ORDER — KETOCONAZOLE 20 MG/ML
SHAMPOO TOPICAL
Qty: 120 ML | Refills: 1 | Status: SHIPPED | OUTPATIENT
Start: 2019-12-17 | End: 2020-12-30

## 2019-12-19 ENCOUNTER — TRANSFERRED RECORDS (OUTPATIENT)
Dept: HEALTH INFORMATION MANAGEMENT | Facility: CLINIC | Age: 74
End: 2019-12-19

## 2019-12-28 ASSESSMENT — ENCOUNTER SYMPTOMS
DIFFICULTY URINATING: 0
HEMATURIA: 0
DYSURIA: 0
FLANK PAIN: 0

## 2020-01-03 DIAGNOSIS — I10 ESSENTIAL HYPERTENSION, BENIGN: ICD-10-CM

## 2020-01-03 NOTE — TELEPHONE ENCOUNTER
"Requested Prescriptions   Pending Prescriptions Disp Refills     enalapril (VASOTEC) 5 MG tablet [Pharmacy Med Name: ENALAPRIL TAB 5MG] 180 tablet 3     Sig: TAKE 1 TABLET TWICE A DAY       ACE Inhibitors (Including Combos) Protocol Passed - 1/3/2020  3:45 AM        Passed - Blood pressure under 140/90 in past 12 months     BP Readings from Last 3 Encounters:   08/15/19 130/75   06/06/19 166/88   05/02/19 118/60                 Passed - Recent (12 mo) or future (30 days) visit within the authorizing provider's specialty     Patient has had an office visit with the authorizing provider or a provider within the authorizing providers department within the previous 12 mos or has a future within next 30 days. See \"Patient Info\" tab in inbasket, or \"Choose Columns\" in Meds & Orders section of the refill encounter.              Passed - Medication is active on med list        Passed - Patient is age 18 or older        Passed - Normal serum creatinine on file in past 12 months     Recent Labs   Lab Test 08/15/19  0949   CR 0.74             Passed - Normal serum potassium on file in past 12 months     Recent Labs   Lab Test 08/15/19  0949   POTASSIUM 4.6             Last Written Prescription Date:  1/18/19  Last Fill Quantity: 180,  # refills: 3   Last office visit: 8/15/2019 with prescribing provider:  8/15/19   Future Office Visit:      "

## 2020-01-06 RX ORDER — ENALAPRIL MALEATE 5 MG/1
TABLET ORAL
Qty: 180 TABLET | Refills: 1 | Status: SHIPPED | OUTPATIENT
Start: 2020-01-06 | End: 2020-06-09

## 2020-01-08 DIAGNOSIS — G12.21 ALS (AMYOTROPHIC LATERAL SCLEROSIS) (H): Primary | ICD-10-CM

## 2020-01-09 ENCOUNTER — OFFICE VISIT (OUTPATIENT)
Dept: NEUROLOGY | Facility: CLINIC | Age: 75
End: 2020-01-09
Payer: COMMERCIAL

## 2020-01-09 ENCOUNTER — THERAPY VISIT (OUTPATIENT)
Dept: OCCUPATIONAL THERAPY | Facility: CLINIC | Age: 75
End: 2020-01-09
Payer: COMMERCIAL

## 2020-01-09 ENCOUNTER — THERAPY VISIT (OUTPATIENT)
Dept: PHYSICAL THERAPY | Facility: CLINIC | Age: 75
End: 2020-01-09
Payer: COMMERCIAL

## 2020-01-09 ENCOUNTER — THERAPY VISIT (OUTPATIENT)
Dept: SPEECH THERAPY | Facility: CLINIC | Age: 75
End: 2020-01-09
Payer: COMMERCIAL

## 2020-01-09 VITALS — HEART RATE: 62 BPM | DIASTOLIC BLOOD PRESSURE: 88 MMHG | OXYGEN SATURATION: 95 % | SYSTOLIC BLOOD PRESSURE: 164 MMHG

## 2020-01-09 DIAGNOSIS — G12.21 ALS (AMYOTROPHIC LATERAL SCLEROSIS) (H): ICD-10-CM

## 2020-01-09 DIAGNOSIS — Z74.09 IMPAIRED MOBILITY AND ADLS: Primary | ICD-10-CM

## 2020-01-09 DIAGNOSIS — Z78.9 IMPAIRED MOBILITY AND ADLS: Primary | ICD-10-CM

## 2020-01-09 DIAGNOSIS — R13.12 OROPHARYNGEAL DYSPHAGIA: Primary | ICD-10-CM

## 2020-01-09 DIAGNOSIS — G12.23 PRIMARY LATERAL SCLEROSIS (H): Primary | ICD-10-CM

## 2020-01-09 DIAGNOSIS — G12.23 PRIMARY LATERAL SCLEROSES (H): ICD-10-CM

## 2020-01-09 DIAGNOSIS — R47.1 DYSARTHRIA: ICD-10-CM

## 2020-01-09 DIAGNOSIS — G12.21 ALS (AMYOTROPHIC LATERAL SCLEROSIS) (H): Primary | ICD-10-CM

## 2020-01-09 DIAGNOSIS — Z74.09 IMPAIRED MOBILITY AND ADLS: ICD-10-CM

## 2020-01-09 DIAGNOSIS — Z78.9 IMPAIRED MOBILITY AND ADLS: ICD-10-CM

## 2020-01-09 LAB
ALBUMIN SERPL-MCNC: 4 G/DL (ref 3.4–5)
ALP SERPL-CCNC: 66 U/L (ref 40–150)
ALT SERPL W P-5'-P-CCNC: 29 U/L (ref 0–70)
ANION GAP SERPL CALCULATED.3IONS-SCNC: 4 MMOL/L (ref 3–14)
AST SERPL W P-5'-P-CCNC: 36 U/L (ref 0–45)
BASOPHILS # BLD AUTO: 0.1 10E9/L (ref 0–0.2)
BASOPHILS NFR BLD AUTO: 0.7 %
BILIRUB SERPL-MCNC: 0.6 MG/DL (ref 0.2–1.3)
BUN SERPL-MCNC: 16 MG/DL (ref 7–30)
CALCIUM SERPL-MCNC: 9 MG/DL (ref 8.5–10.1)
CHLORIDE SERPL-SCNC: 102 MMOL/L (ref 94–109)
CO2 SERPL-SCNC: 27 MMOL/L (ref 20–32)
CREAT SERPL-MCNC: 0.72 MG/DL (ref 0.66–1.25)
DIFFERENTIAL METHOD BLD: NORMAL
EOSINOPHIL # BLD AUTO: 0.1 10E9/L (ref 0–0.7)
EOSINOPHIL NFR BLD AUTO: 0.9 %
ERYTHROCYTE [DISTWIDTH] IN BLOOD BY AUTOMATED COUNT: 13.3 % (ref 10–15)
GFR SERPL CREATININE-BSD FRML MDRD: >90 ML/MIN/{1.73_M2}
GLUCOSE SERPL-MCNC: 93 MG/DL (ref 70–99)
HCT VFR BLD AUTO: 45.4 % (ref 40–53)
HGB BLD-MCNC: 15.2 G/DL (ref 13.3–17.7)
IMM GRANULOCYTES # BLD: 0 10E9/L (ref 0–0.4)
IMM GRANULOCYTES NFR BLD: 0.3 %
LYMPHOCYTES # BLD AUTO: 1.4 10E9/L (ref 0.8–5.3)
LYMPHOCYTES NFR BLD AUTO: 18.2 %
MCH RBC QN AUTO: 30.5 PG (ref 26.5–33)
MCHC RBC AUTO-ENTMCNC: 33.5 G/DL (ref 31.5–36.5)
MCV RBC AUTO: 91 FL (ref 78–100)
MONOCYTES # BLD AUTO: 0.6 10E9/L (ref 0–1.3)
MONOCYTES NFR BLD AUTO: 8.2 %
NEUTROPHILS # BLD AUTO: 5.3 10E9/L (ref 1.6–8.3)
NEUTROPHILS NFR BLD AUTO: 71.7 %
NRBC # BLD AUTO: 0 10*3/UL
NRBC BLD AUTO-RTO: 0 /100
PLATELET # BLD AUTO: 225 10E9/L (ref 150–450)
POTASSIUM SERPL-SCNC: 4.5 MMOL/L (ref 3.4–5.3)
PROT SERPL-MCNC: 7.2 G/DL (ref 6.8–8.8)
RBC # BLD AUTO: 4.99 10E12/L (ref 4.4–5.9)
SODIUM SERPL-SCNC: 134 MMOL/L (ref 133–144)
WBC # BLD AUTO: 7.4 10E9/L (ref 4–11)

## 2020-01-09 SDOH — HEALTH STABILITY: MENTAL HEALTH: HOW OFTEN DO YOU HAVE A DRINK CONTAINING ALCOHOL?: 2-4 TIMES A MONTH

## 2020-01-09 SDOH — HEALTH STABILITY: MENTAL HEALTH: HOW MANY STANDARD DRINKS CONTAINING ALCOHOL DO YOU HAVE ON A TYPICAL DAY?: 1 OR 2

## 2020-01-09 ASSESSMENT — REVISED AMYOTROPHIC LATERAL SCLEROSIS FUNCTIONAL RATING SCALE (ALSFRS-R)
ALSFRS_TOTAL_SCORE: 32
BULBAR_SUBTOTAL: 9
DYSPNEA: 4
SALIVATION: NORMAL
TURNING_IN_BED_AND_ADJUSTING_BED_CLOTHES: 2
DRESSING_AND_HYGEINE: 2
SPEECH: 2
SALIVATION: 4
WALKING: WALKS WITH ASSISTANCE
SPEECH: INTELLIGIBLE WITH REPEATING
WALKING: 2
SWALLOWING: EARLY EATING PROBLEMS - OCCASIONAL CHOKING
CLIMBING_STAIRS: NEEDS ASSISTANCE
TURNING_IN_BED_AND_ADJUSTING_BED_CLOTHES: CAN TURN ALONE OR ADJUST SHEETS, BUT WITH GREAT DIFFICULTY
SIX_ITEM_SUBTOTAL: 14
HANDWRITING: NOT ALL WORDS ARE LEGIBLE
RESPIRATORY_SUBTOTAL_SCORE: 12
HANDWRITING: 2
RESPIRATORY_INSUFFICIENCY: 4
SWALLOWING: 3
CUTTING_FOOD_AND_HANDLING_UTENSILES: 2
CLIMBING_STAIRS: 1
FINE_MOTOR_SUBTOTAL_SCORE: 6
GROSS_MOTOR_SUBTOTAL_SCORE: 5
DRESSING_AND_HYGEINE: INTERMITTENT ASSISTANCE OR SUBSTITUTE METHODS
ORTHOPENA: 4

## 2020-01-09 ASSESSMENT — PAIN SCALES - GENERAL: PAINLEVEL: NO PAIN (0)

## 2020-01-09 NOTE — NURSING NOTE
Chief Complaint   Patient presents with     RECHECK     [facility] RETURN - FOLLOW UP       Luis Antonio Lomax, EMT

## 2020-01-09 NOTE — PROGRESS NOTES
"    OUTPATIENT OCCUPATIONAL THERAPY CLINIC NOTE    Type of visit:  Evaluation            Date of Service:  1/9/20     Referring provider: Dr. Gary Tejada    Others present at visit:  Spouse / significant other, Kelli    Medical diagnosis:   Primary lateral sclerosis (PLS)     Date of diagnosis: 2001     Pertinent medical history:  H/O BPPV with 2 recent episodes, stroke with L king June 2014; recent fall 5/2017 with LE DVT;stroke (most recent 5/24/18 - lacunar infarct in the right frontal white matter). baclofen pump; Fall 11/19 while out of state hit head on wall with LOC as cannot recall how it happened/concussion    Additional Occupational Profile Information (patterns of daily living, interests, values and needs):  and retired with long history of PLS    Cardio-respiratory status:  FVC: 74%, MIP -74 (6/6/19)    Height/Weight: 5'10\" / 156 lb    Living environment:  House  Master bath remodeled with a emily shower and grab bars and fold down seat and also another seating area to dress once out of shower,   Higher toilet with bars around toilet  second bathroom with high toilet and vanity he pulls forward on.    Living environment barriers:  2 stairs to enter (1 railing present)    Ramp from garage an to deck with Ted rails, walks with  4ww   Flight to basement with 1 railing- goes down about 1x/week    Current assistance/living environment:  Lives with wife who assists with donning socks as needed/bathing       Current mobility equipment:  4 wheeled walker with seat in home (has 4 strategically placed)  Scooter for distance , breaks down into 4 pieces so wife can transport in vehicle  Transport wheelchair  bedrail  Gait belt    Current ADL equipment:  Roll in shower grab bars and fold down seat  Wall grab bars around high toilet     Technology used: computer, dysarthric speech, hard to understand-50% comprehensible    Patient concerns/goals: Noting increased Ted hand weakness.Works with private-pay PT " 1x/week, 2 PTs alternate weeks. Has 4 walkers strategically placed for use around home.    Evaluation   Interview completed.   Pain assessment:  Pain denied    Range of motion:  L handed: WFL BUE AROM, end range limited    Manual muscle testing: L UE: 5/5 shoulder and elbow; R UE: 4-/5 shoulder and 5/5 elbow; Ted  is fairly strong and symmetrical, but lateral and palmar pinch are weak and  R is weaker than L with manual testing    Cognition:   ALS CBS (ALS Cognitive Behavioral Screen) completed last visit-please see.-demonstrates impairment      ADL:   Feeding self:  Spouse helps cut meat, able to hold regular utensils with L hand  Grooming: slower but okay  UE Dressing:  Help with buttons from wife.    LE Dressing:  Help with shoes and socks. Can do elastic pants but due to impaired balance cannot manage pants with belts/buttons/sippers as requires 2 hands and needs one hand on grab bar at all times  Showering/bathing: home health aid and spouse when showers, sits to shower  Toileting/transfer:  Ind with toileting, holds bars and uses elastic waists    IADL:   Home management:  Kelli, spouse does all    Driving:  No longer driving, spouse does driving  Occupation: retired    Fall Risk Screen:   Has the patient fallen 2 or more times in the last year? Yes, about 6. Had fall yesterday, turned too quick getting up from toilet and fell down                            Has the patient fallen and had an injury in the past year? yes  11/19 in ER for concussion as hit head                                        Per PT on 1/10/20:  25ft walk: 40.63 sec, many steps, 4ww      Is the patient a fall risk? Yes, department fall risk interventions  implemented     Impairments:  Fatigue  Muscle atrophy  Coordination  dysathria  Balance  spasticity   Cognition    Treatment diagnosis:  Impaired activities of daily living and mobility    Assessment of Occupational Performance: 3-5 Performance Deficits  Identified Performance  Deficits (ie: feeding, social skills):toileting, dressing, functional mobility, Clinical Decision Making (Complexity):Mod complexity     Recommendations/Plan of care:  Patient would benefit from interventions to enhance safety and independence.  Rehab potential good for stated goals.  Occupational therapy intervention for  self care/home management.  1 session evaluation & treatment.    Goals:   Target date: today  Patient, family and/or caregiver will verbalize understanding of evaluation results and implications for functional performance.  Patient, family and/or caregiver will verbalize/demonstrate understanding of compensatory methods /equipment to enhance functional independence and safety.  Patient, family and/or caregiver will verbalize fall monitoring options  appropriate for status and setting.      Educational assessment/barriers to learning:  No barriers noted      Treatment provided this date:   Self care/home management, 15 minutes of training in:  Options for safe call options for falls:  -[employer]-provided brochure for [employer] to pt/wife to call about set up as pt currently carrying cell phone around home when she is out doing errands  -Provided training on options of [place]ary camera with smartphone gareth for spouse viewing that [employer] has available for the home when she is away doing errands and she  And pt were interested in having Yamileth from [facility] f/u with her about this option for set up and so a message was sent to Yamileht on their behalf.  -Did train patient in options of button hook for buttoning shirts, use of zipper pulls for pants zippers and using belt to cinch pants tight and avoid buttoning but wife reports balance is an issue if using two hands and so needs to use elastic pants. Trained in purpose of a hand based palmar abd splint being fabricated to aid pinch force for daily living skills but pt declined as reported he likely would not wear it.    Response to  treatment/recommendations:receptive    Goal attainment:  All goals met    Risks and benefits of evaluation/treatment have been explained.  Patient, family and/or caregiver are in agreement with Plan of Care.     Timed Code Treatment Minutes: 15  Total Treatment Time (sum of timed and untimed services): 25      Signature: JOANNA Spaulding, MSCS     Date:1/9/20    Certification: [employer]

## 2020-01-09 NOTE — PROGRESS NOTES
[id number]  Answers for HPI/ROS submitted by the patient on 12/28/2019   General Symptoms: No  Skin Symptoms: No  HENT Symptoms: No  EYE SYMPTOMS: No  HEART SYMPTOMS: No  LUNG SYMPTOMS: No  INTESTINAL SYMPTOMS: No  URINARY SYMPTOMS: Yes  REPRODUCTIVE SYMPTOMS: No  SKELETAL SYMPTOMS: No  BLOOD SYMPTOMS: No  NERVOUS SYSTEM SYMPTOMS: No  MENTAL HEALTH SYMPTOMS: No  Trouble holding urine or incontinence: Yes  Pain or burning: No  Trouble starting or stopping: No  Increased frequency of urination: Yes  Blood in urine: No  Decreased frequency of urination: No  Frequent nighttime urination: No  Flank pain: No  Difficulty emptying bladder: No

## 2020-01-09 NOTE — LETTER
RE: Elier Barber  [address] [facility] [address]     Dear Colleague,    Thank you for referring your patient, Elier Barber, to the [facility] NEUROLOGY at [facility]. Please see a copy of my visit note below.    Service Date: 2020      MD Bartolo Yoder MD      RE: Elier De La Garza    MRN: [id number]   : 1945      Dear Doctors:      I saw Nader De La Garza in followup at the [facility] where he is seen for management of PLS.  Nader reports a gradual worsening in his general physical function since he was seen last.  This involves ADLs as well as mobility and was not triggered by any identifiable event.  He denies any weight loss or systemic illness.  He denies difficulty with sleep hygiene.      Examination demonstrates the following:  Vital signs are as noted.  Weight is stable.  Examination of the skin and extremities demonstrates no deformities.  He breathes comfortably at rest.      Neuromuscular examination is as follows:     NM Exam: Cranial      Atrophy:  Fasciculations:    Upper face:  Neg Neg   Lower face:  Neg Neg   Tongue:  Neg Neg          Facial weakness: no facial weakness   Tongue movements: slow   Tongue weakness: mild   Jaw jerk: present   Speech: dysarthria   Pseudobulbar affect: absent     NM Exam: Upper Extremities      Right:  Left:    Atrophy:  Neg Neg   Fasciculations:  Neg Neg   Muscle tone:  stiff throughout stiff throughout   Rapid alt. movements:  slow slow       Reflexes Right:  Left:    Biceps:  increased increased   Brachioradialis:  increased increased   Triceps:  increased increased   Weldon:  right Weldon reflex present left Weldon reflex present       Strength Right:  Left:    Shoulder abduction*:  5 5   Elbow flexion:  5 5   Elbow extension*:  5 5   Wrist extension*:  5 5   Finger extension:  5 5   Finger flexion:      Abductor pollicis brevis:   4 4   Abductor digiti minimi:  4 4   First dorsal interosseous*:  4 4     NM Exam: Lower Extremities      Right:  Left:    Atrophy:      Fasciculations:      Muscle tone:      Rapid alt. movements:          Reflexes Right:  Left:    Patellar:      Achilles:      Plantar:          Strength Right:  Left:    Hip flexion*:  5 5   Hip extension:  5 5   Hip adduction:  5 5   Hip abduction:  5 5   Knee extension*:  5 5   Knee flexion:  5 5   Ankle dorsiflexion*:  5 5        MEDICAL RECORD REVIEW:  Recent laboratory studies have been unremarkable aside from minor changes in CBC of doubtful significance.      Nader's examination and history demonstrate no clear evidence of a supervening general medical condition or a change in his neurologic diagnosis.  I do suspect his change in symptoms reflects the general progression of PLS.  He will meet with all members of our multidisciplinary care team today.  In particular, our physical therapist will evaluate the height of his walker and the potential benefit of AFOs.  He is continuing with his home exercises and stretching which I think are very valuable.  I have recommended no new medications today.  We will obtain a comprehensive metabolic panel and repeat CBC with differential today.  Nader will return in 6 months.        Sincerely,       Gary Tejada MD      cc:   Lida Ray MD   [facility]    [address]   [place] [address]      Bartolo Calvert MD   [facility]    [address] [place] [address]      D: 2020   T: 2020   MT: KRISTINE      Name:     ANIL MELGAR   MRN:      [id number]        Account:      [id number]   :      1945           Service Date: 2020      Document: [id number]

## 2020-01-09 NOTE — PROGRESS NOTES
"    OUTPATIENT PHYSICAL THERAPY CLINIC NOTE  Elier Barber  YOB: 1945  [id number]    Type of visit:         Evaluation     Date of service: 1/9/2020    Referring provider: Dr. Tejada     present: No    Others present at visit:  Spouse / significant otherKelli    Medical diagnosis:   Primary lateral sclerosis (PLS)    Date of diagnosis: 2001    Pertinent history of current problem (include personal factors and/or comorbidities that impact the plan of care): H/O BPPV with 2 recent episodes, stroke with L king June 2014; recent fall 5/2017 with LE DVT; baclofen pump    Cardio-respiratory status:  Forced vital capacity: 74 % of predicted (6/6/19)    Height/Weight: 5'10\" / 156 lb    Living environment:  House    Living environment barriers:  2 stairs to enter (1 railing present)  Ramp installed from garage onto deck, with B rails- walks with 4ww  Flight to basement with 1 railing- goes down about 1x/week     Current assistance/living environment:  Lives with spouse, she assists with donning socks as needed & bathing      Current mobility equipment:  4 wheeled walker with seat (has 4 strategically placed)  Scooter for distance, breaks down into 4 pieces so wife can transport in vehicle  Transport wheelchair  Bedrailing   Gait belt     Current ADL equipment:  Master bath remodeled with roll-in shower, grab bars, fold down seat and another seating area for dressing outside the shower  Walk-in shower main level, grab bars, shower chair, shower door  Taller toilets, vanity near to hold; 2nd bathroom with vanity he pulls forward on    Technology used: See OT note    Patient concerns/goals: Works with private-pay PT 1x/week, 2 PTs alternate weeks. Has 4 walkers strategically placed for use around home.     Evaluation   Interview completed.   Pain assessment:  Pain denied. Does report a bad back but manages with chiropractor about every 6 weeks.    Range of motion: Spasticity B adductors, HS, quads, " calves with limited muscle extensibility. Ankles limited shy of neutral ~5* B.      Manual muscle testin/5 throughout hip flex/abd, knee ext/flex, ankle DF/PF, but very slow moving DF against hypertonicity. Pt is flexed at hip with functional hip ext weakness   Gait: Pt amb with 4ww with slow pace, scissoring gait and reduced toe clearance R>L, with flexed posture at hips/torso, squeezes 4ww brakes throughout.   Cognition: Intact. Dysarthric.    *ALS FRS-R (ALS Functional Rating Scale-Revised) completed today. Please see separate note.     Fall Risk Screen:   Has the patient fallen 2 or more times in the last year? Yes, about 6. Had fall yesterday, turned too quick getting up from toilet and fell down      Has the patient fallen and had an injury in the past year? No       25ft walk: 40.63 sec, many steps, 4ww    Is the patient a fall risk? Yes, department fall risk interventions implemented     Impairments:  Fatigue  Coordination  Balance  Range of motion  Spasticity     Treatment diagnosis:  Impaired mobility  Impaired activities of daily living    Clinical Presentation: Evolving/Changing  Clinical Presentation Rationale: Gradually progressive nature of disease with marked spasticity affecting mobility and falls risk.  Clinical Decision Making (Complexity): Moderate complexity     Recommendations/Plan of care:  1 session evaluation & treatment.  Recommend referral/Equipment recommended: Orthotic consult for Bilateral AFOs (custom, hinged?), and Bilateral PRAFOs.     Goals:   Target date: 2020  Patient, family and/or caregiver will verbalize understanding of evaluation results and implications for functional performance.  Patient, family and/or caregiver will verbalize/demonstrate understanding of compensatory methods /equipment to enhance functional independence and safety.    Educational assessment/barriers to learning:   No barriers noted     Treatment provided this date:   Self-care/Home management, 18  minutes   -Education provided regarding the role of AFOs in setting of significant spasticity B. Trial of L AFO (only L available in clinic), Walk-on style. It appeared somewhat helpful but not dramatic. He would benefit from trial of B AFOs and may be best served by custom, hinged AFOs. He is open to Orthotic Consult to trial B in ortho clinic and pursue if helpful  -Education also provided for role of B PRAFOs for contracture prevention at rest. Added to orthotic consult.   -Education for role of Friction brakes to keep 4ww at a safe pace to ensure safe clearance of LEs. He squeezes brakes and currently feels comfortable with that  -Education for safe gait via Going pace that his LEs can keep up with him, and leading with feet with turns vs pivoting over his feet  -Positive feedback for regular activity and stretching program that he undergoes routinely  -Walker height assessed and appears appropriate, friction brakes may help to improve posture to some degree    Response to treatment/recommendations: Pt and spouse voice understanding    Goal attainment:  All goals met     Risks and benefits of evaluation/treatment have been explained.  Patient, family and/or caregiver are in agreement with Plan of Care.     Timed Code Treatment Minutes: 18  Total Treatment Time (sum of timed and untimed services): 28    Signature: Nicol Sawant, PT   Date: 1/9/2020

## 2020-01-09 NOTE — PROGRESS NOTES
Outpatient Speech Language Pathology Neurology Clinic Evaluation  Elier Barber YOB: 1945 [id number]    Visit Date: 1/9/20, last seen by this SLP 6/6/19    Age: 74 year old    Medical Diagnosis: Primary lateral sclerosis (PLS)    Date of Diagnosis: approx 2008    Referring MD: Dr Tejada    PMH: Refer to Medical Chart    Fall Risk:Refer to physician report.    Others at Clinic Visit: Spouse Kelli    Living Situation: With others    Patient Concerns/Goals: Increased speech deficits, Increased swallow deficits    Observations: Patient seen with wife present. Both are pleasant and cooperative. He reports minimal change in speech or swallowing.    Current Mode of Nutrition: Oral diet    Weight: 156 lbs at last visit, not yet weighed today    Cardio-Respiratory Status: Forced vital capacity: 74% at last visit, not yet assessed today    Functional Rating Scale (ALS-FRS): 32/48    Eating Assessment Tool (EAT10): 5/40 (compared to 3/40 last visit)   0  My swallowing problem has caused me to lose weight   0  My swallowing problem interferes with my ability to go out for meals   1  Swallowing liquids takes extra effort   1  Swallowing solids takes extra effort   1  Swallowing pills takes extra effort   0  Swallowing is painful   0  The pleasure of eating is affected by my swallowing   1  When I swallow food sticks in my throat   1  I cough when I eat   0  Swallowing is stressful      Oral Motor/Swallowing  Oral Motor Function: Anomalies present:    Labial anomaly- moderate weakness, reduced protrusion, poor seal, slow effortful movement  Lingual anomaly- mild-moderate weakness, greater weakness on left side due to prior CVAs (per his report), reduced ROM in all directions, rate of movement significantly slow  Diadochokinetics- all slow with reduced accuracy    Volitional Abilities:  Cough- Functional  Throat Clear- Functional  Swallow- Present    Swallow Function:   Thin Liquid-  Mild oral residue, mildly  reduced bolus control with reduced A-P propulsion, laryngeal elevation palpated to be possibly mildly reduced with appropriate timing however s/s aspiration noted which is suspected to be due to premature spillage into pharynx  Regular Solids- increased mastication time, anterior loss of small amounts of bolus and mild oral reside, reduced bolus control and propulsion. Multiple swallows required to pass with possible mildly reduced laryngeal elevation. He denies food stuck in his throat during visit but admits this does occur regularly.    Dysphagia Diagnosis: Mild-moderate dysphagia       Speech Intelligibility/Functional Communication   Methods of communication: Verbal    Dysarthria: Moderate-Severe    Speech:   Deficits in phonation- Strained-strangled quality (spastic)  Deficits in articulation- Decreased precision of articulation and Slow effortful rate  Intelligibility- wife reports she understands approx 70%, and pt feels most people understand similarly. SLP agrees.    Speech Intelligibility/Communication:  Impacts daily activities, Impacts social activities and Impacts phone usage    Augmentative and Alternative Communication (AAC):   He has previously reported he has an iPad with AAC apps but has never used.   Educated re use of nonverbal communication, writing, letter boards, etc to supplement verbal speech      Clinical Impression/Plan of Care  Treatment Diagnosis: Oropharyngeal Dysphagia and Dysarthia     Recommendations:  Oral diet of soft moist solids and thin liquids, consider nectar thick liquids if s/s aspiration persist  Continue use of compensatory swallow strategies  Continue compensatory speech strategies    Plan of Care:  1 session evaluation and treatment.    Goals: Based on today's evaluation session patient and/or caregiver will have understanding of current communication and/or swallowing status and recommendations for management.    Educational Assessment:  Learners- Patient and  Significant other  Barriers to Learning- No barriers.    Education provided/response:   Speech  Swallowing  AAC  Verbalized understanding    Treatment provided this date:   Swallow intervention, 10 minutes  SLP educated regarding swallowing anatomy and physiology including aspiration and possible respiratory compromise from aspiration. Trained safe swallow strategies to reduce these risks including slow rate of intake, small bites/sips, chin tuck/neutral head position, mindful swallowing. Also trained diet modifications to reduce risk of aspiration and ease intake including avoid hard dry foods with particles and choosing soft moist solids. SLP educated regarding thickened liquids including rationale and possible benefit in reducing aspiration. Educated regarding naturally thicker liquids (Boost, Ensure, Smoothies, etc)  Communication intervention, 3 minutes   Educated re use of nonverbal communication, writing, letter boards, etc to supplement verbal speech as well as speech strategies including slow rate, over-articulation, loud voice, conserving energy, etc.    Response to recommendations/treatment: verbalized understanding, agreed to POC    Goal attainment: All goals met    Risks and benefits of evaluation/treatment have been explained.  Patient, family and/or caregiver are in agreement with Plan of Care.    Timed Code Treatment Minutes: 0 minutes timed codes    Total Treatment Time (sum of timed and untimed services): 19 minutes

## 2020-01-10 ENCOUNTER — ALLIED HEALTH/NURSE VISIT (OUTPATIENT)
Dept: NUTRITION | Facility: CLINIC | Age: 75
End: 2020-01-10

## 2020-01-10 VITALS — BODY MASS INDEX: 22.38 KG/M2 | WEIGHT: 156 LBS

## 2020-01-10 NOTE — PROGRESS NOTES
"CLINICAL NUTRITION SERVICES - ASSESSMENT NOTE    Eliercarson Barber 74 year old referred for MNT related to PLS    Visit Type: initial  Referring Physician: Terrell  Pt accompanied by: his wife, Edward    NUTRITION HISTORY  Factors affecting nutrition intake include:minimal swallowing difficulties  Current diet: regular foods, adding more moisture.   Current appetite/intake: good  PEG Tube: No    Elier tells me that his appetite and intake remain good.   His weight tends to fluctuate by 5 lbs so he is not concerned with his weight loss of 5 lbs in the past 1 year.   He eats 3 meals/day and exercises daily.    His wife does all of the meal preparation and tends to have burnout with this as they don't have any meal help.     Diet Recall  Breakfast Whey protein shake, banana   Lunch [place] with ham, cheese, sierra/mustard, fruit   Dinner 6 oz lean meat (variety) or fish, chicken with grain (bread, potatoes, rice etc), cooked vegetables or salad   Snacks Fruit, crackers, chips   Beverages Water, ice tea     ANTHROPOMETRICS  Height: 70\"  Weight: 156 lbs/70.8kg  BMI: 22.3   Weight Status:  Normal BMI  IBW: 166 lbs  % IBW: 94%  Weight History: stable   Wt Readings from Last 8 Encounters:   01/10/20 70.8 kg (156 lb)   08/15/19 70.9 kg (156 lb 6.4 oz)   06/06/19 70.8 kg (156 lb)   05/02/19 73.9 kg (163 lb)   01/18/19 72.3 kg (159 lb 4.8 oz)   11/30/18 74.4 kg (164 lb)   11/01/18 73.7 kg (162 lb 8 oz)   10/27/18 73.5 kg (162 lb)       Medications/vitamins/minerals/herbals:   Reviewed    LABS  Labs reviewed    ASSESSED NUTRITION NEEDS:  Estimated Energy Needs: 2100-2400kcals (30-35 Kcal/Kg)  Justification: maintenance with PLS  Estimated Protein Needs: 84 grams protein (1.2 g pro/Kg)  Justification: maintenance  Estimated Fluid Needs: 2000  mL   Justification: maintenance    MALNUTRITION:  % Weight Loss:  None noted  % Intake:  No decreased intake noted  Subcutaneous Fat Loss:  None observed  Muscle Loss:  None observed  Fluid " Retention:  None noted    Malnutrition Diagnosis: Patient does not meet two of the above criteria necessary for diagnosing malnutrition    NUTRITION DIAGNOSIS:  No nutrition diagnosis identified at this time    INTERVENTIONS  Recommendations / Nutrition Prescription  1. [facility] [place] services  Nutrition Education     Provided written & verbal education on:   - Ways to maximize kcal and protein intake. Discussed calorie and protein needs for maintenance of weight and nutrition status.  Advised pt to aim for at least 2100kcal and 75g protein via 5-6 small frequent meals.    - Referred patient to [facility] meal services to help reduce the workload stress from his wife.      Goals  1.  Aim for 5-6 small frequent meals  2.  Aim for 2100kcal and 75g protein/day  3. Weight maintenance      Follow-Up Plans: Pt has RD contact information for questions.      Madelaine Willis, JAYN, LDN

## 2020-01-13 DIAGNOSIS — G12.23 PRIMARY LATERAL SCLEROSIS (H): Primary | ICD-10-CM

## 2020-01-24 ENCOUNTER — TELEPHONE (OUTPATIENT)
Dept: INTERNAL MEDICINE | Facility: CLINIC | Age: 75
End: 2020-01-24

## 2020-01-24 DIAGNOSIS — Z53.9 DIAGNOSIS NOT YET DEFINED: Primary | ICD-10-CM

## 2020-01-24 PROCEDURE — G0179 MD RECERTIFICATION HHA PT: HCPCS | Performed by: INTERNAL MEDICINE

## 2020-01-31 ENCOUNTER — TRANSFERRED RECORDS (OUTPATIENT)
Dept: HEALTH INFORMATION MANAGEMENT | Facility: CLINIC | Age: 75
End: 2020-01-31

## 2020-02-10 ENCOUNTER — HEALTH MAINTENANCE LETTER (OUTPATIENT)
Age: 75
End: 2020-02-10

## 2020-02-10 DIAGNOSIS — C61 PROSTATE CA (H): Primary | ICD-10-CM

## 2020-02-10 ASSESSMENT — ENCOUNTER SYMPTOMS
FLANK PAIN: 0
DIFFICULTY URINATING: 0
HEMATURIA: 0
DYSURIA: 0

## 2020-02-13 ENCOUNTER — OFFICE VISIT (OUTPATIENT)
Dept: UROLOGY | Facility: CLINIC | Age: 75
End: 2020-02-13
Payer: COMMERCIAL

## 2020-02-13 VITALS
HEIGHT: 70 IN | HEART RATE: 60 BPM | BODY MASS INDEX: 22.33 KG/M2 | SYSTOLIC BLOOD PRESSURE: 128 MMHG | WEIGHT: 156 LBS | DIASTOLIC BLOOD PRESSURE: 70 MMHG

## 2020-02-13 DIAGNOSIS — C61 PROSTATE CA (H): ICD-10-CM

## 2020-02-13 LAB
ALBUMIN UR-MCNC: NEGATIVE MG/DL
APPEARANCE UR: CLEAR
BILIRUB UR QL STRIP: NEGATIVE
COLOR UR AUTO: YELLOW
GLUCOSE UR STRIP-MCNC: NEGATIVE MG/DL
HGB UR QL STRIP: NEGATIVE
KETONES UR STRIP-MCNC: NEGATIVE MG/DL
LEUKOCYTE ESTERASE UR QL STRIP: NEGATIVE
NITRATE UR QL: NEGATIVE
PH UR STRIP: 6.5 PH (ref 5–7)
PR INTERVAL - MUSE: 115
SOURCE: NORMAL
SP GR UR STRIP: 1.02 (ref 1–1.03)
UROBILINOGEN UR STRIP-ACNC: 0.2 EU/DL (ref 0.2–1)

## 2020-02-13 PROCEDURE — 51798 US URINE CAPACITY MEASURE: CPT | Performed by: UROLOGY

## 2020-02-13 PROCEDURE — 81003 URINALYSIS AUTO W/O SCOPE: CPT | Performed by: UROLOGY

## 2020-02-13 PROCEDURE — 99212 OFFICE O/P EST SF 10 MIN: CPT | Mod: 25 | Performed by: UROLOGY

## 2020-02-13 RX ORDER — CLOPIDOGREL BISULFATE 75 MG/1
75 TABLET ORAL EVERY EVENING
Status: ON HOLD | COMMUNITY
Start: 2020-01-27 | End: 2021-08-03

## 2020-02-13 ASSESSMENT — PAIN SCALES - GENERAL: PAINLEVEL: NO PAIN (0)

## 2020-02-13 ASSESSMENT — MIFFLIN-ST. JEOR: SCORE: 1448.86

## 2020-02-13 NOTE — NURSING NOTE
Chief Complaint   Patient presents with     Prostate Cancer     Follow-up after surgery in 2010    PVR-115ml  Deya Oviedo LPN

## 2020-02-13 NOTE — PROGRESS NOTES
Shar is a 74-year-old male with a history of grade 3+4 adenocarcinoma of the prostate.  He underwent radical prostatectomy 12 years ago and maintains an undetectable PSA.  His Tomball grade was 3+4 and he had negative surgical margins and negative lymph nodes.  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  He does have dry mouth from this and some constipation that he manages with MiraLAX.  He denies any blurry vision or mental confusion.  His urinalysis today is unremarkable and his postvoid residual is 114 cc which is acceptable.  He denies any dysuria or hematuria.  He does drink caffeinated tea and some caffeinated pop and eats some chocolate- he was advised to stop these because of their stimulating effect on the bladder muscle.  Other past medical history: Hypertension, lateral sclerosis, hyperlipidemia, vertigo, CVA, edema, GERD  Medications: Baby aspirin, Vasotec, oxybutynin ER, Protonix, MiraLAX, potassium chloride, baclofen, Plavix, EpiPen, Nizoral shampoo  Allergies: Bees, penicillin  Review of systems: As above  Exam: In wheelchair.  With spouse.  Speech is difficult and some spasticity.  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  Continue present dose of oxybutynin.  We will asked Dr. Ray to do yearly PSA.  See me as needed in the future

## 2020-02-13 NOTE — LETTER
2/13/2020       RE: Elier Barber  [address] [facility] [address]     Dear Colleague,    Thank you for referring your patient, Elier Barber, to the [facility] UROLOGY CLINIC [place] at [facility]. Please see a copy of my visit note below.    Shar is a 74-year-old male with a history of grade 3+4 adenocarcinoma of the prostate.  He underwent radical prostatectomy 12 years ago and maintains an undetectable PSA.  His Jose M grade was 3+4 and he had negative surgical margins and negative lymph nodes.  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  He does have dry mouth from this and some constipation that he manages with MiraLAX.  He denies any blurry vision or mental confusion.  His urinalysis today is unremarkable and his postvoid residual is 114 cc which is acceptable.  He denies any dysuria or hematuria.  He does drink caffeinated tea and some caffeinated pop and eats some chocolate- he was advised to stop these because of their stimulating effect on the bladder muscle.  Other past medical history: Hypertension, lateral sclerosis, hyperlipidemia, vertigo, CVA, edema, GERD  Medications: Baby aspirin, Vasotec, oxybutynin ER, Protonix, MiraLAX, potassium chloride, baclofen, Plavix, EpiPen, Nizoral shampoo  Allergies: Bees, penicillin  Review of systems: As above  Exam: In wheelchair.  With spouse.  Speech is difficult and some spasticity.  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  Continue present dose of oxybutynin.  We will asked Dr. Ray to do yearly PSA.  See me as needed in the future      Again, thank you for allowing me to participate in the care of your patient.      Sincerely,    Rasheed Fuentes MD

## 2020-03-09 ENCOUNTER — MYC MEDICAL ADVICE (OUTPATIENT)
Dept: INTERNAL MEDICINE | Facility: CLINIC | Age: 75
End: 2020-03-09

## 2020-03-16 DIAGNOSIS — I10 ESSENTIAL HYPERTENSION, BENIGN: ICD-10-CM

## 2020-03-16 NOTE — TELEPHONE ENCOUNTER
"Requested Prescriptions   Pending Prescriptions Disp Refills     potassium chloride ER (K-TAB/KLOR-CON) 10 MEQ CR tablet [Pharmacy Med Name: POT CHLOR ER TAB 10MEQ [facility]ERSON]  Last Written Prescription Date:  6/17/2019  Last Fill Quantity: 180,  # refills: 2   Last office visit: 8/15/2019 with prescribing provider:     Future Office Visit:   180 tablet 2     Sig: TAKE 2 TABLETS (20MEQ)     DAILY       Potassium Supplements Protocol Passed - 3/16/2020  2:46 AM        Passed - Recent (12 mo) or future (30 days) visit within the authorizing provider's department     Patient has had an office visit with the authorizing provider or a provider within the authorizing providers department within the previous 12 mos or has a future within next 30 days. See \"Patient Info\" tab in inbasket, or \"Choose Columns\" in Meds & Orders section of the refill encounter.              Passed - Medication is active on med list        Passed - Patient is age 18 or older        Passed - Normal serum potassium in past 12 months     Recent Labs   Lab Test 01/09/20  1240   POTASSIUM 4.5                       "

## 2020-03-18 ENCOUNTER — TRANSFERRED RECORDS (OUTPATIENT)
Dept: HEALTH INFORMATION MANAGEMENT | Facility: CLINIC | Age: 75
End: 2020-03-18

## 2020-03-18 ENCOUNTER — MYC MEDICAL ADVICE (OUTPATIENT)
Dept: INTERNAL MEDICINE | Facility: CLINIC | Age: 75
End: 2020-03-18

## 2020-03-18 RX ORDER — POTASSIUM CHLORIDE 750 MG/1
TABLET, EXTENDED RELEASE ORAL
Qty: 180 TABLET | Refills: 1 | Status: SHIPPED | OUTPATIENT
Start: 2020-03-18 | End: 2020-08-19

## 2020-03-18 NOTE — TELEPHONE ENCOUNTER
Prescription approved per [facility] Refill Protocol.     Include Pregnancy/Lactation Warning?: No

## 2020-04-09 ENCOUNTER — TELEPHONE (OUTPATIENT)
Dept: INTERNAL MEDICINE | Facility: CLINIC | Age: 75
End: 2020-04-09

## 2020-04-09 DIAGNOSIS — Z53.9 DIAGNOSIS NOT YET DEFINED: Primary | ICD-10-CM

## 2020-04-09 PROCEDURE — G0179 MD RECERTIFICATION HHA PT: HCPCS | Performed by: INTERNAL MEDICINE

## 2020-04-27 NOTE — TELEPHONE ENCOUNTER
Pt calling for refill request of Clopidogrel 75 mg. Writer is unable to order medication. Prescription can be sent to pharmacy that is teed up. Please advise. Thanks.

## 2020-04-27 NOTE — TELEPHONE ENCOUNTER
I thought this was being prescribed by his neurologist, please clarify; if so, should call him for refill.

## 2020-04-29 ENCOUNTER — TELEPHONE (OUTPATIENT)
Dept: INTERNAL MEDICINE | Facility: CLINIC | Age: 75
End: 2020-04-29

## 2020-04-29 RX ORDER — CLOPIDOGREL BISULFATE 75 MG/1
TABLET ORAL
OUTPATIENT
Start: 2020-04-29

## 2020-04-29 NOTE — TELEPHONE ENCOUNTER
[facility] now requests orders and shares plan of care/discharge summaries for some patients through [employer].  Please REPLY TO THIS MESSAGE OR ROUTE BACK TO THE AUTHOR in order to give authorization for orders when needed.  This is considered a verbal order, you will still receive a faxed copy of orders for signature.  Thank you for your assistance in improving collaboration for our patients.    Update:  Pt experienced fall over the weekend when transferring from bed, wife unable to assist to stand neighbor called and assisted to stand, no EMS needed and no apparent injuries. Home Care to continue for fall prevention and maintenance.  Please contact with any questions.  Thank you,    Rene Herrera PT  [email or web address]  [phone]    MD SUMMARY/PLAN OF CARE    DISCHARGE SUMMARY

## 2020-04-29 NOTE — TELEPHONE ENCOUNTER
Medication refused, Called patient and he stated that he didn't look at the bottle close enough and made a mistake.  Medication is not needed.  .

## 2020-06-01 ENCOUNTER — TRANSFERRED RECORDS (OUTPATIENT)
Dept: HEALTH INFORMATION MANAGEMENT | Facility: CLINIC | Age: 75
End: 2020-06-01

## 2020-06-05 ENCOUNTER — TELEPHONE (OUTPATIENT)
Dept: INTERNAL MEDICINE | Facility: CLINIC | Age: 75
End: 2020-06-05

## 2020-06-05 DIAGNOSIS — Z53.9 DIAGNOSIS NOT YET DEFINED: Primary | ICD-10-CM

## 2020-06-05 PROCEDURE — G0179 MD RECERTIFICATION HHA PT: HCPCS | Performed by: INTERNAL MEDICINE

## 2020-06-09 ENCOUNTER — MYC REFILL (OUTPATIENT)
Dept: INTERNAL MEDICINE | Facility: CLINIC | Age: 75
End: 2020-06-09

## 2020-06-09 DIAGNOSIS — I10 ESSENTIAL HYPERTENSION, BENIGN: ICD-10-CM

## 2020-06-10 RX ORDER — ENALAPRIL MALEATE 5 MG/1
5 TABLET ORAL 2 TIMES DAILY
Qty: 180 TABLET | Refills: 0 | Status: SHIPPED | OUTPATIENT
Start: 2020-06-10 | End: 2020-09-02

## 2020-06-10 NOTE — TELEPHONE ENCOUNTER
"Requested Prescriptions   Pending Prescriptions Disp Refills     enalapril (VASOTEC) 5 MG tablet 180 tablet 1     Sig: Take 1 tablet (5 mg) by mouth 2 times daily       ACE Inhibitors (Including Combos) Protocol Passed - 6/9/2020 11:06 AM        Passed - Blood pressure under 140/90 in past 12 months     BP Readings from Last 3 Encounters:   02/13/20 128/70   01/09/20 (!) 164/88   08/15/19 130/75                 Passed - Recent (12 mo) or future (30 days) visit within the authorizing provider's specialty     Patient has had an office visit with the authorizing provider or a provider within the authorizing providers department within the previous 12 mos or has a future within next 30 days. See \"Patient Info\" tab in inbasket, or \"Choose Columns\" in Meds & Orders section of the refill encounter.              Passed - Medication is active on med list        Passed - Patient is age 18 or older        Passed - Normal serum creatinine on file in past 12 months     Recent Labs   Lab Test 01/09/20  1240   CR 0.72       Ok to refill medication if creatinine is low          Passed - Normal serum potassium on file in past 12 months     Recent Labs   Lab Test 01/09/20  1240   POTASSIUM 4.5                  Last office visit 8-15-19    Medication is being filled for 1 time refill only due to:  pt will be due for an appt in Aug 2020.  [employer] message sent.    "

## 2020-06-18 ENCOUNTER — TRANSFERRED RECORDS (OUTPATIENT)
Dept: HEALTH INFORMATION MANAGEMENT | Facility: CLINIC | Age: 75
End: 2020-06-18

## 2020-07-13 ENCOUNTER — TELEPHONE (OUTPATIENT)
Dept: INTERNAL MEDICINE | Facility: CLINIC | Age: 75
End: 2020-07-13

## 2020-07-13 NOTE — TELEPHONE ENCOUNTER
[facility] now requests orders and shares plan of care/discharge summaries for some patients through [employer].  Please REPLY TO THIS MESSAGE OR ROUTE BACK TO THE AUTHOR in order to give authorization for orders when needed.  This is considered a verbal order, you will still receive a faxed copy of orders for signature.  Thank you for your assistance in improving collaboration for our patients.    ORDER  PT for gait, transfers, strength, balance 1w8    Roselia Melgar, RUSS  [facility]  [phone]

## 2020-07-27 ENCOUNTER — TRANSFERRED RECORDS (OUTPATIENT)
Dept: HEALTH INFORMATION MANAGEMENT | Facility: CLINIC | Age: 75
End: 2020-07-27

## 2020-07-28 ENCOUNTER — TELEPHONE (OUTPATIENT)
Dept: INTERNAL MEDICINE | Facility: CLINIC | Age: 75
End: 2020-07-28

## 2020-07-28 DIAGNOSIS — Z53.9 DIAGNOSIS NOT YET DEFINED: Primary | ICD-10-CM

## 2020-07-28 PROCEDURE — G0179 MD RECERTIFICATION HHA PT: HCPCS | Performed by: INTERNAL MEDICINE

## 2020-08-04 ENCOUNTER — TRANSFERRED RECORDS (OUTPATIENT)
Dept: HEALTH INFORMATION MANAGEMENT | Facility: CLINIC | Age: 75
End: 2020-08-04

## 2020-08-17 DIAGNOSIS — I10 ESSENTIAL HYPERTENSION, BENIGN: ICD-10-CM

## 2020-08-17 DIAGNOSIS — R35.0 URINARY FREQUENCY: ICD-10-CM

## 2020-08-19 RX ORDER — POTASSIUM CHLORIDE 750 MG/1
TABLET, EXTENDED RELEASE ORAL
Qty: 180 TABLET | Refills: 0 | Status: SHIPPED | OUTPATIENT
Start: 2020-08-19 | End: 2020-09-08

## 2020-08-19 RX ORDER — OXYBUTYNIN CHLORIDE 10 MG/1
TABLET, EXTENDED RELEASE ORAL
Qty: 180 TABLET | Refills: 0 | Status: SHIPPED | OUTPATIENT
Start: 2020-08-19 | End: 2020-09-08

## 2020-08-19 NOTE — TELEPHONE ENCOUNTER
Due for appointment. Called patient and spoke to him and his wife. Video visit scheduled for Annual Wellness exam.     Medication is being filled for 1 time refill only due to:  Future appointment scheduled

## 2020-09-01 DIAGNOSIS — I10 ESSENTIAL HYPERTENSION, BENIGN: ICD-10-CM

## 2020-09-02 RX ORDER — ENALAPRIL MALEATE 5 MG/1
TABLET ORAL
Qty: 180 TABLET | Refills: 0 | Status: SHIPPED | OUTPATIENT
Start: 2020-09-02 | End: 2020-09-08

## 2020-09-08 ENCOUNTER — VIRTUAL VISIT (OUTPATIENT)
Dept: INTERNAL MEDICINE | Facility: CLINIC | Age: 75
End: 2020-09-08
Payer: COMMERCIAL

## 2020-09-08 DIAGNOSIS — K21.9 GASTROESOPHAGEAL REFLUX DISEASE WITHOUT ESOPHAGITIS: ICD-10-CM

## 2020-09-08 DIAGNOSIS — G12.23 PRIMARY LATERAL SCLEROSIS (H): ICD-10-CM

## 2020-09-08 DIAGNOSIS — Z00.00 ENCOUNTER FOR MEDICARE ANNUAL WELLNESS EXAM: Primary | ICD-10-CM

## 2020-09-08 DIAGNOSIS — R35.0 URINARY FREQUENCY: ICD-10-CM

## 2020-09-08 DIAGNOSIS — I10 ESSENTIAL HYPERTENSION, BENIGN: ICD-10-CM

## 2020-09-08 DIAGNOSIS — I63.9 CEREBRAL INFARCTION, UNSPECIFIED MECHANISM (H): ICD-10-CM

## 2020-09-08 DIAGNOSIS — C61 PROSTATE CA (H): ICD-10-CM

## 2020-09-08 DIAGNOSIS — M25.519 SHOULDER PAIN, UNSPECIFIED CHRONICITY, UNSPECIFIED LATERALITY: ICD-10-CM

## 2020-09-08 PROCEDURE — 99214 OFFICE O/P EST MOD 30 MIN: CPT | Mod: 95 | Performed by: INTERNAL MEDICINE

## 2020-09-08 PROCEDURE — 99397 PER PM REEVAL EST PAT 65+ YR: CPT | Mod: GT | Performed by: INTERNAL MEDICINE

## 2020-09-08 RX ORDER — OXYBUTYNIN CHLORIDE 10 MG/1
20 TABLET, EXTENDED RELEASE ORAL AT BEDTIME
Qty: 180 TABLET | Refills: 3 | Status: SHIPPED | OUTPATIENT
Start: 2020-09-08 | End: 2021-11-09

## 2020-09-08 RX ORDER — POTASSIUM CHLORIDE 750 MG/1
20 TABLET, EXTENDED RELEASE ORAL DAILY
Qty: 180 TABLET | Refills: 3 | Status: SHIPPED | OUTPATIENT
Start: 2020-09-08 | End: 2021-07-22

## 2020-09-08 RX ORDER — ENALAPRIL MALEATE 5 MG/1
5 TABLET ORAL 2 TIMES DAILY
Qty: 180 TABLET | Refills: 3 | Status: SHIPPED | OUTPATIENT
Start: 2020-09-08 | End: 2020-12-24 | Stop reason: DRUGHIGH

## 2020-09-08 RX ORDER — PANTOPRAZOLE SODIUM 40 MG/1
40 TABLET, DELAYED RELEASE ORAL DAILY
Qty: 90 TABLET | Refills: 3 | Status: ON HOLD | OUTPATIENT
Start: 2020-09-08 | End: 2021-07-12

## 2020-09-08 NOTE — PATIENT INSTRUCTIONS

## 2020-09-08 NOTE — PROGRESS NOTES
"Elier Barber is a 74 year old male who is being evaluated via a billable video visit.      The patient has been notified of following:     \"This video visit will be conducted via a call between you and your physician/provider. We have found that certain health care needs can be provided without the need for an in-person physical exam.  This service lets us provide the care you need with a video conversation.  If a prescription is necessary we can send it directly to your pharmacy.  If lab work is needed we can place an order for that and you can then stop by our lab to have the test done at a later time.    Video visits are billed at different rates depending on your insurance coverage.  Please reach out to your insurance provider with any questions.    If during the course of the call the physician/provider feels a video visit is not appropriate, you will not be charged for this service.\"    Patient has given verbal consent for Video visit? Yes  How would you like to obtain your AVS? MyChart  If you are dropped from the video visit, the video invite should be resent to: Send to e-mail at: [email or web address]  Will anyone else be joining your video visit? No    Subjective     Elier Barber is a 74 year old male who presents today via video visit for the following health issues:    HPI    Annual Wellness Visit    Are you in the first 12 months of your Medicare Part B coverage?  No    Physical Health:    In general, how would you rate your overall physical health? good    Outside of work, how many days during the week do you exercise?6-7 days/week    Outside of work, approximately how many minutes a day do you exercise?30-45 minutes    If you drink alcohol do you typically have >3 drinks per day or >7 drinks per week? No    Do you usually eat at least 4 servings of fruit and vegetables a day, include whole grains & fiber and avoid regularly eating high fat or \"junk\" foods? Yes    Do you have any problems taking " medications regularly? No    Do you have any side effects from medications? none    Needs assistance for the following daily activities: transportation, shopping, bathing and laundry    Which of the following safety concerns are present in your home?  none identified     Hearing impairment: No    In the past 6 months, have you been bothered by leaking of urine? yes    Mental Health:    In general, how would you rate your overall mental or emotional health? excellent  PHQ-2 Score:      Do you feel safe in your environment? Yes    Have you ever done Advance Care Planning? (For example, a Health Directive, POLST, or a discussion with a medical provider or your loved ones about your wishes)? Yes, advance care planning is on file.    Fall risk:  Fallen 2 or more times in the past year?: Yes  Any fall with injury in the past year?: No    Cognitive Screening:   Not able to do due to disability    Mini-CogTM Copyright KITA Grimm. Licensed by the author for use in [facility]; reprinted with permission (ok@[facility]). All rights reserved.      Do you have sleep apnea, excessive snoring or daytime drowsiness?: no    Current providers sharing in care for this patient include:   Patient Care Team:  Lida Ray MD as PCP - General (Internal Medicine)  Lida Ray MD as Assigned PCP      Video Start Time: 1:59      Problems:   1. HTN; well controlled, 120-138/70s.   2. PLS: gradual decline, on baclofen pump with dose lowered last month, doing better.   3. CAP: no symptoms of recurrence, saw urology 2/20  4. CVA: no neurologic symptoms, he elected to stop statin    Patient Active Problem List   Diagnosis     Essential hypertension, benign     Primary lateral sclerosis (HCC)     Prostate CA (HCC)     Hyperlipidemia LDL goal <100     Vertigo     Cerebral infarction (H)     Advanced directives, counseling/discussion     Muscle spasticity     Edema, unspecified type     Gastroesophageal reflux disease without esophagitis      Cerebral atherosclerosis     Current Outpatient Medications   Medication Sig Dispense Refill     aspirin 81 MG tablet Take 81 mg by mouth daily       baclofen (LIORESAL) in NaCl 0.9% 100 mL intrathecal infusion by Intrathecal route continuous Pump filled by [facility] [phone]  Pump Model: Syncromed  Last fill:  1/30/2018  Next fill: 5/29/18  Low [place] Alarm Date: 5/29/18  Reservoir Volume: unknown  Conc: 2000 mcg/ml  Flex schedule delivers 266.5 mcg/day  Basal rate:unknown, pt states basal rate increased from 05:00-08:00       clopidogrel (PLAVIX) 75 MG tablet        enalapril (VASOTEC) 5 MG tablet TAKE 1 TABLET TWICE A DAY  WILL BE DUE FOR            APPOINTMENT IN AUGUST 2020 180 tablet 0     EPINEPHrine 0.3 MG/0.3ML injection Inject 0.3 mLs (0.3 mg) into the muscle as needed for anaphylaxis 0.3 mL 3     ketoconazole (NIZORAL) 2 % external shampoo SHAMPOO EVERY 2-3 DAYS AS  NEEDED 120 mL 1     oxybutynin ER (DITROPAN-XL) 10 MG 24 hr tablet TAKE 2 TABLETS AT BEDTIME 180 tablet 0     pantoprazole (PROTONIX) 40 MG EC tablet TAKE 1 TABLET DAILY 90 tablet 2     potassium chloride ER (K-TAB/KLOR-CON) 10 MEQ CR tablet TAKE 2 TABLETS (20MEQ)     DAILY 180 tablet 0     STATIN NOT PRESCRIBED, INTENTIONAL, Please choose reason not prescribed, below       polyethylene glycol (MIRALAX/GLYCOLAX) Packet Take 1 packet by mouth every other day               Review of Systems   CONSTITUTIONAL: NEGATIVE for fever, chills, change in weight  INTEGUMENTARY/SKIN: NEGATIVE for worrisome rashes, moles or lesions  EYES: NEGATIVE for vision changes or irritation  ENT/MOUTH: NEGATIVE for ear, mouth and throat problems  RESP: NEGATIVE for significant cough or SOB  BREAST: NEGATIVE for masses, tenderness or discharge  CV: NEGATIVE for chest pain, palpitations or peripheral edema  GI: NEGATIVE for nausea, abdominal pain, heartburn, or change in bowel habits  : NEGATIVE for frequency,  dysuria, or hematuria  MUSCULOSKELETAL: NEGATIVE for significant arthralgias or myalgia  NEURO: NEGATIVE for weakness, dizziness or paresthesias  ENDOCRINE: NEGATIVE for temperature intolerance, skin/hair changes  HEME: NEGATIVE for bleeding problems  PSYCHIATRIC: NEGATIVE for changes in mood or affect      Objective           Vitals:  No vitals were obtained today due to virtual visit.    Physical Exam     GENERAL: Healthy, alert and no distress  EYES: Eyes grossly normal to inspection.  No discharge or erythema, or obvious scleral/conjunctival abnormalities.  RESP: No audible wheeze, cough, or visible cyanosis.  No visible retractions or increased work of breathing.    SKIN: Visible skin clear. No significant rash, abnormal pigmentation or lesions.  NEURO: speech dysarthric  PSYCH: Mentation appears normal, affect normal/bright, judgement and insight intact, normal speech and appearance well-groomed.              Assessment & Plan     Encounter for Medicare annual wellness exam  Up to date, will get flu shot soon    Cerebral infarction, unspecified mechanism (H)  No new neurologic symptoms, continue ASA and plavix    Prostate CA (HCC)  No recurrence, he does not want to come in for lab at this time, will check next time    Primary lateral sclerosis (HCC)  Slowly progressive, sees neurology    Essential hypertension, benign  Controlled, continue med             No follow-ups on file.    Lida Ray MD  [facility]      Video-Visit Details    Type of service:  Video Visit    Video End Time:2:18    Originating Location (pt. Location): Home    Distant Location (provider location):  Home    Platform used for Video Visit: Makenzie

## 2020-09-22 ENCOUNTER — TRANSFERRED RECORDS (OUTPATIENT)
Dept: HEALTH INFORMATION MANAGEMENT | Facility: CLINIC | Age: 75
End: 2020-09-22

## 2020-10-01 NOTE — TELEPHONE ENCOUNTER
"Requested Prescriptions   Pending Prescriptions Disp Refills     oxybutynin ER (DITROPAN-XL) 10 MG 24 hr tablet [Pharmacy Med Name: OXYBUTYNIN CL ER 10 MG TABLET] 180 tablet 3     Sig: TAKE 2 TABLETS (20 MG) BY MOUTH AT BEDTIME   Last Written Prescription Date:  7/16/2018  Last Fill Quantity: 180,  # refills: 3   Last office visit: 5/2/2019 with prescribing provider:     Future Office Visit:      Muscarinic Antagonists (Urinary Incontinence Agents) Passed - 7/2/2019  1:45 AM        Passed - Recent (12 mo) or future (30 days) visit within the authorizing provider's specialty     Patient had office visit in the last 12 months or has a visit in the next 30 days with authorizing provider or within the authorizing provider's specialty.  See \"Patient Info\" tab in inbasket, or \"Choose Columns\" in Meds & Orders section of the refill encounter.              Passed - Medication is Oxybutynin and patient is 5 years of age or older        Passed - Patient does not have a diagnosis of glaucoma on the problem list     If glaucoma diagnosis is new, refer refill to physician.          Passed - Medication is active on med list        Passed - Patient is 18 years of age or older        " Pt transferred from ER to floor via stretcher with RN. Report received from JOSSELYN Luis. Tele initiated. Will continue to monitor.

## 2020-10-02 ENCOUNTER — OFFICE VISIT (OUTPATIENT)
Dept: ORTHOPEDICS | Facility: CLINIC | Age: 75
End: 2020-10-02
Attending: INTERNAL MEDICINE
Payer: COMMERCIAL

## 2020-10-02 ENCOUNTER — ANCILLARY PROCEDURE (OUTPATIENT)
Dept: GENERAL RADIOLOGY | Facility: CLINIC | Age: 75
End: 2020-10-02
Attending: FAMILY MEDICINE
Payer: COMMERCIAL

## 2020-10-02 VITALS — BODY MASS INDEX: 22.38 KG/M2 | SYSTOLIC BLOOD PRESSURE: 138 MMHG | HEIGHT: 70 IN | DIASTOLIC BLOOD PRESSURE: 78 MMHG

## 2020-10-02 DIAGNOSIS — M25.511 CHRONIC PAIN OF BOTH SHOULDERS: ICD-10-CM

## 2020-10-02 DIAGNOSIS — G89.29 CHRONIC PAIN OF BOTH SHOULDERS: Primary | ICD-10-CM

## 2020-10-02 DIAGNOSIS — M67.911 DYSFUNCTION OF RIGHT ROTATOR CUFF: ICD-10-CM

## 2020-10-02 DIAGNOSIS — M25.512 CHRONIC PAIN OF BOTH SHOULDERS: ICD-10-CM

## 2020-10-02 DIAGNOSIS — G12.23 PRIMARY LATERAL SCLEROSIS (H): ICD-10-CM

## 2020-10-02 DIAGNOSIS — M25.512 CHRONIC PAIN OF BOTH SHOULDERS: Primary | ICD-10-CM

## 2020-10-02 DIAGNOSIS — M25.511 CHRONIC PAIN OF BOTH SHOULDERS: Primary | ICD-10-CM

## 2020-10-02 DIAGNOSIS — G89.29 CHRONIC PAIN OF BOTH SHOULDERS: ICD-10-CM

## 2020-10-02 PROCEDURE — 73030 X-RAY EXAM OF SHOULDER: CPT | Mod: LT | Performed by: RADIOLOGY

## 2020-10-02 PROCEDURE — 99204 OFFICE O/P NEW MOD 45 MIN: CPT | Performed by: FAMILY MEDICINE

## 2020-10-02 RX ORDER — MUPIROCIN 20 MG/G
OINTMENT TOPICAL 2 TIMES DAILY
Status: ON HOLD | COMMUNITY
Start: 2020-11-09 | End: 2021-06-17

## 2020-10-02 RX ORDER — SENNOSIDES 8.6 MG
650 CAPSULE ORAL EVERY 8 HOURS PRN
Status: ON HOLD | COMMUNITY
End: 2022-01-01

## 2020-10-02 RX ORDER — INFLUENZA A VIRUS A/HAWAII/70/2019 (H1N1) RECOMBINANT HEMAGGLUTININ ANTIGEN, INFLUENZA A VIRUS A/MINNESOTA/41/2019 (H3N2) RECOMBINANT HEMAGGLUTININ ANTIGEN, INFLUENZA B VIRUS B/WASHINGTON/02/2019 RECOMBINANT HEMAGGLUTININ ANTIGEN, AND INFLUENZA B VIRUS B/PHUKET/3073/2013 RECOMBINANT HEMAGGLUTININ ANTIGEN 45; 45; 45; 45 UG/.5ML; UG/.5ML; UG/.5ML; UG/.5ML
INJECTION INTRAMUSCULAR
COMMUNITY
Start: 2020-10-01 | End: 2020-11-24

## 2020-10-02 NOTE — LETTER
10/2/2020         RE: Elier Barber  [address] [place] [facility] [facility] [address]        Dear Colleague,    Thank you for referring your patient, Elier Barber, to the [facility] [place]. Please see a copy of my visit note below.    ASSESSMENT & PLAN    1. Chronic pain of both shoulders    2. Primary lateral sclerosis (HCC)    3. Dysfunction of right rotator cuff      Seen in consultation for bilateral shoulder stiffness and localized pain over posterior right shoulder  Recommend Tylenol 1 tab, once or twice a day. Can safely take up to 2 tablets (1,000mg) three times a day.  Reviewed xray - advanced osteoarthritis. In my opinion this is not what is causing his current pain in the back of the right shoulder  In my opinion, stiffness is related to PLS rather than osteoarthritis  Given a disc with xray images  If pian in the back of the right shoulder is not improved with Tylenol I would recommend acupuncture.  If he develops more deep shoulder pain (feels in the joint) then can return for ultrasound guided cortisone injections  Can consider adding pool therapy in the future just for overall mobility  Ok for continued chiro  Would not recommend land based therapy - would be concerned that we would fatigue him  Ok for massage - but would not recommend deep tissue massage    -----    SUBJECTIVE  Elier Barber is a/an 75 year old Left handed male who is seen in consultation at the request of  Lida Ray M.D. for evaluation of bilateral shoulder pain. The patient is seen with their wife.    Onset: Right shoulder - 6-7 month(s) ago. Left shoulder - 1 month. Reports insidious onset without acute precipitating event.  Location of Pain: bilateral posterior shoulders - right worse than left  Rating of Pain at worst: 8/10  Rating of Pain Currently: 6/10  Worsened by: pain / stiffness worse in the morning - improves throughout the day  Better with: tylenol provides some relief  Treatments  "tried: rest/activity avoidance and ice, chiropractic care, massage, physical therapy - home PT & PT with chiro  Associated symptoms: no distal numbness or tingling; denies swelling or warmth  Orthopedic history: YES -   Relevant surgical history: YES - patient reports h/o fractured left clavicle with surgical fixation  Patient Social History: retired    Patient's past medical, surgical, social, and family histories were reviewed today and no pertinent history related to patient's presenting problem.    REVIEW OF SYSTEMS:  10 point ROS is negative other than symptoms noted above in HPI, Past Medical History or as stated below  Constitutional: NEGATIVE for fever, chills, change in weight  Skin: NEGATIVE for worrisome rashes, moles or lesions  GI/: NEGATIVE for bowel or bladder changes  Neuro: NEGATIVE for weakness, dizziness or paresthesias    OBJECTIVE:  /78   Ht 1.778 m (5' 10\")   BMI 22.38 kg/m     General: healthy, alert and in no distress  HEENT: no scleral icterus or conjunctival erythema  Skin: no suspicious lesions or rash. No jaundice.  CV: regular rhythm by palpation  Resp: normal respiratory effort without conversational dyspnea   Psych: normal mood and affect  Gait: normal steady gait with appropriate coordination and balance  Neuro: normal light touch sensory exam of the bilateral upper extremities.    MSK:  BILATERAL SHOULDER  Inspection:    moderate muscle atrophy     Palpation:    Tender about the supraspinatus fossa (right). Nontender over all other landmarks on the right. Nontender over the left shoulder  Active Range of Motion:     Abduction 1050, FF 1050,   Strength:    Scapular plane abduction 5-/5,  ER 4/5, IR 5-/5    Independent visualization of the below image:  Recent Results (from the past 24 hour(s))   XR Shoulder Bilateral G/E 2 Views    Narrative    SHOULDER BILATERAL TWO OR MORE VIEWS 10/2/2020 12:11 PM     HISTORY: Chronic pain of both shoulders.    COMPARISON: Left " shoulder exam 9/14/2004.      Impression    IMPRESSION:     Right shoulder: No acute bony or soft tissue abnormality. Advanced  glenohumeral joint space narrowing with some reactive subchondral  sclerosis and mild marginal bony spurring. There is glenohumeral joint  chondrocalcinosis. Moderate hypertrophic degenerative change at the  acromioclavicular joint.    Left shoulder: No acute appearing bony abnormality and the previously  seen greater tuberosity fracture has healed. Advanced osteoarthritis  at the glenohumeral joint has progressed. Chronic hypertrophic bone  related to the distal clavicle with chronic ossification in the  coracoclavicular ligament region. These findings are likely related to  old injury and appear unchanged.       Zoila Alvarado DO [facility]        Again, thank you for allowing me to participate in the care of your patient.        Sincerely,        Zoila Alvarado DO

## 2020-10-02 NOTE — PATIENT INSTRUCTIONS
1. Chronic pain of both shoulders    2. Primary lateral sclerosis (HCC)    3. Dysfunction of right rotator cuff      Recommend Tylenol 1 tab, once or twice a day. Can safely take up to 2 tablets (1,000mg) three times a day.  Reviewed xray - advanced osteoarthritis. In my opinion this is not what is causing his current pain in the back of the right shoulder  In my opinion, stiffness is related to PLS rather than osteoarthritis  Given a disc with xray images  If pian in the back of the right shoulder is not improved with Tylenol I would recommend acupuncture.  If he develops more deep shoulder pain (feels in the joint) then can return for ultrasound guided cortisone injections  Can consider adding pool therapy in the future just for overall mobility  Ok for continued chiro  Would not recommend land based therapy - would be concerned that we would fatigue him  Ok for massage - but would not recommend deep tissue massage

## 2020-10-15 ENCOUNTER — TRANSFERRED RECORDS (OUTPATIENT)
Dept: HEALTH INFORMATION MANAGEMENT | Facility: CLINIC | Age: 75
End: 2020-10-15

## 2020-10-19 ENCOUNTER — TELEPHONE (OUTPATIENT)
Dept: ORTHOPEDICS | Facility: CLINIC | Age: 75
End: 2020-10-19

## 2020-10-19 NOTE — TELEPHONE ENCOUNTER
Pt wanted to make Dr. Alvarado aware that he will be pursuing acupuncture for shoulder. He has an appointment on 11/06/20 at 9:20.

## 2020-10-20 ENCOUNTER — TELEPHONE (OUTPATIENT)
Dept: INTERNAL MEDICINE | Facility: CLINIC | Age: 75
End: 2020-10-20

## 2020-10-20 DIAGNOSIS — Z53.9 DIAGNOSIS NOT YET DEFINED: Primary | ICD-10-CM

## 2020-10-20 PROCEDURE — G0179 MD RECERTIFICATION HHA PT: HCPCS | Performed by: INTERNAL MEDICINE

## 2020-10-20 NOTE — TELEPHONE ENCOUNTER
Plan per patient's LOV on 10/2/20:    Seen in consultation for bilateral shoulder stiffness and localized pain over posterior right shoulder  Recommend Tylenol 1 tab, once or twice a day. Can safely take up to 2 tablets (1,000mg) three times a day.  Reviewed xray - advanced osteoarthritis. In my opinion this is not what is causing his current pain in the back of the right shoulder  In my opinion, stiffness is related to PLS rather than osteoarthritis  Given a disc with xray images  If pain in the back of the right shoulder is not improved with Tylenol I would recommend acupuncture.  If he develops more deep shoulder pain (feels in the joint) then can return for ultrasound guided cortisone injections  Can consider adding pool therapy in the future just for overall mobility  Ok for continued chiro  Would not recommend land based therapy - would be concerned that we would fatigue him  Ok for massage - but would not recommend deep tissue massage    Routing to provider so she is aware.    Karen Silva, ATC

## 2020-10-20 NOTE — TELEPHONE ENCOUNTER
[facility] now requests orders and shares plan of care/discharge summaries for some patients through [employer].  Please REPLY TO THIS MESSAGE OR ROUTE BACK TO THE AUTHOR in order to give authorization for orders when needed.  This is considered a verbal order, you will still receive a faxed copy of orders for signature.  Thank you for your assistance in improving collaboration for our patients.    ORDER  ST SOC to eval and tx for swallowing, volume and quality of speech decline.  [facility] SOC for 10/26/20.    [email or web address]  [phone]  MD SUMMARY/PLAN OF CARE    DISCHARGE SUMMARY

## 2020-10-23 ENCOUNTER — TELEPHONE (OUTPATIENT)
Dept: INTERNAL MEDICINE | Facility: CLINIC | Age: 75
End: 2020-10-23

## 2020-10-27 ENCOUNTER — TELEPHONE (OUTPATIENT)
Dept: INTERNAL MEDICINE | Facility: CLINIC | Age: 75
End: 2020-10-27

## 2020-10-27 NOTE — TELEPHONE ENCOUNTER
Speech therapy completed a speech and voice evaluation and the patient demonstrates a severely reduced ability to phonate. Recommend referral to ENT for a laryngoscopy to differentially diagnose the patient's disordered voice and to rule out vocal fold pathology. If in agreement, please place referral to ENT for laryngeal workup.    Please reach out with questions or concerns.    Kindly,    Earnest Cast MS, CCC-SLP  C. [phone]

## 2020-10-27 NOTE — TELEPHONE ENCOUNTER
[facility] now requests orders and shares plan of care/discharge summaries for some patients through [employer].  Please REPLY TO THIS MESSAGE OR ROUTE BACK TO THE AUTHOR in order to give authorization for orders when needed.  This is considered a verbal order, you will still receive a faxed copy of orders for signature.  Thank you for your assistance in improving collaboration for our patients.    ORDER    2w4 speech therapy for dysarthria therapy to improve speech intelligibility and remove functional barriers to communication with conversation partners.      Earnest Cast MS, CCC-SLP  c [phone]

## 2020-10-27 NOTE — TELEPHONE ENCOUNTER
He has known neurologic condition that is likely causing this problem but suggest he check with his neurologist to see if he thinks this is necessary.

## 2020-10-30 ENCOUNTER — TELEPHONE (OUTPATIENT)
Dept: INTERNAL MEDICINE | Facility: CLINIC | Age: 75
End: 2020-10-30

## 2020-11-03 ENCOUNTER — MYC MEDICAL ADVICE (OUTPATIENT)
Dept: NEUROLOGY | Facility: CLINIC | Age: 75
End: 2020-11-03

## 2020-11-06 ENCOUNTER — THERAPY VISIT (OUTPATIENT)
Dept: CHIROPRACTIC MEDICINE | Facility: CLINIC | Age: 75
End: 2020-11-06
Payer: COMMERCIAL

## 2020-11-06 DIAGNOSIS — M99.01 CERVICAL SEGMENT DYSFUNCTION: Primary | ICD-10-CM

## 2020-11-06 DIAGNOSIS — M54.6 PAIN IN THORACIC SPINE: ICD-10-CM

## 2020-11-06 DIAGNOSIS — M99.02 THORACIC SEGMENT DYSFUNCTION: ICD-10-CM

## 2020-11-06 DIAGNOSIS — M54.2 CERVICALGIA: ICD-10-CM

## 2020-11-06 PROCEDURE — 99203 OFFICE O/P NEW LOW 30 MIN: CPT | Mod: GA | Performed by: CHIROPRACTOR

## 2020-11-06 PROCEDURE — 97810 ACUP 1/> WO ESTIM 1ST 15 MIN: CPT | Mod: GA | Performed by: CHIROPRACTOR

## 2020-11-06 NOTE — PROGRESS NOTES
Chiropractic Clinic Visit    PCP: Lida Ray    Elier Barber is a 75 year old male who is seen  in consultation at the request of  Zoila Alvarado M.D. presenting with chronic neck, upper back, mid-back, bilateral shoulder pain . 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 When asked, patient denies:, falling, slipping, bending and reaching or sleeping awkwardly. Patient reports the repetitive hunching forward aggravates the upper back/neck region.  Prior to onset, the patient was able to read 2 hours without back pain. Patient notes that due to symptoms, they can only read 1/2 hour before pain increases. Elier Barber notes   reading rated at a 5/10 and  prior to this onset it was 1/10.    Patient reports he has done physical therapy.  Patient reports he does work with a chiropractor, patient reports the adjustments don't seem to hold very long and is here today to try acupuncture to see if this helps with the chiropractic manipulation.     Injury: No new injury    Location of Pain: bilateral mid to upper thoracic, lower cervical spine, right shoulder at the following level(s) C5 , C6 , C7 , T2 , T3  and T5   Duration of Pain: 15 years, worse in the last year   Rating of Pain at worst: 9/10  Rating of Pain Currently: 6/10  Symptoms are better with: Tylenol and Rest  Symptoms are worse with: prolonged walking, reading  Additional Features: Patient reports he is weak right arm, right side of the body due to the PLS       Health History  as reported by the patient:    How does the patient rate their own health:   Fair    Current or past medical history:   High blood pressure, PLS-ongoing since 2002    Medical allergies  None    Past Traumas/Surgeries  Shoulder/Prostate/Hernia/Cataract/Skin    Family History  This patient has no significant family history    Medications:  Baby aspirin/Blood Thinner/Acid Reflux/Pain Relief, high blood  pressure    Occupation:  Retired    Primary tasks:   Prolonged sitting    Barriers as home/work:   none    Additional health Issues:     NA      Review of Systems  Musculoskeletal: as above  Remainder of review of systems is negative including constitutional, CV, pulmonary, GI, Skin and Neurologic except as noted in HPI or medical history.    Past Medical History:   Diagnosis Date     Basal cell carcinoma nos     sees derm     CVA (cerebral infarction) 6/14    small vessel right int capsule stroke     DVT (deep vein thrombosis) in pregnancy 05/12/2017    right peroneal vein     Essential hypertension, benign      Hearing loss      Hoarseness of voice     assoc with PLS     Lumbar radiculopathy     2017     Primary Lateral Sclerosis 2002    has baclofen pump through Dr. Calvert, [place], sees Dr. Fink, [facility]     Prostate CA (H) 2008    prostatectomy     Past Surgical History:   Procedure Laterality Date     ABDOMEN SURGERY  11/12    Hernia     BIOPSY  4/16    Cancerous growth on leg. Removed by MOHs treatment     HERNIA REPAIR  11/12    Repaired     PROSTATE SURGERY       [facility] NONSPECIFIC PROCEDURE  6/08     prostatectomy      [facility] NONSPECIFIC PROCEDURE  11/01    colonoscopy     [facility] NONSPECIFIC PROCEDURE  11/2006    hernia, right side     [facility] NONSPECIFIC PROCEDURE      T + A       [id number]  There were no vitals taken for this visit.    GENERAL APPEARANCE: healthy, alert and no distress   GAIT: walker and wheelchair  SKIN: no suspicious lesions or rashes  NEURO: sensory exam grossly normal, weakness of right side, spasticity of legs and speech affected from PLS  PSYCH:  mentation appears normal and affect normal/bright      Cervical Spine Exam    Range of Motion:         Slight to moderate active and passive ROM forward flexion, extension, lateral rotation, lateral flexion. Lower neck pain/stiffness noted at end range of all motion    Inspection:         Anterior head position        Forward rounded  shoulders    Tender:        paraspinal muscles       upper border of trapezius       Supraspinatus right    Non-Tender:        remainder of cervical spine area    Muscle strength:  Did not test due to PLS    Reflexes:        C5 (biceps) symmetric 2 bilaterally       C6 (supinator) symmetric 2 bilaterally       C7 (triceps) symmetric 2 bilaterally    Sensation:       grossly intact througout upper extremities bilaterally    Special Tests:  Distraction - negative and cervical compression-Neg, foraminal compression left and right-Neg    Lymphatics:        no edema noted in the upper extremities       Segmental spinal dysfunction/restrictions found at:  C6 , T2  and T3       The following soft tissue hypotonicities were observed:   Traps: bilateral, ache and stiff, referred pain: no    Trigger points were found in:  Levator scapulae and Traps    Muscle spasm found in:  Levator scapulae, T-spine paraspinal, Traps and supraspinatus:       Radiology:  none    Assessment:    No diagnosis found.    RX ordered/plan of care  Anticipated outcomes  Possible risks and side effects    After discussing the risk and benefits of care, patient consented to treatment    Patient's condition:  Patient symptoms are staying the same    Treatment effectiveness:  Tenderness is decreasing    Plan:    Procedures:    Evaluation and Management:  36215 Moderate level exam 30 min    CMT:  No SMT-patient is having this done with another chiropractic provider. Last treatment was 2 weeks ago.    Modalities:  49945: Acupuncture, for 15 minutes:  Points: for neck, upper back, mid-back, shoulder-right-Patient seated    Therapeutic procedures:  None    Response to Treatment  Patient tolerated the treatment well today    Prognosis: Guarded      Treatment plan and goals:  Goals:  PAIN: the patient specific goal of thier symptoms is to attain the pre-inury state of 2-3/10 on the VAS  Able to read one hour without increased neck/upper back pain.    Frequency  of care  Duration of care is estimated to be 6 weeks, from the initial treatment.  It is estimated that the patient will need a total of 4-6 visits to resolve this episode.  For the initial therapeutic trial of care, the frequency is recommended at 1 X week, once daily.  A reevaluation would be clinically appropriate in 6 visits, to determine progress and further course of care.    In-Office Treatment  Evaluation  No SMT-patient is having this done with another chiropractic provider. Last treatment was 2 weeks ago.    Modalities:  45250: Acupuncture, for 15 minutes:  Points: for neck, upper back, mid-back, shoulder-right-Patient seated    Recommendations:    Instructions:  ice 20 minutes every other hour as needed    Follow-up:  Return to care in one week     Disclaimer: This note consists of symbols derived from keyboarding, dictation and/or voice recognition software. As a result, there may be errors in the script that have gone undetected. Please consider this when interpreting information found in this chart.

## 2020-11-09 ENCOUNTER — TRANSFERRED RECORDS (OUTPATIENT)
Dept: HEALTH INFORMATION MANAGEMENT | Facility: CLINIC | Age: 75
End: 2020-11-09

## 2020-11-10 ENCOUNTER — TELEPHONE (OUTPATIENT)
Dept: INTERNAL MEDICINE | Facility: CLINIC | Age: 75
End: 2020-11-10

## 2020-11-12 ENCOUNTER — TELEPHONE (OUTPATIENT)
Dept: INTERNAL MEDICINE | Facility: CLINIC | Age: 75
End: 2020-11-12

## 2020-11-15 ENCOUNTER — INDEPENDENT LIVING VISIT (OUTPATIENT)
Dept: NEUROLOGY | Facility: CLINIC | Age: 75
End: 2020-11-15
Payer: COMMERCIAL

## 2020-11-15 ENCOUNTER — MEDICAL CORRESPONDENCE (OUTPATIENT)
Dept: HEALTH INFORMATION MANAGEMENT | Facility: CLINIC | Age: 75
End: 2020-11-15

## 2020-11-15 DIAGNOSIS — G12.23 PRIMARY LATERAL SCLEROSIS (H): Primary | ICD-10-CM

## 2020-11-15 PROCEDURE — 99350 HOME/RES VST EST HIGH MDM 60: CPT | Performed by: PSYCHIATRY & NEUROLOGY

## 2020-11-16 ENCOUNTER — HOSPITAL ENCOUNTER (OUTPATIENT)
Facility: CLINIC | Age: 75
Setting detail: OBSERVATION
Discharge: HOME | End: 2020-11-19
Attending: EMERGENCY MEDICINE | Admitting: INTERNAL MEDICINE
Payer: COMMERCIAL

## 2020-11-16 ENCOUNTER — APPOINTMENT (OUTPATIENT)
Dept: MRI IMAGING | Facility: CLINIC | Age: 75
End: 2020-11-16
Attending: EMERGENCY MEDICINE
Payer: COMMERCIAL

## 2020-11-16 ENCOUNTER — THERAPY VISIT (OUTPATIENT)
Dept: CHIROPRACTIC MEDICINE | Facility: CLINIC | Age: 75
End: 2020-11-16
Payer: COMMERCIAL

## 2020-11-16 ENCOUNTER — APPOINTMENT (OUTPATIENT)
Dept: CT IMAGING | Facility: CLINIC | Age: 75
End: 2020-11-16
Attending: EMERGENCY MEDICINE
Payer: COMMERCIAL

## 2020-11-16 DIAGNOSIS — M54.6 PAIN IN THORACIC SPINE: ICD-10-CM

## 2020-11-16 DIAGNOSIS — I63.9 CEREBROVASCULAR ACCIDENT (CVA), UNSPECIFIED MECHANISM (H): ICD-10-CM

## 2020-11-16 DIAGNOSIS — M99.01 CERVICAL SEGMENT DYSFUNCTION: Primary | ICD-10-CM

## 2020-11-16 DIAGNOSIS — M54.2 CERVICALGIA: ICD-10-CM

## 2020-11-16 LAB
ALBUMIN UR-MCNC: NEGATIVE MG/DL
ANION GAP SERPL CALCULATED.3IONS-SCNC: 5 MMOL/L (ref 3–14)
APPEARANCE UR: CLEAR
BACTERIA #/AREA URNS HPF: ABNORMAL /HPF
BASE EXCESS BLDV CALC-SCNC: 2.7 MMOL/L
BASOPHILS # BLD AUTO: 0.1 10E9/L (ref 0–0.2)
BASOPHILS NFR BLD AUTO: 1.1 %
BILIRUB UR QL STRIP: NEGATIVE
BUN SERPL-MCNC: 25 MG/DL (ref 7–30)
CALCIUM SERPL-MCNC: 9 MG/DL (ref 8.5–10.1)
CHLORIDE SERPL-SCNC: 102 MMOL/L (ref 94–109)
CO2 SERPL-SCNC: 28 MMOL/L (ref 20–32)
COLOR UR AUTO: ABNORMAL
CREAT SERPL-MCNC: 0.66 MG/DL (ref 0.66–1.25)
DIFFERENTIAL METHOD BLD: NORMAL
EOSINOPHIL # BLD AUTO: 0.2 10E9/L (ref 0–0.7)
EOSINOPHIL NFR BLD AUTO: 3.8 %
ERYTHROCYTE [DISTWIDTH] IN BLOOD BY AUTOMATED COUNT: 14.1 % (ref 10–15)
GFR SERPL CREATININE-BSD FRML MDRD: >90 ML/MIN/{1.73_M2}
GLUCOSE SERPL-MCNC: 116 MG/DL (ref 70–99)
GLUCOSE UR STRIP-MCNC: NEGATIVE MG/DL
HCO3 BLDV-SCNC: 28 MMOL/L (ref 21–28)
HCT VFR BLD AUTO: 45.6 % (ref 40–53)
HGB BLD-MCNC: 14.9 G/DL (ref 13.3–17.7)
HGB UR QL STRIP: NEGATIVE
IMM GRANULOCYTES # BLD: 0 10E9/L (ref 0–0.4)
IMM GRANULOCYTES NFR BLD: 0.5 %
KETONES UR STRIP-MCNC: NEGATIVE MG/DL
LEUKOCYTE ESTERASE UR QL STRIP: NEGATIVE
LYMPHOCYTES # BLD AUTO: 1.4 10E9/L (ref 0.8–5.3)
LYMPHOCYTES NFR BLD AUTO: 22.1 %
MCH RBC QN AUTO: 30.1 PG (ref 26.5–33)
MCHC RBC AUTO-ENTMCNC: 32.7 G/DL (ref 31.5–36.5)
MCV RBC AUTO: 92 FL (ref 78–100)
MONOCYTES # BLD AUTO: 0.7 10E9/L (ref 0–1.3)
MONOCYTES NFR BLD AUTO: 11.1 %
MUCOUS THREADS #/AREA URNS LPF: PRESENT /LPF
NEUTROPHILS # BLD AUTO: 3.9 10E9/L (ref 1.6–8.3)
NEUTROPHILS NFR BLD AUTO: 61.4 %
NITRATE UR QL: NEGATIVE
NRBC # BLD AUTO: 0 10*3/UL
NRBC BLD AUTO-RTO: 0 /100
O2/TOTAL GAS SETTING VFR VENT: NORMAL %
OXYHGB MFR BLDV: 76 %
PCO2 BLDV: 46 MM HG (ref 40–50)
PH BLDV: 7.4 PH (ref 7.32–7.43)
PH UR STRIP: 6 PH (ref 5–7)
PLATELET # BLD AUTO: 255 10E9/L (ref 150–450)
PO2 BLDV: 42 MM HG (ref 25–47)
POTASSIUM SERPL-SCNC: 4.1 MMOL/L (ref 3.4–5.3)
RBC # BLD AUTO: 4.95 10E12/L (ref 4.4–5.9)
RBC #/AREA URNS AUTO: <1 /HPF (ref 0–2)
SODIUM SERPL-SCNC: 135 MMOL/L (ref 133–144)
SOURCE: ABNORMAL
SP GR UR STRIP: 1.02 (ref 1–1.03)
SQUAMOUS #/AREA URNS AUTO: <1 /HPF (ref 0–1)
TROPONIN I SERPL-MCNC: <0.015 UG/L (ref 0–0.04)
UROBILINOGEN UR STRIP-MCNC: NORMAL MG/DL (ref 0–2)
WBC # BLD AUTO: 6.4 10E9/L (ref 4–11)
WBC #/AREA URNS AUTO: <1 /HPF (ref 0–5)

## 2020-11-16 PROCEDURE — 250N000013 HC RX MED GY IP 250 OP 250 PS 637: Performed by: EMERGENCY MEDICINE

## 2020-11-16 PROCEDURE — 84484 ASSAY OF TROPONIN QUANT: CPT | Performed by: EMERGENCY MEDICINE

## 2020-11-16 PROCEDURE — 80048 BASIC METABOLIC PNL TOTAL CA: CPT | Performed by: EMERGENCY MEDICINE

## 2020-11-16 PROCEDURE — C9803 HOPD COVID-19 SPEC COLLECT: HCPCS

## 2020-11-16 PROCEDURE — 93005 ELECTROCARDIOGRAM TRACING: CPT

## 2020-11-16 PROCEDURE — 258N000003 HC RX IP 258 OP 636: Performed by: EMERGENCY MEDICINE

## 2020-11-16 PROCEDURE — 82805 BLOOD GASES W/O2 SATURATION: CPT | Performed by: EMERGENCY MEDICINE

## 2020-11-16 PROCEDURE — 70553 MRI BRAIN STEM W/O & W/DYE: CPT

## 2020-11-16 PROCEDURE — 81001 URINALYSIS AUTO W/SCOPE: CPT | Performed by: EMERGENCY MEDICINE

## 2020-11-16 PROCEDURE — 97810 ACUP 1/> WO ESTIM 1ST 15 MIN: CPT | Mod: GA | Performed by: CHIROPRACTOR

## 2020-11-16 PROCEDURE — 96361 HYDRATE IV INFUSION ADD-ON: CPT

## 2020-11-16 PROCEDURE — 36415 COLL VENOUS BLD VENIPUNCTURE: CPT | Performed by: INTERNAL MEDICINE

## 2020-11-16 PROCEDURE — 96360 HYDRATION IV INFUSION INIT: CPT | Mod: 59

## 2020-11-16 PROCEDURE — 85025 COMPLETE CBC W/AUTO DIFF WBC: CPT | Performed by: EMERGENCY MEDICINE

## 2020-11-16 PROCEDURE — 70450 CT HEAD/BRAIN W/O DYE: CPT

## 2020-11-16 PROCEDURE — 99285 EMERGENCY DEPT VISIT HI MDM: CPT | Mod: 25

## 2020-11-16 RX ORDER — HYDROCODONE BITARTRATE AND ACETAMINOPHEN 5; 325 MG/1; MG/1
1 TABLET ORAL ONCE
Status: COMPLETED | OUTPATIENT
Start: 2020-11-16 | End: 2020-11-16

## 2020-11-16 RX ADMIN — HYDROCODONE BITARTRATE AND ACETAMINOPHEN 1 TABLET: 5; 325 TABLET ORAL at 22:34

## 2020-11-16 RX ADMIN — SODIUM CHLORIDE 500 ML: 9 INJECTION, SOLUTION INTRAVENOUS at 22:34

## 2020-11-16 NOTE — PROGRESS NOTES
I met with Nader De La Garza and his wife, Kelli, on 11/15 for followup of his established diagnosis of PLS.  Since March of this year, Nader reports that he has been generally moving slower and is more fatigued at night.  He notes increased stiffness which is principally in the upper limbs.  This is most notable when raising his upper limbs.  His health has otherwise been stable.  He did experience a staph infection after removal of a skin tumor in July, and he is currently on his second course of antibiotics but does not feel that his symptoms worsened in association with this.  He also feels that the change this year has been gradual and not abrupt as in previous strokes.    PHYSICAL EXAMINATION: The patient appears well.  He breathes comfortably at rest.  There is a modest limitation of passive range of motion at the cervical spine and there is no pain with passive movement.  In addition, he denies neck pain or radicular symptoms.  There is a moderate limitation in passive abduction at both shoulders, perhaps right greater than left.  This is painless.  Tone is only modestly increased in the upper limbs and moderately increased in the lower limbs.  Manual muscle testing demonstrates moderate weakness of right supraspinatus and infraspinatus despite absence of substantial atrophy in those muscles.  Elbow flexion is equivocally impaired on the right.  FDI is 4+ on the right.  Strength is otherwise 5/5.  Rapid repetitive movements are severely slowed in all 4 limbs, perhaps somewhat worse in the right hand and the left lower limb.  Perception of temperature and touch are preserved.  Vibration scores are 4 at the malleoli.  Reflexes are brisk at the biceps bilaterally, 1+ at the left patella, and absent elsewhere in the lower limbs.  Plantar stimulation results in brisk withdrawal or triple flexion response.  Speech is notable for a marked spastic dysarthria.  Tongue bulk is normal.  Cranial strength is normal.  Neck  flexion strength is equivocally impaired.  Nader has great difficulty rising from a seated position and needs to pull himself forward in doing so.  He walks with a striking spastic gait and flexed posture.    I discussed the following with Nader over the course of a 60-minute visit:  As he intimated in our conversation, despite continuing to exercise and work with a , it is possible that the reduction in general physical activity since COVID has impacted his mobility.  I do suspect a degree of adhesive capsulitis in both shoulders.  He has seen an orthopedist in our system and I will review her reports and have a conversation with her as well.  In addition, there may be vandana weakness in right shoulder abduction which is contributing.  Whether this reflects PLS, local orthopedic pathology or C5 radiculopathy is not clear.  I offered the option of an EMG, but made it clear that C5 radiculopathy would best be treated surgically and he is not enthusiastic about this option.  In the absence of that, continued efforts to strengthen the right upper limb proximally would be reasonable and appropriate.  His gait remains quite spastic and I do believe there is truncal weakness that is significant.  We discussed the option of botulinum toxin injections in the lower limbs.  Insofar as there is an element of weakness, this may not be advisable.  He continues to adjust his intrathecal baclofen doses with Dr. Calvert.  Finally, he does have some upper trapezius discomfort which is contributing to the perception of stiffness in the upper limbs.  This has responded in the past to acupuncture and he has reinstituted this intervention.    I will speak with Dr. Alvarado about Nader's shoulder weakness and perhaps with his therapist as well.  We also discussed consideration of more assistive equipment for mobility, including a walker, power wheelchair, a chair with assisted standing, and a chair elevator to the basement.  He has  considered all of these above and is currently reluctant in the hopes of some improvement in his upper limb function.      Gary Tejada MD    cc:  Bartolo Calvert MD  [facility]  [address] [place]  [address]    Zoila Alvarado, [facility]  [address]  [place]  [address]    60 minutes spent with the patient with greater than 50% of the time counseling and coordinating care as documented above.

## 2020-11-17 ENCOUNTER — MYC MEDICAL ADVICE (OUTPATIENT)
Dept: NEUROLOGY | Facility: CLINIC | Age: 75
End: 2020-11-17

## 2020-11-17 ENCOUNTER — APPOINTMENT (OUTPATIENT)
Dept: CARDIOLOGY | Facility: CLINIC | Age: 75
End: 2020-11-17
Attending: NURSE PRACTITIONER
Payer: COMMERCIAL

## 2020-11-17 ENCOUNTER — TELEPHONE (OUTPATIENT)
Dept: INTERNAL MEDICINE | Facility: CLINIC | Age: 75
End: 2020-11-17

## 2020-11-17 ENCOUNTER — APPOINTMENT (OUTPATIENT)
Dept: MRI IMAGING | Facility: CLINIC | Age: 75
End: 2020-11-17
Attending: EMERGENCY MEDICINE
Payer: COMMERCIAL

## 2020-11-17 ENCOUNTER — APPOINTMENT (OUTPATIENT)
Dept: SPEECH THERAPY | Facility: CLINIC | Age: 75
End: 2020-11-17
Attending: INTERNAL MEDICINE
Payer: COMMERCIAL

## 2020-11-17 ENCOUNTER — APPOINTMENT (OUTPATIENT)
Dept: PHYSICAL THERAPY | Facility: CLINIC | Age: 75
End: 2020-11-17
Attending: INTERNAL MEDICINE
Payer: COMMERCIAL

## 2020-11-17 DIAGNOSIS — I63.321 CEREBROVASCULAR ACCIDENT (CVA) DUE TO THROMBOSIS OF RIGHT ANTERIOR CEREBRAL ARTERY (H): Primary | ICD-10-CM

## 2020-11-17 LAB
CHOLEST SERPL-MCNC: 152 MG/DL
GLUCOSE BLDC GLUCOMTR-MCNC: 120 MG/DL (ref 70–99)
GLUCOSE BLDC GLUCOMTR-MCNC: 91 MG/DL (ref 70–99)
HBA1C MFR BLD: 5.8 % (ref 0–5.6)
HDLC SERPL-MCNC: 53 MG/DL
INTERPRETATION ECG - MUSE: NORMAL
LDLC SERPL CALC-MCNC: 91 MG/DL
NONHDLC SERPL-MCNC: 99 MG/DL
PLATELET FUNCTION ASA: 487 ARU
PLATELET REACTIVITY P2Y12: 144 PRU
SARS-COV-2 RNA SPEC QL NAA+PROBE: NOT DETECTED
SPECIMEN SOURCE: NORMAL
TRIGL SERPL-MCNC: 41 MG/DL

## 2020-11-17 PROCEDURE — 70549 MR ANGIOGRAPH NECK W/O&W/DYE: CPT

## 2020-11-17 PROCEDURE — 85576 BLOOD PLATELET AGGREGATION: CPT | Mod: TC | Performed by: NURSE PRACTITIONER

## 2020-11-17 PROCEDURE — G0463 HOSPITAL OUTPT CLINIC VISIT: HCPCS | Mod: 25

## 2020-11-17 PROCEDURE — 97162 PT EVAL MOD COMPLEX 30 MIN: CPT | Mod: GP | Performed by: PHYSICAL THERAPIST

## 2020-11-17 PROCEDURE — 97116 GAIT TRAINING THERAPY: CPT | Mod: GP | Performed by: PHYSICAL THERAPIST

## 2020-11-17 PROCEDURE — 250N000013 HC RX MED GY IP 250 OP 250 PS 637: Performed by: EMERGENCY MEDICINE

## 2020-11-17 PROCEDURE — 99207 PR CDG-MDM COMPONENT: MEETS MODERATE - UP CODED: CPT | Performed by: NURSE PRACTITIONER

## 2020-11-17 PROCEDURE — 36415 COLL VENOUS BLD VENIPUNCTURE: CPT | Performed by: INTERNAL MEDICINE

## 2020-11-17 PROCEDURE — 97602 WOUND(S) CARE NON-SELECTIVE: CPT

## 2020-11-17 PROCEDURE — 99223 1ST HOSP IP/OBS HIGH 75: CPT | Mod: AI | Performed by: INTERNAL MEDICINE

## 2020-11-17 PROCEDURE — 80061 LIPID PANEL: CPT | Performed by: INTERNAL MEDICINE

## 2020-11-17 PROCEDURE — 92526 ORAL FUNCTION THERAPY: CPT | Mod: GN | Performed by: SPEECH-LANGUAGE PATHOLOGIST

## 2020-11-17 PROCEDURE — U0003 INFECTIOUS AGENT DETECTION BY NUCLEIC ACID (DNA OR RNA); SEVERE ACUTE RESPIRATORY SYNDROME CORONAVIRUS 2 (SARS-COV-2) (CORONAVIRUS DISEASE [COVID-19]), AMPLIFIED PROBE TECHNIQUE, MAKING USE OF HIGH THROUGHPUT TECHNOLOGIES AS DESCRIBED BY CMS-2020-01-R: HCPCS | Performed by: EMERGENCY MEDICINE

## 2020-11-17 PROCEDURE — 70544 MR ANGIOGRAPHY HEAD W/O DYE: CPT

## 2020-11-17 PROCEDURE — 999N001017 HC STATISTIC GLUCOSE BY METER IP

## 2020-11-17 PROCEDURE — 255N000002 HC RX 255 OP 636: Performed by: INTERNAL MEDICINE

## 2020-11-17 PROCEDURE — 93325 DOPPLER ECHO COLOR FLOW MAPG: CPT | Mod: 26 | Performed by: INTERNAL MEDICINE

## 2020-11-17 PROCEDURE — A9585 GADOBUTROL INJECTION: HCPCS | Performed by: EMERGENCY MEDICINE

## 2020-11-17 PROCEDURE — 97530 THERAPEUTIC ACTIVITIES: CPT | Mod: GP | Performed by: PHYSICAL THERAPIST

## 2020-11-17 PROCEDURE — 93321 DOPPLER ECHO F-UP/LMTD STD: CPT | Mod: 26 | Performed by: INTERNAL MEDICINE

## 2020-11-17 PROCEDURE — G0378 HOSPITAL OBSERVATION PER HR: HCPCS

## 2020-11-17 PROCEDURE — 120N000001 HC R&B MED SURG/OB

## 2020-11-17 PROCEDURE — 255N000002 HC RX 255 OP 636: Performed by: EMERGENCY MEDICINE

## 2020-11-17 PROCEDURE — 36415 COLL VENOUS BLD VENIPUNCTURE: CPT | Performed by: NURSE PRACTITIONER

## 2020-11-17 PROCEDURE — 250N000013 HC RX MED GY IP 250 OP 250 PS 637: Performed by: INTERNAL MEDICINE

## 2020-11-17 PROCEDURE — 92610 EVALUATE SWALLOWING FUNCTION: CPT | Mod: GN | Performed by: SPEECH-LANGUAGE PATHOLOGIST

## 2020-11-17 PROCEDURE — 83036 HEMOGLOBIN GLYCOSYLATED A1C: CPT | Performed by: INTERNAL MEDICINE

## 2020-11-17 PROCEDURE — G0426 INPT/ED TELECONSULT50: HCPCS | Performed by: NURSE PRACTITIONER

## 2020-11-17 PROCEDURE — 93308 TTE F-UP OR LMTD: CPT | Mod: 26 | Performed by: INTERNAL MEDICINE

## 2020-11-17 PROCEDURE — 99207 PR APP CREDIT; MD BILLING SHARED VISIT: CPT | Performed by: INTERNAL MEDICINE

## 2020-11-17 RX ORDER — CLOPIDOGREL BISULFATE 75 MG/1
75 TABLET ORAL DAILY
Status: DISCONTINUED | OUTPATIENT
Start: 2020-11-17 | End: 2020-11-19 | Stop reason: HOSPADM

## 2020-11-17 RX ORDER — PANTOPRAZOLE SODIUM 40 MG/1
40 TABLET, DELAYED RELEASE ORAL DAILY
Status: DISCONTINUED | OUTPATIENT
Start: 2020-11-17 | End: 2020-11-19 | Stop reason: HOSPADM

## 2020-11-17 RX ORDER — OXYBUTYNIN CHLORIDE 5 MG/1
20 TABLET, EXTENDED RELEASE ORAL AT BEDTIME
Status: DISCONTINUED | OUTPATIENT
Start: 2020-11-17 | End: 2020-11-19 | Stop reason: HOSPADM

## 2020-11-17 RX ORDER — ASPIRIN 81 MG/1
81 TABLET ORAL DAILY
Status: DISCONTINUED | OUTPATIENT
Start: 2020-11-18 | End: 2020-11-19 | Stop reason: HOSPADM

## 2020-11-17 RX ORDER — ENALAPRILAT 1.25 MG/ML
0.62 INJECTION INTRAVENOUS EVERY 6 HOURS PRN
Status: DISCONTINUED | OUTPATIENT
Start: 2020-11-17 | End: 2020-11-19 | Stop reason: HOSPADM

## 2020-11-17 RX ORDER — GADOBUTROL 604.72 MG/ML
7.5 INJECTION INTRAVENOUS ONCE
Status: COMPLETED | OUTPATIENT
Start: 2020-11-17 | End: 2020-11-17

## 2020-11-17 RX ORDER — MINOCYCLINE HYDROCHLORIDE 100 MG/1
100 CAPSULE ORAL EVERY 12 HOURS SCHEDULED
Status: DISCONTINUED | OUTPATIENT
Start: 2020-11-17 | End: 2020-11-19 | Stop reason: HOSPADM

## 2020-11-17 RX ORDER — ASPIRIN 325 MG
325 TABLET ORAL ONCE
Status: COMPLETED | OUTPATIENT
Start: 2020-11-17 | End: 2020-11-17

## 2020-11-17 RX ORDER — MINOCYCLINE HYDROCHLORIDE 100 MG/1
100 CAPSULE ORAL 2 TIMES DAILY
Status: ON HOLD | COMMUNITY
End: 2021-06-17

## 2020-11-17 RX ADMIN — MINOCYCLINE HYDROCHLORIDE 100 MG: 100 CAPSULE ORAL at 20:29

## 2020-11-17 RX ADMIN — MINOCYCLINE HYDROCHLORIDE 100 MG: 100 CAPSULE ORAL at 10:21

## 2020-11-17 RX ADMIN — GADOBUTROL 7.5 ML: 604.72 INJECTION INTRAVENOUS at 00:23

## 2020-11-17 RX ADMIN — ASPIRIN 325 MG ORAL TABLET 325 MG: 325 PILL ORAL at 01:26

## 2020-11-17 RX ADMIN — HUMAN ALBUMIN MICROSPHERES AND PERFLUTREN 3 ML: 10; .22 INJECTION, SOLUTION INTRAVENOUS at 14:00

## 2020-11-17 RX ADMIN — GADOBUTROL 7.5 ML: 604.72 INJECTION INTRAVENOUS at 04:51

## 2020-11-17 RX ADMIN — OXYBUTYNIN CHLORIDE 20 MG: 5 TABLET, EXTENDED RELEASE ORAL at 21:39

## 2020-11-17 RX ADMIN — PANTOPRAZOLE SODIUM 40 MG: 40 TABLET, DELAYED RELEASE ORAL at 18:15

## 2020-11-17 RX ADMIN — CLOPIDOGREL BISULFATE 75 MG: 75 TABLET ORAL at 18:15

## 2020-11-17 ASSESSMENT — ACTIVITIES OF DAILY LIVING (ADL)
ADLS_ACUITY_SCORE: 27
ADLS_ACUITY_SCORE: 28
ADLS_ACUITY_SCORE: 25
ADLS_ACUITY_SCORE: 25

## 2020-11-17 ASSESSMENT — ENCOUNTER SYMPTOMS
DYSURIA: 0
DIARRHEA: 0
FEVER: 0
BLOOD IN STOOL: 0
WEAKNESS: 1
NAUSEA: 0
VOMITING: 0

## 2020-11-17 NOTE — ED TRIAGE NOTES
Pt states difficulty ambulating that has progressively worsened since yesterday morning. Pt states greater difficulty ambulating with LLE than RLE. Pt states concerned about having another CVA, states previous hx of CVA in 2013 & 2018. ABCs intact Baseline mental status

## 2020-11-17 NOTE — ED NOTES
"[facility]  ED Nurse Handoff Report    Elier Barber is a 75 year old male   ED Chief complaint: Mobility Issues  . ED Diagnosis:   Final diagnoses:   Cerebrovascular accident (CVA), unspecified mechanism (H)     Allergies:   Allergies   Allergen Reactions     No Clinical Screening - See Comments Hives     Starts swelling and hives     Bee Venom      Penicillins Hives     \"HIVES\"       Code Status: Full Code  Activity level - Baseline/Home:  Independent. Activity Level - Current:   Assist X 2. Lift room needed: No. Bariatric: No   Needed: No   Isolation: No. Infection: Not Applicable.     Vital Signs:   Vitals:    11/16/20 2230 11/16/20 2300 11/16/20 2315 11/16/20 2330   BP: (!) 168/100 (!) 165/90 (!) 162/98 (!) 167/88   Pulse: 65 66 69 67   Resp:       Temp:       TempSrc:       SpO2:  97% 97% 95%       Cardiac Rhythm:  ,      Pain level:    Patient confused: No. Patient Falls Risk: Yes.   Elimination Status: Has voided   Patient Report - Initial Complaint: Mobility issues. Focused Assessment: Musculoskeletal - Musculoskeletal WDL: .WDL except  General Mobility: generalized weakness; significantly impaired (Pt c/o increased generalized weakness and difficulty ambulating since yesterday. Pt has hx of stroke) LLE Weight-Bearing Status: weight-bearing as tolerated  RLE Weight-Bearing Status: weight-bearing as tolerated  CMS Intact: Yes  Musculoskeletal Comment: Pt states ambulating seems worse in LLE than RLE since yesterday.    Tests Performed: IV labs, imaging. Abnormal Results:   Labs Ordered and Resulted from Time of ED Arrival Up to the Time of Departure from the ED   BASIC METABOLIC PANEL - Abnormal; Notable for the following components:       Result Value    Glucose 116 (*)     All other components within normal limits   ROUTINE UA WITH MICROSCOPIC - Abnormal; Notable for the following components:    Bacteria Urine Few (*)     Mucous Urine Present (*)     All other components within " normal limits   CBC WITH PLATELETS DIFFERENTIAL   BLOOD GAS VENOUS AND OXYHGB   TROPONIN I     MR Brain w/o & w Contrast   Final Result   IMPRESSION:   1.  Small probable subacute infarction in the subcortical white matter of the right precentral gyrus. No associated hemorrhage or mass effect.   2.  Moderate presumed chronic small vessel ischemic changes.            Head CT w/o contrast   Final Result   IMPRESSION:   1.  Age-related changes, as above, with no acute intracranial abnormality.         Treatments provided: IVF, meds  Family Comments: Wife at bedside.  OBS brochure/video discussed/provided to patient:  Yes  ED Medications:   Medications   aspirin (ASA) tablet 325 mg (has no administration in time range)   0.9% sodium chloride BOLUS (0 mLs Intravenous Stopped 11/17/20 0102)   HYDROcodone-acetaminophen (NORCO) 5-325 MG per tablet 1 tablet (1 tablet Oral Given 11/16/20 2234)   gadobutrol (GADAVIST) injection 7.5 mL (7.5 mLs Intravenous Given 11/17/20 0023)     Drips infusing:  No  For the majority of the shift, the patient's behavior Green. Interventions performed were N/A.    Sepsis treatment initiated: No     Patient tested for COVID 19 prior to admission: YES    ED Nurse Name/Phone Number: Rosette Sam RN,   1:16 AM      RECEIVING UNIT ED HANDOFF REVIEW    Above ED Nurse Handoff Report was reviewed: Yes  Reviewed by: Cyndy Perez RN on November 17, 2020 at 3:52 AM

## 2020-11-17 NOTE — PROGRESS NOTES
"   11/17/20 1405   Quick Adds   Type of Visit Initial PT Evaluation   Living Environment   People in home spouse   Current Living Arrangements house   Home Accessibility no concerns;other (see comments)  (ramp access)   Living Environment Comments lives in a rambler with spouse   Self-Care   Usual Activity Tolerance moderate   Current Activity Tolerance poor   Equipment Currently Used at Home walker, rolling;wheelchair, manual   Activity/Exercise/Self-Care Comment normally reports ability to dress and toilet self; able to shower; use of walker normally; recent need for wheelchair   Disability/Function   Fall history within last six months yes   Number of times patient has fallen within last six months 2   Change in Functional Status Since Onset of Current Illness/Injury yes  (weakness; impaired gait/transfers)   General Information   Onset of Illness/Injury or Date of Surgery 11/16/20   Referring Physician Floyd Blanton MD   Patient/Family Therapy Goals Statement (PT) not stated   Pertinent History of Current Problem (include personal factors and/or comorbidities that impact the POC) per chart: \"Subacute CVA: Reports 3 days of increasing bilateral leg weakness left greater than right.  Normally he can use a walker, but has been needing to be pushed around in a wheelchair.  No other new focal neurologic deficits.  History of CVA in the past with some chronic left-sided weakness resulting from this and is on 81 mg aspirin and Plavix 75 mg daily at baseline.  Does not take a statin.  Lab work unremarkable.  Blood pressure 140-160 systolic in the ER.  Brain MRI shows a likely small subacute stroke in the right precentral gyrus despite being on dual antiplatelet therapy. 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  As above at baseline can use a walker.  Has chronic left-sided and leg weakness.  Has upper body and shoulder stiffness at baseline.  Also with some chronic dysarthria.  Had a visit with his " neurologist 2 days ago.  He has a baclofen pump in place; see medical record for further information   Existing Precautions/Restrictions fall   General Observations patient in bed; using a purewick type device   Cognition   Orientation Status (Cognition) oriented x 3  (difficulty to assess further; difficulty understanding speec)   Follows Commands (Cognition) 75-90% accuracy   Pain Assessment   Patient Currently in Pain No   Posture    Posture Comments Stiff trunk; tends to be flexed forward and then extends trunk backward at times during mobility   Range of Motion (ROM)   ROM Comment stiffness of LE's and trunk   Strength   Strength Comments functional weakness but able to move against gravity   Bed Mobility   Comment (Bed Mobility) mod A for supine to sit   Transfers   Transfer Safety Comments sit>stand with max A x 2; use of walker in standing   Gait/Stairs (Locomotion)   Comment (Gait/Stairs) gait in room with rolling walker and mod-max A x 2   Balance   Balance Comments impaired; leans backward when backing up to chair; use of walker and assist x 2   Clinical Impression   Criteria for Skilled Therapeutic Intervention yes, treatment indicated   PT Diagnosis (PT) impaired gait/transfers; weakness   Influenced by the following impairments weakness; stiffness; decreased command following; baseline L sided weakness; impaired balance; recent falls   Functional limitations due to impairments impaired independence with functional mobility   Clinical Presentation Evolving/Changing   Clinical Presentation Rationale clinical judgement; level of assist   Clinical Decision Making (Complexity) moderate complexity   Therapy Frequency (PT) Daily   Predicted Duration of Therapy Intervention (days/wks) 5 days   Planned Therapy Interventions (PT) gait training;bed mobility training;balance training;strengthening;transfer training   Anticipated Equipment Needs at Discharge (PT) wheelchair;walker, rolling   Risk & Benefits of  therapy have been explained evaluation/treatment results reviewed;care plan/treatment goals reviewed;risks/benefits reviewed;patient   PT Discharge Planning    PT Discharge Recommendation (DC Rec) Transitional Care Facility   PT Rationale for DC Rec Patient below reported baseline level of function with weakness, impaired balance, and impaired functional mobility; subacute stroke; Would benefit from ongoing skilled PT to address deficits and to assist patient in his goal of eventual return home with spouse   Total Evaluation Time   Total Evaluation Time (Minutes) 15

## 2020-11-17 NOTE — H&P
[facility]  Hospitalist Admission Note  Name: Elier Barber    MRN: [id number]  YOB: 1945    Age: 75 year old  Date of admission: 11/16/2020  Primary care provider: Lida Ray    Chief Complaint: Leg weakness    Assessment and Plan:   Subacute CVA: Reports 3 days of increasing bilateral leg weakness left greater than right.  Normally he can use a walker, but has been needing to be pushed around in a wheelchair.  No other new focal neurologic deficits.  History of CVA in the past with some chronic left-sided weakness resulting from this and is on 81 mg aspirin and Plavix 75 mg daily at baseline.  Does not take a statin.  Lab work unremarkable.  Blood pressure 140-160 systolic in the ER.  Brain MRI shows a likely small subacute stroke in the right precentral gyrus despite being on dual antiplatelet therapy.  Previous TTE with bubble study from 2018 was negative.  CTA 2018 did show some moderate stenosis in the proximal P2 segment of the right PCA.  EKG looks like NSR with possible sinus arrhythmia although there is fair amount of artifact.  -Stroke neurology consult later today  -Received 325 mg aspirin in the ER, continue aspirin 81 mg and Plavix 75 mg daily.  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  As above at baseline can use a walker.  Has chronic left-sided and leg weakness.  Has upper body and shoulder stiffness at baseline.  Also with some chronic dysarthria.  Had a visit with his neurologist 2 days ago.  He has a baclofen pump in place.    HTN: Blood pressure 140-160.  PTA on enalapril 5 mg twice daily.  -As above allowing permissive HTN initially, hold enalapril    History DVT: History of DVT in the right peroneal vein in 2017.  Not on chronic  anticoagulation.    History of prostate cancer: History of prostatectomy.  Continue PTA oxybutynin.    Recent leg infection: Recent left leg infection reportedly from staph aureus.  He was started on 10-day course of minocycline on 8/31 then ongoing symptoms so he was prescribed 30-day course starting 11/9.  He does have wounds on both legs.  The largest area is approximately 2 cm circular wound on the left anterior shin that appears to have some granulation tissue.  No fluctuance.  There is some area of erythema around the wound, however I think this has been pretty stable.  -Continue his minus likely 100 mg twice daily  -Wound nurse consult      DVT Prophylaxis: Pneumatic Compression Devices  Code Status: Full Code discussed with patient and spouse  FEN: Regular diet, no IV fluids  Discharge Dispo: PT/OT/SLP consult  Estimated Disch Date / # of Days until Disch: Admit inpatient for acute CVA with persistent symptoms.  Anticipate 2 night hospitalization.      History of Present Illness:  Elier Barber is a 75 year old male with PMH including CVA with some left-sided weakness, primary lateral sclerosis with chronic bilateral leg weakness, upper arm stiffness, and dysarthria, DVT not on chronic anticoagulation, HTN, and prostate cancer s/p prostatectomy who presents with leg weakness.  For the past 3 days the patient is noted an increase in his chronic bilateral leg weakness left more so than right.  Normally he uses a walker, but he has been needed to be pushed around in a wheelchair by his spouse.  He reports ongoing chronic stiffness in his arms and right shoulder pain.  Has chronic dysarthria without any change in recent symptoms.  Denies any new numbness or tingling.  Denies headache.  Has not any recent fevers, chills, cough, shortness of breath, chest pain, abdominal pain, nausea, vomiting, diarrhea.  He has had a recent left leg reportedly staph aureus infection following removal of actinic keratoses in  clinic.  He was started on 10-day course of oral minus likely not on 8/31 and then due to ongoing symptoms he was placed on a 30-day course of minocycline on 11/9 and continues this antibiotic.    History obtained from patient, medical record, and from Dr. Ceballos in the emergency department.  Blood pressure 146-167 systolic, heart rate 60s, temperature 98.2  F, oxygen 97% on room air.  CBC, BMP are within normal limits.  Troponin is undetectable.  Urinalysis with no pyuria.  EKG shows NSR with possible sinus arrhythmia, although some artifact.  Brain MRI shows a small subacute right precentral gyrus infarct.  He received 325 mg aspirin and 500 mL normal saline.  Also received a dose of Norco.  Plan to obtain MRA of the head neck and consult stroke neurology.  Admit as inpatient for therapy assessment.  May need to board in the ER for now due to lack of bed availability on the medical floors.     Past Medical History reviewed:  Past Medical History:   Diagnosis Date     Basal cell carcinoma nos     sees derm     CVA (cerebral infarction) 6/14    small vessel right int capsule stroke     DVT (deep vein thrombosis) in pregnancy 05/12/2017    right peroneal vein     Essential hypertension, benign      Hearing loss      Hoarseness of voice     assoc with PLS     Lumbar radiculopathy     2017     Primary Lateral Sclerosis 2002    has baclofen pump through Dr. Calvert, [facility], sees Dr. Fink, [facility]     Prostate CA (H) 2008    prostatectomy     Past Surgical History reviewed:  Past Surgical History:   Procedure Laterality Date     ABDOMEN SURGERY  11/12    Hernia     BIOPSY  4/16    Cancerous growth on leg. Removed by MOHs treatment     HERNIA REPAIR  11/12    Repaired     PROSTATE SURGERY       [facility] NONSPECIFIC PROCEDURE  6/08     prostatectomy      [facility] NONSPECIFIC PROCEDURE  11/01    colonoscopy     [facility] NONSPECIFIC PROCEDURE  11/2006    hernia, right side     [facility] NONSPECIFIC PROCEDURE      T + A     Social History  "reviewed:  Social History     Tobacco Use     Smoking status: Former Smoker     Packs/day: 0.30     Years: 6.00     Pack years: 1.80     Types: Cigarettes     Start date: 1970     Quit date: 10/5/1976     Years since quittin.1     Smokeless tobacco: Never Used   Substance Use Topics     Alcohol use: Yes     Frequency: 2-4 times a month     Drinks per session: 1 or 2     Comment: 1 drink/week     Social History     Social History Narrative     Not on file     Family History reviewed:  Family History   Problem Relation Age of Onset     Heart Disease Mother         CHF     Hypertension Mother      C.A.D. Maternal Grandfather      Cerebrovascular Disease Maternal Uncle         45     Cerebrovascular Disease Maternal Uncle      Allergies:  Allergies   Allergen Reactions     No Clinical Screening - See Comments Hives     Starts swelling and hives     Bee Venom      Penicillins Hives     \"HIVES\"     Medications:  Prior to Admission medications    Medication Sig Last Dose Taking? Auth Provider   acetaminophen (TYLENOL) 650 MG CR tablet Take 2 tablets by mouth   Reported, Patient   aspirin 81 MG tablet Take 81 mg by mouth daily   Reported, Patient   baclofen (LIORESAL) in NaCl 0.9% 100 mL intrathecal infusion by Intrathecal route continuous Pump filled by [facility] [phone]  Pump Model: Syncromed  Last fill:  2018  Next fill: 18  [place] Alarm Date: 18  Reservoir Volume: unknown  Conc: 2000 mcg/ml  Flex schedule delivers 266.5 mcg/day  Basal rate:unknown, pt states basal rate increased from 05:00-08:00   Reported, Patient   clopidogrel (PLAVIX) 75 MG tablet    Reported, Patient   enalapril (VASOTEC) 5 MG tablet Take 1 tablet (5 mg) by mouth 2 times daily   Lida Ray MD   EPINEPHrine 0.3 MG/0.3ML injection Inject 0.3 mLs (0.3 mg) into the muscle as needed for anaphylaxis   Lida Ray MD   FLUBLOK QUADRIVALENT 0.5 ML injection    Reported, " Patient   ketoconazole (NIZORAL) 2 % external shampoo SHAMPOO EVERY 2-3 DAYS AS  NEEDED   Lida Ray MD   mupirocin (BACTROBAN) 2 % external ointment    Reported, Patient   oxybutynin ER (DITROPAN-XL) 10 MG 24 hr tablet Take 2 tablets (20 mg) by mouth At Bedtime   Lida Ray MD   pantoprazole (PROTONIX) 40 MG EC tablet Take 1 tablet (40 mg) by mouth daily   Lida Ray MD   polyethylene glycol (MIRALAX/GLYCOLAX) Packet Take 1 packet by mouth every other day    Reported, Patient   potassium chloride ER (K-TAB/KLOR-CON) 10 MEQ CR tablet Take 2 tablets (20 mEq) by mouth daily   Lida Ray MD   STATIN NOT PRESCRIBED, INTENTIONAL, Please choose reason not prescribed, below  Patient not taking: Reported on 10/2/2020   Lida Ray MD     Review of Systems:  A Comprehensive greater than 10 system review of systems was carried out.  Pertinent positives and negatives are noted above.  Otherwise negative.     Physical Exam:  Blood pressure (!) 169/99, pulse 58, temperature 98.2  F (36.8  C), temperature source Oral, resp. rate 16, SpO2 95 %.  Wt Readings from Last 1 Encounters:   02/13/20 70.8 kg (156 lb)     Exam:  Constitutional: Awake, NAD   Eyes: sclera white   HEENT: atraumatic, MMM  Respiratory: no respiratory distress, lungs cta bilaterally, no crackles or wheeze  Cardiovascular: RRR.  No murmur  GI: non-tender, not distended, bowel sounds present  Skin: Bilateral lower extremity wounds.  Largest area on the left anterior shin approximately 2 cm circular lesion with granulation tissue.  Surrounding erythema.  No fluctuance  Musculoskeletal/extremities: atraumatic, no major deformities.  Trace bilateral lower extremity edema  Neurologic: Alert, oriented, significant dysarthria, strength actually 5 out of 5 in upper and lower extremities bilaterally, light touch sensation intact  Psychiatric: calm, cooperative     Lab and imaging data personally reviewed:  Labs:  Recent Labs   Lab 11/16/20  7250   PHV 7.40    PO2V 42   PCO2V 46   HCO3V 28     Recent Labs   Lab 11/16/20 2125   WBC 6.4   HGB 14.9   HCT 45.6   MCV 92        Recent Labs   Lab 11/16/20 2125      POTASSIUM 4.1   CHLORIDE 102   CO2 28   ANIONGAP 5   *   BUN 25   CR 0.66   GFRESTIMATED >90   GFRESTBLACK >90   AMOS 9.0     Recent Labs   Lab 11/16/20 2125   TROPI <0.015     Recent Labs   Lab 11/16/20  2342   COLOR Light Yellow   APPEARANCE Clear   URINEGLC Negative   URINEBILI Negative   URINEKETONE Negative   SG 1.019   UBLD Negative   URINEPH 6.0   PROTEIN Negative   NITRITE Negative   LEUKEST Negative   RBCU <1   WBCU <1       EKG:  NSR, possible sinus arrhythmia, some artifact     Imaging:  Recent Results (from the past 24 hour(s))   Head CT w/o contrast    Narrative    EXAM: CT HEAD W/O CONTRAST  LOCATION: [facility]  DATE/TIME: 11/16/2020 9:50 PM    INDICATION: Neuro deficit(s), subacute. Decreased mobility.  COMPARISON: 10/27/2018 brain MRI. 10/27/2018 head CT/head and neck CTA.  TECHNIQUE: Routine without IV contrast. Multiplanar reformats. Dose reduction techniques were used.    FINDINGS:  INTRACRANIAL CONTENTS: No intracranial hemorrhage, extraaxial collection, or mass effect.  No CT evidence of acute infarct. Moderate to severe burden hypoattenuating chronic small vessel ischemic change. Stable ventricular size in the setting of a   moderate diffuse parenchymal volume loss.     VISUALIZED ORBITS/SINUSES/MASTOIDS: Prior bilateral cataract surgery. Visualized portions of the orbits are otherwise unremarkable. No paranasal sinus mucosal disease. No middle ear or mastoid effusion.    BONES/SOFT TISSUES: No acute abnormality.      Impression    IMPRESSION:  1.  Age-related changes, as above, with no acute intracranial abnormality.   MR Brain w/o & w Contrast    Narrative    EXAM: MR BRAIN W/O and W CONTRAST  LOCATION: [facility]  DATE/TIME: 11/16/2020 11:58 PM    INDICATION: Decreased mobility.  Sella  likely stenosis.  COMPARISON: None.  CONTRAST: 7.5ml. Gadavist injected  TECHNIQUE: Routine multiplanar multisequence head MRI without and with intravenous contrast.    FINDINGS:  INTRACRANIAL CONTENTS: Small focus of elevated DWI signal in the right precentral gyrus subcortical white matter with intermediate ADC signal (series 2.2, image 43). No mass, acute hemorrhage, or extra-axial fluid collections. Patchy and confluent   nonspecific T2/FLAIR hyperintensities within the cerebral white matter most consistent with moderate chronic microvascular ischemic change. Mild to moderate generalized cerebral atrophy. No hydrocephalus. Mild cerebellar atrophy. No pathologic contrast   enhancement.    SELLA: No abnormality accounting for technique.    OSSEOUS STRUCTURES/SOFT TISSUES: Normal marrow signal. The major intracranial vascular flow voids are maintained.     ORBITS: No abnormality accounting for technique.     SINUSES/MASTOIDS: No paranasal sinus mucosal disease. No middle ear or mastoid effusion.       Impression    IMPRESSION:  1.  Small probable subacute infarction in the subcortical white matter of the right precentral gyrus. No associated hemorrhage or mass effect.  2.  Moderate presumed chronic small vessel ischemic changes.             Floyd Blanton MD  Hospitalist  [facility]

## 2020-11-17 NOTE — PLAN OF CARE
A+O, follows directions,moving all extremities.  States just unable to walk.  P.T to see this afternoon.  Appetite good.  Speech difficult to understand, which was affected by previous stroke..  Tele stroke and ECHO done.  Tele SR    Male purwick being used.

## 2020-11-17 NOTE — PLAN OF CARE
Presentation/Diagnosis: Pt presented to the ED due to increasing weakness on the left side. Imaging revealed a Right sided subacute CVA. Increased left-sided weakness. Garbled speech is baseline due to previous stroke history. His wife reported that he will need help being fed, no reported difficulty swallowing. Please update her on his condition throughout the day. Transferred from ED.   History: History of prostate cancer, basal cell carcinoma, HTN, stroke, and DVT.  Labs/Protocols: None at this time, BG was 91 upon admission to [facility].  Vitals: Q4 vitals and neuro checks. BP elevated at 174/85 last reading. Continue to monitor.   Telemetry: Afib, CVR.   Respiratory: LS clear and equal bilaterally. 95% on RA.   Neuro: Aox4. Clear minded, good sense of humor and positive outlook.   GI/: GI: BS active. Device in RLQ to prevent spasms in leg. Noticeable upon inspection. Non-distended abdomen. : condom catheter hooked up to suction on wall.   Skin: 2 sores on L shin, bandages changed and mepilex applied to sores on the shin, muscle exposed but pt reports that he does not feel pain on or around the sores. Sores from recent skin infection. Sore between 2 toes on L foot, intact, covered with bandaid because of size. R calf abrasion, covered with mepliex. Pillows applied under legs, PCD's applied.   LDA's: RPIV, saline locked.   Diet: NPO until speech consult due to garbled speech and history of stroke.   Activity: Bedrest for time being. Received report from ED who said that 3+ assist was needed. PT consult in patient plans. Bed alarms on, fall risk bracelet applied. Baseline is wheelchair/walker at home.   Plan: Continue q 4 neuro and vital signs. Monitor BP and neurological symptoms for worsening condition. Consult with OT, PT, Neurology, speech, and wound nurse. Continue to monitor pt to get medically stable and prepare for discharge when medically appropriate.

## 2020-11-17 NOTE — ED NOTES
covid swab sent. Wife updated on no visitor policy upstairs. States she is the patient's caregiver due to his garbled speech. Call cell phone first.   Wishes to be here when possible.

## 2020-11-17 NOTE — PHARMACY-ADMISSION MEDICATION HISTORY
Admission medication history interview status for this patient is complete. See [facility] admission navigator for allergy information, prior to admission medications and immunization status.     Medication history interview done via telephone during Covid-19 pandemic, indicate source(s): Patient  Medication history resources (including written lists, pill bottles, clinic record): Epic list, care everywhere, chart review, fill history.  Pharmacy: [facility] order; [facility]    Changes made to PTA medication list:  Added: minocycline, bactroban directions.  Deleted: none  Changed: Updated Baclofen pump info, Miralax to prn,     Actions taken by pharmacist (provider contacted, etc):Called [facility] stroke [place] for baclofen pump info.    Additional medication history information:None    Medication reconciliation/reorder completed by provider prior to medication history?  Y   (Y/N)       Prior to Admission medications    Medication Sig Last Dose Taking? Auth Provider   acetaminophen (TYLENOL) 650 MG CR tablet Take 650 mg by mouth 2 times daily  11/16/2020 at Unknown time Yes Reported, Patient   aspirin 81 MG tablet Take 81 mg by mouth daily 11/16/2020 at Unknown time Yes Reported, Patient   baclofen (LIORESAL) intraTHECAL Internal Pump by Intrathecal route continuous prn Pump filled by [facility] [phone]  Pump Model: Syncromed  Last fill:  10/2020  Next fill:   3/17/2021  [place] Alarm Date:   Reservoir Volume: 20 ml  Conc: 2000 mcg/ml  Delivers 234.7 mcg/day  Basal rate:unknown  Battery life: replace in 33 months. continuous Yes Unknown, Entered By History   clopidogrel (PLAVIX) 75 MG tablet Take 75 mg by mouth daily  11/16/2020 at PM Yes Reported, Patient   enalapril (VASOTEC) 5 MG tablet Take 1 tablet (5 mg) by mouth 2 times daily 11/16/2020 at Unknown time Yes Lida Ray MD   EPINEPHrine 0.3 MG/0.3ML injection Inject 0.3 mLs (0.3 mg) into the muscle as  needed for anaphylaxis  Yes Lida Ray MD   ketoconazole (NIZORAL) 2 % external shampoo SHAMPOO EVERY 2-3 DAYS AS  NEEDED Past Month at Unknown time Yes Lida Ray MD   minocycline (MINOCIN) 100 MG capsule Take 100 mg by mouth 2 times daily Staph infection. 11/16/2020 at Unknown time Yes Unknown, Entered By History   mupirocin (BACTROBAN) 2 % external ointment Apply topically 2 times daily Wound left shin. 11/16/2020 at Unknown time Yes Reported, Patient   oxybutynin ER (DITROPAN-XL) 10 MG 24 hr tablet Take 2 tablets (20 mg) by mouth At Bedtime 11/15/2020 at PM Yes Lida Ray MD   pantoprazole (PROTONIX) 40 MG EC tablet Take 1 tablet (40 mg) by mouth daily 11/16/2020 at PM Yes Lida Ray MD   potassium chloride ER (K-TAB/KLOR-CON) 10 MEQ CR tablet Take 2 tablets (20 mEq) by mouth daily 11/16/2020 at AM Yes Lida Ray MD   FLUBLOK QUADRIVALENT 0.5 ML injection    Reported, Patient   polyethylene glycol (MIRALAX/GLYCOLAX) Packet Take 1 packet by mouth daily as needed Every 2-3 days.   Reported, Patient   STATIN NOT PRESCRIBED, INTENTIONAL, Please choose reason not prescribed, below  Patient not taking: Reported on 10/2/2020   Lida Ray MD

## 2020-11-17 NOTE — ED PROVIDER NOTES
History   Chief Complaint  Bilateral leg weakness    The history is provided by the patient.      Elier Barber is a 75 year old male with a history of primary lateral sclerosis, stroke, hypertension, DVT, basal cell carcinoma, and prostate cancer who presents for evaluation of increased leg weakness, left worse than right, for the past 3 days. The wife states that he has generalized weakness in his extremities at baseline due to his primary lateral sclerosis, however, he is typically amble to ambulate with a walker at baseline. In the past 3 days he has been unable to do this and his wife has been pushing him in a wheel chair. He does have a history of two strokes in the past and the wife states that his symptoms have typically come on slowly, similar to his presentation today. He does not have any significant weakness in his arms beyond baseline. He does have some slurred speech, which they state is also due to his primary lateral sclerosis and is not worse than baseline. He also has some right shoulder pain, however this is more chronic. He denies fever, dysuria, diarrhea, bloody stool, nausea, and emesis. Notably, the patient was diagnosed with a staph infection on his left leg in July after having a cancerous lesion on his skin removed with liquid nitrogen. He was treated with a 7-10 day course of antibiotics. Although went he had a recheck more recently they felt the area was still infected and placed on a 30 day course of Minocycline. He lives in a private home in his wife.     Allergies  Bee Venom  Penicillins    Medications  Aspirin 81 mg   Baclofen pump  Plavix  Enalapril   Protonix  Minocycline  Tylenol  Flublok Quadrivalent     Past Medical History  Basal cell carcinoma  Cerebral infarction  DVT  Hypertension  Hearing loss  Hoarseness of voice  Lumbar radiculopathy  Primary lateral sclerosis  Prostate cancer     Past Surgical History  Hernia surgery x2  Biopsy skin   Prostatectomy  Tonsillectomy and  adenoidectomy     Family History  Mother - CHF, hypertension     Social History  The patient was accompanied to the ED by his wife.  Smoking Status: former smoker  Smokeless Tobacco: never  Alcohol Use: yes  Drug Use: no    Review of Systems   Constitutional: Negative for fever.   Gastrointestinal: Negative for blood in stool, diarrhea, nausea and vomiting.   Genitourinary: Negative for dysuria.   Neurological: Positive for weakness.   All other systems reviewed and are negative.      Physical Exam     Patient Vitals for the past 24 hrs:   BP Temp Temp src Pulse Resp SpO2   11/17/20 0145 -- -- -- 58 -- 95 %   11/17/20 0130 (!) 169/99 -- -- 66 -- 96 %   11/17/20 0116 (!) 174/97 -- -- 69 16 95 %   11/16/20 2330 (!) 167/88 -- -- 67 -- 95 %   11/16/20 2315 (!) 162/98 -- -- 69 -- 97 %   11/16/20 2300 (!) 165/90 -- -- 66 -- 97 %   11/16/20 2230 (!) 168/100 -- -- 65 -- --   11/16/20 2215 (!) 185/119 -- -- 77 -- 99 %   11/16/20 2033 (!) 146/117 98.2  F (36.8  C) Oral 66 20 97 %       Physical Exam    General:   Pleasant, age appropriate male.  HEENT:    Oropharynx is moist, without lesions or trismus.  Eyes:    Conjunctiva normal     PERRL     EOMs intact  Neck:    Supple, no meningismus.     CV:     Regular rate and rhythm.      No murmurs, rubs or gallops.        2+ radial pulses bilateral.       Trace lower extremity edema.  PULM:    Clear to auscultation bilateral.       No respiratory distress.      Good air exchange.     No rales or wheezing.     No stridor.  ABD:    Soft, non-tender, non-distended.       No pulsatile masses.       No rebound, guarding or rigidity.  MSK:     No gross deformity to all four extremities.   LYMPH:   No cervical lymphadenopathy.  NEURO:   Alert & O x 3     + Dysarthria (baseline) without aphasia     CN II-XII intact     Finger to nose within normal limits.  No pronator drift.       Strength is 5/5 in upper extremities      4+/5 in RLE; 4-/5 in LLE     Sensation is intact.       No  tremor.  Skin:    Warm, dry and intact.    Psych:    Mood is good and affect is appropriate.      National Institutes of [employer] Stroke Scale  Exam Interval: Baseline   Score    Level of consciousness: (0)   Alert, keenly responsive    LOC questions: (0)   Answers both questions correctly    LOC commands: (0)   Performs both tasks correctly    Best gaze: (0)   Normal    Visual: (0)   No visual loss    Facial palsy: (0)   Normal symmetrical movements    Motor arm (left): (0)   No drift    Motor arm (right): (0)   No drift    Motor leg (left): (0)   No drift    Motor leg (right): (0)   No drift    Limb ataxia: (0)   Absent    Sensory: (0)   Normal- no sensory loss    Best language: (0)   Normal- no aphasia    Dysarthria: (1)   Mild to moderate dysarthria    Extinction and inattention: (0)   No abnormality        Total Score:  1       Emergency Department Course   EKG  Indication: generalized weakness  Time: 2258  Rate 70 bpm. SD interval 194. QRS duration 84. QT/QTc 414/447. P-R-T axes 75 30 8.   Normal sinus rhythm with sinus arrythmia  Normal ECG   No significant changes compared to prior, dated 10/27/2018.  Read time: 2309  Read by Mario Alberto Rogers MD    Imaging:  Radiology findings were communicated with the patient and wife who voiced understanding of the findings.    Head CT w/o contrast:  IMPRESSION:  1.  Age-related changes, as above, with no acute intracranial abnormality.    MR brain w/o and w contrast:  IMPRESSION:   1.  Small probable subacute infarction in the subcortical white matter of the right precentral gyrus. No associated hemorrhage or mass effect.   2.  Moderate presumed chronic small vessel ischemic changes.     Readings per Radiology    Laboratory:  Laboratory findings were communicated with the patient and wife who voiced understanding of the findings.    UA with Microscopic: bacteria few (A), mucous present (A) o/w WNL    BGV: AWNL    CBC: AWNL (WBC 6.4, HGB 14.9, )  BMP: glucose  116 (H) o/w WNL (Creatinine 0.66)  Troponin (Collected ): <0.015    Asymptomatic COVID-19 virus by PCR: pending    Interventions:    mL IV Bolus   [place] 5-325 mg PO     Emergency Department Course:  Past medical records, nursing notes, and vitals reviewed.    The patient was sent for radiographs while in the emergency department, results above.     2212 I physically examined the patient as documented above.    EKG obtained in the ED, see results above.     IV was inserted and blood was drawn for laboratory testing, results above.    The patient provided a urine sample here in the emergency department. This was sent for laboratory testing, findings above.    The patient was sent for radiographs while in the emergency department, results above.     0101 I rechecked the patient and discussed the findings of their workup thus far.    0153 I consulted with Dr. Blanton of hospitalist service.     A Nasopharyngeal swab was obtained here in the ED.     Findings and plan explained to the Patient who consents to admission. Discussed the patient with Dr. Blanton, who will admit the patient to a [facility] bed for further monitoring, evaluation, and treatment.    I personally reviewed the laboratory and imaging results with the Patient and spouse and answered all related questions prior to admission.     Impression & Plan     Medical Decision Makin-year-old male with peripheral lateral sclerosis and prior stroke with chronic left-sided weakness presented to the ED with a 1 to 2-day history of increased lower extremity weakness with mild asymmetry to the left lower extremity.  It was uncertain whether this represented an acute intracranial event or not.  Laboratory studies did not reveal any source of his weakness such as acute anemia, electrolyte disturbance, intravascular volume depletion or overt infection.  MRI of the brain undertaken revealing a likely subacute stroke to the right precentral gyrus  which is consistent with the patient's findings.  Patient obviously not a TPA or interventional candidate given the duration of symptoms.  Patient is already on 75 mg of Plavix and 81 mg of baby aspirin.  Full dose aspirin provided.  Patient will be transferred to a cardiac telemetry bed for further management.  MRA of head and neck ordered to further assist with decisions of care during admission.    Diagnosis:    ICD-10-CM    1. Cerebrovascular accident (CVA), unspecified mechanism (H)  I63.9      Disposition:  Admitted to [facility] bed under care of Dr. Blanton.    Scribe Disclosure:  Mariah HENSLEY, am serving as a scribe at 10:11 PM on 11/16/2020 to document services personally performed by Mario Alberto Rogers MD based on my observations and the provider's statements to me.      Mario Alberto Rogers MD  11/17/20 0205

## 2020-11-17 NOTE — PLAN OF CARE
OT- Attempted evaluation. Pt reporting fatigue after working with IP PT. Pt requesting therapist return tomorrow.

## 2020-11-17 NOTE — CONSULTS
"[facility] Nurse Inpatient Wound Assessment   Reason for consultation: Evaluate and treat  BLE wounds    Assessment  BLE wounds due to Venous Ulcer  Status: initial assessment of BLE ulcers; wound base non granular moist yellow adhered slough. Pt reports that he had \"scabs that recently fell off\". Multiple dry kerostosis noted along BLE.     Treatment Plan  BLE wounds: Odd days   1. Wash wounds with wound spray and pat dry  2. Apply grape amount of Iodosorb gel to cover wound  3. Cover with Mepilex dressing and date  4. Elevate BLE on pillows to float heels and ensure feet are not touching foot board to prevent pressure injury     Orders Written  Recommended provider order: None, at this time  WO Nurse follow-up plan:weekly  Nursing to notify the Provider(s) and re-consult the [facility] Nurse if wound(s) deteriorates or new skin concern.    Patient History  According to provider note(s):  75 year old male with PMH including CVA with some left-sided weakness, primary lateral sclerosis with chronic bilateral leg weakness, upper arm stiffness, and dysarthria, DVT not on chronic anticoagulation, HTN, and prostate cancer s/p prostatectomy who presents with leg weakness.  For the past 3 days the patient is noted an increase in his chronic bilateral leg weakness left more so than right.  Normally he uses a walker, but he has been needed to be pushed around in a wheelchair by his spouse.  He reports ongoing chronic stiffness in his arms and right shoulder pain.  Has chronic dysarthria without any change in recent symptoms.  Denies any new numbness or tingling.  Denies headache.  Has not any recent fevers, chills, cough, shortness of breath, chest pain, abdominal pain, nausea, vomiting, diarrhea.  He has had a recent left leg reportedly staph aureus infection following removal of actinic keratoses in clinic.  He was started on 10-day course of oral minus likely not on 8/31 and then due to ongoing symptoms he was placed on a 30-day " course of minocycline on 11/9 and continues this antibiotic.    Objective Data  Containment of urine/stool: External catheter    Active Diet Order  Orders Placed This Encounter      Regular Diet Adult      Output:   No intake/output data recorded.    Risk Assessment:   Sensory Perception: 3-->slightly limited  Moisture: 3-->occasionally moist  Activity: 2-->chairfast(AT HOME)  Mobility: 2-->very limited  Nutrition: 3-->adequate  Friction and Shear: 2-->potential problem  Yusef Score: 15                          Labs:   Recent Labs   Lab 11/17/20  0716 11/16/20  2125   HGB  --  14.9   WBC  --  6.4   A1C 5.8*  --        Physical Exam  Areas of skin assessed: focused BLE    Wound Location:  BLE  Date of last photo unable to upload  Wound History: see above, chronic ulcerations    LLE: Superior anterior LE: 2x1x0.1cm 100% yellow; Distal Anterior LE: 1x1x0.1cm 100% yellow moist slough  RLE: posterior LE:  0.5 x 0.5 x 0.1cm mixed non viable yellow slough marbled with pink tissue       Palpation of the wound bed: normal      Drainage: scant     Description of drainage: serosanguinous and tan      Tunneling N/A     Undermining N/A  Periwound skin: intact, dry/scaly and small intact bruised areas      Color: normal and consistent with surrounding tissue and pale      Temperature: normal   Odor: none  Pain: absent and denies ,     Interventions  Visual inspection and assessment completed and discussed with pt, and RN  Wound Care Rationale Protect periwound skin, Promote moist wound healing without tissue dehydration  and Provide selective debridement (autolysis) of nonviable tissue   Wound Care: done per plan of care  Supplies: ordered: Iodosorb, floor stock and discussed with RN  Current off-loading measures: Foam padding, Pillows under calves and Pillows  Current support surface: Standard  Atmos Air mattress  Education provided to: plan of care and wound progress  Discussed plan of care with Patient and Nurse    Lida  VIVEK MunguiaN, RN, CWON

## 2020-11-17 NOTE — CONSULTS
"  [facility]    Stroke Consult Note    Reason for Consult:  \"CVA\"    Chief Complaint: Mobility Issues       HPI  Elier Barber is a 75 year old male with past medical history significant for prior strokes (R frontal, R internal capsule), HTN, prostate cancer, and primary lateral sclerosis (chronic L side weakness) who presented to the [facility] ED for evaluation of left sided weakness greater than baseline. He reports that over the past three days his left side slowly became weaker than usual. He typically ambulates with a walker, but was using a wheelchair due to this weakness. He denies associated speech difficulty, vision changes, or headache.    MRI brain revealed a subacute infarct in the right precentral gyrus. Today he feels as though his left leg weakness is somewhat improved.     Stroke Evaluation summarized:  MRI/Head CT: MRI brain with subacute infarct in the right precentral gyrus  Intracranial Vascular Imaging: MRA head with multifocal areas of irregularity in the PCAs, consistent with intracranial atherosclerosis  Cervical Carotid and Vertebral Artery Vascular Imaging: MRA neck unremarkable  Echocardiogram: EF 55-60%, normal LA  EKG/Telemetry: Sinus  LDL: 91  A1c: 5.8  Troponin: 0   Other testing: Not Applicable    Impression  Ischemic Stroke due to embolic stroke of undetermined source (ESUS)     Recommendations  - Awaiting results of TTE, will follow but do not anticipate they will change managment  - On DAPT with ASA 81 mg + Plavix 75 mg daily at home. Continue these medications and will check P2Y12 and ASA reactivity to ensure he is responding appropriately  - LDL 91, he reports myalgias with previous statin use. Goal LDL 40-70, can consider Zetia vs PCSK9 inhibitor  - A1c within goal <7.0  - No indication for permissive HTN given symptoms x3 days. Goal BP <140/90 with tighter control associated with decreased overall CV risk, if tolerated  - Therapies, as needed  - PLC  - " "Discharge with 30 day cardiac event monitor to evaluate for atrial fibrillation    ADDENDUM:   - P2Y12 and ASA reactivity both demonstrate therapeutic response. Recommend continuation of DAPT with ASA 81 mg + Plavix 75 mg daily    Patient Follow-up    - in the next 1-2 week(s) with PCP  - in 6 weeks with local neurologist (Patient has established neurologist)    Thank you for this consult. No further stroke evaluation is indicated, we will sign off. Please contact us with additional questions.     CITLALY Gracia, CNP  Neurology  To page me or covering stroke neurology team member, click here: AMCOM   Choose \"On Call\" tab at top, then search dropdown box for \"Neurology Adult\", select location, press Enter, then look for stroke/neuro ICU/telestroke.    _____________________________________________________    Past Medical History   Past Medical History:   Diagnosis Date     Basal cell carcinoma nos     sees derm     CVA (cerebral infarction) 6/14    small vessel right int capsule stroke     DVT (deep vein thrombosis) in pregnancy 05/12/2017    right peroneal vein     Essential hypertension, benign      Hearing loss      Hoarseness of voice     assoc with PLS     Lumbar radiculopathy     2017     Primary Lateral Sclerosis 2002    has baclofen pump through Dr. Calvert, [place], [facility]     Prostate CA (H) 2008    prostatectomy     Past Surgical History   Past Surgical History:   Procedure Laterality Date     ABDOMEN SURGERY  11/12    Hernia     BIOPSY  4/16    Cancerous growth on leg. Removed by MOHs treatment     HERNIA REPAIR  11/12    Repaired     PROSTATE SURGERY       [facility] NONSPECIFIC PROCEDURE  6/08     prostatectomy      [facility] NONSPECIFIC PROCEDURE  11/01    colonoscopy     [facility] NONSPECIFIC PROCEDURE  11/2006    hernia, right side     [facility] NONSPECIFIC PROCEDURE      T + A     Medications   Home Meds  Prior to Admission medications    Medication Sig Start Date End Date Taking? Authorizing Provider "   baclofen (LIORESAL) intraTHECAL Internal Pump by Intrathecal route continuous prn   Yes Unknown, Entered By History   acetaminophen (TYLENOL) 650 MG CR tablet Take 2 tablets by mouth    Reported, Patient   aspirin 81 MG tablet Take 81 mg by mouth daily    Reported, Patient   baclofen (LIORESAL) in NaCl 0.9% 100 mL intrathecal infusion by Intrathecal route continuous Pump filled by [facility] [place] [phone]  Pump Model: Syncromed  Last fill:  1/30/2018  Next fill: 5/29/18  [place] Alarm Date: 5/29/18  Reservoir Volume: unknown  Conc: 2000 mcg/ml  Flex schedule delivers 266.5 mcg/day  Basal rate:unknown, pt states basal rate increased from 05:00-08:00    Reported, Patient   clopidogrel (PLAVIX) 75 MG tablet  1/27/20   Reported, Patient   enalapril (VASOTEC) 5 MG tablet Take 1 tablet (5 mg) by mouth 2 times daily 9/8/20   Lida Ray MD   EPINEPHrine 0.3 MG/0.3ML injection Inject 0.3 mLs (0.3 mg) into the muscle as needed for anaphylaxis 5/25/17   Lida Ray MD   FLUBLOK QUADRIVALENT 0.5 ML injection  10/1/20   Reported, Patient   ketoconazole (NIZORAL) 2 % external shampoo SHAMPOO EVERY 2-3 DAYS AS  NEEDED 12/17/19   Lida Ray MD   mupirocin (BACTROBAN) 2 % external ointment  8/31/20   Reported, Patient   oxybutynin ER (DITROPAN-XL) 10 MG 24 hr tablet Take 2 tablets (20 mg) by mouth At Bedtime 9/8/20   Lida Ray MD   pantoprazole (PROTONIX) 40 MG EC tablet Take 1 tablet (40 mg) by mouth daily 9/8/20   Lida Ray MD   polyethylene glycol (MIRALAX/GLYCOLAX) Packet Take 1 packet by mouth every other day     Reported, Patient   potassium chloride ER (K-TAB/KLOR-CON) 10 MEQ CR tablet Take 2 tablets (20 mEq) by mouth daily 9/8/20   Lida Ray MD   STATIN NOT PRESCRIBED, INTENTIONAL, Please choose reason not prescribed, below  Patient not taking: Reported on 10/2/2020 7/15/18   Lida Ray MD       Scheduled Meds    [START ON 11/18/2020] aspirin  81 mg  "Oral Daily     clopidogrel  75 mg Oral Daily     minocycline  100 mg Oral Q12H [facility]     oxybutynin ER  20 mg Oral At Bedtime     pantoprazole  40 mg Oral Daily       Infusion Meds      PRN Meds  enalaprilat    Allergies   Allergies   Allergen Reactions     No Clinical Screening - See Comments Hives     Starts swelling and hives     Bee Venom      Penicillins Hives     \"HIVES\"     Family History   Family History   Problem Relation Age of Onset     Heart Disease Mother         CHF     Hypertension Mother      C.A.D. Maternal Grandfather      Cerebrovascular Disease Maternal Uncle         45     Cerebrovascular Disease Maternal Uncle      Social History   Social History     Tobacco Use     Smoking status: Former Smoker     Packs/day: 0.30     Years: 6.00     Pack years: 1.80     Types: Cigarettes     Start date: 1970     Quit date: 10/5/1976     Years since quittin.1     Smokeless tobacco: Never Used   Substance Use Topics     Alcohol use: Yes     Frequency: 2-4 times a month     Drinks per session: 1 or 2     Comment: 1 drink/week     Drug use: No       Review of Systems   The 10 point Review of Systems is negative other than noted in the HPI or here.        PHYSICAL EXAMINATION   Temp:  [96.1  F (35.6  C)-98.2  F (36.8  C)] 96.1  F (35.6  C)  Pulse:  [53-77] 63  Resp:  [11-20] 12  BP: (146-185)/() 152/85  SpO2:  [94 %-99 %] 96 %    Neurologic  Mental Status:  alert, oriented x 3, follows commands, naming and repetition normal, speech fluent but with moderate dysarthria (baseline per patient)  Cranial Nerves:  EOMI with normal smooth pursuit, facial movements symmetric, hearing not formally tested but intact to conversation, tongue protrusion midline  Motor:  no abnormal movements, able to move all limbs antigravity spontaneously with no signs of hemiparesis observed, LUE pronation without drift  Reflexes:  unable to test (telestroke)  Sensory:  light touch sensation intact and symmetric throughout upper " and lower extremities (assessed by nurse), no extinction on double simultaneous stimulation (assessed by nurse)  Coordination:  no ataxia out of proportion to weakness  Station/Gait:  unable to test due to telestroke    Dysphagia Screen  Per Nursing    Stroke Scales    NIHSS  Interval     Interval Comments     1a. Level of Consciousness 0-->Alert, keenly responsive   1b. LOC Questions 0-->Answers both questions correctly   1c. LOC Commands 0-->Performs both tasks correctly   2.   Best Gaze 0-->Normal   3.   Visual 0-->No visual loss   4.   Facial Palsy 0-->Normal symmetrical movements   5a. Motor Arm, Left 0-->No drift, limb holds 90 (or 45) degrees for full 10 secs   5b. Motor Arm, Right 0-->No drift, limb holds 90 (or 45) degrees for full 10 secs   6a. Motor Leg, Left 0-->No drift, leg holds 30 degree position for full 5 secs   6b. Motor Leg, right 0-->No drift, leg holds 30 degree position for full 5 secs   7.   Limb Ataxia 0-->Absent   8.   Sensory 0-->Normal, no sensory loss   9.   Best Language 0-->No aphasia, normal   10. Dysarthria 1-->Mild-to-moderate dysarthria, patient slurs at least some words and, at worst, can be understood with some difficulty(Baseline per patient)   11. Extinction and Inattention  0-->No abnormality   Total 1 (11/17/20 1214)        PHQ-2 Depression Screening - deferred    Imaging  I personally reviewed all imaging; relevant findings per HPI.    Labs Data   CBC  Recent Labs   Lab 11/16/20  2125   WBC 6.4   RBC 4.95   HGB 14.9   HCT 45.6        Basic Metabolic Panel   Recent Labs   Lab 11/16/20  2125      POTASSIUM 4.1   CHLORIDE 102   CO2 28   BUN 25   CR 0.66   *   AMOS 9.0     Liver Panel  No results for input(s): PROTTOTAL, ALBUMIN, BILITOTAL, ALKPHOS, AST, ALT, BILIDIRECT in the last 168 hours.  INR    Recent Labs   Lab Test 10/27/18  1734 05/24/18  1622 02/21/13  0700   INR 1.07 1.23* 0.95      Lipid Profile    Recent Labs   Lab Test 11/17/20  0716  08/11/14  1111   CHOL 152 156   HDL 53 84   LDL 91 62   TRIG 41 50   CHOLHDLRATIO  --  1.9     A1C    Recent Labs   Lab Test 11/17/20  0716   A1C 5.8*     Troponin I    Recent Labs   Lab 11/16/20  2125   TROPI <0.015          Stroke Code / Stroke Consult Data Data Telestroke Service Details  (for non-emergent stroke consult with tele)  Video start time 11/17/20   1130   Video end time 11/17/20   1148   Type of service telemedicine diagnostic assessment of acute neurological changes   Reason telemedicine is appropriate patient requires assessment with a specialist for diagnosis and treatment of neurological symptoms   Mode of transmission secure interactive audio and video communication per [place]   Originating site (patient location) [facility]    Distant site (provider location) Provider remote site       I have personally spent a total of 30 minutes providing care and consulting with this patient's medical providers today, with more than 50% of this time spent in consultation, coordination of care, and discussion with the patient and/or family regarding diagnostic results, prognosis, symptom management, risks and benefits of management options, and development of plan of care.

## 2020-11-17 NOTE — ED NOTES
Wife has concerns leaving patient. Reassurance provided about care on the floor. Patient has garbled speech. Will need patience to listen to him. Will need his meat cut up.  Uses a walker at home. Legs will stiffen.  Will need physical therapy.     Cyndy GARZA on floor updated to wife's requests.

## 2020-11-17 NOTE — PROGRESS NOTES
"SPIRITUAL HEALTH SERVICES Progress Note  [facility]    Spiritual Health Services visit per routine admission hospital  request.  Pt alert and pleasant. Shared this is the third stroke, albeit a \"mild one this time\".  He was just admitted from ED and is resting comfortably. Pt states he lives in own home with good support from spouse, Kelli.      Nader is optimistic about possible discharge to home where he receives home cares.  Time of prayer welcomed.    Oriented to Spiritual Health Services for support during hospital stay.    Plan: Spiritual Health Services remains available for additional emotional/spiritual support.    Kris Richard MA  Staff   Pager: [phone]  Phone: [phone]         "

## 2020-11-17 NOTE — PROGRESS NOTES
"   11/17/20 0931   General Information   Onset of Illness/Injury or Date of Surgery 11/16/20   Referring Physician Floyd Blanton MD   Patient/Family Therapy Goal Statement (SLP) water   Pertinent History of Current Problem \"Subacute CVA: Reports 3 days of increasing bilateral leg weakness left greater than right.  Normally he can use a walker, but has been needing to be pushed around in a wheelchair.  No other new focal neurologic deficits.  History of CVA in the past with some chronic left-sided weakness resulting from this and is on 81 mg aspirin and Plavix 75 mg daily at baseline.  Does not take a statin.  Lab work unremarkable.  Blood pressure 140-160 systolic in the ER.  Brain MRI shows a likely small subacute stroke in the right precentral gyrus despite being on dual antiplatelet therapy. 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  As above at baseline can use a walker.  Has chronic left-sided and leg weakness.  Has upper body and shoulder stiffness at baseline.  Also with some chronic dysarthria.  Had a visit with his neurologist 2 days ago.  He has a baclofen pump in place.\" Pt reported that he is currently being followed by SLP for Big and Loud Program as well as AAC device. Baseline mild dysphagia that pt reported he manages well with small bites, slow rate, and alternating solids and liquids.   Type of Evaluation   Type of Evaluation Swallow Evaluation   Oral Motor   Oral Musculature anomalies present   Vocal Quality/Secretion Management (Oral Motor)   Vocal Quality (Oral Motor) WNL   General Swallowing Observations   Current Diet/Method of Nutritional Intake (General Swallowing Observations, NIS) NPO   Respiratory Support (General Swallowing Observations) none   Swallowing Recommendations   Diet Consistency Recommendations regular diet;thin liquids   Supervision Level for Intake distant supervision needed   Mode of Delivery Recommendations bolus size, small;slow rate of intake "   Swallowing Maneuver Recommendations alternate food and liquid intake   Monitoring/Assistance Required (Eating/Swallowing) monitor for cough or change in vocal quality with intake;stop eating activities when fatigue is present   Recommended Feeding/Eating Techniques (Swallow Eval) maintain upright sitting position for eating;maintain upright posture during/after eating for 30 minutes  (meats chopped)   Medication Administration Recommendations, Swallowing (SLP) whole per pt preference   Comment, Swallowing Recommendations SLP: Pt appears to be at baseline, mild dypshagia with pt managing symptoms well by using small bites, slow rate, and alternating solids and liquids. Dysarthria in conversation that pt reported is baseline and is currently being followed by SLP for Big and Loud Program as well as AAC device. Thin liquids, puree solids and regular solids trialed. Noted cough x1 prior to PO intake and cough x1 during the evaluation. No additional s/sx of aspiration and pt denied globus sensation or any aspiration symptoms. Pt verbalized agreement that he is at baseline and would like to continue a regular diet/thin liquids with meats chopped up and independent use of very small bites, slow rate, and sips of liquids between bites. No further IP SLP services at this time.    General Therapy Interventions   Planned Therapy Interventions Dysphagia Treatment   Dysphagia treatment Instruction of safe swallow strategies   SLP Therapy Assessment/Plan   Criteria for Skilled Therapeutic Interventions Met (SLP Eval) yes   SLP Diagnosis mild dysphagia   Rehab Potential (SLP Eval) good, to achieve stated therapy goals   Therapy Frequency (SLP Eval) one time eval and treatment only   Predicted Duration of Therapy Intervention (SLP Eval) 1 session   Therapy Plan Review/Discharge Plan (SLP)   Therapy Plan Review (SLP) evaluation/treatment results reviewed;care plan/treatment goals reviewed;risks/benefits reviewed;patient   SLP  Discharge Planning    SLP Discharge Recommendation (DC Rec) home with outpatient speech therapy   SLP Rationale for DC Rec Continue OP SLP program; no further IP SLP needs at this time   SLP Brief overview of current status  Pt verbalized agreement that he is at baseline and would like to continue a regular diet/thin liquids with meats chopped up and independent use of very small bites, slow rate, and sips of liquids between bites    Total Evaluation Time   Total Evaluation Time (Minutes) 15

## 2020-11-17 NOTE — PROGRESS NOTES
[facility]  Patient is currently open to home care services with [facility]. The patient is currently receiving PT OT SLP services.  [facility]  and team have been notified of patient admission.  [facility] liaison will continue to follow patient during stay.  If appropriate provide orders to resume home care at time of discharge.

## 2020-11-17 NOTE — PROGRESS NOTES
[facility]    Hospitalist Progress Note  Name: Elier Barber    MRN: [id number]  Provider: Melani Pham MD  Date of Service: 11/17/2020    Assessment & Plan   Summary of Stay: Elier Barber is a 75 year old male who was admitted on 11/16/2020 for subacute CVA presented with 3 days of increasing bilateral leg weakness left more than right.  Patient's past medical history significant for prior CVA with chronic left-sided weakness, primary lateral sclerosis, essential hypertension.    Subacute CVA  -Prior to admission patient was on aspirin and Plavix 75 daily  -Not on statins  -Previous TTE in 2018 was negative for bubble study  -CTA in 2018 did show moderate stenosis in proximal P2 segment of right PCA  -On admission MRI showed moderate small vessel ischemic disease and possible subacute stroke  -MRA of neck did not show any significant stenosis  -Stroke neurology is consulted  -Patient received 325 mg of aspirin in the emergency room  -Patient will be continued on daily aspirin 81 mg and Plavix 75 mg daily  -Resumed Protonix 40 mg daily  -Lipid panel shows HDL of 53 and LDL of 91  -Hemoglobin A1c is 5.8 (prediabetes level)  -We will continue to monitor on telemetry  -Permissive hypertension for now enalapril was held on admission (defer to stroke neurology) ?  It is more than 3 days could likely resume blood pressure meds  -PT OT and speech consults    Primary lateral sclerosis  -Patient is followed by neurology  -Uses walker at baseline  -Chronic upper body stiffness, chronic dysarthria    Hypertension  -Systolic blood pressure between 1 40-1 60  -Prior to admission enalapril 5 mg twice daily  -Allowing permissive hypertension for now  -Enalapril held on admission    History of DVT  -Right peroneal vein DVT in 2017 not on chronic anticoagulation    History of prostate cancer  -Status post prostatectomy  -On prior to admission oxybutynin    Recent leg infection  -Per report on  minocycline 100  mg twice daily prior to admission we will continue  -Wound consult           DVT Prophylaxis: Dual therapy for stroke on Plavix.  Code Status: Prior    Disposition: Expected discharge in 1 to 2 days    Interval History   Assumed care reviewed chart, patient feels trouble walking however is able to lift and move legs and upper extremity.  His dysarthria is at baseline.  Denies any chest pain or shortness of breath.  Review of all other symptoms are negative.    -Data reviewed today: I reviewed all new labs and imaging reports over the last 24 hours. I personally reviewed no images or EKG's today.    Physical Exam   Temp: 96.1  F (35.6  C) Temp src: Axillary BP: (!) 152/85 Pulse: 63   Resp: 12 SpO2: 96 % O2 Device: None (Room air)    There were no vitals filed for this visit.  Vital Signs with Ranges  Temp:  [96.1  F (35.6  C)-98.2  F (36.8  C)] 96.1  F (35.6  C)  Pulse:  [53-77] 63  Resp:  [11-20] 12  BP: (146-185)/() 152/85  SpO2:  [94 %-99 %] 96 %  No intake/output data recorded.      Constitutional: Awake, NAD   Eyes: sclera white   HEENT: atraumatic, MMM  Respiratory: no respiratory distress, lungs cta bilaterally, no crackles or wheeze  Cardiovascular: RRR.  No murmur  GI: non-tender, not distended, bowel sounds present  Skin: Bilateral lower extremity wounds.  Largest area on the left anterior shin approximately 2 cm circular lesion with granulation tissue.  Surrounding erythema.  No fluctuance  Musculoskeletal/extremities: atraumatic, no major deformities.  Trace bilateral lower extremity edema  Neurologic: Alert, oriented, significant dysarthria, strength actually 5 out of 5 in upper and lower extremities bilaterally, light touch sensation intact  Psychiatric: calm, cooperative     Medications       [START ON 11/18/2020] aspirin  81 mg Oral Daily     clopidogrel  75 mg Oral Daily     minocycline  100 mg Oral Q12H ANA     oxybutynin ER  20 mg Oral At Bedtime     pantoprazole  40 mg Oral Daily     Data      Recent Labs   Lab 11/16/20 2125   WBC 6.4   HGB 14.9   HCT 45.6   MCV 92        Recent Labs   Lab 11/16/20 2125      POTASSIUM 4.1   CHLORIDE 102   CO2 28   ANIONGAP 5   *   BUN 25   CR 0.66   GFRESTIMATED >90   GFRESTBLACK >90   AMOS 9.0     No results for input(s): CULT in the last 168 hours.  No results for input(s): NTBNPI, NTBNP in the last 168 hours.  No results for input(s): CKT in the last 168 hours.    Invalid input(s): CK, CK TOTAL  No results for input(s): AST, ALT, GGT, ALKPHOS, BILITOTAL, BILICONJ, BILIDIRECT, GURINDER in the last 168 hours.    Invalid input(s): BILIRUBININDIRECT  No results for input(s): INR in the last 168 hours.  No results for input(s): LACT in the last 168 hours.  No results for input(s): TSH in the last 168 hours.  Recent Labs   Lab 11/16/20 2125   TROPI <0.015     Recent Labs   Lab 11/16/20  2342   COLOR Light Yellow   APPEARANCE Clear   URINEGLC Negative   URINEBILI Negative   URINEKETONE Negative   SG 1.019   UBLD Negative   URINEPH 6.0   PROTEIN Negative   NITRITE Negative   LEUKEST Negative   RBCU <1   WBCU <1       Recent Results (from the past 24 hour(s))   Head CT w/o contrast    Narrative    EXAM: CT HEAD W/O CONTRAST  LOCATION: [facility]  DATE/TIME: 11/16/2020 9:50 PM    INDICATION: Neuro deficit(s), subacute. Decreased mobility.  COMPARISON: 10/27/2018 brain MRI. 10/27/2018 head CT/head and neck CTA.  TECHNIQUE: Routine without IV contrast. Multiplanar reformats. Dose reduction techniques were used.    FINDINGS:  INTRACRANIAL CONTENTS: No intracranial hemorrhage, extraaxial collection, or mass effect.  No CT evidence of acute infarct. Moderate to severe burden hypoattenuating chronic small vessel ischemic change. Stable ventricular size in the setting of a   moderate diffuse parenchymal volume loss.     VISUALIZED ORBITS/SINUSES/MASTOIDS: Prior bilateral cataract surgery. Visualized portions of the orbits are otherwise unremarkable.  No paranasal sinus mucosal disease. No middle ear or mastoid effusion.    BONES/SOFT TISSUES: No acute abnormality.      Impression    IMPRESSION:  1.  Age-related changes, as above, with no acute intracranial abnormality.   MR Brain w/o & w Contrast    Narrative    EXAM: MR BRAIN W/O and W CONTRAST  LOCATION: [facility]  DATE/TIME: 11/16/2020 11:58 PM    INDICATION: Decreased mobility.  Sella likely stenosis.  COMPARISON: None.  CONTRAST: 7.5ml. Gadavist injected  TECHNIQUE: Routine multiplanar multisequence head MRI without and with intravenous contrast.    FINDINGS:  INTRACRANIAL CONTENTS: Small focus of elevated DWI signal in the right precentral gyrus subcortical white matter with intermediate ADC signal (series 2.2, image 43). No mass, acute hemorrhage, or extra-axial fluid collections. Patchy and confluent   nonspecific T2/FLAIR hyperintensities within the cerebral white matter most consistent with moderate chronic microvascular ischemic change. Mild to moderate generalized cerebral atrophy. No hydrocephalus. Mild cerebellar atrophy. No pathologic contrast   enhancement.    SELLA: No abnormality accounting for technique.    OSSEOUS STRUCTURES/SOFT TISSUES: Normal marrow signal. The major intracranial vascular flow voids are maintained.     ORBITS: No abnormality accounting for technique.     SINUSES/MASTOIDS: No paranasal sinus mucosal disease. No middle ear or mastoid effusion.       Impression    IMPRESSION:  1.  Small probable subacute infarction in the subcortical white matter of the right precentral gyrus. No associated hemorrhage or mass effect.  2.  Moderate presumed chronic small vessel ischemic changes.       MRA Brain ([facility]) wo Contrast    Narrative    EXAM: MRA BRAIN ([place] [facility]) WO CONTRAST, MRA NECK (CAROTIDS) WO and W CONTRAST  LOCATION: [facility]  DATE/TIME: 11/17/2020 4:02 AM    INDICATION: Stroke.  COMPARISON: MRI brain 11/16/2020.  CONTRAST: 7.5  mL Gadavist injected.  TECHNIQUE:   HEAD MRA: 3D time-of-flight head MRA without intravenous contrast.  NECK MRA: Neck MRA without and with IV contrast. Stenosis measurements made according to NASCET criteria unless otherwise specified.    FINDINGS:    HEAD MRA:   ANTERIOR CIRCULATION: No stenosis/occlusion, aneurysm, or high flow vascular malformation. Fetal origin of the right posterior cerebral artery from the anterior circulation.    POSTERIOR CIRCULATION: Again demonstrated are several focal areas of irregularity and narrowing in both posterior cerebral arteries most pronounced at the right P2 segment. No definite evidence of flow limitation. Balanced vertebral arteries supply a   normal basilar artery.     NECK MRA:   RIGHT CAROTID: No measurable stenosis or dissection.    LEFT CAROTID: No measurable stenosis or dissection.    VERTEBRAL ARTERIES: No focal stenosis or dissection. Balanced vertebral arteries.    AORTIC ARCH: Nonflow limiting stenoses in the proximal right common carotid artery and left common carotid artery and left subclavian artery.      Impression    IMPRESSION:    HEAD MRA:   1.  No major vessel occlusion or flow-limiting stenosis.  2.  Multifocal areas of irregularity and narrowing primarily involving the posterior cerebral arteries consistent with intracranial atherosclerosis.    NECK MRA:  1.  No measurable stenosis in either proximal internal carotid artery.  2.  Vertebral arteries are patent through the neck and into the head.  3.  There are nonflow limiting stenoses in the proximal bilateral common carotid arteries and the proximal left subclavian artery.             MRA Neck (Carotids) wo & w Contrast    Narrative    EXAM: MRA BRAIN ([facility]) WO CONTRAST, MRA NECK (CAROTIDS) WO and W CONTRAST  LOCATION: [facility]  DATE/TIME: 11/17/2020 4:02 AM    INDICATION: Stroke.  COMPARISON: MRI brain 11/16/2020.  CONTRAST: 7.5 mL Gadavist injected.  TECHNIQUE:   HEAD MRA:  3D time-of-flight head MRA without intravenous contrast.  NECK MRA: Neck MRA without and with IV contrast. Stenosis measurements made according to NASCET criteria unless otherwise specified.    FINDINGS:    HEAD MRA:   ANTERIOR CIRCULATION: No stenosis/occlusion, aneurysm, or high flow vascular malformation. Fetal origin of the right posterior cerebral artery from the anterior circulation.    POSTERIOR CIRCULATION: Again demonstrated are several focal areas of irregularity and narrowing in both posterior cerebral arteries most pronounced at the right P2 segment. No definite evidence of flow limitation. Balanced vertebral arteries supply a   normal basilar artery.     NECK MRA:   RIGHT CAROTID: No measurable stenosis or dissection.    LEFT CAROTID: No measurable stenosis or dissection.    VERTEBRAL ARTERIES: No focal stenosis or dissection. Balanced vertebral arteries.    AORTIC ARCH: Nonflow limiting stenoses in the proximal right common carotid artery and left common carotid artery and left subclavian artery.      Impression    IMPRESSION:    HEAD MRA:   1.  No major vessel occlusion or flow-limiting stenosis.  2.  Multifocal areas of irregularity and narrowing primarily involving the posterior cerebral arteries consistent with intracranial atherosclerosis.    NECK MRA:  1.  No measurable stenosis in either proximal internal carotid artery.  2.  Vertebral arteries are patent through the neck and into the head.  3.  There are nonflow limiting stenoses in the proximal bilateral common carotid arteries and the proximal left subclavian artery.

## 2020-11-18 ENCOUNTER — APPOINTMENT (OUTPATIENT)
Dept: OCCUPATIONAL THERAPY | Facility: CLINIC | Age: 75
End: 2020-11-18
Attending: INTERNAL MEDICINE
Payer: COMMERCIAL

## 2020-11-18 ENCOUNTER — TELEPHONE (OUTPATIENT)
Dept: ORTHOPEDICS | Facility: CLINIC | Age: 75
End: 2020-11-18

## 2020-11-18 ENCOUNTER — APPOINTMENT (OUTPATIENT)
Dept: PHYSICAL THERAPY | Facility: CLINIC | Age: 75
End: 2020-11-18
Payer: COMMERCIAL

## 2020-11-18 ENCOUNTER — TELEPHONE (OUTPATIENT)
Dept: INTERNAL MEDICINE | Facility: CLINIC | Age: 75
End: 2020-11-18

## 2020-11-18 DIAGNOSIS — Z53.9 DIAGNOSIS NOT YET DEFINED: Primary | ICD-10-CM

## 2020-11-18 PROCEDURE — 97535 SELF CARE MNGMENT TRAINING: CPT | Mod: GO,59

## 2020-11-18 PROCEDURE — 97165 OT EVAL LOW COMPLEX 30 MIN: CPT | Mod: GO

## 2020-11-18 PROCEDURE — 97530 THERAPEUTIC ACTIVITIES: CPT | Mod: GP | Performed by: PHYSICAL THERAPIST

## 2020-11-18 PROCEDURE — 250N000013 HC RX MED GY IP 250 OP 250 PS 637: Performed by: INTERNAL MEDICINE

## 2020-11-18 PROCEDURE — G0179 MD RECERTIFICATION HHA PT: HCPCS | Performed by: INTERNAL MEDICINE

## 2020-11-18 PROCEDURE — 97116 GAIT TRAINING THERAPY: CPT | Mod: GP | Performed by: PHYSICAL THERAPIST

## 2020-11-18 PROCEDURE — G0378 HOSPITAL OBSERVATION PER HR: HCPCS

## 2020-11-18 PROCEDURE — 120N000001 HC R&B MED SURG/OB

## 2020-11-18 PROCEDURE — 97530 THERAPEUTIC ACTIVITIES: CPT | Mod: GO

## 2020-11-18 PROCEDURE — 99233 SBSQ HOSP IP/OBS HIGH 50: CPT | Performed by: INTERNAL MEDICINE

## 2020-11-18 RX ORDER — ENALAPRIL MALEATE 2.5 MG/1
5 TABLET ORAL 2 TIMES DAILY
Status: DISCONTINUED | OUTPATIENT
Start: 2020-11-18 | End: 2020-11-19 | Stop reason: HOSPADM

## 2020-11-18 RX ADMIN — CLOPIDOGREL BISULFATE 75 MG: 75 TABLET ORAL at 17:43

## 2020-11-18 RX ADMIN — MINOCYCLINE HYDROCHLORIDE 100 MG: 100 CAPSULE ORAL at 20:40

## 2020-11-18 RX ADMIN — MINOCYCLINE HYDROCHLORIDE 100 MG: 100 CAPSULE ORAL at 08:22

## 2020-11-18 RX ADMIN — ENALAPRIL MALEATE 5 MG: 2.5 TABLET ORAL at 20:40

## 2020-11-18 RX ADMIN — OXYBUTYNIN CHLORIDE 20 MG: 5 TABLET, EXTENDED RELEASE ORAL at 21:30

## 2020-11-18 RX ADMIN — ASPIRIN 81 MG: 81 TABLET, COATED ORAL at 08:22

## 2020-11-18 RX ADMIN — PANTOPRAZOLE SODIUM 40 MG: 40 TABLET, DELAYED RELEASE ORAL at 17:43

## 2020-11-18 ASSESSMENT — ACTIVITIES OF DAILY LIVING (ADL)
ADLS_ACUITY_SCORE: 32
ADLS_ACUITY_SCORE: 28
ADLS_ACUITY_SCORE: 26
IADL_COMMENTS: SPOUSE ASSISTS WITH IADLS

## 2020-11-18 NOTE — PLAN OF CARE
Appetite and oral intake good.  Up with Bebe Brian to chair this am and this afternoon walked with walker in black with MARGOTH Marcelo education done today with pt and wife through I-pad educator. Dischg plan, possible tcu  At dischg  Dressings intact on stasis ulcers.  Due for treatment tomorrow per orders of every other day.  Tele SR

## 2020-11-18 NOTE — PROGRESS NOTES
"   11/18/20 1007   Quick Adds   Type of Visit Initial Occupational Therapy Evaluation   Living Environment   People in home spouse   Current Living Arrangements house   Home Accessibility other (see comments);wheelchair accessible  (ramp access)   Living Environment Comments Pt lives with spouse in rambler house, ramp to enter, walk in shower w/ shower bench, RTS w/ grab bars   Self-Care   Usual Activity Tolerance moderate   Current Activity Tolerance poor   Equipment Currently Used at Home walker, rolling;wheelchair, manual   Disability/Function   Hearing Difficulty or Deaf yes   Wear Glasses or Blind yes   Concentrating, Remembering or Making Decisions Difficulty no   Difficulty Communicating yes   Communication difficulty speaking   Difficulty Eating/Swallowing no   Walking or Climbing Stairs Difficulty yes   Walking or Climbing Stairs ambulation difficulty, assistance 1 person   Dressing/Bathing Difficulty yes   Dressing/Bathing bathing difficulty, assistance 1 person   Toileting no   Doing Errands Independently Difficulty (such as shopping) no   Fall history within last six months yes   Number of times patient has fallen within last six months 2   Change in Functional Status Since Onset of Current Illness/Injury yes   General Information   Onset of Illness/Injury or Date of Surgery 11/16/20   Referring Physician Floyd Blanton MD   Patient/Family Therapy Goal Statement (OT) Pt's goal is to get stronger   Additional Occupational Profile Info/Pertinent History of Current Problem Per chart: Pt is a 75 year old male admitted with BLE weakness, MRI shows a likely small subacute stroke   Performance Patterns (Routines, Roles, Habits) Pt reports typicall mod I with dressing, toileting, grooming, however spouse will assist to \"speed it up.\" Spouse assists with IADLs. Pt typically mod I for mobility with use of 4ww, has been using manual w/c recently due to weakness.    Existing Precautions/Restrictions fall "   Visual Perception   Visual Impairment/Limitations corrective lenses for reading   Sensory   Sensory Quick Adds No deficits were identified   Pain Assessment   Patient Currently in Pain No   Range of Motion Comprehensive   Comment, General Range of Motion BUEs WFL   Strength Comprehensive (MMT)   Comment, General Manual Muscle Testing (MMT) Assessment Generalized weakness BUEs   Coordination   Coordination Comments Slowed BUE FMC   Transfers   Transfers sit-stand transfer;bed-chair transfer   Transfer Skill: Bed to Chair/Chair to Bed   Bed-Chair [place] (Transfers) dependent (less than 25% patient effort);2 person assist  (Bebe Steady)   Sit-Stand Transfer   Sit-Stand [place] (Transfers) maximum assist (25% patient effort)   Assistive Device (Sit-Stand Transfers) walker, front-wheeled   Activities of Daily Living   BADL Assessment/Intervention lower body dressing;grooming;toileting   Lower Body Dressing Assessment/Training   [place] Level (Lower Body Dressing) moderate assist (50% patient effort)   Grooming Assessment/Training   [place] Level (Grooming) minimum assist (75% patient effort)   Toileting   [place] Level (Toileting) maximum assist (25% patient effort)   Instrumental Activities of Daily Living (IADL)   IADL Comments Spouse assists with IADLs   Clinical Impression   Criteria for Skilled Therapeutic Interventions Met (OT) yes   OT Diagnosis Impaired ADLS, IADLs and mobility tasks   OT Problem List-Impairments impacting ADL problems related to;activity tolerance impaired;balance;strength   ADL comments/analysis Pt presents to OT below baseline level of functioning with regards to ADLs   Assessment of Occupational Performance 5 or more Performance Deficits   Identified Performance Deficits Bathing, dressing, grooming, toileting, transfers   Planned Therapy Interventions (OT) ADL retraining;IADL retraining;strengthening;transfer training   Clinical Decision Making Complexity (OT) low  complexity   Therapy Frequency (OT) Daily   Predicted Duration of Therapy 4 days   Risks and Benefits of Treatment have been explained. Yes   Patient, Family & other staff in agreement with plan of care Yes   OT Discharge Planning    OT Discharge Recommendation (DC Rec) Transitional Care Facility   OT Rationale for DC Rec Pt is below baseline level of functioning with regards to ADLs and transfers. Recommend ongoing skilled OT while IP and in TCU setting to improve strength, functional activity tolerance, balance and safety needed for daily tasks.    OT Brief overview of current status  Pt requiring Ax2/Bebe Steady for safe transfers   Total Evaluation Time (Minutes)   Total Evaluation Time (Minutes) 10

## 2020-11-18 NOTE — CONSULTS
Care Management Initial Consult    General Information  Assessment completed with: Patient;Spouse or significant other, Lisa  Type of CM/SW Visit: Initial Assessment  Primary Care Provider verified and updated as needed: Yes   Readmission within the last 30 days:        Reason for Consult: discharge planning  Advance Care Planning: Advance Care Planning Reviewed: present on chart          Communication Assessment  Patient's communication style: spoken language (English or Bilingual)    Hearing Difficulty or Deaf: yes   Wear Glasses or Blind: yes    Cognitive  Cognitive/Neuro/Behavioral: WDL  Level of Consciousness: alert  Arousal Level: opens eyes spontaneously  Orientation: oriented x 4  Mood/Behavior: calm;cooperative  Best Language: 1 - Mild to moderate  Speech: slow;hoarse    Living Environment:   People in home: spouse  Kelli  Current living Arrangements: house    - Rambler style, 1 level home.  There is a ramp to enter the home.  The home is handicap accessible.  Able to return to prior arrangements:  TCU is recommended, but pt and spouse want the pt to return home with resumption of HC PT/OT/ST       Family/Social Support:  Care provided by: self  Provides care for: no one  Marital Status:   Wife  Kelli       Description of Support System: Involved;Supportive    Support Assessment: Adequate family and caregiver support;Adequate social supports    Current Resources:   Skilled Home Care Services: Physicial Therapy;Occupational Therapy;Speech Therapy  Community Resources: Home Care - [facility]  Equipment currently used at home: walker, rolling;wheelchair, manual  Supplies currently used at home:  None    Employment/Financial:  Employment Status: retired        Financial Concerns: No concerns identified   Referral to Financial Counselor: No       Lifestyle & Psychosocial Needs:        Socioeconomic History     Marital status:      Spouse name: Kelli     Number of children: 2     Years  "of education: Not on file     Highest education level: Not on file   Occupational History     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  He quit driving about 15 months ago.  The pt's wife does the cooking and she helps him get dressed when he needs help.  The pt's wife provides transportation for the pt.             Mental Health Status:  Mental Health Status: No Current Concerns       Chemical Dependency Status:  Chemical Dependency Status: No Current Concerns             Values/Beliefs:  Spiritual, Cultural Beliefs, Methodist Practices, Values that affect care: yes          Values/Beliefs Comment: Julia    Additional Information:  Augustus met with the pt to discuss his discharge plan.  The pt was sitting in his chair when sw arrived.  The pt had just finished working with PT.  The pt was aware that his discharge recommendation is TCU.  The pt said that he does not want to go to a TCU.  He said that he is getting stronger each day and does not feel that TCU is necessary.  He said that he will go back home with the support of his wife and resumption of HC services.  Augustus asked the pt if they could call and update his wife.  Augustus told him that they want to get her input on the discharge plan because she will be the one helping to provide cares for him at home.  He said that sw can call his wife because she is \"the boss.\"    Augustus spoke with the " pt's wife Kelli regarding the pt's discharge plan.  Johnson updated Kelli that the pt's discharge recommendation is TCU.  Kelli said that the pt will discharge to home with resumption of [facility] PT/OT/ST.  Kelli does not want the pt to go to TCU due to COVID-19.  Kelli said that she has been caring for the pt at home for awhile now and cannot imagine that there has been that much change is abilities to ambulate.  Johnson updated her that per chart review the pt was an assist of 1 with a jose steady (johnson explained to her what a jose steady is) and that this afternoon he was able to ambulate with therapies.  She said that when she talked with the pt, he said he walked in the halls with therapy.  Johnson told her that he did, but part of the concern of him going home is not just ambulating.  Johnson asked her if she would be able to help him get up off of the couch or chair if he cannot do it himself.  She said that she will be able to do that.  Johnson and Kelli discussed adding RN services to the pt's HC and she thought that would be a good benefit.  She updated johnson that the pt's HC services are being paid for under his long-term care insurance and she asked sw if that would be changed to [employer].  Johnson told Kelli that they will reach out to [facility] for clarification on who will be covering the pt's HC services.  Kelli said that she will provide transport home for the pt when he is discharge ready.  She said that once the pt is home, their dtr can help if needed and they have support in their neighborhood if it is needed.  Johnson told Kelli that they will see how the pt is doing tomorrow morning and then follow-up with her to confirm the pt's discharge plans.  Kelli was agreeable.    Johnson spoke with the [facility] liaison, Kaylene, to update her on the pt's discharge plan.  Kaylene will look into payment for the pt's HC and determine which insurance will be covering the costs of it.  Johnson updated Kaylene that the pt will be ready for  discharge tomorrow.    Sw updated the pt's physician.    Sw will continue with discharge planning and will be available as needed until discharge.      ISIDORO Rivers, [facility]  Inpatient Care Coordination  [facility]  [phone]

## 2020-11-18 NOTE — PLAN OF CARE
Please see flowsheets for detailed vital signs and assessments.   Neuro: A/O x 4. Slow speech. See neuro assessment in flowsheets.  Vital Signs: BP: 166/86  Pain: denies  O2: 96% RA  Tele: SB  Respiratory: BLS: clear  GI: WDL  : voiding w/o difficulty, used urinal in bed. Urine shayy in color.   Skin: Covered wounds, dry, bruises.   Activity: Weight shifted in bed; A-2  IVF: saline locked  Consults: PT, OT, WOC  Plan: Continue with plan of care.   Discharge: 3 days.

## 2020-11-18 NOTE — TELEPHONE ENCOUNTER
Received call from Dr. Tejada, neurologist. He states he told patient he would connect with Dr. Alvarado regarding his shoulder pain and getting an injection.   He feels patient has frozen shoulder.   No urgency.   Informed that Dr. Alvarado is out on ALFREDO.     Did review plan of care from last office visit : 10/2/20: If he develops more deep shoulder pain (feels in the joint) then can return for ultrasound guided cortisone injections.   Informed patient may schedule if that is the case. He asks if we can contact patient to schedule. Informed we will do so.     If questions, he can be reached by staff message or cell: [phone]. However, he may be with a patient if calling.     Per chart review, after call was ended, Dr. Alvarado's note states :In my opinion, stiffness is related to PLS rather than osteoarthritis    Will discuss with Dr. Yeo in Dr. Alvarado's absence.     Please advise.     ADELAIDE Nelson RN

## 2020-11-18 NOTE — PROGRESS NOTES
[facility]    Hospitalist Progress Note  Name: Elier Barber    MRN: [id number]  Provider: Melani Pham MD  Date of Service: 11/18/2020    Assessment & Plan   Summary of Stay: Elier Barber is a 75 year old male who was admitted on 11/16/2020 for subacute CVA presented with 3 days of increasing bilateral leg weakness left more than right.  Patient's past medical history significant for prior CVA with chronic left-sided weakness, primary lateral sclerosis, essential hypertension.    Subacute CVA  -Prior to admission patient was on aspirin and Plavix 75 daily  -PTA Not on statins  -Previous TTE in 2018 was negative for bubble study  -CTA in 2018 did show moderate stenosis in proximal P2 segment of right PCA  -On admission MRI showed moderate small vessel ischemic disease and possible subacute stroke  -MRA of neck did not show any significant stenosis  -Stroke neurology is consulted  -Patient received 325 mg of aspirin in the emergency room  -Patient will be continued on daily aspirin 81 mg and Plavix 75 mg daily  -Resumed Protonix 40 mg daily  -Lipid panel shows HDL of 53 and LDL of 91  -Hemoglobin A1c is 5.8 (prediabetes level)  -We will continue to monitor on telemetry  -Permissive hypertension for now enalapril was held on admission but subsequently resumed  -PT OT and speech consults    Primary lateral sclerosis  -Patient is followed by neurology  -Uses walker at baseline  -Chronic upper body stiffness, chronic dysarthria    Hypertension  -Systolic blood pressure between 1 40-1 60  -Prior to admission enalapril 5 mg twice daily  -Allowing permissive hypertension for now  -Enalapril held on admission    History of DVT  -Right peroneal vein DVT in 2017 not on chronic anticoagulation    History of prostate cancer  -Status post prostatectomy  -On prior to admission oxybutynin    Recent leg infection  -Per report on  minocycline 100 mg twice daily prior to admission we will continue  -Wound  consult           DVT Prophylaxis: Dual therapy for stroke on Plavix.  Code Status: Full Code    Disposition: Expected discharge: Pending safe disposition.  Patient will likely need TCU with patient and family are reluctant to go to TCU due to COVID-19 situation.  We will continue PT evaluation and plan safe disposition        Interval History   Reviewed chart, patient feels trouble walking however is able to lift and move legs and upper extremity.  His dysarthria is at baseline.  Denies any chest pain or shortness of breath.  Review of all other symptoms are negative.    -Data reviewed today: I reviewed all new labs and imaging reports over the last 24 hours. I personally reviewed no images or EKG's today.    Physical Exam   Temp: 97.4  F (36.3  C) Temp src: Oral BP: (!) 149/86 Pulse: 71   Resp: 16 SpO2: 95 % O2 Device: None (Room air)    There were no vitals filed for this visit.  Vital Signs with Ranges  Temp:  [96.2  F (35.7  C)-97.9  F (36.6  C)] 97.4  F (36.3  C)  Pulse:  [68-74] 71  Resp:  [16] 16  BP: (149-189)/() 149/86  SpO2:  [91 %-97 %] 95 %  I/O last 3 completed shifts:  In: 360 [P.O.:360]  Out: 2050 [Urine:2050]      Constitutional: Awake, NAD   Eyes: sclera white   HEENT: atraumatic, MMM  Respiratory: no respiratory distress, lungs cta bilaterally, no crackles or wheeze  Cardiovascular: RRR.  No murmur  GI: non-tender, not distended, bowel sounds present  Skin: Bilateral lower extremity wounds.  Largest area on the left anterior shin approximately 2 cm circular lesion with granulation tissue.  Surrounding erythema.  No fluctuance  Musculoskeletal/extremities: atraumatic, no major deformities.  Trace bilateral lower extremity edema  Neurologic: Alert, oriented, significant dysarthria, strength actually 5 out of 5 in upper and lower extremities bilaterally, light touch sensation intact  Psychiatric: calm, cooperative     Medications       aspirin  81 mg Oral Daily     clopidogrel  75 mg Oral Daily      minocycline  100 mg Oral Q12H ANA     oxybutynin ER  20 mg Oral At Bedtime     pantoprazole  40 mg Oral Daily     Data     Recent Labs   Lab 11/16/20 2125   WBC 6.4   HGB 14.9   HCT 45.6   MCV 92        Recent Labs   Lab 11/16/20 2125      POTASSIUM 4.1   CHLORIDE 102   CO2 28   ANIONGAP 5   *   BUN 25   CR 0.66   GFRESTIMATED >90   GFRESTBLACK >90   AMOS 9.0     No results for input(s): CULT in the last 168 hours.  No results for input(s): NTBNPI, NTBNP in the last 168 hours.  No results for input(s): CKT in the last 168 hours.    Invalid input(s): CK, CK TOTAL  No results for input(s): AST, ALT, GGT, ALKPHOS, BILITOTAL, BILICONJ, BILIDIRECT, GURINDER in the last 168 hours.    Invalid input(s): BILIRUBININDIRECT  No results for input(s): INR in the last 168 hours.  No results for input(s): LACT in the last 168 hours.  No results for input(s): TSH in the last 168 hours.  Recent Labs   Lab 11/16/20 2125   TROPI <0.015     Recent Labs   Lab 11/16/20  2342   COLOR Light Yellow   APPEARANCE Clear   URINEGLC Negative   URINEBILI Negative   URINEKETONE Negative   SG 1.019   UBLD Negative   URINEPH 6.0   PROTEIN Negative   NITRITE Negative   LEUKEST Negative   RBCU <1   WBCU <1       No results found for this or any previous visit (from the past 24 hour(s)).

## 2020-11-18 NOTE — CONSULTS
Stroke Education Note    The following information has been reviewed with the patient and family:    1. Warning signs of stroke    2. Calling 911 if having warning signs of stroke    3. All modifiable risk factors: hypertension, CAD, atrial fib, diabetes, hypercholesterolemia, smoking, substance abuse, diet, physical inactivity, obesity, sleep apnea.    4. Patient's risk factors for stroke which include: HTN and previous CVA    5. Follow-up plan for after discharge    6. Discharge medications which include: Aspirin, Plavix, and enalapril     In addition, the [facility] Stroke Class Handout has been given to the patient and family.    Learner's response to risk factors / lifestyle modification education: Taking steps     FELIPE Macdonald RN

## 2020-11-18 NOTE — PLAN OF CARE
Elevated BP otherwise VSS on RA.  A/Ox4.  Denies pain.  Neuros unchanged and intact.  LS clear, denies SOB. Tele: SR, denies CP.  R hand PIV SL.  1 BM this shift.  Reg diet, good appetite.  Up A2 w/ jose steady.  Mepilex on leg wounds, CDI.  Covid results negative.  Poss discharge 2-3 days.  Continue POC.

## 2020-11-18 NOTE — TELEPHONE ENCOUNTER
Discussed with Dr. Yeo. He is in agreement to see patient and give injection. Will determine appropriateness at time of appointment, but schedule 40 minutes for time for injection if done.   No urgency since patient hospitalized recently for stroke.     Consent to communicate on file to speak to wife, Kelli.   Left voicemail asking Kelli to return call to discuss appointment with Dr. Yeo. Informed that Dr. Tejada called our office regarding patient getting an injection for his shoulder. Triage number provided.     ADELAIDE Nelson RN

## 2020-11-19 ENCOUNTER — APPOINTMENT (OUTPATIENT)
Dept: PHYSICAL THERAPY | Facility: CLINIC | Age: 75
End: 2020-11-19
Payer: COMMERCIAL

## 2020-11-19 ENCOUNTER — APPOINTMENT (OUTPATIENT)
Dept: OCCUPATIONAL THERAPY | Facility: CLINIC | Age: 75
End: 2020-11-19
Payer: COMMERCIAL

## 2020-11-19 VITALS
RESPIRATION RATE: 20 BRPM | DIASTOLIC BLOOD PRESSURE: 95 MMHG | TEMPERATURE: 95.3 F | SYSTOLIC BLOOD PRESSURE: 161 MMHG | HEART RATE: 70 BPM | OXYGEN SATURATION: 93 %

## 2020-11-19 PROCEDURE — 99239 HOSP IP/OBS DSCHRG MGMT >30: CPT | Performed by: INTERNAL MEDICINE

## 2020-11-19 PROCEDURE — 97110 THERAPEUTIC EXERCISES: CPT | Mod: GO | Performed by: OCCUPATIONAL THERAPIST

## 2020-11-19 PROCEDURE — 97530 THERAPEUTIC ACTIVITIES: CPT | Mod: GP

## 2020-11-19 PROCEDURE — 97116 GAIT TRAINING THERAPY: CPT | Mod: GP,59

## 2020-11-19 PROCEDURE — 250N000013 HC RX MED GY IP 250 OP 250 PS 637: Performed by: INTERNAL MEDICINE

## 2020-11-19 PROCEDURE — G0378 HOSPITAL OBSERVATION PER HR: HCPCS

## 2020-11-19 PROCEDURE — 97535 SELF CARE MNGMENT TRAINING: CPT | Mod: GO,59 | Performed by: OCCUPATIONAL THERAPIST

## 2020-11-19 RX ADMIN — MINOCYCLINE HYDROCHLORIDE 100 MG: 100 CAPSULE ORAL at 07:56

## 2020-11-19 RX ADMIN — ASPIRIN 81 MG: 81 TABLET, COATED ORAL at 07:56

## 2020-11-19 RX ADMIN — ENALAPRIL MALEATE 5 MG: 2.5 TABLET ORAL at 07:56

## 2020-11-19 ASSESSMENT — ACTIVITIES OF DAILY LIVING (ADL)
ADLS_ACUITY_SCORE: 28
ADLS_ACUITY_SCORE: 32
ADLS_ACUITY_SCORE: 28
ADLS_ACUITY_SCORE: 28

## 2020-11-19 NOTE — TELEPHONE ENCOUNTER
RECORDS RECEIVED FROM: Internal   Date of Appt: 1/19/21   NOTES (FOR ALL VISITS) STATUS DETAILS   OFFICE NOTE from referring provider Internal SEE INPATIENT NOTES   OFFICE NOTE from other specialist Care Everywhere Dr Bartolo Calvert @ [facility]:  10/15/20  7/27/20  6/11/20  6/4/20  5/27/20  (additional encounters in Epic)   DISCHARGE SUMMARY from hospital Internal [facility]:  11/16/20-present (as of 11/19)  5/24/18-5/26/18 6/5/14-6/6/14   DISCHARGE REPORT from the ER Internal [facility]:  10/27/18   OPERATIVE REPORT N/A    MEDICATION LIST Internal    IMAGING  (FOR ALL VISITS)     EMG Received [facility]:  7/24/17   EEG N/A    LUMBAR PUNCTURE N/A    GLORIA SCAN N/A    DEXA SCAN *NEUROSURGERY* N/A    ULTRASOUND (CAROTID BILAT) *VASCULAR* N/A    MRI (HEAD, NECK, SPINE) Internal [facility]:  MRA Neck Carotids 11/17/20  MRA Brain COW 11/17/20  MRI Brain 11/16/20  MRI Brain 10/27/18  MRI Brain 5/24/18  (additional images in PACS)   XRAY (SPINE) *NEUROSURGERY* N/A    CT (HEAD, NECK, SPINE) Internal [facility]:  CT Head 10/2/20  CT Head 10/27/18  CTA Head Neck 10/27/18  CT Head 10/27/17  CT Head 8/14/18  CTA Head 5/24/18

## 2020-11-19 NOTE — TELEPHONE ENCOUNTER
Phone call to wife Kelli. She did receive our message.   Patient is to come home tomorrow after having a mild stroke. She has to set up multiple appointments for physical therapy, occupational therapy, etc and will call back at her convenience. Asked that she set up a 40 minute appointment with Dr. Yeo in case an injection is appropriate and can be done at the same time. She verbalized understanding.     ADELAIDE Nelson RN

## 2020-11-19 NOTE — PLAN OF CARE
Elevated BP, restarted BP meds otherwise VSS on RA.  A.Ox4.  Neuros unchanged and intact.  Denies pain.  LS clear, denies SOB. Tele: SR, denies CP.  R PIV SL.  Mepilex on leg wounds CDI.  Up A2 w/ walker and GB.  Reg diet, good appetite.  2 episodes of urinary incontinence, Primofit (external male catheter) placed.  Discharge tbd.  Continue POC.

## 2020-11-19 NOTE — PROGRESS NOTES
Care Management Discharge Note    Discharge Date: 11/19/20       Discharge Disposition:  Home with spouse, Kelli    Discharge Services:  HC PT/OT/ST/RN/HHA    Discharge DME:  Walker and wheelchair (pt has at his home)    Discharge Transportation:  Pt's spouseKelli    Private pay costs discussed: Not applicable    PAS Confirmation Code:  N/A  Patient/family educated on Medicare website which has current facility and service quality ratings:  No, pt was already receiving the HC services.    Education Provided on the Discharge Plan:  Yes  Persons Notified of Discharge Plans: Pt, Kelli, and bedside nurse  Patient/Family in Agreement with the Plan:  Yes      Additional Information:  The pt will discharge home today with resumption of [facility] PT/OT/ST/HHA and RN services will be added.  Kelli will provide transport home for the pt.    No sw needs or concerns identified at this time.    Sw will continue to be available as needed until discharge.    Sw updated the pt's AVS.      ISIDORO Rivers, HALI  Inpatient Care Coordination  [facility]  [phone]

## 2020-11-19 NOTE — PLAN OF CARE
Please see flowsheets for detailed vital signs and assessments.   Neuro: A/O x 4. Neuro checks every 4 hours; no changes noted.   Vital Signs: BP's elevated: 150/91, 155/91  Pain: denies  O2: 95-97% on RA  Tele: SR  Respiratory: BLS: clear, pt denies SOB.   GI: WDL  : External catheter in place. Yellow-straw colored urine.   Skin: Flaky, dry, bruises, scabs, wounds covered with foam dressings.  Activity: A-2 repositions in bed.   IVF: saline locked  Notable labs: A1C: 5.8    Consults: PT, OT, WOC  Plan:  Continue with plan of care.   Discharge: TBD

## 2020-11-19 NOTE — PLAN OF CARE
Patient alert, oriented, up with walker and assist x1.  Discharged to home with home care. All belongings with patient. Patient stated understanding of discharge and plan for follow up. Patient discharged, waiting for transport via wife.

## 2020-11-19 NOTE — DISCHARGE INSTRUCTIONS
Your home care referral was sent to [facility].  If you haven't heard from them within the next 24-48 hours,  please call them at [phone].

## 2020-11-19 NOTE — PLAN OF CARE
Occupational Therapy Discharge Summary    Reason for therapy discharge:    Discharged to home with home therapy.    Progress towards therapy goal(s). See goals on Care Plan in [facility] electronic health record for goal details.  Goals partially met.  Barriers to achieving goals:   discharge from facility.    Therapy recommendation(s):    Continued therapy is recommended.  Rationale/Recommendations:  maximize safety and independence with ADLs.

## 2020-11-19 NOTE — DISCHARGE SUMMARY
[facility]  Discharge Summary  Hospitalist      Date of Admission:  11/16/2020  Date of Discharge:  11/19/2020  Provider:  Melani Pham MD  Date of Service (when I last saw the patient): 11/19/20      Primary Provider: Lida Ray          Discharge Diagnosis:   Discharge Diagnoses   Subacute CVA    Other medical issues:  Past Medical History:   Diagnosis Date     Basal cell carcinoma nos     sees derm     CVA (cerebral infarction) 6/14    small vessel right int capsule stroke     DVT (deep vein thrombosis) in pregnancy 05/12/2017    right peroneal vein     Essential hypertension, benign      Hearing loss      Hoarseness of voice     assoc with PLS     Lumbar radiculopathy     2017     Primary Lateral Sclerosis 2002    has baclofen pump through Dr. Calvert, C[facility]Y Mem, sees Dr. Fink [facility]     Prostate CA (H) 2008    prostatectomy          History of Present Illness   Elier Barber is an 75 year old male who presented with increasing bilateral leg weakness left more than right.  Please see the admission history and physical for full details.    Hospital Course     Elier Barber was admitted on 11/16/2020.  He is a 75 year old male who was admitted  for subacute CVA presented with 3 days of increasing bilateral leg weakness left more than right.  Patient's past medical history significant for prior CVA with chronic left-sided weakness, primary lateral sclerosis, essential hypertension.    Evaluation in hospital showed subacute CVA.  Allergy was consulted and recommended continuation of aspirin and Plavix.  Requires physical therapy and rehab.  TCU was initially recommended.  However patient and wife are reluctant to consider TCU.  Patient is being discharged home at his request.    The following problems were addressed during his hospitalization:    Subacute CVA  -Prior to admission patient was on aspirin and Plavix 75 daily  -PTA Not on statins  -Previous TTE in 2018 was negative for  bubble study  -CTA in 2018 did show moderate stenosis in proximal P2 segment of right PCA  -On admission MRI showed moderate small vessel ischemic disease and possible subacute stroke  -MRA of neck did not show any significant stenosis  -Stroke neurology is consulted  -Patient received 325 mg of aspirin in the emergency room  -Patient will be continued on daily aspirin 81 mg and Plavix 75 mg daily  -Resumed Protonix 40 mg daily  -Lipid panel shows HDL of 53 and LDL of 91  -Hemoglobin A1c is 5.8 (prediabetes level)  -We will continue to monitor on telemetry  -Permissive hypertension initially.  Enalapril was held on admission but subsequently resumed  -PT OT and speech consults     Primary lateral sclerosis  -Patient is followed by neurology  -Uses walker at baseline  -Chronic upper body stiffness, chronic dysarthria     Hypertension  -Systolic blood pressure between 1 40-1 60  -Prior to admission enalapril 5 mg twice daily  -Prior to admission meds on discharge       History of DVT  -Right peroneal vein DVT in 2017 not on chronic anticoagulation     History of prostate cancer  -Status post prostatectomy  -On prior to admission oxybutynin     Recent leg infection  -Per report on  minocycline 100 mg twice daily prior to admission was continued during hospitalization  -Wound team consulted       Significant Results and Procedures   As noted above    Pending Results   Unresulted Labs Ordered in the Past 30 Days of this Admission     No orders found from 10/17/2020 to 11/17/2020.          Code Status   Full Code       Primary Care Physician   Lida Ray    Physical Exam   Temp: 95.3  F (35.2  C) Temp src: Oral BP: (!) 161/95 Pulse: 70   Resp: 20 SpO2: 93 % O2 Device: None (Room air)    There were no vitals filed for this visit.  Vital Signs with Ranges  Temp:  [95.3  F (35.2  C)-97.4  F (36.3  C)] 95.3  F (35.2  C)  Pulse:  [70-74] 70  Resp:  [16-20] 20  BP: (149-161)/(86-95) 161/95  SpO2:  [93 %-97 %] 93 %  I/O last 3  completed shifts:  In: 360 [P.O.:360]  Out: 990 [Urine:990]    Constitutional: Awake, NAD   Eyes: sclera white   HEENT: atraumatic, MMM  Respiratory: no respiratory distress, lungs cta bilaterally, no crackles or wheeze  Cardiovascular: RRR.  No murmur  GI: non-tender, not distended, bowel sounds present  Skin: Bilateral lower extremity wounds.  Largest area on the left anterior shin approximately 2 cm circular lesion with granulation tissue.  Surrounding erythema.  No fluctuance  Musculoskeletal/extremities: atraumatic, no major deformities.  Trace bilateral lower extremity edema  Neurologic: Alert, oriented, significant dysarthria, strength actually 5 out of 5 in upper and lower extremities bilaterally, light touch sensation intact  Psychiatric: calm, cooperative       Discharge Disposition   Discharged to home    Consultations This Hospital Stay   NEUROLOGY IP CONSULT  PHYSICAL THERAPY ADULT IP CONSULT  OCCUPATIONAL THERAPY ADULT IP CONSULT  SPEECH LANGUAGE PATH ADULT IP CONSULT  SWALLOW EVAL SPEECH PATH AT BEDSIDE IP CONSULT  SMOKING CESSATION PROGRAM IP CONSULT  PATIENT LEARNING CENTER IP CONSULT  WOUND OSTOMY CONTINENCE NURSE  IP CONSULT  CARE MANAGEMENT / SOCIAL WORK IP CONSULT    Time Spent on this Encounter   I, Melani Pham MD, personally saw the patient today and spent greater than 30 minutes discharging this patient.    Discharge Orders      Home care nursing referral      Home Care PT Referral for Hospital Discharge      Home Care OT Referral for Hospital Discharge      Home Care SLP Referral for Hospital Discharge      Reason for your hospital stay    Please refer to discharge summary. Admitted for CVA  TCU was recommended on discharge due to patient's gait instability and need for assist of 1 or more for transfers and walking.  Patient and wife feel confident that patient would be able to do well at home and would not prefer or agree to consider TCU due to COVID-19 pandemic situation patient  is being discharged home with reservation.  Patient and wife understand the risks.  Patient is high risk for falls at home.  Patient is being discharged at his request to home with home care     Follow-up and recommended labs and tests     Follow up with primary care provider, Lida Ray, within 7 days for hospital follow- up.  The following labs/tests are recommended: cbc.     Activity    Your activity upon discharge: activity as tolerated     Monitor and record    blood pressure daily  pulse daily     MD face to face encounter    Documentation of Face to Face and Certification for Home Health Services    I certify that patient: Elier Barber is under my care and that I, or a nurse practitioner or physician's assistant working with me, had a face-to-face encounter that meets the physician face-to-face encounter requirements with this patient on: 11/19/2020.    This encounter with the patient was in whole, or in part, for the following medical condition, which is the primary reason for home health care: subacute CVA    I certify that, based on my findings, the following services are medically necessary home health services: Nursing, Occupational Therapy, Physical Therapy and Speech Language Therapy.    My clinical findings support the need for the above services because: Nurse is needed: To provide assessment and oversight required in the home to assure adherence to the medical plan due to: limited mobilitry, recurrent CVA, unbsteady gait, requiring assist in transfers., To provide caregiver training to assist with: ADLs care giving safety assessment. and To teach and train about the disease and treatments for CVA illness, because weakness, unsteady gait., Occupational Therapy Services are needed to assess and treat cognitive ability and address ADL safety due to impairment in ADLs Physical Therapy Services are needed to assess and treat the following functional impairments: gait, risk of falls and Speech  Therapy Services are needed to assess and treat impairments in language and/or swallow functions due to speech changes    Further, I certify that my clinical findings support that this patient is homebound (i.e. absences from home require considerable and taxing effort and are for medical reasons or [employer] services or infrequently or of short duration when for other reasons) because: Patient is bedbound due to: needs assist for basic tranfer due to CVA..    Based on the above findings. I certify that this patient is confined to the home and needs intermittent skilled nursing care, physical therapy and/or speech therapy.  The patient is under my care, and I have initiated the establishment of the plan of care.  This patient will be followed by a physician who will periodically review the plan of care.  Physician/Provider to provide follow up care: Lida Ray    Attending [facility] [place] physician (the Medicare certified [place] provider): Melani Pham MD  Physician Signature: See electronic signature associated with these discharge orders.  Date: 11/19/2020     Full Code     Diet    Follow this diet upon discharge: Orders Placed This Encounter  Low salt, low fat diet     Discharge Medications   Current Discharge Medication List      CONTINUE these medications which have NOT CHANGED    Details   acetaminophen (TYLENOL) 650 MG CR tablet Take 650 mg by mouth 2 times daily       aspirin 81 MG tablet Take 81 mg by mouth daily      baclofen (LIORESAL) intraTHECAL Internal Pump by Intrathecal route continuous prn Pump filled by [facility] [phone]  Pump Model: Syncromed  Last fill:  10/2020  Next fill:   3/17/2021  [place] Alarm Date:   Reservoir Volume: 20 ml  Conc: 2000 mcg/ml  Delivers 234.7 mcg/day  Basal rate:unknown  Battery life: replace in 33 months.      clopidogrel (PLAVIX) 75 MG tablet Take 75 mg by mouth daily       enalapril (VASOTEC) 5 MG tablet Take  1 tablet (5 mg) by mouth 2 times daily  Qty: 180 tablet, Refills: 3    Comments: ### DO NOT FILL NOW.  Please update patient's profile to reflect additional refills.  ####  Associated Diagnoses: Essential hypertension, benign      EPINEPHrine 0.3 MG/0.3ML injection Inject 0.3 mLs (0.3 mg) into the muscle as needed for anaphylaxis  Qty: 0.3 mL, Refills: 3    Associated Diagnoses: Bee sting reaction, accidental or unintentional, initial encounter      ketoconazole (NIZORAL) 2 % external shampoo SHAMPOO EVERY 2-3 DAYS AS  NEEDED  Qty: 120 mL, Refills: 1    Associated Diagnoses: Dermatitis      minocycline (MINOCIN) 100 MG capsule Take 100 mg by mouth 2 times daily Staph infection.      mupirocin (BACTROBAN) 2 % external ointment Apply topically 2 times daily Wound left shin.      oxybutynin ER (DITROPAN-XL) 10 MG 24 hr tablet Take 2 tablets (20 mg) by mouth At Bedtime  Qty: 180 tablet, Refills: 3    Comments: ### DO NOT FILL NOW.  Please update patient's profile to reflect additional refills.  ####  Associated Diagnoses: Urinary frequency      pantoprazole (PROTONIX) 40 MG EC tablet Take 1 tablet (40 mg) by mouth daily  Qty: 90 tablet, Refills: 3    Comments: ### DO NOT FILL NOW.  Please update patient's profile to reflect additional refills.  ####  Associated Diagnoses: Gastroesophageal reflux disease without esophagitis      potassium chloride ER (K-TAB/KLOR-CON) 10 MEQ CR tablet Take 2 tablets (20 mEq) by mouth daily  Qty: 180 tablet, Refills: 3    Comments: ### DO NOT FILL NOW.  Please update patient's profile to reflect additional refills.  ####  Associated Diagnoses: Essential hypertension, benign      FLUBLOK QUADRIVALENT 0.5 ML injection       polyethylene glycol (MIRALAX/GLYCOLAX) Packet Take 1 packet by mouth daily as needed Every 2-3 days.      STATIN NOT PRESCRIBED, INTENTIONAL, Please choose reason not prescribed, below    Associated Diagnoses: Cerebral infarction, unspecified mechanism (H)        "    Allergies   Allergies   Allergen Reactions     No Clinical Screening - See Comments Hives     Starts swelling and hives     Bee Venom      Penicillins Hives     \"HIVES\"     Data   Most Recent 3 CBC's:  Recent Labs   Lab Test 11/16/20 2125 01/09/20  1240 11/30/18  2316   WBC 6.4 7.4 7.6   HGB 14.9 15.2 13.7   MCV 92 91 91    225 213      Most Recent 3 BMP's:  Recent Labs   Lab Test 11/16/20 2125 01/09/20  1240 08/15/19  0949    134 136   POTASSIUM 4.1 4.5 4.6   CHLORIDE 102 102 102   CO2 28 27 29   BUN 25 16 16   CR 0.66 0.72 0.74   ANIONGAP 5 4 5   AMOS 9.0 9.0 9.1   * 93 88     Most Recent 2 LFT's:  Recent Labs   Lab Test 01/09/20  1240 08/11/14  1111   AST 36 34   ALT 29 31   ALKPHOS 66 60   BILITOTAL 0.6 0.8     Most Recent INR's and Anticoagulation Dosing History:  Anticoagulation Dose History     Recent Dosing and Labs Latest Ref Rng & Units 6/8/2008 2/21/2013 5/24/2018 10/27/2018    INR 0.86 - 1.14 1.01 0.95 1.23(H) 1.07        Most Recent 3 Troponin's:  Recent Labs   Lab Test 11/16/20  2125 10/27/18  1734 05/24/18  1622   TROPI <0.015 <0.015 <0.015     Most Recent Cholesterol Panel:  Recent Labs   Lab Test 11/17/20  0716   CHOL 152   LDL 91   HDL 53   TRIG 41     Most Recent 6 Bacteria Isolates From Any Culture (See EPIC Reports for Culture Details):  Recent Labs   Lab Test 05/24/18  2352 05/24/18  2347   CULT No growth No growth     Most Recent TSH, T4 and A1c Labs:  Recent Labs   Lab Test 11/17/20  0716 06/18/19  1034   TSH  --  2.35   A1C 5.8*  --      Results for orders placed or performed during the hospital encounter of 11/16/20   Head CT w/o contrast    Narrative    EXAM: CT HEAD W/O CONTRAST  LOCATION: [facility]  DATE/TIME: 11/16/2020 9:50 PM    INDICATION: Neuro deficit(s), subacute. Decreased mobility.  COMPARISON: 10/27/2018 brain MRI. 10/27/2018 head CT/head and neck CTA.  TECHNIQUE: Routine without IV contrast. Multiplanar reformats. Dose reduction " techniques were used.    FINDINGS:  INTRACRANIAL CONTENTS: No intracranial hemorrhage, extraaxial collection, or mass effect.  No CT evidence of acute infarct. Moderate to severe burden hypoattenuating chronic small vessel ischemic change. Stable ventricular size in the setting of a   moderate diffuse parenchymal volume loss.     VISUALIZED ORBITS/SINUSES/MASTOIDS: Prior bilateral cataract surgery. Visualized portions of the orbits are otherwise unremarkable. No paranasal sinus mucosal disease. No middle ear or mastoid effusion.    BONES/SOFT TISSUES: No acute abnormality.      Impression    IMPRESSION:  1.  Age-related changes, as above, with no acute intracranial abnormality.   MR Brain w/o & w Contrast    Narrative    EXAM: MR BRAIN W/O and W CONTRAST  LOCATION: [facility]  DATE/TIME: 11/16/2020 11:58 PM    INDICATION: Decreased mobility.  Sella likely stenosis.  COMPARISON: None.  CONTRAST: 7.5ml. Gadavist injected  TECHNIQUE: Routine multiplanar multisequence head MRI without and with intravenous contrast.    FINDINGS:  INTRACRANIAL CONTENTS: Small focus of elevated DWI signal in the right precentral gyrus subcortical white matter with intermediate ADC signal (series 2.2, image 43). No mass, acute hemorrhage, or extra-axial fluid collections. Patchy and confluent   nonspecific T2/FLAIR hyperintensities within the cerebral white matter most consistent with moderate chronic microvascular ischemic change. Mild to moderate generalized cerebral atrophy. No hydrocephalus. Mild cerebellar atrophy. No pathologic contrast   enhancement.    SELLA: No abnormality accounting for technique.    OSSEOUS STRUCTURES/SOFT TISSUES: Normal marrow signal. The major intracranial vascular flow voids are maintained.     ORBITS: No abnormality accounting for technique.     SINUSES/MASTOIDS: No paranasal sinus mucosal disease. No middle ear or mastoid effusion.       Impression    IMPRESSION:  1.  Small probable subacute  infarction in the subcortical white matter of the right precentral gyrus. No associated hemorrhage or mass effect.  2.  Moderate presumed chronic small vessel ischemic changes.       MRA Brain ([place]) wo Contrast    Narrative    EXAM: MRA BRAIN ([place]) WO CONTRAST, MRA NECK (CAROTIDS) WO and W CONTRAST  LOCATION: [facility]  DATE/TIME: 11/17/2020 4:02 AM    INDICATION: Stroke.  COMPARISON: MRI brain 11/16/2020.  CONTRAST: 7.5 mL Gadavist injected.  TECHNIQUE:   HEAD MRA: 3D time-of-flight head MRA without intravenous contrast.  NECK MRA: Neck MRA without and with IV contrast. Stenosis measurements made according to NASCET criteria unless otherwise specified.    FINDINGS:    HEAD MRA:   ANTERIOR CIRCULATION: No stenosis/occlusion, aneurysm, or high flow vascular malformation. Fetal origin of the right posterior cerebral artery from the anterior circulation.    POSTERIOR CIRCULATION: Again demonstrated are several focal areas of irregularity and narrowing in both posterior cerebral arteries most pronounced at the right P2 segment. No definite evidence of flow limitation. Balanced vertebral arteries supply a   normal basilar artery.     NECK MRA:   RIGHT CAROTID: No measurable stenosis or dissection.    LEFT CAROTID: No measurable stenosis or dissection.    VERTEBRAL ARTERIES: No focal stenosis or dissection. Balanced vertebral arteries.    AORTIC ARCH: Nonflow limiting stenoses in the proximal right common carotid artery and left common carotid artery and left subclavian artery.      Impression    IMPRESSION:    HEAD MRA:   1.  No major vessel occlusion or flow-limiting stenosis.  2.  Multifocal areas of irregularity and narrowing primarily involving the posterior cerebral arteries consistent with intracranial atherosclerosis.    NECK MRA:  1.  No measurable stenosis in either proximal internal carotid artery.  2.  Vertebral arteries are patent through the neck and into the  head.  3.  There are nonflow limiting stenoses in the proximal bilateral common carotid arteries and the proximal left subclavian artery.             MRA Neck (Carotids) wo & w Contrast    Narrative    EXAM: MRA BRAIN ([place]) WO CONTRAST, MRA NECK (CAROTIDS) WO and W CONTRAST  LOCATION: [facility]  DATE/TIME: 11/17/2020 4:02 AM    INDICATION: Stroke.  COMPARISON: MRI brain 11/16/2020.  CONTRAST: 7.5 mL Gadavist injected.  TECHNIQUE:   HEAD MRA: 3D time-of-flight head MRA without intravenous contrast.  NECK MRA: Neck MRA without and with IV contrast. Stenosis measurements made according to NASCET criteria unless otherwise specified.    FINDINGS:    HEAD MRA:   ANTERIOR CIRCULATION: No stenosis/occlusion, aneurysm, or high flow vascular malformation. Fetal origin of the right posterior cerebral artery from the anterior circulation.    POSTERIOR CIRCULATION: Again demonstrated are several focal areas of irregularity and narrowing in both posterior cerebral arteries most pronounced at the right P2 segment. No definite evidence of flow limitation. Balanced vertebral arteries supply a   normal basilar artery.     NECK MRA:   RIGHT CAROTID: No measurable stenosis or dissection.    LEFT CAROTID: No measurable stenosis or dissection.    VERTEBRAL ARTERIES: No focal stenosis or dissection. Balanced vertebral arteries.    AORTIC ARCH: Nonflow limiting stenoses in the proximal right common carotid artery and left common carotid artery and left subclavian artery.      Impression    IMPRESSION:    HEAD MRA:   1.  No major vessel occlusion or flow-limiting stenosis.  2.  Multifocal areas of irregularity and narrowing primarily involving the posterior cerebral arteries consistent with intracranial atherosclerosis.    NECK MRA:  1.  No measurable stenosis in either proximal internal carotid artery.  2.  Vertebral arteries are patent through the neck and into the head.  3.  There are nonflow limiting stenoses  "in the proximal bilateral common carotid arteries and the proximal left subclavian artery.             Echocardiogram Limited    Narrative    [id number]  [id number]  [id number]  [id number]^SUNI^JANE^[facility]  Echocardiography Laboratory  [address] [place] [address]        Name: ANIL MELGAR  MRN: [id number]  : 1945  Study Date: 2020 01:04 PM  Age: 75 yrs  Gender: Male  Patient Location: [facility]  Reason For Study: CVA  Ordering Physician: JANE CULP  Performed By: Aury Dumont     BSA: 1.9 m2  Height: 70 in  Weight: 156 lb  HR: 70  BP: 152/85 mmHg  _____________________________________________________________________________  __        Procedure  Complete Echo Adult. Optison (NDC #[id number]) given intravenously.  _____________________________________________________________________________  __        Interpretation Summary     Because of electrical artifact on the EKG the digital images were being  grabbed erratically in the beginning to mid portion of the study, causing  signifcant difficuly in reading the images due to not having a full systolic  and diastolic cycle with \"jerky\" images.  The visual ejection fraction is estimated at 55-60%.  Left ventricular systolic function is normal.  No dedicated color flow doppler across the interatrial septum was recorded  especially in the standard subcostal view. The interatrial septum appears  intact by 2D echo only.  The study was technically difficult.  _____________________________________________________________________________  __        Left Ventricle  The left ventricle is normal in size. There is mild concentric left  ventricular hypertrophy. Diastolic Doppler findings (E/E' ratio and/or other  parameters) suggest left ventricular filling pressures are indeterminate. The  visual ejection fraction is estimated at 55-60%. Left ventricular systolic  function is normal.     Right Ventricle  The right " ventricle is normal in size and function.     Atria  Normal left atrial size. Right atrial size is normal. Intact atrial septum. No  dedicated color flow doppler across the interatrial septum was recorded  especially in the standard subcostal view. The innteratrial septum appears  intact by 2D echo only.     Mitral Valve  There is trace mitral regurgitation.        Tricuspid Valve  There is trace tricuspid regurgitation. The right ventricular systolic  pressure is approximated at 24.7 mmHg plus the right atrial pressure.     Aortic Valve  The aortic valve is trileaflet. There is trace aortic regurgitation. No  hemodynamically significant valvular aortic stenosis.     Pulmonic Valve  There is no pulmonic valvular regurgitation.     Vessels  Mild aortic root dilatation. (4.1 cm). The ascending aorta is Mildly dilated.  IVC diameter <2.1 cm collapsing >50% with sniff suggests a normal RA pressure  of 3 mmHg.     Pericardium  There is no pericardial effusion.        Rhythm  Sinus rhythm was noted.  _____________________________________________________________________________  __  MMode/2D Measurements & Calculations     IVSd: 1.2 cm  LVIDd: 4.1 cm  LVIDs: 2.5 cm  LVPWd: 1.2 cm  FS: 39.2 %  LV mass(C)d: 163.0 grams  LV mass(C)dI: 86.8 grams/m2  LA dimension: 2.8 cm  asc Aorta Diam: 3.9 cm  LVOT diam: 2.0 cm  LVOT area: 3.1 cm2  RWT: 0.58        Doppler Measurements & Calculations  MV E max susi: 89.8 cm/sec  MV A max susi: 94.8 cm/sec  MV E/A: 0.95  MV dec time: 0.25 sec  LV V1 max P.9 mmHg  LV V1 max: 110.6 cm/sec  LV V1 VTI: 23.6 cm  SV(LVOT): 74.1 ml  SI(LVOT): 39.5 ml/m2  PA acc time: 0.09 sec  TR max susi: 248.4 cm/sec  TR max P.7 mmHg     E/E' av.0  Lateral E/e': 13.0  Medial E/e': 11.1           _____________________________________________________________________________  __           Report approved by: Germain Joel 2020 02:37 PM                Disclaimer: This note consists of  symbols derived from keyboarding, dictation and/or voice recognition software. As a result, there may be errors in the script that have gone undetected. Please consider this when interpreting information found in this chart.

## 2020-11-20 ENCOUNTER — TELEPHONE (OUTPATIENT)
Dept: INTERNAL MEDICINE | Facility: CLINIC | Age: 75
End: 2020-11-20

## 2020-11-20 NOTE — TELEPHONE ENCOUNTER
[facility] now requests orders and shares plan of care/discharge summaries for some patients through [employer].  Please REPLY TO THIS MESSAGE OR ROUTE BACK TO THE AUTHOR in order to give authorization for orders when needed.  This is considered a verbal order, you will still receive a faxed copy of orders for signature.  Thank you for your assistance in improving collaboration for our patients.    ORDER  SN 1 week 6, 3 PRN   Wound care order every other day   Wash wounds with wound spray and pat dry. Apply grape amount of Iodosorb gel to cover wound. Cover with Mepilex dressing. WOCN to eval    SLP, OT, PT eval and treat    KAYLA Figueredo

## 2020-11-20 NOTE — TELEPHONE ENCOUNTER
Wife calls to schedule an appointment for patient. Appointment scheduled for 12/4/20.     ADELAIDE Nelson RN

## 2020-11-20 NOTE — PLAN OF CARE
Physical Therapy Discharge Summary    Reason for therapy discharge:    Discharged to home with home therapy.    Progress towards therapy goal(s). See goals on Care Plan in [facility] electronic health record for goal details.  Goals not met.  Barriers to achieving goals:   limited tolerance for therapy and discharge from facility.    Therapy recommendation(s):    Continued therapy is recommended.  Rationale/Recommendations:  TCU was recommended; patient discharged to home with assist of spouse and Home PT.

## 2020-11-20 NOTE — TELEPHONE ENCOUNTER
Patient's wife is calling to ask if in clinic appointment appointment for hospital follow up is needed or if it can/should be virtual.  If in person no need to call them. There is an appointment already scheduled.

## 2020-11-24 ENCOUNTER — VIRTUAL VISIT (OUTPATIENT)
Dept: INTERNAL MEDICINE | Facility: CLINIC | Age: 75
End: 2020-11-24
Payer: COMMERCIAL

## 2020-11-24 DIAGNOSIS — G12.23 PRIMARY LATERAL SCLEROSIS (H): ICD-10-CM

## 2020-11-24 DIAGNOSIS — I63.9 CEREBRAL INFARCTION, UNSPECIFIED MECHANISM (H): Primary | ICD-10-CM

## 2020-11-24 PROCEDURE — 99214 OFFICE O/P EST MOD 30 MIN: CPT | Mod: 95 | Performed by: INTERNAL MEDICINE

## 2020-11-24 RX ORDER — OXYBUTYNIN CHLORIDE 10 MG/1
20 TABLET, EXTENDED RELEASE ORAL AT BEDTIME
Qty: 180 TABLET | Refills: 3 | Status: CANCELLED | OUTPATIENT
Start: 2020-11-24

## 2020-11-24 RX ORDER — POTASSIUM CHLORIDE 750 MG/1
20 TABLET, EXTENDED RELEASE ORAL DAILY
Qty: 180 TABLET | Refills: 3 | Status: CANCELLED | OUTPATIENT
Start: 2020-11-24

## 2020-11-24 NOTE — PROGRESS NOTES
"Elier Barber is a 75 year old male who is being evaluated via a billable video visit.      The patient has been notified of following:     \"This video visit will be conducted via a call between you and your physician/provider. We have found that certain health care needs can be provided without the need for an in-person physical exam.  This service lets us provide the care you need with a video conversation.  If a prescription is necessary we can send it directly to your pharmacy.  If lab work is needed we can place an order for that and you can then stop by our lab to have the test done at a later time.    Video visits are billed at different rates depending on your insurance coverage.  Please reach out to your insurance provider with any questions.    If during the course of the call the physician/provider feels a video visit is not appropriate, you will not be charged for this service.\"    Patient has given verbal consent for Video visit? Yes  How would you like to obtain your AVS? Mail a copy  If you are dropped from the video visit, the video invite should be resent to: Text to cell phone: [phone]  Will anyone else be joining your video visit? No    Subjective     Elier Barber is a 75 year old male who presents today via video visit for the following health issues:    HPI      Video start time: 1:38    Hospital Follow-up Visit:    Hospital/Nursing Home/IP Rehab Facility: [facility]  Date of Admission: 11/16/2020  Date of Discharge: 11/19/2020  Reason(s) for Admission: CVA      Was your hospitalization related to COVID-19? No   Problems taking medications regularly:  None  Medication changes since discharge: None  Problems adhering to non-medication therapy:  None    Summary of hospitalization:  [facility] discharge summary reviewed  Diagnostic Tests/Treatments reviewed.  Follow up needed: none  Other Healthcare Providers Involved in Patient s Care:         Specialist appointment " - neurologist  Update since discharge: improved.    He has had some gradually increasing leg weakness, initially thought may be related to his neurologic condition but with progression, ended up in the ER with evidence of a subacute CVA, seen by neurology.  He is currently feeling stronger but does tire easily.  He is getting therapy at home.  He does have neurology follow-up in January.    They have questions regarding statin therapy.  He had been put on one after previous CVA but we had discontinued it after evaluation has not shown any significant large artery stenosis, there had been some potential side effects.They are wondering if he should restart it.    He continues to have a wound where he had a skin cancer treated with liquid nitrogen, during his been following this and treated with antibiotics recently.  Home care is considering having a wound nurse see him.    He is having some right shoulder pain but is seeing a chiropractor for this, also seen Dr. Alvarado.        Post Discharge Medication Reconciliation: discharge medications reconciled, continue medications without change.  Plan of care communicated with patient and family              Review of Systems   No fever, chills, headaches,      Objective           Vitals:  No vitals were obtained today due to virtual visit.    Physical Exam     GENERAL: Healthy, alert and no distress  EYES: Eyes grossly normal to inspection.  No discharge or erythema, or obvious scleral/conjunctival abnormalities.  RESP: No audible wheeze, cough, or visible cyanosis.  No visible retractions or increased work of breathing.    SKIN: Visible skin clear. No significant rash, abnormal pigmentation or lesions.  His wound is covered with dressing at this time   NEURO: Cranial nerves grossly intact.  Mentation and speech appropriate for age.  PSYCH: Mentation appears normal, affect normal/bright, judgement and insight intact, speech continues to be dysarthric from his underlying  primary lateral sclerosis              Assessment & Plan     Cerebral infarction, unspecified mechanism (H)  He has had a new acute infarct that seems to be related to small vessel disease.  Advised to discuss lipid treatment with the neurologist, we have to be very careful with medication since he had some issues previously    Primary lateral sclerosis (HCC)  Continued fatigue and weakness that is gradually improving, continue with home care,              Return in about 6 months (around 5/24/2021).    Lida Ray MD  [facility]      Video-Visit Details    Type of service:  Video Visit    Video End Time:2:09    Originating Location (pt. Location): Home    Distant Location (provider location):  [facility]     Platform used for Video Visit: [employer]

## 2020-11-27 ENCOUNTER — TELEPHONE (OUTPATIENT)
Dept: INTERNAL MEDICINE | Facility: CLINIC | Age: 75
End: 2020-11-27

## 2020-11-27 NOTE — TELEPHONE ENCOUNTER
[facility] now requests orders and shares plan of care/discharge summaries for some patients through [employer].  Please REPLY TO THIS MESSAGE OR ROUTE BACK TO THE AUTHOR in order to give authorization for orders when needed.  This is considered a verbal order, you will still receive a faxed copy of orders for signature.  Thank you for your assistance in improving collaboration for our patients.    ORDER OT 5m1 for big and loud program evaluation and treat.  Cecile Reece OTR/L

## 2020-11-30 ENCOUNTER — TELEPHONE (OUTPATIENT)
Dept: INTERNAL MEDICINE | Facility: CLINIC | Age: 75
End: 2020-11-30

## 2020-11-30 NOTE — TELEPHONE ENCOUNTER
[facility] now requests orders and shares plan of care/discharge summaries for some patients through [employer].  Please REPLY TO THIS MESSAGE OR ROUTE BACK TO THE AUTHOR in order to give authorization for orders when needed.  This is considered a verbal order, you will still receive a faxed copy of orders for signature.  Thank you for your assistance in improving collaboration for our patients.    ORDER  PT 2x/wk for 4 wks for strength, balance, transfers, ambulation

## 2020-12-01 ENCOUNTER — TELEPHONE (OUTPATIENT)
Dept: INTERNAL MEDICINE | Facility: CLINIC | Age: 75
End: 2020-12-01

## 2020-12-01 NOTE — TELEPHONE ENCOUNTER
Fax received from [facility] -  11/27/20 for review and signature.  Put in Dr. Ray's in basket.

## 2020-12-02 ENCOUNTER — TELEPHONE (OUTPATIENT)
Dept: INTERNAL MEDICINE | Facility: CLINIC | Age: 75
End: 2020-12-02

## 2020-12-02 ENCOUNTER — MYC MEDICAL ADVICE (OUTPATIENT)
Dept: INTERNAL MEDICINE | Facility: CLINIC | Age: 75
End: 2020-12-02

## 2020-12-02 ENCOUNTER — THERAPY VISIT (OUTPATIENT)
Dept: CHIROPRACTIC MEDICINE | Facility: CLINIC | Age: 75
End: 2020-12-02
Payer: COMMERCIAL

## 2020-12-02 DIAGNOSIS — M25.511 CHRONIC RIGHT SHOULDER PAIN: ICD-10-CM

## 2020-12-02 DIAGNOSIS — G89.29 CHRONIC RIGHT SHOULDER PAIN: ICD-10-CM

## 2020-12-02 DIAGNOSIS — M99.01 CERVICAL SEGMENT DYSFUNCTION: Primary | ICD-10-CM

## 2020-12-02 DIAGNOSIS — M54.2 CERVICALGIA: ICD-10-CM

## 2020-12-02 PROCEDURE — 97810 ACUP 1/> WO ESTIM 1ST 15 MIN: CPT | Mod: GA | Performed by: CHIROPRACTOR

## 2020-12-02 NOTE — TELEPHONE ENCOUNTER
Fax received from [facility] - PT 11/30/20 for review and signature.  Put in Dr. Ray's in basket.

## 2020-12-02 NOTE — PROGRESS NOTES
Visit #:  3 of 6, based on treatment plan    Subjective:  Elier Barbre is a 75 year old male who is seen in f/u up for:  chronic neck, upper back, mid-back, bilateral shoulder pain      Data Unavailable.     Since last visit on 11/16/20,  Elier Barber reports the following changes: Pain immediately after last treatment: 4/10 and their pain level today 5/10.  Patient reports having a slight decrease in upper back/shoulder pain after the treatment for a couple days and then the pain returns. Patient reports having a minor stroke around 11/13/20, states he went into the ER on 11/16/20 due leg weakness. States he was in the hospital about four days.     Area of chief complaint:  Cervical and Thoracic :  Symptoms are graded at 5/10. The quality is described as stiff, achey, dull.  Motion has remained about the same, no improvement, C6, C7, T3, T5. Patient feels overall no change.       Objective:  The following was observed:    P: palpatory tendernessTraps R>>L    A: static palpation demonstrates intersegmental asymmetry , cervical, thoracic    R: motion palpation notes restricted motion, C5 , C6 , T1 , T2  and T3     T: The following soft tissue hypotonicities were observed:  T paraspinals: right, ache and stiff, no      Assessment:    Segmental spinal dysfunction/restrictions found at:  C6   C7   T1   T2   T3    Diagnoses:    No diagnosis found.    Patient's condition:  Patient had restrictions pre-manipulation and Patient symptoms are gradually improving    Treatment effectiveness:  Tenderness is reducing      Procedures:  CMT:  No SMT-patient is having this done with another chiropractic provider. Last treatment was 2 weeks ago.     Modalities:  68145: Acupuncture, for 15 minutes:  Points: for neck, upper back, mid-back, shoulder-right-Patient seated    Therapeutic procedures:  None      Prognosis: Guarded    Progress towards Goals: Patient is making slight progress towards the goal     Response to Treatment:    Patient tolerated the treatment well today      Recommendations:    Instructions:  heat 15 minutes every other hour as needed    Follow-up:   Return to care in one week

## 2020-12-04 ENCOUNTER — VIRTUAL VISIT (OUTPATIENT)
Dept: ORTHOPEDICS | Facility: CLINIC | Age: 75
End: 2020-12-04
Payer: COMMERCIAL

## 2020-12-04 DIAGNOSIS — M19.011 PRIMARY OSTEOARTHRITIS OF RIGHT SHOULDER: ICD-10-CM

## 2020-12-04 DIAGNOSIS — M25.512 CHRONIC PAIN OF BOTH SHOULDERS: Primary | ICD-10-CM

## 2020-12-04 DIAGNOSIS — G89.29 CHRONIC PAIN OF BOTH SHOULDERS: Primary | ICD-10-CM

## 2020-12-04 DIAGNOSIS — M19.112 POST-TRAUMATIC OSTEOARTHRITIS OF LEFT SHOULDER: ICD-10-CM

## 2020-12-04 DIAGNOSIS — M25.511 CHRONIC PAIN OF BOTH SHOULDERS: Primary | ICD-10-CM

## 2020-12-04 PROCEDURE — 99213 OFFICE O/P EST LOW 20 MIN: CPT | Mod: GT | Performed by: FAMILY MEDICINE

## 2020-12-04 NOTE — PROGRESS NOTES
"Elier Barber is a 75 year old male who is being evaluated via a billable video visit.      The patient has been notified of following:     \"This video visit will be conducted via a call between you and your physician/provider. We have found that certain health care needs can be provided without the need for an in-person physical exam.  This service lets us provide the care you need with a video conversation.  If a prescription is necessary we can send it directly to your pharmacy.  If lab work is needed we can place an order for that and you can then stop by our lab to have the test done at a later time.    Video visits are billed at different rates depending on your insurance coverage.  Please reach out to your insurance provider with any questions.    If during the course of the call the physician/provider feels a video visit is not appropriate, you will not be charged for this service.\"    Patient has given verbal consent for Video visit? Yes  How would you like to obtain your AVS? MyChart  If you are dropped from the video visit, the video invite should be resent to: Text to cell phone: [phone]  Will anyone else be joining your video visit? No, wife will be in the video visit as well.         Video-Visit Details    Type of service:  Video Visit     Video Start Time: 10:59 AM  Video End Time: 11:18 AM    Originating Location (pt. Location): Home    Distant Location (provider location):  [facility] [place]     Platform used for Video Visit: [employer]        ASSESSMENT & PLAN  Patient Instructions     1. Chronic pain of both shoulders    2. Primary osteoarthritis of right shoulder    3. Post-traumatic osteoarthritis of left shoulder      -Patient is following up for bilateral shoulder pain that may be due to severe arthritis vs muscle spasms in the upper back; likely contributions from both sources  -Patient is interested in trying trigger point injections in the upper back to " alleviate some of his pain symptoms.  Patient reports that his therapist recently tied Theravance around his shoulder for better supports which helped decrease his pain.  Patient purchased over-the-counter shoulder slings to provide better support for his shoulders.  If he continues to benefit from supportive shoulders, part of his pain is likely due to the severe arthritis in shoulders.    -Patient will follow up on 12/15/2020 at 11 AM to administer trigger point injections.  We may consider an intra-articular cortisone injection at the next visit as well depending   -Call direct clinic number [[phone]] at any time with questions or concerns.    Albert Yeo MD [facility]  [facility]          -----    SUBJECTIVE:  Elier Barber is a 75 year old male who is seen in follow-up for bilateral shoulder pain.They were last seen saw Dr. Alvarado on 10/2/2020.     Since their last visit reports worsening pain. Both shoulders are painful, right hurts more than the left.  They indicate that their current pain level is 7/10. States pain gets better as the day goes on. They have tried Tylenol, previous imaging (xray 10/02/2020), chiropractic care (3 visits) and acupuncture. Yesterday the physical therapist was there and they used theraband as a shoulder brace, which seemed to help, so they are ordering a brace off [employer].       The patient is seen with their wife.    Patient's past medical, surgical, social, and family histories were reviewed today and no changes are noted.    REVIEW OF SYSTEMS:  Constitutional: NEGATIVE for fever, chills, change in weight  Skin: NEGATIVE for worrisome rashes, moles or lesions  GI/: NEGATIVE for bowel or bladder changes  Neuro: NEGATIVE for weakness, dizziness or paresthesias      Independent visualization of the below image:    Patient's conditions were thoroughly discussed during today's visit with greater than 50% of the visit spent  counseling the patient with total time spent face-to-face with the patient being 19 minutes.    Albert Yeo MD, CAM  [facility]

## 2020-12-04 NOTE — LETTER
"    12/4/2020         RE: Elier Barber  [address] [facility] [place] [facility] [address]        Dear Colleague,    Thank you for referring your patient, Elier Barber, to the [facility]. Please see a copy of my visit note below.    Elier Barber is a 75 year old male who is being evaluated via a billable video visit.      The patient has been notified of following:     \"This video visit will be conducted via a call between you and your physician/provider. We have found that certain health care needs can be provided without the need for an in-person physical exam.  This service lets us provide the care you need with a video conversation.  If a prescription is necessary we can send it directly to your pharmacy.  If lab work is needed we can place an order for that and you can then stop by our lab to have the test done at a later time.    Video visits are billed at different rates depending on your insurance coverage.  Please reach out to your insurance provider with any questions.    If during the course of the call the physician/provider feels a video visit is not appropriate, you will not be charged for this service.\"    Patient has given verbal consent for Video visit? Yes  How would you like to obtain your AVS? MyChart  If you are dropped from the video visit, the video invite should be resent to: Text to cell phone: [phone]  Will anyone else be joining your video visit? No, wife will be in the video visit as well.         Video-Visit Details    Type of service:  Video Visit     Video Start Time: 10:59 AM  Video End Time: 11:18 AM    Originating Location (pt. Location): Home    Distant Location (provider location):  [facility]     Platform used for Video Visit: [facility]        ASSESSMENT & PLAN  Patient Instructions     1. Chronic pain of both shoulders    2. Primary osteoarthritis of right shoulder    3. Post-traumatic " osteoarthritis of left shoulder      -Patient is following up for bilateral shoulder pain that may be due to severe arthritis vs muscle spasms in the upper back; likely contributions from both sources  -Patient is interested in trying trigger point injections in the upper back to alleviate some of his pain symptoms.  Patient reports that his therapist recently tied Theravance around his shoulder for better supports which helped decrease his pain.  Patient purchased over-the-counter shoulder slings to provide better support for his shoulders.  If he continues to benefit from supportive shoulders, part of his pain is likely due to the severe arthritis in shoulders.    -Patient will follow up on 12/15/2020 at 11 AM to administer trigger point injections.  We may consider an intra-articular cortisone injection at the next visit as well depending   -Call direct clinic number [[phone]] at any time with questions or concerns.    Albert Yeo MD [facility]  [facility]          -----    SUBJECTIVE:  Elier Barber is a 75 year old male who is seen in follow-up for bilateral shoulder pain.They were last seen saw Dr. Alvarado on 10/2/2020.     Since their last visit reports worsening pain. Both shoulders are painful, right hurts more than the left.  They indicate that their current pain level is 7/10. States pain gets better as the day goes on. They have tried Tylenol, previous imaging (xray 10/02/2020), chiropractic care (3 visits) and acupuncture. Yesterday the physical therapist was there and they used theraband as a shoulder brace, which seemed to help, so they are ordering a brace off [facility]       The patient is seen with their wife.    Patient's past medical, surgical, social, and family histories were reviewed today and no changes are noted.    REVIEW OF SYSTEMS:  Constitutional: NEGATIVE for fever, chills, change in weight  Skin: NEGATIVE for worrisome rashes,  moles or lesions  GI/: NEGATIVE for bowel or bladder changes  Neuro: NEGATIVE for weakness, dizziness or paresthesias      Independent visualization of the below image:    Patient's conditions were thoroughly discussed during today's visit with greater than 50% of the visit spent counseling the patient with total time spent face-to-face with the patient being 19 minutes.    Albert Yeo MD, [facility]            Again, thank you for allowing me to participate in the care of your patient.        Sincerely,        Albert Yeo, MD

## 2020-12-04 NOTE — PATIENT INSTRUCTIONS
1. Chronic pain of both shoulders    2. Primary osteoarthritis of right shoulder    3. Post-traumatic osteoarthritis of left shoulder      -Patient is following up for bilateral shoulder pain that may be due to severe arthritis vs muscle spasms in the upper back; likely contributions from both sources  -Patient is interested in trying trigger point injections in the upper back to alleviate some of his pain symptoms.  Patient reports that his therapist recently tied Theravance around his shoulder for better supports which helped decrease his pain.  Patient purchased over-the-counter shoulder slings to provide better support for his shoulders.  If he continues to benefit from supportive shoulders, part of his pain is likely due to the severe arthritis in shoulders.    -Patient will follow up on 12/15/2020 at 11 AM to administer trigger point injections.  We may consider an intra-articular cortisone injection at the next visit as well depending   -Call direct clinic number [[phone]] at any time with questions or concerns.    Albert Yeo MD [facility]  [facility] [place]

## 2020-12-08 ENCOUNTER — TELEPHONE (OUTPATIENT)
Dept: NEUROLOGY | Facility: CLINIC | Age: 75
End: 2020-12-08

## 2020-12-08 NOTE — TELEPHONE ENCOUNTER
I called Nader and relayed to him and Kelli the recommendations of one of our senior stroke neurologists regarding further vascular risk reduction, who reviewed Nader's chart and offered informal recommendations in view of the fact that his formal stroke consultation is not scheduled until January.    1. Aim for tighter BP control. Although the guidelines are 130/80, he indicated there is emerging evidence that aiming for BP below 120/80 (if tolerated) has additional benefit.    2. Consider very low-dose statin - atorvastatin 5 mg every other day or Rosuvastatin 2.5 mg daily.     I explained that he should discuss these with Dr Ray and explained that these are recommendations from a senior stroke specialist but ultimately the decision is hers and that she may prefer to wait for a formal opinion.     I will forward this to Dr Ray and to my colleague who is scheduled to see him next month.    Gary Tejada M.D.

## 2020-12-10 ENCOUNTER — MYC MEDICAL ADVICE (OUTPATIENT)
Dept: INTERNAL MEDICINE | Facility: CLINIC | Age: 75
End: 2020-12-10

## 2020-12-10 ENCOUNTER — TELEPHONE (OUTPATIENT)
Dept: INTERNAL MEDICINE | Facility: CLINIC | Age: 75
End: 2020-12-10

## 2020-12-15 ENCOUNTER — OFFICE VISIT (OUTPATIENT)
Dept: ORTHOPEDICS | Facility: CLINIC | Age: 75
End: 2020-12-15
Payer: COMMERCIAL

## 2020-12-15 DIAGNOSIS — S46.911D MUSCLE STRAIN OF RIGHT SCAPULAR REGION, SUBSEQUENT ENCOUNTER: ICD-10-CM

## 2020-12-15 DIAGNOSIS — M19.011 PRIMARY OSTEOARTHRITIS OF RIGHT SHOULDER: Primary | ICD-10-CM

## 2020-12-15 PROCEDURE — 20611 DRAIN/INJ JOINT/BURSA W/US: CPT | Mod: RT | Performed by: FAMILY MEDICINE

## 2020-12-15 RX ORDER — METHYLPREDNISOLONE ACETATE 40 MG/ML
40 INJECTION, SUSPENSION INTRA-ARTICULAR; INTRALESIONAL; INTRAMUSCULAR; SOFT TISSUE
Status: DISCONTINUED | OUTPATIENT
Start: 2020-12-15 | End: 2021-06-17

## 2020-12-15 RX ADMIN — METHYLPREDNISOLONE ACETATE 40 MG: 40 INJECTION, SUSPENSION INTRA-ARTICULAR; INTRALESIONAL; INTRAMUSCULAR; SOFT TISSUE at 11:34

## 2020-12-15 NOTE — PATIENT INSTRUCTIONS
1. Primary osteoarthritis of right shoulder    2. Muscle strain of right scapular region, subsequent encounter      -Patient has right shoulder pain due to severe arthritis and upper back pain due to scapular muscle strains  -Patient tolerated intra-articular cortisone injection today without complications.  Patient was given postprocedure instructions  -Patient will follow up when pain returns for reevaluation and repeat treatment.  If patient reports ongoing upper back and scapular pain, to consider returning for possible trigger point injection.  -Patient will continue with home therapy and chiropractic treatments as needed  -Call direct clinic number [[phone]] at any time with questions or concerns.    Albert Yeo MD CAQSM  [facility]  [facility] [place]

## 2020-12-15 NOTE — PROGRESS NOTES
ASSESSMENT & PLAN  Patient Instructions     1. Primary osteoarthritis of right shoulder    2. Muscle strain of right scapular region, subsequent encounter      -Patient has right shoulder pain due to severe arthritis and upper back pain due to scapular muscle strains  -Patient tolerated intra-articular cortisone injection today without complications.  Patient was given postprocedure instructions  -Patient will follow up when pain returns for reevaluation and repeat treatment.  If patient reports ongoing upper back and scapular pain, to consider returning for possible trigger point injection.  -Patient will continue with home therapy and chiropractic treatments as needed  -Call direct clinic number [[phone]] at any time with questions or concerns.    Albert Yeo MD [facility]  [facility]          -----    SUBJECTIVE:  Elier Barber is a 75 year old male who is seen for right shoulder injection.    Large Joint Injection/Arthocentesis: R glenohumeral joint    Date/Time: 12/15/2020 11:34 AM  Performed by: Yeo, Albert, MD  Authorized by: Yeo, Albert, MD     Indications:  Pain and osteoarthritis  Needle Size:  22 G  Guidance: ultrasound    Approach:  Posterior  Location:  Shoulder      Site:  R glenohumeral joint  Medications:  40 mg methylPREDNISolone 40 MG/ML  Outcome:  Tolerated well, no immediate complications  Procedure discussed: discussed risks, benefits, and alternatives    Consent Given by:  Patient  Timeout: timeout called immediately prior to procedure    Prep: patient was prepped and draped in usual sterile fashion            Patient rates pain as 7/10 pre-procedure. Patient rates pain as 0/10 post-procedure.      Albert Yeo MD, [facility]

## 2020-12-15 NOTE — LETTER
12/15/2020         RE: Elier Barber  [address] [facility] [address]        Dear Colleague,    Thank you for referring your patient, Elier Barber, to the [facility] [place]. Please see a copy of my visit note below.    ASSESSMENT & PLAN  Patient Instructions     1. Primary osteoarthritis of right shoulder    2. Muscle strain of right scapular region, subsequent encounter      -Patient has right shoulder pain due to severe arthritis and upper back pain due to scapular muscle strains  -Patient tolerated intra-articular cortisone injection today without complications.  Patient was given postprocedure instructions  -Patient will follow up when pain returns for reevaluation and repeat treatment.  If patient reports ongoing upper back and scapular pain, to consider returning for possible trigger point injection.  -Patient will continue with home therapy and chiropractic treatments as needed  -Call direct clinic number [[phone]] at any time with questions or concerns.    Albert Yeo MD [facility]  [facility]          -----    SUBJECTIVE:  Elier Barber is a 75 year old male who is seen for right shoulder injection.    Large Joint Injection/Arthocentesis: R glenohumeral joint    Date/Time: 12/15/2020 11:34 AM  Performed by: Yeo, Albert, MD  Authorized by: Yeo, Albert, MD     Indications:  Pain and osteoarthritis  Needle Size:  22 G  Guidance: ultrasound    Approach:  Posterior  Location:  Shoulder      Site:  R glenohumeral joint  Medications:  40 mg methylPREDNISolone 40 MG/ML  Outcome:  Tolerated well, no immediate complications  Procedure discussed: discussed risks, benefits, and alternatives    Consent Given by:  Patient  Timeout: timeout called immediately prior to procedure    Prep: patient was prepped and draped in usual sterile fashion            Patient rates pain as 7/10 pre-procedure. Patient rates  pain as 0/10 post-procedure.      Albert Yeo MD, [facility]        Again, thank you for allowing me to participate in the care of your patient.        Sincerely,        Albert Yeo, MD

## 2020-12-16 ENCOUNTER — MYC MEDICAL ADVICE (OUTPATIENT)
Dept: INTERNAL MEDICINE | Facility: CLINIC | Age: 75
End: 2020-12-16

## 2020-12-21 ENCOUNTER — TELEPHONE (OUTPATIENT)
Dept: ORTHOPEDICS | Facility: CLINIC | Age: 75
End: 2020-12-21

## 2020-12-21 NOTE — TELEPHONE ENCOUNTER
Wife, Kelli calls for patient.   Consent to communicate on file to speak with her.   She states patient had a cortisone injection of his shoulder with Dr. Yeo and was told it could elevate his blood pressure.   She states his B/P is 140/80 and is usually 128/70. He also recently had his blood pressure medication increased to twice a day. He denies headache or any side effects.    Per chart review, patient was to increase Enalapril 5mg to 2 tabs twice a day. She states he has been doing that since 12/18/20.   On 12/17/20 blood pressure was its highest at 159/91 so has improved somewhat.   Discussed that blood pressure may elevate for a couple days only. Recommended they discuss with PCP of what is recommended due to patients stroke history and recent changes.   She verbalized understanding.     ADELAIDE Nelson RN

## 2020-12-23 ENCOUNTER — TELEPHONE (OUTPATIENT)
Dept: INTERNAL MEDICINE | Facility: CLINIC | Age: 75
End: 2020-12-23

## 2020-12-23 ENCOUNTER — MYC MEDICAL ADVICE (OUTPATIENT)
Dept: INTERNAL MEDICINE | Facility: CLINIC | Age: 75
End: 2020-12-23

## 2020-12-23 DIAGNOSIS — I10 ESSENTIAL HYPERTENSION, BENIGN: Primary | ICD-10-CM

## 2020-12-23 NOTE — TELEPHONE ENCOUNTER
[facility] now requests orders and shares plan of care/discharge summaries for some patients through [employer].  Please REPLY TO THIS MESSAGE OR ROUTE BACK TO THE AUTHOR in order to give authorization for orders when needed.  This is considered a verbal order, you will still receive a faxed copy of orders for signature.  Thank you for your assistance in improving collaboration for our patients.    ORDER     Clt leg ulcer have improved we have manage to get 2 of 4 closed. Have been using iodosorb gel  For past month.  Would like to change treatment.     1. Wash wounds with wound spray and pat dry  2. Apply  Small piece of promogran hattie ( has silver in it) to wound bed  3. Cover with foam dressing  Change drsg every other day  Spouse to complete on non nursing days

## 2020-12-24 DIAGNOSIS — Z53.9 DIAGNOSIS NOT YET DEFINED: Primary | ICD-10-CM

## 2020-12-24 PROCEDURE — G0180 MD CERTIFICATION HHA PATIENT: HCPCS | Performed by: INTERNAL MEDICINE

## 2020-12-24 RX ORDER — ENALAPRIL MALEATE 10 MG/1
10 TABLET ORAL 2 TIMES DAILY
Qty: 180 TABLET | Refills: 1 | Status: SHIPPED | OUTPATIENT
Start: 2020-12-24 | End: 2021-06-25

## 2020-12-27 DIAGNOSIS — L30.9 DERMATITIS: ICD-10-CM

## 2020-12-29 ENCOUNTER — TELEPHONE (OUTPATIENT)
Dept: INTERNAL MEDICINE | Facility: CLINIC | Age: 75
End: 2020-12-29

## 2020-12-30 RX ORDER — KETOCONAZOLE 20 MG/ML
SHAMPOO TOPICAL
Qty: 120 ML | Refills: 10 | Status: SHIPPED | OUTPATIENT
Start: 2020-12-30 | End: 2021-10-07

## 2020-12-30 NOTE — TELEPHONE ENCOUNTER
Pending Prescriptions:                       Disp   Refills    ketoconazole (NIZORAL) 2 % external shampo*120 mL 10       Sig: SHAMPOO EVERY 2-3 DAYS AS  NEEDED      Routing refill request to provider for review/approval because:  Drug interaction warning    Please advise, thanks.  Routed to covering provider.

## 2020-12-31 ENCOUNTER — TELEPHONE (OUTPATIENT)
Dept: INTERNAL MEDICINE | Facility: CLINIC | Age: 75
End: 2020-12-31

## 2021-01-01 ENCOUNTER — TELEPHONE (OUTPATIENT)
Dept: INTERNAL MEDICINE | Facility: CLINIC | Age: 76
End: 2021-01-01
Payer: COMMERCIAL

## 2021-01-01 ENCOUNTER — MEDICAL CORRESPONDENCE (OUTPATIENT)
Dept: HEALTH INFORMATION MANAGEMENT | Facility: CLINIC | Age: 76
End: 2021-01-01
Payer: COMMERCIAL

## 2021-01-07 ENCOUNTER — TELEPHONE (OUTPATIENT)
Dept: INTERNAL MEDICINE | Facility: CLINIC | Age: 76
End: 2021-01-07

## 2021-01-07 ENCOUNTER — TRANSFERRED RECORDS (OUTPATIENT)
Dept: HEALTH INFORMATION MANAGEMENT | Facility: CLINIC | Age: 76
End: 2021-01-07

## 2021-01-08 ENCOUNTER — TELEPHONE (OUTPATIENT)
Dept: ORTHOPEDICS | Facility: CLINIC | Age: 76
End: 2021-01-08

## 2021-01-08 NOTE — TELEPHONE ENCOUNTER
Upon chart review, patient is scheduled for an appointment with Dr. Alvarado on Tuesday, 1/12/21 for trigger point injections. Patient was last seen by Dr. Yeo on 12/15/20. Plan per LOV:     -Patient has right shoulder pain due to severe arthritis and upper back pain due to scapular muscle strains  -Patient tolerated intra-articular cortisone injection today without complications.  Patient was given postprocedure instructions  -Patient will follow up when pain returns for reevaluation and repeat treatment.  If patient reports ongoing upper back and scapular pain, to consider returning for possible trigger point injection.  -Patient will continue with home therapy and chiropractic treatments as needed  -Call direct clinic number [[phone]] at any time with questions or concerns.    Dr. Alvarado does not perform these injections and would recommend patient follow up with Dr. Yeo.     Phone call to patient and patient's wife Kelli per note in chart. Consent to communicate on file for patient's wife Kelli.     Left detailed message with Dr. Alvarado's recommendations. Provided triage number for call back to discuss further and to reschedule appt with Dr. Yeo.    Karen Silva MBA, ATC

## 2021-01-08 NOTE — TELEPHONE ENCOUNTER
Kelli, wife, returns call. She states she was not able to understand the message left.   Informed that Dr. Alvarado does not do trigger point injections. Apologized for the confusion.   She states Dr. Yeo told them patient could follow up with Dr. Alvarado while he was out of the office. When they went to schedule, the  was new and had to ask for assistance from a manager to schedule the appointment. She feels that person requires more training. Apologized for the confusion and informed writer would pass that information on.     Rescheduled appointment with Dr. Yeo to 1/22/21.   Informed that per last office visit note : Patient will follow up when pain returns for reevaluation and repeat treatment.  If patient reports ongoing upper back and scapular pain, to consider returning for possible trigger point injection.    Informed Dr. Yeo will reevaluate and decide at appointment if trigger point injections are appropriate, so not a guarantee he can get them same day. A longer appointment was scheduled to allow for injections if able. She verbalized understanding.     ADELAIDE Nelson RN

## 2021-01-19 ENCOUNTER — VIRTUAL VISIT (OUTPATIENT)
Dept: NEUROLOGY | Facility: CLINIC | Age: 76
End: 2021-01-19
Attending: PSYCHIATRY & NEUROLOGY
Payer: COMMERCIAL

## 2021-01-19 ENCOUNTER — PRE VISIT (OUTPATIENT)
Dept: NEUROLOGY | Facility: CLINIC | Age: 76
End: 2021-01-19

## 2021-01-19 ENCOUNTER — VIRTUAL VISIT (OUTPATIENT)
Dept: INTERNAL MEDICINE | Facility: CLINIC | Age: 76
End: 2021-01-19
Payer: COMMERCIAL

## 2021-01-19 DIAGNOSIS — E78.5 HYPERLIPIDEMIA LDL GOAL <100: ICD-10-CM

## 2021-01-19 DIAGNOSIS — I63.411 CEREBROVASCULAR ACCIDENT (CVA) DUE TO EMBOLISM OF RIGHT MIDDLE CEREBRAL ARTERY (H): Primary | ICD-10-CM

## 2021-01-19 DIAGNOSIS — R35.0 URINARY FREQUENCY: ICD-10-CM

## 2021-01-19 DIAGNOSIS — I10 ESSENTIAL HYPERTENSION, BENIGN: ICD-10-CM

## 2021-01-19 DIAGNOSIS — I67.2 CEREBRAL ATHEROSCLEROSIS: Primary | ICD-10-CM

## 2021-01-19 PROCEDURE — 99215 OFFICE O/P EST HI 40 MIN: CPT | Mod: 95 | Performed by: PSYCHIATRY & NEUROLOGY

## 2021-01-19 PROCEDURE — 99214 OFFICE O/P EST MOD 30 MIN: CPT | Mod: 95 | Performed by: INTERNAL MEDICINE

## 2021-01-19 RX ORDER — ROSUVASTATIN CALCIUM 5 MG/1
5 TABLET, COATED ORAL DAILY
Qty: 30 TABLET | Refills: 1 | Status: SHIPPED | OUTPATIENT
Start: 2021-01-19 | End: 2021-02-15

## 2021-01-19 NOTE — PROGRESS NOTES
Nader is a 75 year old who is being evaluated via a billable video visit.      How would you like to obtain your AVS? Tatet  Patients wife will be joining video    Video Start Time: 9:10 AM  Video-Visit Details    Type of service:  Video Visit    Video End Time:10 AM    Originating Location (pt. Location): Home    Distant Location (provider location):  [facility]     Platform used for Video Visit: Makenzie Navarro, EMT

## 2021-01-19 NOTE — PROGRESS NOTES
"[facility] [address]  [address] [facility] [id number] [phone]          January 19, 2021    Elier Barber                                                                                                                     [address] [facility] [address]      Total time = 80 minutes video visit (9:10 AM to 10 AM ) + 30 minutes reviewing old imaging and medical history and documentation     Mr. Barber was seen via video visit with his wife, Kelli, who was helpful with the technology and also helped with history. 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 The most recent event was around 11/16/2020 when the brain MRI showed a small subacute infarct near the R pre-central gyrus. The radiology impression is that it is located in the white matter though my impression is that it is quite close to the gyrus. MRA did not show any carotid disease though there was atherosclerosis in the PCA territory which is non-explanatory for this stroke location. The images from 2014 and 2018 were also reviewed. These strokes are indeed small and appear to be due to small vessel disease in etiology. In the 2014 scan there is one clear infarct in the posterior limb of the IC and possibly a small focus near the posterior horn of the lateral ventricle. The patient's main symptom with the most recent stroke event was worsening LE weakness especially of the L LE. Mrs. Barber states that this strength is now back to his pre-stroke baseline. He walks with a walker even pre-stroke for many years and his gait is at his baseline now. 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 He has been on dual anti-platelets since 2018. He is not on a statin and states that he had tried it in the past but discontinued due to not tolerating it well. He reports two episodes of \"whole body " "cramping\" recently. His wife thinks that his urinary urgency may have worsened.    ASSESSMENT / PLAN    Encounter Diagnosis   Name Primary?     Cerebrovascular accident (CVA) due to embolism of right middle cerebral artery (H) Yes     Orders Placed This Encounter   Procedures     Basic metabolic panel     Calcium ionized     CBC with platelets     Leadless EKG Monitor 8 to 14 Days     This gentleman has had multiple small ischemic strokes. They largely appear to be of small vessel etiology though the most recent infarct is close to the gyrus. He has not had any cardiac monitoring and a 14-day zio has been ordered. We had a discussion on optimal BP. While there are recent studies advocating for lower BP, in the case of the elderly the ideal BP is a matter of debate. Based on guidelines, recommendations of ACC (based on SPRINT) and other sources < 130 / 80 mm Hg is reasonable in this gentleman as long as he tolerates it without any symptoms such as dizziness. A small dose of statin will be helpful given intracranial atherosclerotics though - he has had side effects in the past. Rosuvastatin 2.5 mg daily may be a consideration. (Dr. Tejada's note from 12/8 has recommendations on statins). Will copy primary care. I have ordered blood work up for screening for electrolyte imbalances given cramping. They will also call primary care to discuss urinary symptoms.            Brain MRI  IMPRESSION:  1.  Small probable subacute infarction in the subcortical white matter of the right precentral gyrus. No associated hemorrhage or mass effect.  2.  Moderate presumed chronic small vessel ischemic changes.    HEAD MRA:   1.  No major vessel occlusion or flow-limiting stenosis.  2.  Multifocal areas of irregularity and narrowing primarily involving the posterior cerebral arteries consistent with intracranial atherosclerosis.     NECK MRA:  1.  No measurable stenosis in either proximal internal carotid artery.  2.  Vertebral arteries " are patent through the neck and into the head.  3.  There are nonflow limiting stenoses in the proximal bilateral common carotid arteries and the proximal left subclavian artery.    18  IMPRESSION:    1. Motion degraded images with probable area of restricted diffusion  in the right frontal white matter concerning for infarct.  2. Diffuse parenchymal volume loss and white matter changes likely due  to chronic microvascular ischemic disease.  3. No evidence of acute intracranial hemorrhage, mass, or herniation.    2014  IMPRESSION:  1. 1 cm acute ischemic infarct in the posterior limb of the right  internal capsule.  2. Mild cerebral atrophy  3. Scattered nonspecific white matter changes which are probably due  to chronic small vessel ischemic disease given the patient's age.          Current Outpatient Medications   Medication     acetaminophen (TYLENOL) 650 MG CR tablet     aspirin 81 MG tablet     baclofen (LIORESAL) intraTHECAL Internal Pump     Cadexomer Iodine (IODOSORB EX)     clopidogrel (PLAVIX) 75 MG tablet     enalapril (VASOTEC) 10 MG tablet     EPINEPHrine 0.3 MG/0.3ML injection     ketoconazole (NIZORAL) 2 % external shampoo     minocycline (MINOCIN) 100 MG capsule     mupirocin (BACTROBAN) 2 % external ointment     oxybutynin ER (DITROPAN-XL) 10 MG 24 hr tablet     pantoprazole (PROTONIX) 40 MG EC tablet     polyethylene glycol (MIRALAX/GLYCOLAX) Packet     potassium chloride ER (K-TAB/KLOR-CON) 10 MEQ CR tablet     STATIN NOT PRESCRIBED, INTENTIONAL,     Current Facility-Administered Medications   Medication     methylPREDNISolone (DEPO-MEDROL) injection 40 mg     Social History     Tobacco Use     Smoking status: Former Smoker     Packs/day: 0.30     Years: 6.00     Pack years: 1.80     Types: Cigarettes     Start date: 1970     Quit date: 10/5/1976     Years since quittin.3     Smokeless tobacco: Never Used   Substance Use Topics     Alcohol use: Yes     Frequency: 2-4 times a  month     Drinks per session: 1 or 2     Comment: 1 drink/week     Drug use: No         EXAM          Orientation: Normal; Language normal  Comprehension and very dysarthric production (spastic dysarthria) but content appropriate; Attention: normal  Cranial nerves: EOMI, face symmetric tongue midline. Shoulder shrug symmetric    Motor: FFM decreased bilaterally R worse than L. No drift;   Strength exam limited by video visit but antigravity or better in both UE and LE especially proximal muscle groups    Sensory: no deficits to LT  Co-ordination normal in both UE     Gait: walks slowly with walker      Aaron Brito MD

## 2021-01-19 NOTE — LETTER
"1/19/2021       RE: Elier Barber  [address] [facility] [address]     Dear Colleague,    Thank you for referring your patient, Elier Barber, to the [facility] [place] [facility]. Please see a copy of my visit note below.    Nader is a 75 year old who is being evaluated via a billable video visit.      How would you like to obtain your AVS? Mychart  Patients wife will be joining video    Video Start Time: 9:10 AM  Video-Visit Details    Type of service:  Video Visit    Video End Time:10 AM    Originating Location (pt. Location): Home    Distant Location (provider location):  [facility] NEUROLOGY [facility]     Platform used for Video Visit: Makenzie Navarro, EMT            [facility] NEUROLOGY [address]  [address] [facility] [id number] [phone]          January 19, 2021    Elier Barber                                                                                                                     [address] [facility] [address]      Total time = 80 minutes video visit (9:10 AM to 10 AM ) + 30 minutes reviewing old imaging and medical history and documentation     Mr. Barber was seen via video visit with his wife, Kelli, who was helpful with the technology and also helped with history. 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 The most recent event was around 11/16/2020 when the brain MRI showed a small subacute infarct near the R pre-central gyrus. The radiology impression is that it is located in the white matter though my impression is that it is quite close to the gyrus. MRA did not show any carotid disease though there was atherosclerosis in the PCA territory which is non-explanatory for this stroke location. The images from 2014 and 2018 were also reviewed. These strokes are indeed small " "and appear to be due to small vessel disease in etiology. In the 2014 scan there is one clear infarct in the posterior limb of the IC and possibly a small focus near the posterior horn of the lateral ventricle. The patient's main symptom with the most recent stroke event was worsening LE weakness especially of the L LE. Mrs. Barber states that this strength is now back to his pre-stroke baseline. He walks with a walker even pre-stroke for many years and his gait is at his baseline now. 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 He has been on dual anti-platelets since 2018. He is not on a statin and states that he had tried it in the past but discontinued due to not tolerating it well. He reports two episodes of \"whole body cramping\" recently. His wife thinks that his urinary urgency may have worsened.    ASSESSMENT / PLAN    Encounter Diagnosis   Name Primary?     Cerebrovascular accident (CVA) due to embolism of right middle cerebral artery (H) Yes     Orders Placed This Encounter   Procedures     Basic metabolic panel     Calcium ionized     CBC with platelets     Leadless EKG Monitor 8 to 14 Days     This gentleman has had multiple small ischemic strokes. They largely appear to be of small vessel etiology though the most recent infarct is close to the gyrus. He has not had any cardiac monitoring and a 14-day zio has been ordered. We had a discussion on optimal BP. While there are recent studies advocating for lower BP, in the case of the elderly the ideal BP is a matter of debate. Based on guidelines, recommendations of ACC (based on SPRINT) and other sources < 130 / 80 mm Hg is reasonable in this gentleman as long as he tolerates it without any symptoms such as dizziness. A small dose of statin will be helpful given intracranial atherosclerotics though - he has had side effects in the past. Rosuvastatin 2.5 mg daily may be a consideration. (Dr. Tejada's note from 12/8 has " recommendations on statins). Will copy primary care. I have ordered blood work up for screening for electrolyte imbalances given cramping. They will also call primary care to discuss urinary symptoms.            Brain MRI  IMPRESSION:  1.  Small probable subacute infarction in the subcortical white matter of the right precentral gyrus. No associated hemorrhage or mass effect.  2.  Moderate presumed chronic small vessel ischemic changes.    HEAD MRA:   1.  No major vessel occlusion or flow-limiting stenosis.  2.  Multifocal areas of irregularity and narrowing primarily involving the posterior cerebral arteries consistent with intracranial atherosclerosis.     NECK MRA:  1.  No measurable stenosis in either proximal internal carotid artery.  2.  Vertebral arteries are patent through the neck and into the head.  3.  There are nonflow limiting stenoses in the proximal bilateral common carotid arteries and the proximal left subclavian artery.    5/24/18  IMPRESSION:    1. Motion degraded images with probable area of restricted diffusion  in the right frontal white matter concerning for infarct.  2. Diffuse parenchymal volume loss and white matter changes likely due  to chronic microvascular ischemic disease.  3. No evidence of acute intracranial hemorrhage, mass, or herniation.    6/5/2014  IMPRESSION:  1. 1 cm acute ischemic infarct in the posterior limb of the right  internal capsule.  2. Mild cerebral atrophy  3. Scattered nonspecific white matter changes which are probably due  to chronic small vessel ischemic disease given the patient's age.          Current Outpatient Medications   Medication     acetaminophen (TYLENOL) 650 MG CR tablet     aspirin 81 MG tablet     baclofen (LIORESAL) intraTHECAL Internal Pump     Cadexomer Iodine (IODOSORB EX)     clopidogrel (PLAVIX) 75 MG tablet     enalapril (VASOTEC) 10 MG tablet     EPINEPHrine 0.3 MG/0.3ML injection     ketoconazole (NIZORAL) 2 % external shampoo      minocycline (MINOCIN) 100 MG capsule     mupirocin (BACTROBAN) 2 % external ointment     oxybutynin ER (DITROPAN-XL) 10 MG 24 hr tablet     pantoprazole (PROTONIX) 40 MG EC tablet     polyethylene glycol (MIRALAX/GLYCOLAX) Packet     potassium chloride ER (K-TAB/KLOR-CON) 10 MEQ CR tablet     STATIN NOT PRESCRIBED, INTENTIONAL,     Current Facility-Administered Medications   Medication     methylPREDNISolone (DEPO-MEDROL) injection 40 mg     Social History     Tobacco Use     Smoking status: Former Smoker     Packs/day: 0.30     Years: 6.00     Pack years: 1.80     Types: Cigarettes     Start date: 1970     Quit date: 10/5/1976     Years since quittin.3     Smokeless tobacco: Never Used   Substance Use Topics     Alcohol use: Yes     Frequency: 2-4 times a month     Drinks per session: 1 or 2     Comment: 1 drink/week     Drug use: No         EXAM          Orientation: Normal; Language normal  Comprehension and very dysarthric production (spastic dysarthria) but content appropriate; Attention: normal  Cranial nerves: EOMI, face symmetric tongue midline. Shoulder shrug symmetric    Motor: FFM decreased bilaterally R worse than L. No drift;   Strength exam limited by video visit but antigravity or better in both UE and LE especially proximal muscle groups    Sensory: no deficits to LT  Co-ordination normal in both UE     Gait: walks slowly with walker      Aaron Brito MD

## 2021-01-19 NOTE — PROGRESS NOTES
Nader is a 75 year old who is being evaluated via a billable video visit.      How would you like to obtain your AVS? MyChart  If the video visit is dropped, the invitation should be resent by: Send to e-mail at: [email or web address]  Will anyone else be joining your video visit? No      Video Start Time: 1:03    Assessment & Plan     Cerebral atherosclerosis  Vascular try rosuvastatin low-dose, could even start a couple times a week and try to gradually increase, stop if continued muscle symptoms and call.  - rosuvastatin (CRESTOR) 5 MG tablet; Take 1 tablet (5 mg) by mouth daily    Essential hypertension, benign  Improved control, continue medication    Hyperlipidemia LDL goal <100    - rosuvastatin (CRESTOR) 5 MG tablet; Take 1 tablet (5 mg) by mouth daily    Urinary frequency  We will have him collect a urine though symptoms are not strongly suggestive of bladder infection.  - *UA reflex to Microscopic and Culture ([place] [facility] (except [place] and [place]); Future        Return in about 8 months (around 9/9/2021) for Wellness visit.    Lida Ray MD  [facility]     Nader is a 75 year old who presents to clinic today for the following health issues  accompanied by his spouse:    HPI   1.  Urinary symptoms: He has been having more urgency the last few weeks, urine is a little bit dark.  There is no true dysuria, change in odor, no cloudiness no blood.  They are concerned it may be a bladder infection.  No fever or chills but he had 2 times last week where his body went into spasms.  Not sure if that was related to bladder.    2.  The stroke specialist at the [facility] recommended trying the statin again due to his imaging.  They also ordered labs and a heart monitor.      3.  Hypertension: His blood pressures been running 120-130 over 70s since he increase his enalapril            Patient Active Problem List   Diagnosis     Essential hypertension, benign      Primary lateral sclerosis (HCC)     Prostate CA (HCC)     Hyperlipidemia LDL goal <100     Vertigo     Cerebral infarction (H)     Advanced directives, counseling/discussion     Muscle spasticity     Edema, unspecified type     Gastroesophageal reflux disease without esophagitis     Cerebral atherosclerosis     Current Outpatient Medications   Medication Sig Dispense Refill     acetaminophen (TYLENOL) 650 MG CR tablet Take 650 mg by mouth 2 times daily        aspirin 81 MG tablet Take 81 mg by mouth daily       baclofen (LIORESAL) intraTHECAL Internal Pump by Intrathecal route continuous prn Pump filled by [facility] stroke [place] [phone]  Pump Model: Syncromed  Last fill:  10/2020  Next fill:   3/17/2021  Low [place] Alarm Date:   Reservoir Volume: 20 ml  Conc: 2000 mcg/ml  Delivers 234.7 mcg/day  Basal rate:unknown  Battery life: replace in 33 months.       Cadexomer Iodine (IODOSORB EX)        clopidogrel (PLAVIX) 75 MG tablet Take 75 mg by mouth daily        enalapril (VASOTEC) 10 MG tablet Take 1 tablet (10 mg) by mouth 2 times daily 180 tablet 1     EPINEPHrine 0.3 MG/0.3ML injection Inject 0.3 mLs (0.3 mg) into the muscle as needed for anaphylaxis 0.3 mL 3     ketoconazole (NIZORAL) 2 % external shampoo SHAMPOO EVERY 2-3 DAYS AS  NEEDED 120 mL 10     minocycline (MINOCIN) 100 MG capsule Take 100 mg by mouth 2 times daily Staph infection.       mupirocin (BACTROBAN) 2 % external ointment Apply topically 2 times daily Wound left shin.       oxybutynin ER (DITROPAN-XL) 10 MG 24 hr tablet Take 2 tablets (20 mg) by mouth At Bedtime 180 tablet 3     pantoprazole (PROTONIX) 40 MG EC tablet Take 1 tablet (40 mg) by mouth daily 90 tablet 3     polyethylene glycol (MIRALAX/GLYCOLAX) Packet Take 1 packet by mouth daily as needed Every 2-3 days.       potassium chloride ER (K-TAB/KLOR-CON) 10 MEQ CR tablet Take 2 tablets (20 mEq) by mouth daily 180 tablet 3     rosuvastatin  (CRESTOR) 5 MG tablet Take 1 tablet (5 mg) by mouth daily 30 tablet 1     STATIN NOT PRESCRIBED, INTENTIONAL, Please choose reason not prescribed, below (Patient not taking: Reported on 10/2/2020)            Review of Systems   No fever, chills, hematuria, abdominal pain, flank pain      Objective           Vitals:  No vitals were obtained today due to virtual visit.    Physical Exam   GENERAL: Healthy, alert and no distress  EYES: Eyes grossly normal to inspection.  No discharge or erythema, or obvious scleral/conjunctival abnormalities.  RESP: No audible wheeze, cough, or visible cyanosis.  No visible retractions or increased work of breathing.    SKIN: Visible skin clear. No significant rash, abnormal pigmentation or lesions.  NEURO: Cranial nerves grossly intact.  Mentation and speech appropriate for age.  PSYCH: Mentation appears normal, affect normal/bright, judgement and insight intact, normal speech and appearance well-groomed.                Video-Visit Details    Type of service:  Video Visit    Video End Time:1:23    Originating Location (pt. Location): Home    Distant Location (provider location):  [facility]     Platform used for Video Visit: [employer]

## 2021-01-20 ENCOUNTER — TRANSFERRED RECORDS (OUTPATIENT)
Dept: HEALTH INFORMATION MANAGEMENT | Facility: CLINIC | Age: 76
End: 2021-01-20

## 2021-01-20 ENCOUNTER — TELEPHONE (OUTPATIENT)
Dept: INTERNAL MEDICINE | Facility: CLINIC | Age: 76
End: 2021-01-20

## 2021-01-21 ENCOUNTER — MEDICAL CORRESPONDENCE (OUTPATIENT)
Dept: HEALTH INFORMATION MANAGEMENT | Facility: CLINIC | Age: 76
End: 2021-01-21

## 2021-01-25 ENCOUNTER — MYC MEDICAL ADVICE (OUTPATIENT)
Dept: INTERNAL MEDICINE | Facility: CLINIC | Age: 76
End: 2021-01-25

## 2021-01-25 ENCOUNTER — TELEPHONE (OUTPATIENT)
Dept: INTERNAL MEDICINE | Facility: CLINIC | Age: 76
End: 2021-01-25

## 2021-01-25 NOTE — TELEPHONE ENCOUNTER
Patient increased his enalapril (VASOTEC) to 20 mg  Since then he has developed bruises and he is not sure if this is from increasing this medication please advise. Ok to call and reji [phone]

## 2021-01-26 ENCOUNTER — TELEPHONE (OUTPATIENT)
Dept: INTERNAL MEDICINE | Facility: CLINIC | Age: 76
End: 2021-01-26

## 2021-01-26 NOTE — TELEPHONE ENCOUNTER
Called patient and spoke with patient and wife and they stated patient is bruising more and more lately. Please see myc message from today and yesterday with pictures of legs.  Wife stated patient has developed more of these spots on his arms now too.  She stated he has not started statin yet.  Patient and wife wondering what they should do.  Denied any other symptoms.      Should patient get Covid vaccine if dealing with all of the current issues.  They stated there is a lottery for the vaccine, but have not signed up for it yet.  Please advise, thanks.

## 2021-01-26 NOTE — TELEPHONE ENCOUNTER
Please refer to TE from yesterday.  Routed to primary care provider in that encounter referencing back to images in myc medical advise encounters from yesterday.

## 2021-01-26 NOTE — TELEPHONE ENCOUNTER
Advise this medication does not affect bleeding or bruising, it is the aspirin that causes more bruising.

## 2021-01-27 ENCOUNTER — MYC MEDICAL ADVICE (OUTPATIENT)
Dept: INTERNAL MEDICINE | Facility: CLINIC | Age: 76
End: 2021-01-27

## 2021-01-27 DIAGNOSIS — I63.411 CEREBROVASCULAR ACCIDENT (CVA) DUE TO EMBOLISM OF RIGHT MIDDLE CEREBRAL ARTERY (H): ICD-10-CM

## 2021-01-27 DIAGNOSIS — R35.0 URINARY FREQUENCY: ICD-10-CM

## 2021-01-27 LAB
ALBUMIN UR-MCNC: NEGATIVE MG/DL
APPEARANCE UR: CLEAR
BILIRUB UR QL STRIP: NEGATIVE
CA-I SERPL ISE-MCNC: 4.7 MG/DL (ref 4.4–5.2)
COLOR UR AUTO: YELLOW
ERYTHROCYTE [DISTWIDTH] IN BLOOD BY AUTOMATED COUNT: 15.2 % (ref 10–15)
GLUCOSE UR STRIP-MCNC: NEGATIVE MG/DL
HCT VFR BLD AUTO: 43.1 % (ref 40–53)
HGB BLD-MCNC: 14.6 G/DL (ref 13.3–17.7)
HGB UR QL STRIP: NEGATIVE
KETONES UR STRIP-MCNC: NEGATIVE MG/DL
LEUKOCYTE ESTERASE UR QL STRIP: NEGATIVE
MCH RBC QN AUTO: 30.2 PG (ref 26.5–33)
MCHC RBC AUTO-ENTMCNC: 33.9 G/DL (ref 31.5–36.5)
MCV RBC AUTO: 89 FL (ref 78–100)
NITRATE UR QL: NEGATIVE
PH UR STRIP: 5.5 PH (ref 5–7)
PLATELET # BLD AUTO: 337 10E9/L (ref 150–450)
RBC # BLD AUTO: 4.83 10E12/L (ref 4.4–5.9)
SOURCE: NORMAL
SP GR UR STRIP: 1.02 (ref 1–1.03)
UROBILINOGEN UR STRIP-ACNC: 0.2 EU/DL (ref 0.2–1)
WBC # BLD AUTO: 7.8 10E9/L (ref 4–11)

## 2021-01-27 PROCEDURE — 36415 COLL VENOUS BLD VENIPUNCTURE: CPT | Performed by: PSYCHIATRY & NEUROLOGY

## 2021-01-27 PROCEDURE — 82330 ASSAY OF CALCIUM: CPT | Performed by: PSYCHIATRY & NEUROLOGY

## 2021-01-27 PROCEDURE — 85027 COMPLETE CBC AUTOMATED: CPT | Performed by: PSYCHIATRY & NEUROLOGY

## 2021-01-27 PROCEDURE — 80048 BASIC METABOLIC PNL TOTAL CA: CPT | Performed by: PSYCHIATRY & NEUROLOGY

## 2021-01-27 PROCEDURE — 81003 URINALYSIS AUTO W/O SCOPE: CPT | Performed by: INTERNAL MEDICINE

## 2021-01-27 NOTE — TELEPHONE ENCOUNTER
The [employer] messages were not forwarded to me, but were instead closed.  I sent the patient a message through one of the [employer] messages, this appears to be some other type of skin condition, not likely due to enalapril and I want him to see a dermatologist.

## 2021-01-28 ENCOUNTER — MYC MEDICAL ADVICE (OUTPATIENT)
Dept: INTERNAL MEDICINE | Facility: CLINIC | Age: 76
End: 2021-01-28

## 2021-01-28 LAB
ANION GAP SERPL CALCULATED.3IONS-SCNC: 5 MMOL/L (ref 3–14)
BUN SERPL-MCNC: 22 MG/DL (ref 7–30)
CALCIUM SERPL-MCNC: 9.6 MG/DL (ref 8.5–10.1)
CHLORIDE SERPL-SCNC: 99 MMOL/L (ref 94–109)
CO2 SERPL-SCNC: 27 MMOL/L (ref 20–32)
CREAT SERPL-MCNC: 0.75 MG/DL (ref 0.66–1.25)
GFR SERPL CREATININE-BSD FRML MDRD: 89 ML/MIN/{1.73_M2}
GLUCOSE SERPL-MCNC: 120 MG/DL (ref 70–99)
POTASSIUM SERPL-SCNC: 4.6 MMOL/L (ref 3.4–5.3)
SODIUM SERPL-SCNC: 131 MMOL/L (ref 133–144)

## 2021-01-28 NOTE — TELEPHONE ENCOUNTER
I am not sure about what dermatologists are in the area of the left, I would suggest checking with the insurance to find out who may be covered.  Because of Medicare, they would not need a referral

## 2021-01-30 ENCOUNTER — IMMUNIZATION (OUTPATIENT)
Dept: NURSING | Facility: CLINIC | Age: 76
End: 2021-01-30
Payer: COMMERCIAL

## 2021-01-30 PROCEDURE — 91300 PR COVID VAC PFIZER DIL RECON 30 MCG/0.3 ML IM: CPT

## 2021-01-30 PROCEDURE — 0001A PR COVID VAC PFIZER DIL RECON 30 MCG/0.3 ML IM: CPT

## 2021-02-10 ENCOUNTER — TELEPHONE (OUTPATIENT)
Dept: INTERNAL MEDICINE | Facility: CLINIC | Age: 76
End: 2021-02-10

## 2021-02-11 DIAGNOSIS — Z53.9 DIAGNOSIS NOT YET DEFINED: Primary | ICD-10-CM

## 2021-02-11 PROCEDURE — G0179 MD RECERTIFICATION HHA PT: HCPCS | Performed by: INTERNAL MEDICINE

## 2021-02-20 ENCOUNTER — IMMUNIZATION (OUTPATIENT)
Dept: NURSING | Facility: CLINIC | Age: 76
End: 2021-02-20
Attending: INTERNAL MEDICINE
Payer: COMMERCIAL

## 2021-02-20 PROCEDURE — 0002A PR COVID VAC PFIZER DIL RECON 30 MCG/0.3 ML IM: CPT

## 2021-02-20 PROCEDURE — 91300 PR COVID VAC PFIZER DIL RECON 30 MCG/0.3 ML IM: CPT

## 2021-02-24 ENCOUNTER — TRANSFERRED RECORDS (OUTPATIENT)
Dept: HEALTH INFORMATION MANAGEMENT | Facility: CLINIC | Age: 76
End: 2021-02-24

## 2021-03-01 ENCOUNTER — TRANSFERRED RECORDS (OUTPATIENT)
Dept: HEALTH INFORMATION MANAGEMENT | Facility: CLINIC | Age: 76
End: 2021-03-01

## 2021-03-02 ENCOUNTER — TRANSFERRED RECORDS (OUTPATIENT)
Dept: HEALTH INFORMATION MANAGEMENT | Facility: CLINIC | Age: 76
End: 2021-03-02

## 2021-03-02 DIAGNOSIS — I63.411 CEREBROVASCULAR ACCIDENT (CVA) DUE TO EMBOLISM OF RIGHT MIDDLE CEREBRAL ARTERY (H): ICD-10-CM

## 2021-03-11 ENCOUNTER — TRANSFERRED RECORDS (OUTPATIENT)
Dept: HEALTH INFORMATION MANAGEMENT | Facility: CLINIC | Age: 76
End: 2021-03-11

## 2021-03-19 ENCOUNTER — TELEPHONE (OUTPATIENT)
Dept: INTERNAL MEDICINE | Facility: CLINIC | Age: 76
End: 2021-03-19

## 2021-03-24 ENCOUNTER — MYC MEDICAL ADVICE (OUTPATIENT)
Dept: INTERNAL MEDICINE | Facility: CLINIC | Age: 76
End: 2021-03-24

## 2021-03-29 DIAGNOSIS — Z53.9 DIAGNOSIS NOT YET DEFINED: Primary | ICD-10-CM

## 2021-03-29 PROCEDURE — G0179 MD RECERTIFICATION HHA PT: HCPCS | Performed by: INTERNAL MEDICINE

## 2021-03-30 ENCOUNTER — TELEPHONE (OUTPATIENT)
Dept: INTERNAL MEDICINE | Facility: CLINIC | Age: 76
End: 2021-03-30

## 2021-03-30 NOTE — TELEPHONE ENCOUNTER
[facility]  Certification and POC from 3/22/21/5/20/21  Dr. Ray's in basket   Fax

## 2021-04-01 ENCOUNTER — TELEPHONE (OUTPATIENT)
Dept: INTERNAL MEDICINE | Facility: CLINIC | Age: 76
End: 2021-04-01

## 2021-04-01 NOTE — TELEPHONE ENCOUNTER
Fax received from [facility] for review and signature.  Put in Dr. Ray's in basket.

## 2021-04-02 NOTE — TELEPHONE ENCOUNTER
Patient was advised to contact his neurologist to make a recommendation. Not signed, faxed back to [facility]

## 2021-05-21 ENCOUNTER — MEDICAL CORRESPONDENCE (OUTPATIENT)
Dept: HEALTH INFORMATION MANAGEMENT | Facility: CLINIC | Age: 76
End: 2021-05-21

## 2021-05-25 ENCOUNTER — TRANSFERRED RECORDS (OUTPATIENT)
Dept: HEALTH INFORMATION MANAGEMENT | Facility: CLINIC | Age: 76
End: 2021-05-25

## 2021-06-01 ASSESSMENT — ENCOUNTER SYMPTOMS
JOINT SWELLING: 0
HEADACHES: 0
DIZZINESS: 0
SPEECH CHANGE: 1
FLANK PAIN: 0
STIFFNESS: 0
PARALYSIS: 0
NECK PAIN: 1
SEIZURES: 0
MUSCLE WEAKNESS: 1
NUMBNESS: 0
ARTHRALGIAS: 0
TINGLING: 0
MYALGIAS: 0
BACK PAIN: 0
MUSCLE CRAMPS: 0
LOSS OF CONSCIOUSNESS: 0
WEAKNESS: 1
DISTURBANCES IN COORDINATION: 1
TREMORS: 0
DYSURIA: 0
DIFFICULTY URINATING: 0
HEMATURIA: 0
MEMORY LOSS: 0

## 2021-06-08 ENCOUNTER — TELEPHONE (OUTPATIENT)
Dept: INTERNAL MEDICINE | Facility: CLINIC | Age: 76
End: 2021-06-08

## 2021-06-08 ENCOUNTER — MYC MEDICAL ADVICE (OUTPATIENT)
Dept: NEUROLOGY | Facility: CLINIC | Age: 76
End: 2021-06-08

## 2021-06-08 DIAGNOSIS — Z53.9 DIAGNOSIS NOT YET DEFINED: Primary | ICD-10-CM

## 2021-06-08 DIAGNOSIS — K83.8 COMMON BILE DUCT DILATATION: Primary | ICD-10-CM

## 2021-06-08 DIAGNOSIS — K86.2 PANCREATIC CYST: ICD-10-CM

## 2021-06-08 PROCEDURE — G0179 MD RECERTIFICATION HHA PT: HCPCS | Performed by: INTERNAL MEDICINE

## 2021-06-08 NOTE — TELEPHONE ENCOUNTER
Home health certification of care for period 5/21/21-7/19/21 received via fax, form to your box to be signed.

## 2021-06-09 DIAGNOSIS — G12.21 ALS (AMYOTROPHIC LATERAL SCLEROSIS) (H): Primary | ICD-10-CM

## 2021-06-10 ENCOUNTER — THERAPY VISIT (OUTPATIENT)
Dept: SPEECH THERAPY | Facility: CLINIC | Age: 76
End: 2021-06-10
Payer: COMMERCIAL

## 2021-06-10 ENCOUNTER — OFFICE VISIT (OUTPATIENT)
Dept: NEUROLOGY | Facility: CLINIC | Age: 76
End: 2021-06-10
Payer: COMMERCIAL

## 2021-06-10 ENCOUNTER — THERAPY VISIT (OUTPATIENT)
Dept: PHYSICAL THERAPY | Facility: CLINIC | Age: 76
End: 2021-06-10
Payer: COMMERCIAL

## 2021-06-10 ENCOUNTER — ANCILLARY PROCEDURE (OUTPATIENT)
Dept: ULTRASOUND IMAGING | Facility: CLINIC | Age: 76
End: 2021-06-10
Attending: PSYCHIATRY & NEUROLOGY
Payer: COMMERCIAL

## 2021-06-10 ENCOUNTER — THERAPY VISIT (OUTPATIENT)
Dept: OCCUPATIONAL THERAPY | Facility: CLINIC | Age: 76
End: 2021-06-10
Payer: COMMERCIAL

## 2021-06-10 VITALS
HEIGHT: 70 IN | BODY MASS INDEX: 21.64 KG/M2 | SYSTOLIC BLOOD PRESSURE: 161 MMHG | RESPIRATION RATE: 16 BRPM | OXYGEN SATURATION: 97 % | WEIGHT: 151.2 LBS | HEART RATE: 67 BPM | DIASTOLIC BLOOD PRESSURE: 84 MMHG

## 2021-06-10 DIAGNOSIS — R47.1 DYSARTHRIA: ICD-10-CM

## 2021-06-10 DIAGNOSIS — G12.21 ALS (AMYOTROPHIC LATERAL SCLEROSIS) (H): ICD-10-CM

## 2021-06-10 DIAGNOSIS — R17 JAUNDICE: ICD-10-CM

## 2021-06-10 DIAGNOSIS — G12.21 ALS (AMYOTROPHIC LATERAL SCLEROSIS) (H): Primary | ICD-10-CM

## 2021-06-10 DIAGNOSIS — Z74.09 IMPAIRED MOBILITY AND ADLS: ICD-10-CM

## 2021-06-10 DIAGNOSIS — R13.12 OROPHARYNGEAL DYSPHAGIA: ICD-10-CM

## 2021-06-10 DIAGNOSIS — Z78.9 IMPAIRED MOBILITY AND ADLS: ICD-10-CM

## 2021-06-10 DIAGNOSIS — G12.23 PRIMARY LATERAL SCLEROSIS (H): Primary | ICD-10-CM

## 2021-06-10 DIAGNOSIS — G12.23 PRIMARY LATERAL SCLEROSES (H): Primary | ICD-10-CM

## 2021-06-10 LAB
ALBUMIN SERPL-MCNC: 3.2 G/DL (ref 3.4–5)
ALP SERPL-CCNC: 453 U/L (ref 40–150)
ALT SERPL W P-5'-P-CCNC: 139 U/L (ref 0–70)
ANION GAP SERPL CALCULATED.3IONS-SCNC: 6 MMOL/L (ref 3–14)
AST SERPL W P-5'-P-CCNC: 136 U/L (ref 0–45)
BILIRUB SERPL-MCNC: 5.1 MG/DL (ref 0.2–1.3)
BUN SERPL-MCNC: 16 MG/DL (ref 7–30)
CALCIUM SERPL-MCNC: 9.7 MG/DL (ref 8.5–10.1)
CHLORIDE SERPL-SCNC: 100 MMOL/L (ref 94–109)
CO2 SERPL-SCNC: 28 MMOL/L (ref 20–32)
CREAT SERPL-MCNC: 0.67 MG/DL (ref 0.66–1.25)
ERYTHROCYTE [DISTWIDTH] IN BLOOD BY AUTOMATED COUNT: 15.8 % (ref 10–15)
GFR SERPL CREATININE-BSD FRML MDRD: >90 ML/MIN/{1.73_M2}
GLUCOSE SERPL-MCNC: 98 MG/DL (ref 70–99)
HCT VFR BLD AUTO: 42.5 % (ref 40–53)
HGB BLD-MCNC: 14.5 G/DL (ref 13.3–17.7)
INR PPP: 0.98 (ref 0.86–1.14)
MCH RBC QN AUTO: 31.4 PG (ref 26.5–33)
MCHC RBC AUTO-ENTMCNC: 34.1 G/DL (ref 31.5–36.5)
MCV RBC AUTO: 92 FL (ref 78–100)
PLATELET # BLD AUTO: 301 10E9/L (ref 150–450)
POTASSIUM SERPL-SCNC: 4.5 MMOL/L (ref 3.4–5.3)
PROT SERPL-MCNC: 7.4 G/DL (ref 6.8–8.8)
RBC # BLD AUTO: 4.62 10E12/L (ref 4.4–5.9)
SODIUM SERPL-SCNC: 134 MMOL/L (ref 133–144)
WBC # BLD AUTO: 7.5 10E9/L (ref 4–11)

## 2021-06-10 PROCEDURE — 97535 SELF CARE MNGMENT TRAINING: CPT | Mod: GO | Performed by: OCCUPATIONAL THERAPIST

## 2021-06-10 PROCEDURE — 85027 COMPLETE CBC AUTOMATED: CPT | Performed by: PATHOLOGY

## 2021-06-10 PROCEDURE — 94375 RESPIRATORY FLOW VOLUME LOOP: CPT | Performed by: INTERNAL MEDICINE

## 2021-06-10 PROCEDURE — 80053 COMPREHEN METABOLIC PANEL: CPT | Performed by: PATHOLOGY

## 2021-06-10 PROCEDURE — 97162 PT EVAL MOD COMPLEX 30 MIN: CPT | Mod: GP | Performed by: PHYSICAL THERAPIST

## 2021-06-10 PROCEDURE — 99214 OFFICE O/P EST MOD 30 MIN: CPT | Mod: GC | Performed by: PSYCHIATRY & NEUROLOGY

## 2021-06-10 PROCEDURE — 36415 COLL VENOUS BLD VENIPUNCTURE: CPT | Performed by: PATHOLOGY

## 2021-06-10 PROCEDURE — 97166 OT EVAL MOD COMPLEX 45 MIN: CPT | Mod: GO | Performed by: OCCUPATIONAL THERAPIST

## 2021-06-10 PROCEDURE — 92522 EVALUATE SPEECH PRODUCTION: CPT | Mod: GN | Performed by: SPEECH-LANGUAGE PATHOLOGIST

## 2021-06-10 PROCEDURE — 97535 SELF CARE MNGMENT TRAINING: CPT | Mod: GP | Performed by: PHYSICAL THERAPIST

## 2021-06-10 PROCEDURE — 76705 ECHO EXAM OF ABDOMEN: CPT | Mod: GC | Performed by: RADIOLOGY

## 2021-06-10 PROCEDURE — 92507 TX SP LANG VOICE COMM INDIV: CPT | Mod: GN | Performed by: SPEECH-LANGUAGE PATHOLOGIST

## 2021-06-10 PROCEDURE — 85610 PROTHROMBIN TIME: CPT | Performed by: PATHOLOGY

## 2021-06-10 ASSESSMENT — MIFFLIN-ST. JEOR: SCORE: 1422.09

## 2021-06-10 ASSESSMENT — REVISED AMYOTROPHIC LATERAL SCLEROSIS FUNCTIONAL RATING SCALE (ALSFRS-R)
SPEECH: 2
DRESSING_AND_HYGEINE: NEEDS ATTENDANT FOR SELF-CARE
WALKING: 2
HANDWRITING: 2
DRESSING_AND_HYGEINE: 1
TURNING_IN_BED_AND_ADJUSTING_BED_CLOTHES: HELPLESS
SWALLOWING: EARLY EATING PROBLEMS - OCCASIONAL CHOKING
CLIMBING_STAIRS: 0
GROSS_MOTOR_SUBTOTAL_SCORE: 2
CLIMBING_STAIRS: CANNOT DO
HANDWRITING: NOT ALL WORDS ARE LEGIBLE
TURNING_IN_BED_AND_ADJUSTING_BED_CLOTHES: 0
FINE_MOTOR_SUBTOTAL_SCORE: 4
SWALLOWING: 3
SALIVATION: 3
BULBAR_SUBTOTAL: 8
SALIVATION: SLIGHT BUT DEFINITE EXCESS OF SALIVA IN MOUTH; MAY HAVE NIGHTTIME DROOLING
CUTTING_FOOD_AND_HANDLING_UTENSILES: 1
WALKING: WALKS WITH ASSISTANCE
SPEECH: INTELLIGIBLE WITH REPEATING

## 2021-06-10 ASSESSMENT — PAIN SCALES - GENERAL: PAINLEVEL: MODERATE PAIN (5)

## 2021-06-10 NOTE — PROGRESS NOTES
"    OUTPATIENT OCCUPATIONAL THERAPY CLINIC NOTE    Type of visit:  Evaluation            Date of Service:  6/10/21     Referring provider: Dr. Gary Tejada    Others present at visit:  Spouse / significant other, Kelli    Medical diagnosis:   Primary lateral sclerosis (PLS)     Date of diagnosis: 2001     Pertinent medical history:  H/O BPPV with 2 recent episodes, stroke with L king June 2014; recent fall 5/2017 with LE DVT;stroke (most recent 5/24/18 - lacunar infarct in the right frontal white matter). baclofen pump; Fall 11/19 while out of state hit head on wall with LOC as cannot recall how it happened/concussion; 11/20 subacute CVA    Additional Occupational Profile Information (patterns of daily living, interests, values and needs):  and retired with long history of PLS    Cardio-respiratory status:  FVC: 76%    Height/Weight: 5'10\" / 151 lb    Living environment:  House  Master bath remodeled with a emily shower and grab bars and fold down seat and also another seating area to dress once out of shower,   Higher toilet with bars around toilet  second bathroom with high toilet and vanity he pulls forward on.    Living environment barriers:  2 stairs to enter (1 railing present)    Ramp from garage and to deck with Ted rails, walks with 4ww   Flight to basement with 1 railing    Current assistance/living environment:  Lives with wife who assists      Current mobility equipment:  4 wheeled walker with seat in home (has 4 strategically placed)  Scooter for distance , breaks down into 4 pieces so wife can transport in vehicle  Transport wheelchair  bedrail  Gait belt    Current ADL equipment:  Roll in shower grab bars and fold down seat  Wall grab bars around high toilet  Built-up foam       Technology used: computer, dysarthric speech, hard to understand-50% comprehensible    Patient concerns/goals: Interested in [employer] resources  Noting increased Ted hand weakness.Works with private-pay PT 1x/week. "     Evaluation   Interview completed.   Pain assessment:  Pain denied    Range of motion:  L handed: WFL BUE AROM is limited to approx 100-110 degrees shoulder flex with tight end range feel and some increased PROM beyond this  * Pt reports he does tabletop stretches for shoulders at home    Manual muscle testing: Ted  is fairly strong and symmetrical, but lateral and palmar pinch are weak and  R is weaker than L with manual testing    Cognition:   ALS CBS (ALS Cognitive Behavioral Screen) completed last visit-please see.-demonstrates impairment      ADL:   Feeding self:  Spouse helps cut meat, able to hold regular utensils with L hand except uses built up spoon  Grooming: slower but okay  UE Dressing:  Help with buttons from wife.    LE Dressing:  Help with shoes and socks. Can do elastic pants but due to impaired balance cannot manage pants with belts/buttons/zippers as requires 2 hands and needs one hand on grab bar at all times  Showering/bathing: home health aid and spouse when showers, sits to shower  Toileting/transfer:  Help of wife to manage clothing/balance    IADL:   Home management:  Kelli, spouse does all    Driving:  No longer driving, spouse does driving  Occupation: retired   Emergency Call system: provided with [employer] information in past but has not gotten this    Fall Risk Screen:    Has the patient fallen 2 or more times in the last year?no                            Has the patient fallen and had an injury in the past year? no; 11/19 in ER for concussion as hit head                                        TUG: defer to PT      Is the patient a fall risk? Yes, department fall risk interventions implemented     Impairments:  Fatigue  Muscle atrophy  Coordination  dysathria  Balance  spasticity   Cognition    Treatment diagnosis:  Impaired activities of daily living and mobility    Assessment of Occupational Performance: 3-5 Performance Deficits  Identified Performance Deficits (ie: feeding,  social skills):toileting, dressing, functional mobility, bathing, grooming  Clinical Decision Making (Complexity):Mod complexity     Recommendations/Plan of care:  Patient would benefit from interventions to enhance safety and independence.  Rehab potential good for stated goals.  Occupational therapy intervention for  self care/home management.  1 session evaluation & treatment.    Goals:   Target date: today  Patient, family and/or caregiver will verbalize understanding of evaluation results and implications for functional performance.  Patient, family and/or caregiver will verbalize/demonstrate understanding of compensatory methods /equipment to enhance functional independence and safety.  Patient, family and/or caregiver will verbalize resources for accessible vans      Educational assessment/barriers to learning:  No barriers noted      Treatment provided this date:   Self care/home management, 15 minutes of training in:  -[employer] resources-sent via AVS, as pt to pursue power w/c evaluation  -built up foam for pen on Boogie Board as pt will start to use this as AAc due to dysarthria and with weak hand this will aid prehension  Review of training in options of [place] camera with smartphone gareth for spouse viewing that [employer]AR[employer]ANT[employer] has available for the home when she is away doing errands and she  And pt were interested in having OTTatum from [employer] f/u with her about this option for set up and so a message was sent to Tatum on their behalf.      Response to treatment/recommendations:receptive    Goal attainment:  All goals met    Risks and benefits of evaluation/treatment have been explained.  Patient, family and/or caregiver are in agreement with Plan of Care.     Timed Code Treatment Minutes: 15  Total Treatment Time (sum of timed and untimed services): 25      Signature: JOANNA Spaulding, [facility]     Date:6/10/21    Certification: [employer] Advantage Plan

## 2021-06-10 NOTE — TELEPHONE ENCOUNTER
I called his wife, Kelli, and advised of US and labs, moderate elevation of bilirubin. Advised that there is mass/cyst of pancreas, could be a tumor.    Referred to GI.

## 2021-06-10 NOTE — LETTER
6/10/2021       RE: Elier Barber  [address] [facility] [address]     Dear Colleague,    Thank you for referring your patient, Elier Barber, to the [facility] [place] at [facility]. Please see a copy of my visit note below.    [facility]: [place]  Neuromuscular Progress Note    Patient Name:  Elier Barber  MRN:  [id number]    :  1945  Date of Service:  Autumn 10, 2021  Primary care provider:  Lida Ray    Brief Summary:   Mr. Barber is a 75 year old male with h/o PLS which was diagnosed in  who presents for follow-up.     Subjective:   Mr. Barber was last seen in the clinic in 2020.  He is accompanied by his wife today.  He continues to follow-up with Dr. Calvert for management of his baclofen pump in the setting of spasticity.  He has found this helpful for the management of spasticity.  He does continue to note shoulder pain bilaterally.  He stated steroid injections for frozen shoulder were not helpful in the past.  He and his wife do passive range of motion exercises and stretching.  He does feel there has been progression of bilateral lower limb weakness and more difficulty with mobility.  His wife specifically noted more difficulty with transfers.  He does use a manual wheelchair along with a four-wheel walker.  He is also noted more difficulty with hand function which he attributes mostly to stiffness and spasticity.  Also since her last visit he has appreciated worsening dysarthria.  He was seen in 2021 by Dr. Brito for a stroke which was located in the subcortical white matter of the precentral gyrus.  His wife has been noting more difficulty at home caring for her  and is wondering about additional assistance and resources are to be provided.  Of note over the last week he and his wife have noted yellowing of the sclera in his eyes.  He  denied any abdominal pain, fevers, or weight loss.  No recent travel.  He noted loose stools but no change in color.                                                          ROS: A 10-point ROS was performed as per HPI.    PMH:  Past Medical History:   Diagnosis Date     Basal cell carcinoma nos     sees derm     CVA (cerebral infarction) 6/14    small vessel right int capsule stroke     DVT (deep vein thrombosis) in pregnancy 05/12/2017    right peroneal vein     Essential hypertension, benign      Hearing loss      Hoarseness of voice     assoc with PLS     Lumbar radiculopathy     2017     Primary Lateral Sclerosis 2002    has baclofen pump through Dr. Calvert, [facility], sees Dr. Fink, [facility]     Prostate CA (H) 2008    prostatectomy     Past Surgical History:   Procedure Laterality Date     ABDOMEN SURGERY  11/12    Hernia     BIOPSY  4/16    Cancerous growth on leg. Removed by MOHs treatment     HERNIA REPAIR  11/12    Repaired     PROSTATE SURGERY       [facility] NONSPECIFIC PROCEDURE  6/08     prostatectomy      [facility] NONSPECIFIC PROCEDURE  11/01    colonoscopy     [facility] NONSPECIFIC PROCEDURE  11/2006    hernia, right side     [facility] NONSPECIFIC PROCEDURE      T + A       Medications:      Current Outpatient Medications:      acetaminophen (TYLENOL) 650 MG CR tablet, Take 650 mg by mouth 2 times daily , Disp: , Rfl:      aspirin 81 MG tablet, Take 81 mg by mouth daily, Disp: , Rfl:      baclofen (LIORESAL) intraTHECAL Internal Pump, by Intrathecal route continuous prn Pump filled by [facility] stroke center [phone] Pump Model: Syncromed Last fill:  10/2020 Next fill:   3/17/2021 Low [place] Alarm Date:  Reservoir Volume: 20 ml Conc: 2000 mcg/ml Delivers 234.7 mcg/day Basal rate:unknown Battery life: replace in 33 months., Disp: , Rfl:      Cadexomer Iodine (IODOSORB EX), , Disp: , Rfl:      clopidogrel (PLAVIX) 75 MG tablet, Take 75 mg by mouth daily , Disp: , Rfl:      enalapril  (VASOTEC) 10 MG tablet, Take 1 tablet (10 mg) by mouth 2 times daily, Disp: 180 tablet, Rfl: 1     EPINEPHrine 0.3 MG/0.3ML injection, Inject 0.3 mLs (0.3 mg) into the muscle as needed for anaphylaxis, Disp: 0.3 mL, Rfl: 3     ketoconazole (NIZORAL) 2 % external shampoo, SHAMPOO EVERY 2-3 DAYS AS  NEEDED, Disp: 120 mL, Rfl: 10     minocycline (MINOCIN) 100 MG capsule, Take 100 mg by mouth 2 times daily Staph infection., Disp: , Rfl:      mupirocin (BACTROBAN) 2 % external ointment, Apply topically 2 times daily Wound left shin., Disp: , Rfl:      oxybutynin ER (DITROPAN-XL) 10 MG 24 hr tablet, Take 2 tablets (20 mg) by mouth At Bedtime, Disp: 180 tablet, Rfl: 3     pantoprazole (PROTONIX) 40 MG EC tablet, Take 1 tablet (40 mg) by mouth daily, Disp: 90 tablet, Rfl: 3     polyethylene glycol (MIRALAX/GLYCOLAX) Packet, Take 1 packet by mouth daily as needed Every 2-3 days., Disp: , Rfl:      potassium chloride ER (K-TAB/KLOR-CON) 10 MEQ CR tablet, Take 2 tablets (20 mEq) by mouth daily, Disp: 180 tablet, Rfl: 3     rosuvastatin (CRESTOR) 5 MG tablet, TAKE 1 TABLET BY MOUTH EVERY DAY, Disp: 90 tablet, Rfl: 2     STATIN NOT PRESCRIBED, INTENTIONAL,, Please choose reason not prescribed, below (Patient not taking: Reported on 10/2/2020), Disp: , Rfl:     Current Facility-Administered Medications:      methylPREDNISolone (DEPO-MEDROL) injection 40 mg, 40 mg, , , Yeo, Albert, MD, 40 mg at 12/15/20 1134    Physical Examination:   There were no vitals taken for this visit.  Wt Readings from Last 4 Encounters:   02/13/20 70.8 kg (156 lb)   01/10/20 70.8 kg (156 lb)   08/15/19 70.9 kg (156 lb 6.4 oz)   06/06/19 70.8 kg (156 lb)     NM Exam: Cranial     Atrophy Fasciculations   Upper face: Negative Negative   Lower face: Negative Negative   Tongue: Negative Negative      Facial weakness: no facial weakness  Tongue movements: slow  Tongue weakness: mild  Jaw jerk: present  Speech: dysarthria  Pseudobulbar affect: present    NM  Exam: Axial    Neck flexion weakness: none  Neck extension weakness: none    NM Exam: Upper Extremities     Right Left   Atrophy: Negative Negative   Fasciculations: Negative Negative   Muscle tone: spastic catch spastic catch   Rapid alt. movements: slow slow   Reflexes Right Left   Biceps: increased increased   Brachioradialis: increased increased   Triceps: increased increased   Weldon: right Weldon reflex present left Weldon reflex present   Strength Right Left   * Indicates a recommended field     Shoulder abduction*: 5 5   Elbow flexion: 5 5   Elbow extension*: 5 5   Wrist extension*: 5 4+   Finger extension: 5 4   Finger flexion:     Abductor pollicis brevis: 4 4   First dorsal interosseous*: 4 4     NM Exam: Lower Extremities     Right Left   Atrophy: Negative Negative   Fasciculations: Negative Negative   Muscle tone: spastic catch left lower extremity stiffness   Rapid alt. movements: slow slow   Reflexes Right Left   Patellar: increased increased   Achilles: normal normal   Plantar:     Strength Right Left   * Indicates a recommended field     Hip flexion*: 5    Knee extension*: 5 5   Knee flexion: 5 5   Ankle dorsiflexion*: 5 5   Ankle plantar flexion: 5 5     Impression:    1) PLS  2) Jaundice  Mr. Barber is a 75 year old male with h/o PLS which was diagnosed in 2002 who presents for follow-up.  Overall there have not been significant changes in his overall examination since her last visit.  He does have some mild asymmetry to his upper limb weakness, left greater than right which is most likely attributed to his recent stroke.  He is noting functional change in not only his gait but also with upper limb function and speech.  He will be seen by multiple members of our multidisciplinary team including OT, PT, and SLP.  His wife is wondering about additional resources for help that can be provided at home.  We will have our  visit with her today.  Of note he does clearly have jaundice not  only seen in the sclera but also on the skin and examination.  We will plan for further evaluation with blood work and liver ultrasound.  We did recommend that he follow-up with his primary care physician in regards to these results as he would likely need further evaluation.    Recommendations:   -OT/PT/SLP  -SW    -CBC/CMP  -Liver US  -RTC 3 mo    Patient seen and discussed with attending neurologist, Dr. Tejada, who agrees with above.    Eliu Nevarez MD  Neuromuscular Fellow    I personally examined the patient and concur with the resident's note. I spoke with Dr Ray about his jaundice and she reviewed results and will manage.  I also reviewed results of CBC, CMP, and ultrasound briefly with the patient.    Gary Tejada M.D.        Again, thank you for allowing me to participate in the care of your patient.      Sincerely,    Gary Tejada MD

## 2021-06-10 NOTE — NURSING NOTE
Chief Complaint   Patient presents with     RECHECK     [facility] RETURN ALS/MOTOR NEURON     Jovi Keen

## 2021-06-10 NOTE — NURSING NOTE
Chief Complaint   Patient presents with     RECHECK     [facility] RETURN ALS/MOTOR NEURON     José Miguel Chowdary

## 2021-06-10 NOTE — DISCHARGE INSTRUCTIONS
GEORGE Spaulding/SARAH, MSCS  Occupational Therapist     [facility], [address]    [place] [address]    [email or web address]    Office: [phone]      phone: [phone] Fax: [phone]

## 2021-06-10 NOTE — PROGRESS NOTES
"    OUTPATIENT PHYSICAL THERAPY CLINIC NOTE  Elier Barber  YOB: 1945  [id number]    Type of visit:         Evaluation     Date of service: 6/10/2021    Referring provider: Gary Tejada MD     present: No    Others present at visit:  Spouse / significant other- Kelli    Medical diagnosis:   Primary lateral sclerosis (PLS)    Date of diagnosis: 2002    Pertinent history of current problem (include personal factors and/or comorbidities that impact the plan of care): Patient diagnosed with PLS in 2002.  Patient has baclofen pump due to significant lower extremity spasticity.  Patient had additional stroke in January 2021. PMH: BPPV, stroke 2014, lower extremity DVT 2017    Cardio-respiratory status:  Forced vital capacity: 76 % of predicted     Height/Weight: 5'10\" / 151lbs    Living environment:  House    Living environment barriers:  Ramp(s) present  Has stairs to basement, but does not use     Current assistance/living environment:  Lives with wifeKelli who provides substantial assist including majority of ADLs and transfers      Current mobility equipment:  4 wheeled walker with seat (multiple)  Manual wheelchair-primary mode inside  Transport wheelchair-primary mode outside  Scooter  Bed rail  Gait belt     Current ADL equipment:  See OT note    Technology used: None    Patient concerns/goals: Patient continues to work with private pay PT 1 time a week.  Patient mostly wheelchair-bound ambulating only very short distances around 50 feet infrequently.  Patient using wheelchair to/from bathroom and performing stand pivot transfer with grab bars and wife assist.  Increased overall fatigue and weakness which are limiting patient's mobility.  Patient and wife interested in power wheelchair and information on wheelchair accessible vans    Evaluation   Interview completed.   Pain assessment:  Pain denied     Range of motion: Significant bilateral lower extremity range of motion deficits due to " longstanding spasticity     Manual muscle testing: Bilateral lower extremity grossly 4-5 out of 5, but range of motion significantly limited due to spasticity   Gait: Did not perform a eval secondary to patient not having assistive device and limited ambulation mobility   Cognition: Grossly intact    Fall Risk Screen:   Has the patient fallen 2 or more times in the last year? No      Has the patient fallen and had an injury in the past year? No       Timed Up and Go Score: Patient with very limited mobility at this time; not performed    Is the patient a fall risk? Yes, department fall risk interventions implemented     Impairments:  Fatigue  Muscle atrophy  Balance  Range of motion  Spasticity     Treatment diagnosis:  Impaired mobility  Impaired activities of daily living    Clinical Presentation: Evolving/Changing  Clinical Presentation Rationale: Personal factors, body systems involved, progressive nature of PLS  Clinical Decision Making (Complexity): Moderate complexity     Recommendations/Plan of care:  1 session evaluation & treatment.  Equipment recommended: Power wheelchair-requested referral from Dr. Tejada.     Goals:   Target date: 6/10/2021  Patient, family and/or caregiver will verbalize understanding of evaluation results and implications for functional performance.  Patient, family and/or caregiver will verbalize/demonstrate understanding of compensatory methods /equipment to enhance functional independence and safety.  Patient, family and/or caregiver will verbalize/demonstrate understanding of home program.  Patient, family and/or caregiver will verbalize/demonstrate understanding of positioning techniques/equipment.  Patient, family and/or caregiver will verbalize energy management techniques appropriate for status and setting.    Educational assessment/barriers to learning:   No barriers noted     Treatment provided this date:   ADL/self-care management-10 minutes  -Educated patient and wife on  energy conservation techniques to improve overall fatigue levels with mobility.  Recommended patient obtain power wheelchair to improve overall energy conservation and house to limit frequent transfers and improve overall seating positioning and support for muscle recovery.  Educated patient and wife can maintain ambulation as exercise, even with power mobility, as long as it is still safe to do so  -Educated patient on continuation of stretching program performed by wife and private pay therapist at home to maintain range of motion for standing balance    Response to treatment/recommendations: Patient and wife verbalized understanding    Goal attainment:  All goals met     Risks and benefits of evaluation/treatment have been explained.  Patient, family and/or caregiver are in agreement with Plan of Care.     Timed Code Treatment Minutes: 10  Total Treatment Time (sum of timed and untimed services): 35    Signature: Indu Ashley, PT   Date: 6/10/2021

## 2021-06-10 NOTE — PROGRESS NOTES
Outpatient Speech Language Pathology Neurology Clinic Evaluation  Elier Barber YOB: 1945 [id number]    Visit Date: 6/10/21    Age: 75 year old    Medical Diagnosis: Primary lateral sclerosis (PLS)    Date of Diagnosis: approx 2008    Referring MD: Dr Tejada    PMH: Refer to Medical Chart    Fall Risk: Refer to physician report.    Others at Clinic Visit: Spouse Kelli    Living Situation: With others    Patient Concerns/Goals: Increased speech deficits, Increased swallow deficits    Observations: Patient and wife are pleasant and cooperative with visit, report decline in speech as well as occasional coughing with liquids    Current Mode of Nutrition: Oral diet of regular solids and thin liquids    Weight: 151lbs (5lb loss since 2/13/20)    Cardio-Respiratory Status: Forced vital capacity: 76%    Functional Rating Scale (ALS-FRS): 32/48 last visit, not yet completed today      Oral Motor/Swallowing  Oral Motor Function: Anomalies present:  Labial anomaly- moderate weakness, reduced protrusion and closure, slow effortful movement  Lingual anomaly- moderate weakness, reduced range and rate of motion    Volitional Abilities:  Cough- Functional  Throat Clear- Functional  Swallow- Present    Swallow Function:   Unable to assess as he is NPO for instrumental exam later today    Dysphagia Diagnosis: Mild-moderate dysphagia found at last visit, pt and wife deny significant change in swallow function with continue coughing with intake of liquids approximately 1x/day. Wife report he hold liquids in his mouth for a long time before swallowing. SLP educated in risk of premature spillage into pharynx given tongue weakness and risk of aspiration.      Speech Intelligibility/Functional Communication   Methods of communication: Verbal    Dysarthria: Severe    Speech:   Deficits in phonation- Strained-strangled quality (spastic)  Deficits in articulation- Decreased precision of articulation and Slow effortful  rate  Intelligibility- less than 50% to this skilled listener    Speech Intelligibility/Communication:  Impacts daily activities, Impacts social activities, Impacts phone usage and Impacts ability to communicate basic wants/needs    Augmentative and Alternative Communication (AAC):   He has iPad from [employer] but they do not know which gareth is included. Plan for SLP to train use of gareth at next visit or pt can have specific AAC appointment with this SLP prior if needed. Assess most appropriate screen size (phone vs iPad) and determined larger iPad screen is significantly more appropriate. Educated and demonstrate use of Boogie Board which he was able to use to supplement speech during visit, SLP provided them with Boogie Board today. SLP also demonstrate use of voice amplifier but deemed not appropriate due to severity of articulation deficits.      Clinical Impression/Plan of Care  Treatment Diagnosis: Oropharyngeal Dysphagia and Dysarthia     Recommendations:  Consider bedside swallow eval if dysphagia worsens prior to next ALS Clinic visit since could not be completed today  Continue use of compensatory swallow strategies  Initiate use of Boogie Board, explore iPad AAC gareth  Continue compensatory speech strategies  Contact clinic if AAC specific visit is needed prior to next clinic appt for training of gareth use.    Plan of Care:  1 session evaluation and treatment.    Goals: Based on today's evaluation session patient and/or caregiver will have understanding of current communication and/or swallowing status and recommendations for management.    Educational Assessment:  Learners- Patient and Significant other  Barriers to Learning- No barriers.    Education provided/response:   Speech  Swallowing  AAC  Verbalized understanding    Treatment provided this date:   Swallow intervention, 2 minutes  See above  Communication intervention, 16 minutes   Educated and trained use of AAC as described above. Also trained use of  speech strategies to improve intelligibility and reduce fatigue with speaking (reducing background noise, facing the conversation partner, speaking slowly, exaggerating each sound, repeating words or rewording message, using as few words as possible). SLP also trained conversational partners present today regarding listener strategies (giving speaker full attention, letting the speaker know if message not understood, repeating the part of the message that was understood so speaker only needs to repeat the part that was missed, asking yes/no questions when able).    Response to recommendations/treatment: verbalized understanding, agreed to POC    Goal attainment: All goals met    Risks and benefits of evaluation/treatment have been explained.  Patient, family and/or caregiver are in agreement with Plan of Care.    Timed Code Treatment Minutes: 0 minutes timed codes    Total Treatment Time (sum of timed and untimed services): 40 minutes

## 2021-06-10 NOTE — PATIENT INSTRUCTIONS
Plan  -Follow therapy recommendations  -Social work will visit with you today  -Blood tests today  -Liver US today  -Follow up in 3 months

## 2021-06-10 NOTE — TELEPHONE ENCOUNTER
I spoke with Dr. Tejada today, he had seen Nader and there definitely is some jaundice.  He ordered labs and an ultrasound.  I will help follow-up on these results.

## 2021-06-10 NOTE — PROGRESS NOTES
[facility]: [place]  Neuromuscular Progress Note    Patient Name:  Elier Barber  MRN:  [id number]    :  1945  Date of Service:  Autumn 10, 2021  Primary care provider:  Lida Ray    Brief Summary:   Mr. Barber is a 75 year old male with h/o PLS which was diagnosed in  who presents for follow-up.     Subjective:   Mr. Barber was last seen in the clinic in 2020.  He is accompanied by his wife today.  He continues to follow-up with Dr. Calvert for management of his baclofen pump in the setting of spasticity.  He has found this helpful for the management of spasticity.  He does continue to note shoulder pain bilaterally.  He stated steroid injections for frozen shoulder were not helpful in the past.  He and his wife do passive range of motion exercises and stretching.  He does feel there has been progression of bilateral lower limb weakness and more difficulty with mobility.  His wife specifically noted more difficulty with transfers.  He does use a manual wheelchair along with a four-wheel walker.  He is also noted more difficulty with hand function which he attributes mostly to stiffness and spasticity.  Also since her last visit he has appreciated worsening dysarthria.  He was seen in 2021 by Dr. Brito for a stroke which was located in the subcortical white matter of the precentral gyrus.  His wife has been noting more difficulty at home caring for her  and is wondering about additional assistance and resources are to be provided.  Of note over the last week he and his wife have noted yellowing of the sclera in his eyes.  He denied any abdominal pain, fevers, or weight loss.  No recent travel.  He noted loose stools but no change in color.                                                          ROS: A 10-point ROS was performed as per HPI.    PMH:  Past Medical History:   Diagnosis Date     Basal cell carcinoma nos     sees derm     CVA  (cerebral infarction) 6/14    small vessel right int capsule stroke     DVT (deep vein thrombosis) in pregnancy 05/12/2017    right peroneal vein     Essential hypertension, benign      Hearing loss      Hoarseness of voice     assoc with PLS     Lumbar radiculopathy     2017     Primary Lateral Sclerosis 2002    has baclofen pump through Dr. Calvert, [facility], sees Dr. Fink, [facility]     Prostate CA (H) 2008    prostatectomy     Past Surgical History:   Procedure Laterality Date     ABDOMEN SURGERY  11/12    Hernia     BIOPSY  4/16    Cancerous growth on leg. Removed by MOHs treatment     HERNIA REPAIR  11/12    Repaired     PROSTATE SURGERY       [facility] NONSPECIFIC PROCEDURE  6/08     prostatectomy      [facility] NONSPECIFIC PROCEDURE  11/01    colonoscopy     [facility] NONSPECIFIC PROCEDURE  11/2006    hernia, right side     [facility] NONSPECIFIC PROCEDURE      T + A       Medications:      Current Outpatient Medications:      acetaminophen (TYLENOL) 650 MG CR tablet, Take 650 mg by mouth 2 times daily , Disp: , Rfl:      aspirin 81 MG tablet, Take 81 mg by mouth daily, Disp: , Rfl:      baclofen (LIORESAL) intraTHECAL Internal Pump, by Intrathecal route continuous prn Pump filled by [facility] [phone] Pump Model: [employer] Last fill:  10/2020 Next fill:   3/17/2021 Low [place] Alarm Date:  Reservoir Volume: 20 ml Conc: 2000 mcg/ml Delivers 234.7 mcg/day Basal rate:unknown Battery life: replace in 33 months., Disp: , Rfl:      Cadexomer Iodine (IODOSORB EX), , Disp: , Rfl:      clopidogrel (PLAVIX) 75 MG tablet, Take 75 mg by mouth daily , Disp: , Rfl:      enalapril (VASOTEC) 10 MG tablet, Take 1 tablet (10 mg) by mouth 2 times daily, Disp: 180 tablet, Rfl: 1     EPINEPHrine 0.3 MG/0.3ML injection, Inject 0.3 mLs (0.3 mg) into the muscle as needed for anaphylaxis, Disp: 0.3 mL, Rfl: 3     ketoconazole (NIZORAL) 2 % external shampoo, SHAMPOO EVERY 2-3 DAYS AS  NEEDED, Disp: 120 mL,  Rfl: 10     minocycline (MINOCIN) 100 MG capsule, Take 100 mg by mouth 2 times daily Staph infection., Disp: , Rfl:      mupirocin (BACTROBAN) 2 % external ointment, Apply topically 2 times daily Wound left shin., Disp: , Rfl:      oxybutynin ER (DITROPAN-XL) 10 MG 24 hr tablet, Take 2 tablets (20 mg) by mouth At Bedtime, Disp: 180 tablet, Rfl: 3     pantoprazole (PROTONIX) 40 MG EC tablet, Take 1 tablet (40 mg) by mouth daily, Disp: 90 tablet, Rfl: 3     polyethylene glycol (MIRALAX/GLYCOLAX) Packet, Take 1 packet by mouth daily as needed Every 2-3 days., Disp: , Rfl:      potassium chloride ER (K-TAB/KLOR-CON) 10 MEQ CR tablet, Take 2 tablets (20 mEq) by mouth daily, Disp: 180 tablet, Rfl: 3     rosuvastatin (CRESTOR) 5 MG tablet, TAKE 1 TABLET BY MOUTH EVERY DAY, Disp: 90 tablet, Rfl: 2     STATIN NOT PRESCRIBED, INTENTIONAL,, Please choose reason not prescribed, below (Patient not taking: Reported on 10/2/2020), Disp: , Rfl:     Current Facility-Administered Medications:      methylPREDNISolone (DEPO-MEDROL) injection 40 mg, 40 mg, , , Yeo, Albert, MD, 40 mg at 12/15/20 1134    Physical Examination:   There were no vitals taken for this visit.  Wt Readings from Last 4 Encounters:   02/13/20 70.8 kg (156 lb)   01/10/20 70.8 kg (156 lb)   08/15/19 70.9 kg (156 lb 6.4 oz)   06/06/19 70.8 kg (156 lb)     NM Exam: Cranial     Atrophy Fasciculations   Upper face: Negative Negative   Lower face: Negative Negative   Tongue: Negative Negative      Facial weakness: no facial weakness  Tongue movements: slow  Tongue weakness: mild  Jaw jerk: present  Speech: dysarthria  Pseudobulbar affect: present    NM Exam: Axial    Neck flexion weakness: none  Neck extension weakness: none    NM Exam: Upper Extremities     Right Left   Atrophy: Negative Negative   Fasciculations: Negative Negative   Muscle tone: spastic catch spastic catch   Rapid alt. movements: slow slow   Reflexes Right Left   Biceps: increased increased    Brachioradialis: increased increased   Triceps: increased increased   Weldon: right Weldon reflex present left Weldon reflex present   Strength Right Left   * Indicates a recommended field     Shoulder abduction*: 5 5   Elbow flexion: 5 5   Elbow extension*: 5 5   Wrist extension*: 5 4+   Finger extension: 5 4   Finger flexion:     Abductor pollicis brevis: 4 4   First dorsal interosseous*: 4 4     NM Exam: Lower Extremities     Right Left   Atrophy: Negative Negative   Fasciculations: Negative Negative   Muscle tone: spastic catch left lower extremity stiffness   Rapid alt. movements: slow slow   Reflexes Right Left   Patellar: increased increased   Achilles: normal normal   Plantar:     Strength Right Left   * Indicates a recommended field     Hip flexion*: 5    Knee extension*: 5 5   Knee flexion: 5 5   Ankle dorsiflexion*: 5 5   Ankle plantar flexion: 5 5     Impression:    1) PLS  2) Jaundice  Mr. Barber is a 75 year old male with h/o PLS which was diagnosed in 2002 who presents for follow-up.  Overall there have not been significant changes in his overall examination since her last visit.  He does have some mild asymmetry to his upper limb weakness, left greater than right which is most likely attributed to his recent stroke.  He is noting functional change in not only his gait but also with upper limb function and speech.  He will be seen by multiple members of our multidisciplinary team including OT, PT, and SLP.  His wife is wondering about additional resources for help that can be provided at home.  We will have our  visit with her today.  Of note he does clearly have jaundice not only seen in the sclera but also on the skin and examination.  We will plan for further evaluation with blood work and liver ultrasound.  We did recommend that he follow-up with his primary care physician in regards to these results as he would likely need further evaluation.    Recommendations:    -OT/PT/SLP  -SW    -CBC/CMP  -Liver US  -RTC 3 mo    Patient seen and discussed with attending neurologist, Dr. Tejada, who agrees with above.    Eliu Nevarez MD  Neuromuscular Fellow    I personally examined the patient and concur with the resident's note. I spoke with Dr aRy about his jaundice and she reviewed results and will manage.  I also reviewed results of CBC, CMP, and ultrasound briefly with the patient.    Gary Tejada M.D.

## 2021-06-11 ENCOUNTER — PATIENT OUTREACH (OUTPATIENT)
Dept: CARE COORDINATION | Facility: CLINIC | Age: 76
End: 2021-06-11

## 2021-06-11 ENCOUNTER — NURSE TRIAGE (OUTPATIENT)
Dept: INTERNAL MEDICINE | Facility: CLINIC | Age: 76
End: 2021-06-11

## 2021-06-11 LAB
EXPTIME-PRE: 8.03 SEC
FEF2575-%PRED-PRE: 171 %
FEF2575-PRE: 3.76 L/SEC
FEF2575-PRED: 2.19 L/SEC
FEFMAX-%PRED-PRE: 80 %
FEFMAX-PRE: 6.19 L/SEC
FEFMAX-PRED: 7.72 L/SEC
FEV1-%PRED-PRE: 86 %
FEV1-PRE: 2.61 L
FEV1FEV6-PRE: 100 %
FEV1FEV6-PRED: 77 %
FEV1FVC-PRE: 84 %
FEV1FVC-PRED: 75 %
FIFMAX-PRE: 4.16 L/SEC
FVC-%PRED-PRE: 76 %
FVC-PRE: 3.1 L
FVC-PRED: 4.03 L
MEP-PRE: 45 CMH2O
MIP-PRE: -56 CMH2O

## 2021-06-11 NOTE — TELEPHONE ENCOUNTER
Is there pain or swelling in the calf?  When he is in the recliner with legs elevated does not mean the legs are elevated above his heart level?     If it is only swelling in the foot, there is no calf symptoms at all and he is not elevating it above the heart level, he can try that first.  If the swelling goes down with that, then they can just continue trying to work on elevation.    If there is significant pain, then would refer to urgent care since there are no openings in clinic.

## 2021-06-11 NOTE — TELEPHONE ENCOUNTER
Patient's wife calls, patient is with her. He noticed swelling in his right foot when he went to shower today. He has had occasional foot swelling in the past, but she is concerned today as he has new health issues. She states he had an appointment yesterday with Dr. Tejada at [facility] and told there was an issue with his pancreas and liver. He is slightly jaundiced and has been referred for follow-up with GI for further evaluation.     He is in his wheelchair or reclining chair most of the day, he does have feet elevated when he is in the recliner.    Will route to Dr. Ray to review if patient needs to be seen today for evaluation or if okay to monitor this at home.     Reason for Disposition    MILD swelling of both ankles (i.e., pedal edema) AND new onset or worsening    Additional Information    Negative: Chest pain    Negative: Small area of swelling and followed an insect bite to the area    Negative: Followed a knee injury    Negative: Ankle or foot injury    Negative: Pregnant with leg swelling or edema    Negative: Difficulty breathing at rest    Negative: Entire foot is cool or blue in comparison to other side    Negative: SEVERE swelling (e.g., swelling extends above knee, entire leg is swollen, weeping fluid)    Negative: Thigh or calf pain and only 1 side and present > 1 hour    Negative: Thigh, calf, or ankle swelling in only one leg    Negative: Thigh, calf, or ankle swelling in both legs, but one side is definitely more swollen (Exception: longstanding difference between legs)    Negative: Cast on leg or ankle and has increasing pain    Negative: Can't walk or can barely stand (new onset)    Negative: Fever and red area (or area very tender to touch)    Negative: Patient sounds very sick or weak to the triager    Negative: Swelling of face, arm or hands (Exception: slight puffiness of fingers during hot weather)    Negative: Pregnant > 20 weeks and sudden weight gain (i.e., > 2 lbs, 1 kg in one week)     "Negative: MODERATE swelling of both ankles (e.g., swelling extends up to the knees) AND new onset or worsening    Negative: Difficulty breathing with exertion AND worsening or new onset    Negative: Looks like a boil, infected sore, deep ulcer, or other infected rash (spreading redness, pus)    Negative: Patient wants to be seen    Answer Assessment - Initial Assessment Questions  1. ONSET: \"When did the swelling start?\" (e.g., minutes, hours, days)      Noticed it when he went to take a shower this morning  2. LOCATION: \"What part of the leg is swollen?\"  \"Are both legs swollen or just one leg?\"      Patient state right foot only is swollen, but his wife thinks the left leg looks slightly swollen too.  3. SEVERITY: \"How bad is the swelling?\" (e.g., localized; mild, moderate, severe)   - Localized - small area of swelling localized to one leg   - MILD pedal edema - swelling limited to foot and ankle, pitting edema < 1/4 inch (6 mm) deep, rest and elevation eliminate most or all swelling   - MODERATE edema - swelling of lower leg to knee, pitting edema > 1/4 inch (6 mm) deep, rest and elevation only partially reduce swelling   - SEVERE edema - swelling extends above knee, facial or hand swelling present       Localized, mild  4. REDNESS: \"Does the swelling look red or infected?\"      no  5. PAIN: \"Is the swelling painful to touch?\" If so, ask: \"How painful is it?\"   (Scale 1-10; mild, moderate or severe)      no  6. FEVER: \"Do you have a fever?\" If so, ask: \"What is it, how was it measured, and when did it start?\"       no  7. CAUSE: \"What do you think is causing the leg swelling?\"      unsure  8. MEDICAL HISTORY: \"Do you have a history of heart failure, kidney disease, liver failure, or cancer?\"      He was told there was an issue with the liver - thinks there may be a cyst and/or issue with bile duct  9. RECURRENT SYMPTOM: \"Have you had leg swelling before?\" If so, ask: \"When was the last time?\" \"What happened that " "time?\"      On and off in the past, especially with travel, but goes away on it's own.  10. OTHER SYMPTOMS: \"Do you have any other symptoms?\" (e.g., chest pain, difficulty breathing)        No other symptoms present  11. PREGNANCY: \"Is there any chance you are pregnant?\" \"When was your last menstrual period?\"        n/a    Protocols used: LEG SWELLING AND EDEMA-A-OH      "

## 2021-06-11 NOTE — TELEPHONE ENCOUNTER
No pain or swelling in calf.  Read MD message to wife Kelli.  She states she will have patient work on elevating feet above heart level and decreasing sodium in diet.  ARELY Hua R.N.

## 2021-06-11 NOTE — PROGRESS NOTES
Social Work -ALS Clinic Progress Note  [facility]    Data/Intervention:    Patient Name:  Elier Barber  /Age:  1945 (75 year old)    Visit Type: telephone    Collaborated With:    -Nader and spouse Kelli  -Dr Tejada and members of the ALS interdisciplinary team    Psychosocial info/Concerns:   Request to contact Pt/spouse re how to get more care at home.  They had been receiving a HHA 2 hours/day prior to the pandemic and then stopped for Covid safety reasons. Now, [facility] who was providing the services can only provide 1 hour/day. They are not using the ALSA respite program. They were using their LTC policy.    Intervention/Education/Resources Provided:  Discussed that since [facility] can't provide the staffing they need, using another agency is recommended. Will email a list of agencies. Kelli will also contact their LTC policy to inquire about what is covered and how much and then move forward with contacting an agency.  Provided the phone # for ROLAN so she can also access the ALS respite care program.  In addition, addressed the POLST document and it's purpose and use. Will email a copy of it for them to review. Discussed that it's to be completed with a Provider.    Assessment/Plan:  They will f/u on the resources provided. They indicated the info was helpful.    Provided patient/family with contact information and encouraged them to contact me between clinic visits as needed.     Lida Kwon, MSW, [facility]    [facility]  [phone]

## 2021-06-14 DIAGNOSIS — G12.23 PRIMARY LATERAL SCLEROSES (H): Primary | ICD-10-CM

## 2021-06-15 ENCOUNTER — MYC MEDICAL ADVICE (OUTPATIENT)
Dept: INTERNAL MEDICINE | Facility: CLINIC | Age: 76
End: 2021-06-15

## 2021-06-16 ENCOUNTER — HOSPITAL ENCOUNTER (OUTPATIENT)
Facility: CLINIC | Age: 76
Setting detail: OBSERVATION
Discharge: HOME | End: 2021-06-20
Attending: EMERGENCY MEDICINE | Admitting: INTERNAL MEDICINE
Payer: COMMERCIAL

## 2021-06-16 ENCOUNTER — APPOINTMENT (OUTPATIENT)
Dept: ULTRASOUND IMAGING | Facility: CLINIC | Age: 76
End: 2021-06-16
Attending: EMERGENCY MEDICINE
Payer: COMMERCIAL

## 2021-06-16 DIAGNOSIS — G12.23 PRIMARY LATERAL SCLEROSIS (H): Primary | ICD-10-CM

## 2021-06-16 DIAGNOSIS — K86.89 PANCREATIC MASS: ICD-10-CM

## 2021-06-16 DIAGNOSIS — M19.011 PRIMARY OSTEOARTHRITIS OF RIGHT SHOULDER: ICD-10-CM

## 2021-06-16 DIAGNOSIS — R74.8 ELEVATED LIVER ENZYMES: ICD-10-CM

## 2021-06-16 LAB
ALBUMIN SERPL-MCNC: 3.1 G/DL (ref 3.4–5)
ALP SERPL-CCNC: 428 U/L (ref 40–150)
ALT SERPL W P-5'-P-CCNC: 164 U/L (ref 0–70)
ANION GAP SERPL CALCULATED.3IONS-SCNC: 5 MMOL/L (ref 3–14)
AST SERPL W P-5'-P-CCNC: 150 U/L (ref 0–45)
BILIRUB SERPL-MCNC: 8 MG/DL (ref 0.2–1.3)
BUN SERPL-MCNC: 17 MG/DL (ref 7–30)
CALCIUM SERPL-MCNC: 9.4 MG/DL (ref 8.5–10.1)
CHLORIDE SERPL-SCNC: 96 MMOL/L (ref 94–109)
CO2 SERPL-SCNC: 29 MMOL/L (ref 20–32)
CREAT SERPL-MCNC: 0.65 MG/DL (ref 0.66–1.25)
ERYTHROCYTE [DISTWIDTH] IN BLOOD BY AUTOMATED COUNT: 15.6 % (ref 10–15)
GFR SERPL CREATININE-BSD FRML MDRD: >90 ML/MIN/{1.73_M2}
GLUCOSE SERPL-MCNC: 97 MG/DL (ref 70–99)
HCT VFR BLD AUTO: 41.3 % (ref 40–53)
HGB BLD-MCNC: 14.1 G/DL (ref 13.3–17.7)
INR PPP: 0.96 (ref 0.86–1.14)
LIPASE SERPL-CCNC: 506 U/L (ref 73–393)
MCH RBC QN AUTO: 31.9 PG (ref 26.5–33)
MCHC RBC AUTO-ENTMCNC: 34.1 G/DL (ref 31.5–36.5)
MCV RBC AUTO: 93 FL (ref 78–100)
PLATELET # BLD AUTO: 307 10E9/L (ref 150–450)
POTASSIUM SERPL-SCNC: 4 MMOL/L (ref 3.4–5.3)
PROT SERPL-MCNC: 7.2 G/DL (ref 6.8–8.8)
RBC # BLD AUTO: 4.42 10E12/L (ref 4.4–5.9)
SODIUM SERPL-SCNC: 130 MMOL/L (ref 133–144)
WBC # BLD AUTO: 8.4 10E9/L (ref 4–11)

## 2021-06-16 PROCEDURE — 99223 1ST HOSP IP/OBS HIGH 75: CPT | Mod: AI | Performed by: HOSPITALIST

## 2021-06-16 PROCEDURE — 80053 COMPREHEN METABOLIC PANEL: CPT | Performed by: EMERGENCY MEDICINE

## 2021-06-16 PROCEDURE — 99285 EMERGENCY DEPT VISIT HI MDM: CPT | Mod: 25

## 2021-06-16 PROCEDURE — 120N000001 HC R&B MED SURG/OB

## 2021-06-16 PROCEDURE — 93970 EXTREMITY STUDY: CPT

## 2021-06-16 PROCEDURE — 85610 PROTHROMBIN TIME: CPT | Performed by: EMERGENCY MEDICINE

## 2021-06-16 PROCEDURE — C9803 HOPD COVID-19 SPEC COLLECT: HCPCS

## 2021-06-16 PROCEDURE — 83690 ASSAY OF LIPASE: CPT | Performed by: EMERGENCY MEDICINE

## 2021-06-16 PROCEDURE — 85027 COMPLETE CBC AUTOMATED: CPT | Performed by: EMERGENCY MEDICINE

## 2021-06-16 RX ORDER — LIDOCAINE 40 MG/G
CREAM TOPICAL
Status: DISCONTINUED | OUTPATIENT
Start: 2021-06-16 | End: 2021-06-20 | Stop reason: HOSPADM

## 2021-06-16 RX ORDER — ACETAMINOPHEN 325 MG/1
650 TABLET ORAL 3 TIMES DAILY
Status: DISCONTINUED | OUTPATIENT
Start: 2021-06-17 | End: 2021-06-20 | Stop reason: HOSPADM

## 2021-06-16 RX ORDER — KETOCONAZOLE 20 MG/ML
SHAMPOO TOPICAL DAILY PRN
Status: DISCONTINUED | OUTPATIENT
Start: 2021-06-16 | End: 2021-06-20 | Stop reason: HOSPADM

## 2021-06-16 RX ORDER — ROSUVASTATIN CALCIUM 5 MG/1
5 TABLET, COATED ORAL DAILY
Status: DISCONTINUED | OUTPATIENT
Start: 2021-06-17 | End: 2021-06-17

## 2021-06-16 RX ORDER — OXYBUTYNIN CHLORIDE 5 MG/1
20 TABLET, EXTENDED RELEASE ORAL AT BEDTIME
Status: DISCONTINUED | OUTPATIENT
Start: 2021-06-16 | End: 2021-06-20 | Stop reason: HOSPADM

## 2021-06-16 RX ORDER — POTASSIUM CHLORIDE 750 MG/1
20 TABLET, EXTENDED RELEASE ORAL DAILY
Status: DISCONTINUED | OUTPATIENT
Start: 2021-06-17 | End: 2021-06-20 | Stop reason: HOSPADM

## 2021-06-16 RX ORDER — ENALAPRIL MALEATE 10 MG/1
10 TABLET ORAL 2 TIMES DAILY
Status: DISCONTINUED | OUTPATIENT
Start: 2021-06-16 | End: 2021-06-20 | Stop reason: HOSPADM

## 2021-06-16 RX ORDER — DEXTROSE MONOHYDRATE, SODIUM CHLORIDE, AND POTASSIUM CHLORIDE 50; 1.49; 9 G/1000ML; G/1000ML; G/1000ML
INJECTION, SOLUTION INTRAVENOUS CONTINUOUS
Status: DISCONTINUED | OUTPATIENT
Start: 2021-06-16 | End: 2021-06-20 | Stop reason: HOSPADM

## 2021-06-16 RX ORDER — PANTOPRAZOLE SODIUM 40 MG/1
40 TABLET, DELAYED RELEASE ORAL EVERY EVENING
Status: DISCONTINUED | OUTPATIENT
Start: 2021-06-17 | End: 2021-06-20 | Stop reason: HOSPADM

## 2021-06-16 ASSESSMENT — MIFFLIN-ST. JEOR
SCORE: 1413.47
SCORE: 1421.18

## 2021-06-16 ASSESSMENT — ENCOUNTER SYMPTOMS
SHORTNESS OF BREATH: 0
JOINT SWELLING: 1
FEVER: 0
CHILLS: 0

## 2021-06-16 NOTE — LETTER
[place] [address] MEDICAL SURGICAL  [address]  [facility] [address]  [phone]  [phone] 6/19/2021  Elier Barber  [id number] [facility] [id number]    To whom it may concern    This is to certify that Mr. Elier Barber, is under my care at [facility].  He will need further evaluation and testing for his recent diagnosis.  He will not be able to travel and fly for at least next few months.    Sincerely,     Melani Pham MD

## 2021-06-16 NOTE — ED TRIAGE NOTES
Pt had R. Foot swelling that started on Friday. Denies SOB or c/p. Denies fevers. Primary lateral sclerosis. ABCs intact.

## 2021-06-17 ENCOUNTER — ANESTHESIA EVENT (OUTPATIENT)
Dept: SURGERY | Facility: CLINIC | Age: 76
End: 2021-06-17
Payer: COMMERCIAL

## 2021-06-17 ENCOUNTER — APPOINTMENT (OUTPATIENT)
Dept: INTERVENTIONAL RADIOLOGY/VASCULAR | Facility: CLINIC | Age: 76
End: 2021-06-17
Attending: HOSPITALIST
Payer: COMMERCIAL

## 2021-06-17 ENCOUNTER — ANESTHESIA (OUTPATIENT)
Dept: SURGERY | Facility: CLINIC | Age: 76
End: 2021-06-17
Payer: COMMERCIAL

## 2021-06-17 ENCOUNTER — APPOINTMENT (OUTPATIENT)
Dept: CT IMAGING | Facility: CLINIC | Age: 76
End: 2021-06-17
Attending: INTERNAL MEDICINE
Payer: COMMERCIAL

## 2021-06-17 LAB
ALBUMIN SERPL-MCNC: 2.5 G/DL (ref 3.4–5)
ALP SERPL-CCNC: 346 U/L (ref 40–150)
ALT SERPL W P-5'-P-CCNC: 128 U/L (ref 0–70)
ANION GAP SERPL CALCULATED.3IONS-SCNC: 6 MMOL/L (ref 3–14)
AST SERPL W P-5'-P-CCNC: 125 U/L (ref 0–45)
BILIRUB SERPL-MCNC: 7.2 MG/DL (ref 0.2–1.3)
BUN SERPL-MCNC: 14 MG/DL (ref 7–30)
CALCIUM SERPL-MCNC: 8.8 MG/DL (ref 8.5–10.1)
CHLORIDE SERPL-SCNC: 100 MMOL/L (ref 94–109)
CO2 SERPL-SCNC: 27 MMOL/L (ref 20–32)
CREAT SERPL-MCNC: 0.55 MG/DL (ref 0.66–1.25)
ERCP: NORMAL
ERYTHROCYTE [DISTWIDTH] IN BLOOD BY AUTOMATED COUNT: 15.4 % (ref 10–15)
GFR SERPL CREATININE-BSD FRML MDRD: >90 ML/MIN/{1.73_M2}
GLUCOSE SERPL-MCNC: 95 MG/DL (ref 70–99)
HCT VFR BLD AUTO: 37.3 % (ref 40–53)
HGB BLD-MCNC: 12.9 G/DL (ref 13.3–17.7)
INR PPP: 0.96 (ref 0.86–1.14)
LABORATORY COMMENT REPORT: NORMAL
MCH RBC QN AUTO: 31.6 PG (ref 26.5–33)
MCHC RBC AUTO-ENTMCNC: 34.6 G/DL (ref 31.5–36.5)
MCV RBC AUTO: 91 FL (ref 78–100)
PLATELET # BLD AUTO: 260 10E9/L (ref 150–450)
PLATELET # BLD AUTO: 306 10E9/L (ref 150–450)
POTASSIUM SERPL-SCNC: 3.9 MMOL/L (ref 3.4–5.3)
PROT SERPL-MCNC: 5.9 G/DL (ref 6.8–8.8)
RBC # BLD AUTO: 4.08 10E12/L (ref 4.4–5.9)
SARS-COV-2 RNA RESP QL NAA+PROBE: NEGATIVE
SODIUM SERPL-SCNC: 133 MMOL/L (ref 133–144)
SPECIMEN SOURCE: NORMAL
UPPER EUS: NORMAL
WBC # BLD AUTO: 6.6 10E9/L (ref 4–11)

## 2021-06-17 PROCEDURE — 370N000017 HC ANESTHESIA TECHNICAL FEE, PER MIN: Performed by: INTERNAL MEDICINE

## 2021-06-17 PROCEDURE — 88305 TISSUE EXAM BY PATHOLOGIST: CPT | Mod: 26 | Performed by: PATHOLOGY

## 2021-06-17 PROCEDURE — 250N000013 HC RX MED GY IP 250 OP 250 PS 637: Performed by: HOSPITALIST

## 2021-06-17 PROCEDURE — 258N000003 HC RX IP 258 OP 636: Performed by: ANESTHESIOLOGY

## 2021-06-17 PROCEDURE — 88172 CYTP DX EVAL FNA 1ST EA SITE: CPT | Mod: TC | Performed by: INTERNAL MEDICINE

## 2021-06-17 PROCEDURE — 999N001018 HC STATISTIC H-CELL BLOCK W/STAIN: Performed by: INTERNAL MEDICINE

## 2021-06-17 PROCEDURE — 360N000083 HC SURGERY LEVEL 3 W/ FLUORO, PER MIN: Performed by: INTERNAL MEDICINE

## 2021-06-17 PROCEDURE — 96374 THER/PROPH/DIAG INJ IV PUSH: CPT

## 2021-06-17 PROCEDURE — 250N000011 HC RX IP 250 OP 636: Performed by: ANESTHESIOLOGY

## 2021-06-17 PROCEDURE — 258N000001 HC RX 258: Performed by: HOSPITALIST

## 2021-06-17 PROCEDURE — 87635 SARS-COV-2 COVID-19 AMP PRB: CPT | Performed by: INTERNAL MEDICINE

## 2021-06-17 PROCEDURE — 99207 PR CDG-CODE CATEGORY CHANGED: CPT | Performed by: INTERNAL MEDICINE

## 2021-06-17 PROCEDURE — 272N000001 HC OR GENERAL SUPPLY STERILE: Performed by: INTERNAL MEDICINE

## 2021-06-17 PROCEDURE — 88161 CYTOPATH SMEAR OTHER SOURCE: CPT | Mod: 26 | Performed by: PATHOLOGY

## 2021-06-17 PROCEDURE — 258N000003 HC RX IP 258 OP 636: Performed by: NURSE ANESTHETIST, CERTIFIED REGISTERED

## 2021-06-17 PROCEDURE — 250N000009 HC RX 250: Performed by: HOSPITALIST

## 2021-06-17 PROCEDURE — 88172 CYTP DX EVAL FNA 1ST EA SITE: CPT | Mod: 26 | Performed by: PATHOLOGY

## 2021-06-17 PROCEDURE — 999N000059 HC STATISTIC ERCP (OR PROCEDURE): Performed by: INTERNAL MEDICINE

## 2021-06-17 PROCEDURE — 99225 PR SUBSEQUENT OBSERVATION CARE,LEVEL II: CPT | Performed by: INTERNAL MEDICINE

## 2021-06-17 PROCEDURE — 85027 COMPLETE CBC AUTOMATED: CPT | Performed by: HOSPITALIST

## 2021-06-17 PROCEDURE — 74170 CT ABD WO CNTRST FLWD CNTRST: CPT

## 2021-06-17 PROCEDURE — 250N000011 HC RX IP 250 OP 636: Performed by: HOSPITALIST

## 2021-06-17 PROCEDURE — 250N000013 HC RX MED GY IP 250 OP 250 PS 637: Performed by: INTERNAL MEDICINE

## 2021-06-17 PROCEDURE — 250N000011 HC RX IP 250 OP 636: Performed by: INTERNAL MEDICINE

## 2021-06-17 PROCEDURE — 88305 TISSUE EXAM BY PATHOLOGIST: CPT | Mod: TC | Performed by: INTERNAL MEDICINE

## 2021-06-17 PROCEDURE — 36415 COLL VENOUS BLD VENIPUNCTURE: CPT | Performed by: PHYSICIAN ASSISTANT

## 2021-06-17 PROCEDURE — 36415 COLL VENOUS BLD VENIPUNCTURE: CPT | Performed by: HOSPITALIST

## 2021-06-17 PROCEDURE — 88173 CYTOPATH EVAL FNA REPORT: CPT | Mod: TC | Performed by: INTERNAL MEDICINE

## 2021-06-17 PROCEDURE — 80053 COMPREHEN METABOLIC PANEL: CPT | Performed by: HOSPITALIST

## 2021-06-17 PROCEDURE — C1894 INTRO/SHEATH, NON-LASER: HCPCS | Performed by: INTERNAL MEDICINE

## 2021-06-17 PROCEDURE — 85610 PROTHROMBIN TIME: CPT | Performed by: PHYSICIAN ASSISTANT

## 2021-06-17 PROCEDURE — 88173 CYTOPATH EVAL FNA REPORT: CPT | Mod: 26 | Performed by: PATHOLOGY

## 2021-06-17 PROCEDURE — 88112 CYTOPATH CELL ENHANCE TECH: CPT | Mod: TC | Performed by: INTERNAL MEDICINE

## 2021-06-17 PROCEDURE — 250N000011 HC RX IP 250 OP 636: Performed by: NURSE ANESTHETIST, CERTIFIED REGISTERED

## 2021-06-17 PROCEDURE — C1769 GUIDE WIRE: HCPCS | Performed by: INTERNAL MEDICINE

## 2021-06-17 PROCEDURE — 250N000009 HC RX 250: Performed by: NURSE ANESTHETIST, CERTIFIED REGISTERED

## 2021-06-17 PROCEDURE — 710N000009 HC RECOVERY PHASE 1, LEVEL 1, PER MIN: Performed by: INTERNAL MEDICINE

## 2021-06-17 PROCEDURE — 88112 CYTOPATH CELL ENHANCE TECH: CPT | Mod: 26 | Performed by: PATHOLOGY

## 2021-06-17 PROCEDURE — G0378 HOSPITAL OBSERVATION PER HR: HCPCS

## 2021-06-17 PROCEDURE — 999N000060 HC STATISTIC EUS (OR PROCEDURE): Performed by: INTERNAL MEDICINE

## 2021-06-17 PROCEDURE — 85049 AUTOMATED PLATELET COUNT: CPT | Performed by: PHYSICIAN ASSISTANT

## 2021-06-17 PROCEDURE — C1877 STENT, NON-COAT/COV W/O DEL: HCPCS | Performed by: INTERNAL MEDICINE

## 2021-06-17 PROCEDURE — 88161 CYTOPATH SMEAR OTHER SOURCE: CPT | Mod: TC | Performed by: INTERNAL MEDICINE

## 2021-06-17 PROCEDURE — 250N000009 HC RX 250: Performed by: INTERNAL MEDICINE

## 2021-06-17 PROCEDURE — 999N000141 HC STATISTIC PRE-PROCEDURE NURSING ASSESSMENT: Performed by: INTERNAL MEDICINE

## 2021-06-17 DEVICE — STENT COTTON LEUNG (AMSTERDAM) BIL 10FRX07CM CLSO-10-7
Type: IMPLANTABLE DEVICE | Site: BILE DUCT | Status: NON-FUNCTIONAL
Removed: 2021-08-19

## 2021-06-17 RX ORDER — SENNOSIDES 8.6 MG
1-2 TABLET ORAL DAILY PRN
Status: DISCONTINUED | OUTPATIENT
Start: 2021-06-17 | End: 2021-06-20 | Stop reason: HOSPADM

## 2021-06-17 RX ORDER — LIDOCAINE 40 MG/G
CREAM TOPICAL
Status: DISCONTINUED | OUTPATIENT
Start: 2021-06-17 | End: 2021-06-17 | Stop reason: HOSPADM

## 2021-06-17 RX ORDER — METOCLOPRAMIDE 10 MG/1
10 TABLET ORAL EVERY 6 HOURS PRN
Status: DISCONTINUED | OUTPATIENT
Start: 2021-06-17 | End: 2021-06-17 | Stop reason: HOSPADM

## 2021-06-17 RX ORDER — DOCUSATE SODIUM 100 MG/1
100 CAPSULE, LIQUID FILLED ORAL 2 TIMES DAILY PRN
Status: DISCONTINUED | OUTPATIENT
Start: 2021-06-17 | End: 2021-06-20 | Stop reason: HOSPADM

## 2021-06-17 RX ORDER — DIMENHYDRINATE 50 MG/ML
25 INJECTION, SOLUTION INTRAMUSCULAR; INTRAVENOUS
Status: DISCONTINUED | OUTPATIENT
Start: 2021-06-17 | End: 2021-06-17 | Stop reason: HOSPADM

## 2021-06-17 RX ORDER — METOPROLOL TARTRATE 1 MG/ML
1-2 INJECTION, SOLUTION INTRAVENOUS EVERY 5 MIN PRN
Status: DISCONTINUED | OUTPATIENT
Start: 2021-06-17 | End: 2021-06-17 | Stop reason: HOSPADM

## 2021-06-17 RX ORDER — ONDANSETRON 2 MG/ML
4 INJECTION INTRAMUSCULAR; INTRAVENOUS EVERY 30 MIN PRN
Status: DISCONTINUED | OUTPATIENT
Start: 2021-06-17 | End: 2021-06-17 | Stop reason: HOSPADM

## 2021-06-17 RX ORDER — FENTANYL CITRATE 50 UG/ML
25-50 INJECTION, SOLUTION INTRAMUSCULAR; INTRAVENOUS
Status: DISCONTINUED | OUTPATIENT
Start: 2021-06-17 | End: 2021-06-17 | Stop reason: HOSPADM

## 2021-06-17 RX ORDER — NALOXONE HYDROCHLORIDE 0.4 MG/ML
0.4 INJECTION, SOLUTION INTRAMUSCULAR; INTRAVENOUS; SUBCUTANEOUS
Status: DISCONTINUED | OUTPATIENT
Start: 2021-06-17 | End: 2021-06-20 | Stop reason: HOSPADM

## 2021-06-17 RX ORDER — SODIUM CHLORIDE, SODIUM LACTATE, POTASSIUM CHLORIDE, CALCIUM CHLORIDE 600; 310; 30; 20 MG/100ML; MG/100ML; MG/100ML; MG/100ML
INJECTION, SOLUTION INTRAVENOUS CONTINUOUS
Status: DISCONTINUED | OUTPATIENT
Start: 2021-06-17 | End: 2021-06-17 | Stop reason: HOSPADM

## 2021-06-17 RX ORDER — HYDRALAZINE HYDROCHLORIDE 20 MG/ML
2.5-5 INJECTION INTRAMUSCULAR; INTRAVENOUS EVERY 10 MIN PRN
Status: DISCONTINUED | OUTPATIENT
Start: 2021-06-17 | End: 2021-06-17 | Stop reason: HOSPADM

## 2021-06-17 RX ORDER — HYDROMORPHONE HYDROCHLORIDE 1 MG/ML
.3-.5 INJECTION, SOLUTION INTRAMUSCULAR; INTRAVENOUS; SUBCUTANEOUS EVERY 10 MIN PRN
Status: DISCONTINUED | OUTPATIENT
Start: 2021-06-17 | End: 2021-06-17 | Stop reason: HOSPADM

## 2021-06-17 RX ORDER — FLUOROURACIL 50 MG/G
CREAM TOPICAL
COMMUNITY
End: 2021-10-22

## 2021-06-17 RX ORDER — NALOXONE HYDROCHLORIDE 0.4 MG/ML
0.2 INJECTION, SOLUTION INTRAMUSCULAR; INTRAVENOUS; SUBCUTANEOUS
Status: DISCONTINUED | OUTPATIENT
Start: 2021-06-17 | End: 2021-06-20 | Stop reason: HOSPADM

## 2021-06-17 RX ORDER — ONDANSETRON 4 MG/1
4 TABLET, ORALLY DISINTEGRATING ORAL EVERY 30 MIN PRN
Status: DISCONTINUED | OUTPATIENT
Start: 2021-06-17 | End: 2021-06-17 | Stop reason: HOSPADM

## 2021-06-17 RX ORDER — DEXAMETHASONE SODIUM PHOSPHATE 4 MG/ML
INJECTION, SOLUTION INTRA-ARTICULAR; INTRALESIONAL; INTRAMUSCULAR; INTRAVENOUS; SOFT TISSUE PRN
Status: DISCONTINUED | OUTPATIENT
Start: 2021-06-17 | End: 2021-06-17

## 2021-06-17 RX ORDER — ONDANSETRON 2 MG/ML
4 INJECTION INTRAMUSCULAR; INTRAVENOUS EVERY 6 HOURS PRN
Status: DISCONTINUED | OUTPATIENT
Start: 2021-06-17 | End: 2021-06-20 | Stop reason: HOSPADM

## 2021-06-17 RX ORDER — ONDANSETRON 4 MG/1
4 TABLET, ORALLY DISINTEGRATING ORAL EVERY 6 HOURS PRN
Status: DISCONTINUED | OUTPATIENT
Start: 2021-06-17 | End: 2021-06-20 | Stop reason: HOSPADM

## 2021-06-17 RX ORDER — ONDANSETRON 2 MG/ML
INJECTION INTRAMUSCULAR; INTRAVENOUS PRN
Status: DISCONTINUED | OUTPATIENT
Start: 2021-06-17 | End: 2021-06-17

## 2021-06-17 RX ORDER — NEOSTIGMINE METHYLSULFATE 1 MG/ML
VIAL (ML) INJECTION PRN
Status: DISCONTINUED | OUTPATIENT
Start: 2021-06-17 | End: 2021-06-17

## 2021-06-17 RX ORDER — FENTANYL CITRATE 50 UG/ML
INJECTION, SOLUTION INTRAMUSCULAR; INTRAVENOUS PRN
Status: DISCONTINUED | OUTPATIENT
Start: 2021-06-17 | End: 2021-06-17

## 2021-06-17 RX ORDER — NALOXONE HYDROCHLORIDE 0.4 MG/ML
0.2 INJECTION, SOLUTION INTRAMUSCULAR; INTRAVENOUS; SUBCUTANEOUS
Status: DISCONTINUED | OUTPATIENT
Start: 2021-06-17 | End: 2021-06-17 | Stop reason: HOSPADM

## 2021-06-17 RX ORDER — GLYCOPYRROLATE 0.2 MG/ML
INJECTION, SOLUTION INTRAMUSCULAR; INTRAVENOUS PRN
Status: DISCONTINUED | OUTPATIENT
Start: 2021-06-17 | End: 2021-06-17

## 2021-06-17 RX ORDER — METOCLOPRAMIDE HYDROCHLORIDE 5 MG/ML
10 INJECTION INTRAMUSCULAR; INTRAVENOUS EVERY 6 HOURS PRN
Status: DISCONTINUED | OUTPATIENT
Start: 2021-06-17 | End: 2021-06-17 | Stop reason: HOSPADM

## 2021-06-17 RX ORDER — INDOMETHACIN 50 MG/1
100 SUPPOSITORY RECTAL
Status: DISCONTINUED | OUTPATIENT
Start: 2021-06-17 | End: 2021-06-17 | Stop reason: HOSPADM

## 2021-06-17 RX ORDER — NALOXONE HYDROCHLORIDE 0.4 MG/ML
0.4 INJECTION, SOLUTION INTRAMUSCULAR; INTRAVENOUS; SUBCUTANEOUS
Status: DISCONTINUED | OUTPATIENT
Start: 2021-06-17 | End: 2021-06-17 | Stop reason: HOSPADM

## 2021-06-17 RX ORDER — POLYETHYLENE GLYCOL 3350 17 G/17G
17 POWDER, FOR SOLUTION ORAL 2 TIMES DAILY PRN
Status: DISCONTINUED | OUTPATIENT
Start: 2021-06-17 | End: 2021-06-20 | Stop reason: HOSPADM

## 2021-06-17 RX ORDER — FLUMAZENIL 0.1 MG/ML
0.2 INJECTION, SOLUTION INTRAVENOUS
Status: ACTIVE | OUTPATIENT
Start: 2021-06-17 | End: 2021-06-18

## 2021-06-17 RX ORDER — LIDOCAINE HYDROCHLORIDE 10 MG/ML
INJECTION, SOLUTION INFILTRATION; PERINEURAL PRN
Status: DISCONTINUED | OUTPATIENT
Start: 2021-06-17 | End: 2021-06-17

## 2021-06-17 RX ORDER — IOPAMIDOL 755 MG/ML
100 INJECTION, SOLUTION INTRAVASCULAR ONCE
Status: DISCONTINUED | OUTPATIENT
Start: 2021-06-17 | End: 2021-06-17

## 2021-06-17 RX ORDER — MEPERIDINE HYDROCHLORIDE 25 MG/ML
12.5 INJECTION INTRAMUSCULAR; INTRAVENOUS; SUBCUTANEOUS
Status: DISCONTINUED | OUTPATIENT
Start: 2021-06-17 | End: 2021-06-17 | Stop reason: HOSPADM

## 2021-06-17 RX ORDER — INDOMETHACIN 50 MG/1
SUPPOSITORY RECTAL PRN
Status: DISCONTINUED | OUTPATIENT
Start: 2021-06-17 | End: 2021-06-17 | Stop reason: HOSPADM

## 2021-06-17 RX ORDER — PROPOFOL 10 MG/ML
INJECTION, EMULSION INTRAVENOUS PRN
Status: DISCONTINUED | OUTPATIENT
Start: 2021-06-17 | End: 2021-06-17

## 2021-06-17 RX ORDER — IOPAMIDOL 755 MG/ML
500 INJECTION, SOLUTION INTRAVASCULAR ONCE
Status: COMPLETED | OUTPATIENT
Start: 2021-06-17 | End: 2021-06-17

## 2021-06-17 RX ORDER — CIPROFLOXACIN 2 MG/ML
400 INJECTION, SOLUTION INTRAVENOUS EVERY 12 HOURS
Status: DISCONTINUED | OUTPATIENT
Start: 2021-06-17 | End: 2021-06-20 | Stop reason: HOSPADM

## 2021-06-17 RX ADMIN — SODIUM CHLORIDE, POTASSIUM CHLORIDE, SODIUM LACTATE AND CALCIUM CHLORIDE: 600; 310; 30; 20 INJECTION, SOLUTION INTRAVENOUS at 13:12

## 2021-06-17 RX ADMIN — ONDANSETRON HYDROCHLORIDE 4 MG: 2 INJECTION, SOLUTION INTRAMUSCULAR; INTRAVENOUS at 19:05

## 2021-06-17 RX ADMIN — PHENYLEPHRINE HYDROCHLORIDE 100 MCG: 10 INJECTION INTRAVENOUS at 14:02

## 2021-06-17 RX ADMIN — ONDANSETRON HYDROCHLORIDE 4 MG: 2 INJECTION, SOLUTION INTRAVENOUS at 13:55

## 2021-06-17 RX ADMIN — SENNOSIDES 2 TABLET: 8.6 TABLET, FILM COATED ORAL at 21:59

## 2021-06-17 RX ADMIN — POTASSIUM CHLORIDE, DEXTROSE MONOHYDRATE AND SODIUM CHLORIDE: 150; 5; 900 INJECTION, SOLUTION INTRAVENOUS at 00:28

## 2021-06-17 RX ADMIN — POTASSIUM CHLORIDE, DEXTROSE MONOHYDRATE AND SODIUM CHLORIDE: 150; 5; 900 INJECTION, SOLUTION INTRAVENOUS at 11:11

## 2021-06-17 RX ADMIN — PHENYLEPHRINE HYDROCHLORIDE 100 MCG: 10 INJECTION INTRAVENOUS at 14:15

## 2021-06-17 RX ADMIN — NEOSTIGMINE METHYLSULFATE 3 MG: 1 INJECTION, SOLUTION INTRAVENOUS at 14:45

## 2021-06-17 RX ADMIN — IOPAMIDOL 75 ML: 755 INJECTION, SOLUTION INTRAVENOUS at 18:37

## 2021-06-17 RX ADMIN — PHENYLEPHRINE HYDROCHLORIDE 100 MCG: 10 INJECTION INTRAVENOUS at 13:55

## 2021-06-17 RX ADMIN — DEXAMETHASONE SODIUM PHOSPHATE 4 MG: 4 INJECTION, SOLUTION INTRA-ARTICULAR; INTRALESIONAL; INTRAMUSCULAR; INTRAVENOUS; SOFT TISSUE at 13:40

## 2021-06-17 RX ADMIN — PHENYLEPHRINE HYDROCHLORIDE 200 MCG: 10 INJECTION INTRAVENOUS at 14:44

## 2021-06-17 RX ADMIN — PHENYLEPHRINE HYDROCHLORIDE 200 MCG: 10 INJECTION INTRAVENOUS at 14:31

## 2021-06-17 RX ADMIN — ENALAPRIL MALEATE 10 MG: 10 TABLET ORAL at 20:28

## 2021-06-17 RX ADMIN — PANTOPRAZOLE SODIUM 40 MG: 40 TABLET, DELAYED RELEASE ORAL at 19:19

## 2021-06-17 RX ADMIN — ENALAPRIL MALEATE 10 MG: 10 TABLET ORAL at 10:08

## 2021-06-17 RX ADMIN — GLYCOPYRROLATE 0.4 MG: 0.2 INJECTION, SOLUTION INTRAMUSCULAR; INTRAVENOUS at 14:45

## 2021-06-17 RX ADMIN — OXYBUTYNIN CHLORIDE 20 MG: 5 TABLET, EXTENDED RELEASE ORAL at 21:59

## 2021-06-17 RX ADMIN — SODIUM CHLORIDE 59 ML: 9 INJECTION, SOLUTION INTRAVENOUS at 18:38

## 2021-06-17 RX ADMIN — LIDOCAINE HYDROCHLORIDE 50 MG: 10 INJECTION, SOLUTION INFILTRATION; PERINEURAL at 13:40

## 2021-06-17 RX ADMIN — GLYCOPYRROLATE 0.2 MG: 0.2 INJECTION, SOLUTION INTRAMUSCULAR; INTRAVENOUS at 13:40

## 2021-06-17 RX ADMIN — POTASSIUM CHLORIDE 20 MEQ: 750 TABLET, FILM COATED, EXTENDED RELEASE ORAL at 10:08

## 2021-06-17 RX ADMIN — FENTANYL CITRATE 100 MCG: 50 INJECTION, SOLUTION INTRAMUSCULAR; INTRAVENOUS at 13:40

## 2021-06-17 RX ADMIN — ROCURONIUM BROMIDE 30 MG: 10 INJECTION INTRAVENOUS at 13:40

## 2021-06-17 RX ADMIN — CIPROFLOXACIN 400 MG: 2 INJECTION, SOLUTION INTRAVENOUS at 15:14

## 2021-06-17 RX ADMIN — POTASSIUM CHLORIDE, DEXTROSE MONOHYDRATE AND SODIUM CHLORIDE: 150; 5; 900 INJECTION, SOLUTION INTRAVENOUS at 17:42

## 2021-06-17 RX ADMIN — HYDRALAZINE HYDROCHLORIDE 5 MG: 20 INJECTION INTRAMUSCULAR; INTRAVENOUS at 15:33

## 2021-06-17 RX ADMIN — PROPOFOL 200 MG: 10 INJECTION, EMULSION INTRAVENOUS at 13:40

## 2021-06-17 ASSESSMENT — ACTIVITIES OF DAILY LIVING (ADL)
DOING_ERRANDS_INDEPENDENTLY_DIFFICULTY: YES
FALL_HISTORY_WITHIN_LAST_SIX_MONTHS: YES
EQUIPMENT_CURRENTLY_USED_AT_HOME: WALKER, ROLLING;WHEELCHAIR, MANUAL
DIFFICULTY_EATING/SWALLOWING: NO
ADLS_ACUITY_SCORE: 30
WEAR_GLASSES_OR_BLIND: YES
WHICH_OF_THE_ABOVE_FUNCTIONAL_RISKS_HAD_A_RECENT_ONSET_OR_CHANGE?: AMBULATION;TRANSFERRING
THE_FOLLOWING_AIDS_WERE_PROVIDED;: PATIENT DECLINED OFFER OF AUXILIARY AIDS
DRESSING/BATHING_DIFFICULTY: YES
DRESSING/BATHING: BATHING DIFFICULTY, ASSISTANCE 1 PERSON;DRESSING DIFFICULTY, ASSISTANCE 1 PERSON
TOILETING_ASSISTANCE: TOILETING DIFFICULTY, ASSISTANCE 1 PERSON
DEPENDENT_IADLS:: CLEANING;COOKING;LAUNDRY;SHOPPING;MEAL PREPARATION;MEDICATION MANAGEMENT;TRANSPORTATION
COMMUNICATION: DIFFICULTY SPEAKING;OTHER (SEE COMMENTS)
WALKING_OR_CLIMBING_STAIRS_DIFFICULTY: YES
VISION_MANAGEMENT: GLASSES
CONCENTRATING,_REMEMBERING_OR_MAKING_DECISIONS_DIFFICULTY: NO
DESCRIBE_HEARING_LOSS: BILATERAL HEARING LOSS
NUMBER_OF_TIMES_PATIENT_HAS_FALLEN_WITHIN_LAST_SIX_MONTHS: 2
TOILETING_ISSUES: YES
HEARING_DIFFICULTY_OR_DEAF: YES
ADLS_ACUITY_SCORE: 28
DIFFICULTY_COMMUNICATING: YES
TOILETING_MANAGEMENT: SOME INCONTINENCE
ADLS_ACUITY_SCORE: 30

## 2021-06-17 ASSESSMENT — MIFFLIN-ST. JEOR: SCORE: 1400.78

## 2021-06-17 NOTE — PLAN OF CARE
PRIMARY DIAGNOSIS: elevated liver enzymes    OUTPATIENT/OBSERVATION GOALS TO BE MET BEFORE DISCHARGE:    1. Pain status: Pain free.  2. Stable vital signs and labs (if performed) at disposition: No, liver labs still elevated, pt is jaundiced   3. Tolerating adequate PO diet: No, pt has been npo for procedure.     5. ADLs back to baseline?  No, pt having increased weakness & harder for him to move around at home. Possible PT c/s.   6. Activity and level of assistance: Up with maximum assistance. Consider SW and/or PT evaluation.   7. Barriers to discharge noted Yes pt is down having EUS at this time, may be able to discharge later today .     Discharge Planner Nurse   Safe discharge environment identified: Yes  Barriers to discharge: Yes, pt will need to tolerate po intake after procedures.        Entered by: Alayna Mccallum 06/17/2021 1:58 PM     Please review provider order for any additional goals.   Nurse to notify provider when observation goals have been met and patient is ready for discharge.

## 2021-06-17 NOTE — PLAN OF CARE
End of Shift Summary  For vital signs and complete assessments, please see documentation flowsheets.     Pertinent assessments: VSS, A&O.  Denies pain, nausea and SOB.  Skin and eyes jaundice.  External catheter in place.  Up A2 with walker and gait belt, only short distances, uses WC for long distances.  Wife said patient needs to wear his shoes for ambulation, slipper socks do not work with his ambulation.  Small amount of swelling to foot on right side, no numbness or tingling.      Major Shift Events:   admit to the floor    Treatment Plan: IV fluids, monitor labs, NPO for GI consult and possible biopsy.      Bedside Nurse: Anastasiya Gutiérrez RN

## 2021-06-17 NOTE — PLAN OF CARE
PRIMARY DIAGNOSIS: elevated liver enzymes     OUTPATIENT/OBSERVATION GOALS TO BE MET BEFORE DISCHARGE:     1. Pain status: Pain free.  2. Stable vital signs and labs (if performed) at disposition: No, liver labs still elevated, pt is jaundiced   3. Tolerating adequate PO diet: No, pt has been npo for procedure.      5. ADLs back to baseline?  No, pt having increased weakness & harder for him to move around at home. Possible PT c/s.   6. Activity and level of assistance: Up with maximum assistance. Consider SW and/or PT evaluation.   7. Barriers to discharge noted Yes pt will need EUS today, scheduled for 1:00.       Discharge Planner Nurse   Safe discharge environment identified: Yes  Barriers to discharge: Yes, pt will need to tolerate po intake after procedures.

## 2021-06-17 NOTE — ANESTHESIA CARE TRANSFER NOTE
Patient: Elier Barber    Procedure(s):  ESOPHAGOGASTRODUODENOSCOPY, WITH ENDOSCOPIC US, fine needle aspiration  ENDOSCOPIC RETROGRADE CHOLANGIOPANCREATOGRAPHY    Diagnosis: Jaundice [R17]  Diagnosis Additional Information: No value filed.    Anesthesia Type:   General     Note:    Oropharynx: spontaneously breathing  Level of Consciousness: awake  Oxygen Supplementation: face mask  Level of Supplemental Oxygen (L/min / FiO2): 6  Independent Airway: airway patency satisfactory and stable  Dentition: dentition unchanged  Vital Signs Stable: post-procedure vital signs reviewed and stable  Report to RN Given: handoff report given  Patient transferred to: PACU    Handoff Report: Identifed the Patient, Identified the Reponsible Provider, Reviewed the pertinent medical history, Discussed the surgical course, Reviewed Intra-OP anesthesia mangement and issues during anesthesia, Set expectations for post-procedure period and Allowed opportunity for questions and acknowledgement of understanding      Vitals: (Last set prior to Anesthesia Care Transfer)  CRNA VITALS  6/17/2021 1422 - 6/17/2021 1457      6/17/2021             Resp Rate (observed):  (!) 5        Electronically Signed By: CITLALY Nowak CRNA  June 17, 2021  2:57 PM

## 2021-06-17 NOTE — PROGRESS NOTES
[facility]    Hospitalist Progress Note  Name: Elier Barber    MRN: [id number]  Provider: Melani Pham MD  Date of Service: 06/17/2021    Assessment & Plan   Summary of Stay: Elier Barber is a 75 year old male with past medical history significant for ALS, prior stroke, essential hypertension presented with jaundice.  Admitted on 6/16/2021 for painless jaundice for further work-up and evaluation.    Painless obstructive jaundice  -Admitted for further work-up and evaluation  -GI consulted  -Patient scheduled to get EUS plus minus ERCP today      History of ALS  -Patient on baclofen pump  -Supportive care    Essential hypertension  -Continue enalapril    History of CVA  -Stable  -Plavix was held in anticipation of EUS  -Resume after procedure if no further intervention is needed    History of prostate cancer status post prostatectomy    Drop in hemoglobin  -No signs of active bleeding  -We will continue to monitor  -Check hemoglobin in a.m.      DVT Prophylaxis: Pneumatic Compression Devices  Code Status: Full Code    Disposition: Expected discharge to be decided      Interval History   Assumed care reviewed chart.  Patient denies any abdominal pain chest pain shortness of breath.  10 point review of system was completed was otherwise negative.    Total time spent more than 35 minutes in direct patient care and coronation of care.  Scheduled for EUS.    -Data reviewed today: I reviewed all new labs and imaging reports over the last 24 hours. I personally reviewed no images or EKG's today.    Physical Exam   Temp: 98.3  F (36.8  C) Temp src: Oral BP: (!) 158/85 Pulse: 63   Resp: 20 SpO2: 95 % O2 Device: None (Room air)    Vitals:    06/16/21 1859 06/16/21 2258   Weight: 68.5 kg (151 lb) 67.7 kg (149 lb 4.8 oz)     Vital Signs with Ranges  Temp:  [97.6  F (36.4  C)-98.3  F (36.8  C)] 98.3  F (36.8  C)  Pulse:  [57-63] 63  Resp:  [18-20] 20  BP: (131-203)/() 158/85  SpO2:  [95 %-100 %] 95  %  I/O last 3 completed shifts:  In: 415 [I.V.:415]  Out: 500 [Urine:500]      GEN:  Alert, oriented x 3, appears comfortable, NAD.  HEENT:  Normocephalic/atraumatic, scleral icterus ++, no nasal discharge, mouth moist.  CV:  Regular rate and rhythm, no murmur or JVD.  S1 + S2 noted, no S3 or S4.  LUNGS:  Clear to auscultation bilaterally without rales/rhonchi/wheezing/retractions.  Symmetric chest rise on inhalation noted.  ABD:  Active bowel sounds, soft, non-tender/non-distended.  No rebound/guarding/rigidity.  EXT:  No edema or cyanosis.  No joint synovitis noted.  SKIN:  Dry to touch, no exanthems noted in the visualized areas.    Medications     baclofen (LIORESAL) intraTHECAL Internal Pump       dextrose 5% and 0.9% NaCl with potassium chloride 20 mEq 75 mL/hr at 06/17/21 0028       acetaminophen  650 mg Oral TID     enalapril  10 mg Oral BID     oxybutynin ER  20 mg Oral At Bedtime     pantoprazole  40 mg Oral QPM     potassium chloride ER  20 mEq Oral Daily     sodium chloride (PF)  3 mL Intracatheter Q8H     Data     Recent Labs   Lab 06/17/21  0654 06/16/21  1859 06/10/21  1326   WBC 6.6 8.4 7.5   HGB 12.9* 14.1 14.5   HCT 37.3* 41.3 42.5   MCV 91 93 92    307 301     Recent Labs   Lab 06/17/21  0654 06/16/21  1859 06/10/21  1326    130* 134   POTASSIUM 3.9 4.0 4.5   CHLORIDE 100 96 100   CO2 27 29 28   ANIONGAP 6 5 6   GLC 95 97 98   BUN 14 17 16   CR 0.55* 0.65* 0.67   GFRESTIMATED >90 >90 >90   GFRESTBLACK >90 >90 >90   AMOS 8.8 9.4 9.7     No results for input(s): CULT in the last 168 hours.  No results for input(s): NTBNPI, NTBNP in the last 168 hours.  No results for input(s): SED, CRP in the last 168 hours.  Recent Labs   Lab 06/17/21  0654 06/16/21  1859 06/10/21  1326   GLC 95 97 98     Recent Labs   Lab 06/17/21  0654 06/16/21  1859 06/10/21  1326   HGB 12.9* 14.1 14.5     Recent Labs   Lab 06/17/21  0654 06/16/21  1859 06/10/21  1326   * 150* 136*   * 164* 139*    ALKPHOS 346* 428* 453*   BILITOTAL 7.2* 8.0* 5.1*     Recent Labs   Lab 06/16/21  1859 06/10/21  1326   INR 0.96 0.98     No results for input(s): LACT in the last 168 hours.  Recent Labs   Lab 06/16/21  1859   LIPASE 506*     Recent Labs   Lab 06/17/21  0654 06/16/21  1859 06/10/21  1326   BUN 14 17 16   CR 0.55* 0.65* 0.67     No results for input(s): TSH in the last 168 hours.  No results for input(s): TROPONIN, TROPI, TROPR in the last 168 hours.    Invalid input(s): TROP, TROPONINIES  No results for input(s): COLOR, APPEARANCE, URINEGLC, URINEBILI, URINEKETONE, SG, UBLD, URINEPH, PROTEIN, UROBILINOGEN, NITRITE, LEUKEST, RBCU, WBCU in the last 168 hours.    Recent Results (from the past 24 hour(s))   US Lower Extremity Venous Duplex Bilateral    Narrative    EXAM: US LOWER EXTREMITY VENOUS DUPLEX BILATERAL  LOCATION: [facility]  DATE/TIME: 6/16/2021 8:18 PM    INDICATION: Bilateral leg swelling  COMPARISON: None.  TECHNIQUE: Venous Duplex ultrasound of bilateral lower extremities with and without compression, augmentation and duplex. Color flow and spectral Doppler with waveform analysis performed.    FINDINGS: Exam includes the common femoral, femoral, popliteal veins as well as segmentally visualized deep calf veins and greater saphenous vein.     RIGHT: No deep vein thrombosis. No superficial thrombophlebitis. No popliteal cyst.    LEFT: No deep vein thrombosis. No superficial thrombophlebitis. No popliteal cyst.      Impression    IMPRESSION:  1.  No deep venous thrombosis in the bilateral lower extremities.

## 2021-06-17 NOTE — H&P (VIEW-ONLY)
[facility]    History and Physical  Hospitalist     Date of Admission:  6/16/2021  Date of Service (when I saw the patient): 06/16/21  Provider: Buzz Ramirez MD      Chief Complaint   Yellow eyes    History is obtained from the patient, electronic health record and emergency department physician    History of Present Illness   Elier Barber is a 75 year old male who ALS, essential hypertension, prostate cancer status post prostatectomy and others as can be seen in the past medical history below. In recent days during a visit to neurology for follow-up of ALS, the physician noted a yellowish color in his eyes.  His wife had been noting dark urine in the last 2 months and considerable decline in his general health, patient went from standing and moving to be wheelchair-bound.  This prompted a full work-up completed in the outpatient setting. Results are compatible with obstructive icterus and ultrasound of the abdomen reveals intra and extrahepatic dilation of the biliary tree and a possible mass in the pancreatic head. He presents with concern for all his findings, however he is not having fever, nausea or vomiting. No significant abdominal pain. He has lower extremity swelling but recent ultrasounds negative for DVT. Discussed this case with Dr. Coker who requested admission for further treatment and follow-up.  INR and platelet counts are normal    Past Medical History    I have reviewed this patient's medical history and updated it with pertinent information if needed.   Past Medical History:   Diagnosis Date     Basal cell carcinoma nos     sees derm     CVA (cerebral infarction) 6/14    small vessel right int capsule stroke     DVT (deep vein thrombosis) in pregnancy 05/12/2017    right peroneal vein     Essential hypertension, benign      Hearing loss      Hoarseness of voice     assoc with PLS     Lumbar radiculopathy     2017     Primary Lateral Sclerosis 2002    has baclofen  pump through Dr. Calvert, [facility], sees Dr. Fink, [facility]     Prostate CA (H) 2008    prostatectomy        Assessment & Plan   Elier Barber is a 75 year old male who presents with icterus of relatively recent onset, lab work-up compatible with obstructive icterus and ultrasound of the abdomen revealing intra and extrahepatic biliary tree dilation with a possible mass in the head of the pancreas.    1. Presumptive head of the pancreas mass causing obstructive icterus. Nature of the mass unknown at this point, concerning for malignancy. Patient does not have abdominal pain, fever, pruritus, nausea or vomiting.   2. History of ALS, patient is followed by neurology.  Baclofen pump is located in the right lower quadrant of the abdomen and in case of necessity for MRI, [employer] should be notified for resetting.  3. Essential hypertension.  4. History of CVA.  No motor sequels.  5. History of prostate cancer status post prostatectomy.    Plan.  Inpatient.  N.p.o. until seen by GI in the morning.  Hold Plavix and aspirin, rounder to resume if procedure will not be performed or delayed  [facility] consult for possible EUS with fine-needle aspirate-biopsy  Resume most needed home medications as prior to admission.  Rest to be reviewed by the rounder in the morning  PCD's.    Code Status   Full Code    Primary Care Physician   Lida Ray      Past Surgical History   I have reviewed this patient's surgical history and updated it with pertinent information if needed.  Past Surgical History:   Procedure Laterality Date     ABDOMEN SURGERY  11/12    Hernia     BIOPSY  4/16    Cancerous growth on leg. Removed by MOHs treatment     HERNIA REPAIR  11/12    Repaired     PROSTATE SURGERY       [facility] NONSPECIFIC PROCEDURE  6/08     prostatectomy      [facility] NONSPECIFIC PROCEDURE  11/01    colonoscopy     [facility] NONSPECIFIC PROCEDURE  11/2006    hernia, right side     [facility] NONSPECIFIC PROCEDURE      T + A       Prior to Admission Medications    Prior to Admission Medications   Prescriptions Last Dose Informant Patient Reported? Taking?   Cadexomer Iodine (IODOSORB EX)   Yes No   EPINEPHrine 0.3 MG/0.3ML injection   No No   Sig: Inject 0.3 mLs (0.3 mg) into the muscle as needed for anaphylaxis   STATIN NOT PRESCRIBED, INTENTIONAL,   No No   Sig: Please choose reason not prescribed, below   acetaminophen (TYLENOL) 650 MG CR tablet   Yes No   Sig: Take 650 mg by mouth 3 times daily    aspirin 81 MG tablet   Yes No   Sig: Take 81 mg by mouth daily   baclofen (LIORESAL) intraTHECAL Internal Pump   Yes No   Sig: by Intrathecal route continuous prn Pump filled by [facility] stroke [place] [phone]  Pump Model: Finisar  Last fill:  10/2020  Next fill:   3/17/2021  Low [facility] Alarm Date:   Reservoir Volume: 20 ml  Conc: 2000 mcg/ml  Delivers 234.7 mcg/day  Basal rate:unknown  Battery life: replace in 33 months.   clopidogrel (PLAVIX) 75 MG tablet   Yes No   Sig: Take 75 mg by mouth daily    enalapril (VASOTEC) 10 MG tablet   No No   Sig: Take 1 tablet (10 mg) by mouth 2 times daily   ketoconazole (NIZORAL) 2 % external shampoo   No No   Sig: SHAMPOO EVERY 2-3 DAYS AS  NEEDED   minocycline (MINOCIN) 100 MG capsule   Yes No   Sig: Take 100 mg by mouth 2 times daily Staph infection.   mupirocin (BACTROBAN) 2 % external ointment   Yes No   Sig: Apply topically 2 times daily Wound left shin.   oxybutynin ER (DITROPAN-XL) 10 MG 24 hr tablet   No No   Sig: Take 2 tablets (20 mg) by mouth At Bedtime   pantoprazole (PROTONIX) 40 MG EC tablet   No No   Sig: Take 1 tablet (40 mg) by mouth daily   polyethylene glycol (MIRALAX/GLYCOLAX) Packet   Yes No   Sig: Take 1 packet by mouth daily as needed Every 2-3 days.   potassium chloride ER (K-TAB/KLOR-CON) 10 MEQ CR tablet   No No   Sig: Take 2 tablets (20 mEq) by mouth daily   rosuvastatin (CRESTOR) 5 MG tablet   No No   Sig: TAKE 1 TABLET BY MOUTH EVERY DAY   Patient not taking:  "Reported on 6/10/2021      Facility-Administered Medications Last Administration Doses Remaining   methylPREDNISolone (DEPO-MEDROL) injection 40 mg 12/15/2020 11:34 AM         Allergies   Allergies   Allergen Reactions     No Clinical Screening - See Comments Hives     Starts swelling and hives     Bee Venom      Penicillins Hives     \"HIVES\"       Social History   I have personally reviewed the social history with the patient showing.  Social History     Tobacco Use     Smoking status: Former Smoker     Packs/day: 0.30     Years: 6.00     Pack years: 1.80     Types: Cigarettes     Start date: 1970     Quit date: 10/5/1976     Years since quittin.7     Smokeless tobacco: Never Used   Substance Use Topics     Alcohol use: Yes     Frequency: 2-4 times a month     Drinks per session: 1 or 2     Comment: 1 drink/week     Family History   I have reviewed this patient's family history and it is not contributory to the admission .       Review of Systems   Except for +  as noted in the HPI, a 12-system Review of Systems was found to be negative.      Physical Exam   Vital Signs with Ranges  Temp:  [97.6  F (36.4  C)] 97.6  F (36.4  C)  Pulse:  [57-62] 57  Resp:  [18] 18  BP: (131-203)/() 203/110  SpO2:  [98 %-100 %] 98 %  151 lbs 0 oz    GEN:  Alert, oriented x 3, appears comfortable, NAD.  HEENT:  Normocephalic/atraumatic, scleral icterus ++, no nasal discharge, mouth moist.  CV:  Regular rate and rhythm, no murmur or JVD.  S1 + S2 noted, no S3 or S4.  LUNGS:  Clear to auscultation bilaterally without rales/rhonchi/wheezing/retractions.  Symmetric chest rise on inhalation noted.  ABD:  Active bowel sounds, soft, non-tender/non-distended.  No rebound/guarding/rigidity.  EXT:  No edema or cyanosis.  No joint synovitis noted.  SKIN:  Dry to touch, no exanthems noted in the visualized areas.       Data   I personally reviewed.  Results for orders placed or performed during the hospital encounter of 21 " (from the past 24 hour(s))   CBC (platelets, no diff)   Result Value Ref Range    WBC 8.4 4.0 - 11.0 10e9/L    RBC Count 4.42 4.4 - 5.9 10e12/L    Hemoglobin 14.1 13.3 - 17.7 g/dL    Hematocrit 41.3 40.0 - 53.0 %    MCV 93 78 - 100 fl    MCH 31.9 26.5 - 33.0 pg    MCHC 34.1 31.5 - 36.5 g/dL    RDW 15.6 (H) 10.0 - 15.0 %    Platelet Count 307 150 - 450 10e9/L   Comprehensive metabolic panel   Result Value Ref Range    Sodium 130 (L) 133 - 144 mmol/L    Potassium 4.0 3.4 - 5.3 mmol/L    Chloride 96 94 - 109 mmol/L    Carbon Dioxide 29 20 - 32 mmol/L    Anion Gap 5 3 - 14 mmol/L    Glucose 97 70 - 99 mg/dL    Urea Nitrogen 17 7 - 30 mg/dL    Creatinine 0.65 (L) 0.66 - 1.25 mg/dL    GFR Estimate >90 >60 mL/min/[1.73_m2]    GFR Estimate If Black >90 >60 mL/min/[1.73_m2]    Calcium 9.4 8.5 - 10.1 mg/dL    Bilirubin Total 8.0 (H) 0.2 - 1.3 mg/dL    Albumin 3.1 (L) 3.4 - 5.0 g/dL    Protein Total 7.2 6.8 - 8.8 g/dL    Alkaline Phosphatase 428 (H) 40 - 150 U/L     (H) 0 - 70 U/L     (H) 0 - 45 U/L   INR   Result Value Ref Range    INR 0.96 0.86 - 1.14   Lipase   Result Value Ref Range    Lipase 506 (H) 73 - 393 U/L   US Lower Extremity Venous Duplex Bilateral    Narrative    EXAM: US LOWER EXTREMITY VENOUS DUPLEX BILATERAL  LOCATION: [facility]  DATE/TIME: 6/16/2021 8:18 PM    INDICATION: Bilateral leg swelling  COMPARISON: None.  TECHNIQUE: Venous Duplex ultrasound of bilateral lower extremities with and without compression, augmentation and duplex. Color flow and spectral Doppler with waveform analysis performed.    FINDINGS: Exam includes the common femoral, femoral, popliteal veins as well as segmentally visualized deep calf veins and greater saphenous vein.     RIGHT: No deep vein thrombosis. No superficial thrombophlebitis. No popliteal cyst.    LEFT: No deep vein thrombosis. No superficial thrombophlebitis. No popliteal cyst.      Impression    IMPRESSION:  1.  No deep venous thrombosis in  the bilateral lower extremities.     *Note: Due to a large number of results and/or encounters for the requested time period, some results have not been displayed. A complete set of results can be found in Results Review.       Disclaimer: This note consists of symbols derived from keyboarding, dictation and/or voice recognition software. As a result, there may be errors in the script that have gone undetected. Please consider this when interpreting information found in this chart.

## 2021-06-17 NOTE — PROGRESS NOTES
[facility]  Patient is currently open to home care services with [facility]. The patient is currently receiving PT services.  [facility]  and team have been notified of patient admission.  [facility] liaison will continue to follow patient during stay.  If appropriate provide orders to resume home care at time of discharge.

## 2021-06-17 NOTE — PHARMACY-ADMISSION MEDICATION HISTORY
Admission medication history interview status for this patient is in progress, pending callback from [facility] ([phone]) to verify baclofen IT pump settings.  See EPIC admission navigator for allergy information, prior to admission medications and immunization status.     Medication history interview done, indicate source(s): Patient and spouse  Medication history resources (including written lists, pill bottles, clinic record): Written med list, SureScripts dispense records  Pharmacy: [facility]/pharmacy #[id number] [facility] [id number] [facility] [phone]    Changes made to PTA medication list:  Added: Fluorouracil cream;   Deleted: Minocycline; Mupirocin; Rosuvastatin;   Changed: None    Actions taken by pharmacist (provider contacted, etc):None     Additional medication history information:      Medication reconciliation/reorder completed by provider prior to medication history?  Yes       Prior to Admission medications    Medication Sig Last Dose Taking? Auth Provider   acetaminophen (TYLENOL) 650 MG CR tablet Take 650 mg by mouth 3 times daily  Past Week at Unknown time Yes Reported, Patient   aspirin 81 MG tablet Take 81 mg by mouth every evening  6/15/2021 at PM Yes Reported, Patient   clopidogrel (PLAVIX) 75 MG tablet Take 75 mg by mouth every evening  6/15/2021 at PM Yes Reported, Patient   enalapril (VASOTEC) 10 MG tablet Take 1 tablet (10 mg) by mouth 2 times daily 6/16/2021 at AM Yes Lida Ray MD   EPINEPHrine 0.3 MG/0.3ML injection Inject 0.3 mLs (0.3 mg) into the muscle as needed for anaphylaxis  Yes Lida Ray MD   fluorouracil (EFUDEX) 5 % external cream Apply topically to legs 1 to 2 times weekly as needed/tolerated for maintenance  Yes Unknown, Entered By History   ketoconazole (NIZORAL) 2 % external shampoo SHAMPOO EVERY 2-3 DAYS AS  NEEDED  Yes Candida Whittington APRN CNP   oxybutynin ER (DITROPAN-XL) 10 MG 24 hr tablet Take 2 tablets (20  mg) by mouth At Bedtime 6/15/2021 at PM Yes Lida Ray MD   pantoprazole (PROTONIX) 40 MG EC tablet Take 1 tablet (40 mg) by mouth daily 6/15/2021 at PM Yes Lida Ray MD   polyethylene glycol (MIRALAX/GLYCOLAX) Packet Take 1 packet by mouth daily as needed Every 2-3 days.  Yes Reported, Patient   potassium chloride ER (K-TAB/KLOR-CON) 10 MEQ CR tablet Take 2 tablets (20 mEq) by mouth daily 6/16/2021 at AM Yes Lida Ray MD   baclofen (LIORESAL) intraTHECAL Internal Pump by Intrathecal route continuous prn Pump filled by [facility] [place] [phone]  Pump Model: Syncromed  Last fill:  10/2020  Next fill:   3/17/2021  [place] Alarm Date:   Reservoir Volume: 20 ml  Conc: 2000 mcg/ml  Delivers 234.7 mcg/day  Basal rate:unknown  Battery life: replace in 33 months.   Unknown, Entered By History

## 2021-06-17 NOTE — CONSULTS
GASTROENTEROLOGY CONSULTATION      Elier Barber  [address] [facility] [id number]  75 year old male     Admission Date/Time: 6/16/2021  Primary Care Provider: Lida Ray  Referring / Attending Physician:  Dr. Ramirez     We were asked to see the patient in consultation by Dr. Ramirez for evaluation of elevated LFTs and abnormal ultrasound.        HPI:  Elier Barber is a 75 year old male with ALS, hypertension, prostate cancer status post prostatectomy, who was sent to the emergency room with elevated LFTs and an ultrasound concerning for pancreatic mass.    The patient and his wife were present to provide historical information.  For the past 2 months the patient's wife has noticed that his urine was darker than usual.  This prompted a visit with her primary care provider.  Initial lab work revealed an ultrasound of the abdomen showing a pancreatic head cyst versus mass with both intra and extra hepatic biliary ductal dilation.  The common bile duct measured 2.1 cm.  The patient's LFTs on Autumn 10, 2021 are elevated.  Total bilirubin 5.1, alkaline phosphatase 453, , .  6 days later the total bilirubin was 8, alkaline phosphatase 428, , and .  This morning total bilirubin is 7.2 with mild improvement in the remaining transaminases.    Upon further questioning, the patient has lost approximately 10 pounds over the past 2 months.  He has somewhat of a decreased appetite.  No fevers no chills.  No nausea no vomiting.  He has no abdominal pain.  He denies any history of heavy alcohol use.  No significant family history.       PAST MEDICAL HISTORY:  Patient Active Problem List    Diagnosis Date Noted     Elevated liver enzymes 06/16/2021     Priority: Medium     Pancreatic mass 06/16/2021     Priority: Medium     Primary osteoarthritis of right shoulder 06/16/2021     Priority: Medium     Cerebral atherosclerosis 05/31/2018     Priority: Medium     Edema, unspecified type  05/10/2018     Priority: Medium     Gastroesophageal reflux disease without esophagitis 05/10/2018     Priority: Medium     Muscle spasticity 05/04/2018     Priority: Medium     Advanced directives, counseling/discussion 09/10/2014     Priority: Medium     Advance Care Planning:   Receipt of ACP document:  Received: Health Care Directive which was witnessed or notarized on 1-.  Document not previously scanned.  Validation form completed and sent with document to be scanned.  Code Status reflects choices in most recent ACP document.  Confirmed/documented designated decision maker(s). See permanent comments section of demographics in clinical tab. View document(s) and details by clicking on code status.   Added by Airam Palacios on 9/10/2014.             Cerebral infarction (H) 06/05/2014     Priority: Medium     1 cm acute ischemic infarct in the posterior limb of the right internal capsule.  Pt did present with left hemiplegia.      Diagnosis updated by automated process. Provider to review and confirm.       Vertigo 02/21/2013     Priority: Medium     Hyperlipidemia LDL goal <100 11/08/2010     Priority: Medium     Prostate CA (HCC)      Priority: Medium     will have surgery 6/9/08       Essential hypertension, benign 03/07/2005     Priority: Medium     Primary lateral sclerosis (HCC) 03/07/2005     Priority: Medium          ROS: A comprehensive ten point review of systems was negative aside from those in mentioned in the HPI.       MEDICATIONS:   Prior to Admission medications    Medication Sig Start Date End Date Taking? Authorizing Provider   acetaminophen (TYLENOL) 650 MG CR tablet Take 650 mg by mouth 3 times daily    Yes Reported, Patient   aspirin 81 MG tablet Take 81 mg by mouth every evening    Yes Reported, Patient   clopidogrel (PLAVIX) 75 MG tablet Take 75 mg by mouth every evening  1/27/20  Yes Reported, Patient   enalapril (VASOTEC) 10 MG tablet Take 1 tablet (10 mg) by mouth 2 times daily  "20  Yes Lida Ray MD   EPINEPHrine 0.3 MG/0.3ML injection Inject 0.3 mLs (0.3 mg) into the muscle as needed for anaphylaxis 17  Yes Lida Ray MD   fluorouracil (EFUDEX) 5 % external cream Apply topically to legs 1 to 2 times weekly as needed/tolerated for maintenance   Yes Unknown, Entered By History   ketoconazole (NIZORAL) 2 % external shampoo SHAMPOO EVERY 2-3 DAYS AS  NEEDED 20  Yes Candida Whittington APRN CNP   oxybutynin ER (DITROPAN-XL) 10 MG 24 hr tablet Take 2 tablets (20 mg) by mouth At Bedtime 20  Yes Lida Ray MD   pantoprazole (PROTONIX) 40 MG EC tablet Take 1 tablet (40 mg) by mouth daily 20  Yes Lida Ray MD   polyethylene glycol (MIRALAX/GLYCOLAX) Packet Take 1 packet by mouth daily as needed Every 2-3 days.   Yes Reported, Patient   potassium chloride ER (K-TAB/KLOR-CON) 10 MEQ CR tablet Take 2 tablets (20 mEq) by mouth daily 20  Yes Lida Ray MD   baclofen (LIORESAL) intraTHECAL Internal Pump by Intrathecal route continuous prn Pump filled by [facility] stroke [place] [phone]  Pump Model: Syncromed  Last fill:  10/2020  Next fill:   3/17/2021  [place] Alarm Date:   Reservoir Volume: 20 ml  Conc: 2000 mcg/ml  Delivers 234.7 mcg/day  Basal rate:unknown  Battery life: replace in 33 months.    Unknown, Entered By History        ALLERGIES:   Allergies   Allergen Reactions     No Clinical Screening - See Comments Hives     Starts swelling and hives     Bee Venom      Penicillins Hives     \"HIVES\"        SOCIAL HISTORY:  Social History     Tobacco Use     Smoking status: Former Smoker     Packs/day: 0.30     Years: 6.00     Pack years: 1.80     Types: Cigarettes     Start date: 1970     Quit date: 10/5/1976     Years since quittin.7     Smokeless tobacco: Never Used   Substance Use Topics     Alcohol use: Yes     Frequency: 2-4 times a month     Drinks per session: 1 or 2     Comment: 1 drink/week     " "Drug use: No        FAMILY HISTORY:  Family History   Problem Relation Age of Onset     Heart Disease Mother         CHF     Hypertension Mother      C.A.D. Maternal Grandfather      Cerebrovascular Disease Maternal Uncle         45     Cerebrovascular Disease Maternal Uncle         PHYSICAL EXAM:     BP (!) 158/85 (BP Location: Left arm)   Pulse 63   Temp 98.3  F (36.8  C) (Oral)   Resp 20   Ht 1.778 m (5' 10\")   Wt 67.7 kg (149 lb 4.8 oz)   SpO2 95%   BMI 21.42 kg/m       PHYSICAL EXAM:  GENERAL: No distress, resting comfortably  SKIN: no suspicious lesions, rashes, + jaundice  HEAD: Normocephalic. Atraumatic.  NECK: Neck supple. No adenopathy.   EYES: No scleral icterus  GASTROINTESTINAL: +BS, soft, non tender, non distended, no guarding/rebound  JOINT/EXTREMITIES:  no gross deformities noted, normal muscle tone  NEURO: ALS, dysarthria.  PSYCH: Normal affect        ADDITIONAL COMMENTS:   I reviewed the patient's new clinical lab test results.   Recent Labs   Lab Test 06/17/21  0654 06/16/21  1859 06/10/21  1326 10/27/18  1734 10/27/18  1734   WBC 6.6 8.4 7.5   < > 7.8   HGB 12.9* 14.1 14.5   < > 16.1   MCV 91 93 92   < > 92    307 301   < > 241   INR  --  0.96 0.98  --  1.07    < > = values in this interval not displayed.     Recent Labs   Lab Test 06/17/21  0654 06/16/21  1859 06/10/21  1326   POTASSIUM 3.9 4.0 4.5   CHLORIDE 100 96 100   CO2 27 29 28   BUN 14 17 16   ANIONGAP 6 5 6     Recent Labs   Lab Test 06/17/21  0654 06/16/21  1859 06/10/21  1326 01/27/21  1350 11/16/20  2342 02/13/20  0939   ALBUMIN 2.5* 3.1* 3.2*  --   --   --    BILITOTAL 7.2* 8.0* 5.1*  --   --   --    * 164* 139*  --   --   --    * 150* 136*  --   --   --    PROTEIN  --   --   --  Negative Negative Negative   LIPASE  --  506*  --   --   --   --         IMAGING / ENDOSCOPY     EXAMINATION: US ABDOMEN LIMITED  6/10/2021 2:21 PM       CLINICAL HISTORY: New onset jaundice; Jaundice     COMPARISON: 1/13/2017 CT "         FINDINGS:  The liver is normal in contour and echogenicity. There is mild to  moderate intra and moderate extra hepatic biliary ductal dilation. The  common bile duct measures up to 2.1 cm. The gallbladder is minimally  prominent with small amount of intraluminal sludge. Normal wall  thickness, measured at 3 mm. No pericholecystic fluid. No  cholelithiasis. Sonographer reports a negative sonographic Crawford  sign.     There appears to be a 2.6 x 1.9 x 1.8 cm cyst in the pancreatic head.  The remainder the pancreas is poorly visualized secondary to shadowing  bowel gas.     The visualized upper abdominal aorta and inferior vena cava are  normal.       The right kidney is normal in position and echogenicity. The right  kidney measures 10.3 cm. No hydronephrosis. Small 1.6 cm cyst at the  interpolar right kidney with small amount of internal contents, most  likely proteinaceous or hemorrhagic debris.                                                                      IMPRESSION:   1. Mild to moderate intra and moderate extrahepatic biliary ductal  dilation. There is also a partially visualized probable cystic versus  solid focus in the pancreatic head. Recommend further evaluation with  MRCP and MRI with pancreas mass protocol.  2. Mildly prominent gallbladder without secondary findings to suggest  acute cholecystitis.  3. Small right renal cyst with possibly a small amount of  proteinaceous debris. This could be evaluated at same time as MRCP.     CONSULTATION ASSESSMENT AND PLAN:    Elier Barber is a 75 year old with ALS, hypertension, prostate cancer status post prostatectomy, who was sent to the emergency room with elevated LFTs and an ultrasound concerning for pancreatic mass.    1.  Elevated LFTs: Total bilirubin is 7, alkaline phosphatase and transaminases are elevated as well.  These have been abnormal since checked on Autumn 10.  An ultrasound showed biliary ductal dilation with probable cyst versus  "solid focus in the pancreatic head.  There is concern for malignancy given current findings.  The patient has no abdominal pain but has lost approximately 10 pounds over the past few months unintentionally.    -- Discussed with biliary provider, plan for EUS with possible ERCP this afternoon here at [facility].  -- Daily LFTs, INR normal yesterday  -- Patient last took Plavix on 6/ 15 in the evening.      I discussed the patient plan with Dr. Galvez. Thank you for asking us to participate in the care of this patient.    Brigida Douglas PA-C  [facility] ( [facility])       Staff addendum: 75 yom with HTN, ALS admitted with obstructive jaundice, weight loss, no abd pain. Ultrasound showed biliary dilation and pancreas head cyst. CT not done. No pancreatic lesions on abd CT 2018    BP (!) 161/79   Pulse 80   Temp 98.2  F (36.8  C) (Temporal)   Resp 16   Ht 1.753 m (5' 9\")   Wt 68 kg (150 lb)   SpO2 97%   BMI 22.15 kg/m    Gen: awake alert NAD jaundiced  Cor: RRR  Abd:  S NT ND    A/P: 75 yom with obstructive jaundiced, unlikely to be stones. Due to cyst vs malignancy?  -EUS/ERCP today. Unable to do sphincterotomy due to Plavix use    Sima Galvez MD  [facility]  Phone [phone] until 5 pm  Office [phone] for after hours on call provider              "

## 2021-06-17 NOTE — CONSULTS
Care Management Initial Consult    General Information  Assessment completed with: Patient, Family, Wife Kelli  Type of CM/SW Visit: Initial Assessment    Primary Care Provider verified and updated as needed:     Readmission within the last 30 days: no previous admission in last 30 days      Reason for Consult: discharge planning  Advance Care Planning:            Communication Assessment  Patient's communication style: spoken language (English or Bilingual)    Hearing Difficulty or Deaf: yes   Wear Glasses or Blind: yes    Cognitive  Cognitive/Neuro/Behavioral: WDL  Level of Consciousness: alert  Arousal Level: opens eyes spontaneously  Orientation: oriented x 4  Mood/Behavior: calm, cooperative  Best Language: 1 - Mild to moderate  Speech: logical    Living Environment:   People in home: spouse     Current living Arrangements: house      Able to return to prior arrangements: yes       Family/Social Support:  Care provided by: self, spouse/significant other  Provides care for: no one  Marital Status:   Wife, Children  Kelli       Description of Support System: Supportive, Involved    Support Assessment: Adequate family and caregiver support, Adequate social supports    Current Resources:   Patient receiving home care services: Yes  Skilled Home Care Services: Physicial Therapy- Home PT is paid through pt's LTC ins policy. Pt has had home PT for the past 5 years  Community Resources: Home Care- [facility]   Equipment currently used at home: walker, rolling, wheelchair, emily shower and grab bars and fold down seat ,Higher toilet with bars around toilet, ramp from garage, Scooter for long distances       Supplies currently used at home: None    Employment/Financial:  Employment Status: disabled        Financial Concerns: No concerns identified   Referral to Financial Counselor: No       Lifestyle & Psychosocial Needs:        Socioeconomic History     Marital status:      Spouse name: Kelli      Number of children: 2     Years of education: Not on file     Highest education level: Not on file   Occupational History     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      Tobacco Use     Smoking status: Former Smoker     Packs/day: 0.30     Years: 6.00     Pack years: 1.80     Types: Cigarettes     Start date: 1970     Quit date: 10/5/1976     Years since quittin.7     Smokeless tobacco: Never Used   Substance and Sexual Activity     Alcohol use: Yes     Frequency: 2-4 times a month     Drinks per session: 1 or 2     Comment: 1 drink/week     Drug use: No     Sexual activity: Not Currently     Partners: Female       Functional Status:  Prior to admission patient needed assistance:   Dependent ADLs:: Ambulation-walker, Wheelchair-with assist, Bathing, Grooming, Toileting  Dependent IADLs:: Cleaning, Cooking, Laundry, Shopping, Meal Preparation, Medication Management, Transportation       Mental Health Status:  Mental Health Status: No Current Concerns       Chemical Dependency Status:  Chemical Dependency Status: No Current Concerns             Values/Beliefs:  Spiritual, Cultural Beliefs, Spiritism Practices, Values that affect care:                 Additional Information:  Met with wife and Pt. Pt has a very unique LTC ins policy. Pt has home care being paid under his LTC ins police. Wife expressed frustration that since [facility] has transitioned that they have had difficulty getting the same support they have had in the past    SW provided resources for other home care agencies for support as well as discussion on [employer] company as pt is considering an electric W/C for home use.   Pt I s active and out a lot with his spouse. .They are not looking into possible respite support through ALSA groups  Both pt and wife anticipate he will return home once medically stable.     At this time no other needs.     Corinne C. White, BIMAL

## 2021-06-17 NOTE — CONSULTS
CLINICAL NUTRITION SERVICES  -  ASSESSMENT NOTE      RECOMMENDATIONS FOR MD/PROVIDER TO ORDER:   Advance diet as tolerated within 24-48 hours/per MD   Recommendations Ordered by Registered Dietitian (RD):   Ensure Clear while on clear liquid diet.   Nutrition supplements within diet order PRN.   Malnutrition:   % Weight Loss:  Up to 7.5% in 3 months (non-severe malnutrition)  % Intake:  <75% for >/= 1 month (non-severe malnutrition)  Subcutaneous Fat Loss:  Upper arm region mild-moderate depletion  Muscle Loss:  Clavicle bone region mild-moderate depletion, Acromion bone region mild-moderate depletion and Anterior thigh region mild-moderate depletion  Fluid Retention:  None noted    Malnutrition Diagnosis: Non-Severe malnutrition  In Context of:  Chronic illness or disease      REASON FOR ASSESSMENT  Elier Barber is a 75 year old male seen by Registered Dietitian for Admission Nutrition Risk Screen for recent weight loss.      NUTRITION HISTORY  - Pt. Lives with wife who cooks 3 meals/day as follows:  Breakfast: whey protein drink, yogurt, orange juice, tea. Sometimes McMuffin, banana bread.  Lunch: deli meat, crackers, cheese, cookie, chips  Dinner: home cooked meal - usually meat, vegetables, fruit  Snacks: trail mix  Beverages: water  Food Allergies/Intolerances: NKFA  Home Diet: no special diet, eats small bites    CURRENT NUTRITION ORDERS  Diet Order:     Clear Liquid    Current Intake/Tolerance:  50% meals. Pt. Reports recent loss of appetite and eating less than normal for the last month. His wife says he has never been a big eater but has been eating less than he normally would.      NUTRITION FOCUSED PHYSICAL ASSESSMENT FOR DIAGNOSING MALNUTRITION)  Yes       - Pt. Reports no n/v, sometimes constipation/diarrhea.   - Currently reports constipation and no BM since 6/14  - Pt. Is active at home with physical therapy, trainers, and uses a recumbent bike 2x/day for 18 min. each.    ANTHROPOMETRICS  Height:  "5' 9\"  Weight: 150 lbs 0 oz  Body mass index is 22.15 kg/m .  Weight Status:  Normal BMI  UBW: 160 lb  Weight History: 10 lb weight loss in last 2 months reported.  Per chart 3.8% wt. Loss in last 4 months    Wt Readings from Last 10 Encounters:   06/17/21 68 kg (150 lb)   06/10/21 68.6 kg (151 lb 3.2 oz)   02/13/20 70.8 kg (156 lb)   01/10/20 70.8 kg (156 lb)   08/15/19 70.9 kg (156 lb 6.4 oz)   06/06/19 70.8 kg (156 lb)   05/02/19 73.9 kg (163 lb)   01/18/19 72.3 kg (159 lb 4.8 oz)   11/30/18 74.4 kg (164 lb)   11/01/18 73.7 kg (162 lb 8 oz)     LABS  Labs reviewed  Labs:  Electrolytes  Potassium (mmol/L)   Date Value   06/17/2021 3.9   06/16/2021 4.0   06/10/2021 4.5    Blood Glucose  Glucose (mg/dL)   Date Value   06/17/2021 95   06/16/2021 97   06/10/2021 98   01/27/2021 120 (H)   11/16/2020 116 (H)     Hemoglobin A1C (%)   Date Value   11/17/2020 5.8 (H)    Inflammatory Markers  WBC (10e9/L)   Date Value   06/17/2021 6.6   06/16/2021 8.4   06/10/2021 7.5     Albumin (g/dL)   Date Value   06/17/2021 2.5 (L)   06/16/2021 3.1 (L)   06/10/2021 3.2 (L)      Magnesium (mg/dL)   Date Value   02/21/2013 1.9     Sodium (mmol/L)   Date Value   06/17/2021 133   06/16/2021 130 (L)   06/10/2021 134    Renal  Urea Nitrogen (mg/dL)   Date Value   06/17/2021 14   06/16/2021 17   06/10/2021 16     Creatinine (mg/dL)   Date Value   06/17/2021 0.55 (L)   06/16/2021 0.65 (L)   06/10/2021 0.67     Additional  Triglycerides (mg/dL)   Date Value   11/17/2020 41   08/11/2014 50   11/08/2010 51     Ketones Urine (mg/dL)   Date Value   01/27/2021 Negative          MEDICATIONS  Medications reviewed    [Auto Hold] acetaminophen  650 mg Oral TID     ciprofloxacin  400 mg Intravenous Q12H     [Auto Hold] enalapril  10 mg Oral BID     [Auto Hold] oxybutynin ER  20 mg Oral At Bedtime     [Auto Hold] pantoprazole  40 mg Oral QPM     [Auto Hold] potassium chloride ER  20 mEq Oral Daily     [Auto Hold] sodium chloride (PF)  3 mL Intracatheter " Q8H        baclofen (LIORESAL) intraTHECAL Internal Pump       dextrose 5% and 0.9% NaCl with potassium chloride 20 mEq 75 mL/hr at 06/17/21 1111     lactated ringers        baclofen (LIORESAL) intraTHECAL Internal Pump, dimenhyDRINATE, fentaNYL, fentaNYL, hydrALAZINE, HYDROmorphone, indomethacin, optiray 320 50 mL + NaCl 0.9% 50 mL, [Auto Hold] ketoconazole, [Auto Hold] lidocaine 4%, [Auto Hold] lidocaine (buffered or not buffered), [Auto Hold] melatonin, meperidine, metoclopramide **OR** metoclopramide, metoprolol, naloxone, naloxone, naloxone, naloxone, ondansetron **OR** ondansetron, prochlorperazine, [Auto Hold] sodium chloride (PF)       ASSESSED NUTRITION NEEDS PER APPROVED PRACTICE GUIDELINES:    Dosing Weight 68 kg  Estimated Energy Needs: 3858-9917 kcals ([place] St Jeor) x AF 1.3-1.5  Justification: maintenance  Estimated Protein Needs:  grams protein (1.2-1.5 g pro/Kg)  Justification: preservation of lean body mass  Estimated Fluid Needs: 9333-0013  mL (1 mL/Kcal)  Justification: maintenance    MALNUTRITION:  % Weight Loss:  Up to 7.5% in 3 months (non-severe malnutrition)  % Intake:  <75% for >/= 1 month (non-severe malnutrition)  Subcutaneous Fat Loss:  Upper arm region mild-moderate depletion  Muscle Loss:  Clavicle bone region mild-moderate depletion, Acromion bone region mild-moderate depletion and Anterior thigh region mild-moderate depletion  Fluid Retention:  None noted    Malnutrition Diagnosis: Non-Severe malnutrition  In Context of:  Chronic illness or disease    NUTRITION DIAGNOSIS:  Inadequate protein-energy intake related to ALS as evidenced by decreased appetite, weight loss, mild-moderate muscle loss.    NUTRITION INTERVENTIONS  Recommendations / Nutrition Prescription  Advance diet as tolerated within 24-48 hours  Ensure Clear while on clear liquid diet.   Nutrition supplements within diet order PRN.    Implementation  Nutrition education: Provided education on Composition of Meals  and Snacks - eat protein first, small frequent meals, addition of calorically dense foods to diet, Medical Food Supplement and Collaboration and Referral of Nutrition care.    Nutrition Goals  Pt. To consume >/= 75% of meals/supplements    MONITORING AND EVALUATION:  Progress towards goals will be monitored and evaluated per protocol and Practice Guidelines

## 2021-06-17 NOTE — UTILIZATION REVIEW
"  Admission Status; Secondary Review Determination         Under the authority of the Utilization Management Committee, the utilization review process indicated a secondary review on the above patient.  The review outcome is based on review of the medical records, discussions with staff, and applying clinical experience noted on the date of the review.          (x) Observation Status Appropriate - This patient does not meet hospital inpatient criteria and is placed in observation status. If this patient's primary payer is Medicare and was admitted as an inpatient, Condition Code 44 should be used and patient status changed to \"observation\".     RATIONALE FOR DETERMINATION   75 year old male who ALS, essential hypertension, prostate cancer status post  Prostatectomy and others and can be seen in the past medical history views. In recent days during the visit to neurology for follow-up of ALS, the physician noted a yellowish color in his eyes.  His wife had been noting dark urine in the last 2 months and considerable decline in his general health, lab work-up compatible with obstructive icterus and ultrasound of the abdomen revealing intra and extrahepatic biliary tree dilation with a possible mass in the head of the pancreas.  Patient was admitted for further work-up including possible EUS, there is no evidence of cholangitis, sepsis, or other diagnoses requiring prolonged inpatient hospital care.  The severity of illness, intensity of service provided, expected LOS and risk for adverse outcome make the care appropriate for further observation; however, doesn't meet criteria for hospital inpatient admission. Dr. Pham notified of this determination.    This document was produced using voice recognition software.      The information on this document is developed by the utilization review team in order for the business office to ensure compliance.  This only denotes the appropriateness of proper admission status and " does not reflect the quality of care rendered.         The definitions of Inpatient Status and Observation Status used in making the determination above are those provided in the CMS Coverage Manual, Chapter 1 and Chapter 6, section 70.4.      Sincerely,     BERTIN CATALAN MD    System Medical Director  Utilization Management  [facility].

## 2021-06-17 NOTE — PROGRESS NOTES
"SPIRITUAL HEALTH SERVICES Progress Note   Med. Surg. 5    Saw pt Elier Barber per an admission request.  His spouse Kelli was present.  Offered reflective conversation to facilitate the processing of thoughts and feelings.  Speaking is difficult for Elier, so with his permission Kelli did most of the talking.        Illness Narrative - Kelli reported that Elier has a history of cerebral atherosclerosis (an illness similar to ALS) and that Elier came to the ER with concerns regarding foot swelling and his eyes yellowing.  A \"cyst\" was found on his pancreas.        Distress -   o Elier was tearful periodically throughout our conversation, and Kelli acknowledged that \"it has been an emotional time.\"  o Kelli shared that it has been physically challenging being Elier' primary caregiver.  Elier has long-term disability but Kelli is uncertain about how to start home care services.  Referred pt to [facility].      Coping -   o Kelli named their daughter (who lives in [place]) and son (who lives in [place]), friends, and Mandaen as being part of their support network.  o They attend services at [facility].  Elier welcomed prayer during the conversation.  o Elier enjoys reading non-fiction, such a biographies.      Meaning-Making - Kelli and Elier pleasantly responded to this author's inquiries but did not reflect further on Elier' illness or other aspects of their lives.      Plan - Informed pt how he can request further  support.  This author and other chaplains remain available per pt/family request.    Nolberto Bower M.Div., [facility]  Staff   Phone [phone]  "

## 2021-06-17 NOTE — ANESTHESIA PREPROCEDURE EVALUATION
"Anesthesia Pre-Procedure Evaluation    Patient: Elier Barber   MRN: [id number] : 1945        Preoperative Diagnosis: Jaundice [R17]   Procedure : Procedure(s):  ESOPHAGOGASTRODUODENOSCOPY, WITH ENDOSCOPIC US  ENDOSCOPIC RETROGRADE CHOLANGIOPANCREATOGRAPHY     Past Medical History:   Diagnosis Date     Basal cell carcinoma nos     sees derm     CVA (cerebral infarction)     small vessel right int capsule stroke     DVT (deep vein thrombosis) in pregnancy 2017    right peroneal vein     Essential hypertension, benign      Hearing loss      Hoarseness of voice     assoc with PLS     Lumbar radiculopathy          Primary Lateral Sclerosis     has baclofen pump through Dr. Calvert, [facility], sees Dr. Fink, [facility]     Primary osteoarthritis of right shoulder 2021     Prostate CA (H)     prostatectomy      Past Surgical History:   Procedure Laterality Date     ABDOMEN SURGERY      Hernia     BIOPSY      Cancerous growth on leg. Removed by MOHs treatment     HERNIA REPAIR      Repaired     PROSTATE SURGERY       [facility] NONSPECIFIC PROCEDURE       prostatectomy      [facility] NONSPECIFIC PROCEDURE      colonoscopy     [facility] NONSPECIFIC PROCEDURE  2006    hernia, right side     [facility] NONSPECIFIC PROCEDURE      T + A      Allergies   Allergen Reactions     No Clinical Screening - See Comments Hives     Starts swelling and hives     Bee Venom      Penicillins Hives     \"HIVES\"      Social History     Tobacco Use     Smoking status: Former Smoker     Packs/day: 0.30     Years: 6.00     Pack years: 1.80     Types: Cigarettes     Start date: 1970     Quit date: 10/5/1976     Years since quittin.7     Smokeless tobacco: Never Used   Substance Use Topics     Alcohol use: Yes     Frequency: 2-4 times a month     Drinks per session: 1 or 2     Comment: 1 drink/week      Wt Readings from Last 1 Encounters:   21 68 kg (150 lb)        Anesthesia Evaluation   Pt has had prior " anesthetic. Type: General.        ROS/MED HX  ENT/Pulmonary:  - neg pulmonary ROS     Neurologic: Comment: Primary Lateral Sclerosis    (+) CVA,     Cardiovascular:     (+) hypertension-----    METS/Exercise Tolerance:     Hematologic: Comments: Lab Test        06/17/21     06/16/21     06/10/21     10/27/18     10/27/18                       0654          1859          1326          1734          1734          WBC          6.6          8.4          7.5            < >        7.8           HGB          12.9*        14.1         14.5           < >        16.1          MCV          91           93           92             < >        92            PLT          260          307          301            < >        241           INR           --          0.96         0.98          --          1.07           < > = values in this interval not displayed.                  Lab Test        06/17/21     06/16/21     06/10/21                       0654          1859          1326          NA           133          130*         134           POTASSIUM    3.9          4.0          4.5           CHLORIDE     100          96           100           CO2          27           29           28            BUN          14           17           16            CR           0.55*        0.65*        0.67          ANIONGAP     6            5            6             AMOS          8.8          9.4          9.7           GLC          95           97           98                (+) anemia,     Musculoskeletal:  - neg musculoskeletal ROS     GI/Hepatic:     (+) GERD, Asymptomatic on medication,     Renal/Genitourinary:  - neg Renal ROS     Endo: Comment: Pancreatic mass      Psychiatric/Substance Use:  - neg psychiatric ROS     Infectious Disease:  - neg infectious disease ROS     Malignancy:  - neg malignancy ROS     Other:  - neg other ROS          Physical Exam    Airway        Mallampati: II   TM distance: > 3 FB   Neck ROM: full   Mouth opening:  > 3 cm    Respiratory Devices and Support         Dental  no notable dental history         Cardiovascular   cardiovascular exam normal          Pulmonary   pulmonary exam normal                OUTSIDE LABS:  CBC:   Lab Results   Component Value Date    WBC 6.6 06/17/2021    WBC 8.4 06/16/2021    HGB 12.9 (L) 06/17/2021    HGB 14.1 06/16/2021    HCT 37.3 (L) 06/17/2021    HCT 41.3 06/16/2021     06/17/2021     06/16/2021     BMP:   Lab Results   Component Value Date     06/17/2021     (L) 06/16/2021    POTASSIUM 3.9 06/17/2021    POTASSIUM 4.0 06/16/2021    CHLORIDE 100 06/17/2021    CHLORIDE 96 06/16/2021    CO2 27 06/17/2021    CO2 29 06/16/2021    BUN 14 06/17/2021    BUN 17 06/16/2021    CR 0.55 (L) 06/17/2021    CR 0.65 (L) 06/16/2021    GLC 95 06/17/2021    GLC 97 06/16/2021     COAGS:   Lab Results   Component Value Date    PTT 27 10/27/2018    INR 0.96 06/16/2021     POC:   Lab Results   Component Value Date     (H) 11/17/2020     HEPATIC:   Lab Results   Component Value Date    ALBUMIN 2.5 (L) 06/17/2021    PROTTOTAL 5.9 (L) 06/17/2021     (H) 06/17/2021     (H) 06/17/2021    ALKPHOS 346 (H) 06/17/2021    BILITOTAL 7.2 (H) 06/17/2021     OTHER:   Lab Results   Component Value Date    A1C 5.8 (H) 11/17/2020    AMOS 8.8 06/17/2021    MAG 1.9 02/21/2013    LIPASE 506 (H) 06/16/2021    TSH 2.35 06/18/2019       Anesthesia Plan    ASA Status:  3      Anesthesia Type: General.     - Airway: ETT   Induction: Intravenous, Propofol.   Maintenance: Balanced.        Consents    Anesthesia Plan(s) and associated risks, benefits, and realistic alternatives discussed. Questions answered and patient/representative(s) expressed understanding.     - Discussed with:  Patient      - Extended Intubation/Ventilatory Support Discussed: No.      - Patient is DNR/DNI Status: No    Use of blood products discussed: Yes.     - Discussed with: Patient.     - Consented: consented to blood  products            Reason for refusal: other.     Postoperative Care    Pain management: IV analgesics.   PONV prophylaxis: Ondansetron (or other 5HT-3), Dexamethasone or Solumedrol     Comments:                Johnsno Roberts MD

## 2021-06-17 NOTE — ED PROVIDER NOTES
History   Chief Complaint:  Leg Swelling       The history is provided by the spouse and the patient.      Elier Barber is a 75 year old male with history of DVT, stroke, and hypertension who presents with right lower leg swelling. On 06/08/2021, the patient had home PT care and the therapist noticed that the patient's eyes were yellowed. When he visited his neurologist, they discovered that he has a cyst on his pancrease. He was instructed to follow up with his primary care physician. On 06/11, the patient's wife noticed that his right lower leg was swollen but after talking with the nurse triage, it was attributed to the heat. Today during home PT, the therapist said that the right foot was still looking swollen and they should visit the ED for further evaluation. The patient denies fever, chills, difficulty breathing, chest pain, or any other leg pain.     Review of Systems   Constitutional: Negative for chills and fever.   Respiratory: Negative for shortness of breath.    Cardiovascular: Negative for chest pain.   Musculoskeletal: Positive for joint swelling (right lower leg ).   All other systems reviewed and are negative.      Allergies:  Bee Venom  Penicillins    Medications:  Aspirin 81 mg  Lioresal  Cadexomer Iodine  Plavix  Enalapril  Epinephrine  Minocin  Oxybutynin chloride  Protonix  Miralax  Potassium chloride  Rosuvastatin  Colace    Past Medical History:    Basal cell carcinoma  CVA  DVT  Essential hypertension  Hearing loss  Lumbar radiculopathy  Primary lateral sclerosis  Prostate CA  Cerebral atherosclerosis  Edema  GERD  Muscle spasticity  Cerebral infarction  Vertigo  Hyperlipidemia  Actinic keratoses  Generalized weakness    Past Surgical History:    Hernia repair  Prostatectomy  Colonoscopy  T&A    Family History:    Mother: CHF, hypertension    Social History:  The patient presents to the ED with his wife. He currently has at home PT and OT.    Physical Exam     Patient Vitals for the  "past 24 hrs:   BP Temp Temp src Pulse Resp SpO2 Height Weight   06/16/21 2258 (!) 173/86 98  F (36.7  C) Oral 61 20 99 % 1.778 m (5' 10\") 67.7 kg (149 lb 4.8 oz)   06/16/21 2115 (!) 203/110 -- -- 57 -- 98 % -- --   06/16/21 2100 (!) 183/113 -- -- 57 -- 98 % -- --   06/16/21 2045 (!) 177/110 -- -- 62 -- 98 % -- --   06/16/21 2000 134/74 -- -- 57 -- 98 % -- --   06/16/21 1945 132/77 -- -- 58 -- 98 % -- --   06/16/21 1930 (!) 151/83 -- -- 57 -- 100 % -- --   06/16/21 1915 (!) 152/83 -- -- 60 -- 98 % -- --   06/16/21 1900 (!) 158/86 -- -- -- -- 100 % -- --   06/16/21 1859 -- -- -- -- -- -- 1.778 m (5' 10\") 68.5 kg (151 lb)   06/16/21 1750 131/83 97.6  F (36.4  C) Temporal 60 18 99 % -- --       Physical Exam  Constitutional: Well appearing.  HEENT: Atraumatic. Moist mucous membranes.  Neck: Soft.  Supple.   Cardiac: Regular rate and rhythm.  No murmur or rub.  Respiratory: Clear to auscultation bilaterally.  No respiratory distress.   Abdomen: Soft and nontender.  No  guarding.  Nondistended.  Musculoskeletal: Mild edema of the right foot.  Distal pulses intact and symmetric.  No effusions of the joints.  No erythema..  Normal range of motion.  Neurologic: Alert and oriented x3.  Baseline strength and sensation.  Skin: No rashes.  Mild edema of the right foot.  Psych: Normal affect.  Normal behavior.    Emergency Department Course   Imaging:  US Lower Extremity Venous Duplex Dilateral  1.  No deep venous thrombosis in the bilateral lower extremities.  Per radiology    Laboratory:  CBC: WBC 8.4, HGB 14.1,   CMP:  (L), Bilirubin 8.0 (H), Albumin 3.1 (L), ALKPHOS 428 (H),  (H),  (H) o/w WNL (Creatinine 0.65 L)     INR: 0.96  Lipase: 506 (H)     Emergency Department Course:    Reviewed:  1905 I reviewed nursing notes, vitals and past medical history    Assessments:  1910 I obtained history and examined the patient as noted above.   2115 I rechecked the patient and explained findings.     Consults: "   2132 I spoke with Dr. Ramirez, hospitalist, who agreed to admit the patient.     Disposition:  The patient was admitted to the hospital under the care of Dr. Ramirez.       Impression & Plan   Medical Decision Making:  Elier Barber is a 75-year-old man who is afebrile and hemodynamically stable.  He does have some minimal swelling of the lower extremities with right greater than left, however, is neurovascularly intact.  Ultrasound demonstrated no evidence of DVTs.  I reviewed his outpatient ultrasound and labs.  Labs are repeated today with increase of total bilirubin and liver enzymes.  He is losing weight, is weak, has lack of appetite.  He cannot get into outpatient GI until August which is far too long to wait.  We discussed admission to the hospital for further expedited work-up of his elevated liver enzymes and he is in agreement.  I spoke with hospitalist service who accepts him to the medical floor and he is in stable condition at time of admission.    Covid-19  Elier Barber was evaluated during a global COVID-19 pandemic, which necessitated consideration that the patient might be at risk for infection with the SARS-CoV-2 virus that causes COVID-19.   Applicable protocols for evaluation were followed during the patient's care.     Diagnosis:    ICD-10-CM    1. Elevated liver enzymes  R74.8    2. Pancreatic mass  K86.89    3. Primary osteoarthritis of right shoulder  M19.011        Scribe Disclosure:  I, Rosette Garcia, am serving as a scribe at 7:05 PM on 6/16/2021 to document services personally performed by Ricardo Coker MD based on my observations and the provider's statements to me.              Ricardo Coker MD  06/17/21 1957

## 2021-06-17 NOTE — H&P
[facility]    History and Physical  Hospitalist     Date of Admission:  6/16/2021  Date of Service (when I saw the patient): 06/16/21  Provider: Buzz Ramirez MD      Chief Complaint   Yellow eyes    History is obtained from the patient, electronic health record and emergency department physician    History of Present Illness   Elier Barber is a 75 year old male who ALS, essential hypertension, prostate cancer status post prostatectomy and others as can be seen in the past medical history below. In recent days during a visit to neurology for follow-up of ALS, the physician noted a yellowish color in his eyes.  His wife had been noting dark urine in the last 2 months and considerable decline in his general health, patient went from standing and moving to be wheelchair-bound.  This prompted a full work-up completed in the outpatient setting. Results are compatible with obstructive icterus and ultrasound of the abdomen reveals intra and extrahepatic dilation of the biliary tree and a possible mass in the pancreatic head. He presents with concern for all his findings, however he is not having fever, nausea or vomiting. No significant abdominal pain. He has lower extremity swelling but recent ultrasounds negative for DVT. Discussed this case with Dr. Coker who requested admission for further treatment and follow-up.  INR and platelet counts are normal    Past Medical History    I have reviewed this patient's medical history and updated it with pertinent information if needed.   Past Medical History:   Diagnosis Date     Basal cell carcinoma nos     sees derm     CVA (cerebral infarction) 6/14    small vessel right int capsule stroke     DVT (deep vein thrombosis) in pregnancy 05/12/2017    right peroneal vein     Essential hypertension, benign      Hearing loss      Hoarseness of voice     assoc with PLS     Lumbar radiculopathy     2017     Primary Lateral Sclerosis 2002    has baclofen  pump through Dr. Calvert, [facility], sees Dr. Fink, [facility]     Prostate CA (H) 2008    prostatectomy        Assessment & Plan   Elier Barber is a 75 year old male who presents with icterus of relatively recent onset, lab work-up compatible with obstructive icterus and ultrasound of the abdomen revealing intra and extrahepatic biliary tree dilation with a possible mass in the head of the pancreas.    1. Presumptive head of the pancreas mass causing obstructive icterus. Nature of the mass unknown at this point, concerning for malignancy. Patient does not have abdominal pain, fever, pruritus, nausea or vomiting.   2. History of ALS, patient is followed by neurology.  Baclofen pump is located in the right lower quadrant of the abdomen and in case of necessity for MRI, [employer] should be notified for resetting.  3. Essential hypertension.  4. History of CVA.  No motor sequels.  5. History of prostate cancer status post prostatectomy.    Plan.  Inpatient.  N.p.o. until seen by GI in the morning.  Hold Plavix and aspirin, rounder to resume if procedure will not be performed or delayed  [facility] consult for possible EUS with fine-needle aspirate-biopsy  Resume most needed home medications as prior to admission.  Rest to be reviewed by the rounder in the morning  PCD's.    Code Status   Full Code    Primary Care Physician   Lida Ray      Past Surgical History   I have reviewed this patient's surgical history and updated it with pertinent information if needed.  Past Surgical History:   Procedure Laterality Date     ABDOMEN SURGERY  11/12    Hernia     BIOPSY  4/16    Cancerous growth on leg. Removed by MOHs treatment     HERNIA REPAIR  11/12    Repaired     PROSTATE SURGERY       [facility] NONSPECIFIC PROCEDURE  6/08     prostatectomy      [facility] NONSPECIFIC PROCEDURE  11/01    colonoscopy     [facility] NONSPECIFIC PROCEDURE  11/2006    hernia, right side     [facility] NONSPECIFIC PROCEDURE      T + A       Prior to Admission Medications    Prior to Admission Medications   Prescriptions Last Dose Informant Patient Reported? Taking?   Cadexomer Iodine (IODOSORB EX)   Yes No   EPINEPHrine 0.3 MG/0.3ML injection   No No   Sig: Inject 0.3 mLs (0.3 mg) into the muscle as needed for anaphylaxis   STATIN NOT PRESCRIBED, INTENTIONAL,   No No   Sig: Please choose reason not prescribed, below   acetaminophen (TYLENOL) 650 MG CR tablet   Yes No   Sig: Take 650 mg by mouth 3 times daily    aspirin 81 MG tablet   Yes No   Sig: Take 81 mg by mouth daily   baclofen (LIORESAL) intraTHECAL Internal Pump   Yes No   Sig: by Intrathecal route continuous prn Pump filled by [facility] stroke [place] [phone]  Pump Model: [employer]  Last fill:  10/2020  Next fill:   3/17/2021  Low [place] Alarm Date:   Reservoir Volume: 20 ml  Conc: 2000 mcg/ml  Delivers 234.7 mcg/day  Basal rate:unknown  Battery life: replace in 33 months.   clopidogrel (PLAVIX) 75 MG tablet   Yes No   Sig: Take 75 mg by mouth daily    enalapril (VASOTEC) 10 MG tablet   No No   Sig: Take 1 tablet (10 mg) by mouth 2 times daily   ketoconazole (NIZORAL) 2 % external shampoo   No No   Sig: SHAMPOO EVERY 2-3 DAYS AS  NEEDED   minocycline (MINOCIN) 100 MG capsule   Yes No   Sig: Take 100 mg by mouth 2 times daily Staph infection.   mupirocin (BACTROBAN) 2 % external ointment   Yes No   Sig: Apply topically 2 times daily Wound left shin.   oxybutynin ER (DITROPAN-XL) 10 MG 24 hr tablet   No No   Sig: Take 2 tablets (20 mg) by mouth At Bedtime   pantoprazole (PROTONIX) 40 MG EC tablet   No No   Sig: Take 1 tablet (40 mg) by mouth daily   polyethylene glycol (MIRALAX/GLYCOLAX) Packet   Yes No   Sig: Take 1 packet by mouth daily as needed Every 2-3 days.   potassium chloride ER (K-TAB/KLOR-CON) 10 MEQ CR tablet   No No   Sig: Take 2 tablets (20 mEq) by mouth daily   rosuvastatin (CRESTOR) 5 MG tablet   No No   Sig: TAKE 1 TABLET BY MOUTH EVERY DAY   Patient not taking:  "Reported on 6/10/2021      Facility-Administered Medications Last Administration Doses Remaining   methylPREDNISolone (DEPO-MEDROL) injection 40 mg 12/15/2020 11:34 AM         Allergies   Allergies   Allergen Reactions     No Clinical Screening - See Comments Hives     Starts swelling and hives     Bee Venom      Penicillins Hives     \"HIVES\"       Social History   I have personally reviewed the social history with the patient showing.  Social History     Tobacco Use     Smoking status: Former Smoker     Packs/day: 0.30     Years: 6.00     Pack years: 1.80     Types: Cigarettes     Start date: 1970     Quit date: 10/5/1976     Years since quittin.7     Smokeless tobacco: Never Used   Substance Use Topics     Alcohol use: Yes     Frequency: 2-4 times a month     Drinks per session: 1 or 2     Comment: 1 drink/week     Family History   I have reviewed this patient's family history and it is not contributory to the admission .       Review of Systems   Except for +  as noted in the HPI, a 12-system Review of Systems was found to be negative.      Physical Exam   Vital Signs with Ranges  Temp:  [97.6  F (36.4  C)] 97.6  F (36.4  C)  Pulse:  [57-62] 57  Resp:  [18] 18  BP: (131-203)/() 203/110  SpO2:  [98 %-100 %] 98 %  151 lbs 0 oz    GEN:  Alert, oriented x 3, appears comfortable, NAD.  HEENT:  Normocephalic/atraumatic, scleral icterus ++, no nasal discharge, mouth moist.  CV:  Regular rate and rhythm, no murmur or JVD.  S1 + S2 noted, no S3 or S4.  LUNGS:  Clear to auscultation bilaterally without rales/rhonchi/wheezing/retractions.  Symmetric chest rise on inhalation noted.  ABD:  Active bowel sounds, soft, non-tender/non-distended.  No rebound/guarding/rigidity.  EXT:  No edema or cyanosis.  No joint synovitis noted.  SKIN:  Dry to touch, no exanthems noted in the visualized areas.       Data   I personally reviewed.  Results for orders placed or performed during the hospital encounter of 21 " (from the past 24 hour(s))   CBC (platelets, no diff)   Result Value Ref Range    WBC 8.4 4.0 - 11.0 10e9/L    RBC Count 4.42 4.4 - 5.9 10e12/L    Hemoglobin 14.1 13.3 - 17.7 g/dL    Hematocrit 41.3 40.0 - 53.0 %    MCV 93 78 - 100 fl    MCH 31.9 26.5 - 33.0 pg    MCHC 34.1 31.5 - 36.5 g/dL    RDW 15.6 (H) 10.0 - 15.0 %    Platelet Count 307 150 - 450 10e9/L   Comprehensive metabolic panel   Result Value Ref Range    Sodium 130 (L) 133 - 144 mmol/L    Potassium 4.0 3.4 - 5.3 mmol/L    Chloride 96 94 - 109 mmol/L    Carbon Dioxide 29 20 - 32 mmol/L    Anion Gap 5 3 - 14 mmol/L    Glucose 97 70 - 99 mg/dL    Urea Nitrogen 17 7 - 30 mg/dL    Creatinine 0.65 (L) 0.66 - 1.25 mg/dL    GFR Estimate >90 >60 mL/min/[1.73_m2]    GFR Estimate If Black >90 >60 mL/min/[1.73_m2]    Calcium 9.4 8.5 - 10.1 mg/dL    Bilirubin Total 8.0 (H) 0.2 - 1.3 mg/dL    Albumin 3.1 (L) 3.4 - 5.0 g/dL    Protein Total 7.2 6.8 - 8.8 g/dL    Alkaline Phosphatase 428 (H) 40 - 150 U/L     (H) 0 - 70 U/L     (H) 0 - 45 U/L   INR   Result Value Ref Range    INR 0.96 0.86 - 1.14   Lipase   Result Value Ref Range    Lipase 506 (H) 73 - 393 U/L   US Lower Extremity Venous Duplex Bilateral    Narrative    EXAM: US LOWER EXTREMITY VENOUS DUPLEX BILATERAL  LOCATION: [facility]  DATE/TIME: 6/16/2021 8:18 PM    INDICATION: Bilateral leg swelling  COMPARISON: None.  TECHNIQUE: Venous Duplex ultrasound of bilateral lower extremities with and without compression, augmentation and duplex. Color flow and spectral Doppler with waveform analysis performed.    FINDINGS: Exam includes the common femoral, femoral, popliteal veins as well as segmentally visualized deep calf veins and greater saphenous vein.     RIGHT: No deep vein thrombosis. No superficial thrombophlebitis. No popliteal cyst.    LEFT: No deep vein thrombosis. No superficial thrombophlebitis. No popliteal cyst.      Impression    IMPRESSION:  1.  No deep venous thrombosis in  the bilateral lower extremities.     *Note: Due to a large number of results and/or encounters for the requested time period, some results have not been displayed. A complete set of results can be found in Results Review.       Disclaimer: This note consists of symbols derived from keyboarding, dictation and/or voice recognition software. As a result, there may be errors in the script that have gone undetected. Please consider this when interpreting information found in this chart.

## 2021-06-17 NOTE — ANESTHESIA POSTPROCEDURE EVALUATION
Patient: Elier Barber    Procedure(s):  ESOPHAGOGASTRODUODENOSCOPY, WITH ENDOSCOPIC US, fine needle aspiration  ENDOSCOPIC RETROGRADE CHOLANGIOPANCREATOGRAPHY, BILIARY STENT PLACEMENT    Diagnosis:Jaundice [R17]  Diagnosis Additional Information: No value filed.    Anesthesia Type:  General    Note:  Disposition: Outpatient   Postop Pain Control: Uneventful            Sign Out: Well controlled pain   PONV: No   Neuro/Psych: Uneventful            Sign Out: Acceptable/Baseline neuro status   Airway/Respiratory: Uneventful            Sign Out: Acceptable/Baseline resp. status   CV/Hemodynamics: Uneventful            Sign Out: Acceptable CV status; No obvious hypovolemia; No obvious fluid overload   Other NRE: NONE   DID A NON-ROUTINE EVENT OCCUR? No           Last vitals:  Vitals:    06/17/21 1639 06/17/21 1650 06/17/21 1723   BP:  (!) 156/78 117/57   Pulse:  66 72   Resp:  14 13   Temp:      SpO2: 95% 94% 95%       Last vitals prior to Anesthesia Care Transfer:  CRNA VITALS  6/17/2021 1422 - 6/17/2021 1522      6/17/2021             Resp Rate (observed):  (!) 5          Electronically Signed By: Eliu Mabry MD  June 17, 2021  5:34 PM

## 2021-06-17 NOTE — ED NOTES
"[facility]  ED Nurse Handoff Report    Elier Barber is a 75 year old male   ED Chief complaint: Leg Swelling  . ED Diagnosis:   Final diagnoses:   Elevated liver enzymes   Pancreatic mass     Allergies:   Allergies   Allergen Reactions     No Clinical Screening - See Comments Hives     Starts swelling and hives     Bee Venom      Penicillins Hives     \"HIVES\"       Code Status: Full Code  Activity level - Baseline/Home:  Total Care. Activity Level - Current:   Total Care. Lift room needed: Yes. Bariatric: No   Needed: No   Isolation: No. Infection: Not Applicable.     Vital Signs:   Vitals:    06/16/21 2000 06/16/21 2045 06/16/21 2100 06/16/21 2115   BP: 134/74 (!) 177/110 (!) 183/113 (!) 203/110   Pulse: 57 62 57 57   Resp:       Temp:       TempSrc:       SpO2: 98% 98% 98% 98%   Weight:       Height:           Cardiac Rhythm:  ,      Pain level:    Patient confused: No. Patient Falls Risk: Yes.   Elimination Status: Unable to void   Patient Report - Initial Complaint: leg swelling. Focused Assessment: Has a history of stroke, alert follows command. Speech grabled , uses wheelchair at home. Wife able to transfer patient herself. Noticed bilateral leg swelling with right greater then  Left. Wife concerned about blood clots. Also states had a test done at the [place] which showed a cyst on top of liver. Sclera yellow with light yellowish tint to skin. Skin has multiple scabs over arms and legs. Denies any pain ,SOB. Liver test elevated  Tests Performed: ultrasound of legs. Abnormal Results:  Labs Ordered and Resulted from Time of ED Arrival Up to the Time of Departure from the ED   CBC WITH PLATELETS - Abnormal; Notable for the following components:       Result Value    RDW 15.6 (*)     All other components within normal limits   COMPREHENSIVE METABOLIC PANEL - Abnormal; Notable for the following components:    Sodium 130 (*)     Creatinine 0.65 (*)     Bilirubin Total 8.0 (*)     " Albumin 3.1 (*)     Alkaline Phosphatase 428 (*)      (*)      (*)     All other components within normal limits   LIPASE - Abnormal; Notable for the following components:    Lipase 506 (*)     All other components within normal limits   INR      Treatments provided: None  Family Comments: Wife at bedside  OBS brochure/video discussed/provided to patient: NA    ED Medications: Medications - No data to display  Drips infusing:  No  For the majority of the shift, the patient's behavior Green. Interventions performed were None.    Sepsis treatment initiated: No     Patient tested for COVID 19 prior to admission: YES    ED Nurse Name/Phone Number: Reva Rogers RN,   10:00 PM    RECEIVING UNIT ED HANDOFF REVIEW    Above ED Nurse Handoff Report was reviewed: Yes  Reviewed by: Karlo Beard RN on June 16, 2021 at 10:37 PM

## 2021-06-18 ENCOUNTER — TRANSCRIBE ORDERS (OUTPATIENT)
Dept: OTHER | Age: 76
End: 2021-06-18

## 2021-06-18 DIAGNOSIS — C24.1 CANCER OF AMPULLA OF VATER (H): Primary | ICD-10-CM

## 2021-06-18 LAB
ALBUMIN SERPL-MCNC: 2.4 G/DL (ref 3.4–5)
ALP SERPL-CCNC: 337 U/L (ref 40–150)
ALT SERPL W P-5'-P-CCNC: 130 U/L (ref 0–70)
ANION GAP SERPL CALCULATED.3IONS-SCNC: 8 MMOL/L (ref 3–14)
AST SERPL W P-5'-P-CCNC: 113 U/L (ref 0–45)
BILIRUB DIRECT SERPL-MCNC: 6 MG/DL (ref 0–0.2)
BILIRUB SERPL-MCNC: 7.2 MG/DL (ref 0.2–1.3)
BUN SERPL-MCNC: 17 MG/DL (ref 7–30)
CALCIUM SERPL-MCNC: 8.9 MG/DL (ref 8.5–10.1)
CEA SERPL-MCNC: 2.2 UG/L (ref 0–2.5)
CHLORIDE SERPL-SCNC: 98 MMOL/L (ref 94–109)
CO2 SERPL-SCNC: 25 MMOL/L (ref 20–32)
COPATH REPORT: NORMAL
CREAT SERPL-MCNC: 0.6 MG/DL (ref 0.66–1.25)
ERYTHROCYTE [DISTWIDTH] IN BLOOD BY AUTOMATED COUNT: 14.9 % (ref 10–15)
GFR SERPL CREATININE-BSD FRML MDRD: >90 ML/MIN/{1.73_M2}
GLUCOSE SERPL-MCNC: 120 MG/DL (ref 70–99)
HCT VFR BLD AUTO: 37 % (ref 40–53)
HGB BLD-MCNC: 12.9 G/DL (ref 13.3–17.7)
INTERPRETATION ECG - MUSE: NORMAL
MCH RBC QN AUTO: 31.8 PG (ref 26.5–33)
MCHC RBC AUTO-ENTMCNC: 34.9 G/DL (ref 31.5–36.5)
MCV RBC AUTO: 91 FL (ref 78–100)
PLATELET # BLD AUTO: 276 10E9/L (ref 150–450)
POTASSIUM SERPL-SCNC: 3.9 MMOL/L (ref 3.4–5.3)
PROT SERPL-MCNC: 5.9 G/DL (ref 6.8–8.8)
RBC # BLD AUTO: 4.06 10E12/L (ref 4.4–5.9)
SODIUM SERPL-SCNC: 131 MMOL/L (ref 133–144)
WBC # BLD AUTO: 9.2 10E9/L (ref 4–11)

## 2021-06-18 PROCEDURE — 250N000013 HC RX MED GY IP 250 OP 250 PS 637: Performed by: INTERNAL MEDICINE

## 2021-06-18 PROCEDURE — 250N000013 HC RX MED GY IP 250 OP 250 PS 637: Performed by: HOSPITALIST

## 2021-06-18 PROCEDURE — 82378 CARCINOEMBRYONIC ANTIGEN: CPT | Performed by: INTERNAL MEDICINE

## 2021-06-18 PROCEDURE — 86301 IMMUNOASSAY TUMOR CA 19-9: CPT | Mod: GZ | Performed by: INTERNAL MEDICINE

## 2021-06-18 PROCEDURE — 85027 COMPLETE CBC AUTOMATED: CPT | Performed by: INTERNAL MEDICINE

## 2021-06-18 PROCEDURE — 258N000003 HC RX IP 258 OP 636: Performed by: INTERNAL MEDICINE

## 2021-06-18 PROCEDURE — 80048 BASIC METABOLIC PNL TOTAL CA: CPT | Performed by: INTERNAL MEDICINE

## 2021-06-18 PROCEDURE — 96375 TX/PRO/DX INJ NEW DRUG ADDON: CPT

## 2021-06-18 PROCEDURE — 99225 PR SUBSEQUENT OBSERVATION CARE,LEVEL II: CPT | Performed by: INTERNAL MEDICINE

## 2021-06-18 PROCEDURE — 36415 COLL VENOUS BLD VENIPUNCTURE: CPT | Performed by: INTERNAL MEDICINE

## 2021-06-18 PROCEDURE — 80076 HEPATIC FUNCTION PANEL: CPT | Performed by: INTERNAL MEDICINE

## 2021-06-18 PROCEDURE — 250N000011 HC RX IP 250 OP 636: Performed by: INTERNAL MEDICINE

## 2021-06-18 PROCEDURE — 96361 HYDRATE IV INFUSION ADD-ON: CPT

## 2021-06-18 PROCEDURE — G0378 HOSPITAL OBSERVATION PER HR: HCPCS

## 2021-06-18 PROCEDURE — 99207 PR CDG-CODE CATEGORY CHANGED: CPT | Performed by: INTERNAL MEDICINE

## 2021-06-18 PROCEDURE — 96376 TX/PRO/DX INJ SAME DRUG ADON: CPT

## 2021-06-18 RX ORDER — BISACODYL 10 MG
10 SUPPOSITORY, RECTAL RECTAL DAILY PRN
Status: DISCONTINUED | OUTPATIENT
Start: 2021-06-18 | End: 2021-06-20 | Stop reason: HOSPADM

## 2021-06-18 RX ADMIN — PANTOPRAZOLE SODIUM 40 MG: 40 TABLET, DELAYED RELEASE ORAL at 21:01

## 2021-06-18 RX ADMIN — POTASSIUM CHLORIDE 20 MEQ: 750 TABLET, FILM COATED, EXTENDED RELEASE ORAL at 08:37

## 2021-06-18 RX ADMIN — OXYBUTYNIN CHLORIDE 20 MG: 5 TABLET, EXTENDED RELEASE ORAL at 21:01

## 2021-06-18 RX ADMIN — CIPROFLOXACIN 400 MG: 2 INJECTION, SOLUTION INTRAVENOUS at 18:20

## 2021-06-18 RX ADMIN — SENNOSIDES 2 TABLET: 8.6 TABLET, FILM COATED ORAL at 08:37

## 2021-06-18 RX ADMIN — ENALAPRIL MALEATE 10 MG: 10 TABLET ORAL at 21:01

## 2021-06-18 RX ADMIN — CIPROFLOXACIN 400 MG: 2 INJECTION, SOLUTION INTRAVENOUS at 06:00

## 2021-06-18 RX ADMIN — ENALAPRIL MALEATE 10 MG: 10 TABLET ORAL at 08:37

## 2021-06-18 RX ADMIN — POLYETHYLENE GLYCOL 3350 17 G: 17 POWDER, FOR SOLUTION ORAL at 08:36

## 2021-06-18 RX ADMIN — BISACODYL 10 MG: 10 SUPPOSITORY RECTAL at 18:17

## 2021-06-18 RX ADMIN — SODIUM CHLORIDE 500 ML: 9 INJECTION, SOLUTION INTRAVENOUS at 16:35

## 2021-06-18 NOTE — PLAN OF CARE
End of Shift Summary  For vital signs and complete assessments, please see documentation flowsheets.     Pertinent assessments: Complains of nausea, Zofran given times one. Pt taking in small amount of clears only. No BM times three days, Senna given at HS. Bowel sounds hypoactive. External catheter placed when returned from surgery. It was emptied by support staff but bladder scan indicated patient retaining urine. Straight cath times one. VSS. On 2L oxygen to maintain sats. Jaundiced. Speech slurred at baseline. Denies pain.   Treatment Plan: Monitor vitals, antiemetics prn, monitor bowel function and meds for constipation prn. Monitor urine output and straight cath prn.

## 2021-06-18 NOTE — PLAN OF CARE
"PRIMARY DIAGNOSIS: \"GENERIC\" NURSING  OUTPATIENT/OBSERVATION GOALS TO BE MET BEFORE DISCHARGE:  ADLs back to baseline: no    Activity and level of assistance: Up with maximum assistance. Consider SW and/or PT evaluation.     Pain status: Pain free.    Return to near baseline physical activity: No     Discharge Planner Nurse   Safe discharge environment identified: Yes  Barriers to discharge: Yes       Entered by: Anastasiya Gutiérrez 06/18/2021 2:35 AM     Please review provider order for any additional goals.   Nurse to notify provider when observation goals have been met and patient is ready for discharge.    Patient up A2.  A&O but sleepy tonight from anesthesia.  Denies SOB, nausea and pain.  On 2L O2, capnography in place.  External catheter in place, but having urine retention tonight, straight cathed for 300ml at 0200.    "

## 2021-06-18 NOTE — PHARMACY-ADMISSION MEDICATION HISTORY
Addendum to Admission medication history:     Added updated information for intrathecal pump    Prior to Admission medications    Medication Sig Last Dose Taking? Auth Provider                                                                                baclofen (LIORESAL) intraTHECAL Internal Pump by Intrathecal route continuous prn Pump filled by [facility] stroke [place] [phone]  Pump Model: Syncromed  Medication in pump is Gablofen (brand name)  Last fill:  03/02/2021  Next fill:   Before 08/02/2021  Low [place] Alarm Date:   Reservoir Volume: 20 ml  Conc: 2000 mcg/mL  Delivers 234.7 mcg/day  Basal rate:unknown  Battery life: unknown   Unknown, Entered By History

## 2021-06-18 NOTE — PROGRESS NOTES
[facility]    Hospitalist Progress Note  Name: Elier Barber    MRN: [id number]  Provider: Melani Pham MD  Date of Service: 06/18/2021    Assessment & Plan   Summary of Stay: Elier Barber is a 75 year old male with past medical history significant for ALS, prior stroke, essential hypertension presented with jaundice.  Admitted on 6/16/2021 for painless jaundice for further work-up and evaluation.    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   -- Repeat ERCP in 4 weeks to exchange stent. Will need to be off Plavix 5 days prior to procedure   --  CA 19-9        History of ALS  -Patient on baclofen pump  -Supportive care    Essential hypertension  -Continue enalapril    History of CVA  -Stable  -Plavix was held in anticipation of EUS  -Resume after procedure if no further intervention is needed    History of prostate cancer status post prostatectomy    Drop in hemoglobin  -No signs of active bleeding  -We will continue to monitor  -Check hemoglobin in a.m.    Deconditioning and weakness  -PT ordered      DVT Prophylaxis: Pneumatic Compression Devices  Code Status: Full Code    Disposition: Expected discharge to be decided      Interval History   Rviewed chart.  Patient denies any abdominal pain chest pain shortness of breath.  10 point review of system was completed was otherwise negative.    Total time spent more than 35 minutes in direct patient care and coronation of care.  Discussed with GI    -Data reviewed today: I reviewed all new labs and imaging reports over the last 24 hours. I personally reviewed no images or EKG's today.    Physical Exam   Temp: 97.5  F (36.4  C) Temp src: Oral BP:  (!) 143/71 Pulse: 62   Resp: 16 SpO2: 95 % O2 Device: None (Room air) Oxygen Delivery: 2 LPM  Vitals:    06/16/21 1859 06/16/21 2258 06/17/21 1225   Weight: 68.5 kg (151 lb) 67.7 kg (149 lb 4.8 oz) 68 kg (150 lb)     Vital Signs with Ranges  Temp:  [97.3  F (36.3  C)-98.9  F (37.2  C)] 97.5  F (36.4  C)  Pulse:  [62-94] 62  Resp:  [10-20] 16  BP: (115-173)/() 143/71  SpO2:  [93 %-100 %] 95 %  I/O last 3 completed shifts:  In: 1267 [P.O.:30; I.V.:1237]  Out: 1975 [Urine:1975]      GEN:  Alert, oriented x 3, appears comfortable, NAD.  HEENT:  Normocephalic/atraumatic, scleral icterus ++, no nasal discharge, mouth moist.  CV:  Regular rate and rhythm, no murmur or JVD.  S1 + S2 noted, no S3 or S4.  LUNGS:  Clear to auscultation bilaterally without rales/rhonchi/wheezing/retractions.  Symmetric chest rise on inhalation noted.  ABD:  Active bowel sounds, soft, non-tender/non-distended.  No rebound/guarding/rigidity.  EXT:  No edema or cyanosis.  No joint synovitis noted.  SKIN:  Dry to touch, no exanthems noted in the visualized areas.    Medications     dextrose 5% and 0.9% NaCl with potassium chloride 20 mEq 75 mL/hr at 06/18/21 0857     - MEDICATION INSTRUCTIONS -       Medication given by intrathecal pump: This is NOT an order to dispense medication. For information only.         acetaminophen  650 mg Oral TID     ciprofloxacin  400 mg Intravenous Q12H     enalapril  10 mg Oral BID     oxybutynin ER  20 mg Oral At Bedtime     pantoprazole  40 mg Oral QPM     potassium chloride ER  20 mEq Oral Daily     sodium chloride (PF)  3 mL Intracatheter Q8H     Data     Recent Labs   Lab 06/18/21  0830 06/17/21  1240 06/17/21  0654 06/16/21  1859   WBC 9.2  --  6.6 8.4   HGB 12.9*  --  12.9* 14.1   HCT 37.0*  --  37.3* 41.3   MCV 91  --  91 93    306 260 307     Recent Labs   Lab 06/18/21  0830 06/17/21  0654 06/16/21  1859   * 133 130*   POTASSIUM 3.9 3.9 4.0   CHLORIDE 98 100 96   CO2 25 27 29   ANIONGAP 8  6 5   * 95 97   BUN 17 14 17   CR 0.60* 0.55* 0.65*   GFRESTIMATED >90 >90 >90   GFRESTBLACK >90 >90 >90   AMOS 8.9 8.8 9.4     No results for input(s): CULT in the last 168 hours.  No results for input(s): NTBNPI, NTBNP in the last 168 hours.  No results for input(s): SED, CRP in the last 168 hours.  Recent Labs   Lab 06/18/21  0830 06/17/21  0654 06/16/21  1859   * 95 97     Recent Labs   Lab 06/18/21  0830 06/17/21  0654 06/16/21  1859   HGB 12.9* 12.9* 14.1     Recent Labs   Lab 06/18/21  0830 06/17/21  0654 06/16/21  1859   * 125* 150*   * 128* 164*   ALKPHOS 337* 346* 428*   BILITOTAL 7.2* 7.2* 8.0*     Recent Labs   Lab 06/17/21  1240 06/16/21  1859   INR 0.96 0.96     No results for input(s): LACT in the last 168 hours.  Recent Labs   Lab 06/16/21  1859   LIPASE 506*     Recent Labs   Lab 06/18/21  0830 06/17/21  0654 06/16/21  1859   BUN 17 14 17   CR 0.60* 0.55* 0.65*     No results for input(s): TSH in the last 168 hours.  No results for input(s): TROPONIN, TROPI, TROPR in the last 168 hours.    Invalid input(s): TROP, TROPONINIES  No results for input(s): COLOR, APPEARANCE, URINEGLC, URINEBILI, URINEKETONE, SG, UBLD, URINEPH, PROTEIN, UROBILINOGEN, NITRITE, LEUKEST, RBCU, WBCU in the last 168 hours.    Recent Results (from the past 24 hour(s))   XR ERCP    Narrative    This exam was marked as non-reportable because it will not be read by a   radiologist or a [place] non-radiologist provider.         CT Chest Abdomen w Contrast    Narrative    EXAM: CT CHEST ABDOMEN W CONTRAST  LOCATION: [facility]  DATE/TIME: 6/17/2021 6:33 PM    INDICATION: ampulla mass, prostate cancer eval for metastatic disease    COMPARISON: None.    TECHNIQUE: CT scan of the chest, abdomen, and pelvis was performed following injection of IV contrast. Multiplanar reformats were obtained. Dose reduction techniques were used. CONTRAST: 75mL Isovue-370    FINDINGS:   LUNGS AND PLEURA: Mild  subsegmental atelectasis dependently in the lower lobes. No pulmonary mass, consolidation, or suspicious pulmonary nodule. No pleural effusion or pneumothorax.    MEDIASTINUM/AXILLAE: Mild aortic valve calcification correlate for possible stenosis. Great vessels normal in caliber. No abnormally enlarged lymph nodes. Old inferoseptal left ventricular infarction suggested at midcavity and apex. Right atrium is   mildly enlarged.    CORONARY ARTERY CALCIFICATION: Severe.    HEPATOBILIARY: Mild pneumobilia. No suspicious liver lesion. Distal common bile duct stent spans the ampulla. Proximal common bile duct is mildly dilated measuring 9 mm. A small amount of contrast is seen within the distal pancreatic duct which is also   mildly dilated.    PANCREAS: Mildly dilated pancreatic duct, measuring up to 9 mm. No pancreatic mass. Mild diffuse pancreatic atrophy. No evidence for pancreatitis    SPLEEN: Normal.    ADRENAL GLANDS: Normal.    KIDNEYS/BLADDER: No renal mass or hydronephrosis. Simple cyst in the right kidney, 1.5 cm. No follow up needed.    BOWEL: Large amount stool in the imaged colon. No evidence for obstruction. No free air or free fluid.    LYMPH NODES: Normal.    VASCULATURE: No aneurysm    MUSCULOSKELETAL: Very severe degenerative change in the glenohumeral joints bilaterally with large bilateral shoulder effusions. No suspicious bone lesion.      Impression    IMPRESSION:  1.  No evidence for metastatic disease in the chest or abdomen.  2.  Distal common bile duct stent in good position.

## 2021-06-18 NOTE — CONSULTS
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 Moscoso at the ) for consideration of surgery.  The patient is not a candidate for aggressive surgery such as Whipple due to his age and underlying neurologic condition, but might be a candidate for less aggressive procedures such as ampullectomy  - If surgery is not an option, radiation therapy could be considered  - No role for neoadjuvant systemic therapy for localized ampullary cancer  - Checking CEA and CA 19-9    #2 PLS  - Follows with neurologist at the   - Needs wife's help with dressing and bathing.  Ambulate to ambulate with walker for short distances  - No problems with swallowing or breathing  - Speech is slurred as a result of this condition  - Has had it for 19 years.  Prognosis is unclear    PLAN:  - Any treatment decision for his ampullary cancer will need to discussed with the patient's neurologist in the outpatient setting.    Jovi Bird M.D.  [facility]  [phone]             Chief Complaint:   New diagnosis of ampullary carcinoma         History of Present Illness:   Very pleasant 75 year old gentleman with a progressive neurologic condition called PLS was admitted to the hospital he was found to have elevated liver enzymes, bilirubin, and alkaline phosphatase.  He reports  jaundice x 1 week and dark urine for 2 months.  Underwent EUS/ERCP today.  A 1.2 cm mass was found at the ampulla, with no invasion into the pancreas.  No suspicious appearing locoregional lymph nodes.  Biopsy was performed.  A stent was placed in the common bile duct.              Past Medical History:     Past Medical History:   Diagnosis Date     Basal cell carcinoma nos     sees derm     CVA (cerebral infarction)     small vessel right int capsule stroke     DVT (deep vein thrombosis) in pregnancy 2017    right peroneal vein     Essential hypertension, benign      Hearing loss      Hoarseness of voice     assoc with PLS     Lumbar radiculopathy          Primary Lateral Sclerosis     has baclofen pump through Dr. Calvert, [facility], sees Dr. Fink, [facility]     Primary osteoarthritis of right shoulder 2021     Prostate CA (H)     prostatectomy             Past Surgical History:     Past Surgical History:   Procedure Laterality Date     ABDOMEN SURGERY      Hernia     BIOPSY      Cancerous growth on leg. Removed by MOHs treatment     HERNIA REPAIR      Repaired     PROSTATE SURGERY       RUST NONSPECIFIC PROCEDURE       prostatectomy      RUST NONSPECIFIC PROCEDURE      colonoscopy     RUST NONSPECIFIC PROCEDURE  2006    hernia, right side     RUST NONSPECIFIC PROCEDURE      T + A               Social History:     Social History     Tobacco Use     Smoking status: Former Smoker     Packs/day: 0.30     Years: 6.00     Pack years: 1.80     Types: Cigarettes     Start date: 1970     Quit date: 10/5/1976     Years since quittin.7     Smokeless tobacco: Never Used   Substance Use Topics     Alcohol use: Yes     Frequency: 2-4 times a month     Drinks per session: 1 or 2     Comment: 1 drink/week             Family History:     Family History   Problem Relation Age of Onset     Heart Disease Mother         CHF     Hypertension Mother      C.A.D. Maternal Grandfather       Cerebrovascular Disease Maternal Uncle         45     Cerebrovascular Disease Maternal Uncle              Medications:     Facility-Administered Medications Prior to Admission   Medication Dose Route Frequency Provider Last Rate Last Admin     [DISCONTINUED] methylPREDNISolone (DEPO-MEDROL) injection 40 mg  40 mg   Yeo, Albert, MD   40 mg at 12/15/20 1134     Medications Prior to Admission   Medication Sig Dispense Refill Last Dose     acetaminophen (TYLENOL) 650 MG CR tablet Take 650 mg by mouth 3 times daily    Past Week at Unknown time     aspirin 81 MG tablet Take 81 mg by mouth every evening    6/15/2021 at PM     clopidogrel (PLAVIX) 75 MG tablet Take 75 mg by mouth every evening    6/15/2021 at PM     enalapril (VASOTEC) 10 MG tablet Take 1 tablet (10 mg) by mouth 2 times daily 180 tablet 1 6/16/2021 at AM     EPINEPHrine 0.3 MG/0.3ML injection Inject 0.3 mLs (0.3 mg) into the muscle as needed for anaphylaxis 0.3 mL 3      fluorouracil (EFUDEX) 5 % external cream Apply topically to legs 1 to 2 times weekly as needed/tolerated for maintenance        ketoconazole (NIZORAL) 2 % external shampoo SHAMPOO EVERY 2-3 DAYS AS  NEEDED 120 mL 10      oxybutynin ER (DITROPAN-XL) 10 MG 24 hr tablet Take 2 tablets (20 mg) by mouth At Bedtime 180 tablet 3 6/15/2021 at PM     pantoprazole (PROTONIX) 40 MG EC tablet Take 1 tablet (40 mg) by mouth daily 90 tablet 3 6/15/2021 at PM     polyethylene glycol (MIRALAX/GLYCOLAX) Packet Take 1 packet by mouth daily as needed Every 2-3 days.        potassium chloride ER (K-TAB/KLOR-CON) 10 MEQ CR tablet Take 2 tablets (20 mEq) by mouth daily 180 tablet 3 6/16/2021 at AM     baclofen (LIORESAL) intraTHECAL Internal Pump by Intrathecal route continuous prn Pump filled by [facility] stroke [place] [phone]  Pump Model: Syncromed  Last fill:  10/2020  Next fill:   3/17/2021  [place] Alarm Date:   Reservoir Volume: 20 ml  Conc: 2000  mcg/ml  Delivers 234.7 mcg/day  Basal rate:unknown  Battery life: replace in 33 months.                Review of Systems:   The 10 point Review of Systems is negative other than noted in the HPI            Physical Exam:   Vitals were reviewed  Temp: 98  F (36.7  C) Temp src: Axillary BP: 115/60 Pulse: 72   Resp: 13 SpO2: 96 % O2 Device: Nasal cannula Oxygen Delivery: 2 LPM  General: Awake, alert, and oriented  Skin: Jaundiced  HEENT: Scleral icterus noted  Lungs: Clear  Abd: S, NT, ND  Ext: No edema         Data:     Lab Results   Component Value Date    WBC 6.6 06/17/2021    HGB 12.9 (L) 06/17/2021    HCT 37.3 (L) 06/17/2021     06/17/2021     06/17/2021    POTASSIUM 3.9 06/17/2021    CHLORIDE 100 06/17/2021    CO2 27 06/17/2021    BUN 14 06/17/2021    CR 0.55 (L) 06/17/2021    GLC 95 06/17/2021    TROPI <0.015 11/16/2020     (H) 06/17/2021     (H) 06/17/2021    ALKPHOS 346 (H) 06/17/2021    BILITOTAL 7.2 (H) 06/17/2021    INR 0.96 06/17/2021

## 2021-06-18 NOTE — PLAN OF CARE
End of Shift Summary  For vital signs and complete assessments, please see documentation flowsheets.     Pertinent assessments: A&O due to chronic deficit can be difficult to understand.  VSS.  Denies pain, nausea and SOB.  External catheter in place while in bed, voids better standing.  Bladder scanned for 367- stood at the bedside and voided about 100 ml, sit in the bathroom with sufficient stream of urine, orange tinged dark urine. Repeat PVR under 100.  Jaundiced. Diet advanced to regular, tolerating well. Miralax and Senna for BM with no results, new order for supp- given.     Treatment Plan: Monitor vitals, monitor bowel function and meds for constipation prn. Monitor urine output and straight cath prn. Potential discharge tomorrow.    Bedside Nurse: Tracy Kohler RN

## 2021-06-18 NOTE — PLAN OF CARE
"PRIMARY DIAGNOSIS: \"GENERIC\" NURSING  OUTPATIENT/OBSERVATION GOALS TO BE MET BEFORE DISCHARGE:  ADLs back to baseline: No    Activity and level of assistance: Up with maximum assistance. Consider SW and/or PT evaluation.     Pain status: Pain free.    Return to near baseline physical activity: No     Discharge Planner Nurse   Safe discharge environment identified: Yes  Barriers to discharge: No       Entered by: Anastasiya Gutiérrez 06/18/2021 5:39 AM     Please review provider order for any additional goals.   Nurse to notify provider when observation goals have been met and patient is ready for discharge.    End of Shift Summary  For vital signs and complete assessments, please see documentation flowsheets.     Pertinent assessments: A&O but lethargic tonight.  VSS.  Denies pain, nausea and SOB.  On 2L O2, capnography in place.  External catheter placed, but not voiding.  Bladder scanned for 335.  Straight cathed for 300, dark urine.  Jaundiced. Speech slurred at baseline.     Treatment Plan: Monitor vitals, antiemetics prn, monitor bowel function and meds for constipation prn. Monitor urine output and straight cath prn.     Bedside Nurse: Anastasiya Gutiérrez RN        "

## 2021-06-18 NOTE — PROVIDER NOTIFICATION
Page to MD regarding need for bowel medications of supp and/or enema.    Page to MD to inform of hypotension 93/53 not baseline, no symptoms, noting some irregularity in the apical pulse    MD called back with 500 ml fluid bolus and new order for supp.

## 2021-06-18 NOTE — PROGRESS NOTES
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 Moscoso at the ) for consideration of surgery.  The patient is likely not a candidate for aggressive surgery such as Whipple due to his age and underlying neurologic condition, but might be a candidate for less aggressive procedures such as ampullectomy  - If surgery is not an option, radiation therapy could be considered  - No role for neoadjuvant systemic therapy for localized ampullary cancer  - CEA WNL, CA 19-9 pending     #2 PLS  - Follows with neurologist at the   - Needs wife's help with dressing and bathing.  Ambulate to ambulate with walker for short distances  - No problems with swallowing or breathing  - Speech is slurred as a result of this condition  - Also has a baclofen pump  - Has had it for 19 years.  Prognosis is unclear     PLAN:  - Any treatment decision for his ampullary cancer will need to discussed with the patient's neurologist in the outpatient setting.     Jovi Bird M.D.  [facility]  [phone]      Subjective:    Patient tells me his abdominal pain has improved.  No nausea/vomiting.     Scheduled Medications:  Reviewed active medications    Labs:  CBC RESULTS:   Recent Labs   Lab Test 06/18/21  0830 06/17/21  1240 06/17/21  0654 06/16/21  1859   WBC 9.2  --  6.6 8.4   HGB 12.9*  --  12.9* 14.1   HCT 37.0*  --  37.3* 41.3   MCV 91  --  91 93    306 260 307       CMP  Recent Labs   Lab 06/18/21  0830 06/17/21  0654 06/16/21  1859   * 133 130*   POTASSIUM 3.9 3.9 4.0   CHLORIDE  "98 100 96   CO2 25 27 29   ANIONGAP 8 6 5   * 95 97   BUN 17 14 17   CR 0.60* 0.55* 0.65*   GFRESTIMATED >90 >90 >90   GFRESTBLACK >90 >90 >90   AMOS 8.9 8.8 9.4   PROTTOTAL 5.9* 5.9* 7.2   ALBUMIN 2.4* 2.5* 3.1*   BILITOTAL 7.2* 7.2* 8.0*   ALKPHOS 337* 346* 428*   * 125* 150*   * 128* 164*       INR  Recent Labs   Lab 06/17/21  1240 06/16/21  1859   INR 0.96 0.96       Objective/Physical Exam:  Blood pressure 99/55, pulse 61, temperature 97.7  F (36.5  C), temperature source Axillary, resp. rate 18, height 1.753 m (5' 9\"), weight 68 kg (150 lb), SpO2 99 %.  General: Awake, alert, and oriented  Skin: Jaundiced  HEENT: Scleral icterus noted  Lungs: Clear  Abd: S, NT, ND  Ext: No edema    Jovi Bird MD  [facility]  6/18/2021 6:01 PM        "

## 2021-06-18 NOTE — UTILIZATION REVIEW
Concurrent stay review; Secondary Review Determination     Under the authority of the Utilization Management Committee, the utilization review process indicated a secondary review on the above patient.  The review outcome is based on review of the medical records, discussions with staff, and applying clinical experience noted on the date of the review.          (x) Observation Status Appropriate - Concurrent stay review    RATIONALE FOR DETERMINATION   75 year old male who ALS, essential hypertension, prostate cancer status post  Prostatectomy and others and can be seen in the past medical history views. In recent days during the visit to neurology for follow-up of ALS, the physician noted a yellowish color in his eyes.  His wife had been noting dark urine in the last 2 months and considerable decline in his general health, lab work-up compatible with obstructive icterus and ultrasound of the abdomen revealing intra and extrahepatic biliary tree dilation with a possible mass in the head of the pancreas.     The patient was admitted for expedited oncology and GI consultations for work up of pancreatic mass. He has undergone EUS with biliary stent placement. No evidence for sepsis or cholangitis. Not currently on any IV antibiotics. Anticipate he will discharge home alter today or tomorrow morning with home care services and have outpatient follow up to review biopsy results and oncology plan.     Patient is clinically improving and there is no clear indication to change patient's status to inpatient. The severity of illness, intensity of service provided, expected LOS and risk for adverse outcome make the care appropriate for observation.    This document was produced using voice recognition software     The information on this document is developed by the utilization review team in order for the business office to ensure compliance.  This only denotes the appropriateness of proper admission status and does not  reflect the quality of care rendered.         The definitions of Inpatient Status and Observation Status used in making the determination above are those provided in the CMS Coverage Manual, Chapter 1 and Chapter 6, section 70.4.      Sincerely,   Rosamaria Werner MD  Utilization Review  Physician Advisor  [facility].

## 2021-06-18 NOTE — PROVIDER NOTIFICATION
Admission pager notified: Pt had general anesthesia this afternoon, and bladder scan indicates pt currently has 803cc in bladder. He has been hooked up to external catheter with some output. Can I get prn straight cath order?

## 2021-06-19 ENCOUNTER — APPOINTMENT (OUTPATIENT)
Dept: PHYSICAL THERAPY | Facility: CLINIC | Age: 76
End: 2021-06-19
Attending: INTERNAL MEDICINE
Payer: COMMERCIAL

## 2021-06-19 LAB
ALT SERPL W P-5'-P-CCNC: 144 U/L (ref 0–70)
ANION GAP SERPL CALCULATED.3IONS-SCNC: 8 MMOL/L (ref 3–14)
AST SERPL W P-5'-P-CCNC: 122 U/L (ref 0–45)
BILIRUB SERPL-MCNC: 6.3 MG/DL (ref 0.2–1.3)
BUN SERPL-MCNC: 21 MG/DL (ref 7–30)
CALCIUM SERPL-MCNC: 9 MG/DL (ref 8.5–10.1)
CANCER AG19-9 SERPL-ACNC: 103 U/ML (ref 0–37)
CHLORIDE SERPL-SCNC: 100 MMOL/L (ref 94–109)
CO2 SERPL-SCNC: 20 MMOL/L (ref 20–32)
CREAT SERPL-MCNC: 0.62 MG/DL (ref 0.66–1.25)
CREAT UR-MCNC: 92 MG/DL
ERYTHROCYTE [DISTWIDTH] IN BLOOD BY AUTOMATED COUNT: 15.6 % (ref 10–15)
FRACT EXCRET NA UR+SERPL-RTO: 0.1 %
GFR SERPL CREATININE-BSD FRML MDRD: >90 ML/MIN/{1.73_M2}
GLUCOSE SERPL-MCNC: 88 MG/DL (ref 70–99)
HCT VFR BLD AUTO: 41.9 % (ref 40–53)
HGB BLD-MCNC: 14.2 G/DL (ref 13.3–17.7)
MCH RBC QN AUTO: 31.8 PG (ref 26.5–33)
MCHC RBC AUTO-ENTMCNC: 33.9 G/DL (ref 31.5–36.5)
MCV RBC AUTO: 94 FL (ref 78–100)
PLATELET # BLD AUTO: 328 10E9/L (ref 150–450)
POTASSIUM SERPL-SCNC: 4.2 MMOL/L (ref 3.4–5.3)
RBC # BLD AUTO: 4.47 10E12/L (ref 4.4–5.9)
SODIUM SERPL-SCNC: 128 MMOL/L (ref 133–144)
SODIUM UR-SCNC: 24 MMOL/L
TSH SERPL DL<=0.005 MIU/L-ACNC: 1.68 MU/L (ref 0.4–4)
WBC # BLD AUTO: 10.5 10E9/L (ref 4–11)

## 2021-06-19 PROCEDURE — 84300 ASSAY OF URINE SODIUM: CPT | Performed by: INTERNAL MEDICINE

## 2021-06-19 PROCEDURE — 85027 COMPLETE CBC AUTOMATED: CPT | Performed by: INTERNAL MEDICINE

## 2021-06-19 PROCEDURE — 84460 ALANINE AMINO (ALT) (SGPT): CPT | Performed by: INTERNAL MEDICINE

## 2021-06-19 PROCEDURE — 84450 TRANSFERASE (AST) (SGOT): CPT | Performed by: INTERNAL MEDICINE

## 2021-06-19 PROCEDURE — 97161 PT EVAL LOW COMPLEX 20 MIN: CPT | Mod: GP

## 2021-06-19 PROCEDURE — 84443 ASSAY THYROID STIM HORMONE: CPT | Performed by: INTERNAL MEDICINE

## 2021-06-19 PROCEDURE — 258N000003 HC RX IP 258 OP 636: Performed by: INTERNAL MEDICINE

## 2021-06-19 PROCEDURE — 80048 BASIC METABOLIC PNL TOTAL CA: CPT | Performed by: INTERNAL MEDICINE

## 2021-06-19 PROCEDURE — 97530 THERAPEUTIC ACTIVITIES: CPT | Mod: GP

## 2021-06-19 PROCEDURE — 97116 GAIT TRAINING THERAPY: CPT | Mod: GP

## 2021-06-19 PROCEDURE — G0378 HOSPITAL OBSERVATION PER HR: HCPCS

## 2021-06-19 PROCEDURE — 250N000013 HC RX MED GY IP 250 OP 250 PS 637: Performed by: INTERNAL MEDICINE

## 2021-06-19 PROCEDURE — 96376 TX/PRO/DX INJ SAME DRUG ADON: CPT

## 2021-06-19 PROCEDURE — 99225 PR SUBSEQUENT OBSERVATION CARE,LEVEL II: CPT | Performed by: INTERNAL MEDICINE

## 2021-06-19 PROCEDURE — 82570 ASSAY OF URINE CREATININE: CPT | Performed by: INTERNAL MEDICINE

## 2021-06-19 PROCEDURE — 82247 BILIRUBIN TOTAL: CPT | Performed by: INTERNAL MEDICINE

## 2021-06-19 PROCEDURE — 250N000011 HC RX IP 250 OP 636: Performed by: INTERNAL MEDICINE

## 2021-06-19 PROCEDURE — 250N000013 HC RX MED GY IP 250 OP 250 PS 637: Performed by: HOSPITALIST

## 2021-06-19 PROCEDURE — 36415 COLL VENOUS BLD VENIPUNCTURE: CPT | Performed by: INTERNAL MEDICINE

## 2021-06-19 PROCEDURE — 99207 PR CDG-CODE CATEGORY CHANGED: CPT | Performed by: INTERNAL MEDICINE

## 2021-06-19 RX ORDER — SODIUM CHLORIDE 9 MG/ML
INJECTION, SOLUTION INTRAVENOUS CONTINUOUS
Status: DISCONTINUED | OUTPATIENT
Start: 2021-06-19 | End: 2021-06-20 | Stop reason: HOSPADM

## 2021-06-19 RX ADMIN — POLYETHYLENE GLYCOL 3350 17 G: 17 POWDER, FOR SOLUTION ORAL at 09:13

## 2021-06-19 RX ADMIN — PANTOPRAZOLE SODIUM 40 MG: 40 TABLET, DELAYED RELEASE ORAL at 19:45

## 2021-06-19 RX ADMIN — ENALAPRIL MALEATE 10 MG: 10 TABLET ORAL at 21:56

## 2021-06-19 RX ADMIN — POTASSIUM CHLORIDE 20 MEQ: 750 TABLET, FILM COATED, EXTENDED RELEASE ORAL at 09:13

## 2021-06-19 RX ADMIN — SODIUM CHLORIDE: 9 INJECTION, SOLUTION INTRAVENOUS at 11:24

## 2021-06-19 RX ADMIN — CIPROFLOXACIN 400 MG: 2 INJECTION, SOLUTION INTRAVENOUS at 06:00

## 2021-06-19 RX ADMIN — CIPROFLOXACIN 400 MG: 2 INJECTION, SOLUTION INTRAVENOUS at 18:01

## 2021-06-19 RX ADMIN — ENALAPRIL MALEATE 10 MG: 10 TABLET ORAL at 09:13

## 2021-06-19 RX ADMIN — OXYBUTYNIN CHLORIDE 20 MG: 5 TABLET, EXTENDED RELEASE ORAL at 21:56

## 2021-06-19 RX ADMIN — SODIUM CHLORIDE: 9 INJECTION, SOLUTION INTRAVENOUS at 23:59

## 2021-06-19 NOTE — PLAN OF CARE
+BM following bowel meds. Slept well, external catheter overnight. Dry, scabbed legs PRAFUL. Jaundice noted in face/eyes.     Treatment Plan:   Cipro, monitor LFTs

## 2021-06-19 NOTE — PROGRESS NOTES
06/19/21 0800   Quick Adds   Type of Visit Initial PT Evaluation   Living Environment   People in home spouse   Current Living Arrangements house   Home Accessibility wheelchair accessible   Transportation Anticipated family or friend will provide   Self-Care   Usual Activity Tolerance moderate   Current Activity Tolerance fair   Equipment Currently Used at Home grab bar, toilet;grab bar, tub/shower;raised toilet seat;shower chair;walker, rolling;wheelchair, manual   Disability/Function   Hearing Difficulty or Deaf yes   Patient's preferred means of communication verbal   Describe hearing loss bilateral hearing loss   Use of hearing assistive devices right hearing aid;left hearing aid   Were auxiliary aids offered? yes   Wear Glasses or Blind yes   Vision Management glasses    Concentrating, Remembering or Making Decisions Difficulty no   Difficulty Communicating yes   Communication difficulty speaking   Difficulty Eating/Swallowing no   Walking or Climbing Stairs Difficulty yes   Walking or Climbing Stairs ambulation difficulty, requires equipment;ambulation difficulty, assistance 1 person   Dressing/Bathing Difficulty yes   Dressing/Bathing bathing difficulty, assistance 1 person   Toileting issues yes   Toileting Assistance toileting difficulty, assistance 1 person   Doing Errands Independently Difficulty (such as shopping) yes   Errands Management wife   Fall history within last six months yes   Number of times patient has fallen within last six months 2   General Information   Onset of Illness/Injury or Date of Surgery 06/16/21   Referring Physician Buzz Ramirez    Patient/Family Therapy Goals Statement (PT) To get stronger   Pertinent History of Current Problem (include personal factors and/or comorbidities that impact the POC) CBD obstruction; px hx ALS   Existing Precautions/Restrictions fall   Cognition   Orientation Status (Cognition) oriented x 4   Affect/Mental Status (Cognition) WNL   Follows  Commands (Cognition) WNL   Cognitive Status Comments Difficulty speaking    Pain Assessment   Patient Currently in Pain No   Integumentary/Edema   Integumentary/Edema no deficits were identifed   Posture    Posture Forward head position;Kyphosis   Range of Motion (ROM)   ROM Quick Adds ROM WFL   Strength   Manual Muscle Testing Quick Adds Deficits observed during functional mobility   Strength Comments Difficulty in amb    Bed Mobility   Bed Mobility supine-sit;sit-supine   Supine-Sit [place] (Bed Mobility) minimum assist (75% patient effort)   Sit-Supine [place] (Bed Mobility) minimum assist (75% patient effort)   Bed Mobility Limitations decreased ability to use arms for pushing/pulling;decreased ability to use legs for bridging/pushing   Impairments Contributing to Impaired Bed Mobility decreased strength   Sit-Stand Transfer   Sit-Stand [place] (Transfers) minimum assist (75% patient effort)   Assistive Device (Sit-Stand Transfers) walker, front-wheeled   Gait/Stairs (Locomotion)   [place] Level (Gait) contact guard   Assistive Device (Gait) walker, front-wheeled   Distance in Feet (Required for LE Total Joints) 30'   Pattern (Gait) step-to   Deviations/Abnormal Patterns (Gait) base of support, narrow;joaquin decreased;gait speed decreased;stride length decreased   Maintains Weight-bearing Status (Gait) able to maintain   Comment (Gait/Stairs) Stairs not applicable    Balance   Balance other (describe)   Balance Comments Fair dynamic balance   Sensory Examination   Sensory Perception WFL   Coordination   Coordination no deficits were identified   Muscle Tone   Muscle Tone no deficits were identified   Clinical Impression   Criteria for Skilled Therapeutic Intervention yes, treatment indicated   PT Diagnosis (PT) Impaired bed mobility; impaired transfers; impaired gait    Influenced by the following impairments Generalized weakness   Functional limitations due to impairments CBD obstruction    Clinical Presentation Stable/Uncomplicated   Clinical Presentation Rationale Patient's condition currently stable    Clinical Decision Making (Complexity) low complexity   Therapy Frequency (PT) Daily   Predicted Duration of Therapy Intervention (days/wks) 5 days   Planned Therapy Interventions (PT) balance training;gait training;home exercise program;neuromuscular re-education;strengthening;transfer training   Anticipated Equipment Needs at Discharge (PT) walker, rolling   Risk & Benefits of therapy have been explained evaluation/treatment results reviewed;care plan/treatment goals reviewed;risks/benefits reviewed   Clinical Impression Comments Patient demonstrating generalized weakness due to CBD obstruction and prolonged rest leading to reduced tolerance to activity including transfers and amb.    PT Discharge Planning    PT Discharge Recommendation (DC Rec) home;home with assist   PT Rationale for DC Rec Patient near baseline at this time and is well equiped w/ adaptive equipment and spouses assist    PT Brief overview of current status  CGA x 1 during amb    Total Evaluation Time   Total Evaluation Time (Minutes) 15

## 2021-06-19 NOTE — PLAN OF CARE
End of Shift Summary  For vital signs and complete assessments, please see documentation flowsheets.     Pertinent assessments: Sodium 128 this AM, fractional exertion of sodium and TSH drawn. Voiding well, denies pain, VSS, improved appetite.     Major Shift Events Walked with therapy- they report at baseline    Treatment Plan: Continue on IV fluids for sodium replacement, encourage ambulation to the bathroom    Bedside Nurse: Tracy Kohler RN

## 2021-06-19 NOTE — PLAN OF CARE
[facility]      OUTPATIENT PHYSICAL THERAPY EVALUATION  PLAN OF TREATMENT FOR OUTPATIENT REHABILITATION  (COMPLETE FOR INITIAL CLAIMS ONLY)  Patient's Last Name, First Name, M.I.  YOB: 1945  SunilElier montez                        Provider's Name  [facility] Medical Record No.  [id number]                               Onset Date:  06/16/21   Start of Care Date:      6/19/21   Type:     _X_PT   ___OT   ___SLP Medical Diagnosis:      CBD obstruction                      PT Diagnosis:  Impaired bed mobility; impaired transfers; impaired gait    Visits from SOC:  1   _________________________________________________________________________________  Plan of Treatment/Functional Goals    Planned Interventions: balance training, gait training, home exercise program, neuromuscular re-education, strengthening, transfer training     Goals: See Physical Therapy Goals on Care Plan in [employer] electronic health record.    Therapy Frequency: Daily  Predicted Duration of Therapy Intervention: 5 days  _________________________________________________________________________________    I CERTIFY THE NEED FOR THESE SERVICES FURNISHED UNDER        THIS PLAN OF TREATMENT AND WHILE UNDER MY CARE     (Physician co-signature of this document indicates review and certification of the therapy plan).               ,      Referring Physician: Buzz Ramirez             Initial Assessment        See Physical Therapy evaluation dated   in [employer] electronic health record.

## 2021-06-19 NOTE — PROGRESS NOTES
[facility]    Hospitalist Progress Note  Name: Elier Barber    MRN: [id number]  Provider: Melani Pham MD  Date of Service: 06/19/2021    Assessment & Plan   Summary of Stay: Elier Barber is a 75 year old male with past medical history significant for ALS, prior stroke, essential hypertension presented with jaundice.  Admitted on 6/16/2021 for painless jaundice for further work-up and evaluation.    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   -- Repeat ERCP in 4 weeks to exchange stent. Will need to be off Plavix 5 days prior to procedure   --  CA 19-9      Hyponatremia  -Likely prerenal we will check FENa  -IV fluids  -Recheck electrolytes in a.m.      History of ALS  -Patient on baclofen pump  -Supportive care    Essential hypertension  -Continue enalapril    History of CVA  -Stable  -Plavix was held in anticipation of EUS  -Resume after procedure if no further intervention is needed    History of prostate cancer status post prostatectomy    Drop in hemoglobin  -No signs of active bleeding  -We will continue to monitor  -Check hemoglobin in a.m.    Deconditioning and weakness  -PT ordered      DVT Prophylaxis: Pneumatic Compression Devices  Code Status: Full Code    Disposition: Expected discharge to be decided      Interval History   Rviewed chart.  patient sodium is low he feels a little lethargic and tired today.  Also requested a note for canceling his upcoming flight for further evaluation and treatment for the new diagnosis.  10 point review of system was completed was otherwise negative.    Total time spent more than 35 minutes in direct patient care and coronation of care.  Care  plan discussed with patient and wife and answered all questions    -Data reviewed today: I reviewed all new labs and imaging reports over the last 24 hours. I personally reviewed no images or EKG's today.    Physical Exam   Temp: 98.7  F (37.1  C) Temp src: Oral BP: 136/74 Pulse: 68   Resp: 18 SpO2: 97 % O2 Device: None (Room air)    Vitals:    06/16/21 1859 06/16/21 2258 06/17/21 1225   Weight: 68.5 kg (151 lb) 67.7 kg (149 lb 4.8 oz) 68 kg (150 lb)     Vital Signs with Ranges  Temp:  [97  F (36.1  C)-98.7  F (37.1  C)] 98.7  F (37.1  C)  Pulse:  [60-82] 68  Resp:  [18-20] 18  BP: ()/(55-81) 136/74  SpO2:  [96 %-97 %] 97 %  I/O last 3 completed shifts:  In: -   Out: 500 [Urine:500]      GEN:  Alert, oriented x 3, appears comfortable, NAD.  HEENT:  Normocephalic/atraumatic, scleral icterus ++, no nasal discharge, mouth moist.  CV:  Regular rate and rhythm, no murmur or JVD.  S1 + S2 noted, no S3 or S4.  LUNGS:  Clear to auscultation bilaterally without rales/rhonchi/wheezing/retractions.  Symmetric chest rise on inhalation noted.  ABD:  Active bowel sounds, soft, non-tender/non-distended.  No rebound/guarding/rigidity.  EXT:  No edema or cyanosis.  No joint synovitis noted.  SKIN:  Dry to touch, no exanthems noted in the visualized areas.    Medications     dextrose 5% and 0.9% NaCl with potassium chloride 20 mEq 75 mL/hr at 06/18/21 0857     - MEDICATION INSTRUCTIONS -       Medication given by intrathecal pump: This is NOT an order to dispense medication. For information only.       sodium chloride 75 mL/hr at 06/19/21 1124       acetaminophen  650 mg Oral TID     ciprofloxacin  400 mg Intravenous Q12H     enalapril  10 mg Oral BID     oxybutynin ER  20 mg Oral At Bedtime     pantoprazole  40 mg Oral QPM     potassium chloride ER  20 mEq Oral Daily     sodium chloride (PF)  3 mL Intracatheter Q8H     Data     Recent Labs   Lab 06/19/21  0803 06/18/21  0830 06/17/21  1240 06/17/21  0654   WBC 10.5 9.2  --   6.6   HGB 14.2 12.9*  --  12.9*   HCT 41.9 37.0*  --  37.3*   MCV 94 91  --  91    276 306 260     Recent Labs   Lab 06/19/21  0803 06/18/21  0830 06/17/21  0654   * 131* 133   POTASSIUM 4.2 3.9 3.9   CHLORIDE 100 98 100   CO2 20 25 27   ANIONGAP 8 8 6   GLC 88 120* 95   BUN 21 17 14   CR 0.62* 0.60* 0.55*   GFRESTIMATED >90 >90 >90   GFRESTBLACK >90 >90 >90   AMOS 9.0 8.9 8.8     No results for input(s): CULT in the last 168 hours.  No results for input(s): NTBNPI, NTBNP in the last 168 hours.  No results for input(s): SED, CRP in the last 168 hours.  Recent Labs   Lab 06/19/21  0803 06/18/21  0830 06/17/21  0654 06/16/21  1859   GLC 88 120* 95 97     Recent Labs   Lab 06/19/21  0803 06/18/21  0830 06/17/21  0654   HGB 14.2 12.9* 12.9*     Recent Labs   Lab 06/19/21  0803 06/18/21  0830 06/17/21  0654 06/16/21  1859   * 113* 125* 150*   * 130* 128* 164*   ALKPHOS  --  337* 346* 428*   BILITOTAL 6.3* 7.2* 7.2* 8.0*     Recent Labs   Lab 06/17/21  1240 06/16/21  1859   INR 0.96 0.96     No results for input(s): LACT in the last 168 hours.  Recent Labs   Lab 06/16/21  1859   LIPASE 506*     Recent Labs   Lab 06/19/21  0803 06/18/21  0830 06/17/21  0654   BUN 21 17 14   CR 0.62* 0.60* 0.55*     Recent Labs   Lab 06/19/21  0803   TSH 1.68     No results for input(s): TROPONIN, TROPI, TROPR in the last 168 hours.    Invalid input(s): TROP, TROPONINIES  No results for input(s): COLOR, APPEARANCE, URINEGLC, URINEBILI, URINEKETONE, SG, UBLD, URINEPH, PROTEIN, UROBILINOGEN, NITRITE, LEUKEST, RBCU, WBCU in the last 168 hours.    No results found for this or any previous visit (from the past 24 hour(s)).

## 2021-06-19 NOTE — PROGRESS NOTES
[facility] Progress Note          Assessment and Plan:   Mr. Elier Barber is a 75 year old man who was admitted on 6/16 with new findings of obstructive jaundice and mass in the pancreas    1. 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 Moscoso and colleagues at the [facility]  - If surgery is not an option, radiation therapy could be considered  - No role for neoadjuvant systemic therapy for localized ampullary cancer  - CEA WNL, CA 19-9 pending     2. PLS  - Follows with Dr. Tejada at the   - Needs wife's help with dressing and bathing.  Ambulate to ambulate with walker for short distances  - No problems with swallowing or breathing  - Speech is slurred as a result of this condition  - Also has a baclofen pump  - Has had it for 19 years.  Prognosis is unclear    3. Hyponatremia  -asymptomatic               Interval History:   Patient reports no new symptoms. In particular, no complaints of nausea or vomiting              Medications:       acetaminophen  650 mg Oral TID     ciprofloxacin  400 mg Intravenous Q12H     enalapril  10 mg Oral BID     oxybutynin ER  20 mg Oral At Bedtime     pantoprazole  40 mg Oral QPM     potassium chloride ER  20 mEq Oral Daily     sodium chloride (PF)  3 mL Intracatheter Q8H     bisacodyl, docusate sodium, ketoconazole, lidocaine 4%, lidocaine (buffered or not buffered), - MEDICATION INSTRUCTIONS -, Medication given by intrathecal pump: This is NOT an order to dispense medication. For information only., melatonin, naloxone, naloxone, naloxone, naloxone, ondansetron **OR** ondansetron, polyethylene  "glycol, sennosides, sodium chloride (PF), sodium chloride (PF)               Physical Exam:   Blood pressure (!) 148/81, pulse 82, temperature 97  F (36.1  C), temperature source Oral, resp. rate 20, height 1.753 m (5' 9\"), weight 68 kg (150 lb), SpO2 96 %.  Wt Readings from Last 4 Encounters:   21 68 kg (150 lb)   06/10/21 68.6 kg (151 lb 3.2 oz)   20 70.8 kg (156 lb)   01/10/20 70.8 kg (156 lb)         Vital Sign Ranges  Temperature Temp  Av.6  F (36.4  C)  Min: 97  F (36.1  C)  Max: 98.2  F (36.8  C)   Blood pressure Systolic (24hrs), Av , Min:93 , Max:148        Diastolic (24hrs), Av, Min:52, Max:81      Pulse Pulse  Av.3  Min: 60  Max: 82   Respirations Resp  Av.5  Min: 18  Max: 20   Pulse oximetry SpO2  Av.8 %  Min: 96 %  Max: 99 %         Intake/Output Summary (Last 24 hours) at 2021 1433  Last data filed at 2021 0600  Gross per 24 hour   Intake --   Output 500 ml   Net -500 ml       Constitutional: Awake, alert, cooperative, no apparent distress   Lungs: Clear to auscultation bilaterally, no crackles or wheezing   Cardiovascular: Regular rate and rhythm, normal S1 and S2, and no murmur noted   Abdomen: Normal bowel sounds, soft, non-distended, non-tender   Skin: Areas of desquamation across both lower extremities   Other: Speech difficult to understand due to slurring of words.          Data:   Laboratory:  CMP  Recent Labs   Lab 21  0803 21  0830 21  0654 21  1859   * 131* 133 130*   POTASSIUM 4.2 3.9 3.9 4.0   CHLORIDE 100 98 100 96   CO2 20 25 27 29   ANIONGAP 8 8 6 5   GLC 88 120* 95 97   BUN 21 17 14 17   CR 0.62* 0.60* 0.55* 0.65*   GFRESTIMATED >90 >90 >90 >90   GFRESTBLACK >90 >90 >90 >90   AMOS 9.0 8.9 8.8 9.4   PROTTOTAL  --  5.9* 5.9* 7.2   ALBUMIN  --  2.4* 2.5* 3.1*   BILITOTAL 6.3* 7.2* 7.2* 8.0*   ALKPHOS  --  337* 346* 428*   * 113* 125* 150*   * 130* 128* 164*     CBC  Recent Labs   Lab " 06/19/21  0803 06/18/21  0830 06/17/21  1240 06/17/21  0654 06/16/21  1859   WBC 10.5 9.2  --  6.6 8.4   RBC 4.47 4.06*  --  4.08* 4.42   HGB 14.2 12.9*  --  12.9* 14.1   HCT 41.9 37.0*  --  37.3* 41.3   MCV 94 91  --  91 93   MCH 31.8 31.8  --  31.6 31.9   MCHC 33.9 34.9  --  34.6 34.1   RDW 15.6* 14.9  --  15.4* 15.6*    276 306 260 307     INR  Recent Labs   Lab 06/17/21  1240 06/16/21  1859   INR 0.96 0.96       Imaging data:  Recent Results (from the past 48 hour(s))   XR ERCP    Narrative    This exam was marked as non-reportable because it will not be read by a   radiologist or a [facility] non-radiologist provider.         CT Chest Abdomen w Contrast    Narrative    EXAM: CT CHEST ABDOMEN W CONTRAST  LOCATION: [facility]  DATE/TIME: 6/17/2021 6:33 PM    INDICATION: ampulla mass, prostate cancer eval for metastatic disease    COMPARISON: None.    TECHNIQUE: CT scan of the chest, abdomen, and pelvis was performed following injection of IV contrast. Multiplanar reformats were obtained. Dose reduction techniques were used. CONTRAST: 75mL Isovue-370    FINDINGS:   LUNGS AND PLEURA: Mild subsegmental atelectasis dependently in the lower lobes. No pulmonary mass, consolidation, or suspicious pulmonary nodule. No pleural effusion or pneumothorax.    MEDIASTINUM/AXILLAE: Mild aortic valve calcification correlate for possible stenosis. Great vessels normal in caliber. No abnormally enlarged lymph nodes. Old inferoseptal left ventricular infarction suggested at midcavity and apex. Right atrium is   mildly enlarged.    CORONARY ARTERY CALCIFICATION: Severe.    HEPATOBILIARY: Mild pneumobilia. No suspicious liver lesion. Distal common bile duct stent spans the ampulla. Proximal common bile duct is mildly dilated measuring 9 mm. A small amount of contrast is seen within the distal pancreatic duct which is also   mildly dilated.    PANCREAS: Mildly dilated pancreatic duct, measuring up to 9 mm. No  pancreatic mass. Mild diffuse pancreatic atrophy. No evidence for pancreatitis    SPLEEN: Normal.    ADRENAL GLANDS: Normal.    KIDNEYS/BLADDER: No renal mass or hydronephrosis. Simple cyst in the right kidney, 1.5 cm. No follow up needed.    BOWEL: Large amount stool in the imaged colon. No evidence for obstruction. No free air or free fluid.    LYMPH NODES: Normal.    VASCULATURE: No aneurysm    MUSCULOSKELETAL: Very severe degenerative change in the glenohumeral joints bilaterally with large bilateral shoulder effusions. No suspicious bone lesion.      Impression    IMPRESSION:  1.  No evidence for metastatic disease in the chest or abdomen.  2.  Distal common bile duct stent in good position.         Eugenio Stover MD

## 2021-06-20 ENCOUNTER — APPOINTMENT (OUTPATIENT)
Dept: PHYSICAL THERAPY | Facility: CLINIC | Age: 76
End: 2021-06-20
Payer: COMMERCIAL

## 2021-06-20 VITALS
HEIGHT: 69 IN | DIASTOLIC BLOOD PRESSURE: 88 MMHG | RESPIRATION RATE: 16 BRPM | SYSTOLIC BLOOD PRESSURE: 162 MMHG | BODY MASS INDEX: 22.22 KG/M2 | HEART RATE: 65 BPM | WEIGHT: 150 LBS | OXYGEN SATURATION: 96 % | TEMPERATURE: 97.4 F

## 2021-06-20 LAB
ALT SERPL W P-5'-P-CCNC: 119 U/L (ref 0–70)
ANION GAP SERPL CALCULATED.3IONS-SCNC: 2 MMOL/L (ref 3–14)
AST SERPL W P-5'-P-CCNC: 94 U/L (ref 0–45)
BILIRUB SERPL-MCNC: 4.1 MG/DL (ref 0.2–1.3)
BUN SERPL-MCNC: 13 MG/DL (ref 7–30)
CALCIUM SERPL-MCNC: 8.5 MG/DL (ref 8.5–10.1)
CHLORIDE SERPL-SCNC: 102 MMOL/L (ref 94–109)
CO2 SERPL-SCNC: 29 MMOL/L (ref 20–32)
CREAT SERPL-MCNC: 0.65 MG/DL (ref 0.66–1.25)
GFR SERPL CREATININE-BSD FRML MDRD: >90 ML/MIN/{1.73_M2}
GLUCOSE SERPL-MCNC: 98 MG/DL (ref 70–99)
POTASSIUM SERPL-SCNC: 4 MMOL/L (ref 3.4–5.3)
SODIUM SERPL-SCNC: 133 MMOL/L (ref 133–144)

## 2021-06-20 PROCEDURE — 250N000013 HC RX MED GY IP 250 OP 250 PS 637: Performed by: HOSPITALIST

## 2021-06-20 PROCEDURE — 80048 BASIC METABOLIC PNL TOTAL CA: CPT | Performed by: INTERNAL MEDICINE

## 2021-06-20 PROCEDURE — 84460 ALANINE AMINO (ALT) (SGPT): CPT | Performed by: INTERNAL MEDICINE

## 2021-06-20 PROCEDURE — 96376 TX/PRO/DX INJ SAME DRUG ADON: CPT

## 2021-06-20 PROCEDURE — 84450 TRANSFERASE (AST) (SGOT): CPT | Performed by: INTERNAL MEDICINE

## 2021-06-20 PROCEDURE — 97116 GAIT TRAINING THERAPY: CPT | Mod: GP

## 2021-06-20 PROCEDURE — G0378 HOSPITAL OBSERVATION PER HR: HCPCS

## 2021-06-20 PROCEDURE — 36415 COLL VENOUS BLD VENIPUNCTURE: CPT | Performed by: INTERNAL MEDICINE

## 2021-06-20 PROCEDURE — 82247 BILIRUBIN TOTAL: CPT | Performed by: INTERNAL MEDICINE

## 2021-06-20 PROCEDURE — 250N000011 HC RX IP 250 OP 636: Performed by: INTERNAL MEDICINE

## 2021-06-20 PROCEDURE — 99207 PR CDG-CODE CATEGORY CHANGED: CPT | Performed by: INTERNAL MEDICINE

## 2021-06-20 PROCEDURE — 99217 PR OBSERVATION CARE DISCHARGE: CPT | Performed by: INTERNAL MEDICINE

## 2021-06-20 RX ORDER — CIPROFLOXACIN 500 MG/1
500 TABLET, FILM COATED ORAL 2 TIMES DAILY
Qty: 14 TABLET | Refills: 0 | Status: SHIPPED | OUTPATIENT
Start: 2021-06-20 | End: 2021-06-24

## 2021-06-20 RX ADMIN — CIPROFLOXACIN 400 MG: 2 INJECTION, SOLUTION INTRAVENOUS at 06:00

## 2021-06-20 RX ADMIN — POTASSIUM CHLORIDE 20 MEQ: 750 TABLET, FILM COATED, EXTENDED RELEASE ORAL at 11:22

## 2021-06-20 RX ADMIN — ENALAPRIL MALEATE 10 MG: 10 TABLET ORAL at 11:22

## 2021-06-20 NOTE — DISCHARGE SUMMARY
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 Calvert, [facility], sees Dr. Fink, [facility]     Primary osteoarthritis of right shoulder 6/16/2021     Prostate CA (H) 2008    prostatectomy          History of Present Illness   Elier Barber is an 75 year old male who presented with painless jaundice.  Please see the admission history and physical for full details.    Hospital Course     Elier Barber was admitted on 6/16/2021. He is a 75 year old male with past medical history significant for ALS, prior stroke, essential hypertension presented with jaundice.  Admitted  for painless jaundice for further work-up and evaluation.    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   -- Repeat ERCP in 4 weeks to exchange stent. Will need to be off Plavix 5 days prior to procedure   --  CA 19-9  pending     Hyponatremia: resolved  -Likely prerenal we will check FENa  -IV fluids  -Recheck electrolytes in a.m.        History of PLS  -Patient on baclofen pump  -Supportive care     Essential hypertension  -Continued enalapril     History of CVA  -Stable  -Plavix was held in anticipation of EUS but resumed on discharge  -We will likely need to hold again if further intervention is needed     History of prostate cancer status post prostatectomy     Drop in hemoglobin: Seen post procedure but stable on discharge  -No signs of active bleeding  -Stable and       Deconditioning and weakness  -PT therapies while in hospital  -DC home with home nursing services to assess further needs          Significant Results and Procedures   As noted above    Pending Results   Unresulted Labs Ordered in the Past 30 Days of this Admission     Date and Time Order Name Status Description    6/17/2021 1501 Cytology non gyn In process     6/17/2021 1357 Fine needle aspiration In process           Code Status   Full Code       Primary Care Physician   Lida Ray    Physical Exam   Temp: 97.4  F (36.3  C) Temp src: Oral BP: (!) 162/88 Pulse: 65   Resp: 16 SpO2: 96 % O2 Device: None (Room air)    Vitals:    06/16/21 1859 06/16/21 2258 06/17/21 1225   Weight: 68.5 kg (151 lb) 67.7 kg (149 lb 4.8 oz) 68 kg (150 lb)     Vital Signs with Ranges  Temp:  [96.3  F (35.7  C)-98.7  F (37.1  C)] 97.4  F (36.3  C)  Pulse:  [59-68] 65  Resp:  [16-18] 16  BP: (136-162)/(74-88) 162/88  SpO2:  [95 %-97 %] 96 %  I/O last 3 completed shifts:  In: 1565.75 [P.O.:50; I.V.:1515.75]  Out: 1850 [Urine:1850]    GEN:  Alert, oriented x 3, appears comfortable, NAD.  HEENT:  Normocephalic/atraumatic, scleral icterus ++, no nasal discharge, mouth moist.  CV:   Regular rate and rhythm, no murmur or JVD.  S1 + S2 noted, no S3 or S4.  LUNGS:  Clear to auscultation bilaterally without rales/rhonchi/wheezing/retractions.  Symmetric chest rise on inhalation noted.  ABD:  Active bowel sounds, soft, non-tender/non-distended.  No rebound/guarding/rigidity.  EXT:  No edema or cyanosis.  No joint synovitis noted.  SKIN:  Dry to touch, icteric, ch stasis changes      Discharge Disposition   Discharged to home    Consultations This Hospital Stay   GASTROENTEROLOGY IP CONSULT  CARE MANAGEMENT / SOCIAL WORK IP CONSULT  HEMATOLOGY & ONCOLOGY IP CONSULT  PHYSICAL THERAPY ADULT IP CONSULT    Time Spent on this Encounter   I, Melani Pham MD, personally saw the patient today and spent greater than 30 minutes discharging this patient.    Discharge Orders      Home care nursing referral      Reason for your hospital stay    Please refer to discharge summary.  Briefly admitted painless jaundice with diagnosis of new ampullary mass status post biopsy.     Follow-up and recommended labs and tests     Follow up with primary care provider, Lida Ray, within 7 days for hospital follow- up.  The following labs/tests are recommended: Sodium, platelets and INR.    -During hospital stay aspirin and Plavix was hold in anticipation of biopsy.  -Aspirin and Plavix were resumed.  However will need to be held prior to any other surgical intervention planned.  -Discussed with primary care provider and surgery about adding aspirin and Plavix prior to any upcoming procedures  -Follow-up with neurology as scheduled for ALS  -surgery consultation with Dr. Moscoso  at the [facility]  -Please call or come if there are any new or worsening symptoms  -Follow-up with gastroenterology as recommended  -Follow-up with oncology as recommended  -Biopsy results are pending will need to follow-up with oncology     Activity    Your activity upon discharge: activity as tolerated     MD face to face encounter     Documentation of Face to Face and Certification for Home Health Services    I certify that patient: Elier Barber is under my care and that I, or a nurse practitioner or physician's assistant working with me, had a face-to-face encounter that meets the physician face-to-face encounter requirements with this patient on: 6/20/2021.    This encounter with the patient was in whole, or in part, for the following medical condition, which is the primary reason for home health care: New diagnosis of a mass, obstructive jaundice, PLS, deconditioning    I certify that, based on my findings, the following services are medically necessary home health services: Nursing.    My clinical findings support the need for the above services because: Nurse is needed: To provide assessment and oversight required in the home to assure adherence to the medical plan due to: Complex medical conditions and new diagnosis of possible malignancy involving ampulla     Further, I certify that my clinical findings support that this patient is homebound (i.e. absences from home require considerable and taxing effort and are for medical reasons or [employer] services or infrequently or of short duration when for other reasons) because: Requires assistance of another person or specialized equipment to access medical services because patient: Is unable to exit home safely on own due to: ALS...    Based on the above findings. I certify that this patient is confined to the home and needs intermittent skilled nursing care, physical therapy and/or speech therapy.  The patient is under my care, and I have initiated the establishment of the plan of care.  This patient will be followed by a physician who will periodically review the plan of care.  Physician/Provider to provide follow up care: Lida Ray    Attending hospital physician (the Medicare certified [place] provider): Melani Pham MD  Physician Signature: See electronic signature associated  with these discharge orders.  Date: 6/20/2021     Full Code     Diet    Follow this diet upon discharge: Orders Placed This Encounter      Advance Diet as Tolerated: Regular Diet Adult     Discharge Medications   Current Discharge Medication List      START taking these medications    Details   ciprofloxacin (CIPRO) 500 MG tablet Take 1 tablet (500 mg) by mouth 2 times daily for 4 days  Qty: 14 tablet, Refills: 0    Associated Diagnoses: Elevated liver enzymes         CONTINUE these medications which have NOT CHANGED    Details   acetaminophen (TYLENOL) 650 MG CR tablet Take 650 mg by mouth 3 times daily       aspirin 81 MG tablet Take 81 mg by mouth every evening       clopidogrel (PLAVIX) 75 MG tablet Take 75 mg by mouth every evening       enalapril (VASOTEC) 10 MG tablet Take 1 tablet (10 mg) by mouth 2 times daily  Qty: 180 tablet, Refills: 1    Associated Diagnoses: Essential hypertension, benign      EPINEPHrine 0.3 MG/0.3ML injection Inject 0.3 mLs (0.3 mg) into the muscle as needed for anaphylaxis  Qty: 0.3 mL, Refills: 3    Associated Diagnoses: Bee sting reaction, accidental or unintentional, initial encounter      fluorouracil (EFUDEX) 5 % external cream Apply topically to legs 1 to 2 times weekly as needed/tolerated for maintenance      ketoconazole (NIZORAL) 2 % external shampoo SHAMPOO EVERY 2-3 DAYS AS  NEEDED  Qty: 120 mL, Refills: 10    Associated Diagnoses: Dermatitis      oxybutynin ER (DITROPAN-XL) 10 MG 24 hr tablet Take 2 tablets (20 mg) by mouth At Bedtime  Qty: 180 tablet, Refills: 3    Comments: ### DO NOT FILL NOW.  Please update patient's profile to reflect additional refills.  ####  Associated Diagnoses: Urinary frequency      pantoprazole (PROTONIX) 40 MG EC tablet Take 1 tablet (40 mg) by mouth daily  Qty: 90 tablet, Refills: 3    Comments: ### DO NOT FILL NOW.  Please update patient's profile to reflect additional refills.  ####  Associated Diagnoses: Gastroesophageal reflux disease  "without esophagitis      polyethylene glycol (MIRALAX/GLYCOLAX) Packet Take 1 packet by mouth daily as needed Every 2-3 days.      potassium chloride ER (K-TAB/KLOR-CON) 10 MEQ CR tablet Take 2 tablets (20 mEq) by mouth daily  Qty: 180 tablet, Refills: 3    Comments: ### DO NOT FILL NOW.  Please update patient's profile to reflect additional refills.  ####  Associated Diagnoses: Essential hypertension, benign      baclofen (LIORESAL) intraTHECAL Internal Pump by Intrathecal route continuous prn Pump filled by [facility] stroke [place] [phone]  Pump Model: Syncromed  Medication in pump is Gablofen (brand name)  Last fill:  03/02/2021  Next fill:   Before 08/02/2021  [place] Alarm Date:   Reservoir Volume: 20 ml  Conc: 2000 mcg/mL  Delivers 234.7 mcg/day  Basal rate:unknown  Battery life: unknown           Allergies   Allergies   Allergen Reactions     No Clinical Screening - See Comments Hives     Starts swelling and hives     Bee Venom      Penicillins Hives     \"HIVES\"     Data   Most Recent 3 CBC's:  Recent Labs   Lab Test 06/19/21  0803 06/18/21  0830 06/17/21  1240 06/17/21  0654   WBC 10.5 9.2  --  6.6   HGB 14.2 12.9*  --  12.9*   MCV 94 91  --  91    276 306 260      Most Recent 3 BMP's:  Recent Labs   Lab Test 06/20/21  0721 06/19/21  0803 06/18/21  0830    128* 131*   POTASSIUM 4.0 4.2 3.9   CHLORIDE 102 100 98   CO2 29 20 25   BUN 13 21 17   CR 0.65* 0.62* 0.60*   ANIONGAP 2* 8 8   AMOS 8.5 9.0 8.9   GLC 98 88 120*     Most Recent 2 LFT's:  Recent Labs   Lab Test 06/20/21  0721 06/19/21  0803 06/18/21  0830 06/17/21  0654   AST 94* 122* 113* 125*   * 144* 130* 128*   ALKPHOS  --   --  337* 346*   BILITOTAL 4.1* 6.3* 7.2* 7.2*     Most Recent INR's and Anticoagulation Dosing History:  Anticoagulation Dose History     Recent Dosing and Labs Latest Ref Rng & Units 6/8/2008 2/21/2013 5/24/2018 10/27/2018 6/10/2021 6/16/2021 6/17/2021    INR 0.86 - " 1.14 1.01 0.95 1.23(H) 1.07 0.98 0.96 0.96        Most Recent 3 Troponin's:  Recent Labs   Lab Test 11/16/20  2125 10/27/18  1734 05/24/18  1622   TROPI <0.015 <0.015 <0.015     Most Recent Cholesterol Panel:  Recent Labs   Lab Test 11/17/20  0716   CHOL 152   LDL 91   HDL 53   TRIG 41     Most Recent 6 Bacteria Isolates From Any Culture (See EPIC Reports for Culture Details):  Recent Labs   Lab Test 05/24/18  2352 05/24/18  2347   CULT No growth No growth     Most Recent TSH, T4 and A1c Labs:  Recent Labs   Lab Test 06/19/21  0803 11/17/20  0716   TSH 1.68  --    A1C  --  5.8*     Results for orders placed or performed during the hospital encounter of 06/16/21   US Lower Extremity Venous Duplex Bilateral    Narrative    EXAM: US LOWER EXTREMITY VENOUS DUPLEX BILATERAL  LOCATION: [facility]  DATE/TIME: 6/16/2021 8:18 PM    INDICATION: Bilateral leg swelling  COMPARISON: None.  TECHNIQUE: Venous Duplex ultrasound of bilateral lower extremities with and without compression, augmentation and duplex. Color flow and spectral Doppler with waveform analysis performed.    FINDINGS: Exam includes the common femoral, femoral, popliteal veins as well as segmentally visualized deep calf veins and greater saphenous vein.     RIGHT: No deep vein thrombosis. No superficial thrombophlebitis. No popliteal cyst.    LEFT: No deep vein thrombosis. No superficial thrombophlebitis. No popliteal cyst.      Impression    IMPRESSION:  1.  No deep venous thrombosis in the bilateral lower extremities.   XR ERCP    Narrative    This exam was marked as non-reportable because it will not be read by a   radiologist or a [facility] non-radiologist provider.         CT Chest Abdomen w Contrast    Narrative    EXAM: CT CHEST ABDOMEN W CONTRAST  LOCATION: [facility]  DATE/TIME: 6/17/2021 6:33 PM    INDICATION: ampulla mass, prostate cancer eval for metastatic disease    COMPARISON: None.    TECHNIQUE: CT scan of the chest,  abdomen, and pelvis was performed following injection of IV contrast. Multiplanar reformats were obtained. Dose reduction techniques were used. CONTRAST: 75mL Isovue-370    FINDINGS:   LUNGS AND PLEURA: Mild subsegmental atelectasis dependently in the lower lobes. No pulmonary mass, consolidation, or suspicious pulmonary nodule. No pleural effusion or pneumothorax.    MEDIASTINUM/AXILLAE: Mild aortic valve calcification correlate for possible stenosis. Great vessels normal in caliber. No abnormally enlarged lymph nodes. Old inferoseptal left ventricular infarction suggested at midcavity and apex. Right atrium is   mildly enlarged.    CORONARY ARTERY CALCIFICATION: Severe.    HEPATOBILIARY: Mild pneumobilia. No suspicious liver lesion. Distal common bile duct stent spans the ampulla. Proximal common bile duct is mildly dilated measuring 9 mm. A small amount of contrast is seen within the distal pancreatic duct which is also   mildly dilated.    PANCREAS: Mildly dilated pancreatic duct, measuring up to 9 mm. No pancreatic mass. Mild diffuse pancreatic atrophy. No evidence for pancreatitis    SPLEEN: Normal.    ADRENAL GLANDS: Normal.    KIDNEYS/BLADDER: No renal mass or hydronephrosis. Simple cyst in the right kidney, 1.5 cm. No follow up needed.    BOWEL: Large amount stool in the imaged colon. No evidence for obstruction. No free air or free fluid.    LYMPH NODES: Normal.    VASCULATURE: No aneurysm    MUSCULOSKELETAL: Very severe degenerative change in the glenohumeral joints bilaterally with large bilateral shoulder effusions. No suspicious bone lesion.      Impression    IMPRESSION:  1.  No evidence for metastatic disease in the chest or abdomen.  2.  Distal common bile duct stent in good position.     *Note: Due to a large number of results and/or encounters for the requested time period, some results have not been displayed. A complete set of results can be found in Results Review.           Disclaimer: This  note consists of symbols derived from keyboarding, dictation and/or voice recognition software. As a result, there may be errors in the script that have gone undetected. Please consider this when interpreting information found in this chart.

## 2021-06-20 NOTE — PLAN OF CARE
End of Shift Summary  For vital signs and complete assessments, please see documentation flowsheets.     Pertinent assessments: Pt up in chair and walking to bathroom with assist of one with walker and gait belt. Denies pain. BM this shift. VSS.  Treatment Plan: Encourage ambulation and getting up to chair as tolerated. Monitor sodium levels. NS IVF.

## 2021-06-20 NOTE — PROGRESS NOTES
Chart check. Note plan for discharge today with follow up at the [facility] with Dr. Moscoso.

## 2021-06-20 NOTE — PLAN OF CARE
To Do:  End of Shift Summary  For vital signs and complete assessments, please see documentation flowsheets.     Pertinent assessments: VSS, denies pain and SOB. Up to bathroom w/ Ax1.  External cath overnight. BM 1x loose this shift.   Major Shift Events: uneventful  Treatment Plan: IVF, monitor electrolytes and encourage ambulation.  Bedside Nurse: Yasmeen Levy RN

## 2021-06-20 NOTE — DISCHARGE INSTRUCTIONS
You have a new order for [facility] for RN services. They will be contacting you within 24-48 hours to set up initial visit. If you have not heard from them after this time, please contact them at ([phone]).

## 2021-06-20 NOTE — PROGRESS NOTES
Pt discharged to home with wife and [facility] care RN services.   New medication of oral cipro sent to [facility] in [place], [place]  Reviewed other discharge and included the name and contact for Dr Moscoso surgery and Oncology.   Reviewed discharge and there was no additoinal questions    Wife did call back after discharge to clarify the antibiotic order, paper state 4 days but filled 7 day supply. MD confirmed only an additional 4 days needed.

## 2021-06-21 ENCOUNTER — TELEPHONE (OUTPATIENT)
Dept: INTERNAL MEDICINE | Facility: CLINIC | Age: 76
End: 2021-06-21

## 2021-06-21 LAB — COPATH REPORT: NORMAL

## 2021-06-21 NOTE — TELEPHONE ENCOUNTER
IP F/U    Date: 6/20/21  Diagnosis: Elevated Liver Enzymes, Primary Lateral Sclerosis (H)  Is patient active in care coordination? No  Was patient in TCU? No    Next 5 appointments (look out 90 days)    Jun 28, 2021  1:00 PM  Office Visit with Lida Ray MD  [facility] ([facility] ) [address] [place]  [facility] [id number] [phone]

## 2021-06-21 NOTE — PLAN OF CARE
Physical Therapy Discharge Summary    Reason for therapy discharge:    Discharged to home.    Progress towards therapy goal(s). See goals on Care Plan in [facility] electronic health record for goal details.  Goals partially met.  Barriers to achieving goals:   discharge from facility.    Therapy recommendation(s):    No further therapy is recommended.    Note: Pt not seen by documenting PT on this date. Information obtained from chart review.

## 2021-06-22 ENCOUNTER — HOSPITAL ENCOUNTER (OUTPATIENT)
Facility: CLINIC | Age: 76
End: 2021-06-22
Attending: INTERNAL MEDICINE | Admitting: INTERNAL MEDICINE
Payer: COMMERCIAL

## 2021-06-22 ENCOUNTER — PRE VISIT (OUTPATIENT)
Dept: OTHER | Age: 76
End: 2021-06-22

## 2021-06-22 DIAGNOSIS — Z11.59 ENCOUNTER FOR SCREENING FOR OTHER VIRAL DISEASES: ICD-10-CM

## 2021-06-23 ENCOUNTER — TELEPHONE (OUTPATIENT)
Dept: GASTROENTEROLOGY | Facility: CLINIC | Age: 76
End: 2021-06-23

## 2021-06-23 DIAGNOSIS — I10 ESSENTIAL HYPERTENSION, BENIGN: ICD-10-CM

## 2021-06-23 NOTE — TELEPHONE ENCOUNTER
Pt referred to med/surg onc, needs ERCP in 4 weeks. Currently scheduled at [facility] with Sima Galvez MD. Called patient to discuss where he wants to follow up for endoscopic care.     Called to talk to patient. Asked that I call back when his wife is home. Pt has visit scheduled with Dr Moscoso on 6/28, will follow.    ML

## 2021-06-25 ENCOUNTER — MEDICAL CORRESPONDENCE (OUTPATIENT)
Dept: HEALTH INFORMATION MANAGEMENT | Facility: CLINIC | Age: 76
End: 2021-06-25

## 2021-06-25 ENCOUNTER — TELEPHONE (OUTPATIENT)
Dept: INTERNAL MEDICINE | Facility: CLINIC | Age: 76
End: 2021-06-25

## 2021-06-25 RX ORDER — ENALAPRIL MALEATE 10 MG/1
TABLET ORAL
Qty: 180 TABLET | Refills: 1 | Status: ON HOLD | OUTPATIENT
Start: 2021-06-25 | End: 2021-07-21

## 2021-06-25 NOTE — TELEPHONE ENCOUNTER
Routing refill request to provider for review/approval because:  Labs out of range:    BP Readings from Last 3 Encounters:   06/20/21 (!) 162/88   06/10/21 (!) 161/84   11/19/20 (!) 161/95     Creatinine   Date Value Ref Range Status   06/20/2021 0.65 (L) 0.66 - 1.25 mg/dL Final     Galindo Jordan RN, BSN

## 2021-06-28 ENCOUNTER — OFFICE VISIT (OUTPATIENT)
Dept: SURGERY | Facility: CLINIC | Age: 76
End: 2021-06-28
Attending: SURGERY
Payer: COMMERCIAL

## 2021-06-28 VITALS
BODY MASS INDEX: 21.94 KG/M2 | HEART RATE: 63 BPM | SYSTOLIC BLOOD PRESSURE: 162 MMHG | TEMPERATURE: 97.9 F | OXYGEN SATURATION: 99 % | HEIGHT: 69 IN | DIASTOLIC BLOOD PRESSURE: 74 MMHG | WEIGHT: 148.1 LBS

## 2021-06-28 DIAGNOSIS — C24.1 CANCER OF AMPULLA OF VATER (H): ICD-10-CM

## 2021-06-28 PROCEDURE — 99205 OFFICE O/P NEW HI 60 MIN: CPT | Performed by: SURGERY

## 2021-06-28 PROCEDURE — G0463 HOSPITAL OUTPT CLINIC VISIT: HCPCS

## 2021-06-28 ASSESSMENT — PAIN SCALES - GENERAL: PAINLEVEL: NO PAIN (0)

## 2021-06-28 ASSESSMENT — MIFFLIN-ST. JEOR: SCORE: 1392.16

## 2021-06-28 NOTE — NURSING NOTE
"Oncology Rooming Note    June 28, 2021 1:35 PM   Elier Barber is a 75 year old male who presents for:    Chief Complaint   Patient presents with     New Patient     Cancer of ampulla of marisol      Initial Vitals: BP (!) 162/74   Pulse 63   Temp 97.9  F (36.6  C)   Ht 1.753 m (5' 9\")   Wt 67.2 kg (148 lb 1.6 oz)   SpO2 99%   BMI 21.87 kg/m   Estimated body mass index is 21.87 kg/m  as calculated from the following:    Height as of this encounter: 1.753 m (5' 9\").    Weight as of this encounter: 67.2 kg (148 lb 1.6 oz). Body surface area is 1.81 meters squared.  No Pain (0) Comment: Data Unavailable   No LMP for male patient.  Allergies reviewed: Yes  Medications reviewed: Yes    Medications: Medication refills not needed today.  Pharmacy name entered into EPIC:    [id number] - [place], [place] - [address]  [facility] [place], [place] - [address] AT PORTAL TO REGISTERED [facility] SITES    Clinical concerns: New patient        Armando Cook MA            "

## 2021-06-28 NOTE — LETTER
6/28/2021         RE: Elier Barber  [address] [facility] [address]        Dear Colleague,    Thank you for referring your patient, Elier Barber, to the [facility]. Please see a copy of my visit note below.    REASON FOR EVALUATION:  Newly diagnosed ampullary adenocarcinoma.    HISTORY OF PRESENT ILLNESS:  Mr. Barber is a 75-year-old gentleman with PLS who presents with his wife in a wheelchair today.  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  The patient was seen by Medical Oncology and was referred to me for consideration of surgical therapy.  Mr. Barber is overall reasonably fit, however, is quite limited by his PLS, which leads him to require a wheelchair.  He can ambulate about 10-15 feet without the wheelchair but only with assistance.  He lives at home and is cared for by his wife, who is with him today.    I had a long discussion with Mr. Barber and his wife today regarding the surgical treatment of ampullary adenocarcinomas.  I discussed that surgery typically requires a Whipple procedure which is quite complicated and I think would be very difficult for him to recover from given his very limited physical capacity.  We discussed the possibility of an ampullectomy.  However, I essentially indicated that open ampullectomy has, for the most part, been replaced by endoscopic ampullectomy, and I did show his imaging at our Multidisciplinary Pancreas Conference today and will revise the possibility of an attempted endoscopic resection with my GI colleagues within the next 24 hours.  At the end of our discussion today, it was clear that Mr. Barber and his wife would like to decline surgical intervention, which I think is very reasonable.  They would like to discuss other options, either essentially palliative observation versus  intervention.  I touched on a few possibilities which may include simple observation, palliative radiation plus or minus palliative chemotherapy.  They will be seeing Dr. Jovi Bird at [facility] later this week to discuss those options with him.  Prior to that visit, I will plan to speak with Dr. Kent from our GI group, who is an expert at endoscopic ampullectomy, to see if there is any potential to make an attempt at that as a minimally invasive way to treat this.    I think Mr. Barber was very satisfied with the conversation today and really offered no additional questions.  I did spend over 45 minutes in discussion with him and his wife today.  An additional 15 minutes was spent in communication with Dr. Bird as well as review of the patient's history and recent imaging.            Again, thank you for allowing me to participate in the care of your patient.        Sincerely,        Saqib Moscoso MD

## 2021-06-28 NOTE — PROGRESS NOTES
REASON FOR EVALUATION:  Newly diagnosed ampullary adenocarcinoma.    HISTORY OF PRESENT ILLNESS:  Mr. Barber is a 75-year-old gentleman with PLS who presents with his wife in a wheelchair today.  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  The patient was seen by Medical Oncology and was referred to me for consideration of surgical therapy.  Mr. Barber is overall reasonably fit, however, is quite limited by his PLS, which leads him to require a wheelchair.  He can ambulate about 10-15 feet without the wheelchair but only with assistance.  He lives at home and is cared for by his wife, who is with him today.    I had a long discussion with Mr. Barber and his wife today regarding the surgical treatment of ampullary adenocarcinomas.  I discussed that surgery typically requires a Whipple procedure which is quite complicated and I think would be very difficult for him to recover from given his very limited physical capacity.  We discussed the possibility of an ampullectomy.  However, I essentially indicated that open ampullectomy has, for the most part, been replaced by endoscopic ampullectomy, and I did show his imaging at our Multidisciplinary Pancreas Conference today and will revise the possibility of an attempted endoscopic resection with my GI colleagues within the next 24 hours.  At the end of our discussion today, it was clear that Mr. Barber and his wife would like to decline surgical intervention, which I think is very reasonable.  They would like to discuss other options, either essentially palliative observation versus intervention.  I touched on a few possibilities which may include simple observation, palliative radiation plus or minus palliative chemotherapy.  They will be seeing Dr. Jovi Bird at [facility] later this week to discuss those options with him.  Prior to  that visit, I will plan to speak with Dr. Kent from our GI group, who is an expert at endoscopic ampullectomy, to see if there is any potential to make an attempt at that as a minimally invasive way to treat this.    I think Mr. Barber was very satisfied with the conversation today and really offered no additional questions.  I did spend over 45 minutes in discussion with him and his wife today.  An additional 15 minutes was spent in communication with Dr. Bird as well as review of the patient's history and recent imaging.

## 2021-06-29 ENCOUNTER — PREP FOR PROCEDURE (OUTPATIENT)
Dept: GASTROENTEROLOGY | Facility: CLINIC | Age: 76
End: 2021-06-29

## 2021-06-29 ENCOUNTER — PATIENT OUTREACH (OUTPATIENT)
Dept: GASTROENTEROLOGY | Facility: CLINIC | Age: 76
End: 2021-06-29

## 2021-06-29 DIAGNOSIS — C24.1 CANCER OF AMPULLA OF VATER (H): Primary | ICD-10-CM

## 2021-06-29 DIAGNOSIS — Z11.59 ENCOUNTER FOR SCREENING FOR OTHER VIRAL DISEASES: ICD-10-CM

## 2021-06-29 NOTE — PROGRESS NOTES
Called to discuss with patient.   Pt of Dr Moscoso  Procedure/Imaging/Clinic: EUS, ERCP possible papillectomy   Physician: Donte   Timing: ASAP   Procedure length: 90 min   Anesthesia: Gen   Dx: ampullary lesion   Tier: 2   Location: [facility]    Explained they can expect a call from  for date and time of procedure, will need a , someone to stay with them for 24 hours and should stay in town for 24 hours (within 45 min of Hospital) post procedure    Patient needs to get pre-op physical completed. If outside  health system will need physical faxed to number [phone]   If you do not get a preop physical, your procedure could be cancelled, patient voiced understanding*    Preop Plan: Dr Lida Ray, [facility], appt was July 8th    Med Review    Blood thinner -  Plavix, protocol to hold for 5 days  ASA - yes  Diabetic - none    COVID test discussed: understands needs to be within 4 days    Patient Education r/t procedure: mychart    Does patient have any history of gastric bypass/gastric surgery/altered panc/bili anatomy? none    A pre-op nurse will call 1-2 days prior to the procedure. Is advised to be NPO/no solid food 8 hours before the procedure. Ok to drink clear liquids (Water, Apple Juice or Gatorade) up to 2 hours prior to procedure.      Verbalized understanding of all instructions. All questions answered.      Case

## 2021-06-29 NOTE — PROGRESS NOTES
"Called patient to discuss Dr Kent's recommendations for the plavix as follows    \"He needs to be off of it for 5 days prior to the procedure. He should continue his aspirin though. Dr. Galvez couldn't do a biliary sphincterotomy because of it but I'll need to if I place a metal stent or if we end up doing a papillectomy.\"     Elier asked that I call back when his wife is available later today to discuss    1150 6/30/21  Called patient home number, after VM left yesterday to discuss plavix  Left     1345  Wife Kelli called back, discussed need to hold Plavix starting 7/7, for 5 days prior to procedure. Also asked she verify with the PCP that no bridging is needed.  Will have pre op and covid done on 7/8  All questions answered.    Nathalie Levine, RN, BSN,   [facility]  Care coordinator   [phone]  Fax [phone]    "

## 2021-06-30 ENCOUNTER — TELEPHONE (OUTPATIENT)
Dept: NEUROLOGY | Facility: CLINIC | Age: 76
End: 2021-06-30

## 2021-06-30 ENCOUNTER — TRANSFERRED RECORDS (OUTPATIENT)
Dept: HEALTH INFORMATION MANAGEMENT | Facility: CLINIC | Age: 76
End: 2021-06-30

## 2021-06-30 NOTE — TELEPHONE ENCOUNTER
[facility] Call Center    Phone Message    May a detailed message be left on voicemail: yes     Reason for Call: Order(s): Other: 5 Day Hold for Plavix  Reason for requested: ERCP with [facility] on 7/22/21  Date needed: 7/15/21  Provider name: Dr. Tejada    Please call with verbal orders and advise of any bridging that may be needed.      Action Taken: Message routed to:  [facility] ([facility] Neurology    Travel Screening: Not Applicable

## 2021-07-01 ENCOUNTER — MYC MEDICAL ADVICE (OUTPATIENT)
Dept: INTERNAL MEDICINE | Facility: CLINIC | Age: 76
End: 2021-07-01

## 2021-07-01 ENCOUNTER — PATIENT OUTREACH (OUTPATIENT)
Dept: GASTROENTEROLOGY | Facility: CLINIC | Age: 76
End: 2021-07-01

## 2021-07-01 NOTE — PROGRESS NOTES
Kelli called , left VM, stating that after discussion/visit with oncology yesterday the provider asked why an ampullectomy was not ordered as part of the upcoming procedure with Donte   It is ordered as an EUS and ERCP with possible papillectomy but perhaps did not show as such since papillectomy is not an option in the case request order.  Called and left VM to reassure patient and wife that Dr Kent has been in communication with Dr Moscoso about what the procedure would involve and that details would be further discussed on day of procedure.  Left call back number if additional questions    Nathalie Levine, RN, BSN,   [facility]  Care coordinator   [phone]  Fax [phone]

## 2021-07-06 ENCOUNTER — PATIENT OUTREACH (OUTPATIENT)
Dept: GASTROENTEROLOGY | Facility: CLINIC | Age: 76
End: 2021-07-06

## 2021-07-06 NOTE — PROGRESS NOTES
Kelli called with questions about visitor restrictions and  parking  Also wanted to update that she still has not gotten a response from Elier's neurologist who manages his plavix, whether bridging is necessary once the plavix is held. Did ask that Kelli try to get in touch with someone today since tomorrow Elier will begin holding the medication.   Pre op and covid test on Thurs 8th   Nathalie Levine RN, BSN,   [facility]  Care coordinator   [phone]  Fax [phone]

## 2021-07-07 ENCOUNTER — TELEPHONE (OUTPATIENT)
Dept: INTERNAL MEDICINE | Facility: CLINIC | Age: 76
End: 2021-07-07

## 2021-07-07 ENCOUNTER — MEDICAL CORRESPONDENCE (OUTPATIENT)
Dept: HEALTH INFORMATION MANAGEMENT | Facility: CLINIC | Age: 76
End: 2021-07-07

## 2021-07-07 NOTE — TELEPHONE ENCOUNTER
Cecile, occupational therapy with [facility] calling.  States she is working with the [employer] association focusing on getting patient new equipment (bath equipment, wheel chair, etc) and will teach patient and wife to use the new equipment.    Asking for occupational therapy 1x/wk x 2 wks.    Please advise, thanks.

## 2021-07-08 ENCOUNTER — OFFICE VISIT (OUTPATIENT)
Dept: INTERNAL MEDICINE | Facility: CLINIC | Age: 76
End: 2021-07-08
Payer: COMMERCIAL

## 2021-07-08 ENCOUNTER — MEDICAL CORRESPONDENCE (OUTPATIENT)
Dept: HEALTH INFORMATION MANAGEMENT | Facility: CLINIC | Age: 76
End: 2021-07-08

## 2021-07-08 VITALS
HEIGHT: 70 IN | RESPIRATION RATE: 18 BRPM | HEART RATE: 78 BPM | TEMPERATURE: 98.7 F | WEIGHT: 142.6 LBS | DIASTOLIC BLOOD PRESSURE: 79 MMHG | OXYGEN SATURATION: 99 % | BODY MASS INDEX: 20.41 KG/M2 | SYSTOLIC BLOOD PRESSURE: 132 MMHG

## 2021-07-08 DIAGNOSIS — Z01.818 PRE-OP EXAM: Primary | ICD-10-CM

## 2021-07-08 DIAGNOSIS — C24.1 CANCER OF AMPULLA OF VATER (H): ICD-10-CM

## 2021-07-08 LAB
LABORATORY COMMENT REPORT: NORMAL
SARS-COV-2 RNA RESP QL NAA+PROBE: NEGATIVE
SARS-COV-2 RNA RESP QL NAA+PROBE: NORMAL
SPECIMEN SOURCE: NORMAL
SPECIMEN SOURCE: NORMAL

## 2021-07-08 PROCEDURE — 99214 OFFICE O/P EST MOD 30 MIN: CPT | Performed by: INTERNAL MEDICINE

## 2021-07-08 PROCEDURE — 93000 ELECTROCARDIOGRAM COMPLETE: CPT | Performed by: INTERNAL MEDICINE

## 2021-07-08 PROCEDURE — U0003 INFECTIOUS AGENT DETECTION BY NUCLEIC ACID (DNA OR RNA); SEVERE ACUTE RESPIRATORY SYNDROME CORONAVIRUS 2 (SARS-COV-2) (CORONAVIRUS DISEASE [COVID-19]), AMPLIFIED PROBE TECHNIQUE, MAKING USE OF HIGH THROUGHPUT TECHNOLOGIES AS DESCRIBED BY CMS-2020-01-R: HCPCS | Performed by: INTERNAL MEDICINE

## 2021-07-08 PROCEDURE — U0005 INFEC AGEN DETEC AMPLI PROBE: HCPCS | Performed by: INTERNAL MEDICINE

## 2021-07-08 ASSESSMENT — MIFFLIN-ST. JEOR: SCORE: 1383.08

## 2021-07-08 NOTE — OR NURSING
The following email was sent to the 3 C management team on 7/7/ at 1713:     Hi 3 C team,    Elier gilliland ([id number]) is a 75-year-old, arriving at 0900 on 7/12 for EUS, ESOPHAGOSCOPY / UPPER GI/ERCP, possible papillectomy for an Ampullary Mass.  He has Primary Lateral Sclerosis and has had a stroke. He will need a Mark lift. He is dependent on his wife and daughter, who are his caregivers. He has slurred speech and it is hard to understand him.    His wife and daughter were planning to be with him on DOS. Pt's wife said Nathalie Levine, Dr Kent's RNCC, said this would be ok. I told her that only one adult visitor is allowed. She is requesting an exemption for pt's daughter, who is also his caregiver to come with and help. Also, one of them will drive and the other will care for pt in the car.    Please advise.    Thanks so much.    Hansa Oviedo, RN, BSN  Preanesthesia Screening  [phone]

## 2021-07-08 NOTE — PROGRESS NOTES
[facility]  [address]  [facility] [address]  Phone: [phone]  Primary Provider: Seun Ray  Pre-op Performing Provider: SEUN RAY      PREOPERATIVE EVALUATION:  Today's date: 7/8/2021    Elier Barber is a 75 year old male who presents for a preoperative evaluation.    Surgical Information:  Surgery/Procedure: EUS, possible papillectomy  Surgery Location: [facility]  Surgeon: Dr Agus Kent  Surgery Date: 07/12/2021  Time of Surgery: 9:10 AM  Where patient plans to recover: At home with family  Fax number for surgical facility: Note does not need to be faxed, will be available electronically in Epic.    Type of Anesthesia Anticipated: to be determined    Assessment & Plan     The proposed surgical procedure is considered LOW risk.    Pre-op exam    - EKG 12-lead complete w/read - Clinics  - Asymptomatic COVID-19 Virus (Coronavirus) by PCR  - SARS-CoV-2 COVID-19 Virus (Coronavirus) by PCR    Cancer of ampulla of Vater (H)             Risks and Recommendations:  The patient has the following additional risks and recommendations for perioperative complications:   - No identified additional risk factors other than previously addressed    Medication Instructions:  He will hold aspirin and Plavix a week ahead of time.  He will take enalapril the morning of surgery but take other morning medications later.  He has a baclofen pump in place.      RECOMMENDATION:  APPROVAL GIVEN to proceed with proposed procedure, without further diagnostic evaluation.    [id number]}    Subjective     HPI related to upcoming procedure: He had developed acute jaundice, evaluation showed abnormalities around the pancreas.  Further studies showed that he had cancer of the ampulla.  He will undergo endoscopic replacement of stent and possible papillectomy    Preop Questions 7/5/2021   1. Have you ever had a heart attack or stroke? YES - CVA   2. Have  you ever had surgery on your heart or blood vessels, such as a stent placement, a coronary artery bypass, or surgery on an artery in your head, neck, heart, or legs? No   3. Do you have chest pain with activity? No   4. Do you have a history of  heart failure? No   5. Do you currently have a cold, bronchitis or symptoms of other infection? No   6. Do you have a cough, shortness of breath, or wheezing? No   7. Do you or anyone in your family have previous history of blood clots? no   8. Do you or does anyone in your family have a serious bleeding problem such as prolonged bleeding following surgeries or cuts? no   9. Have you ever had problems with anemia or been told to take iron pills? No   10. Have you had any abnormal blood loss such as black, tarry or bloody stools? No   11. Have you ever had a blood transfusion? No   12. Are you willing to have a blood transfusion if it is medically needed before, during, or after your surgery? Yes   13. Have you or any of your relatives ever had problems with anesthesia? No   14. Do you have sleep apnea, excessive snoring or daytime drowsiness? No   15. Do you have any artifical heart valves or other implanted medical devices like a pacemaker, defibrillator, or continuous glucose monitor? No   16. Do you have artificial joints? No   17. Are you allergic to latex? No       Health Care Directive:  Patient has a Health Care Directive on file      Preoperative Review of :   reviewed - no record of controlled substances prescribed.      Status of Chronic Conditions:  See problem list for active medical problems.  Problems all longstanding and stable, except as noted/documented.  See ROS for pertinent symptoms related to these conditions.  He has primary lateral sclerosis, well-controlled hypertension    Review of Systems  GENERAL: negative for, fever, chills, weight gain  EYES: negative  ENT: negative  RESPIRATORY: No dyspnea on exertion and No cough  CARDIOVASCULAR: negative  for, palpitations, tachycardia, irregular heart beat and chest pain  GI: negative for, nausea, abdominal pain, melena and hematochezia  : negative  MUSCULOSKELETAL: joint pain   NEUROLOGIC: positive for speech issues and chronic generalized weakness due to primary lateral sclerosis, negative for and headaches  SKIN: bruising  ENDOCRINE: negative         Patient Active Problem List    Diagnosis Date Noted     Cancer of ampulla of Vater (H) 06/29/2021     Priority: Medium     Added automatically from request for surgery [id number]       Elevated liver enzymes 06/16/2021     Priority: Medium     Pancreatic mass 06/16/2021     Priority: Medium     Primary osteoarthritis of right shoulder 06/16/2021     Priority: Medium     Cerebral atherosclerosis 05/31/2018     Priority: Medium     Edema, unspecified type 05/10/2018     Priority: Medium     Gastroesophageal reflux disease without esophagitis 05/10/2018     Priority: Medium     Muscle spasticity 05/04/2018     Priority: Medium     Advanced directives, counseling/discussion 09/10/2014     Priority: Medium     Advance Care Planning:   Receipt of ACP document:  Received: Health Care Directive which was witnessed or notarized on 1-.  Document not previously scanned.  Validation form completed and sent with document to be scanned.  Code Status reflects choices in most recent ACP document.  Confirmed/documented designated decision maker(s). See permanent comments section of demographics in clinical tab. View document(s) and details by clicking on code status.   Added by Airam Palacios on 9/10/2014.             Cerebral infarction (H) 06/05/2014     Priority: Medium     1 cm acute ischemic infarct in the posterior limb of the right internal capsule.  Pt did present with left hemiplegia.      Diagnosis updated by automated process. Provider to review and confirm.       Vertigo 02/21/2013     Priority: Medium     Hyperlipidemia LDL goal <100 11/08/2010     Priority:  Medium     Prostate CA (HCC)      Priority: Medium     will have surgery 6/9/08       Essential hypertension, benign 03/07/2005     Priority: Medium     Primary lateral sclerosis (HCC) 03/07/2005     Priority: Medium      Past Medical History:   Diagnosis Date     Basal cell carcinoma nos     sees derm     CVA (cerebral infarction) 6/14    small vessel right int capsule stroke     DVT (deep vein thrombosis) in pregnancy 05/12/2017    right peroneal vein     Essential hypertension, benign      Hearing loss      Hoarseness of voice     assoc with PLS     Lumbar radiculopathy     2017     Primary Lateral Sclerosis 2002    has baclofen pump through Dr. Calvert, [facility], sees Dr. Fink, [facility]     Primary osteoarthritis of right shoulder 6/16/2021     Prostate CA (H) 2008    prostatectomy     Past Surgical History:   Procedure Laterality Date     ABDOMEN SURGERY  11/12    Hernia     BIOPSY  4/16    Cancerous growth on leg. Removed by MOHs treatment     ENDOSCOPIC RETROGRADE CHOLANGIOPANCREATOGRAM N/A 6/17/2021    Procedure: ENDOSCOPIC RETROGRADE CHOLANGIOPANCREATOGRAPHY, BILIARY STENT PLACEMENT;  Surgeon: Sima Galvez MD;  Location:  OR     ESOPHAGOSCOPY, GASTROSCOPY, DUODENOSCOPY (EGD), COMBINED N/A 6/17/2021    Procedure: ESOPHAGOGASTRODUODENOSCOPY, WITH ENDOSCOPIC US, fine needle aspiration;  Surgeon: Sima Galvez MD;  Location: [facility] OR     HERNIA REPAIR  11/12    Repaired     PROSTATE SURGERY       [facility] NONSPECIFIC PROCEDURE  6/08     prostatectomy      [facility] NONSPECIFIC PROCEDURE  11/01    colonoscopy     [facility] NONSPECIFIC PROCEDURE  11/2006    hernia, right side     [facility] NONSPECIFIC PROCEDURE      T + A     Current Outpatient Medications   Medication Sig Dispense Refill     acetaminophen (TYLENOL) 650 MG CR tablet Take 650 mg by mouth every 8 hours as needed        aspirin 81 MG tablet Take 81 mg by mouth every evening        baclofen (LIORESAL) intraTHECAL Internal Pump by Intrathecal route  "continuous prn Pump filled by [facility] [phone]  Pump Model: Syncromed  Medication in pump is Gablofen (brand name)  Last fill:  2021  Next fill:   Before 2021  Low [place] Alarm Date:   Reservoir Volume: 20 ml  Conc: 2000 mcg/mL  Delivers 234.7 mcg/day  Basal rate:unknown  Battery life: unknown       clopidogrel (PLAVIX) 75 MG tablet Take 75 mg by mouth every evening        enalapril (VASOTEC) 10 MG tablet TAKE 1 TABLET TWICE A  tablet 1     EPINEPHrine 0.3 MG/0.3ML injection Inject 0.3 mLs (0.3 mg) into the muscle as needed for anaphylaxis (Patient not taking: Reported on 2021) 0.3 mL 3     fluorouracil (EFUDEX) 5 % external cream Apply topically to legs 1 to 2 times weekly as needed/tolerated for maintenance       ketoconazole (NIZORAL) 2 % external shampoo SHAMPOO EVERY 2-3 DAYS AS  NEEDED 120 mL 10     oxybutynin ER (DITROPAN-XL) 10 MG 24 hr tablet Take 2 tablets (20 mg) by mouth At Bedtime 180 tablet 3     pantoprazole (PROTONIX) 40 MG EC tablet Take 1 tablet (40 mg) by mouth daily (Patient taking differently: Take 40 mg by mouth At Bedtime ) 90 tablet 3     polyethylene glycol (MIRALAX/GLYCOLAX) Packet Take 1 packet by mouth daily as needed Every 2-3 days.       potassium chloride ER (K-TAB/KLOR-CON) 10 MEQ CR tablet Take 2 tablets (20 mEq) by mouth daily 180 tablet 3       Allergies   Allergen Reactions     Bee Venom      Swelling and hives     Penicillins Hives     \"HIVES\"        Social History     Tobacco Use     Smoking status: Former Smoker     Packs/day: 0.30     Years: 6.00     Pack years: 1.80     Types: Cigarettes     Start date: 1970     Quit date: 10/5/1976     Years since quittin.7     Smokeless tobacco: Never Used   Substance Use Topics     Alcohol use: Yes     Frequency: 2-4 times a month     Drinks per session: 1 or 2     Comment: 1 drink/week       History   Drug Use No         Objective       Physical " "Exam    Patient is alert, oriented, cooperative in no acute distress.  /79 (BP Location: Left arm, Patient Position: Sitting, Cuff Size: Adult Regular)   Pulse 78   Temp 98.7  F (37.1  C) (Oral)   Resp 18   Ht 1.778 m (5' 10\")   Wt 64.7 kg (142 lb 9.6 oz)   SpO2 99%   BMI 20.46 kg/m      HEENT: PERRL, EOMI, TM's are normal.   NECK: No lymphadenopathy or thyromegaly. Carotid pulses full without bruits.  LUNGS: clear  CV: normal S1, S2 without murmur, S3 or S4 present. Pulses are 2/2 throughout. No JVD.  ABDOMEN: Bowel sounds present, nontender without hepatosplenomegaly. Liver is normal size to percussion.  EXTREMITIES: no edema present, unremarkable joints  NEUROLOGIC: Cranial nerves II-XII intact, significant dysarthria    Recent Labs   Lab Test 06/20/21  0721 06/19/21  0803 06/18/21  0830 06/17/21  1240 06/16/21  1859 06/16/21  1859 11/17/20  0716 11/17/20  0716   HGB  --  14.2 12.9*  --    < > 14.1   < >  --    PLT  --  328 276 306   < > 307   < >  --    INR  --   --   --  0.96  --  0.96   < >  --     128* 131*  --    < > 130*   < >  --    POTASSIUM 4.0 4.2 3.9  --    < > 4.0   < >  --    CR 0.65* 0.62* 0.60*  --    < > 0.65*   < >  --    A1C  --   --   --   --   --   --   --  5.8*    < > = values in this interval not displayed.        Diagnostics:  Covid test pending  EKG: appears normal, NSR, normal axis, normal intervals, no acute ST/T changes c/w ischemia, no LVH by voltage criteria, unchanged from previous tracings    Revised Cardiac Risk Index (RCRI):  The patient has the following serious cardiovascular risks for perioperative complications:   - No serious cardiac risks = 0 points     RCRI Interpretation: 0 points: Class I (very low risk - 0.4% complication rate)           Signed Electronically by: Lida Ray MD  Copy of this evaluation report is provided to requesting physician.      "

## 2021-07-09 ENCOUNTER — TELEPHONE (OUTPATIENT)
Dept: INTERNAL MEDICINE | Facility: CLINIC | Age: 76
End: 2021-07-09

## 2021-07-09 PROBLEM — R74.8 ELEVATED LIVER ENZYMES: Status: RESOLVED | Noted: 2021-06-16 | Resolved: 2021-07-09

## 2021-07-09 PROBLEM — K86.89 PANCREATIC MASS: Status: RESOLVED | Noted: 2021-06-16 | Resolved: 2021-07-09

## 2021-07-09 NOTE — OR NURSING
Telephone, returning call from pt's wife Kelli. Reviewed TLD instructions and care concerns - recap of phone call provided via [place] message to PreOp manager team:    --  I just received another call back from pt s wife, Kelli, who was upset to learn that her daughter would not be able to be with her and the pt DOS. I explained again about our limitations and trying to keep everyone safe with regard to COVID. I explained that it would be possible to have her daughter on speaker phone or FaceTime during PreOp visits with the doctors and that we can provide phone updates to her daughter as well as go over care instructions with both of them to make sure everyone is involved and has all the necessary information. Kelli was agreeable with this plan.    We also discussed the plan for arrival- pt needs a wheelchair escort on arrival to the [facility]; arrival time at [place] on 7/12/21 is 0710. Kelli also asked for reassurance that staff could assist at time of discharge with wheelchair transport for pt to car; I reassured her that this would be the case and that nursing staff would be with the patient throughout his entire stay.    Thank you,    Mamie RN  --    Mamie Amos RN  PreAdmission Screening  [facility]

## 2021-07-09 NOTE — TELEPHONE ENCOUNTER
Fax received from [facility] - Resumption of Care 06/25/21 for review and signature.  Put in Dr. Ray's in basket.

## 2021-07-09 NOTE — TELEPHONE ENCOUNTER
Fax received from [facility] - Physician Orders 07/06/21 for review and signature.  Put in Dr. Ray's in basket.

## 2021-07-09 NOTE — OR NURSING
Telephoned and left msg for pt's wife Kelli, advised of updated TLD information, also relayed information re: accommodations request via [place] email msg from PreOp manager:    --  Both can be present at the hospital but only one family member will be allowed in the pre-op area and surgical family waiting lounge.   Thank you,   Adriana DOYLEN, RN CAPA  Supervisor Perianesthesia  --    Left call back number for PAN office in case of any further questions.    Mamie Amos RN  PreAdmission Screening  [facility]

## 2021-07-12 ENCOUNTER — ANESTHESIA (OUTPATIENT)
Dept: SURGERY | Facility: CLINIC | Age: 76
DRG: 438 | End: 2021-07-12
Payer: COMMERCIAL

## 2021-07-12 ENCOUNTER — TELEPHONE (OUTPATIENT)
Dept: INTERNAL MEDICINE | Facility: CLINIC | Age: 76
End: 2021-07-12

## 2021-07-12 ENCOUNTER — HOSPITAL ENCOUNTER (OUTPATIENT)
Facility: CLINIC | Age: 76
Discharge: HOME | DRG: 438 | End: 2021-07-12
Attending: INTERNAL MEDICINE | Admitting: INTERNAL MEDICINE
Payer: COMMERCIAL

## 2021-07-12 ENCOUNTER — APPOINTMENT (OUTPATIENT)
Dept: GENERAL RADIOLOGY | Facility: CLINIC | Age: 76
DRG: 438 | End: 2021-07-12
Attending: INTERNAL MEDICINE
Payer: COMMERCIAL

## 2021-07-12 ENCOUNTER — ANESTHESIA EVENT (OUTPATIENT)
Dept: SURGERY | Facility: CLINIC | Age: 76
DRG: 438 | End: 2021-07-12
Payer: COMMERCIAL

## 2021-07-12 VITALS
SYSTOLIC BLOOD PRESSURE: 145 MMHG | HEIGHT: 70 IN | RESPIRATION RATE: 16 BRPM | TEMPERATURE: 96.9 F | DIASTOLIC BLOOD PRESSURE: 84 MMHG | HEART RATE: 53 BPM | OXYGEN SATURATION: 98 % | BODY MASS INDEX: 20.46 KG/M2

## 2021-07-12 DIAGNOSIS — K21.9 GASTROESOPHAGEAL REFLUX DISEASE WITHOUT ESOPHAGITIS: ICD-10-CM

## 2021-07-12 DIAGNOSIS — C24.1 CANCER OF AMPULLA OF VATER (H): ICD-10-CM

## 2021-07-12 LAB
ALBUMIN SERPL-MCNC: 3.2 G/DL (ref 3.4–5)
ALP SERPL-CCNC: 182 U/L (ref 40–150)
ALT SERPL W P-5'-P-CCNC: 68 U/L (ref 0–70)
AMYLASE SERPL-CCNC: 115 U/L (ref 30–110)
AMYLASE SERPL-CCNC: 120 U/L (ref 30–110)
ANION GAP SERPL CALCULATED.3IONS-SCNC: 5 MMOL/L (ref 3–14)
AST SERPL W P-5'-P-CCNC: 53 U/L (ref 0–45)
BILIRUB SERPL-MCNC: 1.1 MG/DL (ref 0.2–1.3)
BUN SERPL-MCNC: 17 MG/DL (ref 7–30)
CALCIUM SERPL-MCNC: 9.3 MG/DL (ref 8.5–10.1)
CHLORIDE BLD-SCNC: 99 MMOL/L (ref 94–109)
CO2 SERPL-SCNC: 28 MMOL/L (ref 20–32)
CREAT SERPL-MCNC: 0.58 MG/DL (ref 0.66–1.25)
ERCP: NORMAL
ERYTHROCYTE [DISTWIDTH] IN BLOOD BY AUTOMATED COUNT: 13.6 % (ref 10–15)
GFR SERPL CREATININE-BSD FRML MDRD: >90 ML/MIN/1.73M2
GLUCOSE BLD-MCNC: 96 MG/DL (ref 70–99)
HCT VFR BLD AUTO: 44.6 % (ref 40–53)
HGB BLD-MCNC: 15.2 G/DL (ref 13.3–17.7)
INR PPP: 1 (ref 0.85–1.15)
LIPASE SERPL-CCNC: 420 U/L (ref 73–393)
LIPASE SERPL-CCNC: 616 U/L (ref 73–393)
MCH RBC QN AUTO: 31.6 PG (ref 26.5–33)
MCHC RBC AUTO-ENTMCNC: 34.1 G/DL (ref 31.5–36.5)
MCV RBC AUTO: 93 FL (ref 78–100)
PLATELET # BLD AUTO: 232 10E3/UL (ref 150–450)
POTASSIUM BLD-SCNC: 4.3 MMOL/L (ref 3.4–5.3)
PROT SERPL-MCNC: 6.8 G/DL (ref 6.8–8.8)
RBC # BLD AUTO: 4.81 10E6/UL (ref 4.4–5.9)
SODIUM SERPL-SCNC: 132 MMOL/L (ref 133–144)
UPPER EUS: NORMAL
WBC # BLD AUTO: 6.5 10E3/UL (ref 4–11)

## 2021-07-12 PROCEDURE — 999N000179 XR SURGERY CARM FLUORO LESS THAN 5 MIN W STILLS: Mod: TC

## 2021-07-12 PROCEDURE — 272N000001 HC OR GENERAL SUPPLY STERILE: Performed by: INTERNAL MEDICINE

## 2021-07-12 PROCEDURE — 250N000025 HC SEVOFLURANE, PER MIN: Performed by: INTERNAL MEDICINE

## 2021-07-12 PROCEDURE — 0F5C8ZZ DESTRUCTION OF AMPULLA OF VATER, VIA NATURAL OR ARTIFICIAL OPENING ENDOSCOPIC: ICD-10-PCS | Performed by: INTERNAL MEDICINE

## 2021-07-12 PROCEDURE — 370N000017 HC ANESTHESIA TECHNICAL FEE, PER MIN: Performed by: INTERNAL MEDICINE

## 2021-07-12 PROCEDURE — 710N000012 HC RECOVERY PHASE 2, PER MINUTE: Performed by: INTERNAL MEDICINE

## 2021-07-12 PROCEDURE — 0F7D8DZ DILATION OF PANCREATIC DUCT WITH INTRALUMINAL DEVICE, VIA NATURAL OR ARTIFICIAL OPENING ENDOSCOPIC: ICD-10-PCS | Performed by: INTERNAL MEDICINE

## 2021-07-12 PROCEDURE — 0FPB8DZ REMOVAL OF INTRALUMINAL DEVICE FROM HEPATOBILIARY DUCT, VIA NATURAL OR ARTIFICIAL OPENING ENDOSCOPIC: ICD-10-PCS | Performed by: INTERNAL MEDICINE

## 2021-07-12 PROCEDURE — 360N000083 HC SURGERY LEVEL 3 W/ FLUORO, PER MIN: Performed by: INTERNAL MEDICINE

## 2021-07-12 PROCEDURE — C1877 STENT, NON-COAT/COV W/O DEL: HCPCS | Performed by: INTERNAL MEDICINE

## 2021-07-12 PROCEDURE — 0F798DZ DILATION OF COMMON BILE DUCT WITH INTRALUMINAL DEVICE, VIA NATURAL OR ARTIFICIAL OPENING ENDOSCOPIC: ICD-10-PCS | Performed by: INTERNAL MEDICINE

## 2021-07-12 PROCEDURE — 88307 TISSUE EXAM BY PATHOLOGIST: CPT | Mod: TC | Performed by: INTERNAL MEDICINE

## 2021-07-12 PROCEDURE — C1769 GUIDE WIRE: HCPCS | Performed by: INTERNAL MEDICINE

## 2021-07-12 PROCEDURE — 710N000010 HC RECOVERY PHASE 1, LEVEL 2, PER MIN: Performed by: INTERNAL MEDICINE

## 2021-07-12 PROCEDURE — 999N000141 HC STATISTIC PRE-PROCEDURE NURSING ASSESSMENT: Performed by: INTERNAL MEDICINE

## 2021-07-12 PROCEDURE — 250N000009 HC RX 250: Performed by: INTERNAL MEDICINE

## 2021-07-12 PROCEDURE — 250N000009 HC RX 250: Performed by: NURSE ANESTHETIST, CERTIFIED REGISTERED

## 2021-07-12 PROCEDURE — 83690 ASSAY OF LIPASE: CPT | Performed by: INTERNAL MEDICINE

## 2021-07-12 PROCEDURE — 250N000011 HC RX IP 250 OP 636: Performed by: NURSE ANESTHETIST, CERTIFIED REGISTERED

## 2021-07-12 PROCEDURE — 85027 COMPLETE CBC AUTOMATED: CPT | Performed by: INTERNAL MEDICINE

## 2021-07-12 PROCEDURE — 36415 COLL VENOUS BLD VENIPUNCTURE: CPT | Performed by: INTERNAL MEDICINE

## 2021-07-12 PROCEDURE — 80053 COMPREHEN METABOLIC PANEL: CPT | Performed by: INTERNAL MEDICINE

## 2021-07-12 PROCEDURE — 255N000002 HC RX 255 OP 636: Performed by: INTERNAL MEDICINE

## 2021-07-12 PROCEDURE — 258N000003 HC RX IP 258 OP 636: Performed by: NURSE ANESTHETIST, CERTIFIED REGISTERED

## 2021-07-12 PROCEDURE — 272N000002 HC OR SUPPLY OTHER OPNP: Performed by: INTERNAL MEDICINE

## 2021-07-12 PROCEDURE — 85610 PROTHROMBIN TIME: CPT | Performed by: INTERNAL MEDICINE

## 2021-07-12 PROCEDURE — 82150 ASSAY OF AMYLASE: CPT | Performed by: INTERNAL MEDICINE

## 2021-07-12 DEVICE — IMPLANTABLE DEVICE
Type: IMPLANTABLE DEVICE | Site: BILE DUCT | Status: NON-FUNCTIONAL
Removed: 2021-07-14

## 2021-07-12 DEVICE — IMPLANTABLE DEVICE
Type: IMPLANTABLE DEVICE | Site: PANCREATIC DUCT | Status: NON-FUNCTIONAL
Removed: 2021-08-19

## 2021-07-12 RX ORDER — PROPOFOL 10 MG/ML
INJECTION, EMULSION INTRAVENOUS PRN
Status: DISCONTINUED | OUTPATIENT
Start: 2021-07-12 | End: 2021-07-12

## 2021-07-12 RX ORDER — INDOMETHACIN 50 MG/1
100 SUPPOSITORY RECTAL
Status: COMPLETED | OUTPATIENT
Start: 2021-07-12 | End: 2021-07-12

## 2021-07-12 RX ORDER — ONDANSETRON 4 MG/1
4 TABLET, ORALLY DISINTEGRATING ORAL EVERY 6 HOURS PRN
Status: DISCONTINUED | OUTPATIENT
Start: 2021-07-12 | End: 2021-07-12 | Stop reason: HOSPADM

## 2021-07-12 RX ORDER — FENTANYL CITRATE 50 UG/ML
25 INJECTION, SOLUTION INTRAMUSCULAR; INTRAVENOUS EVERY 5 MIN PRN
Status: DISCONTINUED | OUTPATIENT
Start: 2021-07-12 | End: 2021-07-12 | Stop reason: HOSPADM

## 2021-07-12 RX ORDER — NALOXONE HYDROCHLORIDE 0.4 MG/ML
0.4 INJECTION, SOLUTION INTRAMUSCULAR; INTRAVENOUS; SUBCUTANEOUS
Status: DISCONTINUED | OUTPATIENT
Start: 2021-07-12 | End: 2021-07-12 | Stop reason: HOSPADM

## 2021-07-12 RX ORDER — ONDANSETRON 2 MG/ML
4 INJECTION INTRAMUSCULAR; INTRAVENOUS EVERY 6 HOURS PRN
Status: DISCONTINUED | OUTPATIENT
Start: 2021-07-12 | End: 2021-07-12 | Stop reason: HOSPADM

## 2021-07-12 RX ORDER — LIDOCAINE HYDROCHLORIDE 20 MG/ML
INJECTION, SOLUTION INFILTRATION; PERINEURAL PRN
Status: DISCONTINUED | OUTPATIENT
Start: 2021-07-12 | End: 2021-07-12

## 2021-07-12 RX ORDER — NALOXONE HYDROCHLORIDE 0.4 MG/ML
0.2 INJECTION, SOLUTION INTRAMUSCULAR; INTRAVENOUS; SUBCUTANEOUS
Status: DISCONTINUED | OUTPATIENT
Start: 2021-07-12 | End: 2021-07-12 | Stop reason: HOSPADM

## 2021-07-12 RX ORDER — LIDOCAINE 40 MG/G
CREAM TOPICAL
Status: DISCONTINUED | OUTPATIENT
Start: 2021-07-12 | End: 2021-07-12 | Stop reason: HOSPADM

## 2021-07-12 RX ORDER — FLUMAZENIL 0.1 MG/ML
0.2 INJECTION, SOLUTION INTRAVENOUS
Status: DISCONTINUED | OUTPATIENT
Start: 2021-07-12 | End: 2021-07-12 | Stop reason: HOSPADM

## 2021-07-12 RX ORDER — LABETALOL 20 MG/4 ML (5 MG/ML) INTRAVENOUS SYRINGE
PRN
Status: DISCONTINUED | OUTPATIENT
Start: 2021-07-12 | End: 2021-07-12

## 2021-07-12 RX ORDER — ONDANSETRON 4 MG/1
4 TABLET, ORALLY DISINTEGRATING ORAL EVERY 30 MIN PRN
Status: DISCONTINUED | OUTPATIENT
Start: 2021-07-12 | End: 2021-07-12 | Stop reason: HOSPADM

## 2021-07-12 RX ORDER — ONDANSETRON 2 MG/ML
INJECTION INTRAMUSCULAR; INTRAVENOUS PRN
Status: DISCONTINUED | OUTPATIENT
Start: 2021-07-12 | End: 2021-07-12

## 2021-07-12 RX ORDER — ONDANSETRON 2 MG/ML
4 INJECTION INTRAMUSCULAR; INTRAVENOUS EVERY 30 MIN PRN
Status: DISCONTINUED | OUTPATIENT
Start: 2021-07-12 | End: 2021-07-12 | Stop reason: HOSPADM

## 2021-07-12 RX ORDER — MEPERIDINE HYDROCHLORIDE 25 MG/ML
12.5 INJECTION INTRAMUSCULAR; INTRAVENOUS; SUBCUTANEOUS
Status: DISCONTINUED | OUTPATIENT
Start: 2021-07-12 | End: 2021-07-12 | Stop reason: HOSPADM

## 2021-07-12 RX ORDER — IOPAMIDOL 510 MG/ML
INJECTION, SOLUTION INTRAVASCULAR PRN
Status: DISCONTINUED | OUTPATIENT
Start: 2021-07-12 | End: 2021-07-12 | Stop reason: HOSPADM

## 2021-07-12 RX ORDER — DEXAMETHASONE SODIUM PHOSPHATE 4 MG/ML
INJECTION, SOLUTION INTRA-ARTICULAR; INTRALESIONAL; INTRAMUSCULAR; INTRAVENOUS; SOFT TISSUE PRN
Status: DISCONTINUED | OUTPATIENT
Start: 2021-07-12 | End: 2021-07-12

## 2021-07-12 RX ORDER — PANTOPRAZOLE SODIUM 40 MG/1
40 TABLET, DELAYED RELEASE ORAL 2 TIMES DAILY
Qty: 60 TABLET | Refills: 0 | Status: SHIPPED | OUTPATIENT
Start: 2021-07-12 | End: 2021-08-17

## 2021-07-12 RX ORDER — SODIUM CHLORIDE, SODIUM LACTATE, POTASSIUM CHLORIDE, CALCIUM CHLORIDE 600; 310; 30; 20 MG/100ML; MG/100ML; MG/100ML; MG/100ML
INJECTION, SOLUTION INTRAVENOUS CONTINUOUS
Status: DISCONTINUED | OUTPATIENT
Start: 2021-07-12 | End: 2021-07-12 | Stop reason: HOSPADM

## 2021-07-12 RX ORDER — INDOMETHACIN 50 MG/1
100 SUPPOSITORY RECTAL
Status: DISCONTINUED | OUTPATIENT
Start: 2021-07-12 | End: 2021-07-12 | Stop reason: HOSPADM

## 2021-07-12 RX ORDER — SODIUM CHLORIDE, SODIUM LACTATE, POTASSIUM CHLORIDE, CALCIUM CHLORIDE 600; 310; 30; 20 MG/100ML; MG/100ML; MG/100ML; MG/100ML
INJECTION, SOLUTION INTRAVENOUS CONTINUOUS PRN
Status: DISCONTINUED | OUTPATIENT
Start: 2021-07-12 | End: 2021-07-12

## 2021-07-12 RX ORDER — ALBUTEROL SULFATE 0.83 MG/ML
2.5 SOLUTION RESPIRATORY (INHALATION) EVERY 4 HOURS PRN
Status: DISCONTINUED | OUTPATIENT
Start: 2021-07-12 | End: 2021-07-12 | Stop reason: HOSPADM

## 2021-07-12 RX ADMIN — ROCURONIUM BROMIDE 10 MG: 10 INJECTION INTRAVENOUS at 10:09

## 2021-07-12 RX ADMIN — LIDOCAINE HYDROCHLORIDE 60 MG: 20 INJECTION, SOLUTION INFILTRATION; PERINEURAL at 09:18

## 2021-07-12 RX ADMIN — GLUCAGON HYDROCHLORIDE 0.4 MG: KIT at 10:10

## 2021-07-12 RX ADMIN — PHENYLEPHRINE HYDROCHLORIDE 100 MCG: 10 INJECTION INTRAVENOUS at 10:15

## 2021-07-12 RX ADMIN — PHENYLEPHRINE HYDROCHLORIDE 100 MCG: 10 INJECTION INTRAVENOUS at 09:55

## 2021-07-12 RX ADMIN — PROPOFOL 30 MG: 10 INJECTION, EMULSION INTRAVENOUS at 09:20

## 2021-07-12 RX ADMIN — ONDANSETRON 4 MG: 2 INJECTION INTRAMUSCULAR; INTRAVENOUS at 09:18

## 2021-07-12 RX ADMIN — SUGAMMADEX 200 MG: 100 INJECTION, SOLUTION INTRAVENOUS at 10:34

## 2021-07-12 RX ADMIN — ROCURONIUM BROMIDE 40 MG: 10 INJECTION INTRAVENOUS at 09:19

## 2021-07-12 RX ADMIN — LABETALOL 20 MG/4 ML (5 MG/ML) INTRAVENOUS SYRINGE 10 MG: at 10:46

## 2021-07-12 RX ADMIN — SODIUM CHLORIDE, POTASSIUM CHLORIDE, SODIUM LACTATE AND CALCIUM CHLORIDE: 600; 310; 30; 20 INJECTION, SOLUTION INTRAVENOUS at 09:04

## 2021-07-12 RX ADMIN — PHENYLEPHRINE HYDROCHLORIDE 100 MCG: 10 INJECTION INTRAVENOUS at 09:38

## 2021-07-12 RX ADMIN — PROPOFOL 100 MG: 10 INJECTION, EMULSION INTRAVENOUS at 09:18

## 2021-07-12 NOTE — ANESTHESIA POSTPROCEDURE EVALUATION
Patient: Elier Barber    Procedure(s):  ENDOSCOPIC ULTRASOUND, ESOPHAGOSCOPY / UPPER GASTROINTESTINAL TRACT (GI)  ENDOSCOPIC RETROGRADE CHOLANGIOPANCREATOGRAPHY WITH AMPULLECTOMY, PANCREATIC DUCT STENT PLACEMENT AND BILIARY STENT PLACEMENT    Diagnosis:Cancer of ampulla of Vater (H) [C24.1]  Diagnosis Additional Information: No value filed.    Anesthesia Type:  General    Note:  Disposition: Outpatient   Postop Pain Control: Uneventful            Sign Out: Well controlled pain   PONV: No   Neuro/Psych: Uneventful            Sign Out: Acceptable/Baseline neuro status   Airway/Respiratory: Uneventful            Sign Out: Acceptable/Baseline resp. status   CV/Hemodynamics: Uneventful            Sign Out: Acceptable CV status; No obvious hypovolemia; No obvious fluid overload   Other NRE: NONE   DID A NON-ROUTINE EVENT OCCUR? No           Last vitals:  Vitals:    07/12/21 1045 07/12/21 1100 07/12/21 1115   BP: (!) 174/112 (!) 166/88 (!) 164/96   Pulse: 72 53 51   Resp: 16 18 14   Temp: 35.4  C (95.7  F) 35.3  C (95.5  F) 36.6  C (97.8  F)   SpO2: 99% 98% 97%       Last vitals prior to Anesthesia Care Transfer:  CRNA VITALS  7/12/2021 1011 - 7/12/2021 1111      7/12/2021             NIBP:  (!) 156/102    Pulse:  68    Temp:  35  C (95  F)    SpO2:  98 %    Resp Rate (observed):  14    EKG:  PVC's;Sinus rhythm          Electronically Signed By: Steven Varghese MD  July 12, 2021  11:32 AM

## 2021-07-12 NOTE — PROGRESS NOTES
SPIRITUAL HEALTH SERVICES  [facility] ([place] PRE-OP   PRE-SURGERY VISIT    Had pre-surgery visit with pt.  Provided spiritual support, prayer.     Raya Rendon  Chaplain Resident  Pager: [phone]

## 2021-07-12 NOTE — OR NURSING
Spoke with JEFE Varghese, okay with SBP in 160s (patient asymptomatic and EKG stable). Okay to transfer to phase II. JEFE Varghese will put in sign out.

## 2021-07-12 NOTE — TELEPHONE ENCOUNTER
SKILLED NURSING / PHYSICAL THERAPY / OCCUPATIONAL THERAPY/ SPEECH THERAPY orders signed and faxed.

## 2021-07-12 NOTE — DISCHARGE INSTRUCTIONS
[facility]  Same-Day Surgery   Adult Discharge Orders & Instructions     For 24 hours after surgery    1. Get plenty of rest.  A responsible adult must stay with you for at least 24 hours after you leave the hospital.   2. Do not drive or use heavy equipment.  If you have weakness or tingling, don't drive or use heavy equipment until this feeling goes away.  3. Do not drink alcohol.  4. Avoid strenuous or risky activities.  Ask for help when climbing stairs.   5. You may feel lightheaded.  IF so, sit for a few minutes before standing.  Have someone help you get up.   6. If you have nausea (feel sick to your stomach): Drink only clear liquids such as apple juice, ginger ale, broth or 7-Up.  Rest may also help.  Be sure to drink enough fluids.  Move to a regular diet as you feel able.  7. You may have a slight fever. Call the doctor if your fever is over 100 F (37.7 C) (taken under the tongue) or lasts longer than 24 hours.  8. You may have a dry mouth, a sore throat, muscle aches or trouble sleeping.  These should go away after 24 hours.  9. Do not make important or legal decisions.   Call your doctor for any of the followin.  Signs of infection (fever, growing tenderness at the surgery site, a large amount of drainage or bleeding, severe pain, foul-smelling drainage, redness, swelling).    2. It has been over 8 to 10 hours since surgery and you are still not able to urinate (pass water).    3.  Headache for over 24 hours.    4.  Numbness, tingling or weakness the day after surgery (if you had spinal anesthesia).  To contact a doctor, call Dr Kent [phone] or:        [phone] and ask for the resident on call for gastroenterology (answered 24 hours a day)      Emergency Department:    [facility]: [phone]       (TT[facility] for hearing impaired: [phone])      - Await pathology results. Final results will determine if further treatement or evaluation is  needed.   - Continue to hold plavix. Restart on 7/16/21.   - Aspirin can be continued   - Increase pantoprazole to 40 mg twice daily for next 30 days  - Resume regular diet

## 2021-07-12 NOTE — OR NURSING
Dr Donte travis with lab work and pt is OK to discharge home. No questions per pt or wife. Will review discharge instructions and discharge to home.

## 2021-07-12 NOTE — OR NURSING
MD Kent paged at 1120: PACU 15: ADIEL Barber- Did you want an 1 L of LR given in PACU in addition to the 1L given in the OR? 2 L total? Thanks! Radha CHUN RN [id number] or [phone]    Spoke with Donte, give 1 additional liter of fluid.

## 2021-07-12 NOTE — TELEPHONE ENCOUNTER
I am now on the [facility] team and so not really covering for Dr. Ray who is on the [facility] team.  Please locate the form and I will see about completing for her. Won't be back in the office until Wednesday.

## 2021-07-12 NOTE — OP NOTE
Upper EUS 07/12/2021  8:47 AM [address] [place] [address] [phone]     Endoscopy Department   _______________________________________________________________________________   Patient Name: Elier Barber           Procedure Date: 7/12/2021 8:47 AM   MRN: [id number]                       Account Number: [id number]   YOB: 1945              Admit Type: Outpatient   Age: 75                               Room: OR   Gender: Male                          Note Status: Finalized   Attending MD: Agus Kent MD       Pause for the Cause: time out performed   Total Sedation Time:                     _______________________________________________________________________________       Procedure:           Upper EUS   Indications:         Mucosal mass/polyp involving the major papilla (found on                        endoscopy), Documented ampullary adenocarcinoma,                        75-year-old gentleman with PLS who presented previously                        with jaundice and found to have an ampullary mass s/p                        outside EUS showing biopsy-proven adenocarcinoma. Seen                        by Dr. Moscoso and deemed not a surgical candidate due to                        PLS. Plan for EUS to evaluate potential lymphadenopathy                        by the portal vein as well as determine origin                        (ampullary/biliary/pancreatic) and potential for                        endoscopic resection.   Providers:           Agus Kent MD   Referring MD:           Requesting Provider: Saqib Moscoso MD, Jovi Bird MD, Sima Galvez   Medicines:           General Anesthesia   Complications:       No immediate complications. Estimated blood loss:                        Minimal.   _______________________________________________________________________________    Procedure:           Pre-Anesthesia Assessment:                        - Prior to the procedure, a History and Physical was                         performed, and patient medications and allergies were                        reviewed. The patient is competent. The risks and                        benefits of the procedure and the sedation options and                        risks were discussed with the patient. All questions                        were answered and informed consent was obtained. Patient                        identification and proposed procedure were verified by                        the physician, the nurse, the anesthesiologist and the                        anesthetist in the procedure room. Mental Status                        Examination: alert and oriented. Airway Examination:                        normal oropharyngeal airway and neck mobility.                        Respiratory Examination: clear to auscultation. CV                        Examination: normal. Prophylactic Antibiotics: The                        patient does not require prophylactic antibiotics. Prior                        Anticoagulants: The patient has taken Plavix                        (clopidogrel), last dose was 5 days prior to procedure.                        ASA Grade Assessment: III - A patient with severe                        systemic disease. After reviewing the risks and                        benefits, the patient was deemed in satisfactory                        condition to undergo the procedure. The anesthesia plan                        was to use general anesthesia. Immediately prior to                        administration of medications, the patient was                        re-assessed for adequacy to receive sedatives. The heart                        rate, respiratory rate, oxygen saturations, blood                        pressure, adequacy of pulmonary ventilation, and                         response to care were monitored throughout the                        procedure. The physical status of the patient was                        re-assessed after the procedure.                        After obtaining informed consent, the endoscope was                        passed under direct vision. Throughout the procedure,                        the patient's blood pressure, pulse, and oxygen                        saturations were monitored continuously. The Endoscope                        was introduced through the mouth, and advanced to the                        second part of duodenum. The upper EUS was accomplished                        without difficulty. The patient tolerated the procedure                        well.                                                                                     Findings:        ENDOSONOGRAPHIC FINDING: :        Endoscopic exam with the side viewing duodenoscope showed a full major        papilla with a biliary stent in place. Biliary stent removed with a        snare. There appeared to be an intraductal mass at the major papilla        seen at the orifice. The overlying major papilla mucosa appeared normal.        The radial echoendoscope was used to evaluate the major papilla. There        was a hypoechoic growth within the ampulla appearing to be within the        very distal common bile duct with upstream dilation of the CBD and PD.        The intraduodenal portion of the major papilla/intraampullar mass        measured 16 mm x 11 mm. The CBD and PD were dilated upstream of the        intraampullary growth and were still within the intraduodenal portion of        the major papilla. The very distal CBD may have had stent related debris        versus intraductal polyp still within the intraduodenal portion of the        major papilla. No evidence of deep invasion.        The bile duct measured 8 mm in maximal diameter with pneumobilia        artifact. The entire  common bile duct wall was thickened symmetrically.        The gallbladder contained small hyperechoic foci consistent with either        air artifact versus microlithiasis.        The pancreatic parenchyma appeared normal. There were no features of        chronic pancreatitis. No masses, cysts or stones were visualized in the        pancreatic parenchyma. The main pancreatic duct was followed from the        major papilla to the body, excluding pancreas divisum. The pancreatic        duct measured 5 mm in the body and 3.7 in the tail of the pancreas.        No lymph nodes were seen in the upper abdomen and mediastinum.        Specifically no pathologic lymphadenopathy seen at the nael        hepatis/periportal area        No masses were seen in the visualized portions of the liver.        The left adrenal appeared normal.                                                                                     Impression:          - Prior biliary stent removed                        - No pathologic lymphadenopathy seen on EUS                        - Endoscopically a buldging major papilla seen with an                        intraampullary growth. Normal overlying major                        papilla/duodenal mucosa. EUS showed an intraampullary                        growth likely arising from the very distal common bile                        duct with upstream dilation of the CBD and PD.                        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 A neuroendocrine tumor could have a                        similar appearance but would not expect prior path to                        show adenocarcinoma. Intraampullary growth limited to                        intraduodenal portion with upstream dilation of the                        CBD/PD still within the intraduodenal portion of the                         major papilla. Therefore appears endoscopically                        resectable.                        - No specimens collected.   Recommendation:      - Proceed with ERCP with papillectomy today                                                                                       Agus Kent MD      ERCP 07/12/2021  8:41 AM [address] [place] [address] [phone]     Endoscopy Department   _______________________________________________________________________________   Patient Name: Elier Barber           Procedure Date: 7/12/2021 8:41 AM   MRN: [id number]                       Account Number: [id number]   YOB: 1945              Admit Type: Outpatient   Age: 75                               Room: OR   Gender: Male                          Note Status: Finalized   Attending MD: Agus Kent MD       Pause for the Cause: time out performed   Total Sedation Time:                     _______________________________________________________________________________       Procedure:           ERCP   Indications:         For therapy of periampullary tumor, Mucosal mass/polyp                        involving the major papilla (found on endoscopy),                        Documented ampullary adenocarcinoma, 75-year-old                        gentleman with PLS who presented previously with                        jaundice and found to have an ampullary mass s/p outside                        EUS showing biopsy-proven adenocarcinoma. Seen by Dr. Moscoso and deemed not a surgical candidate due to PLS.                        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   Providers:           Agus Kent MD   Referring MD:           Requesting Provider: Saqib Moscoso MD, Jovi  JONO Bird MD, Sima Oliveira                        [facility]   Medicines:           General Anesthesia, Indomethacin 100 mg OH, Glucagon 0.4                        mg IV   Complications:       No immediate complications. Estimated blood loss:                        Minimal.   _______________________________________________________________________________   Procedure:           Pre-Anesthesia Assessment:                        - Prior to the procedure, a History and Physical was                        performed, and patient medications and allergies were                        reviewed. The patient is competent. The risks and                        benefits of the procedure and the sedation options and                        risks were discussed with the patient. All questions                        were answered and informed consent was obtained. Patient                        identification and proposed procedure were verified by                        the physician, the nurse, the anesthesiologist and the                        anesthetist in the procedure room. Mental Status                        Examination: alert and oriented. Airway Examination:                        normal oropharyngeal airway and neck mobility.                        Respiratory Examination: clear to auscultation. CV                        Examination: normal. Prophylactic Antibiotics: The                        patient does not require prophylactic antibiotics. Prior                        Anticoagulants: The patient has taken Plavix                        (clopidogrel), last dose was 5 days prior to procedure.                        ASA Grade Assessment: III - A patient with severe                        systemic disease. After reviewing the risks and                        benefits, the patient was deemed in satisfactory                        condition to undergo the procedure. The anesthesia plan                        was to use  general anesthesia. Immediately prior to                        administration of medications, the patient was                        re-assessed for adequacy to receive sedatives. The heart                        rate, respiratory rate, oxygen saturations, blood                        pressure, adequacy of pulmonary ventilation, and                        response to care were monitored throughout the                        procedure. The physical status of the patient was                        re-assessed after the procedure.                        After obtaining informed consent, the scope was passed                        under direct vision. Throughout the procedure, the                        patient's blood pressure, pulse, and oxygen saturations                        were monitored continuously. The was introduced through                        the mouth, and used to inject contrast into and used to                        inject contrast into the bile duct and ventral                        pancreatic duct. The ERCP was accomplished without                        difficulty. The patient tolerated the procedure well.                                                                                     Findings:        The  film was normal. The esophagus was successfully intubated        under direct vision. The scope was advanced from the mouth to the        duodenum. The pharynx, larynx and associated structures, as well as the        upper GI tract, were normal. An intraampullary mass was found at the        major papilla. The bile duct was deeply cannulated with the short-nosed        traction sphincterotome. Contrast was injected. I personally interpreted        the bile duct images. There was brisk flow of contrast through the        ducts. Image quality was excellent. Contrast extended to the hepatic        ducts. The main bile duct was diffusely dilated. Using a snare, the        major papilla  was grasped. The papilla was then resected using ERBE        electrocautery. Resection and retrieval were complete. The ventral        pancreatic duct was deeply cannulated with the short-nosed traction        sphincterotome. Contrast was injected. The entire opacified area was        dilated markedly. Visiglide wire was passed into the ventral pancreatic        duct. One 7 Fr by 4 cm plastic stent with a single external pigtail and        a single internal flap was placed 4 cm into the ventral pancreatic duct.        Clear fluid flowed through the stent. The stent was in good position.        The bile duct was deeply cannulated with the short-nosed traction        sphincterotome. Contrast was injected. Visiglide wire was passed into        the biliary tree. One 10 Fr by 5 cm plastic stent with a single external        flap and a single internal flap was placed 5 cm into the common bile        duct. Bile flowed through the stent. The stent was in good position.        No evidence of a perforation seen on final endoscopic and fluoroscopic        exam                                                                                     Impression:          - Intraampullary mass at the major papilla                        - Snare papillectomy successfully performed as described                        above. Specimen retrieved                        - Dilated pancreatic duct stented with a 7 Fr x 4 cm SPT                        Zimmon stent                        - Dilated common bile duct stented with a 10 Fr x 5 cm                        Sofflex stent   Recommendation:      - Observe patient in same day observation unit with 2                        hour amylase/lipase for possible discharge same day.                        - Give 1 liter lactated ringer's in post-operative area                        for pancreatitis prophylaxis                        - Await pathology results. Final results will determine                         if further treatement or evaluation is needed.                        - Continue to hold plavix. Restart on 7/16/21.                        - Aspirin can be continued                        - Increase pantoprazole to 40 mg twice daily                        - Resume diet                        - Above discussed with patient and wife                                                                                       Agus Kent MD   ________________

## 2021-07-12 NOTE — OR NURSING
Call to lab to confirm timed lab draw per Dr Kent's order. Lab states to call them 10 minutes prior to time of draw. Will call lab at [phone].

## 2021-07-12 NOTE — ANESTHESIA PREPROCEDURE EVALUATION
"Anesthesia Pre-Procedure Evaluation    Patient: Elier Barber   MRN: [id number] : 1945        Preoperative Diagnosis: Jaundice [R17]   Procedure : Procedure(s):  ESOPHAGOGASTRODUODENOSCOPY, WITH ENDOSCOPIC US  ENDOSCOPIC RETROGRADE CHOLANGIOPANCREATOGRAPHY     Past Medical History:   Diagnosis Date     Basal cell carcinoma nos     sees derm     CVA (cerebral infarction)     small vessel right int capsule stroke     DVT (deep vein thrombosis) in pregnancy 2017    right peroneal vein     Essential hypertension, benign      Hearing loss      Hoarseness of voice     assoc with PLS     Lumbar radiculopathy          Primary Lateral Sclerosis     has baclofen pump through Dr. Calvert, [place], sees Dr. Fink, [facility]     Primary osteoarthritis of right shoulder 2021     Prostate CA (H)     prostatectomy     Vertigo 2013      Past Surgical History:   Procedure Laterality Date     ABDOMEN SURGERY      Hernia     BIOPSY      Cancerous growth on leg. Removed by MOHs treatment     ENDOSCOPIC RETROGRADE CHOLANGIOPANCREATOGRAM N/A 2021    Procedure: ENDOSCOPIC RETROGRADE CHOLANGIOPANCREATOGRAPHY, BILIARY STENT PLACEMENT;  Surgeon: Sima Galvez MD;  Location: [facility] OR     ESOPHAGOSCOPY, GASTROSCOPY, DUODENOSCOPY (EGD), COMBINED N/A 2021    Procedure: ESOPHAGOGASTRODUODENOSCOPY, WITH ENDOSCOPIC US, fine needle aspiration;  Surgeon: Sima Galvez MD;  Location: [facility] OR     HERNIA REPAIR      Repaired     PROSTATE SURGERY       [facility] NONSPECIFIC PROCEDURE       prostatectomy      ZZ NONSPECIFIC PROCEDURE      colonoscopy     Z NONSPECIFIC PROCEDURE  2006    hernia, right side     ZZC NONSPECIFIC PROCEDURE      T + A      Allergies   Allergen Reactions     Bee Venom      Swelling and hives     Penicillins Hives     \"HIVES\"      Social History     Tobacco Use     Smoking status: Former Smoker     Packs/day: 0.30     Years: 6.00     Pack " years: 1.80     Types: Cigarettes     Start date: 1970     Quit date: 10/5/1976     Years since quittin.7     Smokeless tobacco: Never Used   Substance Use Topics     Alcohol use: Yes     Comment: 1 drink/week      Wt Readings from Last 1 Encounters:   21 64.7 kg (142 lb 9.6 oz)        Anesthesia Evaluation   Pt has had prior anesthetic. Type: General.    No history of anesthetic complications       ROS/MED HX  ENT/Pulmonary:  - neg pulmonary ROS     Neurologic: Comment: Primary Lateral Sclerosis with weakness and expressive aphasia    (+) CVA, date: 3 separate occasions, without deficits,     Cardiovascular: Comment: Functional capacity assessment limited by current medical conditions    (+) hypertension-----    METS/Exercise Tolerance: 1 - Eating, dressing    Hematologic: Comments: Lab Test        06/17/21     06/16/21     06/10/21     10/27/18     10/27/18                       0654          1859          1326          1734          1734          WBC          6.6          8.4          7.5            < >        7.8           HGB          12.9*        14.1         14.5           < >        16.1          MCV          91           93           92             < >        92            PLT          260          307          301            < >        241           INR           --          0.96         0.98          --          1.07           < > = values in this interval not displayed.                  Lab Test        06/17/21     06/16/21     06/10/21                       0654          1859          1326          NA           133          130*         134           POTASSIUM    3.9          4.0          4.5           CHLORIDE     100          96           100           CO2          27           29           28            BUN          14           17           16            CR           0.55*        0.65*        0.67          ANIONGAP     6            5            6             AMOS          8.8           9.4          9.7           GLC          95           97           98                (+) anemia,     Musculoskeletal:  - neg musculoskeletal ROS     GI/Hepatic:     (+) GERD, Asymptomatic on medication,     Renal/Genitourinary:  - neg Renal ROS     Endo: Comment: Pancreatic mass      Psychiatric/Substance Use:  - neg psychiatric ROS     Infectious Disease:  - neg infectious disease ROS     Malignancy:  - neg malignancy ROS     Other: Comment: Hx of hyponatremia - neg other ROS          Physical Exam    Airway      Comment: Large central incisors    Mallampati: II   TM distance: > 3 FB   Neck ROM: full   Mouth opening: > 3 cm    Respiratory Devices and Support         Dental  no notable dental history         Cardiovascular   cardiovascular exam normal          Pulmonary   pulmonary exam normal                OUTSIDE LABS:  CBC:   Lab Results   Component Value Date    WBC 10.5 06/19/2021    WBC 9.2 06/18/2021    HGB 14.2 06/19/2021    HGB 12.9 (L) 06/18/2021    HCT 41.9 06/19/2021    HCT 37.0 (L) 06/18/2021     06/19/2021     06/18/2021     BMP:   Lab Results   Component Value Date     06/20/2021     (L) 06/19/2021    POTASSIUM 4.0 06/20/2021    POTASSIUM 4.2 06/19/2021    CHLORIDE 102 06/20/2021    CHLORIDE 100 06/19/2021    CO2 29 06/20/2021    CO2 20 06/19/2021    BUN 13 06/20/2021    BUN 21 06/19/2021    CR 0.65 (L) 06/20/2021    CR 0.62 (L) 06/19/2021     (H) 07/12/2021    GLC 98 06/20/2021     COAGS:   Lab Results   Component Value Date    PTT 27 10/27/2018    INR 0.96 06/17/2021     POC:   Lab Results   Component Value Date     (H) 11/17/2020     HEPATIC:   Lab Results   Component Value Date    ALBUMIN 2.4 (L) 06/18/2021    PROTTOTAL 5.9 (L) 06/18/2021     (H) 06/20/2021    AST 94 (H) 06/20/2021    ALKPHOS 337 (H) 06/18/2021    BILITOTAL 4.1 (H) 06/20/2021     OTHER:   Lab Results   Component Value Date    A1C 5.8 (H) 11/17/2020    AMOS 8.5 06/20/2021    MAG 1.9  02/21/2013    LIPASE 506 (H) 06/16/2021    TSH 1.68 06/19/2021       Anesthesia Plan    ASA Status:  3   NPO Status:  NPO Appropriate    Anesthesia Type: General.     - Airway: ETT   Induction: Intravenous, Propofol.   Maintenance: TIVA.        Consents    Anesthesia Plan(s) and associated risks, benefits, and realistic alternatives discussed. Questions answered and patient/representative(s) expressed understanding.     - Discussed with:  Patient      - Extended Intubation/Ventilatory Support Discussed: Yes.      - Patient is DNR/DNI Status: No    Use of blood products discussed: Yes.     - Discussed with: Patient.     - Consented: consented to blood products            Reason for refusal: other.     Postoperative Care    Pain management: IV analgesics.   PONV prophylaxis: Ondansetron (or other 5HT-3)     Comments:    Patient seen and examined.  Risks, benefits and alternatives to GETA discussed.  Questions answered and patient wishes to proceed.  Recheck sodium this am.                  Steven Varghese MD

## 2021-07-12 NOTE — ANESTHESIA CARE TRANSFER NOTE
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    Anesthesia Type:   General     Note:    Oropharynx: spontaneously breathing  Level of Consciousness: awake  Oxygen Supplementation: nasal cannula  Level of Supplemental Oxygen (L/min / FiO2): 4  Independent Airway: airway patency satisfactory and stable  Dentition: dentition unchanged  Vital Signs Stable: post-procedure vital signs reviewed and stable  Report to RN Given: handoff report given  Patient transferred to: PACU    Handoff Report: Identifed the Patient, Identified the Reponsible Provider, Reviewed the pertinent medical history, Discussed the surgical course, Reviewed Intra-OP anesthesia mangement and issues during anesthesia, Set expectations for post-procedure period and Allowed opportunity for questions and acknowledgement of understanding      Vitals: (Last set prior to Anesthesia Care Transfer)  CRNA VITALS  7/12/2021 1011 - 7/12/2021 1053      7/12/2021             NIBP:  (!) 186/104    Pulse:  68    Temp:  35  C (95  F)    SpO2:  98 %    Resp Rate (observed):  14    EKG:  PVC's;Sinus rhythm        Electronically Signed By: CITLALY Cross CRNA  July 12, 2021  10:53 AM

## 2021-07-12 NOTE — ANESTHESIA PROCEDURE NOTES
Airway       Patient location during procedure: OR       Procedure Start/Stop Times: 7/12/2021 9:22 AM  Staff -        Anesthesiologist:  Steven Varghese MD       CRNA: Say Alfonso APRN CRNA       Performed By: CRNA  Consent for Airway        Urgency: elective  Indications and Patient Condition       Indications for airway management: logan-procedural       Induction type:intravenous       Mask difficulty assessment: 2 - vent by mask + OA or adjuvant +/- NMBA    Final Airway Details       Final airway type: endotracheal airway       Successful airway: ETT - single  Endotracheal Airway Details        ETT size (mm): 8.0       Cuffed: yes       Successful intubation technique: direct laryngoscopy       DL Blade Type: Diaz 2       Grade View of Cords: 2 (1 - by Dr. Duff Oral Surg Res)       Adjucts: stylet       Position: Right       Measured from: gums/teeth       Secured at (cm): 24       Bite Block used: Endo Bite Block Green.    Post intubation assessment        Placement verified by: capnometry, equal breath sounds and chest rise        Number of attempts at approach: 2       Secured with: cloth tape and silk tape       Ease of procedure: easy       Dentition: Intact

## 2021-07-12 NOTE — TELEPHONE ENCOUNTER
[facility]  7/7/21 add on discipline orders  Dr. Ray's in basket   Fax

## 2021-07-13 NOTE — TELEPHONE ENCOUNTER
Leann,    Please read the email.  Dr Ray is in vacation and you are covering Letters [facility]    Mattie Colon MD  Internal Medicine

## 2021-07-14 ENCOUNTER — ANESTHESIA EVENT (OUTPATIENT)
Dept: SURGERY | Facility: CLINIC | Age: 76
DRG: 438 | End: 2021-07-14
Payer: COMMERCIAL

## 2021-07-14 ENCOUNTER — MEDICAL CORRESPONDENCE (OUTPATIENT)
Dept: HEALTH INFORMATION MANAGEMENT | Facility: CLINIC | Age: 76
End: 2021-07-14

## 2021-07-14 ENCOUNTER — APPOINTMENT (OUTPATIENT)
Dept: GENERAL RADIOLOGY | Facility: CLINIC | Age: 76
DRG: 438 | End: 2021-07-14
Attending: INTERNAL MEDICINE
Payer: COMMERCIAL

## 2021-07-14 ENCOUNTER — PATIENT OUTREACH (OUTPATIENT)
Dept: ONCOLOGY | Facility: CLINIC | Age: 76
End: 2021-07-14

## 2021-07-14 ENCOUNTER — ANESTHESIA (OUTPATIENT)
Dept: SURGERY | Facility: CLINIC | Age: 76
DRG: 438 | End: 2021-07-14
Payer: COMMERCIAL

## 2021-07-14 ENCOUNTER — HOSPITAL ENCOUNTER (INPATIENT)
Facility: CLINIC | Age: 76
LOS: 7 days | Discharge: HOME | DRG: 438 | End: 2021-07-21
Attending: EMERGENCY MEDICINE | Admitting: STUDENT IN AN ORGANIZED HEALTH CARE EDUCATION/TRAINING PROGRAM
Payer: COMMERCIAL

## 2021-07-14 ENCOUNTER — PATIENT OUTREACH (OUTPATIENT)
Dept: GASTROENTEROLOGY | Facility: CLINIC | Age: 76
End: 2021-07-14

## 2021-07-14 DIAGNOSIS — R19.7 DIARRHEA, UNSPECIFIED TYPE: ICD-10-CM

## 2021-07-14 DIAGNOSIS — D64.9 ANEMIA, UNSPECIFIED TYPE: ICD-10-CM

## 2021-07-14 DIAGNOSIS — Z20.822 COVID-19 RULED OUT BY LABORATORY TESTING: ICD-10-CM

## 2021-07-14 DIAGNOSIS — R53.81 PHYSICAL DECONDITIONING: ICD-10-CM

## 2021-07-14 DIAGNOSIS — G12.23 PRIMARY LATERAL SCLEROSIS (H): ICD-10-CM

## 2021-07-14 DIAGNOSIS — K92.2 GASTROINTESTINAL HEMORRHAGE, UNSPECIFIED GASTROINTESTINAL HEMORRHAGE TYPE: ICD-10-CM

## 2021-07-14 DIAGNOSIS — R25.2 SPASTICITY: ICD-10-CM

## 2021-07-14 DIAGNOSIS — C24.1 CANCER OF AMPULLA OF VATER (H): ICD-10-CM

## 2021-07-14 DIAGNOSIS — K92.2 GI BLEED: Primary | ICD-10-CM

## 2021-07-14 LAB
ALBUMIN SERPL-MCNC: 2.6 G/DL (ref 3.4–5)
ALP SERPL-CCNC: 137 U/L (ref 40–150)
ALT SERPL W P-5'-P-CCNC: 64 U/L (ref 0–70)
ANION GAP SERPL CALCULATED.3IONS-SCNC: 5 MMOL/L (ref 3–14)
AST SERPL W P-5'-P-CCNC: 94 U/L (ref 0–45)
BILIRUB SERPL-MCNC: 0.9 MG/DL (ref 0.2–1.3)
BUN SERPL-MCNC: 46 MG/DL (ref 7–30)
CALCIUM SERPL-MCNC: 9 MG/DL (ref 8.5–10.1)
CHLORIDE BLD-SCNC: 101 MMOL/L (ref 94–109)
CO2 SERPL-SCNC: 28 MMOL/L (ref 20–32)
CREAT SERPL-MCNC: 0.76 MG/DL (ref 0.66–1.25)
ERYTHROCYTE [DISTWIDTH] IN BLOOD BY AUTOMATED COUNT: 14.1 % (ref 10–15)
GFR SERPL CREATININE-BSD FRML MDRD: 89 ML/MIN/1.73M2
GLUCOSE BLD-MCNC: 117 MG/DL (ref 70–99)
HCT VFR BLD AUTO: 24.4 % (ref 40–53)
HGB BLD-MCNC: 8.1 G/DL (ref 13.3–17.7)
HOLD SPECIMEN: NORMAL
MCH RBC QN AUTO: 31.9 PG (ref 26.5–33)
MCHC RBC AUTO-ENTMCNC: 33.2 G/DL (ref 31.5–36.5)
MCV RBC AUTO: 96 FL (ref 78–100)
PATH REPORT.COMMENTS IMP SPEC: NORMAL
PATH REPORT.FINAL DX SPEC: NORMAL
PATH REPORT.GROSS SPEC: NORMAL
PATH REPORT.MICROSCOPIC SPEC OTHER STN: NORMAL
PATH REPORT.RELEVANT HX SPEC: NORMAL
PHOTO IMAGE: NORMAL
PLATELET # BLD AUTO: 186 10E3/UL (ref 150–450)
POTASSIUM BLD-SCNC: 4.7 MMOL/L (ref 3.4–5.3)
PROT SERPL-MCNC: 5.4 G/DL (ref 6.8–8.8)
RBC # BLD AUTO: 2.54 10E6/UL (ref 4.4–5.9)
SARS-COV-2 RNA RESP QL NAA+PROBE: NEGATIVE
SODIUM SERPL-SCNC: 134 MMOL/L (ref 133–144)
WBC # BLD AUTO: 9.6 10E3/UL (ref 4–11)

## 2021-07-14 PROCEDURE — 999N000179 XR SURGERY CARM FLUORO LESS THAN 5 MIN W STILLS: Mod: TC

## 2021-07-14 PROCEDURE — 250N000025 HC SEVOFLURANE, PER MIN: Performed by: INTERNAL MEDICINE

## 2021-07-14 PROCEDURE — 99285 EMERGENCY DEPT VISIT HI MDM: CPT | Mod: 25 | Performed by: EMERGENCY MEDICINE

## 2021-07-14 PROCEDURE — 86900 BLOOD TYPING SEROLOGIC ABO: CPT | Performed by: EMERGENCY MEDICINE

## 2021-07-14 PROCEDURE — BF101ZZ FLUOROSCOPY OF BILE DUCTS USING LOW OSMOLAR CONTRAST: ICD-10-PCS | Performed by: INTERNAL MEDICINE

## 2021-07-14 PROCEDURE — C1726 CATH, BAL DIL, NON-VASCULAR: HCPCS | Performed by: INTERNAL MEDICINE

## 2021-07-14 PROCEDURE — P9041 ALBUMIN (HUMAN),5%, 50ML: HCPCS | Performed by: NURSE ANESTHETIST, CERTIFIED REGISTERED

## 2021-07-14 PROCEDURE — 250N000011 HC RX IP 250 OP 636

## 2021-07-14 PROCEDURE — C1769 GUIDE WIRE: HCPCS | Performed by: INTERNAL MEDICINE

## 2021-07-14 PROCEDURE — 93010 ELECTROCARDIOGRAM REPORT: CPT | Performed by: EMERGENCY MEDICINE

## 2021-07-14 PROCEDURE — 250N000011 HC RX IP 250 OP 636: Performed by: NURSE ANESTHETIST, CERTIFIED REGISTERED

## 2021-07-14 PROCEDURE — 710N000010 HC RECOVERY PHASE 1, LEVEL 2, PER MIN: Performed by: INTERNAL MEDICINE

## 2021-07-14 PROCEDURE — 96374 THER/PROPH/DIAG INJ IV PUSH: CPT | Performed by: EMERGENCY MEDICINE

## 2021-07-14 PROCEDURE — 0W3P8ZZ CONTROL BLEEDING IN GASTROINTESTINAL TRACT, VIA NATURAL OR ARTIFICIAL OPENING ENDOSCOPIC: ICD-10-PCS | Performed by: INTERNAL MEDICINE

## 2021-07-14 PROCEDURE — 93005 ELECTROCARDIOGRAM TRACING: CPT | Performed by: EMERGENCY MEDICINE

## 2021-07-14 PROCEDURE — 80053 COMPREHEN METABOLIC PANEL: CPT | Performed by: EMERGENCY MEDICINE

## 2021-07-14 PROCEDURE — 999N000141 HC STATISTIC PRE-PROCEDURE NURSING ASSESSMENT: Performed by: INTERNAL MEDICINE

## 2021-07-14 PROCEDURE — 250N000011 HC RX IP 250 OP 636: Performed by: INTERNAL MEDICINE

## 2021-07-14 PROCEDURE — C9113 INJ PANTOPRAZOLE SODIUM, VIA: HCPCS

## 2021-07-14 PROCEDURE — C9803 HOPD COVID-19 SPEC COLLECT: HCPCS | Performed by: EMERGENCY MEDICINE

## 2021-07-14 PROCEDURE — 0F798DZ DILATION OF COMMON BILE DUCT WITH INTRALUMINAL DEVICE, VIA NATURAL OR ARTIFICIAL OPENING ENDOSCOPIC: ICD-10-PCS | Performed by: INTERNAL MEDICINE

## 2021-07-14 PROCEDURE — C1877 STENT, NON-COAT/COV W/O DEL: HCPCS | Performed by: INTERNAL MEDICINE

## 2021-07-14 PROCEDURE — 258N000003 HC RX IP 258 OP 636: Performed by: ANESTHESIOLOGY

## 2021-07-14 PROCEDURE — 0FC98ZZ EXTIRPATION OF MATTER FROM COMMON BILE DUCT, VIA NATURAL OR ARTIFICIAL OPENING ENDOSCOPIC: ICD-10-PCS | Performed by: INTERNAL MEDICINE

## 2021-07-14 PROCEDURE — 36592 COLLECT BLOOD FROM PICC: CPT | Performed by: EMERGENCY MEDICINE

## 2021-07-14 PROCEDURE — 255N000002 HC RX 255 OP 636: Performed by: INTERNAL MEDICINE

## 2021-07-14 PROCEDURE — U0005 INFEC AGEN DETEC AMPLI PROBE: HCPCS | Performed by: PHYSICIAN ASSISTANT

## 2021-07-14 PROCEDURE — 258N000003 HC RX IP 258 OP 636: Performed by: NURSE ANESTHETIST, CERTIFIED REGISTERED

## 2021-07-14 PROCEDURE — 272N000001 HC OR GENERAL SUPPLY STERILE: Performed by: INTERNAL MEDICINE

## 2021-07-14 PROCEDURE — 88307 TISSUE EXAM BY PATHOLOGIST: CPT | Mod: 26 | Performed by: PATHOLOGY

## 2021-07-14 PROCEDURE — 250N000009 HC RX 250: Performed by: INTERNAL MEDICINE

## 2021-07-14 PROCEDURE — 258N000003 HC RX IP 258 OP 636

## 2021-07-14 PROCEDURE — 120N000002 HC R&B MED SURG/OB UMMC

## 2021-07-14 PROCEDURE — 258N000003 HC RX IP 258 OP 636: Performed by: EMERGENCY MEDICINE

## 2021-07-14 PROCEDURE — 99223 1ST HOSP IP/OBS HIGH 75: CPT | Mod: 25

## 2021-07-14 PROCEDURE — 96361 HYDRATE IV INFUSION ADD-ON: CPT | Performed by: EMERGENCY MEDICINE

## 2021-07-14 PROCEDURE — 85027 COMPLETE CBC AUTOMATED: CPT | Performed by: EMERGENCY MEDICINE

## 2021-07-14 PROCEDURE — 250N000009 HC RX 250: Performed by: NURSE ANESTHETIST, CERTIFIED REGISTERED

## 2021-07-14 PROCEDURE — 0FPB8DZ REMOVAL OF INTRALUMINAL DEVICE FROM HEPATOBILIARY DUCT, VIA NATURAL OR ARTIFICIAL OPENING ENDOSCOPIC: ICD-10-PCS | Performed by: INTERNAL MEDICINE

## 2021-07-14 PROCEDURE — 86923 COMPATIBILITY TEST ELECTRIC: CPT

## 2021-07-14 PROCEDURE — 370N000017 HC ANESTHESIA TECHNICAL FEE, PER MIN: Performed by: INTERNAL MEDICINE

## 2021-07-14 PROCEDURE — 360N000082 HC SURGERY LEVEL 2 W/ FLUORO, PER MIN: Performed by: INTERNAL MEDICINE

## 2021-07-14 DEVICE — IMPLANTABLE DEVICE
Type: IMPLANTABLE DEVICE | Site: BILE DUCT | Status: NON-FUNCTIONAL
Removed: 2021-08-19

## 2021-07-14 RX ORDER — LIDOCAINE 40 MG/G
CREAM TOPICAL
Status: DISCONTINUED | OUTPATIENT
Start: 2021-07-14 | End: 2021-07-21 | Stop reason: HOSPADM

## 2021-07-14 RX ORDER — OXYBUTYNIN CHLORIDE 5 MG/1
20 TABLET, EXTENDED RELEASE ORAL AT BEDTIME
Status: DISCONTINUED | OUTPATIENT
Start: 2021-07-15 | End: 2021-07-21 | Stop reason: HOSPADM

## 2021-07-14 RX ORDER — ONDANSETRON 2 MG/ML
4 INJECTION INTRAMUSCULAR; INTRAVENOUS EVERY 30 MIN PRN
Status: DISCONTINUED | OUTPATIENT
Start: 2021-07-14 | End: 2021-07-14

## 2021-07-14 RX ORDER — MEPERIDINE HYDROCHLORIDE 25 MG/ML
12.5 INJECTION INTRAMUSCULAR; INTRAVENOUS; SUBCUTANEOUS
Status: DISCONTINUED | OUTPATIENT
Start: 2021-07-14 | End: 2021-07-14

## 2021-07-14 RX ORDER — ONDANSETRON 2 MG/ML
4 INJECTION INTRAMUSCULAR; INTRAVENOUS EVERY 6 HOURS PRN
Status: DISCONTINUED | OUTPATIENT
Start: 2021-07-14 | End: 2021-07-14

## 2021-07-14 RX ORDER — IOPAMIDOL 510 MG/ML
INJECTION, SOLUTION INTRAVASCULAR PRN
Status: DISCONTINUED | OUTPATIENT
Start: 2021-07-14 | End: 2021-07-14 | Stop reason: HOSPADM

## 2021-07-14 RX ORDER — FENTANYL CITRATE 50 UG/ML
25 INJECTION, SOLUTION INTRAMUSCULAR; INTRAVENOUS EVERY 5 MIN PRN
Status: DISCONTINUED | OUTPATIENT
Start: 2021-07-14 | End: 2021-07-14

## 2021-07-14 RX ORDER — SODIUM CHLORIDE, SODIUM LACTATE, POTASSIUM CHLORIDE, CALCIUM CHLORIDE 600; 310; 30; 20 MG/100ML; MG/100ML; MG/100ML; MG/100ML
INJECTION, SOLUTION INTRAVENOUS CONTINUOUS
Status: DISCONTINUED | OUTPATIENT
Start: 2021-07-14 | End: 2021-07-14 | Stop reason: HOSPADM

## 2021-07-14 RX ORDER — LEVOFLOXACIN 5 MG/ML
INJECTION, SOLUTION INTRAVENOUS PRN
Status: DISCONTINUED | OUTPATIENT
Start: 2021-07-14 | End: 2021-07-14

## 2021-07-14 RX ORDER — POLYETHYLENE GLYCOL 3350 17 G/17G
17 POWDER, FOR SOLUTION ORAL DAILY
Status: DISCONTINUED | OUTPATIENT
Start: 2021-07-15 | End: 2021-07-20

## 2021-07-14 RX ORDER — ONDANSETRON 4 MG/1
4 TABLET, ORALLY DISINTEGRATING ORAL EVERY 6 HOURS PRN
Status: DISCONTINUED | OUTPATIENT
Start: 2021-07-14 | End: 2021-07-21 | Stop reason: HOSPADM

## 2021-07-14 RX ORDER — LIDOCAINE HYDROCHLORIDE 20 MG/ML
INJECTION, SOLUTION INFILTRATION; PERINEURAL PRN
Status: DISCONTINUED | OUTPATIENT
Start: 2021-07-14 | End: 2021-07-14

## 2021-07-14 RX ORDER — SODIUM CHLORIDE, SODIUM LACTATE, POTASSIUM CHLORIDE, CALCIUM CHLORIDE 600; 310; 30; 20 MG/100ML; MG/100ML; MG/100ML; MG/100ML
INJECTION, SOLUTION INTRAVENOUS CONTINUOUS PRN
Status: DISCONTINUED | OUTPATIENT
Start: 2021-07-14 | End: 2021-07-14

## 2021-07-14 RX ORDER — ENALAPRIL MALEATE 10 MG/1
10 TABLET ORAL 2 TIMES DAILY
Status: CANCELLED | OUTPATIENT
Start: 2021-07-14

## 2021-07-14 RX ORDER — LIDOCAINE 40 MG/G
CREAM TOPICAL
Status: DISCONTINUED | OUTPATIENT
Start: 2021-07-14 | End: 2021-07-14 | Stop reason: HOSPADM

## 2021-07-14 RX ORDER — NALOXONE HYDROCHLORIDE 0.4 MG/ML
0.2 INJECTION, SOLUTION INTRAMUSCULAR; INTRAVENOUS; SUBCUTANEOUS
Status: DISCONTINUED | OUTPATIENT
Start: 2021-07-14 | End: 2021-07-14

## 2021-07-14 RX ORDER — HYDROMORPHONE HCL IN WATER/PF 6 MG/30 ML
0.2 PATIENT CONTROLLED ANALGESIA SYRINGE INTRAVENOUS EVERY 5 MIN PRN
Status: DISCONTINUED | OUTPATIENT
Start: 2021-07-14 | End: 2021-07-14

## 2021-07-14 RX ORDER — ONDANSETRON 2 MG/ML
INJECTION INTRAMUSCULAR; INTRAVENOUS PRN
Status: DISCONTINUED | OUTPATIENT
Start: 2021-07-14 | End: 2021-07-14

## 2021-07-14 RX ORDER — ONDANSETRON 2 MG/ML
4 INJECTION INTRAMUSCULAR; INTRAVENOUS EVERY 6 HOURS PRN
Status: DISCONTINUED | OUTPATIENT
Start: 2021-07-14 | End: 2021-07-21 | Stop reason: HOSPADM

## 2021-07-14 RX ORDER — NALOXONE HYDROCHLORIDE 0.4 MG/ML
0.4 INJECTION, SOLUTION INTRAMUSCULAR; INTRAVENOUS; SUBCUTANEOUS
Status: DISCONTINUED | OUTPATIENT
Start: 2021-07-14 | End: 2021-07-14

## 2021-07-14 RX ORDER — SODIUM CHLORIDE, SODIUM LACTATE, POTASSIUM CHLORIDE, CALCIUM CHLORIDE 600; 310; 30; 20 MG/100ML; MG/100ML; MG/100ML; MG/100ML
INJECTION, SOLUTION INTRAVENOUS CONTINUOUS
Status: DISCONTINUED | OUTPATIENT
Start: 2021-07-14 | End: 2021-07-14

## 2021-07-14 RX ORDER — ONDANSETRON 4 MG/1
4 TABLET, ORALLY DISINTEGRATING ORAL EVERY 30 MIN PRN
Status: DISCONTINUED | OUTPATIENT
Start: 2021-07-14 | End: 2021-07-14

## 2021-07-14 RX ORDER — FLUMAZENIL 0.1 MG/ML
0.2 INJECTION, SOLUTION INTRAVENOUS
Status: DISCONTINUED | OUTPATIENT
Start: 2021-07-14 | End: 2021-07-14

## 2021-07-14 RX ORDER — EPINEPHRINE IN SOD CHLOR,ISO 1 MG/10 ML
SYRINGE (ML) INTRAVENOUS PRN
Status: DISCONTINUED | OUTPATIENT
Start: 2021-07-14 | End: 2021-07-14 | Stop reason: HOSPADM

## 2021-07-14 RX ORDER — ALBUMIN, HUMAN INJ 5% 5 %
SOLUTION INTRAVENOUS CONTINUOUS PRN
Status: DISCONTINUED | OUTPATIENT
Start: 2021-07-14 | End: 2021-07-14

## 2021-07-14 RX ORDER — INDOMETHACIN 50 MG/1
100 SUPPOSITORY RECTAL
Status: DISCONTINUED | OUTPATIENT
Start: 2021-07-14 | End: 2021-07-14 | Stop reason: HOSPADM

## 2021-07-14 RX ORDER — POTASSIUM CHLORIDE 750 MG/1
20 TABLET, EXTENDED RELEASE ORAL DAILY
Status: DISCONTINUED | OUTPATIENT
Start: 2021-07-15 | End: 2021-07-21 | Stop reason: HOSPADM

## 2021-07-14 RX ORDER — ONDANSETRON 4 MG/1
4 TABLET, ORALLY DISINTEGRATING ORAL EVERY 6 HOURS PRN
Status: DISCONTINUED | OUTPATIENT
Start: 2021-07-14 | End: 2021-07-14

## 2021-07-14 RX ORDER — KETAMINE HYDROCHLORIDE 10 MG/ML
INJECTION INTRAMUSCULAR; INTRAVENOUS PRN
Status: DISCONTINUED | OUTPATIENT
Start: 2021-07-14 | End: 2021-07-14

## 2021-07-14 RX ORDER — PROPOFOL 10 MG/ML
INJECTION, EMULSION INTRAVENOUS PRN
Status: DISCONTINUED | OUTPATIENT
Start: 2021-07-14 | End: 2021-07-14

## 2021-07-14 RX ADMIN — LEVOFLOXACIN 500 MG: 5 INJECTION, SOLUTION INTRAVENOUS at 16:29

## 2021-07-14 RX ADMIN — ROCURONIUM BROMIDE 50 MG: 10 INJECTION INTRAVENOUS at 16:27

## 2021-07-14 RX ADMIN — NOREPINEPHRINE BITARTRATE 6.4 MCG: 1 INJECTION, SOLUTION, CONCENTRATE INTRAVENOUS at 16:48

## 2021-07-14 RX ADMIN — PROPOFOL 70 MG: 10 INJECTION, EMULSION INTRAVENOUS at 16:27

## 2021-07-14 RX ADMIN — SODIUM CHLORIDE 1000 ML: 900 INJECTION, SOLUTION INTRAVENOUS at 13:52

## 2021-07-14 RX ADMIN — Medication 30 MG: at 16:27

## 2021-07-14 RX ADMIN — SODIUM CHLORIDE 1000 ML: 9 INJECTION, SOLUTION INTRAVENOUS at 12:00

## 2021-07-14 RX ADMIN — ALBUMIN (HUMAN): 12.5 SOLUTION INTRAVENOUS at 17:10

## 2021-07-14 RX ADMIN — SODIUM CHLORIDE, POTASSIUM CHLORIDE, SODIUM LACTATE AND CALCIUM CHLORIDE: 600; 310; 30; 20 INJECTION, SOLUTION INTRAVENOUS at 16:14

## 2021-07-14 RX ADMIN — SODIUM CHLORIDE, POTASSIUM CHLORIDE, SODIUM LACTATE AND CALCIUM CHLORIDE: 600; 310; 30; 20 INJECTION, SOLUTION INTRAVENOUS at 18:25

## 2021-07-14 RX ADMIN — SUGAMMADEX 200 MG: 100 INJECTION, SOLUTION INTRAVENOUS at 17:35

## 2021-07-14 RX ADMIN — PHENYLEPHRINE HYDROCHLORIDE 200 MCG: 10 INJECTION INTRAVENOUS at 16:43

## 2021-07-14 RX ADMIN — PHENYLEPHRINE HYDROCHLORIDE 100 MCG: 10 INJECTION INTRAVENOUS at 16:40

## 2021-07-14 RX ADMIN — GLUCAGON HYDROCHLORIDE 0.4 MG: KIT at 16:58

## 2021-07-14 RX ADMIN — ALBUMIN (HUMAN): 12.5 SOLUTION INTRAVENOUS at 16:40

## 2021-07-14 RX ADMIN — PANTOPRAZOLE SODIUM 40 MG: 40 INJECTION, POWDER, FOR SOLUTION INTRAVENOUS at 14:13

## 2021-07-14 RX ADMIN — ONDANSETRON 4 MG: 2 INJECTION INTRAMUSCULAR; INTRAVENOUS at 17:29

## 2021-07-14 RX ADMIN — PHENYLEPHRINE HYDROCHLORIDE 200 MCG: 10 INJECTION INTRAVENOUS at 17:00

## 2021-07-14 RX ADMIN — LIDOCAINE HYDROCHLORIDE 100 MG: 20 INJECTION, SOLUTION INFILTRATION; PERINEURAL at 16:27

## 2021-07-14 ASSESSMENT — MIFFLIN-ST. JEOR: SCORE: 1425.72

## 2021-07-14 NOTE — PROGRESS NOTES
Elier's wife called to update that Elier was being admitted to the hospital with bleeding and low hgb. Stated that he would be getting scoped while inpatient and blood transfusion. She mentioned that Elier took his medications on Monday evening (after procedure) as they were laid out in his pill box, which included the plavix.  Will relay update with Dr Kent.    Nathalie Levine, RN, BSN,   [facility]  Care coordinator  Ph [phone]  Fax [phone]

## 2021-07-14 NOTE — TELEPHONE ENCOUNTER
As covering provider, found the form and signed for skilled Nursing Care (SN, PT, OT, SLP) s/p recent surgery.  In out box.

## 2021-07-14 NOTE — ED PROVIDER NOTES
[place] EMERGENCY DEPARTMENT ([facility])  July 14, 2021  History     Chief Complaint   Patient presents with     Rectal Bleeding     6+ bloody/tarry stools     The history is provided by the patient and medical records.     Elier Leong is a 75 year old male with a past medical history significant for primary lateral sclerosis who presents to the Emergency Department for evaluation of bloody, tarry stools since yesterday. Pt and his wife report he had an ERCP on Monday and had a tumor in his ampulla removed.  Patient has documented ampullary adenocarcinoma. The GI physician told them that the patient may require cauterization if the bleeding persists. They did not notice much bleeding on Monday evening.  His bloody bowel movements began on Tuesday (7/13) and he had 6 black, tarry BMs  yesterday and 3 today.  The patient's wife states that he has been pale and weak.  She has recorded his BP at home with the  systolic ranging from . He has been afebrile at home. Denies fever, chills, nausea, vomiting, diarrhea, abdominal pain.     Patient also endorses right leg edema.  He was evaluated in outside ED 2 weeks ago, US was performed and was unremarkable. No leg pain.           PAST MEDICAL HISTORY:   Past Medical History:   Diagnosis Date     Basal cell carcinoma nos     sees derm     CVA (cerebral infarction) 6/14    small vessel right int capsule stroke     DVT (deep vein thrombosis) in pregnancy 05/12/2017    right peroneal vein     Essential hypertension, benign      Hearing loss      Hoarseness of voice     assoc with PLS     Lumbar radiculopathy     2017     Primary Lateral Sclerosis 2002    has baclofen pump through Dr. Calvert, [place], sees Dr. Fink, [facility]     Primary osteoarthritis of right shoulder 6/16/2021     Prostate CA (H) 2008    prostatectomy     Vertigo 2/21/2013       PAST SURGICAL HISTORY:   Past Surgical History:   Procedure Laterality Date     ABDOMEN SURGERY  11/12    Hernia      BIOPSY      Cancerous growth on leg. Removed by MOHs treatment     ENDOSCOPIC RETROGRADE CHOLANGIOPANCREATOGRAM N/A 2021    Procedure: ENDOSCOPIC RETROGRADE CHOLANGIOPANCREATOGRAPHY, BILIARY STENT PLACEMENT;  Surgeon: Sima Galvez MD;  Location: [facility] OR     ENDOSCOPIC RETROGRADE CHOLANGIOPANCREATOGRAM COMPLEX N/A 2021    Procedure: ENDOSCOPIC RETROGRADE CHOLANGIOPANCREATOGRAPHY WITH AMPULLECTOMY, PANCREATIC DUCT STENT PLACEMENT AND BILIARY STENT PLACEMENT;  Surgeon: Agus Kent MD;  Location: [facility] OR     ENDOSCOPIC ULTRASOUND UPPER GASTROINTESTINAL TRACT (GI) N/A 2021    Procedure: ENDOSCOPIC ULTRASOUND, ESOPHAGOSCOPY / UPPER GASTROINTESTINAL TRACT (GI);  Surgeon: Agus Kent MD;  Location: [facility] OR     ESOPHAGOSCOPY, GASTROSCOPY, DUODENOSCOPY (EGD), COMBINED N/A 2021    Procedure: ESOPHAGOGASTRODUODENOSCOPY, WITH ENDOSCOPIC US, fine needle aspiration;  Surgeon: Sima Galvez MD;  Location: [facility] OR     HERNIA REPAIR      Repaired     PROSTATE SURGERY       [facility] NONSPECIFIC PROCEDURE       prostatectomy      ZZC NONSPECIFIC PROCEDURE      colonoscopy     ZZC NONSPECIFIC PROCEDURE  2006    hernia, right side     ZZ NONSPECIFIC PROCEDURE      T + A       Past medical history, past surgical history, medications, and allergies were reviewed with the patient. Additional pertinent items: None    FAMILY HISTORY:   Family History   Problem Relation Age of Onset     Heart Disease Mother         CHF     Hypertension Mother      C.A.D. Maternal Grandfather      Cerebrovascular Disease Maternal Uncle         45     Cerebrovascular Disease Maternal Uncle        SOCIAL HISTORY:   Social History     Tobacco Use     Smoking status: Former Smoker     Packs/day: 0.30     Years: 6.00     Pack years: 1.80     Types: Cigarettes     Start date: 1970     Quit date: 10/5/1976     Years since quittin.8     Smokeless tobacco: Never Used   Substance Use Topics      Alcohol use: Yes     Comment: 1 drink/week     Social history was reviewed with the patient. Additional pertinent items: None      Current Discharge Medication List      CONTINUE these medications which have NOT CHANGED    Details   acetaminophen (TYLENOL) 650 MG CR tablet Take 650 mg by mouth every 8 hours as needed       aspirin 81 MG tablet Take 81 mg by mouth every evening       baclofen (LIORESAL) intraTHECAL Internal Pump by Intrathecal route continuous prn Pump filled by [facility] stroke [place] [phone]  Pump Model: Syncromed  Medication in pump is Gablofen (brand name)  Last fill:  03/02/2021  Next fill:   Before 08/02/2021  Low [place] Alarm Date:   Reservoir Volume: 20 ml  Conc: 2000 mcg/mL  Delivers 234.7 mcg/day  Basal rate:unknown  Battery life: unknown      clopidogrel (PLAVIX) 75 MG tablet Take 75 mg by mouth every evening       enalapril (VASOTEC) 10 MG tablet TAKE 1 TABLET TWICE A DAY  Qty: 180 tablet, Refills: 1    Associated Diagnoses: Essential hypertension, benign      oxybutynin ER (DITROPAN-XL) 10 MG 24 hr tablet Take 2 tablets (20 mg) by mouth At Bedtime  Qty: 180 tablet, Refills: 3    Comments: ### DO NOT FILL NOW.  Please update patient's profile to reflect additional refills.  ####  Associated Diagnoses: Urinary frequency      pantoprazole (PROTONIX) 40 MG EC tablet Take 1 tablet (40 mg) by mouth 2 times daily  Qty: 60 tablet, Refills: 0    Associated Diagnoses: Gastroesophageal reflux disease without esophagitis      polyethylene glycol (MIRALAX/GLYCOLAX) Packet Take 1 packet by mouth daily as needed Every 2-3 days.      potassium chloride ER (K-TAB/KLOR-CON) 10 MEQ CR tablet Take 2 tablets (20 mEq) by mouth daily  Qty: 180 tablet, Refills: 3    Comments: ### DO NOT FILL NOW.  Please update patient's profile to reflect additional refills.  ####  Associated Diagnoses: Essential hypertension, benign      EPINEPHrine 0.3 MG/0.3ML injection Inject 0.3  "mLs (0.3 mg) into the muscle as needed for anaphylaxis  Qty: 0.3 mL, Refills: 3    Associated Diagnoses: Bee sting reaction, accidental or unintentional, initial encounter      fluorouracil (EFUDEX) 5 % external cream Apply topically to legs 1 to 2 times weekly as needed/tolerated for maintenance      ketoconazole (NIZORAL) 2 % external shampoo SHAMPOO EVERY 2-3 DAYS AS  NEEDED  Qty: 120 mL, Refills: 10    Associated Diagnoses: Dermatitis                Allergies   Allergen Reactions     Bee Venom      Swelling and hives     Penicillins Hives     \"HIVES\"        Review of Systems  A complete review of systems was performed with pertinent positives and negatives noted in the HPI, and all other systems negative.  General: No fevers or chills, +fatigue and generalized weakness  Skin: No rash or diaphoresis  Eyes: No eye redness or discharge  Ears/Nose/Throat: No rhinorrhea or nasal congestion  Respiratory: No cough or SOB  Cardiovascular: No chest pain or palpitations  Gastrointestinal: No nausea, vomiting, or diarrhea  Genitourinary: No urinary frequency, hematuria, or dysuria  Musculoskeletal: No arthralgias or myalgias  Neurologic: No numbness or weakness  Psychiatric: No depression or SI  Hematologic/Lymphatic/Immunologic: +leg swelling, no easy bruising/bleeding  Endocrine: No polyuria/polydypsia      Physical Exam   BP: 105/76  Pulse: 77  Temp: 97.8  F (36.6  C)  Resp: 10  Height: 177.8 cm (5' 10\")  Weight: 68.9 kg (152 lb)  SpO2: 100 %      Physical Exam  Vitals and nursing note reviewed.   Constitutional:       General: He is awake. He is not in acute distress.     Appearance: Normal appearance. He is well-developed, well-groomed and normal weight. He is not ill-appearing.   HENT:      Head: Normocephalic and atraumatic.      Mouth/Throat:      Mouth: Mucous membranes are moist.   Eyes:      General: No scleral icterus.     Extraocular Movements: Extraocular movements intact.      Conjunctiva/sclera: " Conjunctivae normal.      Pupils: Pupils are equal, round, and reactive to light.   Cardiovascular:      Rate and Rhythm: Normal rate and regular rhythm.      Pulses: Normal pulses.      Heart sounds: Normal heart sounds. No murmur heard.   No gallop.    Pulmonary:      Effort: Pulmonary effort is normal. No respiratory distress.      Breath sounds: Normal breath sounds. No wheezing or rales.   Abdominal:      General: Abdomen is flat. Bowel sounds are normal. There is no distension.      Palpations: Abdomen is soft.      Tenderness: There is no abdominal tenderness. There is no guarding.   Musculoskeletal:      Cervical back: Normal range of motion and neck supple.      Comments: BLE edema, R>L; +1 pitting   Skin:     General: Skin is warm.      Capillary Refill: Capillary refill takes 2 to 3 seconds.      Coloration: Skin is pale.      Comments: Multiple purpura on BLE   Neurological:      Mental Status: He is alert and oriented to person, place, and time.   Psychiatric:         Mood and Affect: Mood normal.         Behavior: Behavior normal. Behavior is cooperative.         Thought Content: Thought content normal.         ED Course        Procedures          EKG Interpretation:      Interpreted by Ana Maria Mercado MD  Time reviewed: 3:34 pm  Symptoms at time of EKG: none  Rhythm: Normal sinus rhythm  Rate: 89  Axis: normal  Ectopy: none  Conduction: normal  ST Segments/ T Waves: No ST-T wave changes  Q Waves: none  Comparison to prior: not compared to prior  Clinical Impression: normal EKG      Results for orders placed or performed during the hospital encounter of 07/14/21 (from the past 24 hour(s))   Novelty Draw    Narrative    The following orders were created for panel order Novelty Draw.  Procedure                               Abnormality         Status                     ---------                               -----------         ------                     Extra Blood Culture Bottle[[id number]]                        Final result               Extra Blue Top Tube[[id number]]                              Final result               Extra Red Top Tube[[id number]]                               Final result               Extra Green Top (Lithium...[[id number]]                      Final result               Extra Purple Top Tube[[id number]]                            Final result               Extra Purple Top Tube[[id number]]                            Final result               Extra Green Top (Lithium...[[id number]]                      Final result                 Please view results for these tests on the individual orders.   Extra Blue Top Tube   Result Value Ref Range    Hold Specimen JIC    Extra Purple Top Tube   Result Value Ref Range    Hold Specimen JIC    Extra Green Top (Lithium Heparin) ON ICE   Result Value Ref Range    Hold Specimen JIC    Extra Blood Culture Bottle   Result Value Ref Range    Hold Specimen JIC    Extra Red Top Tube   Result Value Ref Range    Hold Specimen JIC    Extra Green Top (Lithium Heparin) Tube   Result Value Ref Range    Hold Specimen JIC    Extra Purple Top Tube   Result Value Ref Range    Hold Specimen JIC    ABO/Rh type and screen    Narrative    The following orders were created for panel order ABO/Rh type and screen.  Procedure                               Abnormality         Status                     ---------                               -----------         ------                     Adult Type and Screen[[id number]]                            In process                   Please view results for these tests on the individual orders.   CBC with platelets   Result Value Ref Range    WBC Count 9.6 4.0 - 11.0 10e3/uL    RBC Count 2.54 (L) 4.40 - 5.90 10e6/uL    Hemoglobin 8.1 (L) 13.3 - 17.7 g/dL    Hematocrit 24.4 (L) 40.0 - 53.0 %    MCV 96 78 - 100 fL    MCH 31.9 26.5 - 33.0 pg    MCHC 33.2 31.5 - 36.5 g/dL    RDW 14.1 10.0 - 15.0 %    Platelet Count 186 150 - 450 10e3/uL    Comprehensive metabolic panel   Result Value Ref Range    Sodium 134 133 - 144 mmol/L    Potassium 4.7 3.4 - 5.3 mmol/L    Chloride 101 94 - 109 mmol/L    Carbon Dioxide (CO2) 28 20 - 32 mmol/L    Anion Gap 5 3 - 14 mmol/L    Urea Nitrogen 46 (H) 7 - 30 mg/dL    Creatinine 0.76 0.66 - 1.25 mg/dL    Calcium 9.0 8.5 - 10.1 mg/dL    Glucose 117 (H) 70 - 99 mg/dL    Alkaline Phosphatase 137 40 - 150 U/L    AST 94 (H) 0 - 45 U/L    ALT 64 0 - 70 U/L    Protein Total 5.4 (L) 6.8 - 8.8 g/dL    Albumin 2.6 (L) 3.4 - 5.0 g/dL    Bilirubin Total 0.9 0.2 - 1.3 mg/dL    GFR Estimate 89 >60 mL/min/1.73m2   ABO/Rh type and screen *Canceled*    Narrative    The following orders were created for panel order ABO/Rh type and screen.  Procedure                               Abnormality         Status                     ---------                               -----------         ------                       Please view results for these tests on the individual orders.   Asymptomatic COVID-19 Virus (Coronavirus) by PCR Nasopharyngeal *Canceled*    Specimen: Nasopharyngeal; Swab    Narrative    The following orders were created for panel order Asymptomatic COVID-19 Virus (Coronavirus) by PCR Nasopharyngeal.  Procedure                               Abnormality         Status                     ---------                               -----------         ------                       Please view results for these tests on the individual orders.   Asymptomatic COVID-19 Virus (Coronavirus) by PCR Nasopharyngeal    Specimen: Nasopharyngeal; Swab    Narrative    The following orders were created for panel order Asymptomatic COVID-19 Virus (Coronavirus) by PCR Nasopharyngeal.  Procedure                               Abnormality         Status                     ---------                               -----------         ------                     SARS-COV2 (COVID-19) Vir...[[id number]]                      In process                   Please  view results for these tests on the individual orders.   Asymptomatic COVID-19 Virus (Coronavirus) by PCR Nasopharyngeal *Canceled*    Specimen: Nasopharyngeal; Swab    Narrative    The following orders were created for panel order Asymptomatic COVID-19 Virus (Coronavirus) by PCR Nasopharyngeal.  Procedure                               Abnormality         Status                     ---------                               -----------         ------                       Please view results for these tests on the individual orders.   EKG 12 lead   Result Value Ref Range    Systolic Blood Pressure  mmHg    Diastolic Blood Pressure  mmHg    Ventricular Rate 89 BPM    Atrial Rate 89 BPM    RI Interval 166 ms    QRS Duration 84 ms     ms    QTc 457 ms    P Axis 70 degrees    R AXIS 7 degrees    T Axis 4 degrees    Interpretation ECG Click View Image link to view waveform and result      *Note: Due to a large number of results and/or encounters for the requested time period, some results have not been displayed. A complete set of results can be found in Results Review.     Medications   simethicone 133mg/2mL oral suspension (2 mLs Oral Given 7/14/21 1529)   0.9% sodium chloride BOLUS (0 mLs Intravenous Stopped 7/14/21 1351)   0.9% sodium chloride BOLUS (1,000 mLs Intravenous New Bag 7/14/21 1352)   pantoprazole (PROTONIX) IV push injection 40 mg (40 mg Intravenous Given 7/14/21 1413)             Assessments & Plan (with Medical Decision Making)     Elier Leong is a 75 year old male with a past medical history significant for primary lateral sclerosis who presents to the Emergency Department for evaluation of bloody, tarry stools since yesterday.    #GI bleed  #bloody stools  -Patient underwent ERCP on 7/12/2021 for therapeutic periampullary tumor (documented ampullary adenocarcinoma) and has had persistent bloody stools since 7/13/21.  -Hgb currently 8.1. Hgb decreased from 15.2 on 7/12/21 to 8.1 today  (7/14/21).   -Ordered type and screen  -Administer 2 L of 0.9 percent NS  -Continue NPO until GI says otherwise  -Started Protonix 40 mg IVF per GI  -2 large-bore IVs placed  -ordered EKG  -I consented the patient for blood transfusion    DISPOSITION:  Dr. Rosalva ludwig with GI who will evaluate the patient.  Due to rapid decline decrease in hemoglobin I will admit patient to medicine service. GI will perform another EGD to identify the source of the bleed.  Will need to continually monitor patient's BP.  Has been stable, but soft while here in the ED (SYSTOLIC LOW 100s).         I have reviewed the nursing notes.    I have reviewed the findings, diagnosis, plan and need for follow up with the patient.    Ana Maria Mercado MD  Internal Medicine  PGY1      Current Discharge Medication List          Final diagnoses:   GI bleed       7/14/2021   [facility] EMERGENCY DEPARTMENT  --    ED Attending Physician Attestation    I Eugenio Blackwell MD, cared for this patient with the Resident. I have performed a history and physical examination of the patient and discussed management with the resident. I reviewed the resident's documentation above and agree with the documented findings and plan of care.    Summary of HPI, PE, ED Course   Patient is a 75 year old male evaluated in the emergency department for maroon and black tarry stools. Exam notable for slightly low blood pressure. ED course notable for laboratories revealing hemoglobin drop resulting in GI consultation. After the completion of care in the emergency department, the patient was admitted to inpatient.    Critical Care & Procedures  Not applicable.    Medical Decision Making  The medical record was reviewed and interpreted.  Current labs reviewed and interpreted.  Previous labs reviewed and interpreted.      Eugenio Blackwell MD  Emergency Medicine          Eugenio Blackwell MD  07/14/21 0036

## 2021-07-14 NOTE — ANESTHESIA PREPROCEDURE EVALUATION
Anesthesia Pre-Procedure Evaluation    Patient: Elier Leong   MRN: [id number] : 1945        Preoperative Diagnosis: GI bleed [K92.2]   Procedure : Procedure(s):  ESOPHAGOGASTRODUODENOSCOPY (EGD)  ENDOSCOPIC RETROGRADE CHOLANGIOPANCREATOGRAPHY     Past Medical History:   Diagnosis Date     Basal cell carcinoma nos     sees derm     CVA (cerebral infarction)     small vessel right int capsule stroke     DVT (deep vein thrombosis) in pregnancy 2017    right peroneal vein     Essential hypertension, benign      Hearing loss      Hoarseness of voice     assoc with PLS     Lumbar radiculopathy          Primary Lateral Sclerosis     has baclofen pump through Dr. Calvert, [facility], sees Dr. Fink, [facility]     Primary osteoarthritis of right shoulder 2021     Prostate CA (H)     prostatectomy     Vertigo 2013      Past Surgical History:   Procedure Laterality Date     ABDOMEN SURGERY      Hernia     BIOPSY      Cancerous growth on leg. Removed by MOHs treatment     ENDOSCOPIC RETROGRADE CHOLANGIOPANCREATOGRAM N/A 2021    Procedure: ENDOSCOPIC RETROGRADE CHOLANGIOPANCREATOGRAPHY, BILIARY STENT PLACEMENT;  Surgeon: Sima Galvez MD;  Location: [facility] OR     ENDOSCOPIC RETROGRADE CHOLANGIOPANCREATOGRAM COMPLEX N/A 2021    Procedure: ENDOSCOPIC RETROGRADE CHOLANGIOPANCREATOGRAPHY WITH AMPULLECTOMY, PANCREATIC DUCT STENT PLACEMENT AND BILIARY STENT PLACEMENT;  Surgeon: Agus Kent MD;  Location: [facility]     ENDOSCOPIC ULTRASOUND UPPER GASTROINTESTINAL TRACT (GI) N/A 2021    Procedure: ENDOSCOPIC ULTRASOUND, ESOPHAGOSCOPY / UPPER GASTROINTESTINAL TRACT (GI);  Surgeon: Agus Kent MD;  Location: [facility] OR     ESOPHAGOSCOPY, GASTROSCOPY, DUODENOSCOPY (EGD), COMBINED N/A 2021    Procedure: ESOPHAGOGASTRODUODENOSCOPY, WITH ENDOSCOPIC US, fine needle aspiration;  Surgeon: Sima Galvez MD;  Location: [facility] OR     HERNIA REPAIR      Repaired      "PROSTATE SURGERY       [facility] NONSPECIFIC PROCEDURE       prostatectomy      [facility] NONSPECIFIC PROCEDURE      colonoscopy     [facility] NONSPECIFIC PROCEDURE  2006    hernia, right side     [facility] NONSPECIFIC PROCEDURE      T + A      Allergies   Allergen Reactions     Bee Venom      Swelling and hives     Penicillins Hives     \"HIVES\"      Social History     Tobacco Use     Smoking status: Former Smoker     Packs/day: 0.30     Years: 6.00     Pack years: 1.80     Types: Cigarettes     Start date: 1970     Quit date: 10/5/1976     Years since quittin.8     Smokeless tobacco: Never Used   Substance Use Topics     Alcohol use: Yes     Comment: 1 drink/week      Wt Readings from Last 1 Encounters:   21 68.9 kg (152 lb)        Anesthesia Evaluation   Pt has had prior anesthetic. Type: General.    No history of anesthetic complications       ROS/MED HX  ENT/Pulmonary:  - neg pulmonary ROS     Neurologic: Comment: Primary Lateral Sclerosis with weakness and expressive aphasia    (+) CVA, date: 3 separate occasions, without deficits,     Cardiovascular: Comment: Functional capacity assessment limited by current medical conditions    (+) hypertension-----EGAN.     METS/Exercise Tolerance: 1 - Eating, dressing    Hematologic: Comments: Lab Test        06/17/21     06/16/21     06/10/21     10/27/18     10/27/18                       0654          1859          1326          1734          1734          WBC          6.6          8.4          7.5            < >        7.8           HGB          12.9*        14.1         14.5           < >        16.1          MCV          91           93           92             < >        92            PLT          260          307          301            < >        241           INR           --          0.96         0.98          --          1.07           < > = values in this interval not displayed.                  Lab Test        06/17/21     06/16/21     06/10/21              "          0654          1859          1326          NA           133          130*         134           POTASSIUM    3.9          4.0          4.5           CHLORIDE     100          96           100           CO2          27           29           28            BUN          14           17           16            CR           0.55*        0.65*        0.67          ANIONGAP     6            5            6             AMOS          8.8          9.4          9.7           GLC          95           97           98                (+) anemia,     Musculoskeletal:  - neg musculoskeletal ROS     GI/Hepatic:     (+) GERD, Asymptomatic on medication,     Renal/Genitourinary:  - neg Renal ROS     Endo: Comment: Pancreatic mass      Psychiatric/Substance Use:  - neg psychiatric ROS     Infectious Disease:  - neg infectious disease ROS     Malignancy:  - neg malignancy ROS     Other: Comment: Hx of hyponatremia - neg other ROS             OUTSIDE LABS:  CBC:   Lab Results   Component Value Date    WBC 9.6 07/14/2021    WBC 6.5 07/12/2021    HGB 8.1 (L) 07/14/2021    HGB 15.2 07/12/2021    HCT 24.4 (L) 07/14/2021    HCT 44.6 07/12/2021     07/14/2021     07/12/2021     BMP:   Lab Results   Component Value Date     07/14/2021     (L) 07/12/2021    POTASSIUM 4.7 07/14/2021    POTASSIUM 4.3 07/12/2021    CHLORIDE 101 07/14/2021    CHLORIDE 99 07/12/2021    CO2 28 07/14/2021    CO2 28 07/12/2021    BUN 46 (H) 07/14/2021    BUN 17 07/12/2021    CR 0.76 07/14/2021    CR 0.58 (L) 07/12/2021     (H) 07/14/2021    GLC 96 07/12/2021     COAGS:   Lab Results   Component Value Date    PTT 27 10/27/2018    INR 1.00 07/12/2021     POC:   Lab Results   Component Value Date     (H) 11/17/2020     HEPATIC:   Lab Results   Component Value Date    ALBUMIN 2.6 (L) 07/14/2021    PROTTOTAL 5.4 (L) 07/14/2021    ALT 64 07/14/2021    AST 94 (H) 07/14/2021    ALKPHOS 137 07/14/2021    BILITOTAL 0.9 07/14/2021      OTHER:   Lab Results   Component Value Date    A1C 5.8 (H) 11/17/2020    AMOS 9.0 07/14/2021    MAG 1.9 02/21/2013    LIPASE 616 (H) 07/12/2021    AMYLASE 120 (H) 07/12/2021    TSH 1.68 06/19/2021       Anesthesia Plan    ASA Status:  3      Anesthesia Type: General.     - Airway: ETT   Induction: Propofol, Intravenous.   Maintenance: Balanced.   Techniques and Equipment:     - Lines/Monitors: 2nd IV     Consents    Anesthesia Plan(s) and associated risks, benefits, and realistic alternatives discussed. Questions answered and patient/representative(s) expressed understanding.     - Discussed with:  Patient      - Extended Intubation/Ventilatory Support Discussed: No.      - Patient is DNR/DNI Status: No    Use of blood products discussed: No .     Postoperative Care       PONV prophylaxis: Ondansetron (or other 5HT-3), Dexamethasone or Solumedrol     Comments:                Gustavo Escobedo MD

## 2021-07-14 NOTE — PROGRESS NOTES
Spouse calls to report that after Elier had his EUS he took his plavix and other medication and has had bloody stools since yesterday morning. They started as a bit brighter red but have moved into a tar looking color and are very foul smelling. He had multiple instances of this occur yesterday. He is also more weak than he normally is (due to his PLS history) and very tired. He had an episode of incontinence this morning. She has been checking his blood pressure and notes that this morning he is in the 80s/50s with a heart rate in the 90s.which is low for him.     I advised spouse to hold his blood pressure medication and bring in into ED. I alerted his care team and called ED for report.    Rose Abdalla RN

## 2021-07-14 NOTE — ANESTHESIA CARE TRANSFER NOTE
Patient: Elier Leong    Procedure(s):  ESOPHAGOGASTRODUODENOSCOPY (EGD)  ENDOSCOPIC RETROGRADE CHOLANGIOPANCREATOGRAPHY with bile duct stents exchanged, balloon sweep of bile ducts, hemostasis    Diagnosis: GI bleed [K92.2]  Diagnosis Additional Information: No value filed.    Anesthesia Type:   General     Note:      Level of Consciousness: awake  Oxygen Supplementation: nasal cannula  Level of Supplemental Oxygen (L/min / FiO2): 3 L  Independent Airway: airway patency satisfactory and stable  Dentition: dentition unchanged  Vital Signs Stable: post-procedure vital signs reviewed and stable  Report to RN Given: handoff report given  Patient transferred to: PACU  Comments: Awake with good patent airway.  VSS  Handoff Report: Identifed the Patient, Identified the Reponsible Provider, Reviewed the pertinent medical history, Discussed the surgical course, Reviewed Intra-OP anesthesia mangement and issues during anesthesia, Set expectations for post-procedure period and Allowed opportunity for questions and acknowledgement of understanding      Vitals: (Last set prior to Anesthesia Care Transfer)  CRNA VITALS  7/14/2021 1709 - 7/14/2021 1745      7/14/2021             Resp Rate (observed):  (!) 3        Electronically Signed By: CITLALY Mendez CRNA  July 14, 2021  5:45 PM

## 2021-07-14 NOTE — ED TRIAGE NOTES
Pt arrives in wheel chair with concern for post procedure bleeding (ERCP). Pt  Has had >6 bloody or tarry stool yesterday.

## 2021-07-14 NOTE — H&P
[facility]    History and Physical - [place]        Date of Admission:  7/14/2021    Assessment & Plan      Elier Leong is a 75 year old male admitted on 7/14/2021. He has a history of primary lateral sclerosis, prostate cancer s/p prostatectomy and newly diagnosed ampullary adenocarcinoma admitted for hematochezia and melena.     # GI bleed  # Symptomatic anemia 2/2 blood loss   # Ampullary adenocarcinoma  Admitted with GIB after resuming Plavix following ERCP on 7/12. During procedure, he underwent snare papillectomy for newly diagnosed ampullary adenocarcinoma. Pancreatic and common bile duct stents were also placed at that time. Patient given instructions to hold Plavix until 7/16, however, inadvertently restarted on 7/12 as they were already laid out in his pill box. He subsequently developed several episodes of hematochezia and melena the following morning. Hemoglobin trended from 15.2 on 7/12 to 8.1 on admission. Underwent ERCP with GI prior to coming to floor; area of ulceration near pancreatic duct was found and treated with thermal therapy and epi.   - GI panc bili consulted, appreciate recs  - CLD  - 2 large bore IVs  - IV protonix 40 mg BID  - Hold PTA clopidogrel x 5 days from ERCP  - Hold NSAIDs and any antithrombotic medications x5 days from ERCP  - CBC in AM    # Choledocholithiasis  Discovered during ERCP 7/14. Stone removed and stents placed in CBD.   - Monitor LFTs    # History of PLS  Diagnosed 19 years ago, prognosis unclear. At baseline has slurred speech and needs wife's help with dressing and bathing. Able to ambulate with walker short distances, wheelchair for longer distances. Followed by neurology.   - Patient on baclofen pump (if needs MRI, notify [employer] for resetting)  - PTA oxybutinin     # History of CVA  Acute CVA in 2018, subacute CVA 11/2020. Currently at neurologic baseline.   -Holding plavix x5 days from  ERCP       Chronic issues:  GERD: Hold PTA PO PPI while on IV PPI  HLD: Not currently on statin (intentionally not prescribed per EMR)  Essential hypertension: Hold PTA enalapril given hypovolemia/hypotension  Hypokalemia: PTA potassium     Diet: Clear Liquid Diet    DVT Prophylaxis: SCDs  Wright Catheter: Not present  Fluids: PO ad constantino  Central Lines: None  Code Status: Full Code Full code, has ACP docs    Disposition Plan   Expected discharge: tomorrow or 7/16 recommended to prior living arrangement once hemoglobin stable.     The patient's care was discussed with the Attending Physician, Dr. Berger.    MD Suri [facility] Service [phone]  [facility]  Securely message with the Vocera Web Console (learn more here)  Text page via [employer] Paging/Directory  Please see sign in/sign out for up to date coverage information  ______________________________________________________________________    Chief Complaint   Rectal bleeding, black/maroon stools    History is obtained from the patient, EMR    History of Present Illness   Elier Leong is a 75 year old male who has a history of hypertension, CVA in 2018, primary lateral sclerosis, prostate cancer s/p prostatectomy and newly diagnosed ampullary adenocarcinoma and is admitted for anemia due to GI blood loss.     Patient initially presented to [facility] ER 6/16 with painless obstructive jaundice, found to have CBD obstruction secondary to ampullary mass seen on EUS, ultimately found to have new diagnosis of ampullary adenocarcinoma. He underwent ERCP with snare papillectomy successfully performed for treatment of the periampullary tumor. He also had pancreatic and CBD stents placed at that time. He was given 1L LR postoperatively for pancreatitis prophylaxis. Recommendations per the op note were to hold Plavix and restart on 7/16. Patient inadvertently restarted his Plavix the day of the procedure (2 days ago) as  they were already set out in his pill box, and yesterday morning developed bloody stools- 6 episodes yesterday, 3 today. Stools were initially bright red in color but became more tarry and foul smelling over the course of the day. His spouse noticed he was seemingly more weak and tired and his blood pressure was in the 80s/50s at home. They called the Hematology/Oncology RN line and advised to bring him to the ER for evaluation of his symptoms.     Patient notes weakness, but no vandana dizziness. He denies headache, shortness of breath, chest pain or palpitations, abdominal pain. He does report that he is much more pale than usual.     Patient noted to be anemic with hgb down to 8.1 in ED from 15.2 on 7/12. From the ED, patient underwent ERCP with GI who found area of ulceration near pancreatic duct orifice which was managed with thermal therapy and epinephrine. They also noted migration of transpapillary biliary stent from bile duct, which was retrieved, as well as choledocholithiasis which was treated at the same time with removal of stone and stent placement in CBD.     After procedure, patient feeling drowsy and hungry, otherwise doing well. Voided x1, no BMs. Requiring heavy assist x2 for transfers.     ER course:  - HR 70s-80s, BP 90s-110/60s-70s, 99% on room air, afebrile  - hemoglobin 8.1 (from 15.2 two days prior), BUN 46  - type & screen  - 2L NS  - IV pantoprazole 40 mg   - Consults: GI, plan for patient to go to OR for ERCP    Review of Systems    Negative except as noted in HPI    Past Medical History    I have reviewed this patient's medical history and updated it with pertinent information if needed.   Past Medical History:   Diagnosis Date     Basal cell carcinoma nos     sees derm     CVA (cerebral infarction) 6/14    small vessel right int capsule stroke     DVT (deep vein thrombosis) in pregnancy 05/12/2017    right peroneal vein     Essential hypertension, benign      Hearing loss      Hoarseness  of voice     assoc with PLS     Lumbar radiculopathy     2017     Primary Lateral Sclerosis 2002    has baclofen pump through [facility], [facility]     Primary osteoarthritis of right shoulder 6/16/2021     Prostate CA (H) 2008    prostatectomy     Vertigo 2/21/2013      Past Surgical History   I have reviewed this patient's surgical history and updated it with pertinent information if needed.  Past Surgical History:   Procedure Laterality Date     ABDOMEN SURGERY  11/12    Hernia     BIOPSY  4/16    Cancerous growth on leg. Removed by MOHs treatment     ENDOSCOPIC RETROGRADE CHOLANGIOPANCREATOGRAM N/A 6/17/2021    Procedure: ENDOSCOPIC RETROGRADE CHOLANGIOPANCREATOGRAPHY, BILIARY STENT PLACEMENT;  Surgeon: Sima Galvez MD;  Location: [facility] OR     ENDOSCOPIC RETROGRADE CHOLANGIOPANCREATOGRAM COMPLEX N/A 7/12/2021    Procedure: ENDOSCOPIC RETROGRADE CHOLANGIOPANCREATOGRAPHY WITH AMPULLECTOMY, PANCREATIC DUCT STENT PLACEMENT AND BILIARY STENT PLACEMENT;  Surgeon: Agus Kent MD;  [facility]     ENDOSCOPIC ULTRASOUND UPPER GASTROINTESTINAL TRACT (GI) N/A 7/12/2021    Procedure: ENDOSCOPIC ULTRASOUND, ESOPHAGOSCOPY / UPPER GASTROINTESTINAL TRACT (GI);  Surgeon: Agus Kent MD;  Location: [facility] OR     ESOPHAGOSCOPY, GASTROSCOPY, DUODENOSCOPY (EGD), COMBINED N/A 6/17/2021    Procedure: ESOPHAGOGASTRODUODENOSCOPY, WITH ENDOSCOPIC US, fine needle aspiration;  Surgeon: Sima Galvez MD;  Location: [facility] OR     HERNIA REPAIR  11/12    Repaired     PROSTATE SURGERY       [facility] NONSPECIFIC PROCEDURE  6/08     prostatectomy      [facility] NONSPECIFIC PROCEDURE  11/01    colonoscopy     [facility] NONSPECIFIC PROCEDURE  11/2006    hernia, right side     [facility] NONSPECIFIC PROCEDURE      T + A        Social History   I have reviewed this patient's social history and updated it with pertinent information if needed. Elier Leong  reports that he quit smoking about 44 years ago. His smoking use  included cigarettes. He started smoking about 51 years ago. He has a 1.80 pack-year smoking history. He has never used smokeless tobacco. He reports current alcohol use. He reports that he does not use drugs.     He lives at home with his wife.    Family History   I have reviewed this patient's family history and updated it with pertinent information if needed.  Family History   Problem Relation Age of Onset     Heart Disease Mother         CHF     Hypertension Mother      C.A.D. Maternal Grandfather      Cerebrovascular Disease Maternal Uncle         45     Cerebrovascular Disease Maternal Uncle        Prior to Admission Medications   Prior to Admission Medications   Prescriptions Last Dose Informant Patient Reported? Taking?   EPINEPHrine 0.3 MG/0.3ML injection Unknown at Unknown time Spouse/Significant Other No No   Sig: Inject 0.3 mLs (0.3 mg) into the muscle as needed for anaphylaxis   acetaminophen (TYLENOL) 650 MG CR tablet Past Week at prn Spouse/Significant Other Yes Yes   Sig: Take 650 mg by mouth every 8 hours as needed    aspirin 81 MG tablet 7/13/2021 at Unknown time Spouse/Significant Other Yes Yes   Sig: Take 81 mg by mouth every evening    baclofen (LIORESAL) intraTHECAL Internal Pump 7/14/2021 at Unknown time Spouse/Significant Other Yes Yes   Sig: by Intrathecal route continuous prn Pump filled by [facility] [place] [phone]  Pump Model: Syncromed  Medication in pump is Gablofen (brand name)  Last fill:  03/02/2021  Next fill:   Before 08/02/2021  Low [place] Alarm Date:   Reservoir Volume: 20 ml  Conc: 2000 mcg/mL  Delivers 234.7 mcg/day  Basal rate:unknown  Battery life: unknown   clopidogrel (PLAVIX) 75 MG tablet Past Week at pm Spouse/Significant Other Yes Yes   Sig: Take 75 mg by mouth every evening    enalapril (VASOTEC) 10 MG tablet 7/13/2021 at pm Spouse/Significant Other No Yes   Sig: TAKE 1 TABLET TWICE A DAY   fluorouracil (EFUDEX) 5 %  "external cream More than a month at prn Spouse/Significant Other Yes No   Sig: Apply topically to legs 1 to 2 times weekly as needed/tolerated for maintenance   ketoconazole (NIZORAL) 2 % external shampoo More than a month at prn Spouse/Significant Other No No   Sig: SHAMPOO EVERY 2-3 DAYS AS  NEEDED   oxybutynin ER (DITROPAN-XL) 10 MG 24 hr tablet 7/13/2021 at pm Spouse/Significant Other No Yes   Sig: Take 2 tablets (20 mg) by mouth At Bedtime   pantoprazole (PROTONIX) 40 MG EC tablet 7/13/2021 at am Spouse/Significant Other No Yes   Sig: Take 1 tablet (40 mg) by mouth 2 times daily   polyethylene glycol (MIRALAX/GLYCOLAX) Packet 7/13/2021 at prn Spouse/Significant Other Yes Yes   Sig: Take 1 packet by mouth daily as needed Every 2-3 days.   potassium chloride ER (K-TAB/KLOR-CON) 10 MEQ CR tablet 7/13/2021 at am Spouse/Significant Other No Yes   Sig: Take 2 tablets (20 mEq) by mouth daily      Facility-Administered Medications: None     Allergies   Allergies   Allergen Reactions     Bee Venom      Swelling and hives     Penicillins Hives     \"HIVES\"       Physical Exam   Vital Signs: Temp: 98.4  F (36.9  C) Temp src: Temporal BP: 103/58 Pulse: 91   Resp: 16 SpO2: 97 % O2 Device: None (Room air) Oxygen Delivery: 1 LPM  Weight: 152 lbs 0 oz    Physical Exam  Constitutional:       Comments: Alert and oriented, tired appearing and pale, appears slightly ill but not in acute distress   HENT:      Head: Normocephalic and atraumatic.      Mouth/Throat:      Mouth: Mucous membranes are moist.      Pharynx: Oropharynx is clear.      Comments: Pale oral mucosa  Eyes:      Comments: Conjunctival pallor present   Cardiovascular:      Rate and Rhythm: Normal rate and regular rhythm.      Heart sounds: Normal heart sounds. No murmur heard.   No gallop.    Pulmonary:      Effort: Pulmonary effort is normal. No respiratory distress.      Breath sounds: Normal breath sounds. No wheezing.   Abdominal:      General: Abdomen is flat. " There is no distension.      Palpations: Abdomen is soft.      Tenderness: There is no abdominal tenderness. There is no guarding.   Musculoskeletal:      Right lower leg: No edema.      Left lower leg: No edema.      Comments: Observed patient transfer from wheelchair to bed requiring heavy assist with 2.    Skin:     General: Skin is warm and dry.      Capillary Refill: Capillary refill takes 2 to 3 seconds.      Coloration: Skin is pale.   Neurological:      Motor: Weakness present.      Coordination: Coordination abnormal.      Comments: Slurred speech at baseline per history, baseline lower extremity spasticity and weakness   Psychiatric:         Mood and Affect: Mood normal.         Behavior: Behavior normal.              Data     Recent Labs   Lab 07/14/21  1119 07/12/21  1300 07/12/21  0848 07/12/21  0728   WBC 9.6  --  6.5  --    HGB 8.1*  --  15.2  --    MCV 96  --  93  --      --  232  --    INR  --   --  1.00  --      --  132*  --    POTASSIUM 4.7  --  4.3  --    CHLORIDE 101  --  99  --    CO2 28  --  28  --    BUN 46*  --  17  --    CR 0.76  --  0.58*  --    ANIONGAP 5  --  5  --    AMOS 9.0  --  9.3  --    *  --  96 126*   ALBUMIN 2.6*  --  3.2*  --    PROTTOTAL 5.4*  --  6.8  --    BILITOTAL 0.9  --  1.1  --    ALKPHOS 137  --  182*  --    ALT 64  --  68  --    AST 94*  --  53*  --    LIPASE  --  616* 420*  --      Recent Results (from the past 24 hour(s))   XR Surgery RUSS Fluoro L/T 5 Min w Stills    Narrative    This exam was marked as non-reportable because it will not be read by a   radiologist or a [place] non-radiologist provider.

## 2021-07-14 NOTE — CONSULTS
GASTROENTEROLOGY CONSULTATION      Date of Admission:  7/14/2021          Chief Complaint:   We were asked by Dr. Ana Maria Mercado MD of Medicine to evaluate this patient with hematochezia and melena after snare papillectomy.           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    #Upper GI bleed likely secondary to resuming Plavix prematurely after ERCP-guided snare papillectomy  - ERCP with snare papillectomy performed 7/12/21  - patient instructed to hold plavix until 7/16/21, but per wife patient took 1 dose after procedure  - Hgb 15 on 7/12/21, now at 8 today (7/14/21)  - patient has been intermittently hypotensive and tachycardic     Recommendations  - keep NPO  - 2 large bore IVs  - start IV protonix 40mg BID  - patient added on to schedule for EGD/ERCP today     Gastroenterology follow up recommendations: Pending clinical course      Patient care plan discussed with Dr. Arreola, GI staff physician. Thank you for involving us in this patient's care. Please do not hesitate to contact the GI service with any questions or concerns.     Mariposa Rodgers DO  PGY-1  [id number]  Department of Medicine  -------------------------------------------------------------------------------------------------------------------   History is obtained from the patient and the medical record.          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 The patient was seen by Dr. Moscoso for possible surgical intervention/Whipple for the adenocarcinoma, however, he was deemed a poor surgical candidate due to his PLS. Patient was  subsequently referred to GI for endoscopic ampullectomy. EUS was performed 7/12/21 and showed intra-ampullary mass at the major papilla with proximal dilation of the CBD and PD. ERCP was done the same day and snare papillectomy was performed with stents placed in the CBD and PD. After the ERCP the patient was instructed to hold his Plavix until 7/16/21, however, per the wife the patient took one dose of Plavix after the procedure. Yesterday morning the patient started having bright red blood in his stool which eventually progressed to tarry, foul-smelling stool. The wife states that this was associated with weakness and hypotension (BP 80s systolic at home). Patient denies abdominal pain, nausea, vomiting, or hematemesis.              Past Medical History:   Reviewed and edited as appropriate  Past Medical History:   Diagnosis Date     Basal cell carcinoma nos     sees derm     CVA (cerebral infarction) 6/14    small vessel right int capsule stroke     DVT (deep vein thrombosis) in pregnancy 05/12/2017    right peroneal vein     Essential hypertension, benign      Hearing loss      Hoarseness of voice     assoc with PLS     Lumbar radiculopathy     2017     Primary Lateral Sclerosis 2002    has baclofen pump through Dr. Calvert, [facility], sees Dr. Fink, [facility]     Primary osteoarthritis of right shoulder 6/16/2021     Prostate CA (H) 2008    prostatectomy     Vertigo 2/21/2013            Past Surgical History:   Reviewed and edited as appropriate   Past Surgical History:   Procedure Laterality Date     ABDOMEN SURGERY  11/12    Hernia     BIOPSY  4/16    Cancerous growth on leg. Removed by MOHs treatment     ENDOSCOPIC RETROGRADE CHOLANGIOPANCREATOGRAM N/A 6/17/2021    Procedure: ENDOSCOPIC RETROGRADE CHOLANGIOPANCREATOGRAPHY, BILIARY STENT PLACEMENT;  Surgeon: Sima Galvez MD;  Location:  OR     ENDOSCOPIC RETROGRADE CHOLANGIOPANCREATOGRAM COMPLEX N/A 7/12/2021    Procedure: ENDOSCOPIC RETROGRADE  CHOLANGIOPANCREATOGRAPHY WITH AMPULLECTOMY, PANCREATIC DUCT STENT PLACEMENT AND BILIARY STENT PLACEMENT;  Surgeon: Agus Kent MD;  Location: [facility] OR     ENDOSCOPIC ULTRASOUND UPPER GASTROINTESTINAL TRACT (GI) N/A 2021    Procedure: ENDOSCOPIC ULTRASOUND, ESOPHAGOSCOPY / UPPER GASTROINTESTINAL TRACT (GI);  Surgeon: Agus Kent MD;  Location: [facility] OR     ESOPHAGOSCOPY, GASTROSCOPY, DUODENOSCOPY (EGD), COMBINED N/A 2021    Procedure: ESOPHAGOGASTRODUODENOSCOPY, WITH ENDOSCOPIC US, fine needle aspiration;  Surgeon: Sima Galvez MD;  Location: [facility] OR     HERNIA REPAIR      Repaired     PROSTATE SURGERY       [facility] NONSPECIFIC PROCEDURE       prostatectomy      [facility] NONSPECIFIC PROCEDURE      colonoscopy     Z NONSPECIFIC PROCEDURE  2006    hernia, right side     Z NONSPECIFIC PROCEDURE      T + A            Previous Endoscopy:   Reviewed         Social History:   Reviewed and edited as appropriate  Social History     Socioeconomic History     Marital status:      Spouse name: Kelli     Number of children: 2     Years of education: Not on file     Highest education level: Not on file   Occupational History     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 No   Social History Narrative     Not on file     Social Determinants of Health     Financial Resource Strain:      Difficulty of Paying Living Expenses:    Food Insecurity:      Worried About Running Out of Food in the Last Year:      Ran  "Out of Food in the Last Year:    Transportation Needs:      Lack of Transportation (Medical):      Lack of Transportation (Non-Medical):    Physical Activity:      Days of Exercise per Week:      Minutes of Exercise per Session:    Stress:      Feeling of Stress :    Social Connections:      Frequency of Communication with Friends and Family:      Frequency of Social Gatherings with Friends and Family:      Attends [employer] Services:      Active Member of Clubs or Organizations:      Attends Club or Organization Meetings:      Marital Status:    Intimate Partner Violence:      Fear of Current or Ex-Partner:      Emotionally Abused:      Physically Abused:      Sexually Abused:             Family History:   Reviewed and edited as appropriate  Family History   Problem Relation Age of Onset     Heart Disease Mother         CHF     Hypertension Mother      C.A.D. Maternal Grandfather      Cerebrovascular Disease Maternal Uncle         45     Cerebrovascular Disease Maternal Uncle            Allergies:   Reviewed and edited as appropriate     Allergies   Allergen Reactions     Bee Venom      Swelling and hives     Penicillins Hives     \"HIVES\"            Medications:     Current Facility-Administered Medications   Medication     0.9% sodium chloride BOLUS     Current Outpatient Medications   Medication     acetaminophen (TYLENOL) 650 MG CR tablet     aspirin 81 MG tablet     baclofen (LIORESAL) intraTHECAL Internal Pump     clopidogrel (PLAVIX) 75 MG tablet     enalapril (VASOTEC) 10 MG tablet     EPINEPHrine 0.3 MG/0.3ML injection     fluorouracil (EFUDEX) 5 % external cream     ketoconazole (NIZORAL) 2 % external shampoo     oxybutynin ER (DITROPAN-XL) 10 MG 24 hr tablet     pantoprazole (PROTONIX) 40 MG EC tablet     polyethylene glycol (MIRALAX/GLYCOLAX) Packet     potassium chloride ER (K-TAB/KLOR-CON) 10 MEQ CR tablet            Review of Systems:     A complete review of systems was performed and is negative " "except as noted in the HPI           Physical Exam:   BP 91/60   Pulse 79   Temp 97.8  F (36.6  C) (Oral)   Resp 10   Ht 1.778 m (5' 10\")   Wt 68.9 kg (152 lb)   SpO2 98%   BMI 21.81 kg/m    Wt:   Wt Readings from Last 2 Encounters:   07/14/21 68.9 kg (152 lb)   07/08/21 64.7 kg (142 lb 9.6 oz)      Constitutional: cooperative, pleasant, no in acute distress  Eyes: Sclera anicteric/injected  CV: No edema, S1 and S2 appreciated, normal rate and rhythm  Respiratory: Unlabored breathing, CTAB, normal rate and effort  Abd: Nondistended, +bs, no hepatosplenomegaly, nontender, no peritoneal signs, baclofen pump in place  Skin: cool, perfused, no jaundice  Neuro: AAO x 3         Data:   Labs and imaging below were independently reviewed and interpreted    BMP  Recent Labs   Lab 07/14/21  1119 07/12/21  0848 07/12/21  0728    132*  --    POTASSIUM 4.7 4.3  --    CHLORIDE 101 99  --    AMOS 9.0 9.3  --    CO2 28 28  --    BUN 46* 17  --    CR 0.76 0.58*  --    * 96 126*     CBC  Recent Labs   Lab 07/14/21  1119 07/12/21  0848   WBC 9.6 6.5   RBC 2.54* 4.81   HGB 8.1* 15.2   HCT 24.4* 44.6   MCV 96 93   MCH 31.9 31.6   MCHC 33.2 34.1   RDW 14.1 13.6    232     INR  Recent Labs   Lab 07/12/21  0848   INR 1.00     LFTs  Recent Labs   Lab 07/14/21  1119 07/12/21  0848   ALKPHOS 137 182*   AST 94* 53*   ALT 64 68   BILITOTAL 0.9 1.1   PROTTOTAL 5.4* 6.8   ALBUMIN 2.6* 3.2*      PANC  Recent Labs   Lab 07/12/21  1300 07/12/21  0848   LIPASE 616* 420*   AMYLASE 120* 115*       "

## 2021-07-14 NOTE — BRIEF OP NOTE
[facility] Brief Operative Note    Pre-operative diagnosis: GI bleed [K92.2]   Post-operative diagnosis Post-ampullectomy bleeding   Procedure: Procedure(s):  ESOPHAGOGASTRODUODENOSCOPY (EGD)  ENDOSCOPIC RETROGRADE CHOLANGIOPANCREATOGRAPHY with bile duct stents exchanged, balloon sweep of bile ducts, hemostasis   Surgeon(s): Surgeon(s) and Role:     * Guru Bhavesh Arreola MD - Primary     * Alvin Almanza MD   Estimated blood loss: * No values recorded between 7/14/2021  4:47 PM and 7/14/2021  5:42 PM *    Specimens: * No specimens in log *         75 year old male with a history of PLS, prostate cancer s/p prostatectomy, and 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      ERCP Findings:  - Prior ampullectomy with overlying granulation tissues and an ulcerated area in close proximity to the pancreatic duct orifice, likely site of recent bleeding. Successfully managed with thermal therapy and epinephrine.   - Previously placed transpapillary biliary stent migrated out of the bile duct, retrieved with a rat-toothed forceps. Previously placed pancreatic stent was in a good position and was not manipulated.   - Choledocholithiasis was found.  Complete removal was accomplished by balloon extraction.   - Two plastic stents were placed into the common bile duct.     Plan:  - Observe patient in PACU prior to transfer to hospital floor.   - Avoid NSAIDs/antithrombotic medicines for at least 5 days.   - Continue holding Plavix (clopidogrel) for at least 5 more days.   - Clear liquid diet for today.   - Observe patient's clinical course, watch for recurrent bleeding. Repeat Hgb in the morning.   - Inpatient panc/bili service will continue to follow.  - The findings and recommendations were discussed with the patient's family.       Alvin Almanza MD  Advanced   Fellow  #[id number]

## 2021-07-14 NOTE — ANESTHESIA POSTPROCEDURE EVALUATION
Patient: Elier Leong    Procedure(s):  ESOPHAGOGASTRODUODENOSCOPY (EGD)  ENDOSCOPIC RETROGRADE CHOLANGIOPANCREATOGRAPHY with bile duct stents exchanged, balloon sweep of bile ducts, hemostasis    Diagnosis:GI bleed [K92.2]  Diagnosis Additional Information: No value filed.    Anesthesia Type:  General    Note:  Disposition: Admission   Postop Pain Control: Uneventful            Sign Out: Well controlled pain   PONV: No   Neuro/Psych: Uneventful            Sign Out: Acceptable/Baseline neuro status   Airway/Respiratory: Uneventful            Sign Out: Acceptable/Baseline resp. status   CV/Hemodynamics: Uneventful            Sign Out: Acceptable CV status; No obvious hypovolemia; No obvious fluid overload   Other NRE: NONE   DID A NON-ROUTINE EVENT OCCUR? No           Last vitals:  Vitals:    07/14/21 1745 07/14/21 1800 07/14/21 1815   BP: 129/89 133/78 120/86   Pulse: 93 92 86   Resp: 12 14 14   Temp: (!) 35.9  C (96.6  F) 36.5  C (97.7  F) 36.5  C (97.7  F)   SpO2: 98% 97% 96%       Last vitals prior to Anesthesia Care Transfer:  CRNA VITALS  7/14/2021 1709 - 7/14/2021 1809      7/14/2021             Resp Rate (observed):  (!) 3          Electronically Signed By: Gustavo Escobedo MD  July 14, 2021  6:21 PM

## 2021-07-14 NOTE — ANESTHESIA PROCEDURE NOTES
Airway       Patient location during procedure: OR       Procedure Start/Stop Times: 7/14/2021 4:29 PM  Staff -        CRNA: Sunni Mae APRN CRNA       Performed By: CRNA  Consent for Airway        Urgency: elective  Indications and Patient Condition       Indications for airway management: logan-procedural       Induction type:intravenous       Mask difficulty assessment: 1 - vent by mask    Final Airway Details       Final airway type: endotracheal airway       Successful airway: ETT - single and Oral  Endotracheal Airway Details        ETT size (mm): 7.5       Cuffed: yes       Successful intubation technique: direct laryngoscopy and video laryngoscopy       DL Blade Type: MAC 4       Grade View of Cords: 1       Adjucts: stylet       Position: Right       Measured from: gums/teeth       Secured at (cm): 22       Bite block used: Soft    Post intubation assessment        Placement verified by: capnometry, equal breath sounds and chest rise        Number of attempts at approach: 1       Secured with: pink tape       Ease of procedure: easy       Dentition: Intact and Unchanged

## 2021-07-15 PROBLEM — Z20.822 COVID-19 RULED OUT BY LABORATORY TESTING: Status: ACTIVE | Noted: 2021-07-15

## 2021-07-15 PROBLEM — D64.9 ANEMIA, UNSPECIFIED TYPE: Status: ACTIVE | Noted: 2021-07-15

## 2021-07-15 LAB
ALBUMIN SERPL-MCNC: 2.7 G/DL (ref 3.4–5)
ALP SERPL-CCNC: 96 U/L (ref 40–150)
ALT SERPL W P-5'-P-CCNC: 54 U/L (ref 0–70)
ANION GAP SERPL CALCULATED.3IONS-SCNC: 8 MMOL/L (ref 3–14)
AST SERPL W P-5'-P-CCNC: 126 U/L (ref 0–45)
ATRIAL RATE - MUSE: 89 BPM
BILIRUB SERPL-MCNC: 1.1 MG/DL (ref 0.2–1.3)
BUN SERPL-MCNC: 32 MG/DL (ref 7–30)
CALCIUM SERPL-MCNC: 8.4 MG/DL (ref 8.5–10.1)
CHLORIDE BLD-SCNC: 107 MMOL/L (ref 94–109)
CO2 SERPL-SCNC: 23 MMOL/L (ref 20–32)
CREAT SERPL-MCNC: 0.7 MG/DL (ref 0.66–1.25)
DIASTOLIC BLOOD PRESSURE - MUSE: NORMAL MMHG
ERCP: NORMAL
ERYTHROCYTE [DISTWIDTH] IN BLOOD BY AUTOMATED COUNT: 14.1 % (ref 10–15)
ERYTHROCYTE [DISTWIDTH] IN BLOOD BY AUTOMATED COUNT: 14.6 % (ref 10–15)
GFR SERPL CREATININE-BSD FRML MDRD: >90 ML/MIN/1.73M2
GLUCOSE BLD-MCNC: 109 MG/DL (ref 70–99)
HCT VFR BLD AUTO: 18.1 % (ref 40–53)
HCT VFR BLD AUTO: 24.1 % (ref 40–53)
HGB BLD-MCNC: 6.1 G/DL (ref 13.3–17.7)
HGB BLD-MCNC: 7.8 G/DL (ref 13.3–17.7)
INTERPRETATION ECG - MUSE: NORMAL
MCH RBC QN AUTO: 31.7 PG (ref 26.5–33)
MCH RBC QN AUTO: 31.9 PG (ref 26.5–33)
MCHC RBC AUTO-ENTMCNC: 32.4 G/DL (ref 31.5–36.5)
MCHC RBC AUTO-ENTMCNC: 33.7 G/DL (ref 31.5–36.5)
MCV RBC AUTO: 95 FL (ref 78–100)
MCV RBC AUTO: 98 FL (ref 78–100)
P AXIS - MUSE: 70 DEGREES
PLATELET # BLD AUTO: 123 10E3/UL (ref 150–450)
PLATELET # BLD AUTO: 173 10E3/UL (ref 150–450)
POTASSIUM BLD-SCNC: 4.2 MMOL/L (ref 3.4–5.3)
PR INTERVAL - MUSE: 166 MS
PROT SERPL-MCNC: 4.8 G/DL (ref 6.8–8.8)
QRS DURATION - MUSE: 84 MS
QT - MUSE: 376 MS
QTC - MUSE: 457 MS
R AXIS - MUSE: 7 DEGREES
RBC # BLD AUTO: 1.91 10E6/UL (ref 4.4–5.9)
RBC # BLD AUTO: 2.46 10E6/UL (ref 4.4–5.9)
SODIUM SERPL-SCNC: 138 MMOL/L (ref 133–144)
SYSTOLIC BLOOD PRESSURE - MUSE: NORMAL MMHG
T AXIS - MUSE: 4 DEGREES
VENTRICULAR RATE- MUSE: 89 BPM
WBC # BLD AUTO: 11.5 10E3/UL (ref 4–11)
WBC # BLD AUTO: 7.6 10E3/UL (ref 4–11)

## 2021-07-15 PROCEDURE — 80053 COMPREHEN METABOLIC PANEL: CPT | Performed by: STUDENT IN AN ORGANIZED HEALTH CARE EDUCATION/TRAINING PROGRAM

## 2021-07-15 PROCEDURE — 99222 1ST HOSP IP/OBS MODERATE 55: CPT | Mod: AI | Performed by: STUDENT IN AN ORGANIZED HEALTH CARE EDUCATION/TRAINING PROGRAM

## 2021-07-15 PROCEDURE — 258N000003 HC RX IP 258 OP 636: Performed by: STUDENT IN AN ORGANIZED HEALTH CARE EDUCATION/TRAINING PROGRAM

## 2021-07-15 PROCEDURE — 99232 SBSQ HOSP IP/OBS MODERATE 35: CPT | Mod: GC

## 2021-07-15 PROCEDURE — C9113 INJ PANTOPRAZOLE SODIUM, VIA: HCPCS | Performed by: STUDENT IN AN ORGANIZED HEALTH CARE EDUCATION/TRAINING PROGRAM

## 2021-07-15 PROCEDURE — 120N000002 HC R&B MED SURG/OB UMMC

## 2021-07-15 PROCEDURE — 85027 COMPLETE CBC AUTOMATED: CPT | Performed by: STUDENT IN AN ORGANIZED HEALTH CARE EDUCATION/TRAINING PROGRAM

## 2021-07-15 PROCEDURE — 250N000013 HC RX MED GY IP 250 OP 250 PS 637: Performed by: STUDENT IN AN ORGANIZED HEALTH CARE EDUCATION/TRAINING PROGRAM

## 2021-07-15 PROCEDURE — 999N000128 HC STATISTIC PERIPHERAL IV START W/O US GUIDANCE

## 2021-07-15 PROCEDURE — P9016 RBC LEUKOCYTES REDUCED: HCPCS

## 2021-07-15 PROCEDURE — 36415 COLL VENOUS BLD VENIPUNCTURE: CPT | Performed by: STUDENT IN AN ORGANIZED HEALTH CARE EDUCATION/TRAINING PROGRAM

## 2021-07-15 PROCEDURE — 999N000007 HC SITE CHECK

## 2021-07-15 PROCEDURE — 250N000011 HC RX IP 250 OP 636: Performed by: STUDENT IN AN ORGANIZED HEALTH CARE EDUCATION/TRAINING PROGRAM

## 2021-07-15 RX ORDER — SODIUM CHLORIDE, SODIUM LACTATE, POTASSIUM CHLORIDE, CALCIUM CHLORIDE 600; 310; 30; 20 MG/100ML; MG/100ML; MG/100ML; MG/100ML
INJECTION, SOLUTION INTRAVENOUS CONTINUOUS
Status: ACTIVE | OUTPATIENT
Start: 2021-07-15 | End: 2021-07-15

## 2021-07-15 RX ORDER — SODIUM CHLORIDE, SODIUM LACTATE, POTASSIUM CHLORIDE, CALCIUM CHLORIDE 600; 310; 30; 20 MG/100ML; MG/100ML; MG/100ML; MG/100ML
INJECTION, SOLUTION INTRAVENOUS CONTINUOUS
Status: DISCONTINUED | OUTPATIENT
Start: 2021-07-15 | End: 2021-07-15

## 2021-07-15 RX ADMIN — SODIUM CHLORIDE, POTASSIUM CHLORIDE, SODIUM LACTATE AND CALCIUM CHLORIDE: 600; 310; 30; 20 INJECTION, SOLUTION INTRAVENOUS at 05:49

## 2021-07-15 RX ADMIN — PANTOPRAZOLE SODIUM 40 MG: 40 INJECTION, POWDER, FOR SOLUTION INTRAVENOUS at 14:41

## 2021-07-15 RX ADMIN — POTASSIUM CHLORIDE 20 MEQ: 750 TABLET, EXTENDED RELEASE ORAL at 09:43

## 2021-07-15 RX ADMIN — PANTOPRAZOLE SODIUM 40 MG: 40 INJECTION, POWDER, FOR SOLUTION INTRAVENOUS at 00:35

## 2021-07-15 RX ADMIN — OXYBUTYNIN CHLORIDE 20 MG: 10 TABLET, EXTENDED RELEASE ORAL at 00:41

## 2021-07-15 RX ADMIN — OXYBUTYNIN CHLORIDE 20 MG: 10 TABLET, EXTENDED RELEASE ORAL at 22:15

## 2021-07-15 RX ADMIN — SODIUM CHLORIDE, POTASSIUM CHLORIDE, SODIUM LACTATE AND CALCIUM CHLORIDE: 600; 310; 30; 20 INJECTION, SOLUTION INTRAVENOUS at 16:13

## 2021-07-15 ASSESSMENT — ACTIVITIES OF DAILY LIVING (ADL)
ADLS_ACUITY_SCORE: 32
ADLS_ACUITY_SCORE: 31
ADLS_ACUITY_SCORE: 32
ADLS_ACUITY_SCORE: 31
ADLS_ACUITY_SCORE: 31

## 2021-07-15 NOTE — UTILIZATION REVIEW
Inpatient appropriate    Admission Status; Secondary Review Determination      Under the authority of the Utilization Management Committee, the utilization review process indicated a secondary review on the above patient. The review outcome is based on review of the medical records, discussions with staff, and applying clinical experience noted on the date of the review.    (x) Inpatient Status Appropriate - This patient's medical care is consistent with medical management for inpatient care and reasonable inpatient medical practice.    RATIONALE FOR DETERMINATION    75-year-old male with history of recently diagnosed ampullary adenocarcinoma s/p recent ERCP on 7/12/2021 was admitted to [facility] on 7/14/2021 with hematochezia and melena.  At presentation hemoglobin had dropped from 15.2 at the time of ERCP to 8.1 with subsequently further dropped to 6.1 this morning.  Patient appears symptomatic from the significant acute blood loss anemia, was hypotensive with blood pressure as low as 82/44.  He is on IV Protonix, getting IV fluid and 1 unit of PRBC.  He will need ongoing hospitalization for monitoring and stabilization of his GI bleed.  Inpatient care is appropriate at this time.    At the time of admission with the information available to the attending physician more than 2 nights Hospital complex care was anticipated, based on patient risk of adverse outcome if treated as outpatient and complex care required. Inpatient admission is appropriate based on the Medicare guidelines.    This document was produced using voice recognition software      The information on this document is developed by the utilization review team in order for the business office to ensure compliance. This only denotes the appropriateness of proper admission status and does not reflect the quality of care rendered.  The definitions of Inpatient Status and Observation Status used in making the determination above are those provided in the CMS  Coverage Manual, Chapter 1 and Chapter 6, section 70.4.    Sincerely,    Utilization Review  Physician Advisor  [facility].

## 2021-07-15 NOTE — PLAN OF CARE
[id number]  Afebrile, Hypotensive.  Hgb was 6.1, one unit of RBC given.         NEURO: Alert and oriented x4.      RESPIRATORY: Room Air, denies dizziness, and SOB. Lungs sound, clear, equal bilateral.     CARDIO: VSS, denies dizziness, and extremity pain.      GI/:  Denies N/V, BS active, No BM, AUOP.      SKIN: Intact.      ACTIVITY: Assist x2, with walker and gait belt.      PAIN: Denies pain.    DLA: PIV.     BG: No     PLAN: Continue monitoring.

## 2021-07-15 NOTE — PROVIDER NOTIFICATION
#[id number]  Elier Arias; Pt's BP drooping since last night. the last BP is 82/44  Hgb dropped from 8.1 to 6.1 since yesterday.     Thank you    RN [id number]

## 2021-07-15 NOTE — PROGRESS NOTES
[facility]    Progress Note - [facility]        Date of Admission:  7/14/2021    Assessment & Plan           Elier Leong is a 75 year old male admitted on 7/14/2021. He has a history of primary lateral sclerosis, prostate cancer s/p prostatectomy and newly diagnosed ampullary adenocarcinoma admitted for hematochezia and melena.     # GI bleed, resolved  # Acute blood loss anemia   # Acute Post Hemorrhagic Anemia  # Choledocholithiasis   # Ampullary adenocarcinoma  Admitted with GIB after resuming Plavix following ERCP with snare papillectomy for newly diagnosed ampullary adenocarcinoma on 7/12. Patient inadvertently restarted Plavix later that same day and subsequently developed several episodes of hematochezia and melena the following morning and hemoglobin trended from 15.2 on 7/12 to 8.1 on admission. Underwent ERCP 7/14; area of ulceration near pancreatic duct was found and treated with thermal therapy and epi. This AM hemoglobin 6.1 likely reflects prior bleeding, has not had any evidence of GIB since procedure and remains hemodynamically stable.   - Appreciate GI panc bili following  - 1u pRBC ordered this AM, +1 unit held as there is currently a significant delay on blood products (per charge RN will not be available until 2pm)  - Closely monitor vitals q1h until blood products given, plan 1L LR bolus if MAP <65  - CLD, advance as tolerated  - maintain 2 large bore IVs  - IV protonix 40 mg BID  - Hold PTA clopidogrel x7 days from ERCP (may restart 7/21)  - trend CBC    # Choledocholithiasis  Discovered during ERCP 7/14. Stone removed and stents placed in CBD.   - Trend LFTs     # History of PLS  Diagnosed 19 years ago, prognosis unclear. At baseline has slurred speech and needs wife's help with dressing and bathing. Able to ambulate with walker short distances, wheelchair for longer distances. Followed by neurology.   - Patient on baclofen pump (if  needs MRI, notify [employer] for resetting)  - PTA oxybutinin     # History of CVA  Acute CVA in 2018, subacute CVA 11/2020. Currently at neurologic baseline.   -Holding plavix x7 days from ERCP     Chronic issues:  GERD: Hold PTA PO PPI while on IV PPI  HLD: Not currently on statin (intentionally not prescribed per EMR)  Essential hypertension: Hold PTA enalapril given hypovolemia/hypotension  Hypokalemia: PTA potassium       Diet: Advance Diet as Tolerated: Clear Liquid Diet    DVT Prophylaxis: HOLDING Plavix  Wright Catheter: Not present  Fluids: LR @ 100 mL/hr   Central Lines: None  Code Status: Full Code      Disposition Plan   Expected discharge: 07/16/2021 recommended to prior living arrangement once hemoglobin stable.     The patient's care was discussed with the Attending Physician, Dr. Lewis.    Beevrly Gutierrez MD  [facility]  [facility]  Securely message with the Vocera Web Console (learn more here)  Text page via [employer] Paging/Directory    ______________________________________________________________________    Interval History   Admitted to the floor from PACU around 300am this morning following ERCP prematurely showing ulcerated area found near pancreatic duct on ERCP. He remained afebrile overnight. Blood pressures borderline hypotensive, did not require intervention. Hemoglobin low 6.1 this morning, patient consented for blood transfusion. No confusion, chest pain, shortness of breath.     Physical Exam   Vital Signs: Temp: 97.7  F (36.5  C) Temp src: Oral BP: 100/54 Pulse: 81   Resp: 20 SpO2: 95 % O2 Device: None (Room air) Oxygen Delivery: 1 LPM  Weight: 152 lbs 0 oz  Physical Exam  Constitutional:       General: He is not in acute distress.     Appearance: He is well-developed. He is not diaphoretic.   HENT:      Mouth/Throat:      Mouth: Mucous membranes are moist.   Cardiovascular:      Rate and Rhythm: Normal rate.   Pulmonary:       Effort: Pulmonary effort is normal. No respiratory distress.   Abdominal:      General: There is no distension.      Palpations: Abdomen is soft.      Tenderness: There is no abdominal tenderness.   Skin:     Coloration: Skin is pale. Skin is not jaundiced.      Findings: No bruising.   Neurological:      Mental Status: He is alert.      Comments: Garbled speech consistent with baseline per wife   Psychiatric:         Mood and Affect: Mood normal.          Data   Recent Labs   Lab 07/15/21  0600 07/14/21  1119 07/12/21  1300 07/12/21  0848   WBC 7.6 9.6  --  6.5   HGB 6.1* 8.1*  --  15.2   MCV 95 96  --  93   * 186  --  232   INR  --   --   --  1.00    134  --  132*   POTASSIUM 4.2 4.7  --  4.3   CHLORIDE 107 101  --  99   CO2 23 28  --  28   BUN 32* 46*  --  17   CR 0.70 0.76  --  0.58*   ANIONGAP 8 5  --  5   AMOS 8.4* 9.0  --  9.3   * 117*  --  96   ALBUMIN 2.7* 2.6*  --  3.2*   PROTTOTAL 4.8* 5.4*  --  6.8   BILITOTAL 1.1 0.9  --  1.1   ALKPHOS 96 137  --  182*   ALT 54 64  --  68   * 94*  --  53*   LIPASE  --   --  616* 420*     Recent Results (from the past 24 hour(s))   XR Surgery RUSS Fluoro L/T 5 Min w Stills    Narrative    This exam was marked as non-reportable because it will not be read by a   radiologist or a [facility] non-radiologist provider.

## 2021-07-15 NOTE — PROGRESS NOTES
Nursing Focus: Admission  D: Arrived at 0230 from PACU.. Admitted for GI bleed.      I: Admission process began.  Patient oriented to room, enviroment, call light.  Md. notified of patients arrival on unit.     A: Vital signs stable, afebrile.  Patient stable at this time. No complaints.     P: Implement plan of care when available. Continue to monitor patient. Nursing interventions as appropriate. Notify md with changes in pt status.

## 2021-07-15 NOTE — PROGRESS NOTES
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     #UGIB s/p snare papillectomy- secondary to resuming plavix prematurely with ulcerated area found near pancreatic duct on ERCP  - Hgb 8.1 from 15 on admission  - today (7/15/21) Hgb at 6.1, this is likely from the UGIB he has having prior to the ERCP  - currently patient denies abdominal pain, and there are no overt signs of GI bleeding    RECOMMENDATIONS:  - continue with IV protonix 40mg BID  - hold plavix 5-7 days, recommended to patient and wife to resume on 7/21/21      Gastroenterology follow up recommendations: Pending clinical course.      Thank you for involving us in this patient's care. Please do not hesitate to contact the GI service with any questions or concerns.      Pt care plan discussed with Dr. Arreola, GI staff physician.    Mariposa Rodgers DO  PGY-1  Department of Medicine  [phone]    _______________________________________________________________    24 Hour Events: Yesterday (7/14/21) patient underwent ERCP for post-ampullectomy bleeding. An ulcerated area near pancreatic duct was found and likely the site of the bleeding, so it was managed with thermal therapy and epinephrine. The patient's transpapillary stent had migrated out of the bile duct so that was retrieved. Patient was also noted to have choledocholithiasis on ERCP so 2 CBD stents were placed.      Subjective: Today the patient is feeling well and continues to deny any abdominal pain, nausea, or vomiting. The patient has not had any bowel movements since he has been here.      Objective:  Blood pressure 93/52, pulse 89, temperature 97  F (36.1  C), temperature source Oral, resp. rate 18, height 1.778 m (5' 10\"), " weight 68.9 kg (152 lb), SpO2 93 %.    Gen: NAD  HEENT: ncat, perrl, sclera anicteric  CV: RRR, S1 and S2 appreciated  Lungs: CTA b/l, normal rate and effort  Abd: Nondistended, non-tender to palpation, +bs, baclofen pump in place  Skin: no jaundice  MS: decreased muscle mass for age  Neuro: slowed speech      LABS:  BMP  Recent Labs   Lab 07/15/21  0600 07/14/21  1119 07/12/21  0848 07/12/21  0728    134 132*  --    POTASSIUM 4.2 4.7 4.3  --    CHLORIDE 107 101 99  --    AMOS 8.4* 9.0 9.3  --    CO2 23 28 28  --    BUN 32* 46* 17  --    CR 0.70 0.76 0.58*  --    * 117* 96 126*     CBC  Recent Labs   Lab 07/15/21  0600 07/14/21  1119 07/12/21  0848   WBC 7.6 9.6 6.5   RBC 1.91* 2.54* 4.81   HGB 6.1* 8.1* 15.2   HCT 18.1* 24.4* 44.6   MCV 95 96 93   MCH 31.9 31.9 31.6   MCHC 33.7 33.2 34.1   RDW 14.1 14.1 13.6   * 186 232     INR  Recent Labs   Lab 07/12/21  0848   INR 1.00     LFTs  Recent Labs   Lab 07/15/21  0600 07/14/21  1119 07/12/21  0848   ALKPHOS 96 137 182*   * 94* 53*   ALT 54 64 68   BILITOTAL 1.1 0.9 1.1   PROTTOTAL 4.8* 5.4* 6.8   ALBUMIN 2.7* 2.6* 3.2*      PANC  Recent Labs   Lab 07/12/21  1300 07/12/21  0848   LIPASE 616* 420*   AMYLASE 120* 115*

## 2021-07-15 NOTE — PLAN OF CARE
VSS. Alert and oriented, but with severely garbled speech. PACU RN stated that this was baseline for pt, pt confirmed this as well. Slightly hypotensive at 98/53 this AM. No complaints of pain. Had one incontinent void this AM. Continue with POC.

## 2021-07-16 ENCOUNTER — APPOINTMENT (OUTPATIENT)
Dept: PHYSICAL THERAPY | Facility: CLINIC | Age: 76
DRG: 438 | End: 2021-07-16
Attending: STUDENT IN AN ORGANIZED HEALTH CARE EDUCATION/TRAINING PROGRAM
Payer: COMMERCIAL

## 2021-07-16 ENCOUNTER — APPOINTMENT (OUTPATIENT)
Dept: GENERAL RADIOLOGY | Facility: CLINIC | Age: 76
DRG: 438 | End: 2021-07-16
Attending: STUDENT IN AN ORGANIZED HEALTH CARE EDUCATION/TRAINING PROGRAM
Payer: COMMERCIAL

## 2021-07-16 LAB
ALBUMIN SERPL-MCNC: 2.5 G/DL (ref 3.4–5)
ALP SERPL-CCNC: 102 U/L (ref 40–150)
ALT SERPL W P-5'-P-CCNC: 56 U/L (ref 0–70)
ANION GAP SERPL CALCULATED.3IONS-SCNC: 4 MMOL/L (ref 3–14)
AST SERPL W P-5'-P-CCNC: 133 U/L (ref 0–45)
BILIRUB SERPL-MCNC: 1.3 MG/DL (ref 0.2–1.3)
BUN SERPL-MCNC: 24 MG/DL (ref 7–30)
CALCIUM SERPL-MCNC: 8.8 MG/DL (ref 8.5–10.1)
CHLORIDE BLD-SCNC: 105 MMOL/L (ref 94–109)
CO2 SERPL-SCNC: 26 MMOL/L (ref 20–32)
CREAT SERPL-MCNC: 0.68 MG/DL (ref 0.66–1.25)
CRP SERPL-MCNC: 35 MG/L (ref 0–8)
ERYTHROCYTE [DISTWIDTH] IN BLOOD BY AUTOMATED COUNT: 14.6 % (ref 10–15)
ERYTHROCYTE [DISTWIDTH] IN BLOOD BY AUTOMATED COUNT: 14.6 % (ref 10–15)
GFR SERPL CREATININE-BSD FRML MDRD: >90 ML/MIN/1.73M2
GLUCOSE BLD-MCNC: 88 MG/DL (ref 70–99)
HCT VFR BLD AUTO: 21.1 % (ref 40–53)
HCT VFR BLD AUTO: 25.8 % (ref 40–53)
HGB BLD-MCNC: 7.1 G/DL (ref 13.3–17.7)
HGB BLD-MCNC: 8.6 G/DL (ref 13.3–17.7)
LIPASE SERPL-CCNC: 28 U/L (ref 73–393)
MCH RBC QN AUTO: 32.1 PG (ref 26.5–33)
MCH RBC QN AUTO: 32.5 PG (ref 26.5–33)
MCHC RBC AUTO-ENTMCNC: 33.3 G/DL (ref 31.5–36.5)
MCHC RBC AUTO-ENTMCNC: 33.6 G/DL (ref 31.5–36.5)
MCV RBC AUTO: 96 FL (ref 78–100)
MCV RBC AUTO: 97 FL (ref 78–100)
PLATELET # BLD AUTO: 144 10E3/UL (ref 150–450)
PLATELET # BLD AUTO: 197 10E3/UL (ref 150–450)
POTASSIUM BLD-SCNC: 3.2 MMOL/L (ref 3.4–5.3)
POTASSIUM BLD-SCNC: 3.6 MMOL/L (ref 3.4–5.3)
PROT SERPL-MCNC: 4.9 G/DL (ref 6.8–8.8)
RBC # BLD AUTO: 2.21 10E6/UL (ref 4.4–5.9)
RBC # BLD AUTO: 2.65 10E6/UL (ref 4.4–5.9)
SARS-COV-2 RNA RESP QL NAA+PROBE: NEGATIVE
SODIUM SERPL-SCNC: 135 MMOL/L (ref 133–144)
WBC # BLD AUTO: 13.5 10E3/UL (ref 4–11)
WBC # BLD AUTO: 18.6 10E3/UL (ref 4–11)

## 2021-07-16 PROCEDURE — C9113 INJ PANTOPRAZOLE SODIUM, VIA: HCPCS | Performed by: STUDENT IN AN ORGANIZED HEALTH CARE EDUCATION/TRAINING PROGRAM

## 2021-07-16 PROCEDURE — 97530 THERAPEUTIC ACTIVITIES: CPT | Mod: GP

## 2021-07-16 PROCEDURE — 258N000003 HC RX IP 258 OP 636: Performed by: STUDENT IN AN ORGANIZED HEALTH CARE EDUCATION/TRAINING PROGRAM

## 2021-07-16 PROCEDURE — 83690 ASSAY OF LIPASE: CPT | Performed by: STUDENT IN AN ORGANIZED HEALTH CARE EDUCATION/TRAINING PROGRAM

## 2021-07-16 PROCEDURE — 99232 SBSQ HOSP IP/OBS MODERATE 35: CPT | Mod: GC

## 2021-07-16 PROCEDURE — 36415 COLL VENOUS BLD VENIPUNCTURE: CPT | Performed by: STUDENT IN AN ORGANIZED HEALTH CARE EDUCATION/TRAINING PROGRAM

## 2021-07-16 PROCEDURE — 87040 BLOOD CULTURE FOR BACTERIA: CPT | Performed by: STUDENT IN AN ORGANIZED HEALTH CARE EDUCATION/TRAINING PROGRAM

## 2021-07-16 PROCEDURE — 82040 ASSAY OF SERUM ALBUMIN: CPT | Performed by: STUDENT IN AN ORGANIZED HEALTH CARE EDUCATION/TRAINING PROGRAM

## 2021-07-16 PROCEDURE — 85027 COMPLETE CBC AUTOMATED: CPT | Performed by: STUDENT IN AN ORGANIZED HEALTH CARE EDUCATION/TRAINING PROGRAM

## 2021-07-16 PROCEDURE — 250N000011 HC RX IP 250 OP 636: Performed by: STUDENT IN AN ORGANIZED HEALTH CARE EDUCATION/TRAINING PROGRAM

## 2021-07-16 PROCEDURE — 250N000013 HC RX MED GY IP 250 OP 250 PS 637: Performed by: STUDENT IN AN ORGANIZED HEALTH CARE EDUCATION/TRAINING PROGRAM

## 2021-07-16 PROCEDURE — 97116 GAIT TRAINING THERAPY: CPT | Mod: GP

## 2021-07-16 PROCEDURE — 120N000002 HC R&B MED SURG/OB UMMC

## 2021-07-16 PROCEDURE — 71045 X-RAY EXAM CHEST 1 VIEW: CPT | Mod: 26 | Performed by: RADIOLOGY

## 2021-07-16 PROCEDURE — 86140 C-REACTIVE PROTEIN: CPT | Performed by: STUDENT IN AN ORGANIZED HEALTH CARE EDUCATION/TRAINING PROGRAM

## 2021-07-16 PROCEDURE — U0005 INFEC AGEN DETEC AMPLI PROBE: HCPCS | Performed by: STUDENT IN AN ORGANIZED HEALTH CARE EDUCATION/TRAINING PROGRAM

## 2021-07-16 PROCEDURE — 71045 X-RAY EXAM CHEST 1 VIEW: CPT

## 2021-07-16 PROCEDURE — 99233 SBSQ HOSP IP/OBS HIGH 50: CPT | Mod: GC | Performed by: STUDENT IN AN ORGANIZED HEALTH CARE EDUCATION/TRAINING PROGRAM

## 2021-07-16 PROCEDURE — 97161 PT EVAL LOW COMPLEX 20 MIN: CPT | Mod: GP

## 2021-07-16 PROCEDURE — 85041 AUTOMATED RBC COUNT: CPT | Performed by: STUDENT IN AN ORGANIZED HEALTH CARE EDUCATION/TRAINING PROGRAM

## 2021-07-16 PROCEDURE — 84132 ASSAY OF SERUM POTASSIUM: CPT | Performed by: STUDENT IN AN ORGANIZED HEALTH CARE EDUCATION/TRAINING PROGRAM

## 2021-07-16 RX ORDER — POTASSIUM CHLORIDE 750 MG/1
40 TABLET, EXTENDED RELEASE ORAL ONCE
Status: COMPLETED | OUTPATIENT
Start: 2021-07-16 | End: 2021-07-16

## 2021-07-16 RX ORDER — NALOXONE HYDROCHLORIDE 0.4 MG/ML
0.4 INJECTION, SOLUTION INTRAMUSCULAR; INTRAVENOUS; SUBCUTANEOUS
Status: DISCONTINUED | OUTPATIENT
Start: 2021-07-16 | End: 2021-07-21 | Stop reason: HOSPADM

## 2021-07-16 RX ORDER — NALOXONE HYDROCHLORIDE 0.4 MG/ML
0.2 INJECTION, SOLUTION INTRAMUSCULAR; INTRAVENOUS; SUBCUTANEOUS
Status: DISCONTINUED | OUTPATIENT
Start: 2021-07-16 | End: 2021-07-21 | Stop reason: HOSPADM

## 2021-07-16 RX ORDER — ACETAMINOPHEN 325 MG/1
325 TABLET ORAL EVERY 4 HOURS PRN
Status: DISCONTINUED | OUTPATIENT
Start: 2021-07-16 | End: 2021-07-18

## 2021-07-16 RX ADMIN — POTASSIUM CHLORIDE 20 MEQ: 750 TABLET, EXTENDED RELEASE ORAL at 09:31

## 2021-07-16 RX ADMIN — SODIUM CHLORIDE, POTASSIUM CHLORIDE, SODIUM LACTATE AND CALCIUM CHLORIDE 1000 ML: 600; 310; 30; 20 INJECTION, SOLUTION INTRAVENOUS at 07:54

## 2021-07-16 RX ADMIN — PANTOPRAZOLE SODIUM 40 MG: 40 INJECTION, POWDER, FOR SOLUTION INTRAVENOUS at 11:21

## 2021-07-16 RX ADMIN — POLYETHYLENE GLYCOL 3350 17 G: 17 POWDER, FOR SOLUTION ORAL at 09:31

## 2021-07-16 RX ADMIN — ACETAMINOPHEN 325 MG: 325 TABLET, FILM COATED ORAL at 19:40

## 2021-07-16 RX ADMIN — POTASSIUM CHLORIDE 40 MEQ: 750 TABLET, EXTENDED RELEASE ORAL at 17:07

## 2021-07-16 RX ADMIN — PANTOPRAZOLE SODIUM 40 MG: 40 INJECTION, POWDER, FOR SOLUTION INTRAVENOUS at 00:43

## 2021-07-16 RX ADMIN — OXYBUTYNIN CHLORIDE 20 MG: 10 TABLET, EXTENDED RELEASE ORAL at 21:57

## 2021-07-16 ASSESSMENT — ACTIVITIES OF DAILY LIVING (ADL)
ADLS_ACUITY_SCORE: 34
ADLS_ACUITY_SCORE: 32
ADLS_ACUITY_SCORE: 35
ADLS_ACUITY_SCORE: 32
ADLS_ACUITY_SCORE: 34
ADLS_ACUITY_SCORE: 34

## 2021-07-16 NOTE — PROVIDER NOTIFICATION
Pt had Tmax 100.7 @ 0600.  Has been trending up throughout the night.  Pt doesn't have tylenol order.  Do you want any blood cultures, chest x-ray or UA/UC?  [facility] [id number] or [facility]    New order received for BC, UA, tylenol, fluid bolus.  Dr Beverly Gutierrez

## 2021-07-16 NOTE — PROGRESS NOTES
Met with patient and wife today. Papillectomy complicated by bleeding in the setting of premature Plavix initiation. ERCP by Dr. Arreola showed cessation of bleeding. Hgb has stabilized. He was reported to pass some maroon stools today by the nurse although the patient and wife were unaware of this.     I reviewed the pathology results from the papillectomy specimen. This showed Intra-ampullary papillary tubular adenocarcinoma. The deep margin however was positive. It is possible that there is microscopic invasion at the papillectomy site although endoscopically there did not appear to be residual tumor. Alternatively, due to the cautery at the margin the malignancy might have been completely excised but cautery effect prevents us from being certain. We will review at tumor conference an I will try to reach out to patient's Oncologist, Dr. Jovi Bird. Options would be for adjuvant therapy with radiation/chemo (potentially Brachytherapy?) or close endoscopic and imaging surveillance to look for recurrence.  I explained the above to Elier and his wife. Tentatively plan for repeat ERCP in 1 month to remove stents irregardless.    Agus Kent MD  [facility]  Division of Gastroenterology and Hepatology  [facility] [address] [place], [place] [address]

## 2021-07-16 NOTE — PLAN OF CARE
"Time 6969-7589    /59 (BP Location: Right arm)   Pulse 74   Temp 99.8  F (37.7  C) (Oral)   Resp 18   Ht 1.778 m (5' 10\")   Wt 68.9 kg (152 lb)   SpO2 95%   BMI 21.81 kg/m      Reason for admission: recently diagnosed ampullary adenocarcinoma s/p recent ERCP on 7/12/2021 was admitted to [facility] on 7/14/2021 with hematochezia and melena  Activity: Assist of 2, gait belt  Pain: Denies   Neuro: CVA in '18, A&O x4  Cardiac: wnl  Respiratory: wnl  GI/: Urine output decreased after fluids discontinued, good po intake,   Diet: Clear liquids advance as tolerated. Advanced to full liquid after dinner  Lines: PIV x 2  Wounds: Scabbed area bilateral shins      New changes this shift: hgb 6.1 had 1 unit PRBC recheck 7.8,      Plan: Cont to monitor      Continue to monitor and follow POC   "

## 2021-07-16 NOTE — PLAN OF CARE
"Time 7336-1214    /64 (BP Location: Left arm)   Pulse 88   Temp (!) 100.7  F (38.2  C) (Oral)   Resp 18   Ht 1.778 m (5' 10\")   Wt 68.9 kg (152 lb)   SpO2 94%   BMI 21.81 kg/m      Reason for admission: recently diagnosed ampullary adenocarcinoma s/p recent ERCP on 7/12/2021 was admitted to [facility] on 7/14/2021 with hematochezia and melena  Activity: Assist of 2  Pain: Denies  Neuro: aphasia secondary to CVA '18, A&O x 4  Cardiac: wnl  Respiratory: wnl  GI/: Less urine output after IV fluids stopped, wife would like patient to have BM before they're discharged  Diet: Advance diet as tolerated, order is at full liquid  Lines: PIV x2  Wounds: Lower extremities bruised with scabs      New changes this shift: Patient running fever with Tmax 100.7, no tylenol ordered, provider updated     Plan: Monitor      Continue to monitor and follow POC   "

## 2021-07-16 NOTE — PROGRESS NOTES
GASTROENTEROLOGY PROGRESS NOTE    Date: 07/16/2021     *Please refer to my (Mariposa Rodgers PGY-1) consult note from 7/14/21 as needed.    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    #UGIB s/p snare papillectomy- secondary to resuming plavix prematurely with ulcerated area found near pancreatic duct on ERCP  - Hgb 15-> 8.1 on admission-> 6.1  - s/p 1U PRBCs on 7/15/21 with repeat Hgb at 7.8  - today (7/16/21) Hgb at 7.1  - patient had 2 episodes of bloody stools, per nurse the stools had dark maroon blood in them  - repeat Hgb today at 2pm  - plavix continues to be held    #Fever of unknown origin  - 100.7 F temp today  - UA, UCx, BCx, and lipase pending  - CXR 7/16/21 notable for mild interstitial opacities, left trace pleural effusion- pulmonary edema vs atelectasis  - LFTs mildly increased compared to yesterday  - patient denies abdominal pain, N/V    RECOMMENDATIONS:  - follow-up CBC  - pending repeat Hgb, if <7 transfuse 1 U PRBCs  - if repeat Hgb stable can advance diet  - continue holding plavix, patient can resume next Wednesday (7/21/21)  - ERCP in 1 month to remove stents with Dr. Kent    Gastroenterology follow up recommendations: Pending clinical course.      Thank you for involving us in this patient's care. Please do not hesitate to contact the GI service with any questions or concerns.      Pt care plan discussed with Dr. Arreola, GI staff physician.    Mariposa Rodgers DO  PGY-1  Department of Medicine  [phone]    _______________________________________________________________    24 Hour Events: Patient developed a fever of 100.7 this morning. Blood cultures, urine cultures, UA, and lipase are pending. He is currently afebrile and HDS. The patient also had 2 episodes of  "bloody stool. Per the nurse (Chris) the stool had dark maroon blood in them.      Subjective: The patient is feeling well today, is in good spirits, and denies abdominal pain, nausea, or vomiting.      Objective:  Blood pressure 133/65, pulse 88, temperature 97.8  F (36.6  C), temperature source Oral, resp. rate 18, height 1.778 m (5' 10\"), weight 68.9 kg (152 lb), SpO2 95 %.    Gen: NAD  HEENT: ncat, sclera anicteric  CV: RRR  Lungs: CTA b/l  Abd: normoactive bowel sounds, abdomen non-tender, nondistended  Skin: no jaundice, warm, well-perfused  Neuro: slowed speech      LABS:  BMP  Recent Labs   Lab 07/16/21  0921 07/15/21  0600 07/14/21  1119 07/12/21  0848    138 134 132*   POTASSIUM 3.2* 4.2 4.7 4.3   CHLORIDE 105 107 101 99   AMOS 8.8 8.4* 9.0 9.3   CO2 26 23 28 28   BUN 24 32* 46* 17   CR 0.68 0.70 0.76 0.58*   GLC 88 109* 117* 96     CBC  Recent Labs   Lab 07/16/21  0636 07/15/21  1819 07/15/21  0600 07/14/21  1119   WBC 13.5* 11.5* 7.6 9.6   RBC 2.21* 2.46* 1.91* 2.54*   HGB 7.1* 7.8* 6.1* 8.1*   HCT 21.1* 24.1* 18.1* 24.4*   MCV 96 98 95 96   MCH 32.1 31.7 31.9 31.9   MCHC 33.6 32.4 33.7 33.2   RDW 14.6 14.6 14.1 14.1   * 173 123* 186     INR  Recent Labs   Lab 07/12/21  0848   INR 1.00     LFTs  Recent Labs   Lab 07/16/21  0921 07/15/21  0600 07/14/21  1119 07/12/21  0848   ALKPHOS 102 96 137 182*   * 126* 94* 53*   ALT 56 54 64 68   BILITOTAL 1.3 1.1 0.9 1.1   PROTTOTAL 4.9* 4.8* 5.4* 6.8   ALBUMIN 2.5* 2.7* 2.6* 3.2*      PANC  Recent Labs   Lab 07/16/21  0921 07/12/21  1300 07/12/21  0848   LIPASE 28* 616* 420*   AMYLASE  --  120* 115*       IMAGING:  CXR 7/16/21:  IMPRESSION: There are mild interstitial opacities, most prominently in  the bilateral perihilar and basilar regions with associated left trace  pleural effusion, this could represent pulmonary edema or atelectasis.  "

## 2021-07-16 NOTE — PROGRESS NOTES
"CLINICAL NUTRITION SERVICES - ASSESSMENT NOTE     Nutrition Prescription    RECOMMENDATIONS FOR MDs/PROVIDERS TO ORDER:  Recommend appetite stimulant as appropriate. RD would recommend if in line with medical plan of care.     Malnutrition Status:    Severe malnutrition in the context of chronic illness    Recommendations already ordered by Registered Dietitian (RD):  Supplements: pt able to order any PRN. RD ordered trial of supplements for patient to try. Pt enjoys vanilla flavors.     Future/Additional Recommendations:  Monitor ongoing PO intakes and ability to advance diet > Full liquids.  If PO intakes are not adequate, recommend scheduled supplements and calorie counts     REASON FOR ASSESSMENT  Elier Leong is a/an 75 year old male assessed by the dietitian for Admission Nutrition Risk Screen for positive    PMH: He has a history of primary lateral sclerosis, prostate cancer s/p prostatectomy and newly diagnosed ampullary adenocarcinoma 7/12 admitted for hematochezia and melena.   7/14: ERCP - area of ulceration near pancreatic duct was found and treated with thermal therapy and epi. Stone removed and stents placed in CBD.      NUTRITION HISTORY  Pt was last seen by RD on 6/17/21. At this time, \" 50% meals. Pt. Reports recent loss of appetite and eating less than normal for the last month. His wife says he has never been a big eater but has been eating less than he normally would.\" - Home Diet: no special diet, eats small bites.    Elier has some trouble with gargled speech, however still able to understand. Wife cares for patient at home. They both reported weight loss of bout 10# in 1-1.5 months. Wife reported that she makes Nader's meals and cuts up most of his food in small bite size peices, just because it is easier for patient to consume. She states that he eats % of his meals, but she did report that she gives him the amount of food she knows he will eat, not necessarily the amount he may " "need. Reports he dislikes ensure but does have a whey protein shake in the morning. Nader reported that he just does not have an appetite, but knows he needs to eat. Wife and Nader will both really interested in ways to get more protein and calories in his diet. They were open to trailing other supplements.      CURRENT NUTRITION ORDERS  Diet: Full Liquid  Intake/Tolerance: Patient     LABS  Labs reviewed, K: 3.2 (L)    MEDICATIONS  Medications reviewed  Protonix  Miralax  Klorcon    ANTHROPOMETRICS  Height: 177.8 cm (5' 10\")  Most Recent Weight: 68.9 kg ( 152#)- 7/14  IBW: 75.5 kg  BMI: Normal BMI  Weight History: wife and Nader report 6% wt loss in 1.5 months. Report his UBW is 165#.Difficult to assess weight trends below.   Wt Readings from Last 10 Encounters:   07/14/21 68.9 kg (152 lb)   07/08/21 64.7 kg (142 lb 9.6 oz)   06/28/21 67.2 kg (148 lb 1.6 oz)   06/17/21 68 kg (150 lb)   06/10/21 68.6 kg (151 lb 3.2 oz)   02/13/20 70.8 kg (156 lb)   01/10/20 70.8 kg (156 lb)   08/15/19 70.9 kg (156 lb 6.4 oz)   06/06/19 70.8 kg (156 lb)   05/02/19 73.9 kg (163 lb)     Dosing Weight: 69 kg (actual)     ASSESSED NUTRITION NEEDS  Estimated Energy Needs: 6543-4204 kcals/day (30 - 35 kcals/kg )  Justification: Maintenance with PLS  Estimated Protein Needs:  grams protein/day (1.2 - 1.5 grams of pro/kg)  Justification: Increased needs  Estimated Fluid Needs: 1 mL/kcal/day   Justification: Maintenance and Per provider pending fluid status    PHYSICAL FINDINGS  See malnutrition section below.    MALNUTRITION  % Intake: < 75% for >/= 1 month (non-severe)  % Weight Loss: > 5% in 1 month (severe)  Subcutaneous Fat Loss: Facial region:  mild, Upper arm:  mild and Lower arm:  mild  Muscle Loss: Temporal:  mild, Thoracic region (clavicle, acromium bone, deltoid, trapezius, pectoral):  moderate, Upper arm (bicep, tricep):  moderate, Lower arm  (forearm):  moderate, Dorsal hand:  moderate, Upper leg (quadricep, hamstring):  " moderate, Patellar region:  moderate and Posterior calf:  moderate  Fluid Accumulation/Edema: None noted  Malnutrition Diagnosis: Severe malnutrition in the context of chronic illness    NUTRITION DIAGNOSIS  Inadequate protein-energy intake related to hypercatabolism and poor appetite as evidenced by pt report and 6% wt loss in the past 1.5 moths per pt report.      INTERVENTIONS  Implementation  Nutrition Education: Provided education on role of RD and nutrition plan of care. Provided hand outs of supplements, as well as ways to increase calories and protein in the diet. Also provided handout of poor appetite and ways to help improve this.   Medical food supplement therapy  Modify composition of meals/snacks - recommended to trail 5-6 small meals/day.     Goals  Patient to consume % of nutritionally adequate meal trays TID, or the equivalent with supplements/snacks.     Monitoring/Evaluation  Progress toward goals will be monitored and evaluated per protocol.      Roxanne Adhikari, RD, MS, LD  SICU: [id number]

## 2021-07-16 NOTE — PROGRESS NOTES
[facility]    Progress Note - Suri's Service        Date of Admission:  7/14/2021    Assessment & Plan           Elier Leong is a 75 year old male admitted on 7/14/2021. He has a history of primary lateral sclerosis, prostate cancer s/p prostatectomy and newly diagnosed ampullary adenocarcinoma admitted for hematochezia and melena.     # GI bleed, resolved  # Acute blood loss anemia, improving   # Acute Post Hemorrhagic Anemia  # Choledocholithiasis   # Ampullary adenocarcinoma  Admitted with GIB after resuming Plavix following ERCP with snare papillectomy for newly diagnosed ampullary adenocarcinoma on 7/12 after inadvertently restarting Plavix early and subsequently developing several episodes of hematochezia and melena the following morning and hemoglobin trended from 15.2 on 7/12 to 8.1 on admission. Underwent ERCP 7/14; area of ulceration near pancreatic duct was found and treated with thermal therapy and epi. Required 1u pRBC 7/15 for hgb 6.1.     - Appreciate GI panc bili following  - Repeat CBC @1400  - FLD, advance to regular diet if hemoglobin stable, otherwise will remain on fulls overnight  - maintain 2 large bore IVs  - IV protonix 40 mg BID  - Hold PTA clopidogrel x7 days from ERCP (may restart 7/21)    # Fever  # Leukocytosis  Febrile to 100.7 F x1 on 7/16 AM (POD2) with new leukocytosis. No new symptoms or focal findings, though did note cough during exam.   Differential for early postop fever includes normal postop response, however, infectious causes including pneumonia, urinary infection (has had urinary frequency) need to be ruled out. Also consider post-ERCP pancreatitis, biliary disease (choledocholithiasis successfully treated on recent ERCP), though less likely with no abdominal pain and reassuring abdominal exam. Additional considerations include febrile nonhemolytic transfusion reaction, cancer-related fever.   - Blood culture x2  -  UA, UC  - Lipase   - LFTs   - Repeat COVID19 swab     # Choledocholithiasis  Discovered during ERCP 7/14. Stone removed and stents placed in CBD.   - Trend LFTs     # History of PLS  Diagnosed 19 years ago, prognosis unclear. At baseline has slurred speech and needs wife's help with dressing and bathing. Able to ambulate with walker short distances, wheelchair for longer distances. Followed by neurology.   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 Currently at neurologic baseline.   -Holding plavix x7 days from ERCP (7/21)     Chronic stable issues:  GERD: Hold PTA PO PPI while on IV PPI  HLD: Not currently on statin (intentionally not prescribed per EMR)  Essential hypertension: Hold PTA enalapril given hypovolemia/hypotension  Hypokalemia: PTA potassium       Diet: Advance Diet as Tolerated: Full Liquid Diet    DVT Prophylaxis: HOLDING Plavix  Wright Catheter: Not present  Fluids: PO   Central Lines: None  Code Status: Full Code      Disposition Plan   Expected discharge: 07/17/2021 recommended to prior living arrangement once hemoglobin stable.     The patient's care was discussed with the Attending Physician, Dr. Lewis.    Beverly Gutierrez MD  [facility]  Securely message with the Vocera Web Console (learn more here)  Text page via [facility] Paging/Directory    ______________________________________________________________________    Interval History   Febrile this .7. No new symptoms, denies any pain, nausea, vomiting. Did have urinary frequency yesterday, less so since discontinuation of IVF. Received 1u pRBC with hgb trending up to 7.8.     Physical Exam   Vital Signs: Temp: (!) 100.7  F (38.2  C) Temp src: Oral BP: 102/64 Pulse: 88   Resp: 18 SpO2: 94 % O2 Device: None (Room air)    Weight: 152 lbs 0 oz  Physical  Exam  Constitutional:       General: He is not in acute distress.     Appearance: He is well-developed. He is not diaphoretic.   HENT:      Mouth/Throat:      Mouth: Mucous membranes are moist.   Cardiovascular:      Rate and Rhythm: Normal rate and regular rhythm.   Pulmonary:      Effort: Pulmonary effort is normal. No respiratory distress.      Breath sounds: Normal breath sounds.      Comments: Occasional cough   Abdominal:      General: There is no distension.      Palpations: Abdomen is soft.      Tenderness: There is no abdominal tenderness.   Musculoskeletal:      Cervical back: Normal range of motion.      Right lower leg: No edema.      Left lower leg: No edema.      Comments: Scattered ecchymosis and scabbing on bilateral lower extremities   Skin:     General: Skin is warm.      Coloration: Skin is pale. Skin is not jaundiced.      Findings: No bruising or rash.   Neurological:      Mental Status: He is alert.      Comments: Garbled speech consistent with baseline per wife   Psychiatric:         Mood and Affect: Mood normal.          Data   Recent Labs   Lab 07/16/21  1407 07/16/21  0921 07/16/21  0636 07/15/21  1819 07/15/21  0600 07/14/21  1119 07/12/21  1300 07/12/21  0848   WBC 18.6*  --  13.5* 11.5* 7.6 9.6  --  6.5   HGB 8.6*  --  7.1* 7.8* 6.1* 8.1*  --  15.2   MCV 97  --  96 98 95 96  --  93     --  144* 173 123* 186  --  232   INR  --   --   --   --   --   --   --  1.00   NA  --  135  --   --  138 134  --  132*   POTASSIUM  --  3.2*  --   --  4.2 4.7  --  4.3   CHLORIDE  --  105  --   --  107 101  --  99   CO2  --  26  --   --  23 28  --  28   BUN  --  24  --   --  32* 46*  --  17   CR  --  0.68  --   --  0.70 0.76  --  0.58*   ANIONGAP  --  4  --   --  8 5  --  5   AMOS  --  8.8  --   --  8.4* 9.0  --  9.3   GLC  --  88  --   --  109* 117*  --  96   ALBUMIN  --  2.5*  --   --  2.7* 2.6*  --  3.2*   PROTTOTAL  --  4.9*  --   --  4.8* 5.4*  --  6.8   BILITOTAL  --  1.3  --   --  1.1 0.9  --   1.1   ALKPHOS  --  102  --   --  96 137  --  182*   ALT  --  56  --   --  54 64  --  68   AST  --  133*  --   --  126* 94*  --  53*   LIPASE  --  28*  --   --   --   --  616* 420*     No results found for this or any previous visit (from the past 24 hour(s)).

## 2021-07-16 NOTE — PLAN OF CARE
[id number]      NEURO: Alert and oriented x4. slurred and slow speech.      RESPIRATORY: Room Air, denies dizziness, and SOB. Lungs sound, clear, equal bilateral.     CARDIO: VSS, denies dizziness, and extremity pain.      GI/:  Denies N/V, BS active, BM x 2 (black/red) provider notified, multiple urine incontinences.      SKIN: Intact.      ACTIVITY: Lift     PAIN: Denies pain.    DLA: PIV, NS locked.     BG: No      PLAN: Continue monitoring.

## 2021-07-16 NOTE — PROGRESS NOTES
"SPIRITUAL HEALTH SERVICES Progress Note  Unit 7D Referral when admitted    SH introduced to pt and spouse (Kelli). Pt had some trouble communicating verbally so most conversation happened through spouse. Pt and spouse were providing a brief medical history of the past week which they both described as \"quite busy.\" Labs came to draw blood and we paused the visit.    Upon resuming visit, pt and spouse asked for \"another miracle\" as they named that \"getting the tumor out was in it of itself a miracle.\" SH offered prayer for miracle, peace, and comfort with pt and spouse.    Plan: SH will remain available for any emotional/spiritual needs from pt, family, staff per consult request.     Harry Espinal   Intern  Pager [phone]    "

## 2021-07-17 ENCOUNTER — APPOINTMENT (OUTPATIENT)
Dept: PHYSICAL THERAPY | Facility: CLINIC | Age: 76
DRG: 438 | End: 2021-07-17
Payer: COMMERCIAL

## 2021-07-17 LAB
ABO/RH(D): NORMAL
ALBUMIN UR-MCNC: 20 MG/DL
ANTIBODY SCREEN: NEGATIVE
APPEARANCE UR: CLEAR
BASOPHILS # BLD AUTO: 0 10E3/UL (ref 0–0.2)
BASOPHILS NFR BLD AUTO: 0 %
BILIRUB DIRECT SERPL-MCNC: 0.7 MG/DL (ref 0–0.2)
BILIRUB SERPL-MCNC: 1.2 MG/DL (ref 0.2–1.3)
BILIRUB UR QL STRIP: NEGATIVE
COLOR UR AUTO: YELLOW
EOSINOPHIL # BLD AUTO: 0.3 10E3/UL (ref 0–0.7)
EOSINOPHIL NFR BLD AUTO: 2 %
ERYTHROCYTE [DISTWIDTH] IN BLOOD BY AUTOMATED COUNT: 14.6 % (ref 10–15)
ERYTHROCYTE [DISTWIDTH] IN BLOOD BY AUTOMATED COUNT: 14.7 % (ref 10–15)
ERYTHROCYTE [DISTWIDTH] IN BLOOD BY AUTOMATED COUNT: 14.8 % (ref 10–15)
GLUCOSE UR STRIP-MCNC: NEGATIVE MG/DL
HCT VFR BLD AUTO: 21 % (ref 40–53)
HCT VFR BLD AUTO: 22.9 % (ref 40–53)
HCT VFR BLD AUTO: 25 % (ref 40–53)
HGB BLD-MCNC: 7 G/DL (ref 13.3–17.7)
HGB BLD-MCNC: 7.7 G/DL (ref 13.3–17.7)
HGB BLD-MCNC: 8.3 G/DL (ref 13.3–17.7)
HGB UR QL STRIP: NEGATIVE
IMM GRANULOCYTES # BLD: 0.1 10E3/UL
IMM GRANULOCYTES NFR BLD: 1 %
KETONES UR STRIP-MCNC: NEGATIVE MG/DL
LDH SERPL L TO P-CCNC: 396 U/L (ref 85–227)
LEUKOCYTE ESTERASE UR QL STRIP: NEGATIVE
LYMPHOCYTES # BLD AUTO: 1.6 10E3/UL (ref 0.8–5.3)
LYMPHOCYTES NFR BLD AUTO: 11 %
MCH RBC QN AUTO: 32.1 PG (ref 26.5–33)
MCH RBC QN AUTO: 32.2 PG (ref 26.5–33)
MCH RBC QN AUTO: 32.7 PG (ref 26.5–33)
MCHC RBC AUTO-ENTMCNC: 33.2 G/DL (ref 31.5–36.5)
MCHC RBC AUTO-ENTMCNC: 33.3 G/DL (ref 31.5–36.5)
MCHC RBC AUTO-ENTMCNC: 33.6 G/DL (ref 31.5–36.5)
MCV RBC AUTO: 96 FL (ref 78–100)
MCV RBC AUTO: 96 FL (ref 78–100)
MCV RBC AUTO: 98 FL (ref 78–100)
MONOCYTES # BLD AUTO: 0.9 10E3/UL (ref 0–1.3)
MONOCYTES NFR BLD AUTO: 6 %
MUCOUS THREADS #/AREA URNS LPF: PRESENT /LPF
NEUTROPHILS # BLD AUTO: 12.1 10E3/UL (ref 1.6–8.3)
NEUTROPHILS NFR BLD AUTO: 80 %
NITRATE UR QL: NEGATIVE
NRBC # BLD AUTO: 0 10E3/UL
NRBC BLD AUTO-RTO: 0 /100
PH UR STRIP: 5.5 [PH] (ref 5–7)
PLATELET # BLD AUTO: 168 10E3/UL (ref 150–450)
PLATELET # BLD AUTO: 189 10E3/UL (ref 150–450)
PLATELET # BLD AUTO: 197 10E3/UL (ref 150–450)
POTASSIUM BLD-SCNC: 3.3 MMOL/L (ref 3.4–5.3)
POTASSIUM BLD-SCNC: 3.4 MMOL/L (ref 3.4–5.3)
RBC # BLD AUTO: 2.18 10E6/UL (ref 4.4–5.9)
RBC # BLD AUTO: 2.39 10E6/UL (ref 4.4–5.9)
RBC # BLD AUTO: 2.54 10E6/UL (ref 4.4–5.9)
RBC URINE: <1 /HPF
SP GR UR STRIP: 1.03 (ref 1–1.03)
SQUAMOUS EPITHELIAL: <1 /HPF
UROBILINOGEN UR STRIP-MCNC: NORMAL MG/DL
WBC # BLD AUTO: 12.9 10E3/UL (ref 4–11)
WBC # BLD AUTO: 13.9 10E3/UL (ref 4–11)
WBC # BLD AUTO: 15.1 10E3/UL (ref 4–11)
WBC URINE: <1 /HPF

## 2021-07-17 PROCEDURE — 250N000011 HC RX IP 250 OP 636: Performed by: STUDENT IN AN ORGANIZED HEALTH CARE EDUCATION/TRAINING PROGRAM

## 2021-07-17 PROCEDURE — 85025 COMPLETE CBC W/AUTO DIFF WBC: CPT | Performed by: STUDENT IN AN ORGANIZED HEALTH CARE EDUCATION/TRAINING PROGRAM

## 2021-07-17 PROCEDURE — 84132 ASSAY OF SERUM POTASSIUM: CPT | Performed by: STUDENT IN AN ORGANIZED HEALTH CARE EDUCATION/TRAINING PROGRAM

## 2021-07-17 PROCEDURE — 36415 COLL VENOUS BLD VENIPUNCTURE: CPT | Performed by: STUDENT IN AN ORGANIZED HEALTH CARE EDUCATION/TRAINING PROGRAM

## 2021-07-17 PROCEDURE — 81001 URINALYSIS AUTO W/SCOPE: CPT | Performed by: STUDENT IN AN ORGANIZED HEALTH CARE EDUCATION/TRAINING PROGRAM

## 2021-07-17 PROCEDURE — 85027 COMPLETE CBC AUTOMATED: CPT | Performed by: STUDENT IN AN ORGANIZED HEALTH CARE EDUCATION/TRAINING PROGRAM

## 2021-07-17 PROCEDURE — 258N000003 HC RX IP 258 OP 636: Performed by: STUDENT IN AN ORGANIZED HEALTH CARE EDUCATION/TRAINING PROGRAM

## 2021-07-17 PROCEDURE — 97116 GAIT TRAINING THERAPY: CPT | Mod: GP

## 2021-07-17 PROCEDURE — 82247 BILIRUBIN TOTAL: CPT | Performed by: STUDENT IN AN ORGANIZED HEALTH CARE EDUCATION/TRAINING PROGRAM

## 2021-07-17 PROCEDURE — C9113 INJ PANTOPRAZOLE SODIUM, VIA: HCPCS | Performed by: STUDENT IN AN ORGANIZED HEALTH CARE EDUCATION/TRAINING PROGRAM

## 2021-07-17 PROCEDURE — 99233 SBSQ HOSP IP/OBS HIGH 50: CPT | Mod: GC | Performed by: INTERNAL MEDICINE

## 2021-07-17 PROCEDURE — 99232 SBSQ HOSP IP/OBS MODERATE 35: CPT | Mod: GC | Performed by: STUDENT IN AN ORGANIZED HEALTH CARE EDUCATION/TRAINING PROGRAM

## 2021-07-17 PROCEDURE — 250N000013 HC RX MED GY IP 250 OP 250 PS 637: Performed by: STUDENT IN AN ORGANIZED HEALTH CARE EDUCATION/TRAINING PROGRAM

## 2021-07-17 PROCEDURE — 83615 LACTATE (LD) (LDH) ENZYME: CPT | Performed by: STUDENT IN AN ORGANIZED HEALTH CARE EDUCATION/TRAINING PROGRAM

## 2021-07-17 PROCEDURE — 97530 THERAPEUTIC ACTIVITIES: CPT | Mod: GP

## 2021-07-17 PROCEDURE — 120N000002 HC R&B MED SURG/OB UMMC

## 2021-07-17 RX ORDER — POTASSIUM CHLORIDE 750 MG/1
40 TABLET, EXTENDED RELEASE ORAL ONCE
Status: COMPLETED | OUTPATIENT
Start: 2021-07-17 | End: 2021-07-17

## 2021-07-17 RX ADMIN — POTASSIUM CHLORIDE 40 MEQ: 750 TABLET, EXTENDED RELEASE ORAL at 12:00

## 2021-07-17 RX ADMIN — OXYBUTYNIN CHLORIDE 20 MG: 10 TABLET, EXTENDED RELEASE ORAL at 21:40

## 2021-07-17 RX ADMIN — POLYETHYLENE GLYCOL 3350 17 G: 17 POWDER, FOR SOLUTION ORAL at 11:59

## 2021-07-17 RX ADMIN — PANTOPRAZOLE SODIUM 40 MG: 40 INJECTION, POWDER, FOR SOLUTION INTRAVENOUS at 00:39

## 2021-07-17 RX ADMIN — SODIUM CHLORIDE, POTASSIUM CHLORIDE, SODIUM LACTATE AND CALCIUM CHLORIDE 500 ML: 600; 310; 30; 20 INJECTION, SOLUTION INTRAVENOUS at 21:12

## 2021-07-17 RX ADMIN — PANTOPRAZOLE SODIUM 40 MG: 40 INJECTION, POWDER, FOR SOLUTION INTRAVENOUS at 11:59

## 2021-07-17 ASSESSMENT — ACTIVITIES OF DAILY LIVING (ADL)
ADLS_ACUITY_SCORE: 35
ADLS_ACUITY_SCORE: 37
ADLS_ACUITY_SCORE: 33
ADLS_ACUITY_SCORE: 33

## 2021-07-17 NOTE — PROGRESS NOTES
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 7/14 ERCP showing ulcer near PD orifice s/p thermal therapy and epinephrine. Post-procedure with ongoing intermittent hematochezia and melena with stable hemoglobin and vital signs.     This morning he endorsed scant melena. Later on in afternoon, nurse reported two episodes of small volume hematochezia. Pt remained with stable vital signs, and repeat hemoglobin check actually improved to 8.3.  Given overall clinical improvement, will plan to continue to monitor. Low suspicion for brisk upper GIB given hemodynamic stability. DDX includes hemorrhoidal, colitis, outlet bleed, etc. No recent colonoscopy on file. LACI performed by primary team with +maroon tinge, no overt ext hemorrhoids.     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 Elba Cordoba MD, [facility]  Gastroenterology Fellow, PGY5  Pager [phone]  _______________________________________________________________      Subjective: Nursing notes and 24hr events reviewed. Wife at bedside at time of exam, assisting with communication given patient's severe dysarthria. Pt denies fevers, chills,  "abdominal pain, nausea, vomiting. This morning he endorsed scant melena. Later on in afternoon, nurse reported two episodes of small volume hematochezia. Pt remained with stable vital signs, and repeat hemoglobin check actually improved to 8.3.     ROS:   4 pt ROS negative unless noted in subjective.     Objective:  Blood pressure 119/60, pulse 86, temperature 98.6  F (37  C), temperature source Oral, resp. rate 18, height 1.778 m (5' 10\"), weight 68.9 kg (152 lb), SpO2 97 %.  Gen: Sitting in chair, alert and pleasant   HEENT: NCAT. Conjunctiva clear. Sclera anicteric  CV: RRR   Resp: Non-labored work of breathing  Abd: Soft, NT, ND, no guarding or rebound  Msk: no gross deformity  Skin: No jaundice  Ext: warm, well perfused   Neuro: +Dysarthria   Mental status/Psych: A&O        LABS:  BMP  Recent Labs   Lab 07/17/21  0927 07/16/21  1559 07/16/21  0921 07/15/21  0600 07/14/21  1119 07/12/21  0848   NA  --   --  135 138 134 132*   POTASSIUM 3.3* 3.6 3.4  3.2* 4.2 4.7 4.3   CHLORIDE  --   --  105 107 101 99   AMOS  --   --  8.8 8.4* 9.0 9.3   CO2  --   --  26 23 28 28   BUN  --   --  24 32* 46* 17   CR  --   --  0.68 0.70 0.76 0.58*   GLC  --   --  88 109* 117* 96     CBC  Recent Labs   Lab 07/17/21  1550 07/17/21  0927 07/17/21  0601 07/16/21  1407   WBC 15.1* 13.9* 12.9* 18.6*   RBC 2.54* 2.39* 2.18* 2.65*   HGB 8.3* 7.7* 7.0* 8.6*   HCT 25.0* 22.9* 21.0* 25.8*   MCV 98 96 96 97   MCH 32.7 32.2 32.1 32.5   MCHC 33.2 33.6 33.3 33.3   RDW 14.8 14.7 14.6 14.6    189 168 197     INR  Recent Labs   Lab 07/12/21  0848   INR 1.00     LFTs  Recent Labs   Lab 07/17/21  0927 07/16/21  0921 07/15/21  0600 07/14/21  1119 07/12/21  0848   ALKPHOS  --  102 96 137 182*   AST  --  133* 126* 94* 53*   ALT  --  56 54 64 68   BILITOTAL 1.2 1.3 1.1 0.9 1.1   PROTTOTAL  --  4.9* 4.8* 5.4* 6.8   ALBUMIN  --  2.5* 2.7* 2.6* 3.2*      PANC  Recent Labs   Lab 07/16/21  0921 07/12/21  1300 07/12/21  0848   LIPASE 28* 616* 420* "   AMYLASE  --  120* 115*

## 2021-07-17 NOTE — PLAN OF CARE
A&ox4. VSS. Pain in left shoulder, tylenol given. Up in chair most of shift. Denies abd discomfort. One small stool, maroon brown, no vandana blood. Very small amount and very sticky incontinent. Incontinent of urine. Potassium replaced. Reg diet tolerated for dinner. Will continue to monitor.

## 2021-07-17 NOTE — PROGRESS NOTES
[facility]    Progress Note - Suri's Service        Date of Admission:  7/14/2021    Assessment & Plan           Elier Leong is a 75 year old male admitted on 7/14/2021. He has a history of primary lateral sclerosis, prostate cancer s/p prostatectomy and newly diagnosed ampullary adenocarcinoma admitted for hematochezia and melena.     # GI bleed  # Acute blood loss anemia  # Acute Post Hemorrhagic Anemia  # Choledocholithiasis   # Ampullary adenocarcinoma  Admitted with GIB after resuming Plavix following ERCP with snare papillectomy for newly diagnosed ampullary adenocarcinoma on 7/12 after inadvertently restarting Plavix early and subsequently developing several episodes of hematochezia and melena the following morning and hemoglobin trended from 15.2 on 7/12 to 8.1 on admission. Underwent ERCP 7/14; area of ulceration near pancreatic duct was found and treated with thermal therapy and epi. Required 1u pRBC 7/15 for hgb 6.1. Patient with ongoing sticky maroon/black stools, repeat hemoglobin 7.0 on 7/17. Unclear whether this represents ongoing passage of old blood, oozing from surgical site, vs new source of GI bleed. Vitals remain stable, with no new symptoms. Repeat cbc 3 hours later demonstrated reassuring hgb of 7.7. If patient has another tarry stool, will repeat cbc; if not, will plan to continue monitoring and repeat cbc 7/18 AM.   - Appreciate GI panc bili following  - Repeat CBC in AM  - continue regular diet  - maintain 2 large bore IVs  - IV protonix 40 mg BID  - Hold PTA clopidogrel x7 days from ERCP (may restart 7/21)    # Fever  # Leukocytosis  Febrile to 100.7 F x1 on 7/16 AM (POD2) with new leukocytosis (18.6). No new symptoms or focal findings. No recurrent fevers 7/17, and WBC back down to 12.9.   Differential for early postop fever includes normal postop response, however, infectious causes including pneumonia, urinary infection (has had  urinary frequency). CXR on 7/16 with mild interstitial opacities and trace pleural effusion, COVID-19 negative. UA pending. Blood cultures with ngtd. Considered post-ERCP pancreatitis, biliary disease (choledocholithiasis successfully treated on recent ERCP), though less likely with no abdominal pain and reassuring abdominal exam, as well as normal lipase. Of note, AST is up-trending from admission (53 > 133). Given mild transient fever, improvement in WBC today, as well as lack of systemic symptoms, considered febrile nonhemolytic transfusion reaction - although in this patient the timing is somewhat delayed, with ~6 hours between transfusion and fever onset (typical onset <4 hours). FNHTR would be quite rare in appropriately leukoreduced blood products. Also considered delayed hemolytic transfusion reaction in the setting of ongoing anemia 2 days post-transfusion, and otherwise lack of clinical symptoms (DHTR often clinically silent). Hemolysis labs were obtained: LDH moderately elevated, however indirect bilirubin is within normal limits. Low clinical concern for active hemolysis. Differential also includes cancer-related fever.  - Follow blood cultures  - UA, UC  - if patient requires second unit of PRBCs, monitor for s/sxs of acute and delayed reaction    # Choledocholithiasis  Discovered during ERCP 7/14. Stone removed and stents placed in CBD. ALT remains stable. AST up-trending from admission (53 > 94 > 126 > 133). Bilirubin stable at 1.3. Alk phos within normal limits.  - Trend LFTs     # History of PLS  Diagnosed 19 years ago, prognosis unclear. At baseline has slurred speech and needs wife's help with dressing and bathing. Able to ambulate with walker short distances, wheelchair for longer distances. Followed by neurology.   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 Currently at neurologic  "baseline.   -Holding plavix x7 days from ERCP (7/21)     Chronic stable issues:  GERD: Hold PTA PO PPI while on IV PPI  HLD: Not currently on statin (intentionally not prescribed per EMR)  Essential hypertension: Hold PTA enalapril given hypovolemia/hypotension  Hypokalemia: PTA potassium       Diet: Snacks/Supplements Adult: Other; Any; Between Meals  Regular Diet Adult    DVT Prophylaxis: HOLDING Plavix  Wright Catheter: Not present -condom catheter present  Fluids: PO   Central Lines: None  Code Status: Full Code      Disposition Plan   Expected discharge: 07/18/2021 recommended to prior living arrangement once hemoglobin stable .     The patient's care was discussed with the Attending Physician, Dr. Lewis.    Natalia Rivero, MS4  [employer] [employer]    I was present with the medical student who participated in the service and in the documentation of this note. I have verified the history and personally performed the physical exam and medical decision making, and have verified the content of the note, which accurately reflects my assessment of the patient and the plan of care.   Rebecca Benedict DO PGY2       Suri's Service  [facility]  Securely message with the Vocera Web Console (learn more here)  Text page via [facility] Paging/Directory    ______________________________________________________________________    Interval History   Overnight events: per nursing, patient had one sticky, maroon/black stool, without vandana blood. He has been tolerating a regular diet. He has been afebrile.    This morning, Elier reports feeling sore in his \"backside.\" Otherwise, he denies any new pain or discomfort. He has not had any cough or trouble breathing. No fevers or chills.     Physical Exam   Vital Signs: Temp: 97.6  F (36.4  C) Temp src: Oral BP: 116/57 Pulse: 72   Resp: 18 SpO2: 93 % O2 Device: None (Room air)    Weight: 152 lbs 0 oz  Physical Exam  Constitutional: "       General: He is not in acute distress.     Appearance: He is well-developed. He is not diaphoretic.      Comments: Intermittently tearful   HENT:      Mouth/Throat:      Mouth: Mucous membranes are moist.   Cardiovascular:      Rate and Rhythm: Normal rate and regular rhythm.   Pulmonary:      Effort: Pulmonary effort is normal. No respiratory distress.      Breath sounds: Normal breath sounds. No wheezing or rales.      Comments: Occasional cough  Abdominal:      General: There is no distension.      Palpations: Abdomen is soft.      Tenderness: There is no abdominal tenderness.   Musculoskeletal:      Cervical back: Normal range of motion.      Right lower leg: No edema.      Left lower leg: No edema.   Skin:     General: Skin is warm.      Coloration: Skin is pale. Skin is not jaundiced.      Findings: No bruising or rash.   Neurological:      Mental Status: He is alert.      Comments: Garbled speech consistent with baseline          Data   Recent Labs   Lab 07/17/21  0927 07/17/21  0601 07/16/21  1559 07/16/21  1407 07/16/21  0921 07/15/21  0600 07/14/21  1119 07/12/21  1300 07/12/21  0848   WBC 13.9* 12.9*  --  18.6*  --  7.6 9.6  --  6.5   HGB 7.7* 7.0*  --  8.6*  --  6.1* 8.1*  --  15.2   MCV 96 96  --  97  --  95 96  --  93    168  --  197  --  123* 186  --  232   INR  --   --   --   --   --   --   --   --  1.00   NA  --   --   --   --  135 138 134  --  132*   POTASSIUM 3.3*  --  3.6  --  3.2* 4.2 4.7  --  4.3   CHLORIDE  --   --   --   --  105 107 101  --  99   CO2  --   --   --   --  26 23 28  --  28   BUN  --   --   --   --  24 32* 46*  --  17   CR  --   --   --   --  0.68 0.70 0.76  --  0.58*   ANIONGAP  --   --   --   --  4 8 5  --  5   AMOS  --   --   --   --  8.8 8.4* 9.0  --  9.3   GLC  --   --   --   --  88 109* 117*  --  96   ALBUMIN  --   --   --   --  2.5* 2.7* 2.6*  --  3.2*   PROTTOTAL  --   --   --   --  4.9* 4.8* 5.4*  --  6.8   BILITOTAL 1.2  --   --   --  1.3 1.1 0.9  --  1.1    ALKPHOS  --   --   --   --  102 96 137  --  182*   ALT  --   --   --   --  56 54 64  --  68   AST  --   --   --   --  133* 126* 94*  --  53*   LIPASE  --   --   --   --  28*  --   --  616* 420*     No results found for this or any previous visit (from the past 24 hour(s)).

## 2021-07-17 NOTE — PLAN OF CARE
[id number] Alert and oriented, VSS, afebrile. Patient has slow/ slurred speech at baseline, able to make needs known and follow commands. Incontinent of bowel and bladder, condom cath in place, had one sticky dark maroon/ black stool. Needs UA collected. Denies pain/ nausea/ SOB. Continue plan of care.

## 2021-07-17 NOTE — PROGRESS NOTES
07/16/21 1415   Quick Adds   Type of Visit Initial PT Evaluation      Language English   Living Environment   People in home spouse   Current Living Arrangements house   Home Accessibility wheelchair accessible   Living Environment Comments Pt lives in handicap accessable home: ramp to enter, roll in shower, grab bars all along bathroom. Wife is able to physically assist pt. They also have PT and personal training 1x/week, looking to add 4hrs of PCA services/week.    Self-Care   Usual Activity Tolerance moderate   Current Activity Tolerance fair   Regular Exercise Yes   Activity/Exercise Type other (see comments)  (HHPT and  in home)   Exercise Amount/Frequency 2 times/wk   Equipment Currently Used at Home grab bar, toilet;grab bar, tub/shower;raised toilet seat;shower chair;walker, rolling;wheelchair, manual   Activity/Exercise/Self-Care Comment Pt can usually ambulate short household distances with FWW at A. He also has a manual WC and recieves help propelling. He can perform sit <> stand transfers with mod A at baseline from wife. Pt with baseline slurred speech. Wife and pt working on obtaining a rolling shower chair, power WC, and sit to stand recliner chair.    Disability/Function   Hearing Difficulty or Deaf yes   Hearing Management B hearing aids   Wear Glasses or Blind yes   Vision Management Glasses   Concentrating, Remembering or Making Decisions Difficulty no   Difficulty Communicating yes   Communication difficulty speaking   Difficulty Eating/Swallowing no   Walking or Climbing Stairs Difficulty yes   Mobility Management Use of WC and FWW for mobility.    Dressing/Bathing Difficulty yes   Dressing/Bathing Management Wife assists with all tasks.    Toileting issues yes   Toileting Management Wife assists   Doing Errands Independently Difficulty (such as shopping) yes   Errands Management Wife assists   Fall history within last six months yes   Number of times patient has  fallen within last six months 2   Change in Functional Status Since Onset of Current Illness/Injury yes   General Information   Onset of Illness/Injury or Date of Surgery 07/15/21  (date of PT orders)   Referring Physician Beverly Gutierrez MD   Patient/Family Therapy Goals Statement (PT) To return home   Pertinent History of Current Problem (include personal factors and/or comorbidities that impact the POC) Pt  is a 75 year old male admitted on 7/14/2021. He has a history of primary lateral sclerosis, prostate cancer s/p prostatectomy and newly diagnosed ampullary adenocarcinoma admitted for hematochezia and melena.  - per physician note   Existing Precautions/Restrictions fall   Cognition   Orientation Status (Cognition) oriented x 4   Affect/Mental Status (Cognition) WNL   Follows Commands (Cognition) WNL   Pain Assessment   Patient Currently in Pain No   Integumentary/Edema   Integumentary/Edema no deficits were identifed   Posture    Posture Forward head position;Protracted shoulders   Range of Motion (ROM)   ROM Comment Extension generally limited to all joints - pt with preference for flexed position.    Strength   Strength Comments B LE grossly 3+ to 4/5   Bed Mobility   Comment (Bed Mobility) Did not observe today   Transfers   Transfer Safety Comments Sit <> stand from high-back chair at mod A x 2   Gait/Stairs (Locomotion)   Comment (Gait/Stairs) Pt ambulating with FWW at min A x 1-2, flexed trunk, decreased gait speed, very narrow JIGAR, flexed trunk posture, posterior lean   Balance   Balance Comments Seated static balance fair. Seated dynamic balance poor. Standing static balance poor.    Clinical Impression   Criteria for Skilled Therapeutic Intervention yes, treatment indicated   PT Diagnosis (PT) Impaired functional mobility   Influenced by the following impairments Impaired strength, balance, endurance, posture, ROM   Functional limitations due to impairments Impaired mobility, limiting return to  [facility]   Clinical Presentation Stable/Uncomplicated   Clinical Presentation Rationale Per clinical judgement   Clinical Decision Making (Complexity) low complexity   Therapy Frequency (PT) Daily   Predicted Duration of Therapy Intervention (days/wks) 1 week   Planned Therapy Interventions (PT) balance training;bed mobility training;gait training;home exercise program;neuromuscular re-education;patient/family education;postural re-education;ROM (range of motion);strengthening;stretching;transfer training;wheelchair management/propulsion training;risk factor education;progressive activity/exercise;home program guidelines   Anticipated Equipment Needs at Discharge (PT)   (none)   Risk & Benefits of therapy have been explained evaluation/treatment results reviewed;care plan/treatment goals reviewed;risks/benefits reviewed;current/potential barriers reviewed;participants voiced agreement with care plan;participants included;patient;spouse/significant other   PT Discharge Planning    PT Discharge Recommendation (DC Rec) home with assist;home with home care physical therapy   PT Rationale for DC Rec Pt is below his baseline for mobility, however, pt has appropriate home set up and physical assist available from wife to facilitate a safe discharge to home. Pt could benefit from TCU if pt/wife would agree, however, preference is for home.    PT Brief overview of current status  Nursing: up with lift. Geomat cushion is ordered for when up in chair.    Total Evaluation Time   Total Evaluation Time (Minutes) 5

## 2021-07-17 NOTE — PROGRESS NOTES
[id number]    Elier Leong is a 75 year old male admitted on 7/14/2021. He has a history of primary lateral sclerosis, prostate cancer s/p prostatectomy and newly diagnosed ampullary adenocarcinoma admitted for hematochezia and melena.   # Ampullary adenocarcinoma  Admitted with GIB after resuming Plavix following ERCP with snare papillectomy for newly diagnosed ampullary adenocarcinoma on 7/12 after inadvertently restarting Plavix.    Hgb 8.3    BM x 2 - medium - tarry, black, dark red, very foul smelling, soft.  Had a smear x 1 early this a.m.  Incont of stool.    Incont of urine - this is somewhat new per the wife.  He has a condom cath on.  Placed this a.m. to get a urine sample.  When the condom cath comes off - purewick will be placed back on.  Low urine output.    Good PO - wife orders food and gets him all set up to eat.    Up in chair all shift - shifting positions and standing up at times with assist of 2 and walker.    Continue to monitor stools and Hgb

## 2021-07-17 NOTE — PROGRESS NOTES
"[facility]   BRIEF PROGRESS NOTE    SUBJECTIVE  Went to see patient on request of family that were concerned about 2 stools that were per RN that were bright red and sticky / foul smelling. Also noted BP in chart was 91/60 but on recheck was 119/60.     Updated pt's family to plan of care. Discussed plans with GI team.       OBJECTIVE:  /60 (BP Location: Left arm)   Pulse 86   Temp 98.6  F (37  C) (Oral)   Resp 18   Ht 1.778 m (5' 10\")   Wt 68.9 kg (152 lb)   SpO2 97%   BMI 21.81 kg/m      Exam:   General: Alert and oriented, in no acute distress.  Skin: Warm and dry, no abnormalities noted.  Eyes: Extra-ocular muscles intact, pupils equal and reactive.  ENT: Speech intact, nasal passages open, no hearing impairment noted.  CV: S1 S2 RRR. No cyanosis or pallor, warm and well perfused.  Respiratory: Clear to auscultation anterior listening posts. No respiratory distress, no accessory muscle use.  Rectal: No hemorrhoids on exam. Liquid light maroon blood on glove after digital exam, painless.   Psychiatric: Mood and affect appear normal.   Extremities: Warm, able to move all four extremities at will.      ASSESSMENT/PLAN:    #GI Bleed  Rectal exam with light maroon blood on glove, no hemorrhoids. Reassuringly pt remains hemodynamically stable and alert & oriented at baseline. Discussed with GI, no acute intervention. They would suggest making NPO at midnight if continues to have concerning stools for possible consideration of EGD tomorrow, but with reassuring vitals and Hgb stable at 8.3 on recheck overall is reassuring.     Plan of care discussed with family, wife and daughter.     - Repeat Hgb at 12:15 am   - Monitor vitals every 4 hours  - Notify provider if any additional bloody / tarry / maroon colored stools   - Make NPO at midnight IF repeat concerning stools       Please see daily rounding note for full A/P.  Rebecca Benedict, DO  4:23 PM    "

## 2021-07-18 ENCOUNTER — APPOINTMENT (OUTPATIENT)
Dept: GENERAL RADIOLOGY | Facility: CLINIC | Age: 76
DRG: 438 | End: 2021-07-18
Payer: COMMERCIAL

## 2021-07-18 ENCOUNTER — APPOINTMENT (OUTPATIENT)
Dept: SPEECH THERAPY | Facility: CLINIC | Age: 76
DRG: 438 | End: 2021-07-18
Attending: STUDENT IN AN ORGANIZED HEALTH CARE EDUCATION/TRAINING PROGRAM
Payer: COMMERCIAL

## 2021-07-18 ENCOUNTER — APPOINTMENT (OUTPATIENT)
Dept: PHYSICAL THERAPY | Facility: CLINIC | Age: 76
DRG: 438 | End: 2021-07-18
Payer: COMMERCIAL

## 2021-07-18 LAB
BASOPHILS # BLD AUTO: 0 10E3/UL (ref 0–0.2)
BASOPHILS NFR BLD AUTO: 0 %
CRP SERPL-MCNC: 42 MG/L (ref 0–8)
EOSINOPHIL # BLD AUTO: 0.2 10E3/UL (ref 0–0.7)
EOSINOPHIL NFR BLD AUTO: 2 %
ERYTHROCYTE [DISTWIDTH] IN BLOOD BY AUTOMATED COUNT: 14.9 % (ref 10–15)
ERYTHROCYTE [DISTWIDTH] IN BLOOD BY AUTOMATED COUNT: 15 % (ref 10–15)
ERYTHROCYTE [DISTWIDTH] IN BLOOD BY AUTOMATED COUNT: 15.3 % (ref 10–15)
HCT VFR BLD AUTO: 21.5 % (ref 40–53)
HCT VFR BLD AUTO: 22.1 % (ref 40–53)
HCT VFR BLD AUTO: 22.5 % (ref 40–53)
HEMOCCULT STL QL IA: POSITIVE
HGB BLD-MCNC: 7.2 G/DL (ref 13.3–17.7)
HGB BLD-MCNC: 7.3 G/DL (ref 13.3–17.7)
HGB BLD-MCNC: 7.5 G/DL (ref 13.3–17.7)
IMM GRANULOCYTES # BLD: 0.1 10E3/UL
IMM GRANULOCYTES NFR BLD: 1 %
LYMPHOCYTES # BLD AUTO: 1.4 10E3/UL (ref 0.8–5.3)
LYMPHOCYTES NFR BLD AUTO: 16 %
MCH RBC QN AUTO: 31.9 PG (ref 26.5–33)
MCH RBC QN AUTO: 32.3 PG (ref 26.5–33)
MCH RBC QN AUTO: 32.6 PG (ref 26.5–33)
MCHC RBC AUTO-ENTMCNC: 33 G/DL (ref 31.5–36.5)
MCHC RBC AUTO-ENTMCNC: 33.3 G/DL (ref 31.5–36.5)
MCHC RBC AUTO-ENTMCNC: 33.5 G/DL (ref 31.5–36.5)
MCV RBC AUTO: 97 FL (ref 78–100)
MONOCYTES # BLD AUTO: 0.9 10E3/UL (ref 0–1.3)
MONOCYTES NFR BLD AUTO: 10 %
NEUTROPHILS # BLD AUTO: 6.3 10E3/UL (ref 1.6–8.3)
NEUTROPHILS NFR BLD AUTO: 71 %
NRBC # BLD AUTO: 0 10E3/UL
NRBC BLD AUTO-RTO: 0 /100
PLATELET # BLD AUTO: 165 10E3/UL (ref 150–450)
PLATELET # BLD AUTO: 176 10E3/UL (ref 150–450)
PLATELET # BLD AUTO: 202 10E3/UL (ref 150–450)
POTASSIUM BLD-SCNC: 3.8 MMOL/L (ref 3.4–5.3)
RBC # BLD AUTO: 2.21 10E6/UL (ref 4.4–5.9)
RBC # BLD AUTO: 2.29 10E6/UL (ref 4.4–5.9)
RBC # BLD AUTO: 2.32 10E6/UL (ref 4.4–5.9)
WBC # BLD AUTO: 10.4 10E3/UL (ref 4–11)
WBC # BLD AUTO: 11.6 10E3/UL (ref 4–11)
WBC # BLD AUTO: 8.8 10E3/UL (ref 4–11)

## 2021-07-18 PROCEDURE — 97116 GAIT TRAINING THERAPY: CPT | Mod: GP

## 2021-07-18 PROCEDURE — 71046 X-RAY EXAM CHEST 2 VIEWS: CPT

## 2021-07-18 PROCEDURE — 85025 COMPLETE CBC W/AUTO DIFF WBC: CPT | Performed by: STUDENT IN AN ORGANIZED HEALTH CARE EDUCATION/TRAINING PROGRAM

## 2021-07-18 PROCEDURE — 85027 COMPLETE CBC AUTOMATED: CPT | Performed by: STUDENT IN AN ORGANIZED HEALTH CARE EDUCATION/TRAINING PROGRAM

## 2021-07-18 PROCEDURE — 84145 PROCALCITONIN (PCT): CPT | Performed by: STUDENT IN AN ORGANIZED HEALTH CARE EDUCATION/TRAINING PROGRAM

## 2021-07-18 PROCEDURE — 120N000002 HC R&B MED SURG/OB UMMC

## 2021-07-18 PROCEDURE — C9113 INJ PANTOPRAZOLE SODIUM, VIA: HCPCS | Performed by: STUDENT IN AN ORGANIZED HEALTH CARE EDUCATION/TRAINING PROGRAM

## 2021-07-18 PROCEDURE — 87149 DNA/RNA DIRECT PROBE: CPT | Performed by: STUDENT IN AN ORGANIZED HEALTH CARE EDUCATION/TRAINING PROGRAM

## 2021-07-18 PROCEDURE — 92610 EVALUATE SWALLOWING FUNCTION: CPT | Mod: GN

## 2021-07-18 PROCEDURE — 250N000013 HC RX MED GY IP 250 OP 250 PS 637: Performed by: STUDENT IN AN ORGANIZED HEALTH CARE EDUCATION/TRAINING PROGRAM

## 2021-07-18 PROCEDURE — 92526 ORAL FUNCTION THERAPY: CPT | Mod: GN

## 2021-07-18 PROCEDURE — 71046 X-RAY EXAM CHEST 2 VIEWS: CPT | Mod: 26 | Performed by: RADIOLOGY

## 2021-07-18 PROCEDURE — 82274 ASSAY TEST FOR BLOOD FECAL: CPT | Performed by: STUDENT IN AN ORGANIZED HEALTH CARE EDUCATION/TRAINING PROGRAM

## 2021-07-18 PROCEDURE — 93005 ELECTROCARDIOGRAM TRACING: CPT

## 2021-07-18 PROCEDURE — 84132 ASSAY OF SERUM POTASSIUM: CPT | Performed by: STUDENT IN AN ORGANIZED HEALTH CARE EDUCATION/TRAINING PROGRAM

## 2021-07-18 PROCEDURE — 93010 ELECTROCARDIOGRAM REPORT: CPT | Performed by: INTERNAL MEDICINE

## 2021-07-18 PROCEDURE — 99232 SBSQ HOSP IP/OBS MODERATE 35: CPT | Mod: GC | Performed by: STUDENT IN AN ORGANIZED HEALTH CARE EDUCATION/TRAINING PROGRAM

## 2021-07-18 PROCEDURE — 87186 SC STD MICRODIL/AGAR DIL: CPT | Performed by: STUDENT IN AN ORGANIZED HEALTH CARE EDUCATION/TRAINING PROGRAM

## 2021-07-18 PROCEDURE — 86140 C-REACTIVE PROTEIN: CPT | Performed by: STUDENT IN AN ORGANIZED HEALTH CARE EDUCATION/TRAINING PROGRAM

## 2021-07-18 PROCEDURE — 36415 COLL VENOUS BLD VENIPUNCTURE: CPT | Performed by: STUDENT IN AN ORGANIZED HEALTH CARE EDUCATION/TRAINING PROGRAM

## 2021-07-18 PROCEDURE — 250N000011 HC RX IP 250 OP 636: Performed by: STUDENT IN AN ORGANIZED HEALTH CARE EDUCATION/TRAINING PROGRAM

## 2021-07-18 PROCEDURE — 97530 THERAPEUTIC ACTIVITIES: CPT | Mod: GP

## 2021-07-18 RX ORDER — ACETAMINOPHEN 325 MG/1
650 TABLET ORAL EVERY 4 HOURS PRN
Status: DISCONTINUED | OUTPATIENT
Start: 2021-07-18 | End: 2021-07-21 | Stop reason: HOSPADM

## 2021-07-18 RX ADMIN — ACETAMINOPHEN 325 MG: 325 TABLET, FILM COATED ORAL at 21:22

## 2021-07-18 RX ADMIN — PANTOPRAZOLE SODIUM 40 MG: 40 INJECTION, POWDER, FOR SOLUTION INTRAVENOUS at 12:59

## 2021-07-18 RX ADMIN — POTASSIUM CHLORIDE 20 MEQ: 750 TABLET, EXTENDED RELEASE ORAL at 12:59

## 2021-07-18 RX ADMIN — PANTOPRAZOLE SODIUM 40 MG: 40 INJECTION, POWDER, FOR SOLUTION INTRAVENOUS at 01:10

## 2021-07-18 RX ADMIN — OXYBUTYNIN CHLORIDE 20 MG: 10 TABLET, EXTENDED RELEASE ORAL at 22:16

## 2021-07-18 ASSESSMENT — ACTIVITIES OF DAILY LIVING (ADL)
ADLS_ACUITY_SCORE: 32
ADLS_ACUITY_SCORE: 33

## 2021-07-18 NOTE — PROGRESS NOTES
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 7/14 ERCP showing ulcer near PD orifice s/p thermal therapy and epinephrine. Post-procedure with ongoing intermittent hematochezia and melena with stable hemoglobin and vital signs.  CXR was completed today with the following results, \"Bibasilar streaky atelectasis. No pleural effusion or pneumothorax. No acute osseous or abdominal abnormality.\" Speech therapy consulted for ongoing cough to assess for aspiration risk, bedside swallow evaluation complete per MD orders.    General Observations Pt and pts wife report pt has had extensive speech therapy as an outpatient for a number of years for dysarthria and dysphagia. Most recently pt was seen 6/10/2021 however SLP was unable to complete swallow assessment due to pt being NPO for procedure however noted that pt presented with ongiong dysarthria and dysphagia.    Past History of Dysphagia Pt followed by outpatient speech therapist. 1/9/2020 pt was recommended soft moist solids with continued thin liquids however to consider nectar thick liquids should s/s aspirtion persist. Pt and pts wife report that pt continues on regular/thin liquid diet and pts wife cuts food up into smaller, bite size pieces.    Pain Assessment   Patient Currently in Pain No   Type of Evaluation   Type of Evaluation Swallow Evaluation   Oral Motor   Oral Musculature anomalies present   Structural Abnormalities none present   Mucosal Quality good   Dentition (Oral Motor)   Dentition (Oral Motor) natural dentition;adequate dentition "   Facial Symmetry (Oral Motor)   Facial Symmetry (Oral Motor) right side impairment   Right Side Facial Asymmetry minimal impairment   Lip Function (Oral Motor)   Lip Range of Motion (Oral Motor) WNL   Tongue Function (Oral Motor)   Tongue ROM (Oral Motor) WNL   Cough/Swallow/Gag Reflex (Oral Motor)   Volitional Throat Clear/Cough (Oral Motor) WNL   Volitional Swallow (Oral Motor) mildly delayed   Vocal Quality/Secretion Management (Oral Motor)   Vocal Quality (Oral Motor) monopitch   Secretion Management (Oral Motor) WNL   General Swallowing Observations   Current Diet/Method of Nutritional Intake (General Swallowing Observations, NIS) regular diet;thin liquids (level 0)   Respiratory Support (General Swallowing Observations) none   Swallowing Evaluation Clinical swallow evaluation   Clinical Swallow Evaluation   Feeding Assistance set up only required   Clinical Swallow Evaluation Textures Trialed thin liquids;pureed;solid foods   Clinical Swallow Eval: Thin Liquid Texture Trial   Mode of Presentation, Thin Liquids cup;self-fed   Volume of Liquid or Food Presented 4 oz   Oral Phase of Swallow WFL   Pharyngeal Phase of Swallow intact   Diagnostic Statement Adequate oral phase with no overt s/s aspiration noted.    Clinical Swallow Evaluation: Puree Solid Texture Trial   Mode of Presentation, Puree spoon;self-fed   Volume of Puree Presented 2 oz   Oral Phase, Puree WFL   Pharyngeal Phase, Puree intact   Diagnostic Statement Adequate oral phase and no overt s/s aspiration    Clinical Swallow Evaluation: Solid Food Texture Trial   Mode of Presentation self-fed   Volume Presented 1/2 cracker   Oral Phase poor AP movement;residue in oral cavity   Pharyngeal Phase intact   Diagnostic Statement Pt presents with slow mastication with minimal oral residuals however was able to clear independently with a liquid wash, no overt s/s aspiration noted.    Esophageal Phase of Swallow   Patient reports or presents with symptoms of  esophageal dysphagia No   Swallowing Recommendations   Diet Consistency Recommendations regular diet;thin liquids (level 0)   Supervision Level for Intake close supervision needed   Mode of Delivery Recommendations bolus size, small   Swallowing Maneuver Recommendations alternate food and liquid intake   Monitoring/Assistance Required (Eating/Swallowing) stop eating activities when fatigue is present   Recommended Feeding/Eating Techniques (Swallow Eval) maintain upright sitting position for eating   Medication Administration Recommendations, Swallowing (SLP) Okay for whole with thin liquid or with puree consistency, one at a time   Instrumental Assessment Recommendations instrumental evaluation not recommended at this time   SLP Therapy Assessment/Plan   Criteria for Skilled Therapeutic Interventions Met (SLP Eval) yes;treatment indicated   SLP Diagnosis Mild oropharyngeal dysphagia    Rehab Potential (SLP Eval) good, to achieve stated therapy goals   Therapy Frequency (SLP Eval) 5 times/wk   Predicted Duration of Therapy Intervention (SLP Eval) 1 week   Comment, Therapy Assessment/Plan (SLP) Bedside swallow evaluation completed per MD orders. Oral mechanism was remarkable for mild right sided facial weakness however adequate lingual and labial strength and ROM. Pt presents with mild oropharyngeal dysphagia however continue of regular/thin liquid diet is recommended. Pt was assessed with regular and puree solids and thin liquids. Pt demonstrated slow but complete mastication, minimal oral residuals follow regular solids that pt was able to clear with liquid wash, adequate bolus control, was able to clear bolus with single swallow, no report of sticking sensation in throat, no change in vocal quality following PO trials, and no overt s/s aspiration noted across all PO trials. During evaluation pt presented with cough prior to any PO trials given. Pt did also have x1 cough following trial of regular solids however  this did not appear to be directly related to swallow and rather his baseline cough given delay and no other s/s aspiration noted with follow up trials of regular solids. Pt was able to verbalize understanding of safe swallow strategies and he emphasized that going slow is the most important one for him. Recommend pt continue on current diet of regular solids/thin liquids however speech therapy to follow to ensure continued tolerance of diet.    Therapy Plan Review/Discharge Plan (SLP)   Therapy Plan Review (SLP) evaluation/treatment results reviewed;care plan/treatment goals reviewed;risks/benefits reviewed;participants voiced agreement with care plan;participants included;patient;spouse/significant other   SLP Discharge Planning    SLP Discharge Recommendation (DC Rec) home with outpatient speech therapy   SLP Rationale for DC Rec recommend pt continue to follow up with his established outpatient speech therapist upon discharge   SLP Brief overview of current status  Recommend pt continue on current diet of regular solids/thin liquids. Meds to be given whole one at a time with either thin liquid or puree consistency. Pt should be fully awake, alert, sitting upright with all PO. Suspect pt is at or close to his baseline swallow function however speech therapy to follow up while pt is acutely admitted to ensure continued tolerance of regular/thin liquid diet    Total Evaluation Time   Total Evaluation Time (Minutes) 15

## 2021-07-18 NOTE — PROGRESS NOTES
[facility]    Progress Note - Suri's Service        Date of Admission:  7/14/2021    Assessment & Plan      Elier Leong is a 75 year old male admitted on 7/14/2021. He has a history of primary lateral sclerosis, prostate cancer s/p prostatectomy and newly diagnosed ampullary adenocarcinoma admitted for hematochezia and melena s/p ERCP, now with stable anemia and deconditioning requiring TCU placement.     # GI bleed  # Acute blood loss anemia  # Acute Post Hemorrhagic Anemia  # Choledocholithiasis   # Ampullary adenocarcinoma  Admitted with GIB after resuming Plavix following ERCP with snare papillectomy for newly diagnosed ampullary adenocarcinoma on 7/12 after inadvertently restarting Plavix early and subsequently developing several episodes of hematochezia and melena the following morning and hemoglobin trended from 15.2 on 7/12 to 8.1 on admission. Underwent ERCP 7/14; area of ulceration near pancreatic duct was found and treated with thermal therapy and epi. Required 1u pRBC 7/15 for hgb 6.1. Patient with ongoing sticky maroon/black stools, repeat hemoglobin 7.0 on 7/17. Unclear whether this represents ongoing passage of old blood, oozing from surgical site, vs new source of GI bleed. Vitals remain stable, with no new symptoms. Repeat cbc 3 hours later demonstrated reassuring hgb of 7.7. If patient has another tarry stool, will repeat cbc; if not, will plan to continue monitoring and repeat cbc 7/18 AM.   - Appreciate GI panc bili following  - trend CBC  - Regular diet  - maintain 2 large bore IVs  - IV protonix 40 mg BID  - Hold PTA clopidogrel at least x7 days from ERCP (consider restart 7/21)    # Fever, isolated x1  # Leukocytosis  Febrile to 100.7 F x1 on 7/16 AM (POD2) with new leukocytosis (18.6). No new symptoms or focal findings. No recurrent fevers 7/17, and WBC back down to 12.9.   Differential for early postop fever includes normal postop  response, however, infectious causes including pneumonia, urinary infection (has had urinary frequency). CXR on 7/16 with mild interstitial opacities and trace pleural effusion, COVID-19 negative. UA pending. Blood cultures with ngtd. Considered post-ERCP pancreatitis, biliary disease (choledocholithiasis successfully treated on recent ERCP), though less likely with no abdominal pain and reassuring abdominal exam, as well as normal lipase. Of note, AST is up-trending from admission (53 > 133). Given mild transient fever, improvement in WBC today, as well as lack of systemic symptoms, considered febrile nonhemolytic transfusion reaction - although in this patient the timing is somewhat delayed, with ~6 hours between transfusion and fever onset (typical onset <4 hours). FNHTR would be quite rare in appropriately leukoreduced blood products. Also considered delayed hemolytic transfusion reaction in the setting of ongoing anemia 2 days post-transfusion, and otherwise lack of clinical symptoms (DHTR often clinically silent). Hemolysis labs were obtained: LDH moderately elevated, however indirect bilirubin is within normal limits. Low clinical concern for active hemolysis. Differential also includes cancer-related fever.  - Follow blood cultures  - if patient requires second unit of PRBCs, monitor for s/sxs of acute and delayed reaction    # Choledocholithiasis  Discovered during ERCP 7/14. Stone removed and stents placed in CBD. ALT remains stable. AST up-trending from admission (53 > 94 > 126 > 133). Bilirubin stable at 1.3. Alk phos within normal limits.  - Trend LFTs     # History of PLS  Diagnosed 19 years ago, prognosis unclear. At baseline has slurred speech and needs wife's help with dressing and bathing. Able to ambulate with walker short distances, wheelchair for longer distances. Followed by neurology.   - Patient on baclofen pump (if needs MRI, notify [employer] for resetting)  - Scheduled for baclofen pump  refill 7/19, expect prolonged hospitalization and/or TCU stay, will consult Pain Team for refill   - PTA oxybutinin    # History of CVA  Acute CVA in 2018, subacute CVA 11/2020. Currently at neurologic baseline.   -Holding plavix x7 days from ERCP (possibly restart 7/21)     Chronic stable issues:  GERD: Hold PTA PO PPI while on IV PPI  HLD: Not currently on statin (intentionally not prescribed per EMR)  Essential hypertension: Hold PTA enalapril given hypovolemia/hypotension  Hypokalemia: PTA potassium       Diet: Snacks/Supplements Adult: Other; Any; Between Meals  NPO per Anesthesia Guidelines for Procedure/Surgery Except for: Meds    DVT Prophylaxis: HOLDING Plavix  Wright Catheter: Not present -condom catheter present  Fluids: PO   Central Lines: None  Code Status: Full Code      Disposition Plan   Expected discharge: anticipate medically ready for discharge to TCU 7/19-7/20    recommended to transitional care unit once hemoglobin stable .     The patient's care was discussed with the Attending Physician, Dr. Lewis .    MD Suri Villatoro's Service [facility]  [facility]  Securely message with the Vocera Web Console (learn more here)  Text page via Word[employer]ake Paging/Directory    ______________________________________________________________________    Interval History   No acute events overnight. Did have a small, tarry stool overnight. No nausea/vomiting. Has had more coughing with eating/drinking compared to baseline. No obvious aspiration events. No fevers/chills.     Physical Exam   Vital Signs: Temp: 98.5  F (36.9  C) Temp src: Oral BP: 107/63 Pulse: 83   Resp: 18 SpO2: 94 % O2 Device: None (Room air)    Weight: 152 lbs 0 oz  Physical Exam  Constitutional:       General: He is not in acute distress.     Appearance: He is well-developed. He is not diaphoretic.   HENT:      Mouth/Throat:      Mouth: Mucous membranes are moist.   Cardiovascular:       Rate and Rhythm: Normal rate and regular rhythm.   Pulmonary:      Effort: Pulmonary effort is normal. No respiratory distress.      Breath sounds: Normal breath sounds. No wheezing or rales.      Comments: Occasional cough  Abdominal:      General: There is no distension.      Palpations: Abdomen is soft.      Tenderness: There is no abdominal tenderness.   Musculoskeletal:      Cervical back: Normal range of motion.      Right lower leg: No edema.      Left lower leg: No edema.   Skin:     General: Skin is warm.      Coloration: Skin is pale. Skin is not jaundiced.      Findings: No bruising or rash.   Neurological:      Mental Status: He is alert.      Comments: Garbled speech consistent with baseline   Psychiatric:         Mood and Affect: Mood normal.          Data   Recent Labs   Lab 07/18/21  0630 07/18/21  0036 07/17/21  1550 07/17/21  0927 07/16/21  1559 07/16/21  0921 07/15/21  0600 07/14/21  1119 07/12/21  1300 07/12/21  0848   WBC 10.4 11.6* 15.1* 13.9*  --   --  7.6 9.6  --  6.5   HGB 7.5* 7.2* 8.3* 7.7*  --   --  6.1* 8.1*  --  15.2   MCV 97 97 98 96  --   --  95 96  --  93    165 197 189  --   --  123* 186  --  232   INR  --   --   --   --   --   --   --   --   --  1.00   NA  --   --   --   --   --  135 138 134  --  132*   POTASSIUM  --   --   --  3.3* 3.6 3.4  3.2* 4.2 4.7  --  4.3   CHLORIDE  --   --   --   --   --  105 107 101  --  99   CO2  --   --   --   --   --  26 23 28  --  28   BUN  --   --   --   --   --  24 32* 46*  --  17   CR  --   --   --   --   --  0.68 0.70 0.76  --  0.58*   ANIONGAP  --   --   --   --   --  4 8 5  --  5   AMOS  --   --   --   --   --  8.8 8.4* 9.0  --  9.3   GLC  --   --   --   --   --  88 109* 117*  --  96   ALBUMIN  --   --   --   --   --  2.5* 2.7* 2.6*  --  3.2*   PROTTOTAL  --   --   --   --   --  4.9* 4.8* 5.4*  --  6.8   BILITOTAL  --   --   --  1.2  --  1.3 1.1 0.9  --  1.1   ALKPHOS  --   --   --   --   --  102 96 137  --  182*   ALT  --   --   --   --    --  56 54 64  --  68   AST  --   --   --   --   --  133* 126* 94*  --  53*   LIPASE  --   --   --   --   --  28*  --   --  616* 420*     No results found for this or any previous visit (from the past 24 hour(s)).

## 2021-07-18 NOTE — PROGRESS NOTES
VSS. Labs WNL. Irreg apical pulse this AM, some skipped beats. Progressive cough, and coughing after swallowing. MD paged, CXR, EKG, swallow study ordered and completed. No intervention or changes to plan of care needed. Good urine output. BMs continue to be moderate and dark maroon-tinged, tar-like consistency. Pt states feeling better today than yesterday. Worked with PT, went outside with wife in wheelchair today. Wife states concerns of pt being to weak for her to manage at home. Requesting TCU, pt agrees.     Baclofen pump needs to be refilled ASAP. Was scheduled to have this done 7/19/2020 at outside hospital -MD and charge aware. Wife states pt becomes very weak and suffers lower extremity spasms when nearing the end of baclofen dose.

## 2021-07-18 NOTE — PROGRESS NOTES
Reason for Admission: Hematochezia and melena     Has had 3 Bm's and 1 smear of soft tarry maroon, dark red bloody stools.  Incontinent.  Takes 1 package of wet wipes to clean the stool off him.  Paste applied to areas.    MD's want stool samples to look at in the a.m.  They are bagged and placed in a bin in his room.    Has a condom cath on.  If it comes off, please place the Purewick back on him.      Bladder scan prior to bolus:  289cc    Low urine output and low po intake - bolus LR 500cc admin.    Cont to monitor his urine output.    Pt is accustomed to voiding standing up - this does help - also turning on running water.    Wife orders the meals    Pt was up in chair most of day.  Back in bed at 2130.    NPO at 2200 in case EGD is needed.    Monitor K+ and Hgb in the a.m.    Wife stated they have a clinic appt on Monday for his baclofen pump refill.  So if he is here, he may need that refilled.    Up with assist of 2, belt and walker.    Cont to monitor K+ & Hgb & stools.

## 2021-07-18 NOTE — PLAN OF CARE
[phone]    VSS, afebrile and on RA. Denies SOB/nausea/pain. Baclofen pump on RLQ. Repositioned x2 when awake, refused repositioning x1. Condom catheter in place, adequate UOP. Attempted to bladder scan, machine not working. Incontinent of stool x1.  Up with assist x2. Continue to monitor & w/ POC.

## 2021-07-19 ENCOUNTER — MEDICAL CORRESPONDENCE (OUTPATIENT)
Dept: HEALTH INFORMATION MANAGEMENT | Facility: CLINIC | Age: 76
End: 2021-07-19

## 2021-07-19 ENCOUNTER — MYC MEDICAL ADVICE (OUTPATIENT)
Dept: NEUROLOGY | Facility: CLINIC | Age: 76
End: 2021-07-19

## 2021-07-19 ENCOUNTER — APPOINTMENT (OUTPATIENT)
Dept: PHYSICAL THERAPY | Facility: CLINIC | Age: 76
DRG: 438 | End: 2021-07-19
Payer: COMMERCIAL

## 2021-07-19 ENCOUNTER — MYC MEDICAL ADVICE (OUTPATIENT)
Dept: INTERNAL MEDICINE | Facility: CLINIC | Age: 76
End: 2021-07-19

## 2021-07-19 ENCOUNTER — TELEPHONE (OUTPATIENT)
Dept: PHYSICAL MEDICINE AND REHAB | Facility: CLINIC | Age: 76
End: 2021-07-19

## 2021-07-19 LAB
ATRIAL RATE - MUSE: 86 BPM
DIASTOLIC BLOOD PRESSURE - MUSE: NORMAL MMHG
ERYTHROCYTE [DISTWIDTH] IN BLOOD BY AUTOMATED COUNT: 15.1 % (ref 10–15)
HCT VFR BLD AUTO: 22.5 % (ref 40–53)
HGB BLD-MCNC: 7.5 G/DL (ref 13.3–17.7)
HOLD SPECIMEN: NORMAL
INTERPRETATION ECG - MUSE: NORMAL
MCH RBC QN AUTO: 32.5 PG (ref 26.5–33)
MCHC RBC AUTO-ENTMCNC: 33.3 G/DL (ref 31.5–36.5)
MCV RBC AUTO: 97 FL (ref 78–100)
P AXIS - MUSE: 6 DEGREES
PLATELET # BLD AUTO: 224 10E3/UL (ref 150–450)
PR INTERVAL - MUSE: 154 MS
PROCALCITONIN SERPL-MCNC: 0.15 NG/ML
QRS DURATION - MUSE: 90 MS
QT - MUSE: 370 MS
QTC - MUSE: 442 MS
R AXIS - MUSE: -3 DEGREES
RBC # BLD AUTO: 2.31 10E6/UL (ref 4.4–5.9)
SYSTOLIC BLOOD PRESSURE - MUSE: NORMAL MMHG
T AXIS - MUSE: 13 DEGREES
VENTRICULAR RATE- MUSE: 86 BPM
WBC # BLD AUTO: 9.5 10E3/UL (ref 4–11)

## 2021-07-19 PROCEDURE — 97116 GAIT TRAINING THERAPY: CPT | Mod: GP

## 2021-07-19 PROCEDURE — 120N000002 HC R&B MED SURG/OB UMMC

## 2021-07-19 PROCEDURE — U0003 INFECTIOUS AGENT DETECTION BY NUCLEIC ACID (DNA OR RNA); SEVERE ACUTE RESPIRATORY SYNDROME CORONAVIRUS 2 (SARS-COV-2) (CORONAVIRUS DISEASE [COVID-19]), AMPLIFIED PROBE TECHNIQUE, MAKING USE OF HIGH THROUGHPUT TECHNOLOGIES AS DESCRIBED BY CMS-2020-01-R: HCPCS | Performed by: STUDENT IN AN ORGANIZED HEALTH CARE EDUCATION/TRAINING PROGRAM

## 2021-07-19 PROCEDURE — 250N000011 HC RX IP 250 OP 636: Performed by: STUDENT IN AN ORGANIZED HEALTH CARE EDUCATION/TRAINING PROGRAM

## 2021-07-19 PROCEDURE — 250N000013 HC RX MED GY IP 250 OP 250 PS 637: Performed by: STUDENT IN AN ORGANIZED HEALTH CARE EDUCATION/TRAINING PROGRAM

## 2021-07-19 PROCEDURE — 99232 SBSQ HOSP IP/OBS MODERATE 35: CPT | Mod: GC | Performed by: STUDENT IN AN ORGANIZED HEALTH CARE EDUCATION/TRAINING PROGRAM

## 2021-07-19 PROCEDURE — 85027 COMPLETE CBC AUTOMATED: CPT | Performed by: STUDENT IN AN ORGANIZED HEALTH CARE EDUCATION/TRAINING PROGRAM

## 2021-07-19 PROCEDURE — 36415 COLL VENOUS BLD VENIPUNCTURE: CPT | Performed by: STUDENT IN AN ORGANIZED HEALTH CARE EDUCATION/TRAINING PROGRAM

## 2021-07-19 PROCEDURE — 99222 1ST HOSP IP/OBS MODERATE 55: CPT | Performed by: PHYSICAL MEDICINE & REHABILITATION

## 2021-07-19 PROCEDURE — C9113 INJ PANTOPRAZOLE SODIUM, VIA: HCPCS | Performed by: STUDENT IN AN ORGANIZED HEALTH CARE EDUCATION/TRAINING PROGRAM

## 2021-07-19 PROCEDURE — 97530 THERAPEUTIC ACTIVITIES: CPT | Mod: GP

## 2021-07-19 PROCEDURE — 99233 SBSQ HOSP IP/OBS HIGH 50: CPT | Performed by: PHYSICIAN ASSISTANT

## 2021-07-19 RX ORDER — BACLOFEN 2000 UG/ML
80 INJECTION, SOLUTION INTRATHECAL ONCE
Status: DISCONTINUED | OUTPATIENT
Start: 2021-07-20 | End: 2021-07-20

## 2021-07-19 RX ADMIN — PANTOPRAZOLE SODIUM 40 MG: 40 INJECTION, POWDER, FOR SOLUTION INTRAVENOUS at 09:53

## 2021-07-19 RX ADMIN — ACETAMINOPHEN 650 MG: 325 TABLET, FILM COATED ORAL at 10:04

## 2021-07-19 RX ADMIN — ACETAMINOPHEN 650 MG: 325 TABLET, FILM COATED ORAL at 21:11

## 2021-07-19 RX ADMIN — OXYBUTYNIN CHLORIDE 20 MG: 10 TABLET, EXTENDED RELEASE ORAL at 21:11

## 2021-07-19 RX ADMIN — POTASSIUM CHLORIDE 20 MEQ: 750 TABLET, EXTENDED RELEASE ORAL at 09:52

## 2021-07-19 RX ADMIN — PANTOPRAZOLE SODIUM 40 MG: 40 INJECTION, POWDER, FOR SOLUTION INTRAVENOUS at 00:04

## 2021-07-19 ASSESSMENT — ACTIVITIES OF DAILY LIVING (ADL)
ADLS_ACUITY_SCORE: 30
ADLS_ACUITY_SCORE: 30
ADLS_ACUITY_SCORE: 32
ADLS_ACUITY_SCORE: 30

## 2021-07-19 NOTE — PROVIDER NOTIFICATION
"Paged provider Domingo [phone] at [phone]    \"Pt has temp of 100.7, do you want to do blood cultures? If so, could and order be placed? Thanks\"  "

## 2021-07-19 NOTE — PROGRESS NOTES
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 Successfully managed with thermal therapy and epinephrine. Hgb and vitals have been stable, patient continues to have intermittent maroon/black stools.     He is having low grade fevers, new cough -- primary team evaluating with CXR. Patient denies abdominal pain, no e/o abdominal infection.    RECOMMENDATIONS:  Diet as tolerated  Monitor H/H and for s/s GI bleeding  Continue PPI  Continue to hold antiplatelet therapy (plan to resume Wed 7/21)  Will follow up with Dr. Kent in 1 month to remove stents   Discussed with primary team    The patient was discussed and plan agreed upon with GI staff, Dr Arreola.      Lauren Sun PA-C  Advanced Endoscopy/Pancreaticobiliary GI Service  [facility]  Text Page  _______________________________________________________________      Subjective: Nursing notes and 24hr events reviewed. Patient seen and examined at 0945. Patient reports that he is doing okay today. Denies abdominal pain. Coughing is worse today. Low grade fevers this morning. Tolerating diet so far. Had maroon stool this morning (incontinent) per nursing.    ROS:   4 pt ROS negative unless noted in subjective.     Objective:  Blood pressure 122/63, pulse 82, temperature 98.5  F (36.9  C), temperature source Oral, resp. rate 18, height 1.778 m (5' 10\"), weight 68.9 kg (152 lb), SpO2 93 %.  Gen: Sitting in chair at bedside. Appears " comfortable  HEENT: NCAT. Conjunctiva clear. Sclera anicteric   CV: RRR, Peripheral perfusion intact  Resp: normal work of breathing  Abd: Soft, NT, ND, no guarding or rebound, +BS  Msk: no gross deformity  Skin:  no jaundice  Ext: warm, well perfused  Neuro: grossly normal  Mental status/Psych: A&O. Asks/answers questions appropriately       Date 07/19/21 0700 - 07/20/21 0659   Shift 3602-8819 7736-3702 5279-7590 24 Hour Total   INTAKE   Shift Total(mL/kg)       OUTPUT   Urine 450   450   Shift Total(mL/kg) 450(6.53)   450(6.53)   Weight (kg) 68.95 68.95 68.95 68.95         PROCEDURES:  ERCP 7/14  - Prior ampullectomy with overlying granulation tissues                        and an ulcerated area in close proximity to the                        pancreatic duct orifice, likely site of the recent                        bleeding. Successfully managed with thermal therapy                        (Soft coag with snare tip) and epinephrine.                        - Previously placed transpapillary biliary stent                        migrated out of the bile duct, retrieved with a                        rat-toothed forceps. Previously placed pancreatic stent                        was in a good position and was not manipulated.                        - Choledocholithiasis was found. Complete removal was                        accomplished by balloon extraction.                        - Two 10 Fr plastic stents were placed into the common                        bile duct.     LABS:  BMP  Recent Labs   Lab 07/18/21  1502 07/17/21  0927 07/16/21  1559 07/16/21  0921 07/15/21  0600 07/14/21  1119   NA  --   --   --  135 138 134   POTASSIUM 3.8 3.3* 3.6 3.4  3.2* 4.2 4.7   CHLORIDE  --   --   --  105 107 101   AMOS  --   --   --  8.8 8.4* 9.0   CO2  --   --   --  26 23 28   BUN  --   --   --  24 32* 46*   CR  --   --   --  0.68 0.70 0.76   GLC  --   --   --  88 109* 117*     CBC  Recent Labs   Lab 07/19/21  0637 07/18/21  2205  07/18/21  0630 07/18/21  0036   WBC 9.5 8.8 10.4 11.6*   RBC 2.31* 2.29* 2.32* 2.21*   HGB 7.5* 7.3* 7.5* 7.2*   HCT 22.5* 22.1* 22.5* 21.5*   MCV 97 97 97 97   MCH 32.5 31.9 32.3 32.6   MCHC 33.3 33.0 33.3 33.5   RDW 15.1* 15.3* 15.0 14.9    202 176 165     INRNo lab results found in last 7 days.  LFTs  Recent Labs   Lab 07/17/21  0927 07/16/21  0921 07/15/21  0600 07/14/21  1119   ALKPHOS  --  102 96 137   AST  --  133* 126* 94*   ALT  --  56 54 64   BILITOTAL 1.2 1.3 1.1 0.9   PROTTOTAL  --  4.9* 4.8* 5.4*   ALBUMIN  --  2.5* 2.7* 2.6*      PANC  Recent Labs   Lab 07/16/21  0921 07/12/21  1300   LIPASE 28* 616*   AMYLASE  --  120*         IMAGING:  reviewed

## 2021-07-19 NOTE — PLAN OF CARE
[phone] Alert and oriented, VSS, afebrile after receiving tylenol at 2200. Patient has slow/ slurred speech at baseline, able to make needs known and follow commands. Incontinent of bowel and bladder, condom cath in place, had one sticky dark maroon/ black stool. Complains of mild shoulder pain, much improved after getting tylenol. Baclofen pump needing to be refilled, team is aware. Continue to monitor.

## 2021-07-19 NOTE — PROGRESS NOTES
"Brief Progress Note    Updated by RN of patient's fever to 100.7. Patient's wife notes mild cough today, had CXR which showed likely atelectasis. Otherwise no new symptoms.     /66 (BP Location: Right arm)   Pulse 86   Temp (!) 100.7  F (38.2  C) (Oral)   Resp 18   Ht 1.778 m (5' 10\")   Wt 68.9 kg (152 lb)   SpO2 97%   BMI 21.81 kg/m        Assessment:  # Fever  Patient admitted with recent diagnosis of adenocarcinoma of ampulla of vater, s/p resection via EUS c/b post-procedural blood loss anemia, now s/p 1 unit PRBC. Febrile to 100.7 this evening (second fever this admission, febrile 100.7 on 7/16 POD2 and evening after transfusion--afebrile since). No other signs of acute infection and OVSS. Differential includes occult infection vs fever in the setting of known malignancy. Will do basic infectious work-up this evening and monitor closely. CXR from today without vandana signs of infection; will repeat if worsening SOB, cough or persistently febrile.    Plan:  - Labs: blood cultures x2, CBC w/ diff, CRP, procal  - Increase apap to 650mg q4 hours   - Continue to monitor closely    Clare Lane MD    "

## 2021-07-19 NOTE — PROGRESS NOTES
VSS. Hgb stable at 7.5. Maroon-tinged stools continue, 3+/shift. Voiding well, need to collect UA still. Covid swab (pre-TCU placement) obtained and sent to lab. Cough more productive this am, more frequent. Lung sounds clear. MD aware, CXR warranted no intervention. Activity well tolerated today. Worked well with PT, went outside with wife in wheelchair. Baclofen pump refill ordered for tomorrow.

## 2021-07-19 NOTE — CONSULTS
[facility]   PM&R consultation note  Patient Name: Elier Leong : 1945 Medical Record: [id number]  Consulting Provider: Beverly Gutierrez MD   Reason for consult: ITB pump refill   Location of patient: 7D  Date of encounter: 2021 for initial visit and  for the procedure       Assessment and Recommendations:   Elier Leong is a 74 yo male who was admitted on  with GI bleed after resuming Plavix following ERCP and papillectomy  for newly diagnosed ampullary adenocarcinoma. His PMH is significant for primary lateral sclerosis, prostate cancer s/p prostatectomy, HLD, HTN, GERD and multiple strokes.     He has had ITB pump since  for spasticity management. He is followed by Dr. Calvert at [facility].   Intrathecal baclofen 2000 mcg/mL concentration  SynchroMed II 20 mL pump with a simple continuous infusion mode.   His dose is 234.7 mcg per day and has not been changed recently.     PROCEDURE 2021: The pump was refilled without difficulty using sterile technique and the template. ChloraPrep  was used for skin cleansing. Expected was 3.6 mL and 4.2 mL was removed, which is within the acceptable range. The pump was filled with intrathecal baclofen 2000  mcg/mL concentration. One 20 mL prefilled syringe was used. No changes were made to the dose,  concentration, or infusion mode. Session data report status was verified. Medication, concentration, and  programming were independently verified by Ghazala Mahmood RN. Estimated replacement interval is 2023.  months, and the new alarm date is 2021. Lot # [id number] and Exp date is 2024.     Thank you for this consult, please call with questions.       Brenda Carlson MD  Physical Medicine & Rehabilitation      History of Present Illness:  Elier Leong is a 75 year old male who was admitted on  with hematochezia and melena. 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 Reportedly he restarted his plavix earlier than recommended. Had acute blood loss anemia upon admission and received pRBC. Underwent ERCP again on 7/14 which showed area of ulceration near pancreatic duct which was treated with thermal therapy and epi. His hospital course was also complicated by fevers and leukocytosis.     Today, he was doing well. His wife was at bedside. Reviewed the plan for his ITB pump as above. His alarm date is 8/2 and he was due for refill today but missed his appointment. His wife noted that his spasticity usually gets worse 2-3 weeks before his appointment.     ROS:  No new neurological symptoms   He is tolerating diet  Had condom cath in place and is voiding spontaneously  No issues with BMs  No pain or discomfort  Sleeps ok at night time         Past Medical/Surgical History:  Past Medical History:   Diagnosis Date     Basal cell carcinoma nos     sees derm     CVA (cerebral infarction) 6/14    small vessel right int capsule stroke     DVT (deep vein thrombosis) in pregnancy 05/12/2017    right peroneal vein     Essential hypertension, benign      Hearing loss      Hoarseness of voice     assoc with PLS     Lumbar radiculopathy     2017     Primary Lateral Sclerosis 2002    has baclofen pump through Dr. Calvert, [facility], sees Dr. Fink, [facility]     Primary osteoarthritis of right shoulder 6/16/2021     Prostate CA (H) 2008    prostatectomy     Vertigo 2/21/2013     Past Surgical History:   Procedure Laterality Date     ABDOMEN SURGERY  11/12    Hernia     BIOPSY  4/16    Cancerous growth on leg. Removed by MOHs treatment     ENDOSCOPIC RETROGRADE CHOLANGIOPANCREATOGRAM N/A 6/17/2021    Procedure: ENDOSCOPIC RETROGRADE CHOLANGIOPANCREATOGRAPHY, BILIARY STENT PLACEMENT;  Surgeon: Sima Galvez MD;  Location:  OR     ENDOSCOPIC RETROGRADE CHOLANGIOPANCREATOGRAM COMPLEX N/A 7/12/2021    Procedure: ENDOSCOPIC  RETROGRADE CHOLANGIOPANCREATOGRAPHY WITH AMPULLECTOMY, PANCREATIC DUCT STENT PLACEMENT AND BILIARY STENT PLACEMENT;  Surgeon: Agus Kent MD;  Location: [facility] OR     ENDOSCOPIC RETROGRADE CHOLANGIOPANCREATOGRAPHY, EXCHANGE TUBE/STENT N/A 7/14/2021    Procedure: ENDOSCOPIC RETROGRADE CHOLANGIOPANCREATOGRAPHY with bile duct stents exchanged, balloon sweep of bile ducts, hemostasis;  Surgeon: Guru Bhavesh Arreola MD;  Location: [facility] OR     ENDOSCOPIC ULTRASOUND UPPER GASTROINTESTINAL TRACT (GI) N/A 7/12/2021    Procedure: ENDOSCOPIC ULTRASOUND, ESOPHAGOSCOPY / UPPER GASTROINTESTINAL TRACT (GI);  Surgeon: Agus Kent MD;  Location: [facility] OR     ESOPHAGOSCOPY, GASTROSCOPY, DUODENOSCOPY (EGD), COMBINED N/A 6/17/2021    Procedure: ESOPHAGOGASTRODUODENOSCOPY, WITH ENDOSCOPIC US, fine needle aspiration;  Surgeon: Sima Galvez MD;  Location: [facility] OR     ESOPHAGOSCOPY, GASTROSCOPY, DUODENOSCOPY (EGD), COMBINED N/A 7/14/2021    Procedure: ESOPHAGOGASTRODUODENOSCOPY (EGD);  Surgeon: Guru Bhavesh Arreola MD;  Location: [facility] OR     HERNIA REPAIR  11/12    Repaired     PROSTATE SURGERY       [facility] NONSPECIFIC PROCEDURE  6/08     prostatectomy      [facility] NONSPECIFIC PROCEDURE  11/01    colonoscopy     [facility] NONSPECIFIC PROCEDURE  11/2006    hernia, right side     [facility] NONSPECIFIC PROCEDURE      T + A       Social/Functional History:  He uses a FWW for ambulation   His wife assists with most ADLs and iADLs    Family History:  Family History   Problem Relation Age of Onset     Heart Disease Mother         CHF     Hypertension Mother      C.A.D. Maternal Grandfather      Cerebrovascular Disease Maternal Uncle         45     Cerebrovascular Disease Maternal Uncle          Medications:  Medications Prior to Admission   Medication Sig Dispense Refill Last Dose     acetaminophen (TYLENOL) 650 MG CR tablet Take 650 mg by mouth every 8 hours as needed    Past Week at prn     aspirin 81 MG tablet Take 81 mg by  mouth every evening    7/13/2021 at Unknown time     baclofen (LIORESAL) intraTHECAL Internal Pump by Intrathecal route continuous prn Pump filled by [facility] stroke [place] [phone]  Pump Model: Syncromed  Medication in pump is Gablofen (brand name)  Last fill:  03/02/2021  Next fill:   Before 08/02/2021  Low [place] Alarm Date:   Reservoir Volume: 20 ml  Conc: 2000 mcg/mL  Delivers 234.7 mcg/day  Basal rate:unknown  Battery life: unknown   7/14/2021 at Unknown time     clopidogrel (PLAVIX) 75 MG tablet Take 75 mg by mouth every evening    Past Week at pm     enalapril (VASOTEC) 10 MG tablet TAKE 1 TABLET TWICE A  tablet 1 7/13/2021 at pm     oxybutynin ER (DITROPAN-XL) 10 MG 24 hr tablet Take 2 tablets (20 mg) by mouth At Bedtime 180 tablet 3 7/13/2021 at pm     pantoprazole (PROTONIX) 40 MG EC tablet Take 1 tablet (40 mg) by mouth 2 times daily 60 tablet 0 7/13/2021 at am     polyethylene glycol (MIRALAX/GLYCOLAX) Packet Take 1 packet by mouth daily as needed Every 2-3 days.   7/13/2021 at prn     potassium chloride ER (K-TAB/KLOR-CON) 10 MEQ CR tablet Take 2 tablets (20 mEq) by mouth daily 180 tablet 3 7/13/2021 at am     EPINEPHrine 0.3 MG/0.3ML injection Inject 0.3 mLs (0.3 mg) into the muscle as needed for anaphylaxis 0.3 mL 3 Unknown at Unknown time     fluorouracil (EFUDEX) 5 % external cream Apply topically to legs 1 to 2 times weekly as needed/tolerated for maintenance   More than a month at prn     ketoconazole (NIZORAL) 2 % external shampoo SHAMPOO EVERY 2-3 DAYS AS  NEEDED 120 mL 10 More than a month at prn       Scheduled meds    [START ON 7/20/2021] baclofen  80 mg Fill Pump Once     oxybutynin ER  20 mg Oral At Bedtime     pantoprazole (PROTONIX) IV  40 mg Intravenous Q12H     polyethylene glycol  17 g Oral Daily     potassium chloride ER  20 mEq Oral Daily     sodium chloride (PF)  3 mL Intracatheter Q8H       PRN meds:  sodium chloride 0.9%,  "acetaminophen, baclofen (LIORESAL) intraTHECAL Internal Pump, lidocaine 4%, lidocaine (buffered or not buffered), Medication given by intrathecal pump: This is NOT an order to dispense medication. For information only., melatonin, naloxone **OR** naloxone **OR** naloxone **OR** naloxone, ondansetron **OR** ondansetron, sodium chloride (PF)    Allergies:  Allergies   Allergen Reactions     Bee Venom      Swelling and hives     Penicillins Hives     \"HIVES\"       Physical Exam:  VITAL SIGNS:  /67 (BP Location: Left arm)   Pulse 85   Temp (!) 96.2  F (35.7  C) (Oral)   Resp 18   Ht 1.778 m (5' 10\")   Wt 68.9 kg (152 lb)   SpO2 94%   BMI 21.81 kg/m    BMI:  Estimated body mass index is 21.81 kg/m  as calculated from the following:    Height as of this encounter: 1.778 m (5' 10\").    Weight as of this encounter: 68.9 kg (152 lb).     Gen: NAD, pleasant and cooperative   Lungs: non-labored in room air   Heart: regular pulse   Abd: soft, non-tender, non-distended  Ext: warm, well perfused, no edema, chronic skin changes with discoloration and several scabs likely due to long standing venous stasis   MSK/Neuro:     Cognition: AAx3, dysarthric speech      CN: not tested     Strength: 4/5 at bilateral shoulder abd, 4+/5 distally at bilateral upper extremities. HF was 4-/5 b/l and 4/5 distally in bilateral lower extremities          Sensory: normal to light touch throughout      Tone: increased in bilateral lower extremities with HF, hip add, KF and PF        Lab/Imaging:   Lab Results   Component Value Date    WBC 9.5 07/19/2021    HGB 7.5 (L) 07/19/2021    HCT 22.5 (L) 07/19/2021    MCV 97 07/19/2021     07/19/2021     Lab Results   Component Value Date     07/16/2021    POTASSIUM 3.8 07/18/2021    CHLORIDE 105 07/16/2021    CO2 26 07/16/2021    GLC 88 07/16/2021     Lab Results   Component Value Date    GFRESTIMATED >90 07/16/2021    GFRESTBLACK >90 06/20/2021     Lab Results   Component Value Date "     (H) 07/16/2021    ALT 56 07/16/2021    ALKPHOS 102 07/16/2021    BILITOTAL 1.2 07/17/2021    BILICONJ 0.0 06/05/2014     Lab Results   Component Value Date    INR 1.00 07/12/2021     Lab Results   Component Value Date    BUN 24 07/16/2021    CR 0.68 07/16/2021

## 2021-07-19 NOTE — DISCHARGE SUMMARY
[facility]    Family Medicine Discharge Summary- [facility]  Service    Date of Admission:  7/14/2021  Date of Service: 7/21/2021  Date of Discharge:  7/21/2021 10:41 AM  Discharging Attending Provider: Rick  Discharge Team: Kaitlynn    Discharge Diagnoses   GI bleed  Acute blood loss anemia  Acute post hemorrhagic anemia  Ampullary adenocarcinoma s/p resection   Fever  Leukocytosis      Follow-ups Needed After Discharge   - Follow up with PCP within 7 days for hospital follow up   - If unable to see PCP 7/23, recommend lab only appointment for CBC, BMP   -Reevaluate need for enalapril based on patient's home blood pressures (held here due to low-normal BP in the setting of significant blood loss)  - Follow up with GI and Oncology as planned     Hospital Course   Elier Leong is a 75 year old male admitted on 7/14/2021. He has a history of primary lateral sclerosis, prostate cancer s/p prostatectomy and newly diagnosed ampullary adenocarcinoma admitted for hematochezia and melena s/p ERCP, now with stable anemia and deconditioning requiring TCU placement.      # GI bleed, improving  # Acute blood loss anemia, stable  # Acute Post Hemorrhagic Anemia  # Choledocholithiasis   # Ampullary adenocarcinoma  Admitted with GIB following ERCP with papillectomy for newly diagnosed ampullary adenocarcinoma on 7/12, after which patient inadvertently restarted Plavix early and subsequently developed several episodes of hematochezia and melena. Hemoglobin trended from 15.2 on 7/12 to 8.1 (blas 6.1) on admission. Underwent urgent EGD/ERCP 7/14 with evidence of ulceration near pancreatic duct presumed to be the site of bleeding, successfully treated with thermal therapy and epinephrine. Thereafter required 1u pRBC 7/15 for hgb 6.1. PTA clopidogrel was held for at least 7 days from ERCP, to be restarted 7/21 (day of discharge). Patient was also on IV protonix 40 mg BID. He had  ongoing sticky maroon/black stools, repeat hemoglobin 7.0 on 7/17. Thought to represent likely ongoing passage of old blood, unlikely new source of GI bleed. Vitals remained stable, with no new symptoms. Repeat cbcs with stable, reassuring hemoglobin (7.7 > 8.3 > 7.2 > 7.5). FIT test was positive for occult blood. Discussed with GI, who did not feel that endoscopy or other evaluation would be indicated. Pathology from his initial procedure did confirmed adenocarcinoma and the deep margin was positive. Dr. Kent discussed these results with patient and plan to review at tumor conference. Tentative plan for repeat ERCP in 1 month for stent removal. GI team signed off on 7/20; they recommended restarting clopidogrel as planned on 7/21, and felt comfortable with discharge to TCU, with repeat hemoglobin check later this week.   Patient did have significant fatigue which was attributed to his blood loss/anemia. He was evaluated by physical therapy who recommended TCU. Referrals were placed, however, none of the family preferred TCUs had any bed availability. Patient subsequently elected to be discharged home with home cares including PT/OT.   - Continue PTA PPI 40 mg BID  - Restart clopidogrel this evening  - Imodium PRN for loose stools   - Follow up with GI and Oncology as planned      # Fever, isolated x2  # Leukocytosis, resolved  Febrile x1 7/16 AM (POD2) and again 100.7 on 7/18 PM (POD4). Initially had leukocytosis (18.6) which resolved without intervention. His only focal finding was cough- CXR 7/18 showed no focal opacity. Most likely cause viral URI vs pneumonitis. COVID-19 negative x2. Did have swallow study 7/17 which was not concerning for aspiration. Blood culture x1 growing gram positive cocci (MRSE), likely contaminated sample - second bottle with no growth to date. Liver function within normal limits.   Differential includes post-ERCP pancreatitis, biliary disease (choledocholithiasis successfully treated  on recent ERCP), less likely with no abdominal pain and reassuring abdominal exam, as well as normal lipase. Considered febrile nonhemolytic transfusion reaction - although the timing is not characteristic. Low clinical concern for active hemolysis: LDH moderately elevated, indirect bilirubin within normal limits. Also considered cancer-related fever.      # Choledocholithiasis, resolved s/p ERCP  Discovered during ERCP 7/14. Stone removed and stents placed in CBD. No elevated LFTs/T bili.      # History of PLS  Diagnosed 19 years ago, prognosis unclear. At baseline has slurred speech and needs wife's help with dressing and bathing. Able to ambulate with walker short distances, wheelchair for longer distances. Followed by neurology. Patient on baclofen pump, was due for refill which was done by PM&R team on 7/20.     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 [place] to be at neurologic baseline. Plavix held x7 days from ERCP -- will restart tonight (7/21).     Chronic stable issues:  GERD: Held PTA PO PPI while on IV PPI  HLD: Not currently on statin (intentionally not prescribed per EMR)  Essential hypertension: Held PTA enalapril given hypovolemia/hypotension-- recommended continuing to hold, monitoring daily BP and bringing those readings to PCP visit   Hypokalemia: PTA potassium    # Discharge Pain Plan:   - Patient currently has NO PAIN and is not being prescribed pain medications on discharge.    Consultations This Hospital Stay   PHYSICAL THERAPY ADULT IP CONSULT  VASCULAR ACCESS CARE ADULT IP CONSULT  PAIN MANAGEMENT ADULT IP CONSULT  SPEECH LANGUAGE PATH ADULT IP CONSULT  PHYSICAL MEDICINE & REHAB GENERAL ADULT IP CONSULT    Code Status   Full Code       The patient was discussed with Dr. Cabrera.    MD [facility] Family Medicine Inpatient Service  [facility]   Pager:[phone]_  ___________________________________________________________________  Review  of Systems:  CONSTITUTIONAL: NEGATIVE for fever, chills, change in weight  INTEGUMENTARY/SKIN: NEGATIVE for worrisome rashes, moles or lesions  EYES: NEGATIVE for vision changes or irritation  ENT/MOUTH: NEGATIVE for ear, mouth and throat problems  RESP: NEGATIVE for significant cough or SOB  CV: NEGATIVE for chest pain, palpitations or peripheral edema  GI: NEGATIVE for nausea, abdominal pain, heartburn, or change in bowel habits  : NEGATIVE for frequency, dysuria, or hematuria  MUSCULOSKELETAL: NEGATIVE for significant arthralgias or myalgia  NEURO: NEGATIVE for weakness, dizziness or paresthesias  ENDOCRINE: NEGATIVE for temperature intolerance, skin/hair changes  HEME/ALLERGY: NEGATIVE for bleeding problems  PSYCHIATRIC: NEGATIVE for changes in mood or affect  Physical Exam   Vital Signs: Temp: (!) 96.2  F (35.7  C) Temp src: Oral BP: 138/87 Pulse: 79   Resp: 18 SpO2: 95 % O2 Device: None (Room air)    Weight: 154 lbs 11.2 oz    General Appearance: Awake, alert, sitting up in chair, no acute distress.   Respiratory: Breathing comfortably on room air.   Cardiovascular: Regular rate.   GI: Soft, nondistended, nontender.    Significant Results and Procedures   Most Recent 3 CBC's:  Recent Labs   Lab Test 07/21/21  0611 07/20/21  0612 07/19/21  0637   WBC 10.4 10.5 9.5   HGB 7.5* 7.9* 7.5*   MCV 99 97 97    268 224     Most Recent 3 BMP's:  Recent Labs   Lab Test 07/20/21  0612 07/18/21  1502 07/17/21  0927 07/16/21  0921 07/15/21  0600     --   --  135 138   POTASSIUM 3.6 3.8 3.3* 3.4  3.2* 4.2   CHLORIDE 107  --   --  105 107   CO2 25  --   --  26 23   BUN 12  --   --  24 32*   CR 0.70  --   --  0.68 0.70   ANIONGAP 6  --   --  4 8   AMOS 8.2*  --   --  8.8 8.4*   GLC 91  --   --  88 109*     Most Recent Urinalysis:  Recent Labs   Lab Test 07/20/21  0549 01/27/21  1350   COLOR Yellow Yellow   APPEARANCE Clear Clear   URINEGLC Negative Negative   URINEBILI Negative Negative   URINEKETONE Negative  Negative   SG 1.016 1.025   UBLD Negative Negative   URINEPH 6.5 5.5   PROTEIN Negative Negative   UROBILINOGEN  --  0.2   NITRITE Negative Negative   LEUKEST Negative Negative   RBCU 1  --    WBCU <1  --    ,   Results for orders placed or performed during the hospital encounter of 07/14/21   XR Surgery RUSS Fluoro L/T 5 Min w Stills    Narrative    This exam was marked as non-reportable because it will not be read by a   radiologist or a [place] non-radiologist provider.         XR Chest Port 1 View    Narrative    EXAM: XR CHEST PORT 1 VIEW 7/16/2021 8:37 AM      HISTORY: Fever, leukocytosis in postop patient.    COMPARISON: Chest CT 6/17/2021 and chest radiograph 6/3/2015.     TECHNIQUE: Frontal view of the chest.    FINDINGS: Frontal radiograph of the chest.  Trachea is midline.  Cardiac silhouette is within normal limits.  Pulmonary vasculature is  distinct.  There are mild interstitial opacities most prominent in the  bilateral perihilar and basilar regions. Probable trace left pleural  effusion. No appreciable pneumothorax. No acute osseous abnormality.       Impression    IMPRESSION: There are mild interstitial opacities, most prominently in  the bilateral perihilar and basilar regions with associated left trace  pleural effusion, this could represent pulmonary edema or atelectasis.    I have personally reviewed the examination and initial interpretation  and I agree with the findings.    DEON PAREDES MD         SYSTEM ID:  [id number]   XR Chest 2 Views    Narrative    XR CHEST 2 VW  7/18/2021 11:54 AM      HISTORY: Worsening Cough    COMPARISON: 7/16/2021, CT CAP 6/17/2021    FINDINGS: AP chest radiograph. Trachea is midline, heart size is  stable. Bibasilar streaky atelectasis. No pleural effusion or  pneumothorax. No acute osseous or abdominal abnormality.      Impression    IMPRESSION: Bibasilar streaky atelectasis, better characterized on CT  6/17/2021.    I have personally reviewed the examination  and initial interpretation  and I agree with the findings.    NIRMAL CASTRO MD         SYSTEM ID:  [id number]     *Note: Due to a large number of results and/or encounters for the requested time period, some results have not been displayed. A complete set of results can be found in Results Review.       Pending Results   None.       Primary Care Physician   Lida Ray    Discharge Disposition   Discharged to home  Condition at discharge: Stable    Discharge Orders      Home Care PT Referral for Hospital Discharge      Home Care OT Referral for Hospital Discharge      Care Coordination Referral      Reason for your hospital stay    You were hospitalized because of bloody bowel movements after your recent ERCP procedure. You had another ERCP on 7/14 which found that the source of the bleeding was likely from that prior procedure site, as they noticed some ulcerated tissue there which was treated to stop the bleeding. You continued to have some maroon/dark stools afterwards, which the GI team felt was due to old blood rather than ongoing or new bleeding-- this was supported by the fact that your hemoglobin remained stable (you received one blood transfusion early on) and your vital signs remained normal. Your Plavix was held throughout your hospitalization and will be restarted tonight (7/21). You should watch for bright red blood and follow up with your PCP 7/23 for a repeat hemoglobin (or this can be done by home health nurse if that is possible). You will follow up with your GI and cancer doctors as previously planned.    With your significant blood loss prior to admission, your blood pressures remained in the low-normal range, so we did not give you your blood pressure medication (enalapril) while here. You should discuss restarting this medication with your PCP when they feel it is appropriate to do so.     While you were hospitalized, your baclofen pump was refilled by PM&R on 7/20.     Activity    Your activity  upon discharge: activity as tolerated     Adult [facility] Follow-up and recommended labs and tests    Follow up with primary care provider, Lida Ray, within 7 days for hospital follow- up and BP management (holding enalapril due to low blood pressures in the setting of significant blood loss).  The following labs/tests are recommended: CBC, BMP.      Appointments on [place] and/or [facility] (with [facility] or [facility] provider or service). Call [phone] if you haven't heard regarding these appointments within 7 days of discharge.     MD face to face encounter    Documentation of Face to Face and Certification for Home Health Services    I certify that patient: Elier Leong is under my care and that I, or a nurse practitioner or physician's assistant working with me, had a face-to-face encounter that meets the physician face-to-face encounter requirements with this patient on: July 21, 2021.    This encounter with the patient was in whole, or in part, for the following medical condition, which is the primary reason for home health care: Elier Leong is a 75 year old male admitted on 7/14/2021. He has a history of primary lateral sclerosis, prostate cancer s/p prostatectomy and newly diagnosed ampullary adenocarcinoma admitted for hematochezia and melena s/p ERCP, now with stable anemia.    I certify that, based on my findings, the following services are medically necessary home health services: Occupational Therapy and Physical Therapy.    My clinical findings support the need for the above services because: Occupational Therapy Services are needed to assess and treat cognitive ability and address ADL safety due to impairment in medical status and Physical Therapy Services are needed to assess and treat the following functional impairments: medical status and deconditioning.    Further, I certify that my clinical findings support that this patient is homebound (i.e. absences from home require  considerable and taxing effort and are for medical reasons or [employer] services or infrequently or of short duration when for other reasons) because: Requires assistance of another person or specialized equipment to access medical services because patient: Requires supervision of another for safe transfer.    Based on the above findings. I certify that this patient is confined to the home and needs intermittent skilled nursing care, physical therapy and/or speech therapy.  The patient is under my care, and I have initiated the establishment of the plan of care.  This patient will be followed by a physician who will periodically review the plan of care.  Physician/Provider to provide follow up care: Lida Ray    Attending [facility] physician (the Medicare certified [place] provider): Rudi Calderon MD  Physician Signature: See electronic signature associated with these discharge orders.  Date: 7/21/2021     Diet    Follow this diet upon discharge: Regular Diet Adult     Discharge Medications   Discharge Medication List as of 7/21/2021  9:08 AM      START taking these medications    Details   loperamide (IMODIUM) 2 MG capsule Take 1 capsule (2 mg) by mouth 4 times daily as needed for diarrhea, Disp-60 capsule, R-0, E-Prescribe         CONTINUE these medications which have NOT CHANGED    Details   acetaminophen (TYLENOL) 650 MG CR tablet Take 650 mg by mouth every 8 hours as needed , Historical      aspirin 81 MG tablet Take 81 mg by mouth every evening , Historical      baclofen (LIORESAL) intraTHECAL Internal Pump by Intrathecal route continuous prn Pump filled by [facility] [phone]  Pump Model: Syncromed  Medication in pump is Gablofen (brand name)  Last fill:  03/02/2021  Next fill:   Before 08/02/2021  Low Res ervoir Alarm Date:   Reservoir Volume: 20 ml  Conc: 2000 mcg/mL  Delivers 234.7 mcg/day  Basal rate:unknown  Battery life: unknown, Historical     "  clopidogrel (PLAVIX) 75 MG tablet Take 75 mg by mouth every evening , Historical      oxybutynin ER (DITROPAN-XL) 10 MG 24 hr tablet Take 2 tablets (20 mg) by mouth At Bedtime, Disp-180 tablet, R-3, E-Prescribe### DO NOT FILL NOW.  Please update patient's profile to reflect additional refills.  ####      pantoprazole (PROTONIX) 40 MG EC tablet Take 1 tablet (40 mg) by mouth 2 times daily, Disp-60 tablet, R-0, E-Prescribe      polyethylene glycol (MIRALAX/GLYCOLAX) Packet Take 1 packet by mouth daily as needed Every 2-3 days., Historical      potassium chloride ER (K-TAB/KLOR-CON) 10 MEQ CR tablet Take 2 tablets (20 mEq) by mouth daily, Disp-180 tablet, R-3, E-Prescribe### DO NOT FILL NOW.  Please update patient's profile to reflect additional refills.  ####      EPINEPHrine 0.3 MG/0.3ML injection Inject 0.3 mLs (0.3 mg) into the muscle as needed for anaphylaxis, Disp-0.3 mL, R-3, E-Prescribe      fluorouracil (EFUDEX) 5 % external cream Apply topically to legs 1 to 2 times weekly as needed/tolerated for maintenanceHistorical      ketoconazole (NIZORAL) 2 % external shampoo SHAMPOO EVERY 2-3 DAYS AS  NEEDEDDisp-120 mL, [id number]         STOP taking these medications       enalapril (VASOTEC) 10 MG tablet Comments:   Reason for Stopping:             Allergies   Allergies   Allergen Reactions     Bee Venom      Swelling and hives     Penicillins Hives     \"HIVES\"      "

## 2021-07-19 NOTE — PROGRESS NOTES
[facility]    Progress Note - Suri's Service        Date of Admission:  7/14/2021    Assessment & Plan      Elier Leong is a 75 year old male admitted on 7/14/2021. He has a history of primary lateral sclerosis, prostate cancer s/p prostatectomy and newly diagnosed ampullary adenocarcinoma admitted for hematochezia and melena s/p ERCP, now with stable anemia and deconditioning requiring TCU placement.     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 Hemoglobin trended from 15.2 on 7/12 to 8.1 on admission. Underwent ERCP 7/14; area of ulceration near pancreatic duct was found and treated with thermal therapy and epi. Required 1u pRBC 7/15 for hgb 6.1. Patient with ongoing sticky maroon/black stools, hemoglobin and vitals have remained normal, this likely represents ongoing passage of old blood or slow ooze. Per GI no endoscopy indicated at this time. Pathology from his initial procedure did confirmed adenocarcinoma and the deep margin was positive. Dr. Kent discussed these results with patient and plan to review at tumor conference. Tentative plan for repeat ERCP in 1 month for stent removal.   - Appreciate GI panc bili following  - Trend CBC  - Regular diet  - Maintain 2 large bore IVs  - IV protonix 40 mg BID  - Hold PTA clopidogrel at least x7 days from ERCP (consider restart 7/21)    # Fever, isolated x2  # Leukocytosis, resolved  Febrile x1 7/16 AM (POD2) and again 100.7 on 7/18 PM (POD4). Initially had leukocytosis (18.6) which resolved without intervention. His only focal finding is cough- CXR 7/18 showed no focal opacity. Most  likely cause viral URI vs pneumonitis. COVID-19 negative. Did have swallow study 7/17 which was not concerning for aspiration.   Differential includes post-ERCP pancreatitis, biliary disease (choledocholithiasis successfully treated on recent ERCP), less likely with no abdominal pain and reassuring abdominal exam, as well as normal lipase. Considered febrile nonhemolytic transfusion reaction - although the timing is not characteristic. Low clinical concern for active hemolysis: LDH moderately elevated, indirect bilirubin within normal limits. Also considered cancer-related fever.   - repeat COVID-19  - repeat UA  - tylenol 650 mg q4H prn  - SLP following, appreciate recs  - Follow blood cultures  - if patient requires second unit of PRBCs, monitor for s/sxs of acute and delayed reaction    # Choledocholithiasis  Discovered during ERCP 7/14. Stone removed and stents placed in CBD. ALT remains stable. AST up-trending from admission (53 > 94 > 126 > 133). Bilirubin stable at 1.3. Alk phos within normal limits.  - Trend LFTs     # Severe malnutrition in the context of chronic illness  RD evaluated 7/16.   - Consider appetite stimulant and/or scheduled supplements and calorie counts pending PO intake    # History of PLS  Diagnosed 19 years ago, prognosis unclear. At baseline has slurred speech and needs wife's help with dressing and bathing. Able to ambulate with walker short distances, wheelchair for longer distances. Followed by neurology.   - Patient on baclofen pump (if needs MRI, notify [employer] for resetting)  - Scheduled for baclofen pump refill 7/19, expect prolonged hospitalization and TCU stay, PM&R consulted for refill (to be performed 7/20)  - PTA oxybutinin    # History of CVA  Acute CVA in 2018, subacute CVA 11/2020. Currently at neurologic baseline.   -Holding plavix x7 days from ERCP (possibly restart 7/21)     Chronic stable issues:  GERD: Hold PTA PO PPI while on IV PPI  HLD: Not currently on statin  "(intentionally not prescribed per EMR)  Essential hypertension: Hold PTA enalapril given hypovolemia/hypotension  Hypokalemia: PTA potassium       Diet: Snacks/Supplements Adult: Other; Any; Between Meals  Regular Diet Adult    DVT Prophylaxis: HOLDING Plavix  Wright Catheter: Not present -condom catheter present  Fluids: PO   Central Lines: None  Code Status: Full Code      Disposition Plan   Expected discharge: anticipate medically ready for discharge to TCU 7/20    recommended to transitional care unit once hemoglobin stable .     The patient's care was discussed with the Attending Physician, Dr. Lewis .    I was present with the medical student who participated in the service and in the documentation of this note. I have verified the history and personally performed the physical exam and medical decision making, and have verified the content of the note, which accurately reflects my assessment of the patient and the plan of care.  MD Suri Villatoro's Service PGY3  [facility]  Securely message with the [employer] Web Console (learn more here)  Text page via [employer] Paging/Directory    ______________________________________________________________________    Interval History   Overnight events: fever to 100.7 last night, resolved this morning. Blood cultures drawn. Otherwise patient has been vitally stable, occasional cough, no other symptoms.     This morning, Elier reports worsening cough since yesterday. Feels \"lots of phlegm\" in his chest, but feels unable to cough it up. He denies any associated chest pain or chills. No other concerns.    Physical Exam   Vital Signs: Temp: 98.5  F (36.9  C) Temp src: Oral BP: 122/63 Pulse: 82   Resp: 18 SpO2: 93 % O2 Device: None (Room air)    Weight: 152 lbs 0 oz  Physical Exam  Constitutional:       General: He is not in acute distress.     Appearance: He is well-developed. He is not diaphoretic.   HENT:      " Mouth/Throat:      Mouth: Mucous membranes are moist.   Cardiovascular:      Rate and Rhythm: Normal rate and regular rhythm.   Pulmonary:      Effort: Pulmonary effort is normal. No respiratory distress.      Breath sounds: Normal breath sounds. No wheezing or rales.      Comments: Occasional cough  Abdominal:      General: There is no distension.      Palpations: Abdomen is soft.      Tenderness: There is no abdominal tenderness.   Musculoskeletal:      Cervical back: Normal range of motion.      Right lower leg: No edema.      Left lower leg: No edema.   Skin:     General: Skin is warm.      Coloration: Skin is pale. Skin is not jaundiced.      Findings: No bruising or rash.   Neurological:      Mental Status: He is alert.      Comments: Garbled speech consistent with baseline   Psychiatric:         Mood and Affect: Mood normal.          Data   Recent Labs   Lab 07/19/21  0637 07/18/21  2205 07/18/21  1502 07/18/21  0630 07/17/21  0927 07/16/21  1559 07/16/21  0921 07/15/21  0600 07/14/21  1119 07/12/21  1300 07/12/21  0848   WBC 9.5 8.8  --  10.4 13.9*  --   --  7.6 9.6  --  6.5   HGB 7.5* 7.3*  --  7.5* 7.7*  --   --  6.1* 8.1*  --  15.2   MCV 97 97  --  97 96  --   --  95 96  --  93    202  --  176 189  --   --  123* 186  --  232   INR  --   --   --   --   --   --   --   --   --   --  1.00   NA  --   --   --   --   --   --  135 138 134  --  132*   POTASSIUM  --   --  3.8  --  3.3* 3.6 3.4  3.2* 4.2 4.7  --  4.3   CHLORIDE  --   --   --   --   --   --  105 107 101  --  99   CO2  --   --   --   --   --   --  26 23 28  --  28   BUN  --   --   --   --   --   --  24 32* 46*  --  17   CR  --   --   --   --   --   --  0.68 0.70 0.76  --  0.58*   ANIONGAP  --   --   --   --   --   --  4 8 5  --  5   AMOS  --   --   --   --   --   --  8.8 8.4* 9.0  --  9.3   GLC  --   --   --   --   --   --  88 109* 117*  --  96   ALBUMIN  --   --   --   --   --   --  2.5* 2.7* 2.6*  --  3.2*   PROTTOTAL  --   --   --   --   --    --  4.9* 4.8* 5.4*  --  6.8   BILITOTAL  --   --   --   --  1.2  --  1.3 1.1 0.9  --  1.1   ALKPHOS  --   --   --   --   --   --  102 96 137  --  182*   ALT  --   --   --   --   --   --  56 54 64  --  68   AST  --   --   --   --   --   --  133* 126* 94*  --  53*   LIPASE  --   --   --   --   --   --  28*  --   --  616* 420*     Recent Results (from the past 24 hour(s))   XR Chest 2 Views    Narrative    XR CHEST 2 VW  7/18/2021 11:54 AM      HISTORY: Worsening Cough    COMPARISON: 7/16/2021, CT CAP 6/17/2021    FINDINGS: AP chest radiograph. Trachea is midline, heart size is  stable. Bibasilar streaky atelectasis. No pleural effusion or  pneumothorax. No acute osseous or abdominal abnormality.      Impression    IMPRESSION: Bibasilar streaky atelectasis, better characterized on CT  6/17/2021.    I have personally reviewed the examination and initial interpretation  and I agree with the findings.    NIRMAL CASTRO MD         SYSTEM ID:  [id number]

## 2021-07-19 NOTE — PLAN OF CARE
[id number]    Tmax 100.7, provider notified and blood cultures ordered. OVSS and on RA. Denies nausea/SOB. C/o shoulder pain, managed with PRN tylenol x1. Condom catheter in place, no BM during shift. Continue to monitor & w/ POC.

## 2021-07-20 ENCOUNTER — APPOINTMENT (OUTPATIENT)
Dept: SPEECH THERAPY | Facility: CLINIC | Age: 76
DRG: 438 | End: 2021-07-20
Payer: COMMERCIAL

## 2021-07-20 ENCOUNTER — APPOINTMENT (OUTPATIENT)
Dept: PHYSICAL THERAPY | Facility: CLINIC | Age: 76
DRG: 438 | End: 2021-07-20
Payer: COMMERCIAL

## 2021-07-20 LAB
ALBUMIN SERPL-MCNC: 2.3 G/DL (ref 3.4–5)
ALBUMIN UR-MCNC: NEGATIVE MG/DL
ALP SERPL-CCNC: 105 U/L (ref 40–150)
ALT SERPL W P-5'-P-CCNC: 38 U/L (ref 0–70)
ANION GAP SERPL CALCULATED.3IONS-SCNC: 6 MMOL/L (ref 3–14)
APPEARANCE UR: CLEAR
AST SERPL W P-5'-P-CCNC: 39 U/L (ref 0–45)
BILIRUB SERPL-MCNC: 0.8 MG/DL (ref 0.2–1.3)
BILIRUB UR QL STRIP: NEGATIVE
BUN SERPL-MCNC: 12 MG/DL (ref 7–30)
CALCIUM SERPL-MCNC: 8.2 MG/DL (ref 8.5–10.1)
CHLORIDE BLD-SCNC: 107 MMOL/L (ref 94–109)
CO2 SERPL-SCNC: 25 MMOL/L (ref 20–32)
COLOR UR AUTO: YELLOW
CREAT SERPL-MCNC: 0.7 MG/DL (ref 0.66–1.25)
ENTEROCOCCUS FAECALIS: NOT DETECTED
ENTEROCOCCUS FAECIUM: NOT DETECTED
ERYTHROCYTE [DISTWIDTH] IN BLOOD BY AUTOMATED COUNT: 15.2 % (ref 10–15)
GFR SERPL CREATININE-BSD FRML MDRD: >90 ML/MIN/1.73M2
GLUCOSE BLD-MCNC: 91 MG/DL (ref 70–99)
GLUCOSE UR STRIP-MCNC: NEGATIVE MG/DL
HCT VFR BLD AUTO: 24.3 % (ref 40–53)
HGB BLD-MCNC: 7.9 G/DL (ref 13.3–17.7)
HGB UR QL STRIP: NEGATIVE
KETONES UR STRIP-MCNC: NEGATIVE MG/DL
LEUKOCYTE ESTERASE UR QL STRIP: NEGATIVE
LISTERIA SPECIES (DETECTED/NOT DETECTED): NOT DETECTED
MCH RBC QN AUTO: 31.6 PG (ref 26.5–33)
MCHC RBC AUTO-ENTMCNC: 32.5 G/DL (ref 31.5–36.5)
MCV RBC AUTO: 97 FL (ref 78–100)
MUCOUS THREADS #/AREA URNS LPF: PRESENT /LPF
NITRATE UR QL: NEGATIVE
PH UR STRIP: 6.5 [PH] (ref 5–7)
PLATELET # BLD AUTO: 268 10E3/UL (ref 150–450)
POTASSIUM BLD-SCNC: 3.6 MMOL/L (ref 3.4–5.3)
PROT SERPL-MCNC: 5 G/DL (ref 6.8–8.8)
RBC # BLD AUTO: 2.5 10E6/UL (ref 4.4–5.9)
RBC URINE: 1 /HPF
SARS-COV-2 RNA RESP QL NAA+PROBE: NEGATIVE
SODIUM SERPL-SCNC: 138 MMOL/L (ref 133–144)
SP GR UR STRIP: 1.02 (ref 1–1.03)
STAPHYLOCOCCUS AUREUS: NOT DETECTED
STAPHYLOCOCCUS EPIDERMIDIS: DETECTED
STAPHYLOCOCCUS LUGDUNENSIS: NOT DETECTED
STREPTOCOCCUS AGALACTIAE: NOT DETECTED
STREPTOCOCCUS ANGINOSUS GROUP: NOT DETECTED
STREPTOCOCCUS PNEUMONIAE: NOT DETECTED
STREPTOCOCCUS PYOGENES: NOT DETECTED
STREPTOCOCCUS SPECIES: NOT DETECTED
UROBILINOGEN UR STRIP-MCNC: NORMAL MG/DL
WBC # BLD AUTO: 10.5 10E3/UL (ref 4–11)
WBC URINE: <1 /HPF

## 2021-07-20 PROCEDURE — 250N000013 HC RX MED GY IP 250 OP 250 PS 637: Performed by: STUDENT IN AN ORGANIZED HEALTH CARE EDUCATION/TRAINING PROGRAM

## 2021-07-20 PROCEDURE — 82040 ASSAY OF SERUM ALBUMIN: CPT | Performed by: STUDENT IN AN ORGANIZED HEALTH CARE EDUCATION/TRAINING PROGRAM

## 2021-07-20 PROCEDURE — 81001 URINALYSIS AUTO W/SCOPE: CPT | Performed by: STUDENT IN AN ORGANIZED HEALTH CARE EDUCATION/TRAINING PROGRAM

## 2021-07-20 PROCEDURE — 92526 ORAL FUNCTION THERAPY: CPT | Mod: GN

## 2021-07-20 PROCEDURE — 97530 THERAPEUTIC ACTIVITIES: CPT | Mod: GP

## 2021-07-20 PROCEDURE — 250N000011 HC RX IP 250 OP 636: Performed by: PHYSICAL MEDICINE & REHABILITATION

## 2021-07-20 PROCEDURE — 250N000011 HC RX IP 250 OP 636: Performed by: STUDENT IN AN ORGANIZED HEALTH CARE EDUCATION/TRAINING PROGRAM

## 2021-07-20 PROCEDURE — 120N000002 HC R&B MED SURG/OB UMMC

## 2021-07-20 PROCEDURE — 99233 SBSQ HOSP IP/OBS HIGH 50: CPT | Performed by: PHYSICIAN ASSISTANT

## 2021-07-20 PROCEDURE — 99232 SBSQ HOSP IP/OBS MODERATE 35: CPT | Mod: GC | Performed by: STUDENT IN AN ORGANIZED HEALTH CARE EDUCATION/TRAINING PROGRAM

## 2021-07-20 PROCEDURE — 97116 GAIT TRAINING THERAPY: CPT | Mod: GP

## 2021-07-20 PROCEDURE — 85027 COMPLETE CBC AUTOMATED: CPT | Performed by: STUDENT IN AN ORGANIZED HEALTH CARE EDUCATION/TRAINING PROGRAM

## 2021-07-20 PROCEDURE — C9113 INJ PANTOPRAZOLE SODIUM, VIA: HCPCS | Performed by: STUDENT IN AN ORGANIZED HEALTH CARE EDUCATION/TRAINING PROGRAM

## 2021-07-20 PROCEDURE — 36415 COLL VENOUS BLD VENIPUNCTURE: CPT | Performed by: STUDENT IN AN ORGANIZED HEALTH CARE EDUCATION/TRAINING PROGRAM

## 2021-07-20 PROCEDURE — 62369 ANAL SP INF PMP W/REPRG&FILL: CPT | Performed by: PHYSICAL MEDICINE & REHABILITATION

## 2021-07-20 RX ORDER — POLYETHYLENE GLYCOL 3350 17 G/17G
17 POWDER, FOR SOLUTION ORAL DAILY PRN
Status: DISCONTINUED | OUTPATIENT
Start: 2021-07-20 | End: 2021-07-21 | Stop reason: HOSPADM

## 2021-07-20 RX ORDER — LOPERAMIDE HCL 2 MG
2 CAPSULE ORAL 4 TIMES DAILY PRN
Status: DISCONTINUED | OUTPATIENT
Start: 2021-07-20 | End: 2021-07-21 | Stop reason: HOSPADM

## 2021-07-20 RX ORDER — PANTOPRAZOLE SODIUM 40 MG/1
40 TABLET, DELAYED RELEASE ORAL
Status: DISCONTINUED | OUTPATIENT
Start: 2021-07-20 | End: 2021-07-21 | Stop reason: HOSPADM

## 2021-07-20 RX ORDER — CLOPIDOGREL BISULFATE 75 MG/1
75 TABLET ORAL EVERY EVENING
Status: DISCONTINUED | OUTPATIENT
Start: 2021-07-21 | End: 2021-07-21 | Stop reason: HOSPADM

## 2021-07-20 RX ORDER — BACLOFEN 2000 UG/ML
40 INJECTION, SOLUTION INTRATHECAL ONCE
Status: COMPLETED | OUTPATIENT
Start: 2021-07-20 | End: 2021-07-20

## 2021-07-20 RX ADMIN — ACETAMINOPHEN 650 MG: 325 TABLET, FILM COATED ORAL at 12:26

## 2021-07-20 RX ADMIN — PANTOPRAZOLE SODIUM 40 MG: 40 INJECTION, POWDER, FOR SOLUTION INTRAVENOUS at 00:12

## 2021-07-20 RX ADMIN — LOPERAMIDE HYDROCHLORIDE 2 MG: 2 CAPSULE ORAL at 13:14

## 2021-07-20 RX ADMIN — POTASSIUM CHLORIDE 20 MEQ: 750 TABLET, EXTENDED RELEASE ORAL at 08:27

## 2021-07-20 RX ADMIN — PANTOPRAZOLE SODIUM 40 MG: 40 TABLET, DELAYED RELEASE ORAL at 17:45

## 2021-07-20 RX ADMIN — BACLOFEN 40 MG: 40 INJECTION INTRATHECAL at 09:30

## 2021-07-20 RX ADMIN — OXYBUTYNIN CHLORIDE 20 MG: 10 TABLET, EXTENDED RELEASE ORAL at 21:14

## 2021-07-20 RX ADMIN — PANTOPRAZOLE SODIUM 40 MG: 40 INJECTION, POWDER, FOR SOLUTION INTRAVENOUS at 13:14

## 2021-07-20 ASSESSMENT — ACTIVITIES OF DAILY LIVING (ADL)
ADLS_ACUITY_SCORE: 30

## 2021-07-20 NOTE — PLAN OF CARE
1930-2330: Tmax 99.0. OVSS. PRN Tylenol given at bedtime for shoulder pain. 1 moderate sized maroon-tinged stool. UA sent. Bladder scanned for 500 mL at 0530. Pt stated not feeling like he had to void. Was eventually able to void 225 mL yellow urine with post void residual of 300. Parameters to notify MD not met (notify if >400). Continue to monitor.

## 2021-07-20 NOTE — PROVIDER NOTIFICATION
Provider notification;    Doctor Vicenta notified at 0030 Via [phone].    Reason for notification: Pt has positive blood culture in right hand. Growing gram pos. cocci in clusters.      Updated at 0300 that culture MRSE positive.    Plan: No new orders at this time.

## 2021-07-20 NOTE — PROGRESS NOTES
"Care Management Follow Up    Length of Stay (days): 6    Expected Discharge Date: 07/20/2021     Concerns to be Addressed: discharge planning        Patient plan of care discussed at interdisciplinary rounds: Yes    Anticipated Discharge Disposition:  Home with Home Care vs TCU     Anticipated Discharge Services:  TBD    Anticipated Discharge DME:  TBD    Patient/family educated on Medicare website which has current facility and service quality ratings:  Yes    Education Provided on the Discharge Plan:  Yes    Patient/Family in Agreement with the Plan:  Yes    Referrals Placed by CM/SW:      Private pay costs discussed: insurance costs co-pays    Additional Information:    Call received from patient's bedside nurse that patient and wife would like to discuss TCU placement at time of discharge.     PT worked with patient and spouse 7/19/21 and recommended home with assist/home with home care physical therapy at discharge. PT rationale for recommendations state \"Pt is below his baseline for mobility, however, pt has appropriate home set up and physical assist available from wife to facilitate a safe discharge to home. Pt could benefit from TCU if pt/wife would agree, however, preference is for home.\"     met with patient and spouse in the patient's room; introduced self and explained role. Discussed call received from bedside nurse and current PT/OT recommendations. Patient and spouse stated that they would like to pursue TCU placement at time of discharge in order to build up additional strength, particularly in patient's legs;  verbalized understanding.     discussed the TCU referral process. Provided the Medicare Compare list for TCU, with associated star ratings to assist with choice for referrals/discharge planning.  provided education regarding the star ratings and that they are updated/maintained by Medicare and can be reviewed by visiting www.medicare.gov. " "Spouse inquired if patient would like discharge today to a TCU;  stated that it was unlikely as referrals would need to be submitted, facilities would have to receive records and review, insurance authorization would have to be obtained, and pending TCU bed availability.     Spouse and patient requested referrals be submitted to the following in preference order:    [facility]  [address] [place]  [address]  P: [phone]  F: [phone]    [facility]  [address] [place] [address]  P: [phone]  F: [phone]    [facility]  [address] [place]  [address]  P: [phone]  F: [phone]    [facility]  [address] [place]  [address]  P: [phone]  F: [phone]     Referrals submitted. No further questions or concerns reported at this time.     ADDENDUM 1301: Updates regarding TCU referrals:      [facility] - denied; patient out of insurance network    [facility] - denied; patient out of insurance network    [facility] - denied; no bed availability     researched [facility] Medicare Advantage approved providers by utilizing the [employer] website. Printed the Skilled Nursing Facilities that accept patient's insurance.      followed up with patient and spouse in patient's room. Discussed current status of referrals and that response from [facility] was still pending. Provided the t Medicare Advantage approved SNFs list. Patient and spouse both adamantly declined referrals to listed facilities due to distance from their home ([facility], [facility] [place], [facility] [place], etc). Patient then stated, \"Then I'll just go home.\"      offered to have RNCC assist with home health care arrangements; spouse accepted offer and reported that " she would still like to wait to hear from [facility] before a finalized plan is made.     ADDENDUM 1404:  attempted to call [facility] admissions (P: [phone]); voicemail left requesting a return call to determine if referral had been received and reviewed.     ADDENDUM 1553:  called [facility] an additional time. Spoke with admissions who stated that they are unable to accept patient due to bed availability. Facility does not anticipate any bed availability through end of week.    CHRISTIANO Lal Hematology/Oncology Social Worker  Phone: [phone]  Pager: [phone]  [email or web address]

## 2021-07-20 NOTE — PROGRESS NOTES
[facility]    Progress Note - Suri's Service        Date of Admission:  7/14/2021    Assessment & Plan      Elier Loeng is a 75 year old male admitted on 7/14/2021. He has a history of primary lateral sclerosis, prostate cancer s/p prostatectomy and newly diagnosed ampullary adenocarcinoma admitted for hematochezia and melena s/p ERCP, now with stable anemia and deconditioning requiring TCU placement.     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 Hemoglobin trended from 15.2 on 7/12 to 8.1 on admission. Underwent ERCP 7/14; area of ulceration near pancreatic duct was found and treated with thermal therapy and epi. Required 1u pRBC 7/15 for hgb 6.1. Patient with ongoing sticky maroon/black stools, hemoglobin and vitals have remained normal, this likely represents ongoing passage of old blood or slow ooze. Per GI no endoscopy indicated at this time. Pathology from his initial procedure did confirmed adenocarcinoma and the deep margin was positive. Dr. Kent discussed these results with patient and plan to review at tumor conference. Tentative plan for repeat ERCP in 1 month for stent removal. GI team signed off on 7/20; they recommended restarting clopidogrel as planned on 7/21, and felt comfortable with discharge to TCU, with repeat hemoglobin check later this week.  - Appreciate GI panc bili following  - Plan to restart PTA clopidogrel 7/21, with repeat CBC later this week   - Transition from IV protonix to PTA PO pantoprazole 40 mg BID    # Fever, isolated x2  # Leukocytosis, resolved  # Blood culture +Staph epidermidis   Febrile x1 7/16 AM (POD2) and again  100.7 on 7/18 PM (POD4). Initially had leukocytosis (18.6) which resolved without intervention. His only focal finding is cough- CXR 7/18 showed no focal opacity. Most likely cause viral URI vs pneumonitis. COVID-19 negative. Did have swallow study 7/17 which was not concerning for aspiration. Blood culture x1 growing gram pos cocci (MRSE), likely contaminated sample - second bottle with no growth to date. Liver function 7/20 within normal limits.   Differential includes post-ERCP pancreatitis, biliary disease (choledocholithiasis successfully treated on recent ERCP), less likely with no abdominal pain and reassuring abdominal exam, as well as normal lipase. Considered febrile nonhemolytic transfusion reaction - although the timing is not characteristic. Low clinical concern for active hemolysis: LDH moderately elevated, indirect bilirubin within normal limits. Also considered cancer-related fever.   - tylenol 650 mg q4H prn  - SLP following, appreciate recs  - Follow blood cultures  - if patient requires second unit of PRBCs, monitor for s/sxs of acute and delayed reaction     # Severe malnutrition in the context of chronic illness  RD evaluated 7/16.   - Consider appetite stimulant and/or scheduled supplements and calorie counts pending PO intake    # History of PLS  Diagnosed 19 years ago, prognosis unclear. At baseline has slurred speech and needs wife's help with dressing and bathing. Able to ambulate with walker short distances, wheelchair for longer distances. Followed by neurology.   - Patient on baclofen pump (if needs MRI, notify [employer] for resetting)  - Baclofen pump refilled 7/20 per PM&R team  - PTA oxybutinin    # History of CVA  Acute CVA in 2018, subacute CVA 11/2020. Currently at neurologic baseline.   -Holding plavix x7 days from ERCP (restart 7/21 as above)     Chronic stable issues:  GERD: Hold PTA PO PPI while on IV PPI  HLD: Not currently on statin (intentionally not prescribed per  EMR)  Essential hypertension: Hold PTA enalapril given hypovolemia/hypotension  Hypokalemia: PTA potassium       Diet: Snacks/Supplements Adult: Other; Any; Between Meals  Regular Diet Adult    DVT Prophylaxis: HOLDING Plavix until 7/21  Wrihgt Catheter: Not present -condom catheter present  Fluids: PO   Central Lines: None  Code Status: Full Code      Disposition Plan   Expected discharge: anticipate medically ready for discharge to TCU 7/20    recommended to transitional care unit once hemoglobin stable, bed available.     The patient's care was discussed with the Attending Physician, Dr. Calderon.    Natalia Rivero, MS4  [facility]   [facility]  Securely message with the [employer] Web Console (learn more here)  Text page via [employer] Paging/Directory    ______________________________________________________________________    Interval History   Overnight events: 1 of 2 blood cultures from 7/19 grew MRSE, likely contaminant. No recurrent fevers.    This morning, Elier continues to have a cough and sensation of phlegm in the chest, which he feels unable to cough up. He denies any chest pain, fever or chills. Reports that his cough improves when he is sitting up. He has been sleeping with the head of the bed slightly elevated.    Physical Exam   Vital Signs: Temp: 97.7  F (36.5  C) Temp src: Oral BP: (!) 142/69 Pulse: 81   Resp: 18 SpO2: 95 % O2 Device: None (Room air)    Weight: 152 lbs 0 oz     Physical Exam  Constitutional:       General: He is not in acute distress.  HENT:      Head: Normocephalic and atraumatic.      Mouth/Throat:      Mouth: Mucous membranes are moist.   Cardiovascular:      Rate and Rhythm: Normal rate and regular rhythm.      Heart sounds: Normal heart sounds.   Pulmonary:      Effort: Pulmonary effort is normal. No respiratory distress.      Breath sounds: No stridor. No wheezing.      Comments: Intermittent, raspy  cough  Musculoskeletal:      Right lower leg: No edema.      Left lower leg: No edema.   Skin:     General: Skin is warm and dry.      Coloration: Skin is not jaundiced.   Neurological:      Mental Status: He is alert and oriented to person, place, and time.      Comments: Garbled speech, at baseline.         Data   Recent Labs   Lab 07/20/21  0612 07/19/21  0637 07/18/21  2205 07/18/21  1502 07/17/21  0927 07/16/21  1407 07/16/21  0921 07/15/21  0600   WBC 10.5 9.5 8.8  --  13.9*  --   --  7.6   HGB 7.9* 7.5* 7.3*  --  7.7*  --   --  6.1*   MCV 97 97 97  --  96  --   --  95    224 202  --  189  --   --  123*     --   --   --   --   --  135 138   POTASSIUM 3.6  --   --  3.8 3.3*   < > 3.4  3.2* 4.2   CHLORIDE 107  --   --   --   --   --  105 107   CO2 25  --   --   --   --   --  26 23   BUN 12  --   --   --   --   --  24 32*   CR 0.70  --   --   --   --   --  0.68 0.70   ANIONGAP 6  --   --   --   --   --  4 8   AMOS 8.2*  --   --   --   --   --  8.8 8.4*   GLC 91  --   --   --   --   --  88 109*   ALBUMIN 2.3*  --   --   --   --   --  2.5* 2.7*   PROTTOTAL 5.0*  --   --   --   --   --  4.9* 4.8*   BILITOTAL 0.8  --   --   --  1.2  --  1.3 1.1   ALKPHOS 105  --   --   --   --   --  102 96   ALT 38  --   --   --   --   --  56 54   AST 39  --   --   --   --   --  133* 126*   LIPASE  --   --   --   --   --   --  28*  --     < > = values in this interval not displayed.     No results found for this or any previous visit (from the past 24 hour(s)).

## 2021-07-20 NOTE — PLAN OF CARE
"BP (!) 142/69 (BP Location: Left arm)   Pulse 81   Temp 97.7  F (36.5  C) (Oral)   Resp 18   Ht 1.778 m (5' 10\")   Wt 68.9 kg (152 lb)   SpO2 95%   BMI 21.81 kg/m    On room air. Alert and oriented x 4. Bilateral shoulder pain is managed with Tylenol. Fair oral intake. Had multiple brown stool. Started on loperamide. Good urine volume per external catheter. Transfers from bed to beside chair with assist of 2 persons, gait belt and walker. Wife is present at the bedside. Plan:Continue care. Discharge to TCU, pending placement.  "

## 2021-07-20 NOTE — PROGRESS NOTES
Care Management Follow Up    Length of Stay (days): 6    Expected Discharge Date: 07/21/2021     Concerns to be Addressed: Discharge Planning      Patient plan of care discussed at interdisciplinary rounds: Yes    Anticipated Discharge Disposition: TCU vs Home     Anticipated Discharge Services: Home Care  Anticipated Discharge DME: N/A     Patient/family educated on Medicare website which has current facility and service quality ratings: N/A   Education Provided on the Discharge Plan: Yes  Patient/Family in Agreement with the Plan: Yes     Referrals Placed by CM/SW:  N/A (Open to [facility] PTA)  Private pay costs discussed: Not applicable    Additional Information:    Writer received update from SWer that patient and spouse were only agreeable for 4 TCU referrals to be placed. At this time, patient has been denied at 3 facilities, with one pending. Per report, patient reported if he is not accepted at the last facility, he would prefer to discharge home with HH PT/OT.     Writer received notification from [facility] liaison, that patient's certification period ended and they would need new orders and a F2F if patient is to discharge home. Patient was receiving PT/OT services prior to admission.     RNCC will await update from SWer from final TCU facility and enter orders as appropriate.    Nicol Sanders RN, BSN, PHN  Care Coordinator   P: [phone], [facility]

## 2021-07-20 NOTE — PROGRESS NOTES
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 Successfully managed with thermal therapy and epinephrine. Hgb and vitals have been stable (hgb increased from 7.3-->7.9 today). Stool is reportedly more brown today rather than black. Primary team considering transferring to TCU in the coming days.    RECOMMENDATIONS:  Diet as tolerated  Monitor H/H and for s/s GI bleeding (if discharging soon please obtain serum hemoglobin later this week)  Continue PPI (BID)  Continue to hold antiplatelet therapy (plan to resume Wed 7/21)  Will follow up with Dr. Kent in 1 month for ERCP (also to be discussed at tumor conference)  Discussed with primary team    The inpatient advanced endoscopy/pancreaticobiliary service will sign off at this time. Please call or repost with questions or if status changes. Thank you for allowing us to participate in the care of this patient.    The patient was discussed and plan agreed upon with GI staff, Dr Arreola.      Lauren Sun PA-C  Advanced Endoscopy/Pancreaticobiliary GI Service  [facility]  Text Page  _______________________________________________________________      Subjective: Nursing notes and 24hr events reviewed. Patient seen and examined at 1000. Reports that he is feeling well today. Still coughing but better when sitting up. No abdominal pain. Reportedly had dark brown stool this morning, about an hour prior to visiting patient.    ROS:   4 pt ROS  "negative unless noted in subjective.     Objective:  Blood pressure (!) 142/69, pulse 81, temperature 97.7  F (36.5  C), temperature source Oral, resp. rate 18, height 1.778 m (5' 10\"), weight 68.9 kg (152 lb), SpO2 95 %.  Gen: Sitting in chair at bedside. Appears comfortable  HEENT: NCAT. Conjunctiva clear. Sclera anicteric   CV: RRR, Peripheral perfusion intact  Resp: normal work of breathing  Abd: Soft, NT, ND, no guarding or rebound, +BS  Msk: no gross deformity  Skin:  no jaundice  Ext: warm, well perfused  Neuro: grossly normal  Mental status/Psych: A&O. Asks/answers questions appropriately       Date 07/20/21 0700 - 07/21/21 0659   Shift 5862-5631 1124-0921 6592-1515 24 Hour Total   INTAKE   P.O. 120   120   Shift Total(mL/kg) 120(1.74)   120(1.74)   OUTPUT   Shift Total(mL/kg)       Weight (kg) 68.95 68.95 68.95 68.95           PROCEDURES:  ERCP 7/14  - Prior ampullectomy with overlying granulation tissues                        and an ulcerated area in close proximity to the                        pancreatic duct orifice, likely site of the recent                        bleeding. Successfully managed with thermal therapy                        (Soft coag with snare tip) and epinephrine.                        - Previously placed transpapillary biliary stent                        migrated out of the bile duct, retrieved with a                        rat-toothed forceps. Previously placed pancreatic stent                        was in a good position and was not manipulated.                        - Choledocholithiasis was found. Complete removal was                        accomplished by balloon extraction.                        - Two 10 Fr plastic stents were placed into the common                        bile duct.     LABS:  [facility]  Recent Labs   Lab 07/20/21  0612 07/18/21  1502 07/17/21  0927 07/16/21  1559 07/16/21  0921 07/15/21  0600 07/14/21  1119     --   --   --  135 138 134   POTASSIUM 3.6 " 3.8 3.3* 3.6 3.4  3.2* 4.2 4.7   CHLORIDE 107  --   --   --  105 107 101   AMOS 8.2*  --   --   --  8.8 8.4* 9.0   CO2 25  --   --   --  26 23 28   BUN 12  --   --   --  24 32* 46*   CR 0.70  --   --   --  0.68 0.70 0.76   GLC 91  --   --   --  88 109* 117*     CBC  Recent Labs   Lab 07/20/21  0612 07/19/21  0637 07/18/21  2205 07/18/21  0630   WBC 10.5 9.5 8.8 10.4   RBC 2.50* 2.31* 2.29* 2.32*   HGB 7.9* 7.5* 7.3* 7.5*   HCT 24.3* 22.5* 22.1* 22.5*   MCV 97 97 97 97   MCH 31.6 32.5 31.9 32.3   MCHC 32.5 33.3 33.0 33.3   RDW 15.2* 15.1* 15.3* 15.0    224 202 176     INRNo lab results found in last 7 days.  LFTs  Recent Labs   Lab 07/20/21  0612 07/17/21  0927 07/16/21  0921 07/15/21  0600 07/14/21  1119   ALKPHOS 105  --  102 96 137   AST 39  --  133* 126* 94*   ALT 38  --  56 54 64   BILITOTAL 0.8 1.2 1.3 1.1 0.9   PROTTOTAL 5.0*  --  4.9* 4.8* 5.4*   ALBUMIN 2.3*  --  2.5* 2.7* 2.6*      PANC  Recent Labs   Lab 07/16/21  0921   LIPASE 28*         IMAGING:  reviewed

## 2021-07-21 ENCOUNTER — PREP FOR PROCEDURE (OUTPATIENT)
Dept: GASTROENTEROLOGY | Facility: CLINIC | Age: 76
End: 2021-07-21

## 2021-07-21 VITALS
WEIGHT: 154.7 LBS | SYSTOLIC BLOOD PRESSURE: 138 MMHG | DIASTOLIC BLOOD PRESSURE: 87 MMHG | HEIGHT: 70 IN | HEART RATE: 79 BPM | RESPIRATION RATE: 18 BRPM | OXYGEN SATURATION: 95 % | BODY MASS INDEX: 22.15 KG/M2 | TEMPERATURE: 96.2 F

## 2021-07-21 DIAGNOSIS — C24.1 CANCER OF AMPULLA OF VATER (H): Primary | ICD-10-CM

## 2021-07-21 LAB
BACTERIA BLD CULT: NO GROWTH
BACTERIA BLD CULT: NO GROWTH
ERYTHROCYTE [DISTWIDTH] IN BLOOD BY AUTOMATED COUNT: 15.2 % (ref 10–15)
HCT VFR BLD AUTO: 23.1 % (ref 40–53)
HGB BLD-MCNC: 7.5 G/DL (ref 13.3–17.7)
HOLD SPECIMEN: NORMAL
MCH RBC QN AUTO: 32.1 PG (ref 26.5–33)
MCHC RBC AUTO-ENTMCNC: 32.5 G/DL (ref 31.5–36.5)
MCV RBC AUTO: 99 FL (ref 78–100)
PLATELET # BLD AUTO: 284 10E3/UL (ref 150–450)
RBC # BLD AUTO: 2.34 10E6/UL (ref 4.4–5.9)
WBC # BLD AUTO: 10.4 10E3/UL (ref 4–11)

## 2021-07-21 PROCEDURE — 36415 COLL VENOUS BLD VENIPUNCTURE: CPT | Performed by: STUDENT IN AN ORGANIZED HEALTH CARE EDUCATION/TRAINING PROGRAM

## 2021-07-21 PROCEDURE — 85027 COMPLETE CBC AUTOMATED: CPT | Performed by: STUDENT IN AN ORGANIZED HEALTH CARE EDUCATION/TRAINING PROGRAM

## 2021-07-21 PROCEDURE — 99239 HOSP IP/OBS DSCHRG MGMT >30: CPT | Mod: GC | Performed by: STUDENT IN AN ORGANIZED HEALTH CARE EDUCATION/TRAINING PROGRAM

## 2021-07-21 PROCEDURE — 250N000013 HC RX MED GY IP 250 OP 250 PS 637: Performed by: STUDENT IN AN ORGANIZED HEALTH CARE EDUCATION/TRAINING PROGRAM

## 2021-07-21 RX ORDER — LOPERAMIDE HCL 2 MG
2 CAPSULE ORAL 4 TIMES DAILY PRN
Qty: 60 CAPSULE | Refills: 0 | Status: SHIPPED | OUTPATIENT
Start: 2021-07-21 | End: 2021-08-10

## 2021-07-21 RX ADMIN — PANTOPRAZOLE SODIUM 40 MG: 40 TABLET, DELAYED RELEASE ORAL at 08:19

## 2021-07-21 RX ADMIN — POTASSIUM CHLORIDE 20 MEQ: 750 TABLET, EXTENDED RELEASE ORAL at 08:19

## 2021-07-21 ASSESSMENT — ACTIVITIES OF DAILY LIVING (ADL)
ADLS_ACUITY_SCORE: 29

## 2021-07-21 ASSESSMENT — MIFFLIN-ST. JEOR: SCORE: 1437.96

## 2021-07-21 NOTE — PLAN OF CARE
Speech Language Therapy Discharge Summary    Reason for therapy discharge:    Discharged to home with home therapy.    Progress towards therapy goal(s). See goals on Care Plan in [facility] electronic health record for goal details.  Goals not met.  Barriers to achieving goals:   discharge from facility.    Therapy recommendation(s):    Continued therapy is recommended.  Rationale/Recommendations:  Continued ST for dysphagia tx and consideration of OP follow for AAC should dysarthria progress.    Recommend pt cautiously continue regular textures and thin liquids. Pt should be fully upright and alert for all PO, self-select softer textures, slow pacing, and alternate between consistencies.

## 2021-07-21 NOTE — PLAN OF CARE
Physical Therapy Discharge Summary    Reason for therapy discharge:    Discharged to home with home therapy.    Progress towards therapy goal(s). See goals on Care Plan in [facility] electronic health record for goal details.  Goals not met.  Barriers to achieving goals:   discharge from facility.    Therapy recommendation(s):    Continued therapy is recommended.  Rationale/Recommendations:  TCU was recommended at discharge, however pt and family elected to discharge to home with home care services. Pt would benefit from additional skilled PT to address functional mobility, transfers, and short distance gait.

## 2021-07-21 NOTE — PLAN OF CARE
Discharge  D: Orders for discharge and outpatient medications written.  I: Home medications and return to clinic schedule reviewed with patient. Discharge instructions and parameters for calling Health Care Provider reviewed. Patient left around 1030 accompanied by spouse.   A: Patient/family verbalized understanding and was ready for discharge.   P: Patient instructed to  medications in Pharmacy. Follow up to be scheduled w/ primary provider & for lab draw.       Both PIVs removed. Discharge paperwork discussed & all questions answered. Pt left w/ all belongings.

## 2021-07-21 NOTE — PROGRESS NOTES
Care Management Discharge Note    Discharge Date: 07/21/2021     Discharge Disposition: Home    Discharge Services: Home Care     Discharge DME: N/A    Discharge Transportation: Car, spouse    Private pay costs discussed: Not applicable    PAS Confirmation Code: N/A  Patient/family educated on Medicare website which has current facility and service quality ratings: N/A     Education Provided on the Discharge Plan: Yes  Persons Notified of Discharge Plans: Patient, bedside RN, SW, [facility] Care  Patient/Family in Agreement with the Plan: Yes     Handoff Referral Completed: Yes    Additional Information:    Per team, patient is stable to discharge today. Per SW, patient was denied from all 4 TCU's that patient was agreeable to be referred to.     Patient wishes to discharge home with resumption of  PT/OT services. Writer updated [facility] liaison.     AVS updated.     Nicol Sanders, RN, BSN, PHN  Care Coordinator   P: [phone], [facility]

## 2021-07-21 NOTE — PLAN OF CARE
VSS. No complaints of pain overnight. No acute events overnight. Plan is to discharge home with wife and home health care today.

## 2021-07-22 ENCOUNTER — OFFICE VISIT (OUTPATIENT)
Dept: INTERNAL MEDICINE | Facility: CLINIC | Age: 76
End: 2021-07-22
Payer: COMMERCIAL

## 2021-07-22 ENCOUNTER — PATIENT OUTREACH (OUTPATIENT)
Dept: CARE COORDINATION | Facility: CLINIC | Age: 76
End: 2021-07-22

## 2021-07-22 VITALS
OXYGEN SATURATION: 96 % | TEMPERATURE: 98.2 F | HEIGHT: 70 IN | RESPIRATION RATE: 18 BRPM | BODY MASS INDEX: 22.05 KG/M2 | DIASTOLIC BLOOD PRESSURE: 73 MMHG | HEART RATE: 85 BPM | SYSTOLIC BLOOD PRESSURE: 113 MMHG | WEIGHT: 154 LBS

## 2021-07-22 DIAGNOSIS — D62 ANEMIA ASSOCIATED WITH ACUTE BLOOD LOSS: ICD-10-CM

## 2021-07-22 DIAGNOSIS — I10 BENIGN ESSENTIAL HYPERTENSION: ICD-10-CM

## 2021-07-22 DIAGNOSIS — K92.2 GASTROINTESTINAL HEMORRHAGE, UNSPECIFIED GASTROINTESTINAL HEMORRHAGE TYPE: Primary | ICD-10-CM

## 2021-07-22 DIAGNOSIS — R60.9 EDEMA, UNSPECIFIED TYPE: ICD-10-CM

## 2021-07-22 LAB
BACTERIA BLD CULT: ABNORMAL
BACTERIA BLD CULT: ABNORMAL
HGB BLD-MCNC: 8.6 G/DL (ref 13.3–17.7)

## 2021-07-22 PROCEDURE — 99495 TRANSJ CARE MGMT MOD F2F 14D: CPT | Performed by: INTERNAL MEDICINE

## 2021-07-22 PROCEDURE — 36415 COLL VENOUS BLD VENIPUNCTURE: CPT | Performed by: INTERNAL MEDICINE

## 2021-07-22 PROCEDURE — 85018 HEMOGLOBIN: CPT | Performed by: INTERNAL MEDICINE

## 2021-07-22 PROCEDURE — 80048 BASIC METABOLIC PNL TOTAL CA: CPT | Performed by: INTERNAL MEDICINE

## 2021-07-22 ASSESSMENT — MIFFLIN-ST. JEOR: SCORE: 1434.79

## 2021-07-22 NOTE — PROGRESS NOTES
Clinic Care Coordination Contact  [facility]/Voicemail       Clinical Data: Care Coordinator Outreach  Outreach attempted x 1.  Left message on patient's voicemail with call back information and requested return call.    Plan:  Care Coordinator will try to reach patient again in 1-2 business days.    LEANDER Jha  Clinic Care Coordination  [facility] : [place], [place], and [place]  Phone: [phone]    ______________________  Next Outreach:  07/23/21

## 2021-07-22 NOTE — PROGRESS NOTES
Assessment & Plan     Gastrointestinal hemorrhage, unspecified gastrointestinal hemorrhage type  Bleeding seems to have stopped and he is currently stable.  He restarted his Plavix yesterday, will check his hemoglobin today, then likely will do standing order to continue to monitor but I will let them know when I see the results from today.  - Hemoglobin  - Hemoglobin; Standing    Anemia associated with acute blood loss  As above  - Hemoglobin; Standing    Benign essential hypertension  Blood pressures have been good, continue to stay off enalapril, will stop the potassium as he probably does not need this.  We will recheck in a few weeks with his next lab.  If his blood pressure is getting higher than 150/90, would restart medication  - Basic metabolic panel  (Ca, Cl, CO2, Creat, Gluc, K, Na, BUN)    Edema, unspecified type  Probably is related to all the IV fluids he received with transfusion in the hospital.  Elevate legs, compression stockings.  Expect it will resolve over the next week.  I would avoid diuretic unless its not improving        Return in about 3 months (around 10/22/2021).    Lida Ray MD  [facility] FATUMA Thapa is a 75 year old who presents for the following health issues  accompanied by his spouse:    hospitals       Hospital Follow-up Visit:    Hospital/Nursing Home/IP Rehab Facility: [facility]  Date of Admission: 07/14/2021  Date of Discharge: 07/21/2021  Reason(s) for Admission: GI Bleed following surgical procedure of the biliary ampulla due to cancer      Was your hospitalization related to COVID-19? No   Problems taking medications regularly:  None  Medication changes since discharge: none  Problems adhering to non-medication therapy:  None    Summary of hospitalization:  [facility] discharge summary reviewed  Diagnostic Tests/Treatments reviewed.  Follow up needed: lab  Other Healthcare  Providers Involved in Patient s Care:         Specialist appointment - GI next mon th  Update since discharge: stable. He reports no acute lightheadedness and no melena.  He has questions regarding medications, enalapril is on hold since his blood pressure was low in the hospital.  It is remaining stable at home but they are wondering about what levels would prompt restarting.  He has been on potassium since he was on diuretic many years ago, that has continued despite good potassium levels and wondering if he needs to continue that.    He has had some increased leg edema that started in the hospital.    Post Discharge Medication Reconciliation: discharge medications reconciled, continue medications without change.  Plan of care communicated with patient and family          Patient Active Problem List   Diagnosis     Essential hypertension, benign     Primary lateral sclerosis (HCC)     Prostate CA (HCC)     Hyperlipidemia LDL goal <100     Cerebral infarction (H)     Advanced directives, counseling/discussion     Muscle spasticity     Edema, unspecified type     Gastroesophageal reflux disease without esophagitis     Cerebral atherosclerosis     Primary osteoarthritis of right shoulder     Cancer of ampulla of Vater (H)     GI bleed     Anemia, unspecified type     COVID-19 ruled out by laboratory testing     Current Outpatient Medications   Medication Sig Dispense Refill     acetaminophen (TYLENOL) 650 MG CR tablet Take 650 mg by mouth every 8 hours as needed        aspirin 81 MG tablet Take 81 mg by mouth every evening        baclofen (LIORESAL) intraTHECAL Internal Pump by Intrathecal route continuous prn Pump filled by [facility] stroke center [phone]  Pump Model: Syncromed  Medication in pump is Gablofen (brand name)  Last fill:  03/02/2021  Next fill:   Before 08/02/2021  Low Lastrup Alarm Date:   Reservoir Volume: 20 ml  Conc: 2000 mcg/mL  Delivers 234.7 mcg/day  Basal  "rate:unknown  Battery life: unknown       clopidogrel (PLAVIX) 75 MG tablet Take 75 mg by mouth every evening        EPINEPHrine 0.3 MG/0.3ML injection Inject 0.3 mLs (0.3 mg) into the muscle as needed for anaphylaxis 0.3 mL 3     fluorouracil (EFUDEX) 5 % external cream Apply topically to legs 1 to 2 times weekly as needed/tolerated for maintenance       ketoconazole (NIZORAL) 2 % external shampoo SHAMPOO EVERY 2-3 DAYS AS  NEEDED 120 mL 10     loperamide (IMODIUM) 2 MG capsule Take 1 capsule (2 mg) by mouth 4 times daily as needed for diarrhea 60 capsule 0     oxybutynin ER (DITROPAN-XL) 10 MG 24 hr tablet Take 2 tablets (20 mg) by mouth At Bedtime 180 tablet 3     pantoprazole (PROTONIX) 40 MG EC tablet Take 1 tablet (40 mg) by mouth 2 times daily 60 tablet 0     polyethylene glycol (MIRALAX/GLYCOLAX) Packet Take 1 packet by mouth daily as needed Every 2-3 days.        Social History     Tobacco Use     Smoking status: Former Smoker     Packs/day: 0.30     Years: 6.00     Pack years: 1.80     Types: Cigarettes     Start date: 1970     Quit date: 10/5/1976     Years since quittin.8     Smokeless tobacco: Never Used   Substance Use Topics     Alcohol use: Yes     Comment: 1 drink/week     Drug use: No        Review of Systems   No fever, chills, cough, shortness of breath, melena, abdominal pain      Objective    /73 (BP Location: Right arm, Patient Position: Sitting, Cuff Size: Adult Regular)   Pulse 85   Temp 98.2  F (36.8  C) (Oral)   Resp 18   Ht 1.778 m (5' 10\")   Wt 69.9 kg (154 lb)   SpO2 96%   BMI 22.10 kg/m    Body mass index is 22.1 kg/m .  Physical Exam     No scleral icterus    3+ edema              "

## 2021-07-23 ENCOUNTER — TELEPHONE (OUTPATIENT)
Dept: INTERNAL MEDICINE | Facility: CLINIC | Age: 76
End: 2021-07-23

## 2021-07-23 ENCOUNTER — PATIENT OUTREACH (OUTPATIENT)
Dept: NURSING | Facility: CLINIC | Age: 76
End: 2021-07-23
Payer: COMMERCIAL

## 2021-07-23 LAB
ANION GAP SERPL CALCULATED.3IONS-SCNC: 6 MMOL/L (ref 3–14)
BACTERIA BLD CULT: NO GROWTH
BUN SERPL-MCNC: 17 MG/DL (ref 7–30)
CALCIUM SERPL-MCNC: 9.4 MG/DL (ref 8.5–10.1)
CHLORIDE BLD-SCNC: 107 MMOL/L (ref 94–109)
CO2 SERPL-SCNC: 26 MMOL/L (ref 20–32)
CREAT SERPL-MCNC: 0.71 MG/DL (ref 0.66–1.25)
GFR SERPL CREATININE-BSD FRML MDRD: >90 ML/MIN/1.73M2
GLUCOSE BLD-MCNC: 94 MG/DL (ref 70–99)
POTASSIUM BLD-SCNC: 4.1 MMOL/L (ref 3.4–5.3)
SODIUM SERPL-SCNC: 139 MMOL/L (ref 133–144)

## 2021-07-23 NOTE — PROGRESS NOTES
Clinic Care Coordination Contact  [facility]: Post-Discharge Note  SITUATION                                                      Admission:    Admission Date: 07/15/21   Reason for Admission: Anemia  Discharge:   Discharge Date: 07/21/21  Discharge Plan: Follow up with PCP within 7 dyas    BACKGROUND                                                      Spoke to Kelli(spouse)  CHW introduced self and intent of call regarding recent discharge.  Kelli states that patient has been doing fine.  Patient will be receiving Home Care services through [facility], PT, Rene, will be out on Monday 07/26 @3PM.  However, Kelli states that they are also needing RN, OT and HHA services.  CHW informed Kelli, that would be something that the Home Care PT can set up additional services with home care during the initial visit.  Kelli acknowledged understanding and will plan to address this with PT on Monday.  However, Kelli was also hoping to have someone to stay with [place] so she can have an hour to herself or leave the home.  In that case, CHW discussed that she's possibly in need of private duty nursing or respite care which is something usually not available through [facility].    ASSESSMENT      Discharge Assessment  How are you doing now that you are home?: Feeling pretty well  How are your symptoms? (Red Flag symptoms escalate to triage hotline per guidelines): Unchanged  Do you feel your condition is stable enough to be safe at home until your provider visit?: Yes  Does the patient have their discharge instructions? : Yes  Does the patient have questions regarding their discharge instructions? : No  Were you started on any new medications or were there changes to any of your previous medications? : Yes - Schedule RNCC appt within 48 business hours  Does the patient have all of their medications?: Yes  Do you have questions regarding any of your medications? : Yes - Connect patient to RN  Do you have all of your needed  medical supplies or equipment (DME)?  (i.e. oxygen tank, CPAP, cane, etc.): Yes (ALS Will be receiving a lift to get into bed and shower chair.)  Discharge follow-up appointment scheduled within 14 calendar days? : Yes         Care Management   Community Health Worker Initial Outreach    CHW Initial Information Gathering:  Referral Source: IP Report  Preferred Hospital: [facility]  [phone]  Current living arrangement:: I live in a private home with spouse  Community Resources: Home Care  Informal Support system:: Spouse  No PCP office visit in Past Year: No  CHW Additional Questions  If ED/Hospital discharge, follow-up appointment scheduled as recommended?: Yes  Medication changes made following ED/Hospital discharge?: Yes, patient to be scheduled with CC RN/SW within approx 1 business day  MyChart active?: Yes    Patient accepts CC: Yes. Patient scheduled for assessment with SENTHIL Avlaos RN,  on 11/26/21 at 11AM. Patient noted desire to discuss CC.     LEANDER Jha  Clinic Care Coordination  [facility] : [facility], and [place]  Phone: [phone]      PLAN                                                      Outpatient Plan:      Future Appointments   Date Time Provider Department Center   7/26/2021 11:00 AM RnJanell [facility] [place]   9/2/2021 10:30 AM  PFL C [facility]   9/2/2021 11:00 AM Gary Tejada MD [facility]         For any urgent concerns, please contact our 24 hour nurse triage line: [phone] ([phone])       LEANDER Jha  Clinic Care Coordination  [facility] : [facility], and [place]  Phone: [phone]                    Home Care  Doing pretty well.  PT will come out on Satuday, had to cancel. Old PT Rene coming in Monday @3. [facility].  RN, OT, Bathing

## 2021-07-23 NOTE — TELEPHONE ENCOUNTER
Caroline with [facility] calling for verbal orders for admission to homecare delay until 7/26/21 per patient's request. Call Caroline at [phone]

## 2021-07-26 ENCOUNTER — PATIENT OUTREACH (OUTPATIENT)
Dept: GASTROENTEROLOGY | Facility: CLINIC | Age: 76
End: 2021-07-26

## 2021-07-26 ENCOUNTER — PATIENT OUTREACH (OUTPATIENT)
Dept: NURSING | Facility: CLINIC | Age: 76
End: 2021-07-26
Attending: STUDENT IN AN ORGANIZED HEALTH CARE EDUCATION/TRAINING PROGRAM
Payer: COMMERCIAL

## 2021-07-26 DIAGNOSIS — R60.0 LOCALIZED EDEMA: Primary | ICD-10-CM

## 2021-07-26 DIAGNOSIS — K92.2 GI BLEED: ICD-10-CM

## 2021-07-26 DIAGNOSIS — Z11.59 ENCOUNTER FOR SCREENING FOR OTHER VIRAL DISEASES: Primary | ICD-10-CM

## 2021-07-26 RX ORDER — FUROSEMIDE 20 MG
20 TABLET ORAL DAILY
Qty: 3 TABLET | Refills: 0 | Status: SHIPPED | OUTPATIENT
Start: 2021-07-26 | End: 2021-07-29

## 2021-07-26 SDOH — ECONOMIC STABILITY: TRANSPORTATION INSECURITY
IN THE PAST 12 MONTHS, HAS LACK OF TRANSPORTATION KEPT YOU FROM MEETINGS, WORK, OR FROM GETTING THINGS NEEDED FOR DAILY LIVING?: NO

## 2021-07-26 SDOH — ECONOMIC STABILITY: TRANSPORTATION INSECURITY
IN THE PAST 12 MONTHS, HAS THE LACK OF TRANSPORTATION KEPT YOU FROM MEDICAL APPOINTMENTS OR FROM GETTING MEDICATIONS?: NO

## 2021-07-26 SDOH — ECONOMIC STABILITY: INCOME INSECURITY: IN THE LAST 12 MONTHS, WAS THERE A TIME WHEN YOU WERE NOT ABLE TO PAY THE MORTGAGE OR RENT ON TIME?: NO

## 2021-07-26 ASSESSMENT — ACTIVITIES OF DAILY LIVING (ADL): DEPENDENT_IADLS:: CLEANING;COOKING;LAUNDRY;SHOPPING;MEAL PREPARATION;MEDICATION MANAGEMENT;TRANSPORTATION

## 2021-07-26 ASSESSMENT — SOCIAL DETERMINANTS OF HEALTH (SDOH): HOW HARD IS IT FOR YOU TO PAY FOR THE VERY BASICS LIKE FOOD, HOUSING, MEDICAL CARE, AND HEATING?: NOT HARD AT ALL

## 2021-07-26 NOTE — PROGRESS NOTES
Clinic Care Coordination Contact    Clinic Care Coordination Contact  OUTREACH    Referral Information:  Referral Source: IP Report    Primary Diagnosis: GI Disorders    Chief Complaint   Patient presents with     Clinic Care Coordination - Initial        [place] Utilization: [facility] 7/14 - 7/21/21 for GI bleed  Acute blood loss anemia  Acute post hemorrhagic anemia  Ampullary adenocarcinoma s/p resection   Fever  Leukocytosis     Clinic Utilization  Difficulty keeping appointments:: No  Compliance Concerns: No  No-Show Concerns: No  No PCP office visit in Past Year: No  Utilization    Hospital Admissions  4             ED Visits  3             No Show Count (past year)  0                Current as of: 7/26/2021 11:19 AM              Clinical Concerns:  Current Medical Concerns:    Patient Active Problem List   Diagnosis     Benign essential hypertension     Primary lateral sclerosis (HCC)     Prostate CA (HCC)     Hyperlipidemia LDL goal <100     Cerebral infarction (H)     Advanced directives, counseling/discussion     Muscle spasticity     Edema, unspecified type     Gastroesophageal reflux disease without esophagitis     Cerebral atherosclerosis     Primary osteoarthritis of right shoulder     Cancer of ampulla of Vater (H)     GI bleed     Anemia, unspecified type         Current Behavioral Concerns: None noted    Education Provided to patient: RN CC role and reason for call   Pain  Pain (GOAL):: No  Health Maintenance Reviewed: Due/Overdue   Health Maintenance Due   Topic Date Due     HEPATITIS C SCREENING  Never done     MICROALBUMIN  08/15/2020       Clinical Pathway: None    Medication Management:  Medication review status: Medications reviewed and no changes reported per patient.        Current Outpatient Medications   Medication Instructions     acetaminophen (TYLENOL) 650 mg, Oral, EVERY 8 HOURS PRN     aspirin (ASA) 81 mg, Oral, EVERY EVENING     baclofen (LIORESAL) intraTHECAL Internal Pump  Intrathecal, CONTINUOUS PRN, Pump filled by [facility] [phone]<BR>Pump Model: Syncromed<BR>Medication in pump is Gablofen (brand name)<BR>Last fill:  03/02/2021<BR>Next fill:   Before 08/02/2021<BR>Low [place] Alarm Date: <BR>Reservoir Volume: 20 ml<BR>Conc: 2000 mcg/mL<BR>Delivers 234.7 mcg/day<BR>Basal rate:unknown<BR>Battery life: unknown     clopidogrel (PLAVIX) 75 mg, Oral, EVERY EVENING     EPINEPHrine (ANY BX GENERIC EQUIV) 0.3 mg, Intramuscular, PRN     fluorouracil (EFUDEX) 5 % external cream Apply topically to legs 1 to 2 times weekly as needed/tolerated for maintenance     ketoconazole (NIZORAL) 2 % external shampoo SHAMPOO EVERY 2-3 DAYS AS  NEEDED     loperamide (IMODIUM) 2 mg, Oral, 4 TIMES DAILY PRN     oxybutynin ER (DITROPAN-XL) 20 mg, Oral, AT BEDTIME     pantoprazole (PROTONIX) 40 mg, Oral, 2 TIMES DAILY     polyethylene glycol (MIRALAX/GLYCOLAX) Packet 1 packet, Oral, DAILY PRN, Every 2-3 days.          Functional Status:  Dependent ADLs:: Ambulation-walker, Wheelchair-with assist, Bathing, Grooming, Toileting  Dependent IADLs:: Cleaning, Cooking, Laundry, Shopping, Meal Preparation, Medication Management, Transportation    Living Situation:  Current living arrangement:: I live in a private home with spouse    Lifestyle & Psychosocial Needs:    Social Determinants of Health     Tobacco Use: Medium Risk     Smoking Tobacco Use: Former Smoker     Smokeless Tobacco Use: Never Used   Alcohol Use: Unknown     Frequency of Alcohol Consumption: 2-4 times a month     Average Number of Drinks: 1 or 2     Frequency of Binge Drinking: Not on file   Financial Resource Strain: Low Risk      Difficulty of Paying Living Expenses: Not hard at all   Food Insecurity:      Worried About Running Out of Food in the Last Year:      Ran Out of Food in the Last Year:    Transportation Needs: No Transportation Needs     Lack of Transportation (Medical): No     Lack of  Transportation (Non-Medical): No   Physical Activity:      Days of Exercise per Week:      Minutes of Exercise per Session:    Stress:      Feeling of Stress :    Social Connections:      Frequency of Communication with Friends and Family:      Frequency of Social Gatherings with Friends and Family:      Attends Yazdanism Services:      Active Member of Clubs or Organizations:      Attends Club or Organization Meetings:      Marital Status:    Intimate Partner Violence:      Fear of Current or Ex-Partner:      Emotionally Abused:      Physically Abused:      Sexually Abused:    Depression: Not at risk     PHQ-2 Score: 0   Housing Stability: Low Risk      Unable to Pay for Housing in the Last Year: No     Number of Places Lived in the Last Year: 1     Unstable Housing in the Last Year: No     Diet:: Regular  Inadequate nutrition (GOAL):: No  Tube Feeding: No  Inadequate activity/exercise (GOAL):: Yes  Significant changes in sleep pattern (GOAL): No  Transportation means:: Family     Informal Support system:: Spouse     RN CC spoke with patient and wife Kelli on speaker phone. Patient states his lower leg edema is not resolving with elevating feet and use of compression stockings. Wife Kelli is concerned and wondering if PCP could prescribe a diuretic. This was discussed during 7/22/21 PCP follow up appointment. Patient states he was on a diuretic in the past. Chart routed with request to PCP.     Discussed home health care at length, numerous questions answered as to services each therapy could provide. Kelli questions the billing process, RN CC advised Kelli to call insurance provider and [facility] Billing office to have those questions answered most accurately. Kelli verbalized understanding. [facility]'s phone number provided to Kelli.     Patient will have PT out to the home today. Kelli will be requesting OT, Speech, Skilled nursing and HHA to assist with bathing.     Discussed care giver burnout and  resources available to patient and Kelli. RN CC will send care giver respite and private duty [facility] resources via the mail and [employer].      Reviewed AVS, medication list, upcoming appointments and HM due.   Resources and Interventions:  Current Resources:   Skilled Home Care Services: Physicial Therapy  Community Resources: Home Care  Supplies used at home:: None  Equipment Currently Used at Home: grab bar, toilet, grab bar, tub/shower, raised toilet seat, shower chair, walker, rolling, wheelchair, manual  Employment Status: disabled                    Goals:   Goals        General     1. Respite Care/Private Duty C (pt-stated)      Notes - Note created  7/26/2021 11:43 AM by Rebecca Hernández RN     Goal Statement: I would like to receive resources for respite Care/Private Duty C companies.   Date Goal set: 7/26/21  Barriers: Multiple health concerns  Strengths: Patient and wife are engaged and motivated  Date to Achieve By: 12/31/21  Patient expressed understanding of goal: Yes  Action steps to achieve this goal:  1. I understand that RN CC will send via i[employer] and the mail Respite Care/Private Duty C resources.   2. I will review resources and utilize as I see fit.   3. I will continue to work with care coordination for any additional resources and support.            2. Improve chronic symptoms (pt-stated)      Notes - Note created  7/26/2021 11:45 AM by Rebecca Hernández RN     Goal Statement: I want my lower leg edema to resolve.   Date Goal set: 7/26/21  Barriers: Multiple health concerns  Strengths: Patient is motivated and engaged  Date to Achieve By: 12/31/21  Patient expressed understanding of goal: Yes  Action steps to achieve this goal:  1. I understand RN CC will message my PCP to request a diuretic medication be prescribed.   2. I will wear my compression stockings daily.   3. I will elevate my feet at rest.   4. I will take all my medications as prescribed.   5.  I will continue to work with  care coordination for [place] additional resources and support.               Patient/Caregiver understanding: Patient reports understanding and denies any additional questions or concerns at this times. RN CC engaged in AIDET communication during encounter.      Outreach Frequency: monthly  Future Appointments              In 1 month [facility] PFL C [facility] Pulmonary Function Testing [facility]    In 1 month Gary Tejada MD [facility] Neurology Clinic [facility]          Plan: RN CC will send care coordination introduction, complex care plan and care giver respite and private duty [facility] resources via the mail and [employer]. CHW will reach out to patient monthly and check on care plan goal status. RN CC will review chart in 6 weeks.     Tonya Hernández RN Care Coordinator  [facility]  Email: [email or web address]  Phone: [phone]         Patient

## 2021-07-26 NOTE — LETTER
[facility] CARE COORDINATION  [address] [facility]  [place] [address]    July 26, 2021    Elier Leong  [address] [address]      Dear Elier,    I am a clinic care coordinator who works with Lida Ray MD at [facility]. I wanted to thank you for spending the time to talk with me.  Below is a description of clinic care coordination and how I can further assist you.      The clinic care coordination team is made up of a registered nurse,  and community health worker who understand the health care system. The goal of clinic care coordination is to help you manage your health and improve access to the health care system in the most efficient manner. The team can assist you in meeting your health care goals by providing education, coordinating services, strengthening the communication among your providers and supporting you with any resource needs.    Please feel free to contact the Community Health Worker Edilberto Dumont at [phone] with any questions or concerns. We are focused on providing you with the highest-quality healthcare experience possible and that all starts with you.     Here are the resources we discussed:    [employer] offers Respite Volunteers and Caregiver Counseling and Coaching    [facility] Respite Volunteers  Everyone needs a break from their responsibilities once in a while. [employer] strives to make that break  happen for family caregivers by offering in-home respite volunteers for up to four hours a week.   Compassionate, well-trained volunteers provide supervision and companionship to the person who is frail or ill,  so that the caregiver can take a break, knowing their family member is safe.            [facility] Caregiver Counseling and Coaching  [facility] provides caregiver counseling and coaching to people caring for an aging loved one. This service allows for informal education and support based on your specific needs.   Through the guidance of a  , get expert direction on resources from planning options and decision-making to having challenging family conversations    You can sign up online at:    https://[email or web address]/caregiving-information/     [employer] offers Caregiver Assurance program    Caregiver support and advisory services  Caring for a loved one can be overwhelming, even for the strongest people. But you don t have to do this alone.  Caregiver Assurance  is a membership program that provides resources over the phone, in-person, and online to aid you in your caregiving journey. We help you maintain your own well-being as you support others in living a fulfilling, independent life.     Speak with an Advisor to learn how Caregiver Assurance can help you:  [phone]  Customer service hours:   Monday - Saturday, 9 a.m. - 7 p.m.    Private Duty Home Care:    -[facility]: P. [phone]  Https://www.SoNetJob.[employer] Supply/    -[facility] [place]: P. [phone]  Https://www.Kanshu.com/    -Home Instead: Ph. (727) 697-5114   https://www.L4 Mobile.VoIP Supply/location/505?redirFrom=/505    -[place]: Ph. [phone]  https://www.Fitfu.com/locations/home-health/dfzwd-ih-5598                Resources that help review community care providers/agencies:    -Care Options Network: Care Options Network, now in a first-time offering, provides the strength of our professional association to you in this fully searchable website. Use this website to search for many senior care professionals ready to serve you.  https://www.careoptionsnetwork.org/    - Direct Support Connect from [facility]: https://directsupportApp Pressnect.com/  -Senior LinkAge Line: The Senior LinkAge Line is a free statewide service of the [place] Board on Aging in partnership with [place]'s area agencies on aging. The Senior LinkAge Line assists older [place] and caregivers, by connecting them to local services, finding answers and getting the help they need.   QUINCY  0-307-127-5709  https://[place]senior-linkage-line/    Other agencies:    -Help at Your Door: Help At Your Door is a nonprofit serving seniors and individuals with disabilities across [place].  Our grocery assistance, home support and transportation services provide help with in-home tasks and chores.  Ph. 021-204-4878  [address], [place] [address]   https://helpatyourdoor.org/      Tonya Hernández RN  Care Coordinator  [employer], and [place]  [email or web address]   Office: [phone]  Gender Pronouns: she/her  Employed by [facility] [employer]         Sincerely,     Tonya Hernández RN Care Coordinator  [facility]  Email: [email or web address]  Phone: [phone]

## 2021-07-26 NOTE — TELEPHONE ENCOUNTER
Call to Caroline and left detailed message.     Verbal order given to approve visits until certification received for MD signature.

## 2021-07-26 NOTE — LETTER
[facility] CARE COORDINATION  [address] [facility] [facility] [address]  [place] [address]    July 26, 2021    Elier Leong  [address] [address]      Dear Elier,    I am a clinic care coordinator who works with Lida Ray MD at [facility]. I wanted to thank you for spending the time to talk with me.  Below is a description of clinic care coordination and how I can further assist you.      The clinic care coordination team is made up of a registered nurse,  and community health worker who understand the health care system. The goal of clinic care coordination is to help you manage your health and improve access to the health care system in the most efficient manner. The team can assist you in meeting your health care goals by providing education, coordinating services, strengthening the communication among your providers and supporting you with any resource needs.    Please feel free to contact the Community Health Worker Edilberto Dumont at [phone] with any questions or concerns. We are focused on providing you with the highest-quality healthcare experience possible and that all starts with you.     Here are the resources we discussed:    [employer] offers Respite Volunteers and Caregiver Counseling and Coaching    [facility] Respite Volunteers  Everyone needs a break from their responsibilities once in a while. [employer] strives to make that break  happen for family caregivers by offering in-home respite volunteers for up to four hours a week.   Compassionate, well-trained volunteers provide supervision and companionship to the person who is frail or ill,  so that the caregiver can take a break, knowing their family member is safe.    [facility] Caregiver Counseling and Coaching  DARTS provides caregiver counseling and coaching to people caring for an aging loved one. This service allows for informal education and support based on your specific needs.   Through the guidance of a social  worker, get expert direction on resources from planning options and decision-making to having challenging family conversations      You can sign up online at:    https://dartsconnects.org/caregiving-information/     [employer] offers Caregiver Assurance program    Caregiver support and advisory services  Caring for a loved one can be overwhelming, even for the strongest people. But you don t have to do this alone.  Caregiver Assurance  is a membership program that provides resources over the phone, in-person, and online to aid you in your caregiving journey. We help you maintain your own well-being as you support others in living a fulfilling, independent life.     Speak with an Advisor to learn how Caregiver Assurance can help you:  [phone]  Customer service hours:   Monday - Saturday, 9 a.m. - 7 p.m.    Private Duty Home Care:    -[facility]: P. [phone]  Https://www.Franchise Fund.Scayl/    -[facility] [place]: P. [phone]  Https://www.BIOCUREX.Scayl/    -Home Instead: Ph. ([phone]   https://www.[employer].Scayl/location/505?redirFrom=/505    -[place]: Ph. [phone]  https://www.PathDrugomics.Scayl/locations/home-health/xkdiv-bb-7727    Resources that help review community care providers/agencies:  -Care Options Network: Care Options Network, now in a first-time offering, provides the strength of our professional association to you in this fully searchable website. Use this website to search for many senior care professionals ready to serve you.  https://www.careoptionsnetwork.org/    - Direct Support Connect from [facility]: https://directsupportStartup Networknect.com/  -Senior LinkAge Line: The Senior LinkAge Line is a free statewide service of the [place] Board on Aging in partnership with [place]'s area agencies on aging. The Senior LinkAge Line assists older [place] and caregivers, by connecting them to local services, finding answers and getting the help they need.   QUINCY  6-114-571-0186  https://[place].gov/senior-linkage-line/              Other agencies:    -Help at Your Door: Help At Your Door is a nonprofit serving seniors and individuals with disabilities across [place].  Our grocery assistance, home support and transportation services provide help with in-home tasks and chores.  Ph. 733-269-7148  [address], [place] [address]   https://helpatyourdoor.org/      Tonya Hernández RN  Care Coordinator  [facility] and [place]  [email or web address]   Office: [phone]  Gender Pronouns: she/her  Employed by [employer]         Sincerely,     Tonya Hernández RN Care Coordinator  [facility]  Email: [email or web address]  Phone: [phone]        Enclosed: I have enclosed a copy of the Complex Care Plan. This has helpful information and goals that we have talked about. Please keep this in an easy to access place to use as needed.

## 2021-07-26 NOTE — PROGRESS NOTES
Called patient to follow up post discharge from hospital    Procedure/Imaging/Clinic: ERCP   Physician: Donte   Timing: ~1 month   Procedure length: 60 min   Anesthesia: Gen   Dx: intraampullary adenocarcinoma   Tier: 3   [place]: [place] or [place]  Discussed date of 8/19 with wife, agreeable to [place] location, close to [place] home    Explained they can expect a call from  for date and time of procedure, will need a , someone to stay with them for 24 hours and should stay in town for 24 hours (within 45 min of Hospital) post procedure    Patient needs to get pre-op physical completed. If outside  health system will need physical faxed to number [phone]   If you do not get a preop physical, your procedure could be cancelled, patient voiced understanding*    Preop Plan: Recent hospital admission, discharged 7/22, full H & P 7/14 , Dr Kent to update    Med Review    Blood thinner -  Plavix, discussed needing to hold  for 5 days prior to the procedure. He should continue his aspirin though.  ASA - yes  Diabetic - none    COVID test discussed: yes, discussed needing to get scheduled no more 4 days in advance    Patient Education r/t procedure: mychart    A pre-op nurse will call 1-2 days prior to the procedure. Is advised to be NPO/no solid food 8 hours before the procedure. Ok to drink clear liquids (Water, Apple Juice or Gatorade) up to 2 hours prior to procedure.     Verbalized understanding of all instructions. All questions answered.      Case request is in, message sent to OR     Nathalie Levine RN, BSN,   [facility]  Care coordinator   [phone]  Fax [phone]

## 2021-07-26 NOTE — LETTER
Critical access hospital  Complex Care Plan  About Me:    Patient Name:  Elier Leong    YOB: 1945  Age:         75 year old   [place] MRN:    [id number] Telephone Information:  Home Phone [phone]   Mobile [phone]       Address:  [address] [facility] [address] Email address:  [email or web address]      Emergency Contact(s)    Name Relationship Lgl Grd Work Phone Home Phone Mobile Phone   1. HERNANDO MELGAR Spouse  NONE [phone]   2. CARLEE MELGAR Daughter  NONE [phone]           Primary language:  English     needed? No   [place] [facility]:  [phone] op. 1  Other communication barriers: Glasses, Language barrier  Preferred Method of Communication:  Mail  Current living arrangement: I live in a private home with spouse  Mobility Status/ Medical Equipment:      Health Maintenance  Health Maintenance Reviewed: Due/Overdue   Health Maintenance Due   Topic Date Due     HEPATITIS C SCREENING  Never done     MICROALBUMIN  08/15/2020         My Access Plan  Medical Emergency 911   Primary Clinic Line [facility] - [phone]   24 Hour Appointment Line [phone] or  [phone]) (toll-free)   24 Hour Nurse Line [phone] (toll-free)   Preferred Urgent Care     Preferred [facility]  [phone]   Preferred Pharmacy [id number] [place], [place] - [address] [facility]     Behavioral Health Crisis Line The National Suicide Prevention Lifeline at 1-637.725.7568 or 911             My Care Team Members  Patient Care Team       Relationship Specialty Notifications Start End    Lida Ray MD PCP - General Internal Medicine  5/16/11     Phone: [phone] Fax: [phone]         [address] [facility] [address]    Lida Ray MD Assigned PCP   9/7/15     Phone: [phone] Fax: [phone]         [address]  [facility] [address]    Yeo, Albert, MD Assigned Musculoskeletal Provider   12/6/20     Phone: [phone] Fax: [phone]         [address] [facility] [address]    Gary Tejada MD Assigned Neuroscience Provider   6/20/21     Phone: [phone] Fax: [phone]         [address] [facility] [address] [facility] [address]    Saqib Moscoso MD Assigned Surgical Provider   7/4/21     Phone: [phone] Fax: [phone]         [address]  [facility] [address]    Rebecca Hernández, RN Lead Care Coordinator  Admissions 7/26/21     Edilberto Dumont Community Health Worker   7/26/21             My Care Plans  Self Management and Treatment Plan  Goals and (Comments)  Goals        General     1. Respite Care/Private Duty [facility] (pt-stated)      Notes - Note created  7/26/2021 11:43 AM by Rebecca Hernández RN     Goal Statement: I would like to receive resources for respite Care/Private Duty [employer].   Date Goal set: 7/26/21  Barriers: Multiple health concerns  Strengths: Patient and wife are engaged and motivated  Date to Achieve By: 12/31/21  Patient expressed understanding of goal: Yes  Action steps to achieve this goal:  1. I understand that RN CC will send via OnBeep and the mail Respite Care/Private Duty [facility] resources.   2. I will review resources and utilize as I see fit.   3. I will continue to work with care coordination for any additional resources and support.            2. Improve chronic symptoms (pt-stated)      Notes - Note created  7/26/2021 11:45 AM by Rebecca Hernández RN     Goal Statement: I want my lower leg edema to resolve.   Date Goal set: 7/26/21  Barriers: Multiple health concerns  Strengths: Patient is motivated and engaged  Date to Achieve By: 12/31/21  Patient expressed understanding of goal: Yes  Action steps to achieve this goal:  1. I understand RN CC will message my PCP to request a diuretic medication be prescribed.   2. I will wear my compression stockings  daily.   3. I will elevate my feet at rest.   4. I will take all my medications as prescribed.   5.  I will continue to work with care coordination for [place] additional resources and support.                Action Plans on File: None on file                       Advance Care Plans/Directives Type: None on file        My Medical and Care Information  Problem List   Patient Active Problem List   Diagnosis     Benign essential hypertension     Primary lateral sclerosis (HCC)     Prostate CA (HCC)     Hyperlipidemia LDL goal <100     Cerebral infarction (H)     Advanced directives, counseling/discussion     Muscle spasticity     Edema, unspecified type     Gastroesophageal reflux disease without esophagitis     Cerebral atherosclerosis     Primary osteoarthritis of right shoulder     Cancer of ampulla of Vater (H)     GI bleed     Anemia, unspecified type      Current Medications and Allergies:  See printed Medication Report.  Current Outpatient Medications   Medication Instructions     acetaminophen (TYLENOL) 650 mg, Oral, EVERY 8 HOURS PRN     aspirin (ASA) 81 mg, Oral, EVERY EVENING     baclofen (LIORESAL) intraTHECAL Internal Pump Intrathecal, CONTINUOUS PRN, Pump filled by [facility] stroke [place] [phone]<BR>Pump Model: Syncromed<BR>Medication in pump is Gablofen (brand name)<BR>Last fill:  03/02/2021<BR>Next fill:   Before 08/02/2021<BR>Low [place] Alarm Date: <BR>Reservoir Volume: 20 ml<BR>Conc: 2000 mcg/mL<BR>Delivers 234.7 mcg/day<BR>Basal rate:unknown<BR>Battery life: unknown     clopidogrel (PLAVIX) 75 mg, Oral, EVERY EVENING     EPINEPHrine (ANY BX GENERIC EQUIV) 0.3 mg, Intramuscular, PRN     fluorouracil (EFUDEX) 5 % external cream Apply topically to legs 1 to 2 times weekly as needed/tolerated for maintenance     ketoconazole (NIZORAL) 2 % external shampoo SHAMPOO EVERY 2-3 DAYS AS  NEEDED     loperamide (IMODIUM) 2 mg, Oral, 4 TIMES DAILY PRN     oxybutynin ER  (DITROPAN-XL) 20 mg, Oral, AT BEDTIME     pantoprazole (PROTONIX) 40 mg, Oral, 2 TIMES DAILY     polyethylene glycol (MIRALAX/GLYCOLAX) Packet 1 packet, Oral, DAILY PRN, Every 2-3 days.         Care Coordination Start Date: 7/26/2021   Frequency of Care Coordination: monthly   Form Last Updated: 07/26/2021

## 2021-07-26 NOTE — TELEPHONE ENCOUNTER
The following was sent as a routing note:    Hi Dr. Ray,     Patient states his lower leg edema is not resolving with elevating feet and use of compression stockings. Wife Kelli is concerned and wondering if you could prescribe a diuretic? This was discussed during 7/22/21 follow up appointment.       Thank you!     Tonya Hernández RN Care Coordinator   [facility]   Email: [email or web address]   Phone: [phone]       I would have him use the diuretic for 3 days.  Prescription sent.

## 2021-07-27 DIAGNOSIS — Z53.9 ERRONEOUS ENCOUNTER--DISREGARD: ICD-10-CM

## 2021-07-27 PROCEDURE — 99207 PR NO CHARGE LOS: CPT | Performed by: INTERNAL MEDICINE

## 2021-07-27 NOTE — TELEPHONE ENCOUNTER
Call to pt and left detailed message that Rx faxed to pharmacy and to call Clinic with questions.

## 2021-07-28 ENCOUNTER — TRANSFERRED RECORDS (OUTPATIENT)
Dept: HEALTH INFORMATION MANAGEMENT | Facility: CLINIC | Age: 76
End: 2021-07-28

## 2021-07-29 ENCOUNTER — PATIENT OUTREACH (OUTPATIENT)
Dept: GASTROENTEROLOGY | Facility: CLINIC | Age: 76
End: 2021-07-29

## 2021-07-29 ENCOUNTER — TELEPHONE (OUTPATIENT)
Dept: INTERNAL MEDICINE | Facility: CLINIC | Age: 76
End: 2021-07-29

## 2021-07-29 DIAGNOSIS — R60.0 LOCALIZED EDEMA: ICD-10-CM

## 2021-07-29 RX ORDER — FUROSEMIDE 20 MG
40 TABLET ORAL DAILY
Qty: 60 TABLET | Refills: 1 | Status: ON HOLD | OUTPATIENT
Start: 2021-07-29 | End: 2021-08-06

## 2021-07-29 NOTE — TELEPHONE ENCOUNTER
Rene calls and both legs are still quite swollen and more than his baseline.  Patient is wearing the compression stockings and started Lasix three days ago and there is no improvement.  Please address and advise either Rene or patient at home telephone number.

## 2021-07-29 NOTE — TELEPHONE ENCOUNTER
Call to patient and spouse. Advised. States they do not have any more Lasix left. Please send new prescription. States patient has not had any increase in his urine either since taking the lasix.    Spouse asking how long he should take an increased dose, when they should expect to see some improvement and what to do if he does not. Please advise regarding ongoing plan.

## 2021-07-29 NOTE — PROGRESS NOTES
Wife called with questions about upcoming procedure with Donte Thapa has a clinic appt with oncology next week, wife wondering if procedure can still be planned if radiation treatment is also planned. Asked her to please address with oncology and update oncology about plan for procedure.  Also reiterated that H & P from admission will suffice for pre op exam.  Wife also inquiring about a MD conference done this week of which she was told there would be discussion of Elier's case. Will message Dr Kent to follow up on this.  No notes in the chart to address this      Nathalie Levine, RN, BSN,   [facility]  Care coordinator

## 2021-07-30 LAB
ANION GAP SERPL CALCULATED.3IONS-SCNC: 6 MMOL/L (ref 3–14)
BASOPHILS # BLD AUTO: 0.1 10E3/UL (ref 0–0.2)
BASOPHILS NFR BLD AUTO: 1 %
BUN SERPL-MCNC: 16 MG/DL (ref 7–30)
CALCIUM SERPL-MCNC: 8.5 MG/DL (ref 8.5–10.1)
CHLORIDE BLD-SCNC: 103 MMOL/L (ref 94–109)
CO2 SERPL-SCNC: 28 MMOL/L (ref 20–32)
CREAT SERPL-MCNC: 0.75 MG/DL (ref 0.66–1.25)
EOSINOPHIL # BLD AUTO: 0.2 10E3/UL (ref 0–0.7)
EOSINOPHIL NFR BLD AUTO: 2 %
ERYTHROCYTE [DISTWIDTH] IN BLOOD BY AUTOMATED COUNT: 14.7 % (ref 10–15)
GFR SERPL CREATININE-BSD FRML MDRD: 89 ML/MIN/1.73M2
GLUCOSE BLD-MCNC: 98 MG/DL (ref 70–99)
HCT VFR BLD AUTO: 31.9 % (ref 40–53)
HGB BLD-MCNC: 10.2 G/DL (ref 13.3–17.7)
HOLD SPECIMEN: NORMAL
IMM GRANULOCYTES # BLD: 0 10E3/UL
IMM GRANULOCYTES NFR BLD: 1 %
INR PPP: 1.06 (ref 0.85–1.15)
LYMPHOCYTES # BLD AUTO: 1.3 10E3/UL (ref 0.8–5.3)
LYMPHOCYTES NFR BLD AUTO: 15 %
MCH RBC QN AUTO: 31.3 PG (ref 26.5–33)
MCHC RBC AUTO-ENTMCNC: 32 G/DL (ref 31.5–36.5)
MCV RBC AUTO: 98 FL (ref 78–100)
MONOCYTES # BLD AUTO: 0.9 10E3/UL (ref 0–1.3)
MONOCYTES NFR BLD AUTO: 10 %
NEUTROPHILS # BLD AUTO: 6.4 10E3/UL (ref 1.6–8.3)
NEUTROPHILS NFR BLD AUTO: 71 %
NRBC # BLD AUTO: 0 10E3/UL
NRBC BLD AUTO-RTO: 0 /100
PLATELET # BLD AUTO: 560 10E3/UL (ref 150–450)
POTASSIUM BLD-SCNC: 3.8 MMOL/L (ref 3.4–5.3)
RBC # BLD AUTO: 3.26 10E6/UL (ref 4.4–5.9)
SODIUM SERPL-SCNC: 137 MMOL/L (ref 133–144)
WBC # BLD AUTO: 8.8 10E3/UL (ref 4–11)

## 2021-07-30 PROCEDURE — 96361 HYDRATE IV INFUSION ADD-ON: CPT | Performed by: EMERGENCY MEDICINE

## 2021-07-30 PROCEDURE — 36415 COLL VENOUS BLD VENIPUNCTURE: CPT | Performed by: EMERGENCY MEDICINE

## 2021-07-30 PROCEDURE — 82040 ASSAY OF SERUM ALBUMIN: CPT | Performed by: PHYSICIAN ASSISTANT

## 2021-07-30 PROCEDURE — 85610 PROTHROMBIN TIME: CPT | Performed by: EMERGENCY MEDICINE

## 2021-07-30 PROCEDURE — 85025 COMPLETE CBC W/AUTO DIFF WBC: CPT | Performed by: EMERGENCY MEDICINE

## 2021-07-30 PROCEDURE — 99285 EMERGENCY DEPT VISIT HI MDM: CPT | Performed by: EMERGENCY MEDICINE

## 2021-07-30 PROCEDURE — 80048 BASIC METABOLIC PNL TOTAL CA: CPT | Performed by: EMERGENCY MEDICINE

## 2021-07-30 PROCEDURE — C9803 HOPD COVID-19 SPEC COLLECT: HCPCS | Performed by: EMERGENCY MEDICINE

## 2021-07-30 PROCEDURE — 96374 THER/PROPH/DIAG INJ IV PUSH: CPT | Performed by: EMERGENCY MEDICINE

## 2021-07-30 PROCEDURE — 99285 EMERGENCY DEPT VISIT HI MDM: CPT | Mod: 25 | Performed by: EMERGENCY MEDICINE

## 2021-07-30 ASSESSMENT — MIFFLIN-ST. JEOR: SCORE: 1439.33

## 2021-07-31 ENCOUNTER — APPOINTMENT (OUTPATIENT)
Dept: CARDIOLOGY | Facility: CLINIC | Age: 76
DRG: 378 | End: 2021-07-31
Payer: COMMERCIAL

## 2021-07-31 ENCOUNTER — APPOINTMENT (OUTPATIENT)
Dept: PHYSICAL THERAPY | Facility: CLINIC | Age: 76
DRG: 378 | End: 2021-07-31
Payer: COMMERCIAL

## 2021-07-31 ENCOUNTER — HOSPITAL ENCOUNTER (INPATIENT)
Facility: CLINIC | Age: 76
LOS: 3 days | Discharge: HOME HEALTH | DRG: 378 | End: 2021-08-03
Attending: EMERGENCY MEDICINE | Admitting: INTERNAL MEDICINE
Payer: COMMERCIAL

## 2021-07-31 DIAGNOSIS — Z79.01 LONG TERM (CURRENT) USE OF ANTICOAGULANTS: ICD-10-CM

## 2021-07-31 DIAGNOSIS — I63.9 CEREBRAL INFARCTION, UNSPECIFIED MECHANISM (H): ICD-10-CM

## 2021-07-31 DIAGNOSIS — K62.5 BRIGHT RED BLOOD PER RECTUM: ICD-10-CM

## 2021-07-31 DIAGNOSIS — C24.1 CANCER OF AMPULLA OF VATER (H): Primary | ICD-10-CM

## 2021-07-31 LAB
ALBUMIN SERPL-MCNC: 2.8 G/DL (ref 3.4–5)
ALP SERPL-CCNC: 106 U/L (ref 40–150)
ALT SERPL W P-5'-P-CCNC: 24 U/L (ref 0–70)
ANION GAP SERPL CALCULATED.3IONS-SCNC: 11 MMOL/L (ref 3–14)
AST SERPL W P-5'-P-CCNC: 29 U/L (ref 0–45)
BASOPHILS # BLD AUTO: 0 10E3/UL (ref 0–0.2)
BASOPHILS NFR BLD AUTO: 1 %
BILIRUB SERPL-MCNC: 0.6 MG/DL (ref 0.2–1.3)
BUN SERPL-MCNC: 15 MG/DL (ref 7–30)
CALCIUM SERPL-MCNC: 8.6 MG/DL (ref 8.5–10.1)
CHLORIDE BLD-SCNC: 102 MMOL/L (ref 94–109)
CO2 SERPL-SCNC: 25 MMOL/L (ref 20–32)
CREAT SERPL-MCNC: 0.78 MG/DL (ref 0.66–1.25)
EOSINOPHIL # BLD AUTO: 0.2 10E3/UL (ref 0–0.7)
EOSINOPHIL NFR BLD AUTO: 3 %
ERYTHROCYTE [DISTWIDTH] IN BLOOD BY AUTOMATED COUNT: 14.6 % (ref 10–15)
GFR SERPL CREATININE-BSD FRML MDRD: 88 ML/MIN/1.73M2
GLUCOSE BLD-MCNC: 95 MG/DL (ref 70–99)
HCT VFR BLD AUTO: 28 % (ref 40–53)
HGB BLD-MCNC: 9 G/DL (ref 13.3–17.7)
IMM GRANULOCYTES # BLD: 0 10E3/UL
IMM GRANULOCYTES NFR BLD: 1 %
INR PPP: 1.08 (ref 0.85–1.15)
LVEF ECHO: NORMAL
LYMPHOCYTES # BLD AUTO: 1.3 10E3/UL (ref 0.8–5.3)
LYMPHOCYTES NFR BLD AUTO: 18 %
MCH RBC QN AUTO: 30.5 PG (ref 26.5–33)
MCHC RBC AUTO-ENTMCNC: 32.1 G/DL (ref 31.5–36.5)
MCV RBC AUTO: 95 FL (ref 78–100)
MONOCYTES # BLD AUTO: 0.9 10E3/UL (ref 0–1.3)
MONOCYTES NFR BLD AUTO: 13 %
NEUTROPHILS # BLD AUTO: 4.7 10E3/UL (ref 1.6–8.3)
NEUTROPHILS NFR BLD AUTO: 64 %
NRBC # BLD AUTO: 0 10E3/UL
NRBC BLD AUTO-RTO: 0 /100
PLATELET # BLD AUTO: 424 10E3/UL (ref 150–450)
POTASSIUM BLD-SCNC: 3.9 MMOL/L (ref 3.4–5.3)
PROT SERPL-MCNC: 6.5 G/DL (ref 6.8–8.8)
RBC # BLD AUTO: 2.95 10E6/UL (ref 4.4–5.9)
SARS-COV-2 RNA RESP QL NAA+PROBE: NEGATIVE
SODIUM SERPL-SCNC: 138 MMOL/L (ref 133–144)
WBC # BLD AUTO: 7.2 10E3/UL (ref 4–11)

## 2021-07-31 PROCEDURE — 250N000011 HC RX IP 250 OP 636: Performed by: PHYSICIAN ASSISTANT

## 2021-07-31 PROCEDURE — 93306 TTE W/DOPPLER COMPLETE: CPT

## 2021-07-31 PROCEDURE — 250N000011 HC RX IP 250 OP 636: Performed by: NURSE PRACTITIONER

## 2021-07-31 PROCEDURE — 93306 TTE W/DOPPLER COMPLETE: CPT | Mod: 26 | Performed by: STUDENT IN AN ORGANIZED HEALTH CARE EDUCATION/TRAINING PROGRAM

## 2021-07-31 PROCEDURE — 99222 1ST HOSP IP/OBS MODERATE 55: CPT | Mod: GC | Performed by: INTERNAL MEDICINE

## 2021-07-31 PROCEDURE — 96374 THER/PROPH/DIAG INJ IV PUSH: CPT | Performed by: EMERGENCY MEDICINE

## 2021-07-31 PROCEDURE — 85610 PROTHROMBIN TIME: CPT | Performed by: PHYSICIAN ASSISTANT

## 2021-07-31 PROCEDURE — 96361 HYDRATE IV INFUSION ADD-ON: CPT | Performed by: EMERGENCY MEDICINE

## 2021-07-31 PROCEDURE — 36415 COLL VENOUS BLD VENIPUNCTURE: CPT | Performed by: PHYSICIAN ASSISTANT

## 2021-07-31 PROCEDURE — G0378 HOSPITAL OBSERVATION PER HR: HCPCS

## 2021-07-31 PROCEDURE — 99232 SBSQ HOSP IP/OBS MODERATE 35: CPT | Performed by: INTERNAL MEDICINE

## 2021-07-31 PROCEDURE — U0003 INFECTIOUS AGENT DETECTION BY NUCLEIC ACID (DNA OR RNA); SEVERE ACUTE RESPIRATORY SYNDROME CORONAVIRUS 2 (SARS-COV-2) (CORONAVIRUS DISEASE [COVID-19]), AMPLIFIED PROBE TECHNIQUE, MAKING USE OF HIGH THROUGHPUT TECHNOLOGIES AS DESCRIBED BY CMS-2020-01-R: HCPCS | Performed by: EMERGENCY MEDICINE

## 2021-07-31 PROCEDURE — 85025 COMPLETE CBC W/AUTO DIFF WBC: CPT | Performed by: PHYSICIAN ASSISTANT

## 2021-07-31 PROCEDURE — 97530 THERAPEUTIC ACTIVITIES: CPT | Mod: GP | Performed by: REHABILITATION PRACTITIONER

## 2021-07-31 PROCEDURE — 96376 TX/PRO/DX INJ SAME DRUG ADON: CPT

## 2021-07-31 PROCEDURE — 258N000003 HC RX IP 258 OP 636: Performed by: PHYSICIAN ASSISTANT

## 2021-07-31 PROCEDURE — 120N000002 HC R&B MED SURG/OB UMMC

## 2021-07-31 PROCEDURE — 97161 PT EVAL LOW COMPLEX 20 MIN: CPT | Mod: GP | Performed by: REHABILITATION PRACTITIONER

## 2021-07-31 PROCEDURE — C9113 INJ PANTOPRAZOLE SODIUM, VIA: HCPCS | Performed by: NURSE PRACTITIONER

## 2021-07-31 PROCEDURE — 250N000013 HC RX MED GY IP 250 OP 250 PS 637: Performed by: NURSE PRACTITIONER

## 2021-07-31 PROCEDURE — 250N000013 HC RX MED GY IP 250 OP 250 PS 637: Performed by: PHYSICIAN ASSISTANT

## 2021-07-31 PROCEDURE — C9113 INJ PANTOPRAZOLE SODIUM, VIA: HCPCS | Performed by: PHYSICIAN ASSISTANT

## 2021-07-31 RX ORDER — ONDANSETRON 4 MG/1
4 TABLET, ORALLY DISINTEGRATING ORAL EVERY 6 HOURS PRN
Status: DISCONTINUED | OUTPATIENT
Start: 2021-07-31 | End: 2021-08-03 | Stop reason: HOSPADM

## 2021-07-31 RX ORDER — SODIUM CHLORIDE 9 MG/ML
INJECTION, SOLUTION INTRAVENOUS CONTINUOUS
Status: DISCONTINUED | OUTPATIENT
Start: 2021-07-31 | End: 2021-07-31

## 2021-07-31 RX ORDER — ACETAMINOPHEN 325 MG/1
650 TABLET ORAL EVERY 8 HOURS PRN
Status: DISCONTINUED | OUTPATIENT
Start: 2021-07-31 | End: 2021-08-03 | Stop reason: HOSPADM

## 2021-07-31 RX ORDER — FUROSEMIDE 40 MG
40 TABLET ORAL DAILY
Status: DISCONTINUED | OUTPATIENT
Start: 2021-07-31 | End: 2021-08-01

## 2021-07-31 RX ORDER — ONDANSETRON 2 MG/ML
4 INJECTION INTRAMUSCULAR; INTRAVENOUS EVERY 6 HOURS PRN
Status: DISCONTINUED | OUTPATIENT
Start: 2021-07-31 | End: 2021-08-03 | Stop reason: HOSPADM

## 2021-07-31 RX ORDER — OXYBUTYNIN CHLORIDE 5 MG/1
20 TABLET, EXTENDED RELEASE ORAL AT BEDTIME
Status: DISCONTINUED | OUTPATIENT
Start: 2021-07-31 | End: 2021-08-03 | Stop reason: HOSPADM

## 2021-07-31 RX ADMIN — OXYBUTYNIN CHLORIDE 20 MG: 5 TABLET, EXTENDED RELEASE ORAL at 21:12

## 2021-07-31 RX ADMIN — SODIUM CHLORIDE: 9 INJECTION, SOLUTION INTRAVENOUS at 12:57

## 2021-07-31 RX ADMIN — PANTOPRAZOLE SODIUM 40 MG: 40 INJECTION, POWDER, FOR SOLUTION INTRAVENOUS at 20:55

## 2021-07-31 RX ADMIN — PANTOPRAZOLE SODIUM 40 MG: 40 INJECTION, POWDER, FOR SOLUTION INTRAVENOUS at 08:28

## 2021-07-31 RX ADMIN — FUROSEMIDE 40 MG: 20 TABLET ORAL at 08:29

## 2021-07-31 ASSESSMENT — MIFFLIN-ST. JEOR: SCORE: 1399.25

## 2021-07-31 NOTE — PROGRESS NOTES
"[facility]    Medicine Progress Note - Hospitalist Service, Gold Night       Date of Admission:  7/31/2021    Assessment & Plan         Elier Leong is a 75 year old male admitted on 7/31/2021. 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 GI was consulted and recommended colonoscopy Monday. Now being transferred to medicine for inpatient care.     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 LE doppler US on 6/16/21 was negative for DVT. Echo on 7/31/21 unchanged from 11/20 with EF 55-60% though a technically difficult study.     -continue I/O monitoring    -Continue PTA Lasix 40mg daily    #History PLS Diagnosed 19 years ago. At baseline w/ slurred speech. Able to ambulate with walker short distances, wheelchair for longer distances. Followed by neurology.  Has intrathecal baclofen pump (if needs MRI, notify [employer] for resetting)  Since 2010, last filled here on 7/20/21 here. Followed by Dr. Calvert at [facility]. Per PMR Note on 7/20/21 procedure note:  \"Intrathecal baclofen 2000 mcg/mL concentration  SynchroMed II 20 mL pump with a simple continuous infusion mode.   His dose is 234.7 mcg per day and has not been changed recently\"    - continue PTA Oxybutinin     # History of CVA: Acute " CVA in 2018. Subacute CVA 11/2020. Currently at neurologic baseline.   -Hold Plavix and ASA 2/2 GIB     Diet: Clear Liquid Diet    DVT Prophylaxis: VTE Prophylaxis contraindicated due to bleeding  Wright Catheter: Not present  Central Lines: None  Code Status: Full Code      Disposition Plan   Expected discharge: 08/03/2021 recommended to prior living arrangement once hemoglobin stable and diagnostic testing complete.     The patient's care was discussed with the Attending Physician, Dr. Mortensen, Bedside Nurse, Patient, and patient's wife.    CITLALY Valdez [facility]  Hospitalist Service, [facility]  Securely message with the Vocera Web Console (learn more here)  Text page via [facility] Paging/Directory  Please see sign in/sign out for up to date coverage information  ______________________________________________________________________    Interval History   No acute events overnight since presentation to ED. Elier denies any shortness of breath, chest pain,     Data reviewed today: I reviewed all medications, new labs and imaging results over the last 24 hours. I personally reviewed no images or EKG's today.    Physical Exam   Vital Signs: Temp: 97.7  F (36.5  C) Temp src: Oral BP: 115/83 Pulse: 71   Resp: 18 SpO2: 95 % O2 Device: None (Room air)    Weight: 155 lbs 0 oz  General Appearance: No acute distress, appears comfortable, resting on ED cart  Respiratory: respiratory rate regular. LS clear all fields  Cardiovascular: regular rate, rhythm. S1, s2   GI: abdomen non-tender to palpation. BS active  Skin: warm, dry    Data   Recent Labs   Lab 07/31/21  0748 07/30/21  2203   WBC 7.2 8.8   HGB 9.0* 10.2*   MCV 95 98    560*   INR 1.08 1.06   NA  --  138  137   POTASSIUM  --  3.9  3.8   CHLORIDE  --  102  103   CO2  --  25  28   BUN  --  15  16   CR  --  0.78  0.75   ANIONGAP  --  11  6   AMOS  --  8.6  8.5   GLC  --  95  98   ALBUMIN  --   2.8*   PROTTOTAL  --  6.5*   BILITOTAL  --  0.6   ALKPHOS  --  106   ALT  --  24   AST  --  29

## 2021-07-31 NOTE — ED TRIAGE NOTES
"Patient presents to triage in wheelchair with wife. Patient had tumor removed on 7/12/2021, 7/14/2021 had rectal bleeding (tarry stools) and was admitted x7 days. Was off plavix 5 days before surgery. Took plavix again 7/12. Stopped plavix while inpatient, restarted one week ago Thursday. Patient reports bright red blood from rectum that started today, saw in toilet bowl after BM but caregiver denies blood on toilet paper when wiping him. Patient denies hemorrhoids. Denies feeling lightheaded or dizzy. No chest pain or shortness of breath, denies abdominal pain.     Last dose of plavix was 7/29 evening. Takes plavix due to hx of 3 \"mini strokes\"    Patient has slurred and slow speech at baseline. ABCs intact, alert and oriented.   "

## 2021-07-31 NOTE — PROGRESS NOTES
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     Paged by patient's wife regarding new episode of small volume hematochezia today at around 5 or 6pm. Patient fatigued (though potentially consistent w/ patient's known PLS). No new dizziness, chest pain, shortness of breath. Pt's wife states that they recently increased lasix dose for leg swelling.     Plan:  --Report to ED for check of vital signs and labs including CBC, CMP     Discussed briefly w/ Dr. Donte Cordoba MD  Gastroenterology Fellow  Pager [phone]

## 2021-07-31 NOTE — PROGRESS NOTES
Observation Unit Transfer Summary     Patient ID:  Elier Leong  MRN: [id number]  75 year old  YOB: 1945    Observation Admit Date: 7/31/2021    ED Admitting Attending: BREEZY Bartlett    Transfer Date and Time: July 31, 2021 at 3:01 PM     Transferring Observation Provider: CITLALY Middleton CNP    Admission Diagnoses:     1. Bright red blood per rectum        Transfer Diagnoses:    Bright red blood per rectum     Emergency Department and Observation Course:      Elier Leong is a 75 year old male admitted on 7/31/2021. 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      ##. GIB  ##. 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 Post ampullectomy bleeding (7/14/21) due to plavix   2 episodes of bright red blood per rectum tonight. Reports ongoing generalized weakness, decreased appetite, decreased po intake, weight loss. No nausea, vomiting, abdominal pain,hematemesis, melena, lightheadedness, syncope, chest pain, shortness of breath.  No associated abdominal pain. Had ERCP with papillectomy on 7/12/21 for ampullary adenocarcinoma and inadvertently restarted Plavix early and then developed several episodes of hematochezia and melena 7/14. Hgb 8.1 on 7/14 on admission, down from 15.2 on 7/12. Underwent EGD/ERCP 7/14 with evidence of ulceration near pancreatic duct, which was thought to be the site of bleeding, successfully treated with thermal therapy and epinephrine. Hgb 6.1 on 7/15, required 1 unit pRBCs with stable Hgb in 7-8 range thereafter. Patient's Plavix held for 7 days after ERCP until day of discharge on 7/21. TCU recommended but none of family preferred TCUs had bed availability, so patient was  "discharged home. Repeat ERCP in 1 month (scheduled 8/19). They report last colonoscopy was about 6/7 years ago. In ED, HR 70-85, -152/, RR 18, SaO2 % on RA, Temp 98.9. Labs show normal BMP with BUN 16, creatinine 0.75 (baseline).  H/H 10.2/31.9, RBC 3.26, platelets 560 otherwise normal.  Most recent hemoglobin 8.6 on 7/22/2021, 7.5 on 7/21/2021, 7.9 on 7/20/2021.  Normal INR.  Covid 19 PCR negative. Deny any more bleeding in ED.   Today, H/H 9.0/28.0. GI was consulted with plan for colonoscopy followed by duodenoscopy to evaluate the ampullectomy site. GI recommendations included to start colonoscopy prep with GoLytely tomorrow (8/1) with needing a total of 6 L prep. Reporting this could be done with 4 L on 8/1 and 2 L in the morning on 8/2. Clear liquid diet ordered until start of prep. Patient requesting this to be done through an NG tube. Held Plavix and ASA.   -Protonix 40mg IV BID   -Avoid NSAIDs  -2 large bore IV's  -PT consult 2/2 generalized weakness     ##. BLE: started on lasix and recently increased to 40 mg daily but no appreciable changes noted. LE doppler US on 6/16/21 was negative for DVT. Echo on 11/16/20 with EF 55-60% though a technically difficult study. Denies SOB, orthopnea, facial edema. Echo today showed EF 55-60%  -Add BNP to labs  -Strict I/O  -Daily weights  -Continue PTA Lasix 40mg daily     ##. Choledocholithiasis: Discovered during ERCP 7/14. Stone removed and stents placed in CBD.   - Monitor LFTs     ##. History of PLS: Diagnosed 19 years ago. At baseline w/ slurred speech. Able to ambulate with walker short distances, wheelchair for longer distances. Per wife in WC mostly. Followed by neurology.  Has intrathecal baclofen pump (if needs MRI, notify [employer] for resetting)  Since 2010, last filled here on 7/20/21 here. Follow[facility] by Dr. Calvert at [facility]. Per PMR Note on 7/20/21 procedure note:  \"Intrathecal baclofen 2000 mcg/mL concentration  SynchroMed II 20 mL " "pump with a simple continuous infusion mode.   His dose is 234.7 mcg per day and has not been changed recently\"  - PTA Oxybutinin     # History of CVA: Acute CVA in 2018. Subacute CVA 11/2020. Currently at neurologic baseline.   -Hold Plavix and ASA 2/2 GIB       At this time the patient has failed observation management due to plan for colonoscopy followed by duodenoscopy related to GI bleed (scheduled for Monday) and will be transferred to inpatient status due to patient needing to stay until Monday.    Consults: GI    DATA:    Transfer Exam:    /83   Pulse 71   Temp 97.7  F (36.5  C) (Oral)   Resp 18   Ht 1.778 m (5' 10\")   Wt 70.3 kg (155 lb)   SpO2 95%   BMI 22.24 kg/m    GENERAL APPEARANCE:Cooperative, pleasant, no apparent distress  RESP: lungs clear to auscultation - no rales, rhonchi or wheezes  CV: regular rates and rhythm, normal S1 S2, with no murmur, click or rub. Trace edema  ABDOMEN: soft, nontender, and bowel sounds normal  RECTAL: LACI deferred, no bleeding noted in brief.   SKIN: Skin warm and dry  NEURO: Alert, moves all extremities, no asterixis.  Mild speech and movement deficits from lateral sclerosis.  PSYCH: mentation appears normal     Current Medications:    Current Outpatient Medications   Medication Sig Dispense Refill     acetaminophen (TYLENOL) 650 MG CR tablet Take 650 mg by mouth every 8 hours as needed        aspirin 81 MG tablet Take 81 mg by mouth every evening        baclofen (LIORESAL) intraTHECAL Internal Pump by Intrathecal route continuous prn Pump filled by [facility] stroke [place] [phone]  Pump Model: Syncromed  Medication in pump is Gablofen (brand name)  Last fill:  03/02/2021  Next fill:   Before 08/02/2021  Low [place] Alarm Date:   Reservoir Volume: 20 ml  Conc: 2000 mcg/mL  Delivers 234.7 mcg/day  Basal rate:unknown  Battery life: unknown       clopidogrel (PLAVIX) 75 MG tablet Take 75 mg by mouth every evening        " EPINEPHrine 0.3 MG/0.3ML injection Inject 0.3 mLs (0.3 mg) into the muscle as needed for anaphylaxis 0.3 mL 3     fluorouracil (EFUDEX) 5 % external cream Apply topically to legs 1 to 2 times weekly as needed/tolerated for maintenance       furosemide (LASIX) 20 MG tablet Take 2 tablets (40 mg) by mouth daily 60 tablet 1     ketoconazole (NIZORAL) 2 % external shampoo SHAMPOO EVERY 2-3 DAYS AS  NEEDED 120 mL 10     loperamide (IMODIUM) 2 MG capsule Take 1 capsule (2 mg) by mouth 4 times daily as needed for diarrhea 60 capsule 0     oxybutynin ER (DITROPAN-XL) 10 MG 24 hr tablet Take 2 tablets (20 mg) by mouth At Bedtime 180 tablet 3     pantoprazole (PROTONIX) 40 MG EC tablet Take 1 tablet (40 mg) by mouth 2 times daily 60 tablet 0     polyethylene glycol (MIRALAX/GLYCOLAX) Packet Take 1 packet by mouth daily as needed Every 2-3 days.         Medications Prior to Admission:    (Not in a hospital admission)      Significant Diagnostic Studies:     Results for orders placed or performed during the hospital encounter of 07/31/21   INR     Status: Normal   Result Value Ref Range    INR 1.06 0.85 - 1.15   Basic metabolic panel     Status: Normal   Result Value Ref Range    Sodium 137 133 - 144 mmol/L    Potassium 3.8 3.4 - 5.3 mmol/L    Chloride 103 94 - 109 mmol/L    Carbon Dioxide (CO2) 28 20 - 32 mmol/L    Anion Gap 6 3 - 14 mmol/L    Urea Nitrogen 16 7 - 30 mg/dL    Creatinine 0.75 0.66 - 1.25 mg/dL    Calcium 8.5 8.5 - 10.1 mg/dL    Glucose 98 70 - 99 mg/dL    GFR Estimate 89 >60 mL/min/1.73m2   CBC with platelets and differential     Status: Abnormal   Result Value Ref Range    WBC Count 8.8 4.0 - 11.0 10e3/uL    RBC Count 3.26 (L) 4.40 - 5.90 10e6/uL    Hemoglobin 10.2 (L) 13.3 - 17.7 g/dL    Hematocrit 31.9 (L) 40.0 - 53.0 %    MCV 98 78 - 100 fL    MCH 31.3 26.5 - 33.0 pg    MCHC 32.0 31.5 - 36.5 g/dL    RDW 14.7 10.0 - 15.0 %    Platelet Count 560 (H) 150 - 450 10e3/uL    % Neutrophils 71 %    % Lymphocytes 15  %    % Monocytes 10 %    % Eosinophils 2 %    % Basophils 1 %    % Immature Granulocytes 1 %    NRBCs per 100 WBC 0 <1 /100    Absolute Neutrophils 6.4 1.6 - 8.3 10e3/uL    Absolute Lymphocytes 1.3 0.8 - 5.3 10e3/uL    Absolute Monocytes 0.9 0.0 - 1.3 10e3/uL    Absolute Eosinophils 0.2 0.0 - 0.7 10e3/uL    Absolute Basophils 0.1 0.0 - 0.2 10e3/uL    Absolute Immature Granulocytes 0.0 <=0.0 10e3/uL    Absolute NRBCs 0.0 10e3/uL   Extra Red Top Tube     Status: None   Result Value Ref Range    Hold Specimen [facility]    SARS-COV2 (COVID-19) Virus RT-PCR     Status: Normal    Specimen: Nasopharyngeal; Swab   Result Value Ref Range    SARS CoV2 PCR Negative Negative    Narrative    Testing was performed using the Xpert Xpress SARS-CoV-2 Assay on the  Cepheid Gene-Xpert Instrument Systems. Additional information about  this Emergency Use Authorization (EUA) assay can be found via the Lab  Guide. This test should be ordered for the detection of SARS-CoV-2 in  individuals who meet SARS-CoV-2 clinical and/or epidemiological  criteria. Test performance is unknown in asymptomatic patients. This  test is for in vitro diagnostic use under the FDA EUA for  laboratories certified under CLIA to perform high complexity testing.  This test has not been FDA cleared or approved. A negative result  does not rule out the presence of PCR inhibitors in the specimen or  target RNA in concentration below the limit of detection for the  assay. The possibility of a false negative should be considered if  the patient's recent exposure or clinical presentation suggests  COVID-19. This test was validated by the [facility]. This laboratory is certified under  the Clinical Laboratory Improvement Amendments of 1988 (CLIA-88) as  qualified to perform high complexity laboratory testing.     Comprehensive metabolic panel     Status: Abnormal   Result Value Ref Range    Sodium 138 133 - 144 mmol/L     Potassium 3.9 3.4 - 5.3 mmol/L    Chloride 102 94 - 109 mmol/L    Carbon Dioxide (CO2) 25 20 - 32 mmol/L    Anion Gap 11 3 - 14 mmol/L    Urea Nitrogen 15 7 - 30 mg/dL    Creatinine 0.78 0.66 - 1.25 mg/dL    Calcium 8.6 8.5 - 10.1 mg/dL    Glucose 95 70 - 99 mg/dL    Alkaline Phosphatase 106 40 - 150 U/L    AST 29 0 - 45 U/L    ALT 24 0 - 70 U/L    Protein Total 6.5 (L) 6.8 - 8.8 g/dL    Albumin 2.8 (L) 3.4 - 5.0 g/dL    Bilirubin Total 0.6 0.2 - 1.3 mg/dL    GFR Estimate 88 >60 mL/min/1.73m2   INR     Status: Normal   Result Value Ref Range    INR 1.08 0.85 - 1.15   CBC with platelets and differential     Status: Abnormal   Result Value Ref Range    WBC Count 7.2 4.0 - 11.0 10e3/uL    RBC Count 2.95 (L) 4.40 - 5.90 10e6/uL    Hemoglobin 9.0 (L) 13.3 - 17.7 g/dL    Hematocrit 28.0 (L) 40.0 - 53.0 %    MCV 95 78 - 100 fL    MCH 30.5 26.5 - 33.0 pg    MCHC 32.1 31.5 - 36.5 g/dL    RDW 14.6 10.0 - 15.0 %    Platelet Count 424 150 - 450 10e3/uL    % Neutrophils 64 %    % Lymphocytes 18 %    % Monocytes 13 %    % Eosinophils 3 %    % Basophils 1 %    % Immature Granulocytes 1 %    NRBCs per 100 WBC 0 <1 /100    Absolute Neutrophils 4.7 1.6 - 8.3 10e3/uL    Absolute Lymphocytes 1.3 0.8 - 5.3 10e3/uL    Absolute Monocytes 0.9 0.0 - 1.3 10e3/uL    Absolute Eosinophils 0.2 0.0 - 0.7 10e3/uL    Absolute Basophils 0.0 0.0 - 0.2 10e3/uL    Absolute Immature Granulocytes 0.0 <=0.0 10e3/uL    Absolute NRBCs 0.0 10e3/uL   GI LUMINAL ADULT IP CONSULT: Patient to be seen: Routine within 24 hrs; Call back #: [id number]; h/o PLS, prostate cancer s/p prostatectomy, newly diagnosed ampullary adenocarcinoma s/p papillectomy 7/12, GIB 7/14 presents again with GIB x 1 day . plea...     Status: None ()    Ricardo Rosales MD     7/31/2021  2:58 PM    GASTROENTEROLOGY CONSULTATION      Luminal consult    Consult requested by Domingo Cesar*    Reason for consult: BRBPR    History is obtained from the patient    Date  of Admission:  7/31/2021         History of Present Illness:   Elier Leong is a 75 year old male with a history of primary   lateral sclerosis and ampullary adenocarcinoma s/p ampullectomy   7/12/2021.  GI consulted for BRBPR.    Recent history notable for ampullary adenocarcinoma for which he   underwent ampullectomy on 7/12/2021 with Dr. Kent.  His plavix   was restarted early and he developed bleeding, requiring soft   coag and injection with Dr. Arreola 7/14/2021.  Pathology   from 7/12 returned positive for adenocarcinoma with positive deep   margin.    He then did well, and his plavix was held 5-7, resumed without   issue.  He did well for the last week or two.  Two days ago,   developed sudden BRBPR with bowel movements which came out on its   own and in the toilet bowl.    He has not been told he has hemorrhoids before.  Last colonoscopy   was many years ago, no records of this, he did not have repeat   screenings due to life expectancy and difficulty with prep with   his PLS.    Hgb 10.2 from recent 7.5/8.6.    His prior episode of bleeding from the ampullectomy, he had overt   melena which was foul smelling.  He and his wife report this   recent bleeding is very different from that, and only bright red   blood per rectum.         Social History:   Lives with his wife         Family History:   Denies relevant family history of gastrointestinal disease          Past Medical, Surgical and Procedural History (Summarized):   Pertinent Medical History:  Primary lateral sclerosis  Ampullary adenocarcinoma         Medications:     Current Facility-Administered Medications   Medication     acetaminophen (TYLENOL) tablet 650 mg     baclofen (LIORESAL) intraTHECAL Internal Pump     furosemide (LASIX) tablet 40 mg     melatonin tablet 1 mg     ondansetron (ZOFRAN-ODT) ODT tab 4 mg    Or     ondansetron (ZOFRAN) injection 4 mg     oxybutynin ER (DITROPAN-XL) 24 hr tablet 20 mg     pantoprazole (PROTONIX) IV  "push injection 40 mg     sodium chloride 0.9% infusion     Current Outpatient Medications   Medication Sig     acetaminophen (TYLENOL) 650 MG CR tablet Take 650 mg by mouth   every 8 hours as needed      aspirin 81 MG tablet Take 81 mg by mouth every evening      baclofen (LIORESAL) intraTHECAL Internal Pump by Intrathecal   route continuous prn Pump filled by [facility] [phone]  Pump Model: Syncromed  Medication in pump is Gablofen (brand name)  Last fill:  03/02/2021  Next fill:   Before 08/02/2021  Low [place] Alarm Date:   Reservoir Volume: 20 ml  Conc: 2000 mcg/mL  Delivers 234.7 mcg/day  Basal rate:unknown  Battery life: unknown     clopidogrel (PLAVIX) 75 MG tablet Take 75 mg by mouth every   evening      EPINEPHrine 0.3 MG/0.3ML injection Inject 0.3 mLs (0.3 mg) into   the muscle as needed for anaphylaxis     fluorouracil (EFUDEX) 5 % external cream Apply topically to   legs 1 to 2 times weekly as needed/tolerated for maintenance     furosemide (LASIX) 20 MG tablet Take 2 tablets (40 mg) by mouth   daily     ketoconazole (NIZORAL) 2 % external shampoo SHAMPOO EVERY 2-3   DAYS AS  NEEDED     loperamide (IMODIUM) 2 MG capsule Take 1 capsule (2 mg) by   mouth 4 times daily as needed for diarrhea     oxybutynin ER (DITROPAN-XL) 10 MG 24 hr tablet Take 2 tablets   (20 mg) by mouth At Bedtime     pantoprazole (PROTONIX) 40 MG EC tablet Take 1 tablet (40 mg)   by mouth 2 times daily     polyethylene glycol (MIRALAX/GLYCOLAX) Packet Take 1 packet by   mouth daily as needed Every 2-3 days.            Review of Systems:   A complete 10 point review of systems was obtained.  Please see   the HPI for pertinent positives and negatives.  All other systems   were reviewed and were found to be negative.          Physical Exam:   /83   Pulse 71   Temp 97.7  F (36.5  C) (Oral)   Resp 18     Ht 1.778 m (5' 10\")   Wt 70.3 kg (155 lb)   SpO2 95%   BMI   22.24 " "kg/m    Wt:   Wt Readings from Last 2 Encounters:   07/30/21 70.3 kg (155 lb)   07/22/21 69.9 kg (154 lb)      Constitutional: Cooperative, pleasant, no apparent distress.  HEENT: No conjunctival icterus, moist mucus membranes.  CV:  No edema.  Respiratory: Unlabored breathing  Abdomen:    Non-distended   Non-tender  LACI: No blood, no hemorrhoids, no stool in rectal vault.  Normal   LACI  Skin: No jaundice, warm  Neuro: Alert, moves all extremities, no asterixis.  Mild speech   and movement deficits from lateral sclerosis.  Psych: Normal affect, answers questions appropriately.          Data (Summarized):   Notable labs:  Hgb 10.2 (recent 8.6, 8 days ago)    Cr 0.78  INR 1.08           ASSESSMENT AND PLAN:   Elier Leong is a 75 year old male with a history of primary   lateral sclerosis and ampullary adenocarcinoma s/p ampullectomy   7/12/2021.  GI consulted for BRBPR.    Presents with 2 days of BRBPR in the context of plavix use and   recent ampullectomy for ampullary adenocarcinoma (with positive   deep margin).  Unclear when his last colonoscopy was.     Likely, this is rectal outlet bleeding and potentially   hemorrhoidal or alternate cause.  This is different from the   melena he previously experienced with the ampullectomy bleed and   we would not expect ongoing ampullectomy bleeding this far out.    We will plan for colonoscopy followed by duodenoscopy to evaluate   the ampullectomy site.  His prep will be challenging and will   likely need 2 days of prep.  This can all be done under moderate   sedation.  If there is something actively bleeding near the   ampulla, he will likely need a 2nd procedure in the OR with our   advance endoscopists for therapy.    #1 Bright red blood per rectum  #2 Acute on chronic anemia due to blood loss  #3 History of plavix use (patient reports due to \"ministrokes\"   likely TIAs)  #4 Ampullary carcinoma s/p ampullectomy with pathology showing   positive deep margin  #5 " Primary lateral sclerosis    Recommendations:  -- Please start colonoscopy prep with GoLytely.  He will need a   total of 6 L prep.  This could be done with 4 L on  and 2 L in   the morning on .    -- He requests an NG to do the prep and does not feel he can   drink this much on his own which is reasonable  -- Please hold the plavix for now  -- After discharge he should discuss risk/benefits of plavix with   his primary neurologist in light of the bleeding issues he has   had  -- Transfuse for hemoglobin <7 and monitor daily    The case was staffed with attending, Dr. Jung.  Luminal will   continue to follow.    Ricardo Clement MD  Gastroenterology Fellow, PGY-5     Echocardiogram Complete     Status: None   Result Value Ref Range    LVEF  55-60%     Narrative    [id number]  [id number]  [id number]  [id number]^MALIKA^KAROLINE^ADARSH     [facility]  Echocardiography Laboratory  [address] [place] [id number]     Name: ANIL QUINN  MRN: [id number]  : 1945  Study Date: 2021 08:27 AM  Age: 75 yrs  Gender: Male  Patient Location: [facility]  Reason For Study: Edema  Ordering Physician: KAROLINE DALY  Performed By: MELINA Canela     BSA: 1.9 m2  Height: 70 in  Weight: 155 lb  HR: 69  BP: 115/83 mmHg  ______________________________________________________________________________  Procedure  Complete Portable Echo Adult. Echocardiogram with two-dimensional, color and  spectral Doppler performed.  ______________________________________________________________________________  Interpretation Summary  Left ventricular size, wall motion and function are normal. The ejection  fraction is 55-60%.  Right ventricular function, chamber size, wall motion, and thickness are  normal.  Right ventricular systolic pressure is 35mmHg above the right atrial pressure.  IVC diameter <2.1 cm collapsing >50% with sniff suggests a normal RA pressure  of 3 mmHg.  No  pericardial effusion is present.  ______________________________________________________________________________  Left Ventricle  Left ventricular size, wall motion and function are normal. The ejection  fraction is 55-60%. Left ventricular diastolic function is indeterminate.     Right Ventricle  Right ventricular function, chamber size, wall motion, and thickness are  normal.     Atria  Both atria appear normal.     Mitral Valve  The mitral valve is normal. Trace mitral insufficiency is present.     Aortic Valve  Trileaflet aortic sclerosis without stenosis. Trace aortic insufficiency is  present.     Tricuspid Valve  The tricuspid valve is normal. Mild to moderate tricuspid insufficiency is  present. Pulmonary hypertension is present. Right ventricular systolic  pressure is 35mmHg above the right atrial pressure.     Pulmonic Valve  The pulmonic valve is normal. Trace pulmonic insufficiency is present.     Vessels  The aorta root is normal. The thoracic aorta is normal. The pulmonary artery  cannot be assessed. The inferior vena cava was normal in size with preserved  respiratory variability. IVC diameter <2.1 cm collapsing >50% with sniff  suggests a normal RA pressure of 3 mmHg.     Pericardium  No pericardial effusion is present.     Compared to Previous Study  This study was compared with the study from 2020 . Estimated RVSP is  higher.  ______________________________________________________________________________  MMode/2D Measurements & Calculations     IVSd: 1.2 cm  LVIDd: 4.4 cm  LVIDs: 3.0 cm  LVPWd: 0.91 cm  FS: 32.3 %  LV mass(C)d: 155.0 grams  LV mass(C)dI: 82.7 grams/m2  Ao root diam: 3.8 cm  asc Aorta Diam: 3.5 cm  LVOT diam: 1.9 cm  LVOT area: 2.8 cm2  RWT: 0.42     Doppler Measurements & Calculations  MV E max susi: 97.0 cm/sec  MV A max susi: 113.0 cm/sec  MV E/A: 0.86  MV max P.8 mmHg  MV mean P.0 mmHg  MV V2 VTI: 36.5 cm  MV dec slope: 430.0 cm/sec2  PA acc time: 0.12 sec  TR max  susi: 295.0 cm/sec  TR max P.8 mmHg  E/E' av.4     Lateral E/e': 12.2  Medial E/e': 14.6     ______________________________________________________________________________  Report approved by: MD Jordan Christensen 2021 09:38 AM         Asymptomatic COVID-19 Virus (Coronavirus) by PCR Nasopharyngeal     Status: Normal    Specimen: Nasopharyngeal; Swab    Narrative    The following orders were created for panel order Asymptomatic COVID-19 Virus (Coronavirus) by PCR Nasopharyngeal.  Procedure                               Abnormality         Status                     ---------                               -----------         ------                     SARS-COV2 (COVID-19) Vir...[[id number]]  Normal              Final result                 Please view results for these tests on the individual orders.   ABO/Rh type and screen *Canceled*     Status: None ()    Narrative    The following orders were created for panel order ABO/Rh type and screen.  Procedure                               Abnormality         Status                     ---------                               -----------         ------                     Adult Type and Screen[[id number]]                                                         Please view results for these tests on the individual orders.   CBC with Platelets & Differential     Status: Abnormal    Narrative    The following orders were created for panel order CBC with Platelets & Differential.  Procedure                               Abnormality         Status                     ---------                               -----------         ------                     CBC with platelets and d...[[id number]]  Abnormal            Final result                 Please view results for these tests on the individual orders.   [place] Draw     Status: None    Narrative    The following orders were created for panel order [place] Draw.  Procedure                                Abnormality         Status                     ---------                               -----------         ------                     Extra Red Top Tube[[id number]]                               Final result                 Please view results for these tests on the individual orders.   CBC with Platelets & Differential     Status: Abnormal    Narrative    The following orders were created for panel order CBC with Platelets & Differential.  Procedure                               Abnormality         Status                     ---------                               -----------         ------                     CBC with platelets and d...[[id number]]  Abnormal            Final result                 Please view results for these tests on the individual orders.   ABO/Rh type and screen     Status: None (In process)    Narrative    The following orders were created for panel order ABO/Rh type and screen.  Procedure                               Abnormality         Status                     ---------                               -----------         ------                     Adult Type and Screen[[id number]]                            In process                   Please view results for these tests on the individual orders.   Extra Tube     Status: None ()    Narrative    The following orders were created for panel order Extra Tube.  Procedure                               Abnormality         Status                     ---------                               -----------         ------                     Extra Green Top (Lithium...[[id number]]                                                   Please view results for these tests on the individual orders.       Signed:  Madelaine Dorado, CITLALY CNP  July 31, 2021 at 3:01 PM

## 2021-07-31 NOTE — PROGRESS NOTES
07/31/21 1427   Quick Adds   Type of Visit Initial PT Evaluation   Living Environment   People in home spouse   Current Living Arrangements house   Home Accessibility wheelchair accessible   Transportation Anticipated family or friend will provide   Living Environment Comments Pt lives in [facility] accessible home, ramp to enter at front and back. Pt has walk in shower with grab bars present.    Self-Care   Usual Activity Tolerance moderate   Current Activity Tolerance fair   Regular Exercise Yes   Activity/Exercise Type other (see comments)  (home PT and home OT services)   Exercise Amount/Frequency 2 times/wk   Equipment Currently Used at Home walker, rolling;wheelchair, manual;other (see comments)  (4WW, scooter)   Activity/Exercise/Self-Care Comment Pt has rolling walker and 4WW, can ambulate short distances at baseline. 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   Disability/Function   Hearing Difficulty or Deaf yes   Patient's preferred means of communication verbal   Describe hearing loss bilateral hearing loss   Use of hearing assistive devices bilateral hearing aids   Were auxiliary aids offered? no   Hearing Management hearing aides   Wear Glasses or Blind yes   Vision Management glasses   Concentrating, Remembering or Making Decisions Difficulty no   Difficulty Communicating yes   Communication difficulty speaking;other (see comments)  (some baseline slurred speech)   Communication Management pt with baseline slurred speech   Difficulty Eating/Swallowing no   Walking or Climbing Stairs Difficulty yes   Walking or Climbing Stairs ambulation difficulty, requires equipment;stair climbing difficulty, dependent;transferring difficulty, requires equipment;transferring difficulty, assistance 1 person   Mobility Management Pt has ramps to enter his home. Pt uses WW and 4WW for short distance ambulation. Pt currently has manual w/c,  "hoping to obtain power w/c, uses scooter for mobility outside his home.   Dressing/Bathing Difficulty yes   Dressing/Bathing bathing difficulty, assistance 1 person   Dressing/Bathing Management wife assists with bathing, has walk in shower, pt hoping to get a rolling shower chair   Toileting issues yes   Toileting Management wife assists   Toileting Assistance toileting difficulty, assistance 1 person   Doing Errands Independently Difficulty (such as shopping) yes   Errands Management wife completes errands   Fall history within last six months yes   Number of times patient has fallen within last six months 2   Change in Functional Status Since Onset of Current Illness/Injury yes   General Information   Onset of Illness/Injury or Date of Surgery 07/31/21   Referring Physician Mitali Jordan PA   Pertinent History of Current Problem (include personal factors and/or comorbidities that impact the POC) Pt is a 75 year old male admitted on 7/31/2021. 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    Existing Precautions/Restrictions fall   Cognition   Orientation Status (Cognition) oriented x 4   Affect/Mental Status (Cognition) WFL   Follows Commands (Cognition) WFL   Pain Assessment   Patient Currently in Pain No   Integumentary/Edema   Integumentary/Edema other (describe)   Integumentary/Edema Comments Pts wife reports increase in BLE edema, pt with MARSHALL stockings donned, wife states \"edema improved with lasix medication\".   Posture    Posture Forward head position;Protracted shoulders   Posture Comments Pt with flexed posture, forward head   Range of Motion (ROM)   ROM Comment Extension generally limited to all joints - pt with preference for flexed " position.    Strength   Strength Comments BLE 4/5 throughout   Bed Mobility   Comment (Bed Mobility) Pt tx supine->sit with Min A.    Transfers   Transfer Safety Comments Pt tx sit->stand with Min A.   Balance   Balance Comments Pt requires BUE support on WW for static standing balance. Pt with fair seated balance.   Clinical Impression   Criteria for Skilled Therapeutic Intervention yes, treatment indicated   PT Diagnosis (PT) Impaired functional mobility   Influenced by the following impairments decreased strength, activity intolerance, impaired balance   Functional limitations due to impairments bed mob, transfers   Clinical Presentation Stable/Uncomplicated   Clinical Presentation Rationale clinical judgement, current presentation, PMHx   Clinical Decision Making (Complexity) low complexity   Therapy Frequency (PT) 6x/week   Predicted Duration of Therapy Intervention (days/wks) 8/7/21   Planned Therapy Interventions (PT) bed mobility training;gait training;neuromuscular re-education;ROM (range of motion);strengthening;transfer training;wheelchair management/propulsion training;home program guidelines   Risk & Benefits of therapy have been explained care plan/treatment goals reviewed   PT Discharge Planning    PT Discharge Recommendation (DC Rec) home with assist;home with home care physical therapy   PT Rationale for DC Rec Pt is mobilizing near baseline level of function, pt has all necessary DME at home, manual w/c, scooter for outside the home, but is working on obtaining a power chair, and rolling shower chair.    PT Brief overview of current status  Nursing Staff: pivot to chair with A x 1 + WW + gait belt   Total Evaluation Time   Total Evaluation Time (Minutes) 8

## 2021-07-31 NOTE — PROGRESS NOTES
Patient interviewed and examined. Labs, imaging and chart notes reviewed.  Elier Leong presented to the ED yesterday with BRBPR. He has a recent past medical history significant for severe UGI bleed. 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 This was complicated by subsequent severe UGI bleed. He was found to have ulceration near the pancreatic duct which was treated with thermocautery. Plavix was held for 7 days. TCU was recommended, but family chose to discharge to home. He has resumed the Plavix after 7 days hiatus.Yesterday he had BRBPR associated with bowel movement. There was no melena or maroon stool. In the ED vital signs were stable. His hemoglobin was stable and had risen since his prior hospital discharge. Rectal exam was reportedly unremarkable. He had increased bilateral lower extremity edema since hospital discharge    He has additional history of primary lateral sclerosis, prostate cancer, s/p prostatectomy, CVA x 3 on plavix, HTN, and lumbar radiculopathy.    Past Medical History:   Diagnosis Date     Basal cell carcinoma nos     sees derm     CVA (cerebral infarction) 6/14    small vessel right int capsule stroke     DVT (deep vein thrombosis) in pregnancy 05/12/2017    right peroneal vein     Essential hypertension, benign      Hearing loss      Hoarseness of voice     assoc with PLS     Lumbar radiculopathy     2017     Primary Lateral Sclerosis 2002    has baclofen pump through Dr. Calvert, [facility], sees Dr. Fink, [facility]     Primary osteoarthritis of right shoulder 6/16/2021     Prostate CA (H) 2008    prostatectomy     Vertigo 2/21/2013       Past Surgical History:   Procedure Laterality Date     ABDOMEN SURGERY  11/12    Hernia     BIOPSY  4/16    Cancerous growth on leg. Removed by MOHs treatment     ENDOSCOPIC RETROGRADE CHOLANGIOPANCREATOGRAM N/A 6/17/2021    Procedure: ENDOSCOPIC RETROGRADE CHOLANGIOPANCREATOGRAPHY, BILIARY  STENT PLACEMENT;  Surgeon: Sima Galvez MD;  Location: [facility] OR     ENDOSCOPIC RETROGRADE CHOLANGIOPANCREATOGRAM COMPLEX N/A 7/12/2021    Procedure: ENDOSCOPIC RETROGRADE CHOLANGIOPANCREATOGRAPHY WITH AMPULLECTOMY, PANCREATIC DUCT STENT PLACEMENT AND BILIARY STENT PLACEMENT;  Surgeon: Agus Kent MD;  Location: [facility] OR     ENDOSCOPIC RETROGRADE CHOLANGIOPANCREATOGRAPHY, EXCHANGE TUBE/STENT N/A 7/14/2021    Procedure: ENDOSCOPIC RETROGRADE CHOLANGIOPANCREATOGRAPHY with bile duct stents exchanged, balloon sweep of bile ducts, hemostasis;  Surgeon: Guru Bhavesh Arreola MD;  Location: [facility] OR     ENDOSCOPIC ULTRASOUND UPPER GASTROINTESTINAL TRACT (GI) N/A 7/12/2021    Procedure: ENDOSCOPIC ULTRASOUND, ESOPHAGOSCOPY / UPPER GASTROINTESTINAL TRACT (GI);  Surgeon: Agus Kent MD;  Location: [facility] OR     ESOPHAGOSCOPY, GASTROSCOPY, DUODENOSCOPY (EGD), COMBINED N/A 6/17/2021    Procedure: ESOPHAGOGASTRODUODENOSCOPY, WITH ENDOSCOPIC US, fine needle aspiration;  Surgeon: Sima Galvez MD;  Location: [facility] OR     ESOPHAGOSCOPY, GASTROSCOPY, DUODENOSCOPY (EGD), COMBINED N/A 7/14/2021    Procedure: ESOPHAGOGASTRODUODENOSCOPY (EGD);  Surgeon: Guru Bhavesh Arreola MD;  Location: [facility] OR     HERNIA REPAIR  11/12    Repaired     PROSTATE SURGERY       [facility] NONSPECIFIC PROCEDURE  6/08     prostatectomy      Z NONSPECIFIC PROCEDURE  11/01    colonoscopy     Z NONSPECIFIC PROCEDURE  11/2006    hernia, right side     [facility] NONSPECIFIC PROCEDURE      T + A       Family History   Problem Relation Age of Onset     Heart Disease Mother         CHF     Hypertension Mother      C.A.D. Maternal Grandfather      Cerebrovascular Disease Maternal Uncle         45     Cerebrovascular Disease Maternal Uncle        Social History     Tobacco Use     Smoking status: Former Smoker     Packs/day: 0.30     Years: 6.00     Pack years: 1.80     Types: Cigarettes     Start date: 4/1/1970     Quit date:  "10/5/1976     Years since quittin.8     Smokeless tobacco: Never Used   Substance Use Topics     Alcohol use: Yes     Comment: 1 drink/week     Physical Exam:  Elderly male in NAD.  /83   Pulse 71   Temp 97.7  F (36.5  C) (Oral)   Resp 18   Ht 1.778 m (5' 10\")   Wt 70.3 kg (155 lb)   SpO2 95%   BMI 22.24 kg/m     HEENT:PERRLA. EOMI.  Neck: No bruit.  Lungs: Clear to auscultation.  Cardiac: RRR. Normal S1 and S2. No JVD.  Abdomen: Soft, non tender. BS active.  Extrem: Trace edema. No tenderness.  Neuro: Speech dysarthric. No focal deficits.  Psych: Normal mood and affect.      Labs/Imaging    Results for orders placed or performed during the hospital encounter of 21 (from the past 24 hour(s))   ABO/Rh type and screen *Canceled*    Narrative    The following orders were created for panel order ABO/Rh type and screen.  Procedure                               Abnormality         Status                     ---------                               -----------         ------                     Adult Type and Screen[[id number]]                                                         Please view results for these tests on the individual orders.   INR   Result Value Ref Range    INR 1.06 0.85 - 1.15   CBC with Platelets & Differential    Narrative    The following orders were created for panel order CBC with Platelets & Differential.  Procedure                               Abnormality         Status                     ---------                               -----------         ------                     CBC with platelets and d...[[id number]]  Abnormal            Final result                 Please view results for these tests on the individual orders.   Basic metabolic panel   Result Value Ref Range    Sodium 137 133 - 144 mmol/L    Potassium 3.8 3.4 - 5.3 mmol/L    Chloride 103 94 - 109 mmol/L    Carbon Dioxide (CO2) 28 20 - 32 mmol/L    Anion Gap 6 3 - 14 mmol/L    Urea Nitrogen 16 7 - 30 mg/dL    " Creatinine 0.75 0.66 - 1.25 mg/dL    Calcium 8.5 8.5 - 10.1 mg/dL    Glucose 98 70 - 99 mg/dL    GFR Estimate 89 >60 mL/min/1.73m2   [place] Draw    Narrative    The following orders were created for panel order [place] Draw.  Procedure                               Abnormality         Status                     ---------                               -----------         ------                     Extra Red Top Tube[[id number]]                               Final result                 Please view results for these tests on the individual orders.   CBC with platelets and differential   Result Value Ref Range    WBC Count 8.8 4.0 - 11.0 10e3/uL    RBC Count 3.26 (L) 4.40 - 5.90 10e6/uL    Hemoglobin 10.2 (L) 13.3 - 17.7 g/dL    Hematocrit 31.9 (L) 40.0 - 53.0 %    MCV 98 78 - 100 fL    MCH 31.3 26.5 - 33.0 pg    MCHC 32.0 31.5 - 36.5 g/dL    RDW 14.7 10.0 - 15.0 %    Platelet Count 560 (H) 150 - 450 10e3/uL    % Neutrophils 71 %    % Lymphocytes 15 %    % Monocytes 10 %    % Eosinophils 2 %    % Basophils 1 %    % Immature Granulocytes 1 %    NRBCs per 100 WBC 0 <1 /100    Absolute Neutrophils 6.4 1.6 - 8.3 10e3/uL    Absolute Lymphocytes 1.3 0.8 - 5.3 10e3/uL    Absolute Monocytes 0.9 0.0 - 1.3 10e3/uL    Absolute Eosinophils 0.2 0.0 - 0.7 10e3/uL    Absolute Basophils 0.1 0.0 - 0.2 10e3/uL    Absolute Immature Granulocytes 0.0 <=0.0 10e3/uL    Absolute NRBCs 0.0 10e3/uL   Extra Red Top Tube   Result Value Ref Range    Hold Specimen [facility]    Comprehensive metabolic panel   Result Value Ref Range    Sodium 138 133 - 144 mmol/L    Potassium 3.9 3.4 - 5.3 mmol/L    Chloride 102 94 - 109 mmol/L    Carbon Dioxide (CO2) 25 20 - 32 mmol/L    Anion Gap 11 3 - 14 mmol/L    Urea Nitrogen 15 7 - 30 mg/dL    Creatinine 0.78 0.66 - 1.25 mg/dL    Calcium 8.6 8.5 - 10.1 mg/dL    Glucose 95 70 - 99 mg/dL    Alkaline Phosphatase 106 40 - 150 U/L    AST 29 0 - 45 U/L    ALT 24 0 - 70 U/L    Protein Total 6.5 (L) 6.8 - 8.8 g/dL     Albumin 2.8 (L) 3.4 - 5.0 g/dL    Bilirubin Total 0.6 0.2 - 1.3 mg/dL    GFR Estimate 88 >60 mL/min/1.73m2   ABO/Rh type and screen    Narrative    The following orders were created for panel order ABO/Rh type and screen.  Procedure                               Abnormality         Status                     ---------                               -----------         ------                     Adult Type and Screen[[id number]]                            In process                   Please view results for these tests on the individual orders.   Asymptomatic COVID-19 Virus (Coronavirus) by PCR Nasopharyngeal    Specimen: Nasopharyngeal; Swab    Narrative    The following orders were created for panel order Asymptomatic COVID-19 Virus (Coronavirus) by PCR Nasopharyngeal.  Procedure                               Abnormality         Status                     ---------                               -----------         ------                     SARS-COV2 (COVID-19) Vir...[[id number]]  Normal              Final result                 Please view results for these tests on the individual orders.   SARS-COV2 (COVID-19) Virus RT-PCR    Specimen: Nasopharyngeal; Swab   Result Value Ref Range    SARS CoV2 PCR Negative Negative    Narrative    Testing was performed using the Xpert Xpress SARS-CoV-2 Assay on the  Cepheid Gene-Xpert Instrument Systems. Additional information about  this Emergency Use Authorization (EUA) assay can be found via the Lab  Guide. This test should be ordered for the detection of SARS-CoV-2 in  individuals who meet SARS-CoV-2 clinical and/or epidemiological  criteria. Test performance is unknown in asymptomatic patients. This  test is for in vitro diagnostic use under the FDA EUA for  laboratories certified under CLIA to perform high complexity testing.  This test has not been FDA cleared or approved. A negative result  does not rule out the presence of PCR inhibitors in the specimen or  target RNA in  concentration below the limit of detection for the  assay. The possibility of a false negative should be considered if  the patient's recent exposure or clinical presentation suggests  COVID-19. This test was validated by the [facility]. This laboratory is certified under  the Clinical Laboratory Improvement Amendments of 1988 (CLIA-88) as  qualified to perform high complexity laboratory testing.     CBC with Platelets & Differential    Narrative    The following orders were created for panel order CBC with Platelets & Differential.  Procedure                               Abnormality         Status                     ---------                               -----------         ------                     CBC with platelets and d...[[id number]]  Abnormal            Final result                 Please view results for these tests on the individual orders.   CBC with platelets and differential   Result Value Ref Range    WBC Count 7.2 4.0 - 11.0 10e3/uL    RBC Count 2.95 (L) 4.40 - 5.90 10e6/uL    Hemoglobin 9.0 (L) 13.3 - 17.7 g/dL    Hematocrit 28.0 (L) 40.0 - 53.0 %    MCV 95 78 - 100 fL    MCH 30.5 26.5 - 33.0 pg    MCHC 32.1 31.5 - 36.5 g/dL    RDW 14.6 10.0 - 15.0 %    Platelet Count 424 150 - 450 10e3/uL    % Neutrophils 64 %    % Lymphocytes 18 %    % Monocytes 13 %    % Eosinophils 3 %    % Basophils 1 %    % Immature Granulocytes 1 %    NRBCs per 100 WBC 0 <1 /100    Absolute Neutrophils 4.7 1.6 - 8.3 10e3/uL    Absolute Lymphocytes 1.3 0.8 - 5.3 10e3/uL    Absolute Monocytes 0.9 0.0 - 1.3 10e3/uL    Absolute Eosinophils 0.2 0.0 - 0.7 10e3/uL    Absolute Basophils 0.0 0.0 - 0.2 10e3/uL    Absolute Immature Granulocytes 0.0 <=0.0 10e3/uL    Absolute NRBCs 0.0 10e3/uL   Extra Tube    Narrative    The following orders were created for panel order Extra Tube.  Procedure                               Abnormality         Status                     ---------                                -----------         ------                     Extra Green Top (Lithium...[[id number]]                                                   Please view results for these tests on the individual orders.   Echocardiogram Complete   Result Value Ref Range    LVEF  55-60%     [facility]    [id number]  [id number]  [id number]  [id number]^MALIKA^KAROLINE^ADARSH     [facility]  Echocardiography Laboratory  [address] [place] [address]     Name: ANIL QUINN  MRN: [id number]  : 1945  Study Date: 2021 08:27 AM  Age: 75 yrs  Gender: Male  Patient Location: [facility]  Reason For Study: Edema  Ordering Physician: KAROLINE DALY  Performed By: MELINA Canela     BSA: 1.9 m2  Height: 70 in  Weight: 155 lb  HR: 69  BP: 115/83 mmHg  ______________________________________________________________________________  Procedure  Complete Portable Echo Adult. Echocardiogram with two-dimensional, color and  spectral Doppler performed.  ______________________________________________________________________________  Interpretation Summary  Left ventricular size, wall motion and function are normal. The ejection  fraction is 55-60%.  Right ventricular function, chamber size, wall motion, and thickness are  normal.  Right ventricular systolic pressure is 35mmHg above the right atrial pressure.  IVC diameter <2.1 cm collapsing >50% with sniff suggests a normal RA pressure  of 3 mmHg.  No pericardial effusion is present.  ______________________________________________________________________________  Left Ventricle  Left ventricular size, wall motion and function are normal. The ejection  fraction is 55-60%. Left ventricular diastolic function is indeterminate.     Right Ventricle  Right ventricular function, chamber size, wall motion, and thickness are  normal.     Atria  Both atria appear normal.     Mitral Valve  The mitral valve is normal. Trace mitral insufficiency is  present.     Aortic Valve  Trileaflet aortic sclerosis without stenosis. Trace aortic insufficiency is  present.     Tricuspid Valve  The tricuspid valve is normal. Mild to moderate tricuspid insufficiency is  present. Pulmonary hypertension is present. Right ventricular systolic  pressure is 35mmHg above the right atrial pressure.     Pulmonic Valve  The pulmonic valve is normal. Trace pulmonic insufficiency is present.     Vessels  The aorta root is normal. The thoracic aorta is normal. The pulmonary artery  cannot be assessed. The inferior vena cava was normal in size with preserved  respiratory variability. IVC diameter <2.1 cm collapsing >50% with sniff  suggests a normal RA pressure of 3 mmHg.     Pericardium  No pericardial effusion is present.     Compared to Previous Study  This study was compared with the study from 2020 . Estimated RVSP is  higher.  ______________________________________________________________________________  MMode/2D Measurements & Calculations     IVSd: 1.2 cm  LVIDd: 4.4 cm  LVIDs: 3.0 cm  LVPWd: 0.91 cm  FS: 32.3 %  LV mass(C)d: 155.0 grams  LV mass(C)dI: 82.7 grams/m2  Ao root diam: 3.8 cm  asc Aorta Diam: 3.5 cm  LVOT diam: 1.9 cm  LVOT area: 2.8 cm2  RWT: 0.42     Doppler Measurements & Calculations  MV E max susi: 97.0 cm/sec  MV A max susi: 113.0 cm/sec  MV E/A: 0.86  MV max P.8 mmHg  MV mean P.0 mmHg  MV V2 VTI: 36.5 cm  MV dec slope: 430.0 cm/sec2  PA acc time: 0.12 sec  TR max susi: 295.0 cm/sec  TR max P.8 mmHg  E/E' av.4     Lateral E/e': 12.2  Medial E/e': 14.6     ______________________________________________________________________________  Report approved by: MD Jordan Christensen 2021 09:38 AM           *Note: Due to a large number of results and/or encounters for the requested time period, some results have not been displayed. A complete set of results can be found in Results Review.     Impression:  Elderly male with a  history of primary lateral sclerosis, recurrent CVA, prostate cancer, and recent diagnosis of adenocarcinoma of the ampulla. He had stones and a stent in the CBD. He developed UGI bleed associated with ulceration near the pancreatic duct that was treated with thermal cauterization. He now presents with acute onset of BRBPR associated with bowel movement. Hemoglobin at presentation was 10.2. This had dropped tp 9.0 on recheck this morning. His discharge hemoglobin on 7/22 was 8.6.  His vital signs remain stable. Lower GI bleeding or hemorrhoidal bleeding seems more likely than any recurrance of the UGI bleeding. GI consult is pending. Colonoscopy had been preliminarily considered.    Plan:  Continue to monitor vital signs and serial Hgb every 6 hours.  Further evaluation for the source of the blood per recommendations of GI. If he will need upper and lower endoscopy for recurrent GI bleeding during the current stay, and given the complexity of his recent GI history, he may be more appropriate for inpatient status.    Domingo Hidalgo MD

## 2021-07-31 NOTE — H&P
[facility]    History and Physical - ED Observation Service       Date of Admission:  7/31/2021    Assessment & Plan      Elier Leong is a 75 year old male admitted on 7/31/2021. 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     ##. GIB  ##. 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 Post ampullectomy bleeding (7/14/21) due to plavix   2 episodes of bright red blood per rectum tonight. Reports ongoing generalized weakness, decreased appetite, decreased po intake, weight loss. No nausea, vomiting, abdominal pain,hematemesis, melena, lightheadedness, syncope, chest pain, shortness of breath.  No associated abdominal pain. Had ERCP with papillectomy on 7/12/21 for ampullary adenocarcinoma and inadvertently restarted Plavix early and then developed several episodes of hematochezia and melena 7/14. Hgb 8.1 on 7/14 on admission, down from 15.2 on 7/12. Underwent EGD/ERCP 7/14 with evidence of ulceration near pancreatic duct, which was thought to be the site of bleeding, successfully treated with thermal therapy and epinephrine. Hgb 6.1 on 7/15, required 1 unit pRBCs with stable Hgb in 7-8 range thereafter. Patient's Plavix held for 7 days after ERCP until day of discharge on 7/21. TCU recommended but none of family preferred TCUs had bed availability, so patient was discharged home. Repeat ERCP in 1 month (scheduled 8/19). They report last colonoscopy was about 6/7 years ago. In ED, HR 70-85, -152/, RR 18, SaO2 % on RA, Temp 98.9. Labs show normal BMP with BUN 16, creatinine 0.75 (baseline).  H/H 10.2/31.9, RBC 3.26, platelets 560 otherwise normal.  Most recent hemoglobin 8.6 on  "7/22/2021, 7.5 on 7/21/2021, 7.9 on 7/20/2021.  Normal INR.  Covid 19 PCR negative. Deny any more bleeding in ED.   -Repeat CBC, T&S this AM  -GI consult  -Protonix 40mg IV BID  -NPO  -MIVF at 100ml/hr  -Hold Plavix and ASA until GI consult   -Avoid NSAIDs  -2 large bore IV's  -PT consult 2/2 generalized weakness    ##. BLE: started on lasix and recently increased to 40 mg daily but no appreciable changes noted. LE doppler US on 6/16/21 was negative for DVT. Echo on 11/16/20 with EF 55-60% though a technically difficult study. Denies SOB, orthopnea, facial edema.     -Add BNP to labs  -Echo this AM  -Strict I/O  -Daily weights  -Continue PTA Lasix 40mg daily    ##. Choledocholithiasis: Discovered during ERCP 7/14. Stone removed and stents placed in CBD.   - Monitor LFTs    ##. History of PLS: Diagnosed 19 years ago. At baseline w/ slurred speech. Able to ambulate with walker short distances, wheelchair for longer distances. Per wife in WC mostly. Followed by neurology.  Has intrathecal baclofen pump (if needs MRI, notify [employer] for resetting)  Since 2010, last filled here on 7/20/21 here. Followed by Dr. Calvert [facility]. Per PMR Note on 7/20/21 procedure note:  \"Intrathecal baclofen 2000 mcg/mL concentration  SynchroMed II 20 mL pump with a simple continuous infusion mode.   His dose is 234.7 mcg per day and has not been changed recently\"  - PTA Oxybutinin     # History of CVA: Acute CVA in 2018. Subacute CVA 11/2020. Currently at neurologic baseline.   -Hold Plavix and ASA 2/2 GIB        Diet: NPO for Medical/Clinical Reasons Except for: Ice Chips    DVT Prophylaxis: Anti-embolisim stockings (TEDs)  Wright Catheter: Not present  Central Lines: None  Code Status: Full Code    Clinically Significant Risk Factors Present on Admission              # Platelet Defect: home medication list includes an antiplatelet medication      Disposition Plan   Expected discharge:  recommended to TBD once hemoglobin stable and " "GI consult completed with dispo plan.     The patient's care was discussed with the Attending Physician, Dr. Hidalgo.    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      Per ED Note: \"Patient reports that he restarted his Plavix approximately 9 days ago after having it held for series of GI bleeds following a ERCP procedure (see summary below).  Patient states that he had 2 episodes of bright red blood per rectum tonight.  He states it is bright red blood that was not mixed with stool his wife describes it as \"a small stream\" in the toilet next to the stool.  Possibly some bright red blood mixed with stool yesterday but unclear.  No hematemesis.  No melena.  Stools are soft and light-colored.  Patient feels generally weak but denies dizziness, syncope, chest pain, shortness of breath.  No associated abdominal pain.  No other complaints.     Per chart review, patient was recently admitted on 7/14. Patient had ERCP with papillectomy on 7/12. Patient then inadvertently restarted Plavix early and then developed several episodes of hematochezia and melena. Hemoglobin was 8.1 on admission, down from 15.2 on 7/12. He underwent EGD/ERCP 7/14 with evidence of ulceration near pancreatic duct, which was thought to be the site of bleeding. He was successfully treated with " "thermal therapy and epinephrine. Hemoglobin down to 6.1 on 7/15, patient required 1 unit pRBCs. Patient continued to have sticky maroon/black stools but this was thought to be ongoing passage of old blood. Repeat CBCs were stable with reassuring hemoglobin. Patient's Plavix was held for 7 days after ERCP until day of discharge on 7/21. He was recommended to discharge to TCU but none of family preferred TCUs had bed availability, so patient was discharged home. He had plan for repeat ERCP in 1 month (scheduled 8/19).\"    Patient and wife state he is weak, decreased po intake, decreased appetite and weight loss. They report LE edema for which he was started on lasix and recently increased to 40 mg daily but no appreciable changes noted. LE doppler US on 6/16/21 was negative for DVT. Echo on 11/16/20 with EF 55-60% though a technically difficult study. Denies SOB, orthopnea, facial edema, abdominal pain, nausea, vomiting. They report last colonoscopy was about 6/7 years ago.      In the ED, HR 70-85, -152/, RR 18, SaO2 % on RA, Temp 98.9. Labs show normal BMP with BUN 16, creatinine 0.75 (baseline).  CBC with H/H 10.2/31.9, RBC 3.26, platelets 560 otherwise normal.  Most recent hemoglobin 8.6 on 7/22/2021, 7.5 on 7/21/2021, 7.9 on 7/20/2021.  Normal INR.  Covid 19 PCR negative. Patient is being admitted to the Observation Unit to trend Hgb and for GI consult.     Review of Systems    All other ROS negative except those mentioned in above note.      Past Medical History    I have reviewed this patient's medical history and updated it with pertinent information if needed.   Past Medical History:   Diagnosis Date     Basal cell carcinoma nos     sees derm     CVA (cerebral infarction) 6/14    small vessel right int capsule stroke     DVT (deep vein thrombosis) in pregnancy 05/12/2017    right peroneal vein     Essential hypertension, benign      Hearing loss      Hoarseness of voice     assoc with PLS "     Lumbar radiculopathy     2017     Primary Lateral Sclerosis 2002    has baclofen pump through Dr. Calvert, [facility], sees Dr. Fink, [facility]     Primary osteoarthritis of right shoulder 6/16/2021     Prostate CA (H) 2008    prostatectomy     Vertigo 2/21/2013       Past Surgical History   I have reviewed this patient's surgical history and updated it with pertinent information if needed.  Past Surgical History:   Procedure Laterality Date     ABDOMEN SURGERY  11/12    Hernia     BIOPSY  4/16    Cancerous growth on leg. Removed by MOHs treatment     ENDOSCOPIC RETROGRADE CHOLANGIOPANCREATOGRAM N/A 6/17/2021    Procedure: ENDOSCOPIC RETROGRADE CHOLANGIOPANCREATOGRAPHY, BILIARY STENT PLACEMENT;  Surgeon: Sima Galvez MD;  Location: [facility] OR     ENDOSCOPIC RETROGRADE CHOLANGIOPANCREATOGRAM COMPLEX N/A 7/12/2021    Procedure: ENDOSCOPIC RETROGRADE CHOLANGIOPANCREATOGRAPHY WITH AMPULLECTOMY, PANCREATIC DUCT STENT PLACEMENT AND BILIARY STENT PLACEMENT;  Surgeon: Agus Kent MD;  Location: [facility] OR     ENDOSCOPIC RETROGRADE CHOLANGIOPANCREATOGRAPHY, EXCHANGE TUBE/STENT N/A 7/14/2021    Procedure: ENDOSCOPIC RETROGRADE CHOLANGIOPANCREATOGRAPHY with bile duct stents exchanged, balloon sweep of bile ducts, hemostasis;  Surgeon: Guru Bhavesh Arreola MD;  Location: [facility] OR     ENDOSCOPIC ULTRASOUND UPPER GASTROINTESTINAL TRACT (GI) N/A 7/12/2021    Procedure: ENDOSCOPIC ULTRASOUND, ESOPHAGOSCOPY / UPPER GASTROINTESTINAL TRACT (GI);  Surgeon: Agus Kent MD;  Location: [facility] OR     ESOPHAGOSCOPY, GASTROSCOPY, DUODENOSCOPY (EGD), COMBINED N/A 6/17/2021    Procedure: ESOPHAGOGASTRODUODENOSCOPY, WITH ENDOSCOPIC US, fine needle aspiration;  Surgeon: Sima Galvez MD;  Location: [facility] OR     ESOPHAGOSCOPY, GASTROSCOPY, DUODENOSCOPY (EGD), COMBINED N/A 7/14/2021    Procedure: ESOPHAGOGASTRODUODENOSCOPY (EGD);  Surgeon: Guru Bhavesh Arreola MD;  Location: [facility] OR     HERNIA REPAIR  11/12     Repaired     PROSTATE SURGERY       [facility] NONSPECIFIC PROCEDURE       prostatectomy      [facility] NONSPECIFIC PROCEDURE      colonoscopy     [facility] NONSPECIFIC PROCEDURE  2006    hernia, right side     [facility] NONSPECIFIC PROCEDURE      T + A       Social History   I have reviewed this patient's social history and updated it with pertinent information if needed.  Social History     Tobacco Use     Smoking status: Former Smoker     Packs/day: 0.30     Years: 6.00     Pack years: 1.80     Types: Cigarettes     Start date: 1970     Quit date: 10/5/1976     Years since quittin.8     Smokeless tobacco: Never Used   Substance Use Topics     Alcohol use: Yes     Comment: 1 drink/week     Drug use: No       Family History   I have reviewed this patient's family history and updated it with pertinent information if needed.  Family History   Problem Relation Age of Onset     Heart Disease Mother         CHF     Hypertension Mother      C.A.D. Maternal Grandfather      Cerebrovascular Disease Maternal Uncle         45     Cerebrovascular Disease Maternal Uncle        Prior to Admission Medications   Prior to Admission Medications   Prescriptions Last Dose Informant Patient Reported? Taking?   EPINEPHrine 0.3 MG/0.3ML injection  Spouse/Significant Other No No   Sig: Inject 0.3 mLs (0.3 mg) into the muscle as needed for anaphylaxis   acetaminophen (TYLENOL) 650 MG CR tablet  Spouse/Significant Other Yes No   Sig: Take 650 mg by mouth every 8 hours as needed    aspirin 81 MG tablet  Spouse/Significant Other Yes No   Sig: Take 81 mg by mouth every evening    baclofen (LIORESAL) intraTHECAL Internal Pump  Spouse/Significant Other Yes No   Sig: by Intrathecal route continuous prn Pump filled by [facility] [phone]  Pump Model: Syncromed  Medication in pump is Gablofen (brand name)  Last fill:  2021  Next fill:   Before 2021  Low Tira Alarm Date:   Tira  "Volume: 20 ml  Conc: 2000 mcg/mL  Delivers 234.7 mcg/day  Basal rate:unknown  Battery life: unknown   clopidogrel (PLAVIX) 75 MG tablet  Spouse/Significant Other Yes No   Sig: Take 75 mg by mouth every evening    fluorouracil (EFUDEX) 5 % external cream  Spouse/Significant Other Yes No   Sig: Apply topically to legs 1 to 2 times weekly as needed/tolerated for maintenance   furosemide (LASIX) 20 MG tablet   No No   Sig: Take 2 tablets (40 mg) by mouth daily   ketoconazole (NIZORAL) 2 % external shampoo  Spouse/Significant Other No No   Sig: SHAMPOO EVERY 2-3 DAYS AS  NEEDED   loperamide (IMODIUM) 2 MG capsule   No No   Sig: Take 1 capsule (2 mg) by mouth 4 times daily as needed for diarrhea   oxybutynin ER (DITROPAN-XL) 10 MG 24 hr tablet  Spouse/Significant Other No No   Sig: Take 2 tablets (20 mg) by mouth At Bedtime   pantoprazole (PROTONIX) 40 MG EC tablet  Spouse/Significant Other No No   Sig: Take 1 tablet (40 mg) by mouth 2 times daily   polyethylene glycol (MIRALAX/GLYCOLAX) Packet  Spouse/Significant Other Yes No   Sig: Take 1 packet by mouth daily as needed Every 2-3 days.      Facility-Administered Medications: None     Allergies   Allergies   Allergen Reactions     Bee Venom      Swelling and hives     Penicillins Hives     \"HIVES\"       Physical Exam   Vital Signs: Temp: 97.7  F (36.5  C) Temp src: Oral BP: 115/83 Pulse: 71   Resp: 18 SpO2: 95 % O2 Device: None (Room air)    Weight: 155 lbs 0 oz    Constitutional: awake, alert, cooperative, no apparent distress, and appears stated age  Eyes: Lids and lashes normal, pupils equal, round and reactive to light, extra ocular muscles intact, sclera clear, conjunctiva normal  ENT: Normocephalic, without obvious abnormality, atraumatic, sinuses nontender on palpation, external ears without lesions, oral pharynx with moist mucous membranes, tonsils without erythema or exudates, gums normal and good dentition.  Hematologic / Lymphatic: no cervical " lymphadenopathy  Respiratory: No increased work of breathing, good air exchange, clear to auscultation bilaterally, no crackles or wheezing  Cardiovascular: Normal apical impulse, regular rate and rhythm, normal S1 and S2, no S3 or S4, and no murmur noted  GI: No scars, normal bowel sounds, soft, non-distended, non-tender, no masses palpated, no hepatosplenomegally  Skin: no bruising or bleeding  Musculoskeletal: There is no redness, warmth, or swelling of the joints.  Tone is normal.   Ext: BLE edema  Neurologic: Awake, alert, oriented to name, place and time.    Neuropsychiatric: General: normal, calm and normal eye contact      Data   Data reviewed today: I reviewed all medications, new labs and imaging results over the last 24 hours. I personally reviewed  Recent Labs   Lab 07/30/21 2203   WBC 8.8   HGB 10.2*   MCV 98   *   INR 1.06     137   POTASSIUM 3.9  3.8   CHLORIDE 102  103   CO2 25  28   BUN 15  16   CR 0.78  0.75   ANIONGAP 11  6   AMOS 8.6  8.5   GLC 95  98   ALBUMIN 2.8*   PROTTOTAL 6.5*   BILITOTAL 0.6   ALKPHOS 106   ALT 24   AST 29     Most Recent 3 CBC's:  Recent Labs   Lab Test 07/30/21 2203 07/22/21  1031 07/21/21  0611 07/20/21  0612   WBC 8.8  --  10.4 10.5   HGB 10.2* 8.6* 7.5* 7.9*   MCV 98  --  99 97   *  --  284 268     Most Recent 3 BMP's:  Recent Labs   Lab Test 07/30/21 2203 07/22/21  1031 07/20/21  0612     137 139 138   POTASSIUM 3.9  3.8 4.1 3.6   CHLORIDE 102  103 107 107   CO2 25  28 26 25   BUN 15  16 17 12   CR 0.78  0.75 0.71 0.70   ANIONGAP 11  6 6 6   AMOS 8.6  8.5 9.4 8.2*   GLC 95  98 94 91     Most Recent 2 LFT's:  Recent Labs   Lab Test 07/30/21 2203 07/20/21  0612   AST 29 39   ALT 24 38   ALKPHOS 106 105   BILITOTAL 0.6 0.8     Most Recent 3 INR's:  Recent Labs   Lab Test 07/30/21 2203 07/12/21  0848 06/17/21  1240   INR 1.06 1.00 0.96     No results found for this or any previous visit (from the past 24 hour(s)).

## 2021-07-31 NOTE — CONSULTS
GASTROENTEROLOGY CONSULTATION      Luminal consult    Consult requested by Domingo Cesar*    Reason for consult: BRBPR    History is obtained from the patient    Date of Admission:  7/31/2021         History of Present Illness:   Elier Leong is a 75 year old male with a history of primary lateral sclerosis and ampullary adenocarcinoma s/p ampullectomy 7/12/2021.  GI consulted for BRBPR.    Recent history notable for ampullary adenocarcinoma for which he underwent ampullectomy on 7/12/2021 with Dr. Kent.  His plavix was restarted early and he developed bleeding, requiring soft coag and injection with Dr. Arreola 7/14/2021.  Pathology from 7/12 returned positive for adenocarcinoma with positive deep margin.    He then did well, and his plavix was held 5-7, resumed without issue.  He did well for the last week or two.  Two days ago, developed sudden BRBPR with bowel movements which came out on its own and in the toilet bowl.    He has not been told he has hemorrhoids before.  Last colonoscopy was many years ago, no records of this, he did not have repeat screenings due to life expectancy and difficulty with prep with his PLS.    Hgb 10.2 from recent 7.5/8.6.    His prior episode of bleeding from the ampullectomy, he had overt melena which was foul smelling.  He and his wife report this recent bleeding is very different from that, and only bright red blood per rectum.         Social History:   Lives with his wife         Family History:   Denies relevant family history of gastrointestinal disease          Past Medical, Surgical and Procedural History (Summarized):   Pertinent Medical History:  Primary lateral sclerosis  Ampullary adenocarcinoma         Medications:     Current Facility-Administered Medications   Medication     acetaminophen (TYLENOL) tablet 650 mg     baclofen (LIORESAL) intraTHECAL Internal Pump     furosemide (LASIX) tablet 40 mg     melatonin tablet 1 mg     ondansetron  (ZOFRAN-ODT) ODT tab 4 mg    Or     ondansetron (ZOFRAN) injection 4 mg     oxybutynin ER (DITROPAN-XL) 24 hr tablet 20 mg     pantoprazole (PROTONIX) IV push injection 40 mg     sodium chloride 0.9% infusion     Current Outpatient Medications   Medication Sig     acetaminophen (TYLENOL) 650 MG CR tablet Take 650 mg by mouth every 8 hours as needed      aspirin 81 MG tablet Take 81 mg by mouth every evening      baclofen (LIORESAL) intraTHECAL Internal Pump by Intrathecal route continuous prn Pump filled by [facility] stroke center [phone]  Pump Model: Syncromed  Medication in pump is Gablofen (brand name)  Last fill:  03/02/2021  Next fill:   Before 08/02/2021  Low Balta Alarm Date:   Reservoir Volume: 20 ml  Conc: 2000 mcg/mL  Delivers 234.7 mcg/day  Basal rate:unknown  Battery life: unknown     clopidogrel (PLAVIX) 75 MG tablet Take 75 mg by mouth every evening      EPINEPHrine 0.3 MG/0.3ML injection Inject 0.3 mLs (0.3 mg) into the muscle as needed for anaphylaxis     fluorouracil (EFUDEX) 5 % external cream Apply topically to legs 1 to 2 times weekly as needed/tolerated for maintenance     furosemide (LASIX) 20 MG tablet Take 2 tablets (40 mg) by mouth daily     ketoconazole (NIZORAL) 2 % external shampoo SHAMPOO EVERY 2-3 DAYS AS  NEEDED     loperamide (IMODIUM) 2 MG capsule Take 1 capsule (2 mg) by mouth 4 times daily as needed for diarrhea     oxybutynin ER (DITROPAN-XL) 10 MG 24 hr tablet Take 2 tablets (20 mg) by mouth At Bedtime     pantoprazole (PROTONIX) 40 MG EC tablet Take 1 tablet (40 mg) by mouth 2 times daily     polyethylene glycol (MIRALAX/GLYCOLAX) Packet Take 1 packet by mouth daily as needed Every 2-3 days.            Review of Systems:   A complete 10 point review of systems was obtained.  Please see the HPI for pertinent positives and negatives.  All other systems were reviewed and were found to be negative.          Physical Exam:   /83    "Pulse 71   Temp 97.7  F (36.5  C) (Oral)   Resp 18   Ht 1.778 m (5' 10\")   Wt 70.3 kg (155 lb)   SpO2 95%   BMI 22.24 kg/m    Wt:   Wt Readings from Last 2 Encounters:   07/30/21 70.3 kg (155 lb)   07/22/21 69.9 kg (154 lb)      Constitutional: Cooperative, pleasant, no apparent distress.  HEENT: No conjunctival icterus, moist mucus membranes.  CV:  No edema.  Respiratory: Unlabored breathing  Abdomen:    Non-distended   Non-tender  LACI: No blood, no hemorrhoids, no stool in rectal vault.  Normal LACI  Skin: No jaundice, warm  Neuro: Alert, moves all extremities, no asterixis.  Mild speech and movement deficits from lateral sclerosis.  Psych: Normal affect, answers questions appropriately.          Data (Summarized):   Notable labs:  Hgb 10.2 (recent 8.6, 8 days ago)    Cr 0.78  INR 1.08           ASSESSMENT AND PLAN:   Elier Leong is a 75 year old male with a history of primary lateral sclerosis and ampullary adenocarcinoma s/p ampullectomy 7/12/2021.  GI consulted for BRBPR.    Presents with 2 days of BRBPR in the context of plavix use and recent ampullectomy for ampullary adenocarcinoma (with positive deep margin).  Unclear when his last colonoscopy was.     Likely, this is rectal outlet bleeding and potentially hemorrhoidal or alternate cause.  This is different from the melena he previously experienced with the ampullectomy bleed and we would not expect ongoing ampullectomy bleeding this far out.    We will plan for colonoscopy followed by duodenoscopy to evaluate the ampullectomy site.  His prep will be challenging and will likely need 2 days of prep.  This can all be done under moderate sedation.  If there is something actively bleeding near the ampulla, he will likely need a 2nd procedure in the OR with our advance endoscopists for therapy.    #1 Bright red blood per rectum  #2 Acute on chronic anemia due to blood loss  #3 History of plavix use (patient reports due to \"ministrokes\" likely " TIAs)  #4 Ampullary carcinoma s/p ampullectomy with pathology showing positive deep margin  #5 Primary lateral sclerosis    Recommendations:  -- Please start colonoscopy prep with GoLytely.  He will need a total of 6 L prep.  This could be done with 4 L on 8/1 and 2 L in the morning on 8/2.    -- He requests an NG to do the prep and does not feel he can drink this much on his own which is reasonable  -- Please hold the plavix for now  -- After discharge he should discuss risk/benefits of plavix with his primary neurologist in light of the bleeding issues he has had  -- Transfuse for hemoglobin <7 and monitor daily    The case was staffed with attending, Dr. Jung.  Luminal will continue to follow.    Ricardo Clement MD  Gastroenterology Fellow, PGY-5

## 2021-07-31 NOTE — ED PROVIDER NOTES
"    [place] EMERGENCY DEPARTMENT ([facility])  7/30/21    History     Chief Complaint   Patient presents with     Rectal Bleeding     HPI  Elier Leong is a 75 year old male with a history of primary lateral sclerosis, prostate cancer s/p prostatectomy, and newly diagnosed ampullary adenocarcinoma who presents to the Emergency Department with rectal bleeding.  Patient reports that he restarted his Plavix approximately 9 days ago after having it held for series of GI bleeds following a ERCP procedure (see summary below).  Patient states that he had 2 episodes of bright red blood per rectum tonight.  He states it is bright red blood that was not mixed with stool his wife describes it as \"a small stream\" in the toilet next to the stool.  Possibly some bright red blood mixed with stool yesterday but unclear.  No hematemesis.  No melena.  Stools are soft and light-colored.  Patient feels generally weak but denies dizziness, syncope, chest pain, shortness of breath.  No associated abdominal pain.  No other complaints.    Per chart review, patient was recently admitted on 7/14. Patient had ERCP with papillectomy on 7/12. Patient then inadvertently restarted Plavix early and then developed several episodes of hematochezia and melena. Hemoglobin was 8.1 on admission, down from 15.2 on 7/12. He underwent EGD/ERCP 7/14 with evidence of ulceration near pancreatic duct, which was thought to be the site of bleeding. He was successfully treated with thermal therapy and epinephrine. Hemoglobin down to 6.1 on 7/15, patient required 1 unit pRBCs. Patient continued to have sticky maroon/black stools but this was thought to be ongoing passage of old blood. Repeat CBCs were stable with reassuring hemoglobin. Patient's Plavix was held for 7 days after ERCP until day of discharge on 7/21. He was recommended to discharge to TCU but none of family preferred TCUs had bed availability, so patient was discharged home. He had plan " for repeat ERCP in 1 month (scheduled 8/19).    Past Medical History  Past Medical History:   Diagnosis Date     Basal cell carcinoma nos     sees derm     CVA (cerebral infarction) 6/14    small vessel right int capsule stroke     DVT (deep vein thrombosis) in pregnancy 05/12/2017    right peroneal vein     Essential hypertension, benign      Hearing loss      Hoarseness of voice     assoc with PLS     Lumbar radiculopathy     2017     Primary Lateral Sclerosis 2002    has baclofen pump through Dr. Calvert, [place], [facility]. [facility]ink [facility]     Primary osteoarthritis of right shoulder 6/16/2021     Prostate CA (H) 2008    prostatectomy     Vertigo 2/21/2013     Past Surgical History:   Procedure Laterality Date     ABDOMEN SURGERY  11/12    Hernia     BIOPSY  4/16    Cancerous growth on leg. Removed by MOHs treatment     ENDOSCOPIC RETROGRADE CHOLANGIOPANCREATOGRAM N/A 6/17/2021    Procedure: ENDOSCOPIC RETROGRADE CHOLANGIOPANCREATOGRAPHY, BILIARY STENT PLACEMENT;  Surgeon: Sima Galvez MD;  Location: [facility] OR     ENDOSCOPIC RETROGRADE CHOLANGIOPANCREATOGRAM COMPLEX N/A 7/12/2021    Procedure: ENDOSCOPIC RETROGRADE CHOLANGIOPANCREATOGRAPHY WITH AMPULLECTOMY, PANCREATIC DUCT STENT PLACEMENT AND BILIARY STENT PLACEMENT;  Surgeon: Agus Kent MD;  Location: [facility] OR     ENDOSCOPIC RETROGRADE CHOLANGIOPANCREATOGRAPHY, EXCHANGE TUBE/STENT N/A 7/14/2021    Procedure: ENDOSCOPIC RETROGRADE CHOLANGIOPANCREATOGRAPHY with bile duct stents exchanged, balloon sweep of bile ducts, hemostasis;  Surgeon: Guru Bhavesh Arreola MD;  Location: [facility] OR     ENDOSCOPIC ULTRASOUND UPPER GASTROINTESTINAL TRACT (GI) N/A 7/12/2021    Procedure: ENDOSCOPIC ULTRASOUND, ESOPHAGOSCOPY / UPPER GASTROINTESTINAL TRACT (GI);  Surgeon: Agus Kent MD;  Location: [facility] OR     ESOPHAGOSCOPY, GASTROSCOPY, DUODENOSCOPY (EGD), COMBINED N/A 6/17/2021    Procedure: ESOPHAGOGASTRODUODENOSCOPY, WITH ENDOSCOPIC US, fine needle aspiration;   "Surgeon: Sima Galvez MD;  Location: [facility] OR     ESOPHAGOSCOPY, GASTROSCOPY, DUODENOSCOPY (EGD), COMBINED N/A 2021    Procedure: ESOPHAGOGASTRODUODENOSCOPY (EGD);  Surgeon: Guru Bhavesh Arreola MD;  Location: [facility] OR     HERNIA REPAIR      Repaired     PROSTATE SURGERY       [facility] NONSPECIFIC PROCEDURE       prostatectomy      [facility] NONSPECIFIC PROCEDURE      colonoscopy     [facility] NONSPECIFIC PROCEDURE  2006    hernia, right side     [facility] NONSPECIFIC PROCEDURE      T + A     acetaminophen (TYLENOL) 650 MG CR tablet  aspirin 81 MG tablet  baclofen (LIORESAL) intraTHECAL Internal Pump  clopidogrel (PLAVIX) 75 MG tablet  EPINEPHrine 0.3 MG/0.3ML injection  fluorouracil (EFUDEX) 5 % external cream  furosemide (LASIX) 20 MG tablet  ketoconazole (NIZORAL) 2 % external shampoo  loperamide (IMODIUM) 2 MG capsule  oxybutynin ER (DITROPAN-XL) 10 MG 24 hr tablet  pantoprazole (PROTONIX) 40 MG EC tablet  polyethylene glycol (MIRALAX/GLYCOLAX) Packet      Allergies   Allergen Reactions     Bee Venom      Swelling and hives     Penicillins Hives     \"HIVES\"     Family History  Family History   Problem Relation Age of Onset     Heart Disease Mother         CHF     Hypertension Mother      C.A.D. Maternal Grandfather      Cerebrovascular Disease Maternal Uncle         45     Cerebrovascular Disease Maternal Uncle      Social History   Social History     Tobacco Use     Smoking status: Former Smoker     Packs/day: 0.30     Years: 6.00     Pack years: 1.80     Types: Cigarettes     Start date: 1970     Quit date: 10/5/1976     Years since quittin.8     Smokeless tobacco: Never Used   Substance Use Topics     Alcohol use: Yes     Comment: 1 drink/week     Drug use: No      Past medical history, past surgical history, medications, allergies, family history, and social history were reviewed with the patient. No additional pertinent items.       Review of Systems  A complete review of " "systems was performed with pertinent positives and negatives noted in the HPI, and all other systems negative.    Physical Exam   BP: 131/80  Pulse: 85  Temp: 98.9  F (37.2  C)  Resp: 18  Height: 177.8 cm (5' 10\")  Weight: 70.3 kg (155 lb)  SpO2: 100 %  Physical Exam  General: awake, alert, NAD  Head: normal cephalic  HEENT: pupils equal, conjugate gaze intact  Neck: Supple  CV: regular rate and rhythm without murmur  Lungs: clear to auscultation  Abd: soft, non-tender, no guarding, no peritoneal signs  Rectal: No hemorrhoidal tissue, no active bleeding, no fissures, no stool, blood or melena in the rectal vault  EXT: lower extremities without swelling or edema  Neuro: awake, answers questions appropriately. No focal deficits noted       ED Course      Procedures       The medical record was reviewed and interpreted.  Current labs reviewed and interpreted.  Previous labs reviewed and interpreted.       Results for orders placed or performed during the hospital encounter of 07/31/21   INR     Status: Normal   Result Value Ref Range    INR 1.06 0.85 - 1.15   Basic metabolic panel     Status: Normal   Result Value Ref Range    Sodium 137 133 - 144 mmol/L    Potassium 3.8 3.4 - 5.3 mmol/L    Chloride 103 94 - 109 mmol/L    Carbon Dioxide (CO2) 28 20 - 32 mmol/L    Anion Gap 6 3 - 14 mmol/L    Urea Nitrogen 16 7 - 30 mg/dL    Creatinine 0.75 0.66 - 1.25 mg/dL    Calcium 8.5 8.5 - 10.1 mg/dL    Glucose 98 70 - 99 mg/dL    GFR Estimate 89 >60 mL/min/1.73m2   CBC with platelets and differential     Status: Abnormal   Result Value Ref Range    WBC Count 8.8 4.0 - 11.0 10e3/uL    RBC Count 3.26 (L) 4.40 - 5.90 10e6/uL    Hemoglobin 10.2 (L) 13.3 - 17.7 g/dL    Hematocrit 31.9 (L) 40.0 - 53.0 %    MCV 98 78 - 100 fL    MCH 31.3 26.5 - 33.0 pg    MCHC 32.0 31.5 - 36.5 g/dL    RDW 14.7 10.0 - 15.0 %    Platelet Count 560 (H) 150 - 450 10e3/uL    % Neutrophils 71 %    % Lymphocytes 15 %    % Monocytes 10 %    % Eosinophils 2 %    " % Basophils 1 %    % Immature Granulocytes 1 %    NRBCs per 100 WBC 0 <1 /100    Absolute Neutrophils 6.4 1.6 - 8.3 10e3/uL    Absolute Lymphocytes 1.3 0.8 - 5.3 10e3/uL    Absolute Monocytes 0.9 0.0 - 1.3 10e3/uL    Absolute Eosinophils 0.2 0.0 - 0.7 10e3/uL    Absolute Basophils 0.1 0.0 - 0.2 10e3/uL    Absolute Immature Granulocytes 0.0 <=0.0 10e3/uL    Absolute NRBCs 0.0 10e3/uL   Extra Red Top Tube     Status: None   Result Value Ref Range    Hold Specimen [facility]    Asymptomatic COVID-19 Virus (Coronavirus) by PCR Nasopharyngeal     Status: None ()    Specimen: Nasopharyngeal; Swab    Narrative    The following orders were created for panel order Asymptomatic COVID-19 Virus (Coronavirus) by PCR Nasopharyngeal.  Procedure                               Abnormality         Status                     ---------                               -----------         ------                     SARS-COV2 (COVID-19) Vir...[[id number]]                                                   Please view results for these tests on the individual orders.   ABO/Rh type and screen *Canceled*     Status: None ()    Narrative    The following orders were created for panel order ABO/Rh type and screen.  Procedure                               Abnormality         Status                     ---------                               -----------         ------                     Adult Type and Screen[[id number]]                                                         Please view results for these tests on the individual orders.   CBC with Platelets & Differential     Status: Abnormal    Narrative    The following orders were created for panel order CBC with Platelets & Differential.  Procedure                               Abnormality         Status                     ---------                               -----------         ------                     CBC with platelets and d...[[id number]]  Abnormal            Final result                 Please  view results for these tests on the individual orders.   [place]elje Draw     Status: None    Narrative    The following orders were created for panel order Rapel[place] Draw.  Procedure                               Abnormality         Status                     ---------                               -----------         ------                     Extra Red Top Tube[[id number]]                               Final result                 Please view results for these tests on the individual orders.     Medications - No data to display     Assessments & Plan (with Medical Decision Making)   Elier is a 75-year-old male with a complicated past medical history including recent severe upper GI bleed status post transfusion.  He presents this time with bright red blood per rectum.    He is well-appearing, nontoxic, normal vital signs, benign abdominal exam without tenderness.  He is denying systemic symptoms such as syncope, dizziness, chest pain or shortness of breath which is reassuring.  Rectal exam normal.    Repeated labs most notable for a hemoglobin of 10.  0.2 which is actually increased from her recent values, an elevated platelet count and a normal INR.  Patient has not taken his Plavix today in light of his recent rectal bleeding.    Plan at this point is to admit to ED observation for serial hemoglobins and monitoring of his rectal bleeding given his recent significant GI bleed.  I have low suspicion that this is a brisk upper GI bleed given his stable hemoglobin and description of symptom though would consider GI consultation in the morning given his recent procedures and complications.  They can also make a recommendation about his Plavix.    I have reviewed the nursing notes. I have reviewed the findings, diagnosis, plan and need for follow up with the patient.    New Prescriptions    No medications on file       Final diagnoses:   Bright red blood per rectum       --    [facility] EMERGENCY  DEPARTMENT  7/30/2021     Delta Baltazar MD  07/31/21 [id number]

## 2021-08-01 ENCOUNTER — APPOINTMENT (OUTPATIENT)
Dept: PHYSICAL THERAPY | Facility: CLINIC | Age: 76
DRG: 378 | End: 2021-08-01
Payer: COMMERCIAL

## 2021-08-01 LAB
CREAT SERPL-MCNC: 0.64 MG/DL (ref 0.66–1.25)
GFR SERPL CREATININE-BSD FRML MDRD: >90 ML/MIN/1.73M2
HGB BLD-MCNC: 9.7 G/DL (ref 13.3–17.7)

## 2021-08-01 PROCEDURE — 97116 GAIT TRAINING THERAPY: CPT | Mod: GP | Performed by: REHABILITATION PRACTITIONER

## 2021-08-01 PROCEDURE — 99232 SBSQ HOSP IP/OBS MODERATE 35: CPT | Performed by: HOSPITALIST

## 2021-08-01 PROCEDURE — 99207 PR CDG-CHARGE REQUIRED MANUAL ENTRY: CPT | Performed by: HOSPITALIST

## 2021-08-01 PROCEDURE — 82565 ASSAY OF CREATININE: CPT | Performed by: NURSE PRACTITIONER

## 2021-08-01 PROCEDURE — 250N000011 HC RX IP 250 OP 636: Performed by: NURSE PRACTITIONER

## 2021-08-01 PROCEDURE — 250N000013 HC RX MED GY IP 250 OP 250 PS 637: Performed by: NURSE PRACTITIONER

## 2021-08-01 PROCEDURE — 120N000002 HC R&B MED SURG/OB UMMC

## 2021-08-01 PROCEDURE — 36415 COLL VENOUS BLD VENIPUNCTURE: CPT | Performed by: NURSE PRACTITIONER

## 2021-08-01 PROCEDURE — 85018 HEMOGLOBIN: CPT | Performed by: NURSE PRACTITIONER

## 2021-08-01 PROCEDURE — 250N000013 HC RX MED GY IP 250 OP 250 PS 637: Performed by: HOSPITALIST

## 2021-08-01 PROCEDURE — 97530 THERAPEUTIC ACTIVITIES: CPT | Mod: GP | Performed by: REHABILITATION PRACTITIONER

## 2021-08-01 PROCEDURE — 97110 THERAPEUTIC EXERCISES: CPT | Mod: GP | Performed by: REHABILITATION PRACTITIONER

## 2021-08-01 PROCEDURE — C9113 INJ PANTOPRAZOLE SODIUM, VIA: HCPCS | Performed by: NURSE PRACTITIONER

## 2021-08-01 RX ORDER — POLYETHYLENE GLYCOL 3350, SODIUM CHLORIDE, SODIUM BICARBONATE, POTASSIUM CHLORIDE 420; 11.2; 5.72; 1.48 G/4L; G/4L; G/4L; G/4L
4000 POWDER, FOR SOLUTION ORAL ONCE
Status: COMPLETED | OUTPATIENT
Start: 2021-08-01 | End: 2021-08-01

## 2021-08-01 RX ORDER — POLYETHYLENE GLYCOL 3350, SODIUM CHLORIDE, SODIUM BICARBONATE, POTASSIUM CHLORIDE 420; 11.2; 5.72; 1.48 G/4L; G/4L; G/4L; G/4L
2000 POWDER, FOR SOLUTION ORAL ONCE
Status: COMPLETED | OUTPATIENT
Start: 2021-08-02 | End: 2021-08-02

## 2021-08-01 RX ADMIN — ONDANSETRON 4 MG: 4 TABLET, ORALLY DISINTEGRATING ORAL at 22:58

## 2021-08-01 RX ADMIN — PANTOPRAZOLE SODIUM 40 MG: 40 INJECTION, POWDER, FOR SOLUTION INTRAVENOUS at 11:22

## 2021-08-01 RX ADMIN — POLYETHYLENE GLYCOL 3350, SODIUM SULFATE ANHYDROUS, SODIUM BICARBONATE, SODIUM CHLORIDE, POTASSIUM CHLORIDE 4000 ML: 236; 22.74; 6.74; 5.86; 2.97 POWDER, FOR SOLUTION ORAL at 15:17

## 2021-08-01 RX ADMIN — OXYBUTYNIN CHLORIDE 20 MG: 5 TABLET, EXTENDED RELEASE ORAL at 22:20

## 2021-08-01 RX ADMIN — PANTOPRAZOLE SODIUM 40 MG: 40 INJECTION, POWDER, FOR SOLUTION INTRAVENOUS at 19:59

## 2021-08-01 ASSESSMENT — ACTIVITIES OF DAILY LIVING (ADL)
ADLS_ACUITY_SCORE: 29
ADLS_ACUITY_SCORE: 30
ADLS_ACUITY_SCORE: 29
ADLS_ACUITY_SCORE: 30

## 2021-08-01 NOTE — PLAN OF CARE
00:00-07:00 am  AF with stable VS  A&Ox4  Denied  nausea/vomiting , pain or SOB.  Hx of stroke, neuro remains unchanged at baseline.  Incontinent of urine x 1  Bladder scan at 06:30 with 311 ml  External catheter, Primofit, now working with good UOP.  No BM this shift  No signs of active bleeding seen this shift.  Plan for NG  for Golytely in preparation for colonoscopy on 8/2   Pt is otherwise stable, resting/sleeping well, and no new acute events overnight  Continue w/POC

## 2021-08-01 NOTE — PLAN OF CARE
AVSS, no c/o pain or nausea. D/t stroke hx pt has slurred slow speech. Clear liquid diet. Up w/ assist of 2, gait belt and walker. Commode ordered. Pt has primofit urine catheter to suction d/t difficulty ambulating (no issues with urination). Drinking water, swallowing pills. Odor in room, brief checked, brief dry, pt explained he is passing a lot of gas. Plan for NG in AM. Plan to get Golytely 4 L on 8/1 and 2 L in the morning on 8/2 ahead of colonoscopy  Scheduled for 8/2. Strict I/O

## 2021-08-01 NOTE — PROGRESS NOTES
[facility]    Medicine Progress Note - Hospitalist Service, Gold 7       Date of Admission:  7/31/2021    Assessment & Plan           Elier Leong is a 75 year old male admitted on 7/31/2021. 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     ##. GIB  ##. 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 Post ampullectomy bleeding (7/14/21) due to plavix   2 episodes of bright red blood per rectum 1 day PTA  Relevant recent admissions: Had ERCP with papillectomy on 7/12/21 for ampullary adenocarcinoma and inadvertently restarted Plavix early and then developed several episodes of hematochezia and melena 7/14. Hgb 8.1 on 7/14 on admission, down from 15.2 on 7/12. Underwent EGD/ERCP 7/14 with evidence of ulceration near pancreatic duct, which was thought to be the site of bleeding, successfully treated with thermal therapy and epinephrine. Hgb 6.1 on 7/15, required 1 unit pRBCs with stable Hgb in 7-8 range thereafter. Patient's Plavix held for 7 days after ERCP until day of discharge on 7/21. TCU recommended but none of family preferred TCUs had bed availability, so patient was discharged home. Repeat ERCP in 1 month (scheduled 8/19).   - GI was consulted on admission. Plan is for NGT and 4L on golytely on 8/1 and 2L on 8/2 with plan for C-scope and duodenoscope on 8/2  -Protonix 40mg IV BID  - Clear liquid diet. If diarrhea and huge volume loss, can consider IVF  - NPO after midnight  - Continue to hold DAPT. No NSAIDs  Per GI  -- After discharge he should discuss risk/benefits of plavix with his primary neurologist in light of the bleeding issues he has had, however the first episode of bleeding seems  "to have been an anticipated bleed 2/2 inadvertent medication administratio. Will route the discharge summary to Neurologist on discharge       ##.Bilateral lower leg edema  - per wife, Lasix was started in prior admission as patient had bilateral pedal edema.  He is currently euvolemic on exam.  Will discontinue Lasix at this time.  Likely his pedal edema from past admission was due to volume overload iatrogenically as he was admitted with a GI bleed, was administered IV fluids and blood products.  -His echocardiogram from 7/31 shows no valvular abnormalities and the EF is preserved 55 to 60%.  He has no known liver disease, and no CKD.  At this time, bilateral lower leg edema can be managed with nonpharmacological interventions of elevation and compression stockings.  -We will discontinue Lasix upon discharge.      ##. Choledocholithiasis  - Discovered during ERCP 7/14. Stone removed and stents placed in CBD.  Patient will need follow-up for stent  per GI.  -Wife is concerned about multiple procedures that the patient has to undergo and requests discussion with GI help her with this issue.  Will make GI team aware      ##. History of PLS: Diagnosed 19 years ago. At baseline w/ slurred speech. Able to ambulate with walker short distances, wheelchair for longer distances. Per wife in WC mostly. Followed by neurology.  Has intrathecal baclofen pump (if needs MRI, notify [employer] for resetting)  Since 2010, last filled here on 7/20/21 here. Followed by Dr. Calvert at [facility]. Per PMR Note on 7/20/21 procedure note:  \"Intrathecal baclofen 2000 mcg/mL concentration  SynchroMed II 20 mL pump with a simple continuous infusion mode.   His dose is 234.7 mcg per day and has not been changed recently\"  - PTA Oxybutinin     # History of CVA: Acute CVA in 2018. Subacute CVA 11/2020. Currently at neurologic baseline.   -Hold Plavix and ASA 2/2 GIB       Diet: Clear Liquid Diet    DVT Prophylaxis: Pneumatic Compression " Devices- GIB, no chemopropphylaxis  Wright Catheter: Not present  Central Lines: None  Code Status: Full Code      Disposition Plan   Expected discharge: 08/03/2021 recommended to prior living arrangement once adequate pain management/ tolerating PO medications.     The patient's care was discussed with the Bedside Nurse.    Bernard Gordon MD  Hospitalist Service, [address]  Securely message with the Vocera Web Console (learn more here)  Text page via [employer] Paging/Directory  Please see sign in/sign out for up to date coverage information            # Platelet Defect: home medication list includes an antiplatelet medication      ______________________________________________________________________    Interval History   Patient was sitting in bed reading a newspaper on my arrival.  Reports no complaints today.  No further bleeding in stools since admission.  No nausea/vomiting/hematemesis/melena.  No new symptoms such as cough/headache/dyspnea/dysuria or hematuria.      Data reviewed today: I reviewed all medications, new labs and imaging results over the last 24 hours.     Physical Exam   Vital Signs: Temp: 97.6  F (36.4  C) Temp src: Oral BP: 118/62 Pulse: 79   Resp: 18 SpO2: 97 % O2 Device: None (Room air)    Weight: 146 lbs 2.64 oz  General Appearance: Alert, well appearing, and in no acute distress  Mental Status: Oriented to person, place, and time  HEENT: No pallor or icterus. Pupils equal and reactive, extraocular eye movements intact.  Chest: Clear to auscultation bilaterally, no wheezes, rales or rhonchi or crackels, symmetric air entry  Heart: Normal S1, S2.  Systolic murmur heard in the tricuspid region, rubs, clicks or gallops. Normal rate, regular rhythm  Abdomen: Soft, nontender, nondistended, no masses or organomegaly, Bowel sounds heard  Neurological: Alert, oriented, slurred speech-at baseline per patient and wife report, CNS: CN  2-12 intact, Strength bilaterally intact in UE and LE 5/5, 100% handgrip bilaterally  Skin and Extremities: Peripheral pulses normal, no pedal edema, no clubbing or cyanosis.  Multiple small ecchymosis suggestive of skin changes due to great being on DAPT.

## 2021-08-01 NOTE — PLAN OF CARE
Nursing Focus: Admission  D: Arrived at 2030 from ED via pt transport. Patient accompanied by pt transport. Admitted for GI bleed/blood in stool. No complaints at this time     I: Admission process began.  Patient oriented to room, enviroment, call light.  MD notified of patient's arrival on unit.     A: Vital signs stable, afebrile.  Patient stable at this time.      P: Implement plan of care when available. Continue to monitor patient. Nursing interventions as appropriate. Notify MD with changes in pt status.

## 2021-08-01 NOTE — PLAN OF CARE
AVSS. No c/o pain. Pt had 1200ml emesis; pt had drank about 2L of golytely in less than 2 hours; instructed pt to slow down a bit and he agreed. Pt had four loose/watery BM including incontinence x2; not clear yet. Has about 800 ml of Golytely left at 2300; pt agrees to work on it overnight and at 2300 took ODT zofran; would give again at 0500 and keep encouraging pt to drink golytely. NPO except for meds. Up with assistance of 1, gait belt and walker to commode. Condom catheter intact and with good output.

## 2021-08-01 NOTE — PROGRESS NOTES
"SPIRITUAL HEALTH SERVICES  SPIRITUAL ASSESSMENT Progress Note  [facility] ([place]) 7D   ON-CALL VISIT    REFERRAL SOURCE: Hospital  request upon admissions    Pt Nader identifies as Rastafari. His wife Thelma was with him at bedside.     Kelli asked that, \"We need all the miracles and prayers that we can get\" and requested that I pray for Nader. Nader also consented and we prayed together at their request.     PLAN: Unit  will be notified for follow up.    Jay Phillips  [employer]   Pager [phone]    [facility] remains available 24/7 for emergent requests/referrals, either by having the switchboard page the on-call  or by entering an ASAP/STAT consult in Epic (this will also page the on-call ).    "

## 2021-08-01 NOTE — PLAN OF CARE
Nader had a few stools today that did not have blood in them He will have a Golytely prep tonight for a colonoscopy tomorrow. He declined an NG placement  and wanted to try and drink the Golytely. He denied pain or nausea. He has slurred speech due to a history of a stroke. He is NPO. Up with assistance of 1, gait belt and a walker to the commode. He has a condom catheter on.

## 2021-08-02 ENCOUNTER — PATIENT OUTREACH (OUTPATIENT)
Dept: CARE COORDINATION | Facility: CLINIC | Age: 76
End: 2021-08-02

## 2021-08-02 ENCOUNTER — TELEPHONE (OUTPATIENT)
Dept: INTERNAL MEDICINE | Facility: CLINIC | Age: 76
End: 2021-08-02

## 2021-08-02 ENCOUNTER — MYC MEDICAL ADVICE (OUTPATIENT)
Dept: NEUROLOGY | Facility: CLINIC | Age: 76
End: 2021-08-02

## 2021-08-02 DIAGNOSIS — Z53.9 DIAGNOSIS NOT YET DEFINED: Primary | ICD-10-CM

## 2021-08-02 LAB
ANION GAP SERPL CALCULATED.3IONS-SCNC: 9 MMOL/L (ref 3–14)
BUN SERPL-MCNC: 11 MG/DL (ref 7–30)
CALCIUM SERPL-MCNC: 8.6 MG/DL (ref 8.5–10.1)
CHLORIDE BLD-SCNC: 100 MMOL/L (ref 94–109)
CO2 SERPL-SCNC: 28 MMOL/L (ref 20–32)
CREAT SERPL-MCNC: 0.67 MG/DL (ref 0.66–1.25)
ERYTHROCYTE [DISTWIDTH] IN BLOOD BY AUTOMATED COUNT: 14.4 % (ref 10–15)
GFR SERPL CREATININE-BSD FRML MDRD: >90 ML/MIN/1.73M2
GLUCOSE BLD-MCNC: 81 MG/DL (ref 70–99)
HCT VFR BLD AUTO: 28.7 % (ref 40–53)
HGB BLD-MCNC: 9.2 G/DL (ref 13.3–17.7)
MCH RBC QN AUTO: 30.3 PG (ref 26.5–33)
MCHC RBC AUTO-ENTMCNC: 32.1 G/DL (ref 31.5–36.5)
MCV RBC AUTO: 94 FL (ref 78–100)
PLATELET # BLD AUTO: 401 10E3/UL (ref 150–450)
POTASSIUM BLD-SCNC: 2.8 MMOL/L (ref 3.4–5.3)
POTASSIUM BLD-SCNC: 3.3 MMOL/L (ref 3.4–5.3)
RBC # BLD AUTO: 3.04 10E6/UL (ref 4.4–5.9)
SODIUM SERPL-SCNC: 137 MMOL/L (ref 133–144)
WBC # BLD AUTO: 8.3 10E3/UL (ref 4–11)

## 2021-08-02 PROCEDURE — 250N000011 HC RX IP 250 OP 636: Performed by: NURSE PRACTITIONER

## 2021-08-02 PROCEDURE — 36415 COLL VENOUS BLD VENIPUNCTURE: CPT | Performed by: PHYSICIAN ASSISTANT

## 2021-08-02 PROCEDURE — 85027 COMPLETE CBC AUTOMATED: CPT | Performed by: HOSPITALIST

## 2021-08-02 PROCEDURE — 999N000128 HC STATISTIC PERIPHERAL IV START W/O US GUIDANCE

## 2021-08-02 PROCEDURE — 120N000002 HC R&B MED SURG/OB UMMC

## 2021-08-02 PROCEDURE — 250N000011 HC RX IP 250 OP 636: Performed by: HOSPITALIST

## 2021-08-02 PROCEDURE — 36415 COLL VENOUS BLD VENIPUNCTURE: CPT | Performed by: HOSPITALIST

## 2021-08-02 PROCEDURE — 99207 PR CDG-CHARGE REQUIRED MANUAL ENTRY: CPT | Performed by: HOSPITALIST

## 2021-08-02 PROCEDURE — 250N000013 HC RX MED GY IP 250 OP 250 PS 637: Performed by: NURSE PRACTITIONER

## 2021-08-02 PROCEDURE — 250N000013 HC RX MED GY IP 250 OP 250 PS 637: Performed by: HOSPITALIST

## 2021-08-02 PROCEDURE — G0180 MD CERTIFICATION HHA PATIENT: HCPCS | Performed by: INTERNAL MEDICINE

## 2021-08-02 PROCEDURE — C9113 INJ PANTOPRAZOLE SODIUM, VIA: HCPCS | Performed by: NURSE PRACTITIONER

## 2021-08-02 PROCEDURE — 250N000011 HC RX IP 250 OP 636: Performed by: PHYSICIAN ASSISTANT

## 2021-08-02 PROCEDURE — 99232 SBSQ HOSP IP/OBS MODERATE 35: CPT | Performed by: HOSPITALIST

## 2021-08-02 PROCEDURE — 84132 ASSAY OF SERUM POTASSIUM: CPT | Performed by: PHYSICIAN ASSISTANT

## 2021-08-02 PROCEDURE — 82565 ASSAY OF CREATININE: CPT | Performed by: HOSPITALIST

## 2021-08-02 RX ORDER — POTASSIUM CHLORIDE 7.45 MG/ML
10 INJECTION INTRAVENOUS
Status: COMPLETED | OUTPATIENT
Start: 2021-08-02 | End: 2021-08-03

## 2021-08-02 RX ORDER — POTASSIUM CHLORIDE 7.45 MG/ML
10 INJECTION INTRAVENOUS
Status: DISPENSED | OUTPATIENT
Start: 2021-08-02 | End: 2021-08-02

## 2021-08-02 RX ADMIN — POTASSIUM CHLORIDE 10 MEQ: 7.46 INJECTION, SOLUTION INTRAVENOUS at 23:06

## 2021-08-02 RX ADMIN — POTASSIUM CHLORIDE 10 MEQ: 7.46 INJECTION, SOLUTION INTRAVENOUS at 10:32

## 2021-08-02 RX ADMIN — POTASSIUM CHLORIDE 10 MEQ: 7.46 INJECTION, SOLUTION INTRAVENOUS at 21:47

## 2021-08-02 RX ADMIN — PANTOPRAZOLE SODIUM 40 MG: 40 INJECTION, POWDER, FOR SOLUTION INTRAVENOUS at 20:26

## 2021-08-02 RX ADMIN — POLYETHYLENE GLYCOL 3350, SODIUM SULFATE ANHYDROUS, SODIUM BICARBONATE, SODIUM CHLORIDE, POTASSIUM CHLORIDE 2000 ML: 236; 22.74; 6.74; 5.86; 2.97 POWDER, FOR SOLUTION ORAL at 08:25

## 2021-08-02 RX ADMIN — OXYBUTYNIN CHLORIDE 20 MG: 5 TABLET, EXTENDED RELEASE ORAL at 22:12

## 2021-08-02 RX ADMIN — POTASSIUM CHLORIDE 10 MEQ: 7.46 INJECTION, SOLUTION INTRAVENOUS at 12:38

## 2021-08-02 RX ADMIN — POTASSIUM CHLORIDE 10 MEQ: 7.46 INJECTION, SOLUTION INTRAVENOUS at 14:07

## 2021-08-02 RX ADMIN — POLYETHYLENE GLYCOL 3350, SODIUM SULFATE ANHYDROUS, SODIUM BICARBONATE, SODIUM CHLORIDE, POTASSIUM CHLORIDE 4000 ML: 236; 22.74; 6.74; 5.86; 2.97 POWDER, FOR SOLUTION ORAL at 17:45

## 2021-08-02 RX ADMIN — PANTOPRAZOLE SODIUM 40 MG: 40 INJECTION, POWDER, FOR SOLUTION INTRAVENOUS at 08:25

## 2021-08-02 RX ADMIN — POTASSIUM CHLORIDE 10 MEQ: 7.46 INJECTION, SOLUTION INTRAVENOUS at 15:52

## 2021-08-02 ASSESSMENT — MIFFLIN-ST. JEOR: SCORE: 1426.25

## 2021-08-02 ASSESSMENT — ACTIVITIES OF DAILY LIVING (ADL)
ADLS_ACUITY_SCORE: 30
ADLS_ACUITY_SCORE: 33
ADLS_ACUITY_SCORE: 29
DEPENDENT_IADLS:: CLEANING;COOKING;LAUNDRY;SHOPPING;MEAL PREPARATION;MEDICATION MANAGEMENT;TRANSPORTATION
ADLS_ACUITY_SCORE: 29
ADLS_ACUITY_SCORE: 33
ADLS_ACUITY_SCORE: 29
DEPENDENT_IADLS:: CLEANING;COOKING;LAUNDRY;SHOPPING;MEAL PREPARATION;MEDICATION MANAGEMENT;TRANSPORTATION;INCONTINENCE

## 2021-08-02 NOTE — PROGRESS NOTES
"CLINICAL NUTRITION SERVICES - ASSESSMENT NOTE     Nutrition Prescription    RECOMMENDATIONS FOR MDs/PROVIDERS TO ORDER:  --Diet advancement post-procedure as medically able.   --Consider appetite stimulant if appropriate (re: Marinol, Remeron, or Megace).     Malnutrition Status:    Severe malnutrition in the context of acute on chronic illness    Recommendations already ordered by Registered Dietitian (RD):  --Supplements x 2 today (while on Clear Liquid Diet): Gelatein (orange), Ensure Clear (apple).     Future/Additional Recommendations:  --Monitor diet advancement. Order vanilla-flavored supplements when able (Ensure Enlive, [employer], Vital [employer]isine).   --Consider calorie counts.      REASON FOR ASSESSMENT  Elier Leong is a/an 75 year old male assessed by the dietitian for Admission Nutrition Risk Screen for positive (2-13 lb weight loss and decreased appetite).     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 Per H&P \"Reports ongoing generalized weakness, decreased appetite, decreased po intake, weight loss.\"    Pt was seen by RD during recent admission on 7/16/2021 and 6/17/2021. Pertinent information from RD notes:  6/17/2021:   --\"50% meals. Pt. Reports recent loss of appetite and eating less than normal for the last month. His wife says he has never been a big eater but has been eating less than he normally would.\" - Home Diet: no special diet, eats small bites.   7/16/2021:  --Elier has some trouble with gargled speech, however still able to understand. Wife cares for patient at home. They both reported weight loss of bout 10# in 1-1.5 months. Wife reported that she makes Nader's meals and cuts up most of his food in small bite size peices, just because it is easier for patient " "to consume. She states that he eats % of his meals, but she did report that she gives him the amount of food she knows he will eat, not necessarily the amount he may need. Reports he dislikes ensure but does have a whey protein shake in the morning. Nader reported that he just does not have an appetite, but knows he needs to eat. Wife and Nader both interested in ways to get more protein and calories in his diet. They were open to trial of other supplements (vanilla).   --Malnutrition Diagnosis: Severe malnutrition in the context of chronic illness  --Weight History: wife and Nader report 6% wt loss in 1.5 months. Report his UBW is 165#. Difficult to assess weight trends.     Colonoscopy today. Pt with very garbled speech. Wife at bedside. Pt/wife report appetite has improved since previous admission but were unsure of weight changes. Pt's wife does report dislike for the hospital food. Pt likes vanilla-flavored supplements and has some at home from his recent admission. Pt's wife concerned pt will get weaker/lose more weight with colonoscopy prep. Discussed clear liquid supplements available (per RN, pt on clear liquids until 16:00). Pt and wife agreed to try clear liquid supplements today and for RD to order variety of vanilla-flavored supplements pending diet advancement.     CURRENT NUTRITION ORDERS  Diet: Clear Liquid    LABS  K+ 2.8 (L)    MEDICATIONS  Medications reviewed    ANTHROPOMETRICS  Height: 177.8 cm (5' 10\")  Most Recent Weight: 69 kg (152 lb 1.9 oz); 66.3 kg on 7/31    IBW: 75.5 kg  BMI: Normal BMI  Weight History:   Wt Readings from Last 20 Encounters:   08/02/21 69 kg (152 lb 1.9 oz)   07/22/21 69.9 kg (154 lb)   07/21/21 70.2 kg (154 lb 11.2 oz)   07/08/21 64.7 kg (142 lb 9.6 oz)   06/28/21 67.2 kg (148 lb 1.6 oz)   06/17/21 68 kg (150 lb)   06/10/21 68.6 kg (151 lb 3.2 oz)   02/13/20 70.8 kg (156 lb)   01/10/20 70.8 kg (156 lb)   08/15/19 70.9 kg (156 lb 6.4 oz)   06/06/19 70.8 kg (156 lb) "   05/02/19 73.9 kg (163 lb)   01/18/19 72.3 kg (159 lb 4.8 oz)   11/30/18 74.4 kg (164 lb)   11/01/18 73.7 kg (162 lb 8 oz)   10/27/18 73.5 kg (162 lb)   07/09/18 74.6 kg (164 lb 6.4 oz)   05/29/18 74.8 kg (165 lb)   05/04/18 75.2 kg (165 lb 12.8 oz)   05/03/18 76.1 kg (167 lb 11.2 oz)     Dosing Weight: 66 kg lowest weight on 7/31    ASSESSED NUTRITION NEEDS  Estimated Energy Needs: 3466-1163 kcals/day (30 - 35 kcals/kg )  Justification: Increased needs and Repletion  Estimated Protein Needs: 79-99 grams protein/day (1.2 - 1.5 grams of pro/kg)  Justification: Increased needs and Repletion  Estimated Fluid Needs: (1 mL/kcal)   Justification: Maintenance and Per provider pending fluid status    PHYSICAL FINDINGS  See malnutrition section below.     MALNUTRITION  % Intake: </=75% for >/= 1 month (severe)  % Weight Loss: difficult to assess  Subcutaneous Fat Loss: Facial region: moderate, Upper arm:  mild and Lower arm:  mild  Muscle Loss: Temporal:  mild, Thoracic region (clavicle, acromium bone, deltoid, trapezius, pectoral):  moderate, Upper arm (bicep, tricep):  moderate, Lower arm  (forearm):  moderate, Dorsal hand:  moderate, Upper leg (quadricep, hamstring):  moderate, Patellar region:  moderate and Posterior calf:  moderate  Fluid Accumulation/Edema: None noted  Malnutrition Diagnosis: Severe malnutrition in the context of acute on chronic illness    NUTRITION DIAGNOSIS  Inadequate oral intake related to poor appetite and suspected hypermetabolism 2/2 diagnosis as evidenced by pt/wife report of usual intake, mild/moderate muscle and fat wasting on exam, and NPO/clear liquid diet x 2-3 days.     INTERVENTIONS  Implementation  Nutrition education for recommended modifications   Medical food supplement therapy     Goals  Patient to consume % of nutritionally adequate meal trays TID, or the equivalent with supplements/snacks.     Monitoring/Evaluation  Progress toward goals will be monitored and evaluated per  protocol.    Rosette Devine MS, RD, LD, [facility] pager: [phone]  ASCOM: [id number]

## 2021-08-02 NOTE — PROGRESS NOTES
[facility]  Patient is currently open to home care services with [facility]. The patient is currently receiving RN, PT, OT, HHA services.  and home health team have been notified of patient admission. [facility] liaison will continue to follow patient during stay. If appropriate provide orders to resume home care at time of discharge.    Do Valderrama RN   [facility] Liaison   [phone]

## 2021-08-02 NOTE — CONSULTS
Care Management Initial Consult    General Information  Assessment completed with: Patient, Care Team Member   Type of CM/SW Visit: Initial Assessment    Primary Care Provider verified and updated as needed: Yes   Readmission within the last 30 days: Previous discharge plan unsuccessful      Reason for Consult: Discharge Planning  Advance Care Planning: Reviewed: Verified with patient       Communication Assessment  Patient's communication style: Spoken language (English or Bilingual)    Hearing Difficulty or Deaf: yes   Wear Glasses or Blind: yes    Cognitive  Cognitive/Neuro/Behavioral: WDL except  Level of Consciousness: alert  Arousal Level: arouses to voice  Orientation: oriented x 4  Mood/Behavior: calm  Best Language: 0 - No aphasia  Speech: slurred, dysarthric    Living Environment:   People in home: Spouse     Current living Arrangements: House      Able to return to prior arrangements: Yes     Family/Social Support:  Care provided by: Spouse/significant other  Provides care for: No one, unable/limited ability to care for self     Description of Support System: Supportive, Involved      Current Resources:   Patient receiving home care services: Yes  Skilled Home Care Services: Skilled Nursing, Physicial Therapy, Occupational Therapy, Home Health Aid  Community Resources: DME, Home Care  Equipment currently used at home: Walker, rolling, wheelchair, manual, grab bar, toilet, grab bar, tub/shower  Supplies currently used at home: Incontinence Supplies    Employment/Financial:  Employment Status: Retired        Financial Concerns: No concerns identified     Lifestyle & Psychosocial Needs:  Social Determinants of Health     Tobacco Use: Medium Risk     Smoking Tobacco Use: Former Smoker     Smokeless Tobacco Use: Never Used   Alcohol Use: Unknown     Frequency of Alcohol Consumption: 2-4 times a month     Average Number of Drinks: 1 or 2     Frequency of Binge Drinking: Not on file   Financial Resource Strain:  Low Risk      Difficulty of Paying Living Expenses: Not hard at all   Food Insecurity:      Worried About Running Out of Food in the Last Year:      Ran Out of Food in the Last Year:    Transportation Needs: No Transportation Needs     Lack of Transportation (Medical): No     Lack of Transportation (Non-Medical): No   Physical Activity:      Days of Exercise per Week:      Minutes of Exercise per Session:    Stress:      Feeling of Stress :    Social Connections:      Frequency of Communication with Friends and Family:      Frequency of Social Gatherings with Friends and Family:      Attends [employer] Services:      Active Member of Clubs or Organizations:      Attends Club or Organization Meetings:      Marital Status:    Intimate Partner Violence:      Fear of Current or Ex-Partner:      Emotionally Abused:      Physically Abused:      Sexually Abused:    Depression: Not at risk     PHQ-2 Score: 0   Housing Stability: Low Risk      Unable to Pay for Housing in the Last Year: No     Number of Places Lived in the Last Year: 1     Unstable Housing in the Last Year: No       Functional Status:  Prior to admission patient needed assistance:   Dependent ADLs: Ambulation-walker, Bathing, Dressing, Grooming, Toileting, Wheelchair-with assist, Transfers  Dependent IADLs: Cleaning, Cooking, Laundry, Shopping, Meal Preparation, Medication Management, Transportation, Incontinence  Assesssment of Functional Status: At functional baseline    Mental Health Status:  Mental Health Status: No Current Concerns       Chemical Dependency Status:  Chemical Dependency Status: No Current Concerns           Values/Beliefs:  Spiritual, Cultural Beliefs, [employer] Practices, Values that affect care: No            Additional Information:    Patient was admitted for management of GI bleed. GI consulted. Patient will have colonoscopy tomorrow, 8/3.    CM assessment being completed due to discharge planning and elevated unplanned readmission  risk score.     Per chart review, PT is recommending HHPT upon discharge.     Patient is currently open to [facility] and receives RN, PT, OT and HHA services. Resumption orders placed.     AVS updated. RNCC will follow.     Nicol Sanders RN, BSN, PHN  Care Coordinator   P: [phone], [facility]

## 2021-08-02 NOTE — PLAN OF CARE
PT-7D: attempted to see pt for PT session; cx-ill, pt's spouse requesting that PT be rescheduled to 8/3/21; rescheduled per spouse's request

## 2021-08-02 NOTE — PLAN OF CARE
No rectal bleeding noted. He drank Golytely and still wasn't clear for a colonoscopy today. Plan is to started a 4 liter Golytely at 1600 and have a colonoscopy tomorrow at 0900. Denied pain or nausea. Potassium is being replaced and will need a recheck when it is done. Up with assistance of one with a gait belt and walker.

## 2021-08-02 NOTE — PLAN OF CARE
"/75 (BP Location: Left arm)   Pulse 76   Temp 98.1  F (36.7  C) (Axillary)   Resp 16   Ht 1.778 m (5' 10\")   Wt 66.3 kg (146 lb 2.6 oz)   SpO2 94%   BMI 20.97 kg/m    Neuro: A&Ox4. Slurred, slow speech s/p CVA, Other neuros intact.  Cardiac: Afebrile, VSS.   Respiratory: RA   GI/: condom cath in place. Uop wdl.  Incontinent of watery brown stool x5 post completion of 3.5L golytely. One incontinent episode included a hard gold ball size stool. Barrier cream applied to coccyx 2/2 blanchable redness with stool incontinence. Rectal pouch ordered; awaiting delivery. Additional 2L of golytely scheduled at 0800.  Diet/appetite: NPO except for meds.  Denies nausea   Activity: repositioned in bed through shift.   Pain: . Denies   Skin: blanchable redness to coccyx- barrier cream applied.  Lines:  PIV sl'd  Drains: none present. Pt expressed again that did not want ng placed for Golytely admin.    Colonoscopy today.     Rested btwn cares. Able to make needs known. Continue to monitor. Continue POC.    "

## 2021-08-02 NOTE — PROGRESS NOTES
Clinic Care Coordination Contact  Ambulatory Care Coordination to Inpatient Care Management   Hand-In Communication    Date:  August 2, 2021  Name: Elier Leong is enrolled in Ambulatory Care Coordination program and I am the Lead Care Coordinator.  CC Contact Information: Epic [employer] + phone  Payor Source: Payor: [employer] / Plan: [employer] MEDICARE ADVANTAGE / Product Type: HMO /   Current services in place: Home Care   Please see the CC [place] and Care Management Flowsheets for specific  details of this Elier Leong care plan.   Additional details/specific concerns r/t this admission:    No additional concerns at this time .    I will follow this admission in Epic. Please feel free to contact me with questions or for further collaboration in discharge planning.    Tonya Hernández RN Care Coordinator  [facility]  Email: [email or web address]  Phone: [phone]

## 2021-08-03 ENCOUNTER — RESULTS ONLY (OUTPATIENT)
Dept: GASTROENTEROLOGY | Facility: CLINIC | Age: 76
End: 2021-08-03

## 2021-08-03 ENCOUNTER — DOCUMENTATION ONLY (OUTPATIENT)
Dept: OTHER | Facility: CLINIC | Age: 76
End: 2021-08-03

## 2021-08-03 VITALS
HEART RATE: 73 BPM | SYSTOLIC BLOOD PRESSURE: 115 MMHG | HEIGHT: 70 IN | RESPIRATION RATE: 16 BRPM | BODY MASS INDEX: 21.46 KG/M2 | OXYGEN SATURATION: 94 % | TEMPERATURE: 97.5 F | DIASTOLIC BLOOD PRESSURE: 62 MMHG | WEIGHT: 149.91 LBS

## 2021-08-03 LAB
ANION GAP SERPL CALCULATED.3IONS-SCNC: 5 MMOL/L (ref 3–14)
BUN SERPL-MCNC: 8 MG/DL (ref 7–30)
CALCIUM SERPL-MCNC: 8.8 MG/DL (ref 8.5–10.1)
CHLORIDE BLD-SCNC: 105 MMOL/L (ref 94–109)
CO2 SERPL-SCNC: 28 MMOL/L (ref 20–32)
CREAT SERPL-MCNC: 0.65 MG/DL (ref 0.66–1.25)
ERYTHROCYTE [DISTWIDTH] IN BLOOD BY AUTOMATED COUNT: 14.3 % (ref 10–15)
GFR SERPL CREATININE-BSD FRML MDRD: >90 ML/MIN/1.73M2
GLUCOSE BLD-MCNC: 78 MG/DL (ref 70–99)
HCT VFR BLD AUTO: 28.6 % (ref 40–53)
HGB BLD-MCNC: 9.2 G/DL (ref 13.3–17.7)
MCH RBC QN AUTO: 30.9 PG (ref 26.5–33)
MCHC RBC AUTO-ENTMCNC: 32.2 G/DL (ref 31.5–36.5)
MCV RBC AUTO: 96 FL (ref 78–100)
PLATELET # BLD AUTO: 364 10E3/UL (ref 150–450)
POTASSIUM BLD-SCNC: 3.4 MMOL/L (ref 3.4–5.3)
POTASSIUM BLD-SCNC: 3.4 MMOL/L (ref 3.4–5.3)
RBC # BLD AUTO: 2.98 10E6/UL (ref 4.4–5.9)
SODIUM SERPL-SCNC: 138 MMOL/L (ref 133–144)
WBC # BLD AUTO: 5.8 10E3/UL (ref 4–11)

## 2021-08-03 PROCEDURE — 45378 DIAGNOSTIC COLONOSCOPY: CPT | Performed by: INTERNAL MEDICINE

## 2021-08-03 PROCEDURE — 250N000011 HC RX IP 250 OP 636: Performed by: INTERNAL MEDICINE

## 2021-08-03 PROCEDURE — 250N000009 HC RX 250: Performed by: INTERNAL MEDICINE

## 2021-08-03 PROCEDURE — 250N000011 HC RX IP 250 OP 636: Performed by: NURSE PRACTITIONER

## 2021-08-03 PROCEDURE — 85027 COMPLETE CBC AUTOMATED: CPT | Performed by: HOSPITALIST

## 2021-08-03 PROCEDURE — 0DBN8ZX EXCISION OF SIGMOID COLON, VIA NATURAL OR ARTIFICIAL OPENING ENDOSCOPIC, DIAGNOSTIC: ICD-10-PCS | Performed by: INTERNAL MEDICINE

## 2021-08-03 PROCEDURE — 84132 ASSAY OF SERUM POTASSIUM: CPT | Performed by: HOSPITALIST

## 2021-08-03 PROCEDURE — 99239 HOSP IP/OBS DSCHRG MGMT >30: CPT | Performed by: INTERNAL MEDICINE

## 2021-08-03 PROCEDURE — 43235 EGD DIAGNOSTIC BRUSH WASH: CPT | Performed by: INTERNAL MEDICINE

## 2021-08-03 PROCEDURE — 36415 COLL VENOUS BLD VENIPUNCTURE: CPT | Performed by: HOSPITALIST

## 2021-08-03 PROCEDURE — 88305 TISSUE EXAM BY PATHOLOGIST: CPT | Mod: TC | Performed by: INTERNAL MEDICINE

## 2021-08-03 PROCEDURE — 250N000011 HC RX IP 250 OP 636: Performed by: HOSPITALIST

## 2021-08-03 PROCEDURE — C9113 INJ PANTOPRAZOLE SODIUM, VIA: HCPCS | Performed by: NURSE PRACTITIONER

## 2021-08-03 PROCEDURE — 99153 MOD SED SAME PHYS/QHP EA: CPT | Performed by: INTERNAL MEDICINE

## 2021-08-03 PROCEDURE — G0500 MOD SEDAT ENDO SERVICE >5YRS: HCPCS | Performed by: INTERNAL MEDICINE

## 2021-08-03 PROCEDURE — 45380 COLONOSCOPY AND BIOPSY: CPT | Performed by: INTERNAL MEDICINE

## 2021-08-03 PROCEDURE — 82374 ASSAY BLOOD CARBON DIOXIDE: CPT | Performed by: HOSPITALIST

## 2021-08-03 PROCEDURE — 0DJ08ZZ INSPECTION OF UPPER INTESTINAL TRACT, VIA NATURAL OR ARTIFICIAL OPENING ENDOSCOPIC: ICD-10-PCS | Performed by: INTERNAL MEDICINE

## 2021-08-03 RX ORDER — POTASSIUM CHLORIDE 7.45 MG/ML
10 INJECTION INTRAVENOUS
Status: COMPLETED | OUTPATIENT
Start: 2021-08-03 | End: 2021-08-03

## 2021-08-03 RX ORDER — FENTANYL CITRATE 50 UG/ML
INJECTION, SOLUTION INTRAMUSCULAR; INTRAVENOUS PRN
Status: COMPLETED | OUTPATIENT
Start: 2021-08-03 | End: 2021-08-03

## 2021-08-03 RX ORDER — POTASSIUM CHLORIDE 7.45 MG/ML
10 INJECTION INTRAVENOUS
Status: DISPENSED | OUTPATIENT
Start: 2021-08-03 | End: 2021-08-03

## 2021-08-03 RX ADMIN — POTASSIUM CHLORIDE 10 MEQ: 7.46 INJECTION, SOLUTION INTRAVENOUS at 07:04

## 2021-08-03 RX ADMIN — MIDAZOLAM 1 MG: 1 INJECTION INTRAMUSCULAR; INTRAVENOUS at 11:05

## 2021-08-03 RX ADMIN — FENTANYL CITRATE 25 MCG: 50 INJECTION, SOLUTION INTRAMUSCULAR; INTRAVENOUS at 11:10

## 2021-08-03 RX ADMIN — FENTANYL CITRATE 25 MCG: 50 INJECTION, SOLUTION INTRAMUSCULAR; INTRAVENOUS at 11:33

## 2021-08-03 RX ADMIN — FENTANYL CITRATE 50 MCG: 50 INJECTION, SOLUTION INTRAMUSCULAR; INTRAVENOUS at 11:06

## 2021-08-03 RX ADMIN — POTASSIUM CHLORIDE 10 MEQ: 7.46 INJECTION, SOLUTION INTRAVENOUS at 08:49

## 2021-08-03 RX ADMIN — POTASSIUM CHLORIDE 10 MEQ: 7.46 INJECTION, SOLUTION INTRAVENOUS at 13:24

## 2021-08-03 RX ADMIN — SIMETHICONE 2 ML: 63.3; 3.7 SOLUTION/ DROPS ORAL at 11:37

## 2021-08-03 RX ADMIN — POTASSIUM CHLORIDE 10 MEQ: 7.46 INJECTION, SOLUTION INTRAVENOUS at 15:51

## 2021-08-03 RX ADMIN — PANTOPRAZOLE SODIUM 40 MG: 40 INJECTION, POWDER, FOR SOLUTION INTRAVENOUS at 08:49

## 2021-08-03 ASSESSMENT — MIFFLIN-ST. JEOR: SCORE: 1416.25

## 2021-08-03 ASSESSMENT — ACTIVITIES OF DAILY LIVING (ADL)
ADLS_ACUITY_SCORE: 31

## 2021-08-03 NOTE — DISCHARGE SUMMARY
[facility]  Hospitalist Discharge Summary      Date of Admission:  7/31/2021  Date of Discharge:  8/3/2021  Discharging Provider: Bautista Yee MD, MD  Discharge Team: Hospitalist Service, Gold 7    Discharge Diagnoses      ##. GIB    Chronic:   ##. 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 Post ampullectomy bleeding (7/14/21) due to plavix   ##. Choledocholithiasis   ##. History of PLS:    # History of CVA: Acute CVA in 2018. Subacute CVA 11/2020. Currently at neurologic baseline.     Follow-ups Needed After Discharge   Follow-up Appointments     Adult [facility] Follow-up and recommended labs and tests      Follow up with primary care provider, Lida Ray, within 7 days for   hospital follow- up.  The following labs/tests are recommended: BMP.      Follow up with GI  , at (location with clinic name or city) [facility] as   planned  .     Appointments on [place] and/or [facility] (with [facility] or [facility]   provider or service). Call [phone] if you haven't heard regarding   these appointments within 7 days of discharge.             Unresulted Labs Ordered in the Past 30 Days of this Admission     No orders found from 7/1/2021 to 8/1/2021.          Discharge Disposition   Discharged to home  Condition at discharge: Stable      Hospital Course   Elier Leong is a 75 year old male admitted on 7/31/2021. 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      ##. GIB  ##. 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 Post ampullectomy bleeding (7/14/21) due to plavix   2 episodes of bright red blood per rectum 1 day PTA  Relevant recent admissions: Had ERCP with papillectomy on  7/12/21 for ampullary adenocarcinoma and inadvertently restarted Plavix early and then developed several episodes of hematochezia and melena 7/14. Hgb 8.1 on 7/14 on admission, down from 15.2 on 7/12. Underwent EGD/ERCP 7/14 with evidence of ulceration near pancreatic duct, which was thought to be the site of bleeding, successfully treated with thermal therapy and epinephrine. Hgb 6.1 on 7/15, required 1 unit pRBCs with stable Hgb in 7-8 range thereafter. Patient's Plavix held for 7 days after ERCP until day of discharge on 7/21. TCU recommended but none of family preferred TCUs had bed availability, so patient was discharged home. Repeat ERCP in 1 month (scheduled 8/19).   - GI was consulted on admission.  He was managed conservatively and had C-scope and duodenoscope on 8/3 that showed :   -EGD with healing ampulectomy site and no evidence or recent bleeding  -Colonoscopy with poor prep, but scope complete without obvious mass, large polyp, or stigmata of bleeding. Granular tissue in rectum biopsied, could represent healing stercoral ulcer  -Will follow up on pathology results and need for 8/19 ERCP with Dr Kent    As his hemoglobin remained stable he was discharged home in stable condition.  His aspirin was resumed at discharge.  As per his wife's discussion with his neurologist the plan is for Plavix to be discontinued.  He will follow with the neurologist as outpatient.     ##.Bilateral lower leg edema  - per wife, Lasix was started in prior admission as patient had bilateral pedal edema.  He was currently euvolemic on exam so lasix discontinued      ##. Choledocholithiasis  - Discovered during ERCP 7/14. Stone removed and stents placed in CBD.  Patient will need follow-up for stent  per GI.     ##. History of PLS: Diagnosed 19 years ago. At baseline w/ slurred speech. Able to ambulate with walker short distances, wheelchair for longer distances. Per wife in WC mostly. Followed by neurology.  Has intrathecal  "baclofen pump (if needs MRI, notify [employer] for resetting)  Since 2010, last filled here on 7/20/21 here. Followed by Dr. Calvert at [facility]. Per PMR Note on 7/20/21 procedure note:  \"Intrathecal baclofen 2000 mcg/mL concentration  SynchroMed II 20 mL pump with a simple continuous infusion mode.   His dose is 234.7 mcg per day and has not been changed recently\"  - PTA Oxybutinin     # History of CVA: Acute CVA in 2018. Subacute CVA 11/2020. Currently at neurologic baseline.   -Hold Plavix and ASA 2/2 GIB     Consultations This Hospital Stay   GI LUMINAL ADULT IP CONSULT  PHYSICAL THERAPY ADULT IP CONSULT  SOCIAL WORK IP CONSULT  MEDICATION HISTORY IP PHARMACY CONSULT  CARE MANAGEMENT / SOCIAL WORK IP CONSULT  VASCULAR ACCESS CARE ADULT IP CONSULT  VASCULAR ACCESS CARE ADULT IP CONSULT    Code Status   Full Code    Time Spent on this Encounter   I, Bautista Yee MD, MD, personally saw the patient today and spent greater than 30 minutes discharging this patient.       Bautista Yee MD, MD  [facility] [address] [place] [id number]  Phone: [phone]  ______________________________________________________________________    Physical Exam   Vital Signs: Temp: 97.5  F (36.4  C) Temp src: Oral BP: 115/62 Pulse: 73   Resp: 16 SpO2: 94 % O2 Device: None (Room air)    Weight: 149 lbs 14.6 oz  Gen- pleasant lying in bed  HEENT-  LANEY  Neck- supple, no JVD elevation, no thyromegaly  CVS- I+II+ no m/r/g  RS- CTAB  Abdo- soft, no tenderness . No g/r/r  Ext- no edema   CNS- dysarthria. Weakness from previous stroke    Primary Care Physician   Lida Ray    Discharge Orders      Home care nursing referral      Reason for your hospital stay    -EGD with healing ampulectomy site and no evidence or recent bleeding  -Colonoscopy with poor prep, but scope complete without obvious mass, large polyp, or stigmata of bleeding. Granular tissue in rectum biopsied, could represent healing " stercoral ulcer  -Will follow up on pathology results and need for  ERCP with Dr Kent  -No further inpatient work up indicated     Activity    Your activity upon discharge: activity as tolerated     Adult [facility] Follow-up and recommended labs and tests    Follow up with primary care provider, Lida Ray, within 7 days for hospital follow- up.  The following labs/tests are recommended: BMP.      Follow up with GI  , at (location with clinic name or city) [facility] as planned  .     Appointments on [place] and/or [facility] (with [facility] or [facility] provider or service). Call [phone] if you haven't heard regarding these appointments within 7 days of discharge.     Diet    Follow this diet upon discharge: Orders Placed This Encounter      Regular Diet Adult       Significant Results and Procedures   Results for orders placed or performed during the hospital encounter of 21   Echocardiogram Complete     Value    LVEF  55-60%    Narrative    [id number]  [id number]  [id number]  [id number]^MALIKA^KAROLINE^ADARSH     [facility]  Echocardiography Laboratory  [address] [place] [id number]     Name: ANIL QUINN  MRN: [id number]  : 1945  Study Date: 2021 08:27 AM  Age: 75 yrs  Gender: Male  Patient Location: [facility]  Reason For Study: Edema  Ordering Physician: KAROLINE DALY  Performed By: MELINA Canela     BSA: 1.9 m2  Height: 70 in  Weight: 155 lb  HR: 69  BP: 115/83 mmHg  ______________________________________________________________________________  Procedure  Complete Portable Echo Adult. Echocardiogram with two-dimensional, color and  spectral Doppler performed.  ______________________________________________________________________________  Interpretation Summary  Left ventricular size, wall motion and function are normal. The ejection  fraction is 55-60%.  Right ventricular function, chamber size, wall motion, and thickness  are  normal.  Right ventricular systolic pressure is 35mmHg above the right atrial pressure.  IVC diameter <2.1 cm collapsing >50% with sniff suggests a normal RA pressure  of 3 mmHg.  No pericardial effusion is present.  ______________________________________________________________________________  Left Ventricle  Left ventricular size, wall motion and function are normal. The ejection  fraction is 55-60%. Left ventricular diastolic function is indeterminate.     Right Ventricle  Right ventricular function, chamber size, wall motion, and thickness are  normal.     Atria  Both atria appear normal.     Mitral Valve  The mitral valve is normal. Trace mitral insufficiency is present.     Aortic Valve  Trileaflet aortic sclerosis without stenosis. Trace aortic insufficiency is  present.     Tricuspid Valve  The tricuspid valve is normal. Mild to moderate tricuspid insufficiency is  present. Pulmonary hypertension is present. Right ventricular systolic  pressure is 35mmHg above the right atrial pressure.     Pulmonic Valve  The pulmonic valve is normal. Trace pulmonic insufficiency is present.     Vessels  The aorta root is normal. The thoracic aorta is normal. The pulmonary artery  cannot be assessed. The inferior vena cava was normal in size with preserved  respiratory variability. IVC diameter <2.1 cm collapsing >50% with sniff  suggests a normal RA pressure of 3 mmHg.     Pericardium  No pericardial effusion is present.     Compared to Previous Study  This study was compared with the study from 11/17/2020 . Estimated RVSP is  higher.  ______________________________________________________________________________  MMode/2D Measurements & Calculations     IVSd: 1.2 cm  LVIDd: 4.4 cm  LVIDs: 3.0 cm  LVPWd: 0.91 cm  FS: 32.3 %  LV mass(C)d: 155.0 grams  LV mass(C)dI: 82.7 grams/m2  Ao root diam: 3.8 cm  asc Aorta Diam: 3.5 cm  LVOT diam: 1.9 cm  LVOT area: 2.8 cm2  RWT: 0.42     Doppler Measurements & Calculations  MV E  max susi: 97.0 cm/sec  MV A max susi: 113.0 cm/sec  MV E/A: 0.86  MV max P.8 mmHg  MV mean P.0 mmHg  MV V2 VTI: 36.5 cm  MV dec slope: 430.0 cm/sec2  PA acc time: 0.12 sec  TR max susi: 295.0 cm/sec  TR max P.8 mmHg  E/E' av.4     Lateral E/e': 12.2  Medial E/e': 14.6     ______________________________________________________________________________  Report approved by: MD Jordan Christensen 2021 09:38 AM           *Note: Due to a large number of results and/or encounters for the requested time period, some results have not been displayed. A complete set of results can be found in Results Review.       Discharge Medications   Current Discharge Medication List      CONTINUE these medications which have NOT CHANGED    Details   baclofen (LIORESAL) intraTHECAL Internal Pump by Intrathecal route continuous prn Pump filled by [facility] stroke [place] [phone]  Pump Model: Syncromed  Medication in pump is Gablofen (brand name)  Last fill: during hospital admission  Next fill:     [place] Alarm Date:   Reservoir Volume: 20 ml  Conc: 2000 mcg/mL  Delivers 234.7 mcg/day  Basal rate:unknown  Battery life: unknown      ketoconazole (NIZORAL) 2 % external shampoo SHAMPOO EVERY 2-3 DAYS AS  NEEDED  Qty: 120 mL, Refills: 10    Associated Diagnoses: Dermatitis      loperamide (IMODIUM) 2 MG capsule Take 1 capsule (2 mg) by mouth 4 times daily as needed for diarrhea  Qty: 60 capsule, Refills: 0    Associated Diagnoses: Diarrhea, unspecified type      acetaminophen (TYLENOL) 650 MG CR tablet Take 650 mg by mouth every 8 hours as needed for mild pain (Shoulder pain)       aspirin 81 MG tablet Take 81 mg by mouth every evening       EPINEPHrine 0.3 MG/0.3ML injection Inject 0.3 mLs (0.3 mg) into the muscle as needed for anaphylaxis  Qty: 0.3 mL, Refills: 3    Associated Diagnoses: Bee sting reaction, accidental or unintentional, initial encounter     "  fluorouracil (EFUDEX) 5 % external cream Apply topically to legs 1 to 2 times weekly as needed/tolerated for maintenance      oxybutynin ER (DITROPAN-XL) 10 MG 24 hr tablet Take 2 tablets (20 mg) by mouth At Bedtime  Qty: 180 tablet, Refills: 3    Comments: ### DO NOT FILL NOW.  Please update patient's profile to reflect additional refills.  ####  Associated Diagnoses: Urinary frequency      pantoprazole (PROTONIX) 40 MG EC tablet Take 1 tablet (40 mg) by mouth 2 times daily  Qty: 60 tablet, Refills: 0    Associated Diagnoses: Gastroesophageal reflux disease without esophagitis      polyethylene glycol (MIRALAX/GLYCOLAX) Packet Take 1 packet by mouth daily as needed Every 2-3 days.         STOP taking these medications       clopidogrel (PLAVIX) 75 MG tablet Comments:   Reason for Stopping:         furosemide (LASIX) 20 MG tablet Comments:   Reason for Stopping:             Allergies   Allergies   Allergen Reactions     Bee Venom      Swelling and hives     Penicillins Hives and Swelling     \"HIVES\"; occurred at age 39     "

## 2021-08-03 NOTE — PLAN OF CARE
[id number]: VSS on RA. Frequently encouraging pt to drink Golytely solution with little result. Cross cover updated. Last BM watery and tan. Plan to encourage as much intake of Golytely as possible before 0800 then NPO.  K+ recheck of 3.4. Bag 1 of 4 IV potassium hanging. Recheck 1 hour after last bag. No UOP this shift. Bladder scanned at 0615 for 405 ml. Pt denies feeling like he has to void. AM RN to follow up with team. Continue to monitor.

## 2021-08-03 NOTE — PROVIDER NOTIFICATION
Provider notification;    Doctor Heme notified at 0300 [phone].    Reason for notification: FYI: Pt to have colonoscopy at 9am. Pt has finished a little over half of Golytely. Encouraging intake with little success. Stools brown and watery.     Plan: Continue to encourage Golytely intake.

## 2021-08-03 NOTE — PLAN OF CARE
Nader had an EDG and colonoscopy today. He was alert and oriented post procedure. His potassium is being replaced an he will need one more bad and a potassium recheck. Up with assistance of one with a gait belt and walker. He had urinary retention and was straight cath ed for 428 ml's. The plan is for him to discharge later today.

## 2021-08-03 NOTE — PROGRESS NOTES
[facility]    Medicine Progress Note - Hospitalist Service, Gold 7       Date of Admission:  7/31/2021    Assessment & Plan           Elier Leong is a 75 year old male admitted on 7/31/2021. 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     ##. GIB  ##. 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 Post ampullectomy bleeding (7/14/21) due to plavix   2 episodes of bright red blood per rectum 1 day PTA  Relevant recent admissions: Had ERCP with papillectomy on 7/12/21 for ampullary adenocarcinoma and inadvertently restarted Plavix early and then developed several episodes of hematochezia and melena 7/14. Hgb 8.1 on 7/14 on admission, down from 15.2 on 7/12. Underwent EGD/ERCP 7/14 with evidence of ulceration near pancreatic duct, which was thought to be the site of bleeding, successfully treated with thermal therapy and epinephrine. Hgb 6.1 on 7/15, required 1 unit pRBCs with stable Hgb in 7-8 range thereafter. Patient's Plavix held for 7 days after ERCP until day of discharge on 7/21. TCU recommended but none of family preferred TCUs had bed availability, so patient was discharged home. Repeat ERCP in 1 month (scheduled 8/19).   - GI was consulted on admission. Plan is for NGT and 4L on golytely on 8/1 and 2L on 8/2 with plan for C-scope and duodenoscope on 8/2- failed prep, now plan for C-scope on 8/3 after more prep  - NPO after midnight  -Protonix 40mg IV BID  - Continue to hold DAPT. No NSAIDs  Per GI  -- After discharge he should discuss risk/benefits of plavix with his primary neurologist in light of the bleeding issues he has had, however the first episode of bleeding seems to have been an  "anticipated bleed 2/2 inadvertent medication administratio. Will route the discharge summary to Neurologist on discharge. Wife already spoke to neurologist today per her report      ##.Bilateral lower leg edema  - per wife, Lasix was started in prior admission as patient had bilateral pedal edema.  He is currently euvolemic on exam.  Will discontinue Lasix at this time.  Likely his pedal edema from past admission was due to volume overload iatrogenically as he was admitted with a GI bleed, was administered IV fluids and blood products.  -His echocardiogram from 7/31 shows no valvular abnormalities and the EF is preserved 55 to 60%.  He has no known liver disease, and no CKD.  At this time, bilateral lower leg edema can be managed with nonpharmacological interventions of elevation and compression stockings.  -We will discontinue Lasix upon discharge. Primary team to discuss with wife about this on d/c      ##. Choledocholithiasis  - Discovered during ERCP 7/14. Stone removed and stents placed in CBD.  Patient will need follow-up for stent  per GI.  -Wife is concerned about multiple procedures that the patient has to undergo and requests discussion with GI help her with this issue. I made GI team aware and they will discuss with her      ##. History of PLS: Diagnosed 19 years ago. At baseline w/ slurred speech. Able to ambulate with walker short distances, wheelchair for longer distances. Per wife in WC mostly. Followed by neurology.  Has intrathecal baclofen pump (if needs MRI, notify [employer] for resetting)  Since 2010, last filled here on 7/20/21 here. Followed by Dr. Calvert at [facility]. Per PMR Note on 7/20/21 procedure note:  \"Intrathecal baclofen 2000 mcg/mL concentration  SynchroMed II 20 mL pump with a simple continuous infusion mode.   His dose is 234.7 mcg per day and has not been changed recently\"  - PTA Oxybutinin     # History of CVA: Acute CVA in 2018. Subacute CVA 11/2020. Currently at neurologic " baseline.   -Hold Plavix and ASA 2/2 GIB       Diet: Clear Liquid Diet  NPO for Medical/Clinical Reasons Except for: Meds, Ice Chips    DVT Prophylaxis: Pneumatic Compression Devices- GIB, no chemopropphylaxis  Wright Catheter: Not present  Central Lines: None  Code Status: Full Code      Disposition Plan   Expected discharge: 08/03/2021 recommended to prior living arrangement once adequate pain management/ tolerating PO medications.     The patient's care was discussed with the Bedside Nurse.    Bernard Gordon MD  Hospitalist Service, [address]  Securely message with the Vocera Web Console (learn more here)  Text page via [facility] Paging/Directory  Please see sign in/sign out for up to date coverage information          # Severe Malnutrition, POA: based on Reduced intake;Subcutaneous fat loss;Muscle loss (08/02/21 1400)     ______________________________________________________________________    Interval History   Reports no complaints today.  Very tired from not sleeping and exhausted from the NPO. No further bleeding in stools since admission.  No nausea/vomiting/hematemesis/melena.  No new symptoms such as cough/headache/dyspnea/dysuria or hematuria.      Data reviewed today: I reviewed all medications, new labs and imaging results over the last 24 hours.     Physical Exam   Vital Signs: Temp: 98.5  F (36.9  C) Temp src: Oral BP: 109/52 Pulse: 83   Resp: 18 SpO2: 95 % O2 Device: None (Room air)    Weight: 152 lbs 1.88 oz  General Appearance: Alert, well appearing, and in no acute distress  Mental Status: Oriented to person, place, and time  HEENT: No pallor or icterus. Pupils equal and reactive, extraocular eye movements intact.  Chest: Clear to auscultation bilaterally, no wheezes, rales or rhonchi or crackels, symmetric air entry  Heart: Normal S1, S2.  Systolic murmur heard in the tricuspid region, rubs, clicks or gallops. Normal rate, regular  rhythm  Abdomen: Soft, nontender, nondistended, no masses or organomegaly, Bowel sounds heard  Neurological: Alert, oriented, slurred speech-slightly worsened today 8/2- per patient and wife this is normal when he does not sleep enough and they are not concerned CNS: CN 2-12 intact, Strength bilaterally intact in UE and LE 5/5, 100% handgrip bilaterally  Skin and Extremities: Peripheral pulses normal, no pedal edema, no clubbing or cyanosis.  Multiple small ecchymosis suggestive of skin changes due to great being on DAPT.

## 2021-08-03 NOTE — PLAN OF CARE
[id number]: Pt VSS.  Pt denies pain/ nausea and vomiting. No rectal bleeding noted. Pt started drinking 4 liter of Golytely at 1745, colonoscopy is planned for tomorrow @ 18260, stool is still brown. Pt IV went bad today, give two begs and the last potassium bag is running. Potassium is being replaced and will need a recheck when it is done. Up with assistance of one with a gait belt and walker.

## 2021-08-03 NOTE — OR NURSING
Procedure: endoscopy and colonoscopy with biopsies  Sedation: Conscious sedation (100 mcg fentanyl, 1 mg versed)  O2: 2-5 LPM NC during procedure  Tolerated: VS stable during and post procedure. Not c/o abdominal or chest pain.  Report: Given to Ignacia harding RN and     Yasmeen Green RN      Registered Nurse     Since 8/3/2021     [id number]     Patient escorted to and from endoscopy clinic via litter by patient transport    Mabel Dukes RN

## 2021-08-03 NOTE — PLAN OF CARE
Pt completed colonoscopy today and his potassium is in normal range and is medically ready to discharge to home. PIV removed without incident. All patient's belongings taken with upon discharge.    Discharge  D: Orders for discharge and outpatient medications written.  I: Home medications and return to clinic schedule reviewed with patient. Discharge instructions and parameters for calling Health Care Provider reviewed. Patient left at 1800 accompanied by wife (Kelli).   A: Patient/family verbalized understanding and was ready for discharge.   P: Patient instructed to  medications in Pharmacy. Follow up as scheduled.

## 2021-08-03 NOTE — PROGRESS NOTES
Gastroenterology Inpatient Sign Off Note  -EGD with healing ampulectomy site and no evidence or recent bleeding  -Colonoscopy with poor prep, but scope complete without obvious mass, large polyp, or stigmata of bleeding. Granular tissue in rectum biopsied, could represent healing stercoral ulcer  -Will follow up on pathology results and need for 8/19 ERCP with Dr Kent  -No further inpatient work up indicated    Inpatient GI consults service will sign off. No further recommendations at this time. If primary team has addition questions, please page consult fellow listed in [place].    Current GI Consult Staff: Dr. Erich Pierda CNP  Luminal GI Team  Text page

## 2021-08-03 NOTE — PROVIDER NOTIFICATION
Provider notification;    Doctor Cross Cover notified at 0658 [address] [phone].    Reason for notification: No UOP overnight. Bladder scanned this at 0615 for 405 cc. Attempted to get pt up to bedside commode but he was unbale. Should we straight cath? If so can you put in order?      Addendum: This team was paged in error.

## 2021-08-04 ENCOUNTER — PATIENT OUTREACH (OUTPATIENT)
Dept: GASTROENTEROLOGY | Facility: CLINIC | Age: 76
End: 2021-08-04

## 2021-08-04 ENCOUNTER — PATIENT OUTREACH (OUTPATIENT)
Dept: NURSING | Facility: CLINIC | Age: 76
End: 2021-08-04
Payer: COMMERCIAL

## 2021-08-04 LAB
COLONOSCOPY: NORMAL
PATH REPORT.COMMENTS IMP SPEC: NORMAL
PATH REPORT.COMMENTS IMP SPEC: NORMAL
PATH REPORT.FINAL DX SPEC: NORMAL
PATH REPORT.GROSS SPEC: NORMAL
PATH REPORT.MICROSCOPIC SPEC OTHER STN: NORMAL
PATH REPORT.MICROSCOPIC SPEC OTHER STN: NORMAL
PATH REPORT.RELEVANT HX SPEC: NORMAL
PHOTO IMAGE: NORMAL
UPPER GI ENDOSCOPY: NORMAL

## 2021-08-04 PROCEDURE — 88305 TISSUE EXAM BY PATHOLOGIST: CPT | Mod: 26 | Performed by: PATHOLOGY

## 2021-08-04 ASSESSMENT — ACTIVITIES OF DAILY LIVING (ADL): DEPENDENT_IADLS:: CLEANING;COOKING;LAUNDRY;SHOPPING;MEAL PREPARATION;MEDICATION MANAGEMENT;TRANSPORTATION;INCONTINENCE

## 2021-08-04 NOTE — PROGRESS NOTES
Clinic Care Coordination Contact    Clinic Care Coordination Contact  OUTREACH    Referral Information:  Referral Source: IP Report    Primary Diagnosis: GI Disorders    Chief Complaint   Patient presents with     Clinic Care Coordination - Post Hospital     Clinic Care Coordination - Follow-up        [place] Utilization: [facility] 7/31 - 8/3/21 for -EGD with healing ampulectomy site and no evidence or recent bleeding  -Colonoscopy with poor prep, but scope complete without obvious mass, large polyp, or stigmata of bleeding.  Granular tissue in rectum biopsied, could represent healing stercoral ulcer  -Will follow up on pathology results and need for 8/19 ERCP with Dr Kent  -No further inpatient work up indicated  Clinic Utilization  Difficulty keeping appointments:: No  Compliance Concerns: No  No-Show Concerns: No  No PCP office visit in Past Year: No  Utilization    Hospital Admissions  5             ED Visits  4             No Show Count (past year)  0                Current as of: 8/4/2021  8:14 AM              Clinical Concerns:  Current Medical Concerns:    Patient Active Problem List   Diagnosis     Benign essential hypertension     Primary lateral sclerosis (HCC)     Prostate CA (HCC)     Hyperlipidemia LDL goal <100     Cerebral infarction (H)     Advanced directives, counseling/discussion     Muscle spasticity     Edema, unspecified type     Gastroesophageal reflux disease without esophagitis     Cerebral atherosclerosis     Primary osteoarthritis of right shoulder     Cancer of ampulla of Vater (H)     GI bleed     Anemia, unspecified type     Bright red blood per rectum         Current Behavioral Concerns: None noted    Education Provided to patient: RN CC role and reason for call   Pain  Pain (GOAL):: No  Health Maintenance Reviewed:    Clinical Pathway:None    Medication Management:  Medication review status: Medications reviewed and no changes reported per patient.        Current Outpatient  Medications   Medication Instructions     acetaminophen (TYLENOL) 650 mg, Oral, EVERY 8 HOURS PRN     aspirin (ASA) 81 mg, Oral, EVERY EVENING     baclofen (LIORESAL) intraTHECAL Internal Pump Intrathecal, CONTINUOUS PRN, Pump filled by [facility] [phone]<BR>Pump Model: Syncromed<BR>Medication in pump is Gablofen (brand name)<BR>Last fill: during hospital admission<BR>Next fill:   <BR>Low [place] Alarm Date: <BR>Reservoir Volume: 20 ml<BR>Conc: 2000 mcg/mL<BR>Delivers 234.7 mcg/day<BR>Basal rate:unknown<BR>Battery life: unknown     EPINEPHrine (ANY BX GENERIC EQUIV) 0.3 mg, Intramuscular, PRN     fluorouracil (EFUDEX) 5 % external cream Apply topically to legs 1 to 2 times weekly as needed/tolerated for maintenance     ketoconazole (NIZORAL) 2 % external shampoo SHAMPOO EVERY 2-3 DAYS AS  NEEDED     loperamide (IMODIUM) 2 mg, Oral, 4 TIMES DAILY PRN     oxybutynin ER (DITROPAN-XL) 20 mg, Oral, AT BEDTIME     pantoprazole (PROTONIX) 40 mg, Oral, 2 TIMES DAILY     polyethylene glycol (MIRALAX/GLYCOLAX) Packet 1 packet, Oral, DAILY PRN, Every 2-3 days.          Functional Status:  Dependent ADLs:: Ambulation-walker, Bathing, Dressing, Grooming, Toileting, Wheelchair-with assist, Transfers  Dependent IADLs:: Cleaning, Cooking, Laundry, Shopping, Meal Preparation, Medication Management, Transportation, Incontinence    Living Situation:  Current living arrangement:: I live in a private home with spouse    Lifestyle & Psychosocial Needs:    Social Determinants of Health     Tobacco Use: Medium Risk     Smoking Tobacco Use: Former Smoker     Smokeless Tobacco Use: Never Used   Alcohol Use: Unknown     Frequency of Alcohol Consumption: 2-4 times a month     Average Number of Drinks: 1 or 2     Frequency of Binge Drinking: Not on file   Financial Resource Strain: Low Risk      Difficulty of Paying Living Expenses: Not hard at all   Food Insecurity:      Worried About  Running Out of Food in the Last Year:      Ran Out of Food in the Last Year:    Transportation Needs: No Transportation Needs     Lack of Transportation (Medical): No     Lack of Transportation (Non-Medical): No   Physical Activity:      Days of Exercise per Week:      Minutes of Exercise per Session:    Stress:      Feeling of Stress :    Social Connections:      Frequency of Communication with Friends and Family:      Frequency of Social Gatherings with Friends and Family:      Attends [employer] Services:      Active Member of Clubs or Organizations:      Attends Club or Organization Meetings:      Marital Status:    Intimate Partner Violence:      Fear of Current or Ex-Partner:      Emotionally Abused:      Physically Abused:      Sexually Abused:    Depression: Not at risk     PHQ-2 Score: 0   Housing Stability: Low Risk      Unable to Pay for Housing in the Last Year: No     Number of Places Lived in the Last Year: 1     Unstable Housing in the Last Year: No     Diet:: Regular  Inadequate nutrition (GOAL):: No  Tube Feeding: No  Inadequate activity/exercise (GOAL):: Yes  Significant changes in sleep pattern (GOAL): No  Transportation means:: Family     Informal Support system:: Spouse     Spoke with patient's wife Kelli, consent to communicate on file. Kelli states that the patient is doing well at home, no changes to patient's health status. No new concerns identified for support or resources. Requests RN CC sent request to PCP for orders to restart [facility] services. From chart review new orders were faxed to [facility] yesterday 8/3/21.     Reviewed AVS, medication, HM due and recommendation to follow up with PCP/lab in 7 days. RN CC assisted Kelli in scheduling follow up with PCP for 8/10/21.      Resources and Interventions:  Current Resources:   Skilled Home Care Services: Skilled Nursing, Physicial Therapy, Occupational Therapy, Home Health Aid  Community Resources: DME, Home Care  Supplies used at home::  Incontinence Supplies  Equipment Currently Used at Home: walker, rolling, wheelchair, manual, grab bar, toilet, grab bar, tub/shower  Employment Status: retired                    Goals:   Goals        General     1. Respite Care/Private Duty C (pt-stated)      Notes - Note created  7/26/2021 11:43 AM by Rebecca Hernández RN     Goal Statement: I would like to receive resources for respite Care/Private Duty C companies.   Date Goal set: 7/26/21  Barriers: Multiple health concerns  Strengths: Patient and wife are engaged and motivated  Date to Achieve By: 12/31/21  Patient expressed understanding of goal: Yes  Action steps to achieve this goal:  1. I understand that RN CC will send via [employer] and the mail Respite Care/Private Duty [facility] resources.   2. I will review resources and utilize as I see fit.   3. I will continue to work with care coordination for any additional resources and support.            2. Improve chronic symptoms (pt-stated)      Notes - Note created  7/26/2021 11:45 AM by Rebecca Hernández RN     Goal Statement: I want my lower leg edema to resolve.   Date Goal set: 7/26/21  Barriers: Multiple health concerns  Strengths: Patient is motivated and engaged  Date to Achieve By: 12/31/21  Patient expressed understanding of goal: Yes  Action steps to achieve this goal:  1. I understand RN CC will message my PCP to request a diuretic medication be prescribed.   2. I will wear my compression stockings daily.   3. I will elevate my feet at rest.   4. I will take all my medications as prescribed.   5.  I will continue to work with care coordination for [place] additional resources and support.               Patient/Caregiver understanding: Patient reports understanding and denies any additional questions or concerns at this times. RN CC engaged in [employer]ID[employer] communication during encounter.      Outreach Frequency: monthly  Future Appointments              In 6 days Lida Ray MD [facility]  [place], [place]    In 1 week [place] COVID LAB [facility], [place]    In 4 weeks UC PFL C [facility] Pulmonary Function Testing [facility]    In 4 weeks Gary Tejada MD [facility] Neurology Clinic [facility]          Plan: [facility] will reach out monthly for care plan status updates. RN CC will review chart in 6 weeks.     Tonya Hernández RN Care Coordinator  [facility], [place]  Email: [email or web address]  Phone: [phone]

## 2021-08-04 NOTE — PLAN OF CARE
Physical Therapy Discharge Summary    Reason for therapy discharge:    Discharged to home with home therapy.    Progress towards therapy goal(s). See goals on Care Plan in [facility] electronic health record for goal details.  Goals partially met.  Barriers to achieving goals:   discharge from facility.    Therapy recommendation(s):    Continued therapy is recommended.  Rationale/Recommendations:  Pt would benefit from additional skilled PT to address functional mobility, short distance gait and balance.

## 2021-08-04 NOTE — PROGRESS NOTES
Called Kelli to discuss procedure, Elier had recently been admitted and discharged from hospital with endoscopy procedure done on 8/3. Kelli wondering if procedure on 8/19 was still needed. Mentioned that she would also be needing to change their oncology visit date as well.    Message routed to Dr Kent to advise    1131  Called Kelli back to discuss that Dr Kent does want to proceed with ERCP planned for 8/19    We should keep that ERCP appointment because I need to take biopsies as the deep margin from my resection was positive and we need to see if there is residual to see if it even makes sense to do chemo/radiation.     Kelli verbalized understanding, she stated that the oncologist has told her that there may be microscopic cancer cells not seen on biopsy, and therefore she is stuggling to know what the best intervention is for Elier. She does not want Nader to have to go through un-necessary scoping.  She is meeting with radiology oncologist soon, advised that she discuss with this provider the need for biopsies as well to determine the plan of care (Ex type of chemo, length of radiation, etc)      Nathalie Levine, RN, BSN,   [facility]  Care coordinator

## 2021-08-05 ENCOUNTER — LAB (OUTPATIENT)
Dept: LAB | Facility: CLINIC | Age: 76
End: 2021-08-05
Payer: COMMERCIAL

## 2021-08-05 ENCOUNTER — TELEPHONE (OUTPATIENT)
Dept: INTERNAL MEDICINE | Facility: CLINIC | Age: 76
End: 2021-08-05

## 2021-08-05 DIAGNOSIS — R30.0 DYSURIA: Primary | ICD-10-CM

## 2021-08-05 DIAGNOSIS — R60.9 EDEMA, UNSPECIFIED TYPE: ICD-10-CM

## 2021-08-05 DIAGNOSIS — R60.0 LOCALIZED EDEMA: ICD-10-CM

## 2021-08-05 DIAGNOSIS — D62 ANEMIA ASSOCIATED WITH ACUTE BLOOD LOSS: ICD-10-CM

## 2021-08-05 DIAGNOSIS — I10 BENIGN ESSENTIAL HYPERTENSION: ICD-10-CM

## 2021-08-05 DIAGNOSIS — R30.0 DYSURIA: ICD-10-CM

## 2021-08-05 DIAGNOSIS — K92.2 GASTROINTESTINAL HEMORRHAGE, UNSPECIFIED GASTROINTESTINAL HEMORRHAGE TYPE: ICD-10-CM

## 2021-08-05 LAB
ALBUMIN UR-MCNC: >=300 MG/DL
APPEARANCE UR: ABNORMAL
BACTERIA #/AREA URNS HPF: ABNORMAL /HPF
BILIRUB UR QL STRIP: NEGATIVE
COLOR UR AUTO: YELLOW
GLUCOSE UR STRIP-MCNC: NEGATIVE MG/DL
HGB BLD-MCNC: 9.9 G/DL (ref 13.3–17.7)
HGB UR QL STRIP: ABNORMAL
KETONES UR STRIP-MCNC: NEGATIVE MG/DL
LEUKOCYTE ESTERASE UR QL STRIP: ABNORMAL
NITRATE UR QL: POSITIVE
PH UR STRIP: 8.5 [PH] (ref 5–7)
RBC #/AREA URNS AUTO: >100 /HPF
SP GR UR STRIP: 1.02 (ref 1–1.03)
SQUAMOUS #/AREA URNS AUTO: ABNORMAL /LPF
UROBILINOGEN UR STRIP-ACNC: 0.2 E.U./DL
WBC #/AREA URNS AUTO: >100 /HPF
WBC CLUMPS #/AREA URNS HPF: PRESENT /HPF

## 2021-08-05 PROCEDURE — 36415 COLL VENOUS BLD VENIPUNCTURE: CPT

## 2021-08-05 PROCEDURE — 85018 HEMOGLOBIN: CPT

## 2021-08-05 PROCEDURE — 84132 ASSAY OF SERUM POTASSIUM: CPT

## 2021-08-05 PROCEDURE — 81001 URINALYSIS AUTO W/SCOPE: CPT

## 2021-08-05 RX ORDER — CIPROFLOXACIN 500 MG/1
500 TABLET, FILM COATED ORAL 2 TIMES DAILY
Qty: 14 TABLET | Refills: 0 | Status: SHIPPED | OUTPATIENT
Start: 2021-08-05 | End: 2021-08-12

## 2021-08-05 NOTE — TELEPHONE ENCOUNTER
Please advise pt UA concentrated, presence of UTI.  eRx sent for cipro x 1 week.  F/u PCP  Shakira Vincent CNP

## 2021-08-05 NOTE — TELEPHONE ENCOUNTER
Call to Kelli and advised.   Scheduled lab appt for this afternoon for UA.     She has a few questions as well.    #1, Pts edema is returning, both ankles more today than yesterday. Weight is the same. Not short of breath. She is asking, should he restart Lasix?   Euvolemic on exam so lasix discontinued inpatient.     #2. He needs new Home care referral after being in the hospital.     Pt has appt on Tuesday with Dr Ray. Wife has not checked his BP.     Potassium   Date Value Ref Range Status   08/03/2021 3.4 3.4 - 5.3 mmol/L Final   08/03/2021 3.4 3.4 - 5.3 mmol/L Final   06/20/2021 4.0 3.4 - 5.3 mmol/L Final     BP Readings from Last 3 Encounters:   08/03/21 115/62   07/22/21 113/73   07/21/21 138/87

## 2021-08-05 NOTE — TELEPHONE ENCOUNTER
Pt may still have Home care nurse that could collect UA.     See message below. Please advise if OK, otherwise could do lab appt.

## 2021-08-05 NOTE — TELEPHONE ENCOUNTER
Patient's wife calling to report symptoms of odor, burning and uncontrolled urination epically at night. Patient was discharged from the hospital on 8/3/21 and they noticed his urine had an odor on 8/4/21.  He has not been able to control urine at night and sometimes during the day. Call her back to advise at [phone] (home)

## 2021-08-06 ENCOUNTER — TELEPHONE (OUTPATIENT)
Dept: INTERNAL MEDICINE | Facility: CLINIC | Age: 76
End: 2021-08-06

## 2021-08-06 LAB — POTASSIUM BLD-SCNC: 3.5 MMOL/L (ref 3.4–5.3)

## 2021-08-06 RX ORDER — FUROSEMIDE 20 MG
40 TABLET ORAL DAILY
Qty: 60 TABLET | Refills: 1 | COMMUNITY
Start: 2021-08-06 | End: 2021-09-30

## 2021-08-06 NOTE — TELEPHONE ENCOUNTER
Call to Kelli and advised. Also [facility] was there at the Home. Gave VO for SNV, PT and OT.     Verbal order given to approve visits until certification received for MD signature.

## 2021-08-06 NOTE — TELEPHONE ENCOUNTER
Recommend restart the Lasix.  It appears the hospital care coordinator did resume orders to the hospital so they should be contacting them and then they will request orders from us.

## 2021-08-06 NOTE — TELEPHONE ENCOUNTER
See other encounter for UTI.   Call to Kelli. They started the Cipro last night.     The Swelling is not as bad as before.     She is asking how much Lasix he should take daily?   Kelil thinks 1 tablet a day is probably OK until they see Dr Ray on Tuesday.   The Home care nurse is coming by today.     Please put the Furosemide back on med list with directions. [facility] will probably call for directions.

## 2021-08-06 NOTE — TELEPHONE ENCOUNTER
Okay to start 1 lasix a day. If the swelling worsens can give 2.   I put it back on the list as 2 for now and will correct it next week.

## 2021-08-09 ENCOUNTER — PATIENT OUTREACH (OUTPATIENT)
Dept: GASTROENTEROLOGY | Facility: CLINIC | Age: 76
End: 2021-08-09

## 2021-08-09 NOTE — PROGRESS NOTES
Kelli called to discuss that Elier had not had a BM since 8/3, which was the same date as his colonoscopy  He is drinking fluids , but Kelli thinks likely not enough. He is not experiencing an discomfort d/t lack of BM and he is passing gas.   Taking miralax daily.  Advised Kelli to try encouraging Elier to drink more fluids and even natural laxatives like prune juice.  Also to try colace and/or senna, if these are not effective could try stronger over the counters like milk of magnesia. Kelli will try the colace she stated and then go from there. I advised this as long as Elier continues to have no abdominal discomfort.    Nathalie Levine, RN, BSN,   [facility]  Care coordinator

## 2021-08-10 ENCOUNTER — OFFICE VISIT (OUTPATIENT)
Dept: INTERNAL MEDICINE | Facility: CLINIC | Age: 76
End: 2021-08-10
Payer: COMMERCIAL

## 2021-08-10 ENCOUNTER — TELEPHONE (OUTPATIENT)
Dept: INTERNAL MEDICINE | Facility: CLINIC | Age: 76
End: 2021-08-10

## 2021-08-10 VITALS
RESPIRATION RATE: 16 BRPM | HEIGHT: 70 IN | BODY MASS INDEX: 21.47 KG/M2 | HEART RATE: 69 BPM | TEMPERATURE: 97.8 F | SYSTOLIC BLOOD PRESSURE: 122 MMHG | DIASTOLIC BLOOD PRESSURE: 62 MMHG | WEIGHT: 150 LBS | OXYGEN SATURATION: 100 %

## 2021-08-10 DIAGNOSIS — K62.5 BRIGHT RED BLOOD PER RECTUM: ICD-10-CM

## 2021-08-10 DIAGNOSIS — R60.9 EDEMA, UNSPECIFIED TYPE: ICD-10-CM

## 2021-08-10 DIAGNOSIS — M25.511 BILATERAL SHOULDER PAIN, UNSPECIFIED CHRONICITY: ICD-10-CM

## 2021-08-10 DIAGNOSIS — C24.1 CANCER OF AMPULLA OF VATER (H): Primary | ICD-10-CM

## 2021-08-10 DIAGNOSIS — M25.512 BILATERAL SHOULDER PAIN, UNSPECIFIED CHRONICITY: ICD-10-CM

## 2021-08-10 PROCEDURE — 99214 OFFICE O/P EST MOD 30 MIN: CPT | Performed by: INTERNAL MEDICINE

## 2021-08-10 ASSESSMENT — MIFFLIN-ST. JEOR: SCORE: 1416.65

## 2021-08-10 NOTE — TELEPHONE ENCOUNTER
Randi from [facility] calling for verbal orders for OT, 1 x week for 4 wks, focusing on upper body strength, range of motion, DME needs. Randi [phone]

## 2021-08-10 NOTE — PROGRESS NOTES
Assessment & Plan     Cancer of ampulla of Vater (H)  He was admitted due to GI bleeding related to his cancer.  He is now off the Plavix, does have follow-up exam scheduled for next week.  Follow-up oncology    Bright red blood per rectum  Resolved, remaining off the Plavix due to the increased risk of bleeding    Edema, unspecified type  Continue Lasix, elevation, trial compression stockings  - Compression Sleeve/Stocking Order for DME - ONLY FOR DME    Bilateral shoulder pain, unspecified chronicity  Refer for injections now that he is off Plavix.  - Orthopedic  Referral; Future        No follow-ups on file.    Lida Ray MD  [facility] FATUMA Thapa is a 75 year old who presents for the following health issues  accompanied by his spouse:    [facility]       Hospital Follow-up Visit:    Hospital/Nursing Home/IP Rehab Facility: [facility]  Date of Admission: 07/31/2021  Date of Discharge: 08/03/2021  Reason(s) for Admission: GI bleeding related to cancer of the ampulla      Was your hospitalization related to COVID-19? No   Problems taking medications regularly:  None  Medication changes since discharge: None  Problems adhering to non-medication therapy:  None    Summary of hospitalization:  [facility] discharge summary reviewed  Diagnostic Tests/Treatments reviewed.  Follow up needed: Hemoglobin  Other Healthcare Providers Involved in Patient s Care:         Specialist appointment - GI and oncology  Update since discharge: stable.     He has not had any further melena or blood per rectum.  He reports no abdominal concerns.      Post Discharge Medication Reconciliation: discharge medications reconciled, continue medications without change.  Plan of care communicated with patient and family              Other concerns:  His edema continues.  He had improvement while he was in the hospital, they took him off his  Lasix and now it is back again.  He has had issues with shoulder pain, was going to have injections but because of the Plavix they did not want to do that.  Now that he is off the Plavix they would like to have a referral for that.      Patient Active Problem List   Diagnosis     Benign essential hypertension     Primary lateral sclerosis (HCC)     Prostate CA (HCC)     Hyperlipidemia LDL goal <100     Cerebral infarction (H)     Advanced directives, counseling/discussion     Muscle spasticity     Edema, unspecified type     Gastroesophageal reflux disease without esophagitis     Cerebral atherosclerosis     Primary osteoarthritis of right shoulder     Cancer of ampulla of Vater (H)     GI bleed     Anemia, unspecified type     Bright red blood per rectum     Current Outpatient Medications   Medication Sig Dispense Refill     acetaminophen (TYLENOL) 650 MG CR tablet Take 650 mg by mouth every 8 hours as needed for mild pain (Shoulder pain)        aspirin 81 MG tablet Take 81 mg by mouth every evening        baclofen (LIORESAL) intraTHECAL Internal Pump by Intrathecal route continuous prn Pump filled by [facility] stroke [place] [phone]  Pump Model: Syncromed  Medication in pump is Gablofen (brand name)  Last fill: during hospital admission  Next fill:     Low [place] Alarm Date:   Reservoir Volume: 20 ml  Conc: 2000 mcg/mL  Delivers 234.7 mcg/day  Basal rate:unknown  Battery life: unknown       EPINEPHrine 0.3 MG/0.3ML injection Inject 0.3 mLs (0.3 mg) into the muscle as needed for anaphylaxis 0.3 mL 3     furosemide (LASIX) 20 MG tablet Take 2 tablets (40 mg) by mouth daily 60 tablet 1     ketoconazole (NIZORAL) 2 % external shampoo SHAMPOO EVERY 2-3 DAYS AS  NEEDED 120 mL 10     oxybutynin ER (DITROPAN-XL) 10 MG 24 hr tablet Take 2 tablets (20 mg) by mouth At Bedtime 180 tablet 3     polyethylene glycol (MIRALAX/GLYCOLAX) Packet Take 1 packet by mouth daily as needed Every  "2-3 days.       fluorouracil (EFUDEX) 5 % external cream Apply topically to legs 1 to 2 times weekly as needed/tolerated for maintenance        pantoprazole (PROTONIX) 40 MG EC tablet Take 1 tablet (40 mg) by mouth 2 times daily 60 tablet 3      Review of Systems   No fever, chills, nausea, vomiting, abdominal pain      Objective    /62 (BP Location: Right arm, Patient Position: Sitting, Cuff Size: Adult Regular)   Pulse 69   Temp 97.8  F (36.6  C) (Oral)   Resp 16   Ht 1.778 m (5' 10\")   Wt 68 kg (150 lb)   SpO2 100%   BMI 21.52 kg/m    Body mass index is 21.52 kg/m .  Physical Exam     Not examined.             "

## 2021-08-10 NOTE — NURSING NOTE
"/62 (BP Location: Right arm, Patient Position: Sitting, Cuff Size: Adult Regular)   Pulse 69   Temp 97.8  F (36.6  C) (Oral)   Resp 16   Ht 1.778 m (5' 10\")   Wt 68 kg (150 lb)   SpO2 100%   BMI 21.52 kg/m      "

## 2021-08-11 ENCOUNTER — MYC MEDICAL ADVICE (OUTPATIENT)
Dept: INTERNAL MEDICINE | Facility: CLINIC | Age: 76
End: 2021-08-11

## 2021-08-11 DIAGNOSIS — M25.512 BILATERAL SHOULDER PAIN, UNSPECIFIED CHRONICITY: Primary | ICD-10-CM

## 2021-08-11 DIAGNOSIS — M25.511 BILATERAL SHOULDER PAIN, UNSPECIFIED CHRONICITY: Primary | ICD-10-CM

## 2021-08-16 ENCOUNTER — TRANSFERRED RECORDS (OUTPATIENT)
Dept: HEALTH INFORMATION MANAGEMENT | Facility: CLINIC | Age: 76
End: 2021-08-16

## 2021-08-16 ENCOUNTER — LAB (OUTPATIENT)
Dept: LAB | Facility: CLINIC | Age: 76
End: 2021-08-16
Attending: INTERNAL MEDICINE
Payer: COMMERCIAL

## 2021-08-16 DIAGNOSIS — Z11.59 ENCOUNTER FOR SCREENING FOR OTHER VIRAL DISEASES: ICD-10-CM

## 2021-08-16 LAB — SARS-COV-2 RNA RESP QL NAA+PROBE: NEGATIVE

## 2021-08-16 PROCEDURE — U0005 INFEC AGEN DETEC AMPLI PROBE: HCPCS

## 2021-08-16 PROCEDURE — U0003 INFECTIOUS AGENT DETECTION BY NUCLEIC ACID (DNA OR RNA); SEVERE ACUTE RESPIRATORY SYNDROME CORONAVIRUS 2 (SARS-COV-2) (CORONAVIRUS DISEASE [COVID-19]), AMPLIFIED PROBE TECHNIQUE, MAKING USE OF HIGH THROUGHPUT TECHNOLOGIES AS DESCRIBED BY CMS-2020-01-R: HCPCS

## 2021-08-17 DIAGNOSIS — K21.9 GASTROESOPHAGEAL REFLUX DISEASE WITHOUT ESOPHAGITIS: ICD-10-CM

## 2021-08-17 RX ORDER — PANTOPRAZOLE SODIUM 40 MG/1
40 TABLET, DELAYED RELEASE ORAL 2 TIMES DAILY
Qty: 60 TABLET | Refills: 3 | Status: SHIPPED | OUTPATIENT
Start: 2021-08-17

## 2021-08-18 ENCOUNTER — ANESTHESIA EVENT (OUTPATIENT)
Dept: SURGERY | Facility: CLINIC | Age: 76
End: 2021-08-18
Payer: COMMERCIAL

## 2021-08-18 ENCOUNTER — TELEPHONE (OUTPATIENT)
Dept: INTERNAL MEDICINE | Facility: CLINIC | Age: 76
End: 2021-08-18

## 2021-08-18 NOTE — ANESTHESIA PREPROCEDURE EVALUATION
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 Calvert, [facility], sees Dr. Fink, [facility]     Primary osteoarthritis of right shoulder 2021     Prostate CA (H)     prostatectomy     Vertigo 2013      Past Surgical History:   Procedure Laterality Date     ABDOMEN SURGERY      Hernia     BIOPSY      Cancerous growth on leg. Removed by MOHs treatment     COLONOSCOPY N/A 8/3/2021    Procedure: COLONOSCOPY;  Surgeon: Luis Antonio Jung MD;  Location: [facility]     COLONOSCOPY N/A 8/3/2021    Procedure: Colonoscopy, With Polypectomy And Biopsy;  Surgeon: Luis Antonio Jung MD;  Location: [facility]     ENDOSCOPIC RETROGRADE CHOLANGIOPANCREATOGRAM N/A 2021    Procedure: ENDOSCOPIC RETROGRADE CHOLANGIOPANCREATOGRAPHY, BILIARY STENT PLACEMENT;  Surgeon: Sima Galvez MD;  Location:  OR     ENDOSCOPIC RETROGRADE CHOLANGIOPANCREATOGRAM COMPLEX N/A 2021    Procedure: ENDOSCOPIC RETROGRADE CHOLANGIOPANCREATOGRAPHY WITH AMPULLECTOMY, PANCREATIC DUCT STENT PLACEMENT AND BILIARY STENT PLACEMENT;  Surgeon: Agus Kent MD;  Location: [facility]     ENDOSCOPIC RETROGRADE CHOLANGIOPANCREATOGRAPHY, EXCHANGE TUBE/STENT N/A 2021    Procedure: ENDOSCOPIC RETROGRADE CHOLANGIOPANCREATOGRAPHY with bile duct stents exchanged, balloon sweep of bile ducts, hemostasis;   "Surgeon: Guru Bhavesh Arreola MD;  Location: [facility]     ENDOSCOPIC ULTRASOUND UPPER GASTROINTESTINAL TRACT (GI) N/A 2021    Procedure: ENDOSCOPIC ULTRASOUND, ESOPHAGOSCOPY / UPPER GASTROINTESTINAL TRACT (GI);  Surgeon: Agus Kent MD;  Location: [facility]     ESOPHAGOSCOPY, GASTROSCOPY, DUODENOSCOPY (EGD), COMBINED N/A 2021    Procedure: ESOPHAGOGASTRODUODENOSCOPY, WITH ENDOSCOPIC US, fine needle aspiration;  Surgeon: Sima Galvez MD;  Location: [facility]     ESOPHAGOSCOPY, GASTROSCOPY, DUODENOSCOPY (EGD), COMBINED N/A 2021    Procedure: ESOPHAGOGASTRODUODENOSCOPY (EGD);  Surgeon: Guru Bhavesh Arreola MD;  Location: [facility]     ESOPHAGOSCOPY, GASTROSCOPY, DUODENOSCOPY (EGD), COMBINED N/A 8/3/2021    Procedure: Esophagoscopy, gastroscopy, duodenoscopy (EGD), combined;  Surgeon: Luis Antonio Jung MD;  Location: [facility]     HERNIA REPAIR      Repaired     PROSTATE SURGERY       [facility] NONSPECIFIC PROCEDURE       prostatectomy      [facility] NONSPECIFIC PROCEDURE      colonoscopy     [facility] NONSPECIFIC PROCEDURE  2006    hernia, right side     [facility] NONSPECIFIC PROCEDURE      T + A      Allergies   Allergen Reactions     Bee Venom      Swelling and hives     Penicillins Hives and Swelling     \"HIVES\"; occurred at age 39      Social History     Tobacco Use     Smoking status: Former Smoker     Packs/day: 0.30     Years: 6.00     Pack years: 1.80     Types: Cigarettes     Start date: 1970     Quit date: 10/5/1976     Years since quittin.8     Smokeless tobacco: Never Used   Substance Use Topics     Alcohol use: Yes     Comment: 1 drink/week      Wt Readings from Last 1 Encounters:   08/10/21 68 kg (150 lb)        Anesthesia Evaluation   Pt has had prior anesthetic.     No history of anesthetic complications (with previous ERCP, he did require norepinephrine for low BP that didn't respond to phenylephrine)       ROS/MED HX  ENT/Pulmonary:    (-) sleep " apnea   Neurologic: Comment: Primary lateral sclerosis.  Diagnosed 19 years ago. At baseline w/ slurred speech. Able to ambulate with walker short distances, wheelchair for longer distances. Per wife in WC mostly. Followed by neurology.  Has intrathecal baclofen pump.    (+) CVA (2018 and 2020, but resolved and at his baseline),     Cardiovascular:     (+) hypertension-----    METS/Exercise Tolerance:     Hematologic:     (+) anemia, history of blood transfusion,     Musculoskeletal:       GI/Hepatic:     (+) GERD,     Renal/Genitourinary: Comment: Ho prostate CA sp prostatectomy      Endo:       Psychiatric/Substance Use:       Infectious Disease:       Malignancy: Comment: Cancer of ampulla of vater.  Had rectal bleeding 7/2021 and it was diagnosed.  Received PRBC  (+) Malignancy,     Other:            Physical Exam    Airway        Mallampati: II   TM distance: > 3 FB   Neck ROM: full   Mouth opening: > 3 cm    Respiratory Devices and Support         Dental  no notable dental history         Cardiovascular   cardiovascular exam normal       Rhythm and rate: regular and normal     Pulmonary   pulmonary exam normal        breath sounds clear to auscultation           OUTSIDE LABS:  CBC:   Lab Results   Component Value Date    WBC 5.8 08/03/2021    WBC 8.3 08/02/2021    HGB 9.9 (L) 08/05/2021    HGB 9.2 (L) 08/03/2021    HCT 28.6 (L) 08/03/2021    HCT 28.7 (L) 08/02/2021     08/03/2021     08/02/2021     BMP:   Lab Results   Component Value Date     08/03/2021     08/02/2021    POTASSIUM 3.5 08/05/2021    POTASSIUM 3.4 08/03/2021    POTASSIUM 3.4 08/03/2021    CHLORIDE 105 08/03/2021    CHLORIDE 100 08/02/2021    CO2 28 08/03/2021    CO2 28 08/02/2021    BUN 8 08/03/2021    BUN 11 08/02/2021    CR 0.65 (L) 08/03/2021    CR 0.67 08/02/2021    GLC 78 08/03/2021    GLC 81 08/02/2021     COAGS:   Lab Results   Component Value Date    PTT 27 10/27/2018    INR 1.08 07/31/2021     POC:   Lab  Results   Component Value Date     (H) 11/17/2020     HEPATIC:   Lab Results   Component Value Date    ALBUMIN 2.8 (L) 07/30/2021    PROTTOTAL 6.5 (L) 07/30/2021    ALT 24 07/30/2021    AST 29 07/30/2021    ALKPHOS 106 07/30/2021    BILITOTAL 0.6 07/30/2021     OTHER:   Lab Results   Component Value Date    A1C 5.8 (H) 11/17/2020    AMOS 8.8 08/03/2021    MAG 1.9 02/21/2013    LIPASE 28 (L) 07/16/2021    AMYLASE 120 (H) 07/12/2021    TSH 1.68 06/19/2021    CRP 42.0 (H) 07/18/2021       Anesthesia Plan    ASA Status:  4      Anesthesia Type: General.     - Airway: ETT   Induction: Intravenous.   Maintenance: Balanced.        Consents    Anesthesia Plan(s) and associated risks, benefits, and realistic alternatives discussed. Questions answered and patient/representative(s) expressed understanding.     - Discussed with:  Patient         Postoperative Care    Pain management: IV analgesics.   PONV prophylaxis: Ondansetron (or other 5HT-3)     Comments:    Medical record reviewed    Vasopressin and norepi available            Wesley Abdalla MD

## 2021-08-18 NOTE — TELEPHONE ENCOUNTER
SKILLED NURSING / PHYSICAL THERAPY / OCCUPATIONAL THERAPY / SPEECH THERAPY orders received via fax. Form in your mailbox to be signed.

## 2021-08-19 ENCOUNTER — APPOINTMENT (OUTPATIENT)
Dept: GENERAL RADIOLOGY | Facility: CLINIC | Age: 76
End: 2021-08-19
Attending: INTERNAL MEDICINE
Payer: COMMERCIAL

## 2021-08-19 ENCOUNTER — ANESTHESIA (OUTPATIENT)
Dept: SURGERY | Facility: CLINIC | Age: 76
End: 2021-08-19
Payer: COMMERCIAL

## 2021-08-19 ENCOUNTER — HOSPITAL ENCOUNTER (OUTPATIENT)
Facility: CLINIC | Age: 76
Discharge: HOME | End: 2021-08-19
Attending: INTERNAL MEDICINE | Admitting: INTERNAL MEDICINE
Payer: COMMERCIAL

## 2021-08-19 VITALS
BODY MASS INDEX: 22.19 KG/M2 | WEIGHT: 155 LBS | HEIGHT: 70 IN | OXYGEN SATURATION: 96 % | DIASTOLIC BLOOD PRESSURE: 68 MMHG | SYSTOLIC BLOOD PRESSURE: 128 MMHG | RESPIRATION RATE: 16 BRPM | TEMPERATURE: 97.9 F | HEART RATE: 64 BPM

## 2021-08-19 DIAGNOSIS — C24.1 CANCER OF AMPULLA OF VATER (H): ICD-10-CM

## 2021-08-19 LAB
ALBUMIN SERPL-MCNC: 3.1 G/DL (ref 3.4–5)
ALP SERPL-CCNC: 108 U/L (ref 40–150)
ALT SERPL W P-5'-P-CCNC: 24 U/L (ref 0–70)
ANION GAP SERPL CALCULATED.3IONS-SCNC: 3 MMOL/L (ref 3–14)
AST SERPL W P-5'-P-CCNC: 31 U/L (ref 0–45)
BILIRUB SERPL-MCNC: 0.4 MG/DL (ref 0.2–1.3)
BUN SERPL-MCNC: 19 MG/DL (ref 7–30)
CALCIUM SERPL-MCNC: 9.3 MG/DL (ref 8.5–10.1)
CHLORIDE BLD-SCNC: 106 MMOL/L (ref 94–109)
CO2 SERPL-SCNC: 28 MMOL/L (ref 20–32)
CREAT SERPL-MCNC: 0.68 MG/DL (ref 0.66–1.25)
ERCP: NORMAL
ERYTHROCYTE [DISTWIDTH] IN BLOOD BY AUTOMATED COUNT: 13.9 % (ref 10–15)
GFR SERPL CREATININE-BSD FRML MDRD: >90 ML/MIN/1.73M2
GLUCOSE BLD-MCNC: 103 MG/DL (ref 70–99)
HCT VFR BLD AUTO: 32.9 % (ref 40–53)
HGB BLD-MCNC: 10.5 G/DL (ref 13.3–17.7)
INR PPP: 0.99 (ref 0.85–1.15)
MCH RBC QN AUTO: 29.3 PG (ref 26.5–33)
MCHC RBC AUTO-ENTMCNC: 31.9 G/DL (ref 31.5–36.5)
MCV RBC AUTO: 92 FL (ref 78–100)
PLATELET # BLD AUTO: 325 10E3/UL (ref 150–450)
POTASSIUM BLD-SCNC: 4 MMOL/L (ref 3.4–5.3)
PROT SERPL-MCNC: 6.8 G/DL (ref 6.8–8.8)
RBC # BLD AUTO: 3.58 10E6/UL (ref 4.4–5.9)
SODIUM SERPL-SCNC: 137 MMOL/L (ref 133–144)
WBC # BLD AUTO: 6 10E3/UL (ref 4–11)

## 2021-08-19 PROCEDURE — 85027 COMPLETE CBC AUTOMATED: CPT | Performed by: INTERNAL MEDICINE

## 2021-08-19 PROCEDURE — 74330 X-RAY BILE/PANC ENDOSCOPY: CPT

## 2021-08-19 PROCEDURE — 999N000141 HC STATISTIC PRE-PROCEDURE NURSING ASSESSMENT: Performed by: INTERNAL MEDICINE

## 2021-08-19 PROCEDURE — 250N000009 HC RX 250: Performed by: ANESTHESIOLOGY

## 2021-08-19 PROCEDURE — 82040 ASSAY OF SERUM ALBUMIN: CPT | Performed by: INTERNAL MEDICINE

## 2021-08-19 PROCEDURE — C1769 GUIDE WIRE: HCPCS | Performed by: INTERNAL MEDICINE

## 2021-08-19 PROCEDURE — 250N000011 HC RX IP 250 OP 636: Performed by: ANESTHESIOLOGY

## 2021-08-19 PROCEDURE — 370N000017 HC ANESTHESIA TECHNICAL FEE, PER MIN: Performed by: INTERNAL MEDICINE

## 2021-08-19 PROCEDURE — 710N000009 HC RECOVERY PHASE 1, LEVEL 1, PER MIN: Performed by: INTERNAL MEDICINE

## 2021-08-19 PROCEDURE — 360N000083 HC SURGERY LEVEL 3 W/ FLUORO, PER MIN: Performed by: INTERNAL MEDICINE

## 2021-08-19 PROCEDURE — 258N000003 HC RX IP 258 OP 636: Performed by: ANESTHESIOLOGY

## 2021-08-19 PROCEDURE — C1877 STENT, NON-COAT/COV W/O DEL: HCPCS | Performed by: INTERNAL MEDICINE

## 2021-08-19 PROCEDURE — 255N000002 HC RX 255 OP 636: Performed by: INTERNAL MEDICINE

## 2021-08-19 PROCEDURE — 250N000025 HC SEVOFLURANE, PER MIN: Performed by: INTERNAL MEDICINE

## 2021-08-19 PROCEDURE — 272N000002 HC OR SUPPLY OTHER OPNP: Performed by: INTERNAL MEDICINE

## 2021-08-19 PROCEDURE — 88305 TISSUE EXAM BY PATHOLOGIST: CPT | Mod: TC | Performed by: INTERNAL MEDICINE

## 2021-08-19 PROCEDURE — 710N000012 HC RECOVERY PHASE 2, PER MINUTE: Performed by: INTERNAL MEDICINE

## 2021-08-19 PROCEDURE — 250N000009 HC RX 250: Performed by: NURSE ANESTHETIST, CERTIFIED REGISTERED

## 2021-08-19 PROCEDURE — 85610 PROTHROMBIN TIME: CPT | Performed by: INTERNAL MEDICINE

## 2021-08-19 PROCEDURE — 36415 COLL VENOUS BLD VENIPUNCTURE: CPT | Performed by: INTERNAL MEDICINE

## 2021-08-19 RX ORDER — NALOXONE HYDROCHLORIDE 0.4 MG/ML
0.4 INJECTION, SOLUTION INTRAMUSCULAR; INTRAVENOUS; SUBCUTANEOUS
Status: DISCONTINUED | OUTPATIENT
Start: 2021-08-19 | End: 2021-08-19 | Stop reason: HOSPADM

## 2021-08-19 RX ORDER — ONDANSETRON 4 MG/1
4 TABLET, ORALLY DISINTEGRATING ORAL EVERY 30 MIN PRN
Status: DISCONTINUED | OUTPATIENT
Start: 2021-08-19 | End: 2021-08-19 | Stop reason: HOSPADM

## 2021-08-19 RX ORDER — SODIUM CHLORIDE, SODIUM LACTATE, POTASSIUM CHLORIDE, CALCIUM CHLORIDE 600; 310; 30; 20 MG/100ML; MG/100ML; MG/100ML; MG/100ML
INJECTION, SOLUTION INTRAVENOUS CONTINUOUS
Status: DISCONTINUED | OUTPATIENT
Start: 2021-08-19 | End: 2021-08-19 | Stop reason: HOSPADM

## 2021-08-19 RX ORDER — ONDANSETRON 2 MG/ML
4 INJECTION INTRAMUSCULAR; INTRAVENOUS EVERY 6 HOURS PRN
Status: DISCONTINUED | OUTPATIENT
Start: 2021-08-19 | End: 2021-08-19 | Stop reason: HOSPADM

## 2021-08-19 RX ORDER — LIDOCAINE 40 MG/G
CREAM TOPICAL
Status: DISCONTINUED | OUTPATIENT
Start: 2021-08-19 | End: 2021-08-19 | Stop reason: HOSPADM

## 2021-08-19 RX ORDER — FLUMAZENIL 0.1 MG/ML
0.2 INJECTION, SOLUTION INTRAVENOUS
Status: DISCONTINUED | OUTPATIENT
Start: 2021-08-19 | End: 2021-08-19 | Stop reason: HOSPADM

## 2021-08-19 RX ORDER — FENTANYL CITRATE 0.05 MG/ML
25 INJECTION, SOLUTION INTRAMUSCULAR; INTRAVENOUS EVERY 5 MIN PRN
Status: DISCONTINUED | OUTPATIENT
Start: 2021-08-19 | End: 2021-08-19 | Stop reason: HOSPADM

## 2021-08-19 RX ORDER — DEXAMETHASONE SODIUM PHOSPHATE 4 MG/ML
INJECTION, SOLUTION INTRA-ARTICULAR; INTRALESIONAL; INTRAMUSCULAR; INTRAVENOUS; SOFT TISSUE PRN
Status: DISCONTINUED | OUTPATIENT
Start: 2021-08-19 | End: 2021-08-19

## 2021-08-19 RX ORDER — HYDRALAZINE HYDROCHLORIDE 20 MG/ML
2.5-5 INJECTION INTRAMUSCULAR; INTRAVENOUS EVERY 10 MIN PRN
Status: DISCONTINUED | OUTPATIENT
Start: 2021-08-19 | End: 2021-08-19 | Stop reason: HOSPADM

## 2021-08-19 RX ORDER — ONDANSETRON 2 MG/ML
INJECTION INTRAMUSCULAR; INTRAVENOUS PRN
Status: DISCONTINUED | OUTPATIENT
Start: 2021-08-19 | End: 2021-08-19

## 2021-08-19 RX ORDER — LIDOCAINE HYDROCHLORIDE 20 MG/ML
INJECTION, SOLUTION INFILTRATION; PERINEURAL PRN
Status: DISCONTINUED | OUTPATIENT
Start: 2021-08-19 | End: 2021-08-19

## 2021-08-19 RX ORDER — LABETALOL HYDROCHLORIDE 5 MG/ML
10 INJECTION, SOLUTION INTRAVENOUS
Status: DISCONTINUED | OUTPATIENT
Start: 2021-08-19 | End: 2021-08-19 | Stop reason: HOSPADM

## 2021-08-19 RX ORDER — NALOXONE HYDROCHLORIDE 0.4 MG/ML
0.2 INJECTION, SOLUTION INTRAMUSCULAR; INTRAVENOUS; SUBCUTANEOUS
Status: DISCONTINUED | OUTPATIENT
Start: 2021-08-19 | End: 2021-08-19 | Stop reason: HOSPADM

## 2021-08-19 RX ORDER — ONDANSETRON 2 MG/ML
4 INJECTION INTRAMUSCULAR; INTRAVENOUS EVERY 30 MIN PRN
Status: DISCONTINUED | OUTPATIENT
Start: 2021-08-19 | End: 2021-08-19 | Stop reason: HOSPADM

## 2021-08-19 RX ORDER — FENTANYL CITRATE 50 UG/ML
INJECTION, SOLUTION INTRAMUSCULAR; INTRAVENOUS PRN
Status: DISCONTINUED | OUTPATIENT
Start: 2021-08-19 | End: 2021-08-19

## 2021-08-19 RX ORDER — INDOMETHACIN 50 MG/1
100 SUPPOSITORY RECTAL
Status: DISCONTINUED | OUTPATIENT
Start: 2021-08-19 | End: 2021-08-19 | Stop reason: HOSPADM

## 2021-08-19 RX ORDER — ONDANSETRON 4 MG/1
4 TABLET, ORALLY DISINTEGRATING ORAL EVERY 6 HOURS PRN
Status: DISCONTINUED | OUTPATIENT
Start: 2021-08-19 | End: 2021-08-19 | Stop reason: HOSPADM

## 2021-08-19 RX ORDER — FENTANYL CITRATE 0.05 MG/ML
50 INJECTION, SOLUTION INTRAMUSCULAR; INTRAVENOUS
Status: DISCONTINUED | OUTPATIENT
Start: 2021-08-19 | End: 2021-08-19 | Stop reason: HOSPADM

## 2021-08-19 RX ORDER — HYDROMORPHONE HCL IN WATER/PF 6 MG/30 ML
0.2 PATIENT CONTROLLED ANALGESIA SYRINGE INTRAVENOUS EVERY 5 MIN PRN
Status: DISCONTINUED | OUTPATIENT
Start: 2021-08-19 | End: 2021-08-19 | Stop reason: HOSPADM

## 2021-08-19 RX ORDER — PROPOFOL 10 MG/ML
INJECTION, EMULSION INTRAVENOUS PRN
Status: DISCONTINUED | OUTPATIENT
Start: 2021-08-19 | End: 2021-08-19

## 2021-08-19 RX ORDER — OXYCODONE HYDROCHLORIDE 5 MG/1
5 TABLET ORAL EVERY 4 HOURS PRN
Status: DISCONTINUED | OUTPATIENT
Start: 2021-08-19 | End: 2021-08-19 | Stop reason: HOSPADM

## 2021-08-19 RX ADMIN — PHENYLEPHRINE HYDROCHLORIDE 50 MCG: 10 INJECTION INTRAVENOUS at 09:29

## 2021-08-19 RX ADMIN — DEXAMETHASONE SODIUM PHOSPHATE 4 MG: 4 INJECTION, SOLUTION INTRA-ARTICULAR; INTRALESIONAL; INTRAMUSCULAR; INTRAVENOUS; SOFT TISSUE at 09:22

## 2021-08-19 RX ADMIN — PHENYLEPHRINE HYDROCHLORIDE 100 MCG: 10 INJECTION INTRAVENOUS at 09:11

## 2021-08-19 RX ADMIN — ROCURONIUM BROMIDE 50 MG: 10 INJECTION INTRAVENOUS at 09:11

## 2021-08-19 RX ADMIN — ONDANSETRON 4 MG: 2 INJECTION INTRAMUSCULAR; INTRAVENOUS at 09:22

## 2021-08-19 RX ADMIN — PHENYLEPHRINE HYDROCHLORIDE 50 MCG: 10 INJECTION INTRAVENOUS at 10:09

## 2021-08-19 RX ADMIN — PHENYLEPHRINE HYDROCHLORIDE 50 MCG: 10 INJECTION INTRAVENOUS at 09:37

## 2021-08-19 RX ADMIN — PHENYLEPHRINE HYDROCHLORIDE 50 MCG: 10 INJECTION INTRAVENOUS at 09:40

## 2021-08-19 RX ADMIN — SUGAMMADEX 140 MG: 100 INJECTION, SOLUTION INTRAVENOUS at 10:16

## 2021-08-19 RX ADMIN — PROPOFOL 80 MG: 10 INJECTION, EMULSION INTRAVENOUS at 09:11

## 2021-08-19 RX ADMIN — PHENYLEPHRINE HYDROCHLORIDE 0.2 MCG/KG/MIN: 10 INJECTION INTRAVENOUS at 09:34

## 2021-08-19 RX ADMIN — SODIUM CHLORIDE, POTASSIUM CHLORIDE, SODIUM LACTATE AND CALCIUM CHLORIDE: 600; 310; 30; 20 INJECTION, SOLUTION INTRAVENOUS at 09:07

## 2021-08-19 RX ADMIN — FENTANYL CITRATE 25 MCG: 50 INJECTION, SOLUTION INTRAMUSCULAR; INTRAVENOUS at 09:11

## 2021-08-19 RX ADMIN — FENTANYL CITRATE 25 MCG: 50 INJECTION, SOLUTION INTRAMUSCULAR; INTRAVENOUS at 09:51

## 2021-08-19 RX ADMIN — LIDOCAINE HYDROCHLORIDE 60 MG: 20 INJECTION, SOLUTION INFILTRATION; PERINEURAL at 09:11

## 2021-08-19 ASSESSMENT — MIFFLIN-ST. JEOR: SCORE: 1439.33

## 2021-08-19 NOTE — ANESTHESIA POSTPROCEDURE EVALUATION
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    Anesthesia Type:  General    Note:  Disposition: Inpatient   Postop Pain Control: Uneventful            Sign Out: Well controlled pain   PONV: No   Neuro/Psych: Uneventful            Sign Out: Acceptable/Baseline neuro status   Airway/Respiratory: Uneventful            Sign Out: Acceptable/Baseline resp. status   CV/Hemodynamics: Uneventful            Sign Out: Acceptable CV status; No obvious hypovolemia; No obvious fluid overload   Other NRE: NONE   DID A NON-ROUTINE EVENT OCCUR? No           Last vitals:  Vitals Value Taken Time   /91 08/19/21 1115   Temp     Pulse 64 08/19/21 1117   Resp 11 08/19/21 1117   SpO2 96 % 08/19/21 1117   Vitals shown include unvalidated device data.    Electronically Signed By: Wesley Abdalla MD  August 19, 2021  11:20 AM

## 2021-08-19 NOTE — OP NOTE
ERCP 08/19/2021  9:02 AM [address] [address] [place]  [address]   _______________________________________________________________________________   Patient Name: Elier Leong          Procedure Date: 8/19/2021 9:02 AM   MRN: [id number]                       Account Number: [id number]   YOB: 1945              Admit Type: Outpatient   Age: 75                               Room: OR   Note Status: Finalized                Attending MD: Agus Kent MD   Instrument Name: 407 [id number]-[id number] ERCP     _______________________________________________________________________________       Procedure:                ERCP   Indications:              Ampullary/Periampullary Papillary Carcinoma, 75                             year old white male with a history of PLS, prostate                             cancer s/p prostatectomy, and newly diagnosed                             intraampullary adenocarcinoma s/p snare                             papillectomy on 7/12/2021. Course complicated by                             bleeding at papillectomy site due to premature                             initiation of Plavix. Bleeding resolved. 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 Plan for reassessment of                             papillectomy site and biopsing the resection margin                             base to see if there is truely residual cancer                             remaining or if margin was compromised from cautery                             effect.   Providers:                Agus  MD Donte, Yary Arthur, RN, Eboni Portillo, Technologist   Referring MD:               Requesting Provider:      Saqib Moscoso MD, Jovi Bird MD, Sima Galvez   Medicines:                General Anesthesia   Complications:            No immediate complications. Estimated blood loss:                             Minimal.   _______________________________________________________________________________   Procedure:                Pre-Anesthesia Assessment:                             - Prior to the procedure, a History and Physical                             was performed, and patient medications and                             allergies were reviewed. The patient is competent.                             The risks and benefits of the procedure and the                             sedation options and risks were discussed with the                             patient. All questions were answered and informed                             consent was obtained. Patient identification and                             proposed procedure were verified by the physician,                             the nurse, the anesthesiologist and the anesthetist                             in the procedure room. Mental Status Examination:                             alert and oriented. Respiratory Examination: clear                             to auscultation. CV Examination: normal.                             Prophylactic Antibiotics: The patient does not                             require prophylactic antibiotics. Prior                             Anticoagulants: The patient has taken no previous                             anticoagulant or antiplatelet agents. ASA Grade                             Assessment: III - A patient with severe systemic                             disease. After reviewing the risks and benefits,                             the patient  was deemed in satisfactory condition to                             undergo the procedure. The anesthesia plan was to                             use general anesthesia. Immediately prior to                             administration of medications, the patient was                             re-assessed for adequacy to receive sedatives. The                             heart rate, respiratory rate, oxygen saturations,                             blood pressure, adequacy of pulmonary ventilation,                             and response to care were monitored throughout the                             procedure. The physical status of the patient was                             re-assessed after the procedure.                             After obtaining informed consent, the scope was                             passed under direct vision. Throughout the                             procedure, the patient's blood pressure, pulse, and                             oxygen saturations were monitored continuously. The                             Duodenoscope was introduced through the mouth, and                             used to inject contrast into and used to inject                             contrast into the bile duct and ventral pancreatic                             duct. The ERCP was accomplished without difficulty.                             The patient tolerated the procedure well.                                                                                     Findings:        Biliary stents and pancreatic stent were visible on the  film. The        esophagus was successfully intubated under direct vision. The scope was        advanced from the mouth to the duodenum. The pharynx, larynx and        associated structures, as well as the upper GI tract, were normal. Three        plastic stents originating in the pancreatic duct and the common bile        duct were emerging from the major papilla. The  stents were visibly        patent. Inspection of the major papilla revealed that an ampullectomy        (papillectomy) had been performed previously. The papillectomy site was        ulcerated. Three stents were removed from the pancreatic duct and the        common bile duct using a snare. The bile duct was deeply cannulated with        the 12 mm balloon. Contrast was injected. I personally interpreted the        bile duct images. There was brisk flow of contrast through the ducts.        Image quality was excellent. Contrast extended to the hepatic ducts. The        main bile duct was normal. Visiglide wire was passed into the biliary        tree. The biliary tree was swept with a 12 mm balloon starting at the        bifurcation. Nothing was found. The ventral pancreatic duct was deeply        cannulated with the 12 mm balloon. Contrast was injected. The entire        opacified area was dilated diffusely. Visiglide wire passed successfully        into the tail of the pancreatic duct from the major papilla. One 4 Fr by        11 cm temporary stent with a single external pigtail and no internal        flaps was placed 11 cm into the ventral pancreatic duct. Clear fluid        flowed through the stent. The stent was in good position. Papillectomy        resection site was biopsied with a cold jumbo forceps for histology.                                                                                     Impression:               - Three visibly patent stents from the pancreatic                             duct and the common bile duct were seen in the                             major papilla. Removed.                             - Prior endoscopic papillectomy. Area closely                             inspected. Site still ulcerated and healing. Cannot                             rule out residual carcinoma visually.                             - Normal cholangiogram with a widely open biliary                              orifice. Bile duct swept and nothing was found.                             - Pancreatic duct cannulated and                             protective/prophylacitc 4 Fr x 11 cm SPT Hobb stent                             placed in PD                             - Multiple biopsies obtained at the papillectomy                             resection site to rule out residual carcinoma   Recommendation:           - Discharge patient to home (ambulatory).                             - Await path results. Will also discuss findings                             with oncologist/radiation oncologist.                             - Resume medications (Patient no longer on Plavix.                             Aspirin okay to continue)                             - Resume diet                             - Pancreatic stent placed today should                             spontaneously pass. Patient to monitor for passage                             out ileostomy.

## 2021-08-19 NOTE — OR NURSING
Wife present in room for discharge instructions. Discharge instructions reviewed with patient and wife. Printed forms also sent with patient. Neither have any further questions.

## 2021-08-19 NOTE — OR NURSING
ERCP stent removal, balloon sweep, stent placement Santos, bxs taken papillectomy site, speciman given to circulator.

## 2021-08-19 NOTE — DISCHARGE INSTRUCTIONS
Same Day Surgery Discharge Instructions for  Sedation and General Anesthesia       It's not unusual to feel dizzy, light-headed or faint for up to 24 hours after surgery or while taking pain medication.  If you have these symptoms: sit for a few minutes before standing and have someone assist you when you get up to walk or use the bathroom.      You should rest and relax for the next 24 hours. We recommend you make arrangements to have an adult stay with you for at least 24 hours after your discharge.  Avoid hazardous and strenuous activity.      DO NOT DRIVE any vehicle or operate mechanical equipment for 24 hours following the end of your surgery.  Even though you may feel normal, your reactions may be affected by the medication you have received.      Do not drink alcoholic beverages for 24 hours following surgery.       Slowly progress to your regular diet as you feel able. It's not unusual to feel nauseated and/or vomit after receiving anesthesia.  If you develop these symptoms, drink clear liquids (apple juice, ginger ale, broth, 7-up, etc. ) until you feel better.  If your nausea and vomiting persists for 24 hours, please notify your surgeon.        All narcotic pain medications, along with inactivity and anesthesia, can cause constipation. Drinking plenty of liquids and increasing fiber intake will help.      For any questions of a medical nature, call your surgeon.      Do not make important decisions for 24 hours.      If you had general anesthesia, you may have a sore throat for a couple of days related to the breathing tube used during surgery.  You may use Cepacol lozenges to help with this discomfort.  If it worsens or if you develop a fever, contact your surgeon.       If you feel your pain is not well managed with the pain medications prescribed by your surgeon, please contact your surgeon's office to let them know so they can address your concerns.       CoVid 19 Information    We want to give you  information regarding Covid. Please consult your primary care provider with any questions you might have.     Patient who have symptoms (cough, fever, or shortness of breath), need to isolate for 7 days from when symptoms started OR 72 hours after fever resolves (without fever reducing medications) AND improvement of respiratory symptoms (whichever is longer).      Isolate yourself at home (in own room/own bathroom if possible)    Do Not allow any visitors    Do Not go to work or school    Do Not go to Taoism,  centers, shopping, or other public places.    Do Not shake hands.    Avoid close and intimate contact with others (hugging, kissing).    Follow CDC recommendations for household cleaning of frequently touched services.     After the initial 7 days, continue to isolate yourself from household members as much as possible. To continue decrease the risk of community spread and exposure, you and any members of your household should limit activities in public for 14 days after starting home isolation.     You can reference the following CDC link for helpful home isolation/care tips:  https://www.cdc.gov/coronavirus/2019-ncov/downloads/10Things.pdf    Protect Others:    Cover Your Mouth and Nose with a mask, disposable tissue or wash cloth to avoid spreading germs to others.    Wash your hands and face frequently with soap and water    Call Your Primary Doctor If: Breathing difficulty develops or you become worse.    For more information about COVID19 and options for caring for yourself at home, please visit the CDC website at https://www.cdc.gov/coronavirus/2019-ncov/about/steps-when-sick.html  For more options for care at [facility], please visit our website at [email or web address]      **If you have questions or concerns about your procedure,   call Dr. Kent at [phone]**

## 2021-08-19 NOTE — ANESTHESIA CARE TRANSFER NOTE
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    Anesthesia Type:   General     Note:    Oropharynx: oropharynx clear of all foreign objects  Level of Consciousness: drowsy  Oxygen Supplementation: face mask  Level of Supplemental Oxygen (L/min / FiO2): 8  Independent Airway: airway patency satisfactory and stable  Dentition: dentition unchanged  Vital Signs Stable: post-procedure vital signs reviewed and stable  Report to RN Given: handoff report given  Patient transferred to: PACU    Handoff Report: Identifed the Patient, Identified the Reponsible Provider, Reviewed the pertinent medical history, Discussed the surgical course, Reviewed Intra-OP anesthesia mangement and issues during anesthesia, Set expectations for post-procedure period and Allowed opportunity for questions and acknowledgement of understanding      Vitals:  Vitals Value Taken Time   BP     Temp     Pulse 69 08/19/21 1030   Resp 9 08/19/21 1030   SpO2 100 % 08/19/21 1030   Vitals shown include unvalidated device data.    Electronically Signed By: CITLALY Smith CRNA  August 19, 2021  10:30 AM

## 2021-08-19 NOTE — ANESTHESIA PROCEDURE NOTES
Airway       Patient location during procedure: OR       Procedure Start/Stop Times: 8/19/2021 9:14 AM  Staff -        Anesthesiologist:  Wesley Abdalla MD       CRNA: Carmella Fontana APRN CRNA       Other Anesthesia Staff: Cheo Weston       Performed By: POLLO  Consent for Airway        Urgency: elective  Indications and Patient Condition       Indications for airway management: logan-procedural       Induction type:intravenous       Mask difficulty assessment: 2 - vent by mask + OA or adjuvant +/- NMBA    Final Airway Details       Final airway type: endotracheal airway       Successful airway: ETT - single  Endotracheal Airway Details        ETT size (mm): 8.0       Successful intubation technique: video laryngoscopy       VL Blade Size: Jordan 4       Grade View of Cords: 1       Adjucts: stylet       Position: Center       Measured from: lips       Secured at (cm): 22       Bite Block used: endo bite block.    Post intubation assessment        Placement verified by: capnometry, equal breath sounds and chest rise        Number of attempts at approach: 1       Secured with: pink tape       Ease of procedure: easy       Dentition: Intact and Unchanged

## 2021-08-20 LAB
PATH REPORT.COMMENTS IMP SPEC: NORMAL
PATH REPORT.FINAL DX SPEC: NORMAL
PATH REPORT.GROSS SPEC: NORMAL
PATH REPORT.MICROSCOPIC SPEC OTHER STN: NORMAL
PATH REPORT.RELEVANT HX SPEC: NORMAL
PHOTO IMAGE: NORMAL

## 2021-08-20 PROCEDURE — 88305 TISSUE EXAM BY PATHOLOGIST: CPT | Mod: 26 | Performed by: PATHOLOGY

## 2021-08-23 ENCOUNTER — PATIENT OUTREACH (OUTPATIENT)
Dept: CARE COORDINATION | Facility: CLINIC | Age: 76
End: 2021-08-23

## 2021-08-23 NOTE — PROGRESS NOTES
Clinic Care Coordination Contact    Situation: Patient chart reviewed by care coordinator.    Background/Assessment: Patient completed scheduled outpatient endoscopy and temporary pancreatic stent placed without complication at [facility] on 8/19/21. No change in patient's health status. Patient was discharged home.     Plan/Recommendations: CHW will perform monthly outreach in September. Rn CC will review chart after CHW outreach.     Tonya Hernández RN Care Coordinator  [facility]  Email: [email or web address]  Phone: [phone]

## 2021-08-26 NOTE — RESULT ENCOUNTER NOTE
I called the patient and his wife to review the pathology from his recent ERCP. Biopsies from his papillectomy site did not show residual cancer. I'll discuss with his medical oncologist and the radiation oncologist these findings. While it's possible that he may still have microscopic disease and we're dealing with sampling error I do worry about over treatment with adjuvant therapy. Close surveillance would be another option particularly given his frailty. We will be planning on surveillance ERCP to look at the resection site in 3-6 months.     Caroline,   Please assist in scheduling:     Procedure/Imaging/Clinic: ERCP  Physician: Donte  Timing: 3 months  Procedure length: 45 min  Anesthesia: Gen  Dx: intraampullary carcinoma  Tier: 3  Location: [facility] OR  Thanks    Agus Kent MD  [facility]  Division of Gastroenterology and Hepatology  [facility] [address] [place], [place] [address]

## 2021-08-29 ASSESSMENT — ENCOUNTER SYMPTOMS
MUSCLE WEAKNESS: 0
BACK PAIN: 0
ARTHRALGIAS: 1
MUSCLE CRAMPS: 0
JOINT SWELLING: 0
MYALGIAS: 0
STIFFNESS: 1
NECK PAIN: 0

## 2021-08-31 ENCOUNTER — PREP FOR PROCEDURE (OUTPATIENT)
Dept: GASTROENTEROLOGY | Facility: CLINIC | Age: 76
End: 2021-08-31

## 2021-08-31 DIAGNOSIS — G12.23 PRIMARY LATERAL SCLEROSES (H): Primary | ICD-10-CM

## 2021-08-31 DIAGNOSIS — C24.8 PERI-AMPULLARY CARCINOMA (H): Primary | ICD-10-CM

## 2021-09-01 ENCOUNTER — TRANSFERRED RECORDS (OUTPATIENT)
Dept: HEALTH INFORMATION MANAGEMENT | Facility: CLINIC | Age: 76
End: 2021-09-01

## 2021-09-02 ENCOUNTER — OFFICE VISIT (OUTPATIENT)
Dept: NEUROLOGY | Facility: CLINIC | Age: 76
End: 2021-09-02
Payer: COMMERCIAL

## 2021-09-02 ENCOUNTER — ANCILLARY PROCEDURE (OUTPATIENT)
Dept: ULTRASOUND IMAGING | Facility: CLINIC | Age: 76
End: 2021-09-02
Attending: PSYCHIATRY & NEUROLOGY
Payer: COMMERCIAL

## 2021-09-02 ENCOUNTER — THERAPY VISIT (OUTPATIENT)
Dept: OCCUPATIONAL THERAPY | Facility: CLINIC | Age: 76
End: 2021-09-02
Payer: COMMERCIAL

## 2021-09-02 ENCOUNTER — THERAPY VISIT (OUTPATIENT)
Dept: PHYSICAL THERAPY | Facility: CLINIC | Age: 76
End: 2021-09-02
Payer: COMMERCIAL

## 2021-09-02 VITALS
SYSTOLIC BLOOD PRESSURE: 149 MMHG | BODY MASS INDEX: 22.24 KG/M2 | DIASTOLIC BLOOD PRESSURE: 85 MMHG | WEIGHT: 155 LBS | HEART RATE: 71 BPM | OXYGEN SATURATION: 97 %

## 2021-09-02 DIAGNOSIS — M79.89 LEG SWELLING: ICD-10-CM

## 2021-09-02 DIAGNOSIS — M25.512 CHRONIC PAIN OF BOTH SHOULDERS: ICD-10-CM

## 2021-09-02 DIAGNOSIS — Z74.09 IMPAIRED MOBILITY AND ADLS: Primary | ICD-10-CM

## 2021-09-02 DIAGNOSIS — G12.23 PRIMARY LATERAL SCLEROSES (H): ICD-10-CM

## 2021-09-02 DIAGNOSIS — Z78.9 IMPAIRED MOBILITY AND ADLS: Primary | ICD-10-CM

## 2021-09-02 DIAGNOSIS — M79.89 LEG SWELLING: Primary | ICD-10-CM

## 2021-09-02 DIAGNOSIS — R39.15 URINARY URGENCY: ICD-10-CM

## 2021-09-02 DIAGNOSIS — M25.511 CHRONIC PAIN OF BOTH SHOULDERS: ICD-10-CM

## 2021-09-02 DIAGNOSIS — G89.29 CHRONIC PAIN OF BOTH SHOULDERS: ICD-10-CM

## 2021-09-02 PROCEDURE — 97162 PT EVAL MOD COMPLEX 30 MIN: CPT | Mod: GP | Performed by: PHYSICAL THERAPIST

## 2021-09-02 PROCEDURE — 97535 SELF CARE MNGMENT TRAINING: CPT | Mod: GP | Performed by: PHYSICAL THERAPIST

## 2021-09-02 PROCEDURE — 99215 OFFICE O/P EST HI 40 MIN: CPT | Performed by: PSYCHIATRY & NEUROLOGY

## 2021-09-02 PROCEDURE — 94375 RESPIRATORY FLOW VOLUME LOOP: CPT | Performed by: INTERNAL MEDICINE

## 2021-09-02 PROCEDURE — 97535 SELF CARE MNGMENT TRAINING: CPT | Mod: GO | Performed by: OCCUPATIONAL THERAPIST

## 2021-09-02 PROCEDURE — 97166 OT EVAL MOD COMPLEX 45 MIN: CPT | Mod: GO | Performed by: OCCUPATIONAL THERAPIST

## 2021-09-02 PROCEDURE — 93970 EXTREMITY STUDY: CPT | Mod: GC | Performed by: RADIOLOGY

## 2021-09-02 ASSESSMENT — REVISED AMYOTROPHIC LATERAL SCLEROSIS FUNCTIONAL RATING SCALE (ALSFRS-R)
SALIVATION: 3
DYSPNEA: 4
SPEECH: 1
DRESSING_AND_HYGEINE: 1
DRESSING_AND_HYGEINE: NEEDS ATTENDANT FOR SELF-CARE
HANDWRITING: 2
WALKING: WALKS WITH ASSISTANCE
TURNING_IN_BED_AND_ADJUSTING_BED_CLOTHES: 2
SALIVATION: SLIGHT BUT DEFINITE EXCESS OF SALIVA IN MOUTH; MAY HAVE NIGHTTIME DROOLING
CLIMBING_STAIRS: 0
CLIMBING_STAIRS: CANNOT DO
SIX_ITEM_SUBTOTAL: 12
HANDWRITING: NOT ALL WORDS ARE LEGIBLE
CUTTING_FOOD_AND_HANDLING_UTENSILES: 2
SWALLOWING: 2
RESPIRATORY_SUBTOTAL_SCORE: 12
BULBAR_SUBTOTAL: 6
WALKING: 2
ORTHOPENA: 4
GROSS_MOTOR_SUBTOTAL_SCORE: 4
RESPIRATORY_INSUFFICIENCY: 4
FINE_MOTOR_SUBTOTAL_SCORE: 5
ALSFRS_TOTAL_SCORE: 27
SWALLOWING: DIETARY CONSISTENCY CHANGES
TURNING_IN_BED_AND_ADJUSTING_BED_CLOTHES: CAN TURN ALONE OR ADJUST SHEETS, BUT WITH GREAT DIFFICULTY
SPEECH: SPEECH COMBINED WITH NON-VOCAL COMMUNICATION

## 2021-09-02 ASSESSMENT — PAIN SCALES - GENERAL: PAINLEVEL: NO PAIN (0)

## 2021-09-02 NOTE — PATIENT INSTRUCTIONS
1. Orthopedic referral - Dr. Cuevas for shoulder evaluation. Call [phone] if you do not hear from them.    2. Lymphedema therapy for leg swelling. Call [phone] if you do not hear from [facility].    3. Ultrasound of the legs to investigate for blood clot-Today @ 2:05pm for check-in.    4. Urology referral to discuss alternative treatments for bladder urgency. Call [phone] to schedule.        Please call Flor @ [phone] for questions or concerns during regular business hours. For a more efficient way to communicate, use [employer] and address the message to your physician. Remember, [employer] is only read during business hours. Do not leave urgent messages on voicemail or [employer]. If situation is urgent, contact the Neurology Clinic @ [phone] and ask to speak to a Triage Nurse or Call 911 or visit an Emergency Department.    Please call your pharmacy if you need a medication refill. They will send us an electronic message.

## 2021-09-02 NOTE — PROGRESS NOTES
"    OUTPATIENT OCCUPATIONAL THERAPY CLINIC NOTE    Type of visit:  Evaluation            Date of Service:  9/2/21    Referring provider: Dr. Gary Tejada    Others present at visit:  Spouse / significant other, Kelli    Medical diagnosis:   Primary lateral sclerosis (PLS)     Date of diagnosis: 2001     Pertinent medical history:  H/O BPPV with 2 recent episodes, stroke with L king June 2014; recent fall 5/2017 with LE DVT;stroke (most recent 5/24/18 - lacunar infarct in the right frontal white matter). baclofen pump; Fall 11/19 while out of state hit head on wall with LOC as cannot recall how it happened/concussion; 11/20 subacute CVA    Additional Occupational Profile Information (patterns of daily living, interests, values and needs):  and retired with long history of PLS    Cardio-respiratory status:  FVC: 60%    Height/Weight: 5'10\" / 155 lb    Living environment:  House  Master bath remodeled with a emily shower and grab bars and fold down seat and also another seating area to dress once out of shower,   Higher toilet with bars around toilet  second bathroom with high toilet and vanity he pulls forward on.    Living environment barriers:  2 stairs to enter (1 railing present)    Ramp from garage and to deck with Ted rails, walks with 4ww   Flight to basement with 1 railing    Current assistance/living environment:  Lives with wife who assists      Current mobility equipment:  4 wheeled walker with seat in home (has 4 strategically placed)  Scooter for distance , breaks down into 4 pieces so wife can transport in vehicle  Transport wheelchair  bedrail  Gait belt    Current ADL equipment:  Roll in shower grab bars and fold down seat  Wall grab bars around high toilet  Built-up foam       Technology used: computer, dysarthric speech, hard to understand-50% comprehensible    Patient concerns/goals: Wife and pt have concerns about how to better help get pt into the SUV they have-is there something that he can " hold onto to pull into the vehicle. Also wife is having a hard time helping pt to get his compression socks on.     Evaluation   Interview completed.   Pain assessment:  Pain in R> L shoulders with use    Range of motion:  L handed: Reduced BUE AROM is limited to approx 100-110 degrees shoulder flex with tight end range feel and some increased PROM beyond this   * Pt reports he does tabletop stretches for shoulders at home    Manual muscle testing: Ted  is fairly strong and symmetrical, but lateral and palmar pinch are weak and  R is weaker than L with manual testing. L bicep strength today as needed to pull self into vehicle 5/5    Cognition:   ALS CBS (ALS Cognitive Behavioral Screen) completed previous visit-please see ALS flowsheet-demonstrates impairment      ADL:   Feeding self:  Spouse helps cut meat, able to hold regular utensils with L hand except uses built up spoon  Grooming: slower but okay  UE Dressing:  Help with buttons from wife.    LE Dressing:  Help with shoes and socks. HAs assist now due to impaired balance cannot manage pants with belts/buttons/zippers as requires 2 hands and needs one hand on grab bar at all times  Showering/bathing: home health aid and spouse when showers, sits to shower  Toileting/transfer:  Help of wife to manage clothing/balance    IADL:   Home management:  Kelli, spouse does all    Driving:  No longer driving, spouse does driving  Occupation: retired   Emergency Call system: provided with [employer] information in past     Fall Risk Screen:    Has the patient fallen 2 or more times in the last year?no                            Has the patient fallen and had an injury in the past year? no; 11/19 in ER for  concussion as hit head                                        TUG: NT, defer to PT      Is the patient a fall risk? Yes, department fall risk interventions implemented     Impairments:  Fatigue  Muscle  atrophy  Coordination  dysathria  Balance  spasticity   Cognition    Treatment diagnosis:  Impaired activities of daily living and mobility    Assessment of Occupational Performance: 3-5 Performance Deficits  Identified Performance Deficits (ie: feeding, social skills):toileting, dressing, functional mobility, bathing, toileting  Clinical Decision Making (Complexity):Mod complexity     Recommendations/Plan of care:  Patient would benefit from interventions to enhance safety and independence.  Rehab potential good for stated goals.  Occupational therapy intervention for  self care/home management.  1 session evaluation & treatment.    Goals:   Target date: today  Patient, family and/or caregiver will verbalize understanding of evaluation results and implications for functional performance.  Patient, family and/or caregiver will verbalize/demonstrate understanding of compensatory methods /equipment to enhance functional independence and safety.  Patient, family and/or caregiver will verbalize resources for improving car transfers and considerations for future w/c transportation as optimal means of transportation and transfer safety in vehicle.      Educational assessment/barriers to learning:  No barriers noted      Treatment provided this date:   Self care/home management, 10 minutes of training in:  -Considerations to try for helping pt get into his passenger seat in vehicle more easily:  Try silk pillow case instead of plastic bag, use pivot disk (has already). Pt's wife notes pt tends to land on edge of seat despite placing transport w/c close to vehicle for stand pivot.   Try extra long gait belt around the 's seat or hook under seat to help pt to grasp so he can pull self over into vehicle-will request from [facility]  Plan for future needs with progression of weakness with adaptation of vehicle with ramp for pt to transport in w/c or potentially seat that comes out and allows improved transfer  -Training in use  of compression stocking (Jobst marshall) and requested from [facility] to be sent out to patient for wife to use to help pt to don from supine so she can protect her back.      Response to treatment/recommendations:receptive    Goal attainment:  All goals met    Risks and benefits of evaluation/treatment have been explained.  Patient, family and/or caregiver are in agreement with Plan of Care.     Timed Code Treatment Minutes: 10  Total Treatment Time (sum of timed and untimed services): 20 min      Signature: GEORGE Spaulding/SARAH, MSCS     Date:9/2/21    Certification: [employer]age Plan

## 2021-09-02 NOTE — LETTER
2021       RE: Elier Leong  [address] [facility] [address]     Dear Colleague,    Thank you for referring your patient, Elier Leong, to the [facility] NEUROLOGY [facility] [place] at [facility]. Please see a copy of my visit note below.    [facility]: [place]  Neuromuscular Progress Note    Patient Name:  Elier Barber  MRN:  [id number]    :  1945  Date of Service:  Sep 2, 2021  Primary care provider:  Lida Ray    Brief Summary:   Mr. Barber is a 75 year old male with h/o PLS which was diagnosed in  who presents for follow-up.     Subjective:   Mr. Ponce was last seen in the clinic in 2021.  He is accompanied by his wife today.  He continues to follow-up with Dr. Calvert for management of his baclofen pump in the setting of spasticity.  He has found this helpful for the management of spasticity.  Still suffering from significant shoulder pain. Has tried trigger points, acupuncture, tylenol, chiropractor, steroid injections, OTC pain patches, gabapentin. It is worse in the morning, does loosen up a bit during the day. It is throbbing and achy, worst on the outside of his shoulder. Has been present for the last 18 months. Was working with pain clinic in [place], but it is too far and did not like the care. Additionally has worsening swelling in his feet, that came up over the summer with all of his GI issues. Takes lasix occasionally, skipping if he it not at home for the day. Has compression stockings, but difficulty using them. Has some urgency with bowel/bladder, takes oxybutin but not helpful as it should be.      He does feel there has been progression of bilateral upper and lower limb weakness,  worsened since being in the hospital, but states he has gained a significant amount of it back with bike and other exercises. Has home OT/PT/nurse, notices transfers are easier.  He  does use a manual wheelchair (80%) along with a four-wheel walker.  Denies dysphagia with solids or liquids, using behavioral modifications. Denies SOB, orthopnea, difficulty with cough, change in speech, difficulty with saliva, or poor sleep.    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 This was removed but had complications with GI bleeds later. He is cancer free but weaker overall due to multiple hospitalizations.                                                           ROS: A 10-point ROS was performed as per HPI.    Medications:      Current Outpatient Medications:      acetaminophen (TYLENOL) 650 MG CR tablet, Take 650 mg by mouth every 8 hours as needed for mild pain (Shoulder pain) , Disp: , Rfl:      aspirin 81 MG tablet, Take 81 mg by mouth every evening , Disp: , Rfl:      baclofen (LIORESAL) intraTHECAL Internal Pump, by Intrathecal route continuous prn Pump filled by [facility] stroke [place] [phone] Pump Model: Syncromed Medication in pump is Gablofen (brand name) Last fill: during hospital admission Next fill:    [place] Alarm Date:  Reservoir Volume: 20 ml Conc: 2000 mcg/mL Delivers 234.7 mcg/day Basal rate:unknown Battery life: unknown, Disp: , Rfl:      EPINEPHrine 0.3 MG/0.3ML injection, Inject 0.3 mLs (0.3 mg) into the muscle as needed for anaphylaxis, Disp: 0.3 mL, Rfl: 3     fluorouracil (EFUDEX) 5 % external cream, Apply topically to legs 1 to 2 times weekly as needed/tolerated for maintenance , Disp: , Rfl:      furosemide (LASIX) 20 MG tablet, Take 2 tablets (40 mg) by mouth daily, Disp: 60 tablet, Rfl: 1     ketoconazole (NIZORAL) 2 % external shampoo, SHAMPOO EVERY 2-3 DAYS AS  NEEDED, Disp: 120 mL, Rfl: 10     oxybutynin ER (DITROPAN-XL) 10 MG 24 hr tablet, Take 2 tablets (20 mg) by mouth At Bedtime, Disp: 180 tablet, Rfl: 3     pantoprazole (PROTONIX) 40 MG EC tablet, Take 1 tablet (40 mg) by mouth 2 times  daily, Disp: 60 tablet, Rfl: 3     polyethylene glycol (MIRALAX/GLYCOLAX) Packet, Take 1 packet by mouth daily as needed Every 2-3 days., Disp: , Rfl:     Physical Examination:   BP (!) 149/85   Pulse 71   Wt 70.3 kg (155 lb)   SpO2 97%   BMI 22.24 kg/m    Wt Readings from Last 4 Encounters:   09/02/21 70.3 kg (155 lb)   08/19/21 70.3 kg (155 lb)   08/10/21 68 kg (150 lb)   08/03/21 68 kg (149 lb 14.6 oz)     Moderate bilateral pitting edema, R>L, without venous cords or tenderness.      NM Exam: Cranial     Atrophy Fasciculations   Upper face: Negative Negative   Lower face: Negative Negative   Tongue: Negative Negative      Facial weakness: difficulty with cheek puff  Tongue movements: slow  Tongue weakness: mild  Jaw jerk: present  Speech: dysarthria  Pseudobulbar affect: present    NM Exam: Axial    Neck flexion weakness: none  Neck extension weakness: none    NM Exam: Upper Extremities     Right Left   Atrophy: Negative Negative   Fasciculations: Negative Negative   Muscle tone: spastic catch spastic catch   Rapid alt. movements: slow slow   Reflexes Right Left   Biceps: increased increased   Brachioradialis: increased increased   Triceps: increased increased   Weldon: right Weldon reflex present left Weldon reflex present   Strength Right Left   * Indicates a recommended field     Shoulder abduction*: >4 *pain  >4* pain   Elbow flexion: 4+ *pain 4+ *pain   Elbow extension*: 4+ *pain 4+ *pain   Wrist extension*: 5 4+   Finger extension: 4 4   Finger flexion: 5 5   Abductor pollicis brevis: 4 4   First dorsal interosseous*: 4 4     NM Exam: Lower Extremities     Right Left   Atrophy: Negative Negative   Fasciculations: Negative Negative   Muscle tone: spastic catch left lower extremity stiffness   Rapid alt. movements: slow slow   Reflexes Right Left   Patellar: increased increased   Achilles: normal normal   Plantar:     Strength Right Left   * Indicates a recommended field     Hip flexion*: 4+ 4+   Knee  extension*: 5 5   Knee flexion: 5 5   Ankle dorsiflexion*: 5 5   Ankle plantar flexion: 5 5     Impression:    Mr. Barber is a 75 year old male with h/o PLS which was diagnosed in 2002 who presents for follow-up.  Overall there have not been significant changes in his overall examination since his last visit, even though he had significant weakening with his multiple hospital stays, he seems to have recovered most of his strength. He does have some mild asymmetry to his upper limb weakness, left greater than right which is most likely attributed to his stroke. The larger issue his is limitation with pain. Recommended he be evaluated by orthopedic shoulder specialist to cause and alternative treatments given lack of benefit with previous treatments and significant limitations to daily activities (use of walker). His significant lower extremity swelling is worse per his wife and would recommend eval with ultrasound to assess for thrombus (tumor, immobilized, asymmetric swelling), although believe this is less likely. Has trouble with initial treatment options, will refer for lymphedema therapy at this time. Lastly, is suffering from worsening bladder urgency. Previous urologist retired, requesting new referral for further evaluation.  He will be seen by multiple members of our multidisciplinary team including OT, PT.  Discussed augmentative communication options, and offered a follow up appointment with our SLP who specializes in that. Patient and wife believe it is not a large issue at this time and are ok with waiting. They feel comfortable and would like to follow-up in 6 months. Will contact sooner if any concerns.     Recommendations:   - OT/PT today  - Ortho referral for shoulder evaluation  - Lymphedema therapy referral  - Urology referral   - U/S bilateral lower extremities (negative)    -RTC 6 mo    Seen and discussed with Dr. Tejada. His addendum follows    Rosamaria Culver MD  Neuromuscular Fellow    This  patient needs a power mobility device due to problems with walking, necessary to complete his/her mobility related activities of daily living, including toileting, hygiene, dressing, and eating. The recommended device will assist this patient to be able to get around their residence independently and assist with MRADLs. Patient will or has had a specific and thorough mobility evaluation by a qualified therapist, and a successful home trial of the recommended device.    I personally examined the patient and concur with the resident's note.    Gary Tejada M.D.    40 minutes spent on the date of the encounter on chart review, history and examination, documentation and further activities as noted above.          Again, thank you for allowing me to participate in the care of your patient.      Sincerely,    Gary Tejada MD

## 2021-09-02 NOTE — PROGRESS NOTES
[facility]: [place]  Neuromuscular Progress Note    Patient Name:  Elier Barber  MRN:  [id number]    :  1945  Date of Service:  Sep 2, 2021  Primary care provider:  Lida Ray    Brief Summary:   Mr. Barber is a 75 year old male with h/o PLS which was diagnosed in  who presents for follow-up.     Subjective:   Mr. Ponce was last seen in the clinic in 2021.  He is accompanied by his wife today.  He continues to follow-up with Dr. Calvert for management of his baclofen pump in the setting of spasticity.  He has found this helpful for the management of spasticity.  Still suffering from significant shoulder pain. Has tried trigger points, acupuncture, tylenol, chiropractor, steroid injections, OTC pain patches, gabapentin. It is worse in the morning, does loosen up a bit during the day. It is throbbing and achy, worst on the outside of his shoulder. Has been present for the last 18 months. Was working with pain clinic in [place], but it is too far and did not like the care. Additionally has worsening swelling in his feet, that came up over the summer with all of his GI issues. Takes lasix occasionally, skipping if he it not at home for the day. Has compression stockings, but difficulty using them. Has some urgency with bowel/bladder, takes oxybutin but not helpful as it should be.      He does feel there has been progression of bilateral upper and lower limb weakness,  worsened since being in the hospital, but states he has gained a significant amount of it back with bike and other exercises. Has home OT/PT/nurse, notices transfers are easier.  He does use a manual wheelchair (80%) along with a four-wheel walker.  Denies dysphagia with solids or liquids, using behavioral modifications. Denies SOB, orthopnea, difficulty with cough, change in speech, difficulty with saliva, or poor sleep.    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 This was removed but had complications with GI bleeds later. He is cancer free but weaker overall due to multiple hospitalizations.                                                           ROS: A 10-point ROS was performed as per HPI.    Medications:      Current Outpatient Medications:      acetaminophen (TYLENOL) 650 MG CR tablet, Take 650 mg by mouth every 8 hours as needed for mild pain (Shoulder pain) , Disp: , Rfl:      aspirin 81 MG tablet, Take 81 mg by mouth every evening , Disp: , Rfl:      baclofen (LIORESAL) intraTHECAL Internal Pump, by Intrathecal route continuous prn Pump filled by [facility] stroke [place] [phone] Pump Model: Syncromed Medication in pump is Gablofen (brand name) Last fill: during hospital admission Next fill:    Low [place] Alarm Date:  Reservoir Volume: 20 ml Conc: 2000 mcg/mL Delivers 234.7 mcg/day Basal rate:unknown Battery life: unknown, Disp: , Rfl:      EPINEPHrine 0.3 MG/0.3ML injection, Inject 0.3 mLs (0.3 mg) into the muscle as needed for anaphylaxis, Disp: 0.3 mL, Rfl: 3     fluorouracil (EFUDEX) 5 % external cream, Apply topically to legs 1 to 2 times weekly as needed/tolerated for maintenance , Disp: , Rfl:      furosemide (LASIX) 20 MG tablet, Take 2 tablets (40 mg) by mouth daily, Disp: 60 tablet, Rfl: 1     ketoconazole (NIZORAL) 2 % external shampoo, SHAMPOO EVERY 2-3 DAYS AS  NEEDED, Disp: 120 mL, Rfl: 10     oxybutynin ER (DITROPAN-XL) 10 MG 24 hr tablet, Take 2 tablets (20 mg) by mouth At Bedtime, Disp: 180 tablet, Rfl: 3     pantoprazole (PROTONIX) 40 MG EC tablet, Take 1 tablet (40 mg) by mouth 2 times daily, Disp: 60 tablet, Rfl: 3     polyethylene glycol (MIRALAX/GLYCOLAX) Packet, Take 1 packet by mouth daily as needed Every 2-3 days., Disp: , Rfl:     Physical Examination:   BP (!) 149/85   Pulse 71   Wt 70.3 kg (155 lb)   SpO2 97%   BMI 22.24 kg/m    Wt Readings from Last 4 Encounters:   09/02/21 70.3 kg (155  lb)   08/19/21 70.3 kg (155 lb)   08/10/21 68 kg (150 lb)   08/03/21 68 kg (149 lb 14.6 oz)     Moderate bilateral pitting edema, R>L, without venous cords or tenderness.      NM Exam: Cranial     Atrophy Fasciculations   Upper face: Negative Negative   Lower face: Negative Negative   Tongue: Negative Negative      Facial weakness: difficulty with cheek puff  Tongue movements: slow  Tongue weakness: mild  Jaw jerk: present  Speech: dysarthria  Pseudobulbar affect: present    NM Exam: Axial    Neck flexion weakness: none  Neck extension weakness: none    NM Exam: Upper Extremities     Right Left   Atrophy: Negative Negative   Fasciculations: Negative Negative   Muscle tone: spastic catch spastic catch   Rapid alt. movements: slow slow   Reflexes Right Left   Biceps: increased increased   Brachioradialis: increased increased   Triceps: increased increased   Weldon: right Weldon reflex present left Weldon reflex present   Strength Right Left   * Indicates a recommended field     Shoulder abduction*: >4 *pain  >4* pain   Elbow flexion: 4+ *pain 4+ *pain   Elbow extension*: 4+ *pain 4+ *pain   Wrist extension*: 5 4+   Finger extension: 4 4   Finger flexion: 5 5   Abductor pollicis brevis: 4 4   First dorsal interosseous*: 4 4     NM Exam: Lower Extremities     Right Left   Atrophy: Negative Negative   Fasciculations: Negative Negative   Muscle tone: spastic catch left lower extremity stiffness   Rapid alt. movements: slow slow   Reflexes Right Left   Patellar: increased increased   Achilles: normal normal   Plantar:     Strength Right Left   * Indicates a recommended field     Hip flexion*: 4+ 4+   Knee extension*: 5 5   Knee flexion: 5 5   Ankle dorsiflexion*: 5 5   Ankle plantar flexion: 5 5     Impression:    Mr. Barber is a 75 year old male with h/o PLS which was diagnosed in 2002 who presents for follow-up.  Overall there have not been significant changes in his overall examination since his last visit, even  though he had significant weakening with his multiple hospital stays, he seems to have recovered most of his strength. He does have some mild asymmetry to his upper limb weakness, left greater than right which is most likely attributed to his stroke. The larger issue his is limitation with pain. Recommended he be evaluated by orthopedic shoulder specialist to cause and alternative treatments given lack of benefit with previous treatments and significant limitations to daily activities (use of walker). His significant lower extremity swelling is worse per his wife and would recommend eval with ultrasound to assess for thrombus (tumor, immobilized, asymmetric swelling), although believe this is less likely. Has trouble with initial treatment options, will refer for lymphedema therapy at this time. Lastly, is suffering from worsening bladder urgency. Previous urologist retired, requesting new referral for further evaluation.  He will be seen by multiple members of our multidisciplinary team including OT, PT.  Discussed augmentative communication options, and offered a follow up appointment with our SLP who specializes in that. Patient and wife believe it is not a large issue at this time and are ok with waiting. They feel comfortable and would like to follow-up in 6 months. Will contact sooner if any concerns.     Recommendations:   - OT/PT today  - Ortho referral for shoulder evaluation  - Lymphedema therapy referral  - Urology referral   - U/S bilateral lower extremities (negative)    -RTC 6 mo    Seen and discussed with Dr. Tejada. His addendum follows    Rosamaria Culver MD  Neuromuscular Fellow    This patient needs a power mobility device due to problems with walking, necessary to complete his/her mobility related activities of daily living, including toileting, hygiene, dressing, and eating. The recommended device will assist this patient to be able to get around their residence independently and assist with MRADLs.  Patient will or has had a specific and thorough mobility evaluation by a qualified therapist, and a successful home trial of the recommended device.    I personally examined the patient and concur with the resident's note.    Gary Tejada M.D.    40 minutes spent on the date of the encounter on chart review, history and examination, documentation and further activities as noted above.

## 2021-09-02 NOTE — PROGRESS NOTES
"    OUTPATIENT PHYSICAL THERAPY CLINIC NOTE  TRENA Elier Leong  YOB: 1945  [id number]    Type of visit:         Evaluation     Date of service: 9/2/2021    Referring provider: Gary Tejada MD     present: No    Others present at visit:  Spouse / significant other- Kelli    Medical diagnosis:   Primary lateral sclerosis (PLS)    Date of diagnosis: 2002    Pertinent history of current problem (include personal factors and/or comorbidities that impact the plan of care): Patient diagnosed with PLS in 2002.  Patient has baclofen pump due to significant lower extremity spasticity.  Patient had additional stroke in January 2021. PMH: BPPV, stroke 2014, lower extremity DVT 2017    Cardio-respiratory status:  Forced vital capacity: 60 % of predicted     Height/Weight: 5'10\" / 155lbs    Living environment:  House    Living environment barriers:  Ramp(s) present  Stairs to basement; does not use     Current assistance/living environment:  Lives with wifeKelli who provides substantial assist including majority of ADLs and transfers      Current mobility equipment:  4 wheeled walker with seat (multiple)  Manual wheelchair-primary mode inside  Transport wheelchair-primary mode outside  Scooter  Bed rail  Gait belt     Current ADL equipment:  See OT note    Technology used: NT    Patient concerns/goals: Shoulder pain limiting walking with 4WW    Evaluation   Interview completed.   Pain assessment:  Pain present- shoulders     Range of motion: Significant BLE ROM restrictions due to spasticity     Manual muscle testing:  BLE grossly 4/5   Gait:  Unable to assess due to no device present. Patient requires max assist for sit to stand due to significant extensor spasticity; posterior lean in stance with flexed forward hip posturing   Cognition:  Mild per previous CBS testing   FRS: 27    Fall Risk Screen:   Has the patient fallen 2 or more times in the last year? No      Has the patient fallen and had an " injury in the past year? No       Timed Up and Go Score: Not able to perform due to no device    Is the patient a fall risk? Yes, department fall risk interventions implemented     Impairments:  Fatigue  Muscle atrophy  Coordination  Balance  Range of motion     Treatment diagnosis:  Impaired mobility  Impaired activities of daily living    Clinical Presentation: Evolving/Changing  Clinical Presentation Rationale: personal factors, body systems involved,progressive nature of PLS  Clinical Decision Making (Complexity): Moderate complexity     Recommendations/Plan of care:  1 session evaluation & treatment.  Equipment recommended: PWC.     Goals:   Target date: 9/2/2021  Patient, family and/or caregiver will verbalize understanding of evaluation results and implications for functional performance.  Patient, family and/or caregiver will verbalize/demonstrate understanding of compensatory methods /equipment to enhance functional independence and safety.  Patient, family and/or caregiver will verbalize energy management techniques appropriate for status and setting.    Educational assessment/barriers to learning:   No barriers noted     Treatment provided this date:   ADL/Self care management- 10 minutes  -Educated patient on energy conservation techniques including pacing of activities, frequent rest breaks, use of assistive device and monitoring signs of fatigue (increased muscle soreness, weakness, cramps, fasciculations) for safe functional mobility and performance of daily tasks  -Recommended patient start PWC process due to very limited ambulation distances; collaborated extensively on process, qualifications and improved QOL.  -Educated on possible areas related to B shoulder pain- has follow up with specialist soon, but recommended possible bracing for shoulder. Patient currently has posture corrector but is not providing enough support    Response to treatment/recommendations: Patient verbalizes  understanding/agreement    Goal attainment:  All goals met     Risks and benefits of evaluation/treatment have been explained.  Patient, family and/or caregiver are in agreement with Plan of Care.     Timed Code Treatment Minutes: 10  Total Treatment Time (sum of timed and untimed services): 25    Signature: Indu Ashley, PT   Date: 9/2/2021

## 2021-09-03 LAB
EXPTIME-PRE: 5.64 SEC
FEF2575-%PRED-PRE: 146 %
FEF2575-PRE: 3.21 L/SEC
FEF2575-PRED: 2.19 L/SEC
FEFMAX-%PRED-PRE: 69 %
FEFMAX-PRE: 5.35 L/SEC
FEFMAX-PRED: 7.72 L/SEC
FEV1-%PRED-PRE: 69 %
FEV1-PRE: 2.08 L
FEV1FEV6-PRE: 86 %
FEV1FEV6-PRED: 77 %
FEV1FVC-PRE: 86 %
FEV1FVC-PRED: 75 %
FIFMAX-PRE: 4 L/SEC
FVC-%PRED-PRE: 60 %
FVC-PRE: 2.43 L
FVC-PRED: 4.03 L
MEP-PRE: 52 CMH2O
MIP-PRE: -35 CMH2O

## 2021-09-03 NOTE — TELEPHONE ENCOUNTER
DIAGNOSIS: Pt with ALS with signfiicant shoulder pain limiting his ability to use his walker. Failed multiple treatment options without specific cause of his pain/ Dr Tejada/ XR 10/2/20 at /MRI 3/11/2021 at [facility]   APPOINTMENT DATE: 10.6.21   NOTES STATUS DETAILS   OFFICE NOTE from referring provider Internal 9.2.21 Dr Gary Tejada, [facility] Neuro   OFFICE NOTE from other specialist Internal 12.15.20 Dr Albert Yeo, [facility] Sports Med  12.4.20  10.2.20 Dr Zoila Alvarado, [facility]   DISCHARGE SUMMARY from hospital N/A    DISCHARGE REPORT from the ER N/A    OPERATIVE REPORT N/A    MEDICATION LIST Internal    EMG (for Spine) N/A    IMPLANT RECORD/STICKER N/A    LABS     CBC/DIFF N/A    CULTURES N/A    INJECTIONS DONE IN RADIOLOGY N/A    MRI N/A 3.11.21 cervical spine, thoracic spine, CDI   CT SCAN N/A    XRAYS (IMAGES & REPORTS) Internal 10.2.20 B shoulder  9.15.21 L shoulder, clavicle   TUMOR     PATHOLOGY  Slides & report N/A      Action 9.3.21 11:33 PM ANDERSON   Action Taken Called [facility] to have MRIs pushed and reports faxed

## 2021-09-10 ENCOUNTER — PATIENT OUTREACH (OUTPATIENT)
Dept: CARE COORDINATION | Facility: CLINIC | Age: 76
End: 2021-09-10

## 2021-09-10 NOTE — PROGRESS NOTES
Clinic Care Coordination Contact  [facility]/Voicemail       Clinical Data: Care Coordinator Outreach  Outreach attempted x 1.  Left message on Kelli (spouse) voicemail with call back information and requested return call.    Plan:  Care Coordinator will try to reach patient again in 10 business days.    LEANDER Jha  Clinic Care Coordination  [facility] : [place], [place], and [place]  Phone: [phone]    ______________________  Next Outreach:  09/23/21  Planned Outreach Frequency: Monthly  Preferred Phone Number: Kelli (spouse) [phone]    Enrollment Date:  07/26/21  Last Care Plan Assessment:  07/26/21

## 2021-09-13 ENCOUNTER — PATIENT OUTREACH (OUTPATIENT)
Dept: CARE COORDINATION | Facility: CLINIC | Age: 76
End: 2021-09-13

## 2021-09-13 ASSESSMENT — ACTIVITIES OF DAILY LIVING (ADL): DEPENDENT_IADLS:: CLEANING;COOKING;LAUNDRY;SHOPPING;MEAL PREPARATION;MEDICATION MANAGEMENT;TRANSPORTATION;INCONTINENCE

## 2021-09-13 NOTE — PROGRESS NOTES
Clinic Care Coordination Contact  Situation: Patient chart reviewed by care coordinator.     Background/Assessment: CHW has been unable to reach patient, since RN CC's last outreach. Patient will benefit from ongoing CHW outreach attempts per standard work.     Plan/Recommendations: CHW will reach out to patient per standard work flow. RN CC will review chart in 2 weeks.      Tonya Hernández RN Care Coordinator  [facility]  Email: [email or web address]  Phone: [phone]

## 2021-09-14 ENCOUNTER — TRANSFERRED RECORDS (OUTPATIENT)
Dept: HEALTH INFORMATION MANAGEMENT | Facility: CLINIC | Age: 76
End: 2021-09-14

## 2021-09-15 ENCOUNTER — PATIENT OUTREACH (OUTPATIENT)
Dept: CARE COORDINATION | Facility: CLINIC | Age: 76
End: 2021-09-15

## 2021-09-15 NOTE — PROGRESS NOTES
On 09/14 @2:41PM, CHW received a voicemail from Kelli(spouse) returning phone call.    Clinic Care Coordination Contact  [facility]/Voicemail       Clinical Data: Care Coordinator Outreach  Outreach attempted x 2.  Spoke to Nader, (speech impediment) Nader states that Kelli should back in about an hour.    Plan:  Care Coordinator will try to reach patient again in 1-2 business days.    LEANDER Jha  Clinic Care Coordination  [facility] : [place], [place], and [place]  Phone: [phone]    ______________________  Next Outreach:  09/23/21  Planned Outreach Frequency: Monthly  Preferred Phone Number: Kelli (spouse) [phone]    Enrollment Date:  07/26/21  Last Care Plan Assessment:  07/26/21

## 2021-09-16 ENCOUNTER — TRANSFERRED RECORDS (OUTPATIENT)
Dept: HEALTH INFORMATION MANAGEMENT | Facility: CLINIC | Age: 76
End: 2021-09-16

## 2021-09-18 ENCOUNTER — HEALTH MAINTENANCE LETTER (OUTPATIENT)
Age: 76
End: 2021-09-18

## 2021-09-22 ENCOUNTER — TELEPHONE (OUTPATIENT)
Dept: INTERNAL MEDICINE | Facility: CLINIC | Age: 76
End: 2021-09-22

## 2021-09-22 ENCOUNTER — MEDICAL CORRESPONDENCE (OUTPATIENT)
Dept: HEALTH INFORMATION MANAGEMENT | Facility: CLINIC | Age: 76
End: 2021-09-22

## 2021-09-22 NOTE — TELEPHONE ENCOUNTER
Call to Angelina and german detailed message.     Verbal order given to approve visits until certification received for MD signature.

## 2021-09-22 NOTE — TELEPHONE ENCOUNTER
Angelina calling from [facility] [[email or web address]] care for verbal orders for PT    1 time a week for 7 weeks  Ok to call and  [phone]

## 2021-09-23 ENCOUNTER — APPOINTMENT (OUTPATIENT)
Dept: MRI IMAGING | Facility: CLINIC | Age: 76
End: 2021-09-23
Attending: EMERGENCY MEDICINE
Payer: COMMERCIAL

## 2021-09-23 ENCOUNTER — APPOINTMENT (OUTPATIENT)
Dept: CT IMAGING | Facility: CLINIC | Age: 76
End: 2021-09-23
Attending: EMERGENCY MEDICINE
Payer: COMMERCIAL

## 2021-09-23 ENCOUNTER — HOSPITAL ENCOUNTER (OUTPATIENT)
Facility: CLINIC | Age: 76
Setting detail: OBSERVATION
Discharge: HOME | End: 2021-09-24
Attending: EMERGENCY MEDICINE | Admitting: HOSPITALIST
Payer: COMMERCIAL

## 2021-09-23 DIAGNOSIS — I63.9 CEREBROVASCULAR ACCIDENT (CVA), UNSPECIFIED MECHANISM (H): ICD-10-CM

## 2021-09-23 DIAGNOSIS — R93.1 ABNORMAL ECHOCARDIOGRAM: ICD-10-CM

## 2021-09-23 DIAGNOSIS — E78.5 HYPERLIPIDEMIA LDL GOAL <100: Primary | ICD-10-CM

## 2021-09-23 LAB
ANION GAP SERPL CALCULATED.3IONS-SCNC: 6 MMOL/L (ref 3–14)
APTT PPP: 27 SECONDS (ref 22–38)
BASOPHILS # BLD AUTO: 0.1 10E3/UL (ref 0–0.2)
BASOPHILS NFR BLD AUTO: 1 %
BUN SERPL-MCNC: 19 MG/DL (ref 7–30)
CALCIUM SERPL-MCNC: 8.6 MG/DL (ref 8.5–10.1)
CHLORIDE BLD-SCNC: 106 MMOL/L (ref 94–109)
CO2 SERPL-SCNC: 28 MMOL/L (ref 20–32)
CREAT SERPL-MCNC: 0.77 MG/DL (ref 0.66–1.25)
EOSINOPHIL # BLD AUTO: 0.2 10E3/UL (ref 0–0.7)
EOSINOPHIL NFR BLD AUTO: 3 %
ERYTHROCYTE [DISTWIDTH] IN BLOOD BY AUTOMATED COUNT: 15.3 % (ref 10–15)
GFR SERPL CREATININE-BSD FRML MDRD: 88 ML/MIN/1.73M2
GLUCOSE BLD-MCNC: 106 MG/DL (ref 70–99)
GLUCOSE BLDC GLUCOMTR-MCNC: 103 MG/DL (ref 70–99)
HCT VFR BLD AUTO: 32.7 % (ref 40–53)
HGB BLD-MCNC: 10.2 G/DL (ref 13.3–17.7)
HOLD SPECIMEN: NORMAL
IMM GRANULOCYTES # BLD: 0 10E3/UL
IMM GRANULOCYTES NFR BLD: 0 %
INR PPP: 1.06 (ref 0.85–1.15)
LYMPHOCYTES # BLD AUTO: 1.3 10E3/UL (ref 0.8–5.3)
LYMPHOCYTES NFR BLD AUTO: 19 %
MCH RBC QN AUTO: 26.9 PG (ref 26.5–33)
MCHC RBC AUTO-ENTMCNC: 31.2 G/DL (ref 31.5–36.5)
MCV RBC AUTO: 86 FL (ref 78–100)
MONOCYTES # BLD AUTO: 0.7 10E3/UL (ref 0–1.3)
MONOCYTES NFR BLD AUTO: 11 %
NEUTROPHILS # BLD AUTO: 4.6 10E3/UL (ref 1.6–8.3)
NEUTROPHILS NFR BLD AUTO: 66 %
NRBC # BLD AUTO: 0 10E3/UL
NRBC BLD AUTO-RTO: 0 /100
PLATELET # BLD AUTO: 272 10E3/UL (ref 150–450)
POTASSIUM BLD-SCNC: 4.1 MMOL/L (ref 3.4–5.3)
RBC # BLD AUTO: 3.79 10E6/UL (ref 4.4–5.9)
SODIUM SERPL-SCNC: 140 MMOL/L (ref 133–144)
TROPONIN I SERPL-MCNC: 0.03 UG/L (ref 0–0.04)
WBC # BLD AUTO: 6.9 10E3/UL (ref 4–11)

## 2021-09-23 PROCEDURE — 85025 COMPLETE CBC W/AUTO DIFF WBC: CPT | Performed by: EMERGENCY MEDICINE

## 2021-09-23 PROCEDURE — C9803 HOPD COVID-19 SPEC COLLECT: HCPCS

## 2021-09-23 PROCEDURE — 85730 THROMBOPLASTIN TIME PARTIAL: CPT | Performed by: EMERGENCY MEDICINE

## 2021-09-23 PROCEDURE — A9585 GADOBUTROL INJECTION: HCPCS | Performed by: EMERGENCY MEDICINE

## 2021-09-23 PROCEDURE — 99285 EMERGENCY DEPT VISIT HI MDM: CPT | Mod: 25

## 2021-09-23 PROCEDURE — 80048 BASIC METABOLIC PNL TOTAL CA: CPT | Performed by: EMERGENCY MEDICINE

## 2021-09-23 PROCEDURE — 99220 PR INITIAL OBSERVATION CARE,LEVEL III: CPT | Performed by: HOSPITALIST

## 2021-09-23 PROCEDURE — 70498 CT ANGIOGRAPHY NECK: CPT

## 2021-09-23 PROCEDURE — 250N000009 HC RX 250: Performed by: EMERGENCY MEDICINE

## 2021-09-23 PROCEDURE — 255N000002 HC RX 255 OP 636: Performed by: EMERGENCY MEDICINE

## 2021-09-23 PROCEDURE — 250N000011 HC RX IP 250 OP 636: Performed by: EMERGENCY MEDICINE

## 2021-09-23 PROCEDURE — 85610 PROTHROMBIN TIME: CPT | Performed by: EMERGENCY MEDICINE

## 2021-09-23 PROCEDURE — 84484 ASSAY OF TROPONIN QUANT: CPT | Performed by: EMERGENCY MEDICINE

## 2021-09-23 PROCEDURE — 93005 ELECTROCARDIOGRAM TRACING: CPT

## 2021-09-23 PROCEDURE — 99207 PR CDG-CODE CATEGORY CHANGED: CPT | Performed by: HOSPITALIST

## 2021-09-23 PROCEDURE — 70450 CT HEAD/BRAIN W/O DYE: CPT

## 2021-09-23 PROCEDURE — 87635 SARS-COV-2 COVID-19 AMP PRB: CPT | Performed by: EMERGENCY MEDICINE

## 2021-09-23 PROCEDURE — 70553 MRI BRAIN STEM W/O & W/DYE: CPT

## 2021-09-23 PROCEDURE — 36415 COLL VENOUS BLD VENIPUNCTURE: CPT | Performed by: EMERGENCY MEDICINE

## 2021-09-23 RX ORDER — GABAPENTIN 300 MG/1
CAPSULE ORAL
Status: ON HOLD | COMMUNITY
Start: 2021-09-16 | End: 2021-09-24

## 2021-09-23 RX ORDER — IOPAMIDOL 755 MG/ML
500 INJECTION, SOLUTION INTRAVASCULAR ONCE
Status: COMPLETED | OUTPATIENT
Start: 2021-09-23 | End: 2021-09-23

## 2021-09-23 RX ORDER — GADOBUTROL 604.72 MG/ML
7.5 INJECTION INTRAVENOUS ONCE
Status: COMPLETED | OUTPATIENT
Start: 2021-09-23 | End: 2021-09-23

## 2021-09-23 RX ADMIN — IOPAMIDOL 70 ML: 755 INJECTION, SOLUTION INTRAVENOUS at 18:42

## 2021-09-23 RX ADMIN — GADOBUTROL 7 ML: 604.72 INJECTION INTRAVENOUS at 22:30

## 2021-09-23 RX ADMIN — SODIUM CHLORIDE 80 ML: 9 INJECTION, SOLUTION INTRAVENOUS at 18:43

## 2021-09-23 ASSESSMENT — MIFFLIN-ST. JEOR
SCORE: 1423.46
SCORE: 1439.33

## 2021-09-23 ASSESSMENT — ENCOUNTER SYMPTOMS
FATIGUE: 1
SPEECH DIFFICULTY: 1
FACIAL ASYMMETRY: 1
HEADACHES: 0
VOMITING: 1

## 2021-09-23 NOTE — ED PROVIDER NOTES
History   Chief Complaint:  Stroke Symptoms     HPI   Elier Leong is a left-handed 75 year old male taking baby aspirin with history of mini strokes and 3 previous strokes  who presents with right-sided body and facial drooping that started 1 hour ago. Wife states they were sitting outside on the porch when she noticed the symptoms. She states the patient also vomited a lot following the asymmetrical drooping. She notes his speech is garbled and his shoulders are in pain at baseline. Uses a wheelchair approximately 90% of the time. States he has swelling in his legs and has not taken his diuretic for a couple days. She also reports that his features are currently more symmetric than before and that he is fatigued, but otherwise normal. The patient denies headache and change in vision.     His wife explained that he was on plavix until July, but after having surgery to remove a tumor he bled profusely and was taken off plavix to control the bleeding. He had not had a stroke in months. He currently takes baby aspirin.     Review of Systems   Constitutional: Positive for fatigue.   Eyes: Negative for visual disturbance.   Gastrointestinal: Positive for vomiting.   Neurological: Positive for facial asymmetry and speech difficulty (normal for him). Negative for headaches.   All other systems reviewed and are negative.    Allergies:  Bee Venom  Penicillins    Medications:  Protonix  Ditropan-XL  Lioresal internal pump  Lasix  Neurontin    Past Medical History:    Basal cell carcinoma  CVA  DVT  Hypertension  Hearing loss  Lumbar radiculopathy  Lateral sclerosis  Osteoarthritis of right shoulder  Prostate CA  Vertigo  Anemia  Cancer of ampulla of Vater  Cerebral atherosclerosis  Edema  GERD  Cerebral infarction  Hyperlipidemia  Actinic keratoses  TIA  Lumbar herniated disc (x2)  Stroke    Past Surgical History:    Hernia repair  Biopsy, cancerous growth on leg  Colonoscopy (x2)  Endoscopic retrograde  "cholangiopancreatogram (x4)  Endoscopic ultrasound upper gastrointestinal tract  EGD combined (x3)  Prostate surgery  Prostatectomy  Colonoscopy  T&A  Orthopedic surgery, right thumb, left shoulder    Family History:    Mother: CHF, hypertension    Social History:  The patient presents to the ED with his wife.    Physical Exam     Patient Vitals for the past 24 hrs:   BP Temp Temp src Pulse Resp SpO2 Height Weight   09/23/21 2300 (!) 146/96 -- -- 77 -- 98 % -- --   09/23/21 2215 (!) 142/71 -- -- 58 -- -- -- --   09/23/21 2200 138/70 -- -- 57 -- -- -- --   09/23/21 2145 139/80 -- -- 56 -- -- -- --   09/23/21 2130 (!) 154/94 -- -- 69 -- 100 % -- --   09/23/21 2115 129/80 -- -- 61 -- 99 % -- --   09/23/21 2100 115/76 -- -- 66 -- 97 % -- --   09/23/21 2045 134/72 -- -- 60 -- 96 % -- --   09/23/21 2030 (!) 144/75 -- -- 61 -- 98 % -- --   09/23/21 2015 (!) 158/85 -- -- 65 -- 92 % -- --   09/23/21 2000 -- -- -- -- -- 96 % -- --   09/23/21 1945 -- -- -- -- -- 97 % -- --   09/23/21 1930 -- -- -- -- -- 97 % -- --   09/23/21 1915 -- -- -- -- -- 98 % -- --   09/23/21 1900 -- -- -- -- -- 99 % -- --   09/23/21 1815 (!) 150/91 -- -- 78 -- 97 % -- --   09/23/21 1813 -- 98.6  F (37  C) Oral -- -- 99 % 1.753 m (5' 9\") 70.3 kg (155 lb)   09/23/21 1808 (!) 144/80 97.9  F (36.6  C) Temporal 73 18 98 % 1.778 m (5' 10\") 70.3 kg (155 lb)       Physical Exam    HENT:  mmm, no rhinorrhea  Eyes: periorbital tissues and sclera normal, pupils 3mm euqal, EOMI, no fields deficits to confrontation  Neck: supple, no abnormal swelling  Lungs:  CTAB,  no resp distress  CV: rrr, no m/r/g, ppi  Abd: soft, nontender, nondistended, no rebound/masses/guarding/hsm  Ext: BLE muscle atrophy, 5/5 pf/df, able to preform SLR BLE and hold for several seconds, symmetric motor BUE and BLE extremities, SILT all ext    Skin: warm, dry, well perfused, no rashes/bruising/lesions on exposed skin  Neuro: alert, CN 2-12 intact, dyarthria (chronic and unchanged per wife) " MAEE, no gross motor or sensory deficits,  Psych: Normal mood, normal affect    Emergency Department Course   ECG  ECG taken at 1817, ECG read at 1820  Sinus rhythm with premature atrial complexes with aberrant conduction. Anterior infarct, age undetermined. Abnormal ECG.    Rate 72 bpm. RI interval 172 ms. QRS duration 90 ms. QT/QTc 414/453 ms. P-R-T axes 78 18 28.     Imaging:  CTA Head Neck w Contrast:  HEAD CTA:   1.  Moderate presumed atherosclerotic narrowing of the proximal P2 segment of the right posterior cerebral artery again noted without change.   2.  Otherwise, normal [place] CTA   NECK CTA:   1.  Normal neck CTA.  As per radiology.    CT Head w/o Contrast:  1.  No CT evidence for acute intracranial process.   2.  Brain atrophy and presumed chronic microvascular ischemic changes as above.  As per radiology.    MR Brain w/o & w Contrast:  1.  Small subtle clefts of acute to early subacute ischemic infarction in the left parietal white matter and left corona radiata.   2.  Age-related changes with chronic lacunar infarcts in the right thalamus and left cerebellar hemisphere.   As per radiology.     Laboratory:  CBC: WBC 6.9, HGB 10.2(L),      Troponin (Collected 1849): 0.030    INR: 1.06    Partial thromboplastin time: 27    BMP: glucose: 106(H) o/w WNL (Creatinine 0.77)     Glucose by meter: 103(H)    Procedures  National Institutes of Health Stroke Scale  Exam Interval: Baseline   Score    Level of consciousness: (0)   Alert, keenly responsive    LOC questions: (0)   Answers both questions correctly    LOC commands: (0)   Performs both tasks correctly    Best gaze: (0)   Normal    Visual: (0)   No visual loss    Facial palsy: (0)   Normal symmetrical movements    Motor arm (left): (0)   No drift    Motor arm (right): (0)   No drift    Motor leg (left): (0)   No drift    Motor leg (right): (0)   No drift    Limb ataxia: (0)   Absent    Sensory: (0)   Normal- no sensory loss    Best  language: (0)   Normal- no aphasia    Dysarthria: (1)   Mild to moderate dysarthria    Extinction and inattention: (0)   No abnormality        Total Score:  1         Emergency Department Course:    Reviewed:  I reviewed nursing notes, vitals, past medical history and care everywhere    Assessments:   I obtained history and examined the patient as noted above.    I rechecked the patient and explained findings.     Consults:    I spoke with Dr. Diaz, neurology, about the patient's status.    I spoke with a representative from [employer] about the patients pump.     Interventions:   Gadavist 7mL IV    Disposition:  The patient was admitted to the hospital under the care of Dr. Ramirez.       Impression & Plan     Medical Decision Makin-year-old gentleman with a history of CVA here with acute onset of mental status change right facial drop and an episode of emesis.  On arrival to the ED wife states that he is back to baseline other than seems tired.  On my neurologic examination he has no objective cranial nerve or long track abnormalities.  He has chronic dysarthria which his wife states is unchanged.  CT CTA shows no acute findings.  We will do an MR to evaluate for ischemic insult.  Confirmed with the [employer] tech as well as the [employer] rep regarding his baclofen pump that it safe for MRI and just needs to be checked within 24 hours to ensure that it restarted.  If the MRI is positive will recommend admission and the tach would come to the hospital to ensure it restarted if he is more negative and otherwise stable consider discharge home.  The [employer] tech with would work with him to meet at a clinic to ensure the device turned back on.  Stroke neuro aware of the small ischemic insult leave Monday antiplatelet which is aspirin for now.    Not a thrombolytic candidate due to improving symptoms, no appreciable ongoing deficit and GI bleed in the middle of July.  No evidence of an LVO on CTA  and so no indication for intra-arterial intervention.     Diagnosis:    ICD-10-CM    1. Cerebrovascular accident (CVA), unspecified mechanism (H)  I63.9          Scribe Disclosure:  I, Khushboo Salguero, am serving as a scribe at 6:16 PM on 9/23/2021 to document services personally performed by Ricardo Wilder MD based on my observations and the provider's statements to me.        Ricardo Wilder MD  09/23/21 6172

## 2021-09-23 NOTE — ED TRIAGE NOTES
Presents to ED with right-sided body droop and right-sided facial droop. Wife states they were sitting on porch and one hour ago she noticed these symptoms. Pt then had a large emesis. Pt has hx of mini strokes, and normally has some garbled speech. Pt was on Plavix, but took them off of this in July. Pt takes baby aspirin. ABCs intact. A&OX4.,

## 2021-09-23 NOTE — CONSULTS
[facility]    Stroke Telephone Note    I was called by Ricardo Wilder on 09/23/21 regarding patient Eiler Leong. The patient is a 75 year old male presenting to the ED after a sudden onset of alteration in mental status.  This occurred approximately 1 hour prior to presentation.  He has a history of prior strokes and was on Plavix but was taken off this past July secondary to a GI bleed.  He does currently take a baby aspirin.  There was some report of a right sided body droop and right facial droop, but per ED physician report this has since resolved.  Currently no focal neurological deficit that is of measurable presence at this time.  Patient with past history of cancer with recent chest abdomen pelvis this past June without findings.    Stroke Code Data (for stroke code without tele)  Stroke code activated 09/23/21   1809   Stroke provider first response  09/23/21   1810            Last known normal 09/23/21   1700        Time of discovery   (or onset of symptoms) 09/23/21   1700   Head CT read by Stroke Neuro Dr/Provider 09/23/21   1830   Was stroke code de-escalated? Yes 09/23/21 1843  symptoms not likely caused by stroke       Imaging Findings   Non-contrast head CT reviewed.  Bilateral periventricular areas of hypodensity suspect leukoarea of stenosis, mildly enlarged bilateral temporal horns, suspect ex vacuo and not hydrocephalus.  CTA without large vessel occlusion.    Thrombolytic Treatment   Not given due to stroke mimic: Work-up in process.    Endovascular Treatment  Not initiated due to absence of proximal vessel occlusion    Impression  Transient spell of unclear etiology at this point in time.  CT/CTA reviewed personally with no sign of hyperdensity on CT to suggest hemorrhage per my review, awaiting final radiology read.  CT angiogram without obvious large vessel occlusion per my review, obviously waiting for final radiology read on this as well.  Spell of  "undetermined origin at this point in time.  Past history of stroke reported by patient and and his wife.  Recent discontinuation of Plavix and transition to aspirin is to have an likely contributed to current presentation.    Recommendations   Continuation of prior home antiplatelet therapy   -(no need to give additional dose today)  MRI brain with and without contrast to evaluate for radiology   -(past prostate cancer history, low likelihood for metastasis, but need to evaluate given neurologic presentation)  Disposition pending final report on all imaging and review of MRI if available.    My recommendations are based on the information provided over the phone by Elier Leong's in-person providers. They are not intended to replace the clinical judgment of his in-person providers. I was not requested to personally see or examine the patient at this time.    Devon Diaz MD  Vascular Neurology/Neurocritical Care  To page me or covering stroke neurology team member, click here: [facility]   Choose \"On Call\" tab at top, then search dropdown box for \"Neurology Adult\", select location, press Enter, then look for stroke/neuro ICU/telestroke.           "

## 2021-09-24 ENCOUNTER — APPOINTMENT (OUTPATIENT)
Dept: SPEECH THERAPY | Facility: CLINIC | Age: 76
End: 2021-09-24
Attending: PHYSICIAN ASSISTANT
Payer: COMMERCIAL

## 2021-09-24 ENCOUNTER — APPOINTMENT (OUTPATIENT)
Dept: PHYSICAL THERAPY | Facility: CLINIC | Age: 76
End: 2021-09-24
Attending: HOSPITALIST
Payer: COMMERCIAL

## 2021-09-24 ENCOUNTER — APPOINTMENT (OUTPATIENT)
Dept: CARDIOLOGY | Facility: CLINIC | Age: 76
End: 2021-09-24
Attending: PHYSICIAN ASSISTANT
Payer: COMMERCIAL

## 2021-09-24 ENCOUNTER — TELEPHONE (OUTPATIENT)
Dept: CARE COORDINATION | Facility: CLINIC | Age: 76
End: 2021-09-24

## 2021-09-24 ENCOUNTER — PREP FOR PROCEDURE (OUTPATIENT)
Dept: GASTROENTEROLOGY | Facility: CLINIC | Age: 76
End: 2021-09-24

## 2021-09-24 VITALS
BODY MASS INDEX: 22.05 KG/M2 | TEMPERATURE: 98.4 F | SYSTOLIC BLOOD PRESSURE: 135 MMHG | HEART RATE: 74 BPM | WEIGHT: 154 LBS | HEIGHT: 70 IN | OXYGEN SATURATION: 93 % | RESPIRATION RATE: 16 BRPM | DIASTOLIC BLOOD PRESSURE: 89 MMHG

## 2021-09-24 DIAGNOSIS — C24.1 AMPULLARY CARCINOMA (H): Primary | ICD-10-CM

## 2021-09-24 LAB
ATRIAL RATE - MUSE: 72 BPM
CHOLEST SERPL-MCNC: 147 MG/DL
DIASTOLIC BLOOD PRESSURE - MUSE: NORMAL MMHG
FASTING STATUS PATIENT QL REPORTED: NORMAL
HBA1C MFR BLD: 5.8 % (ref 0–5.6)
HDLC SERPL-MCNC: 65 MG/DL
INTERPRETATION ECG - MUSE: NORMAL
LDLC SERPL CALC-MCNC: 71 MG/DL
LVEF ECHO: NORMAL
NONHDLC SERPL-MCNC: 82 MG/DL
P AXIS - MUSE: 78 DEGREES
PR INTERVAL - MUSE: 172 MS
QRS DURATION - MUSE: 90 MS
QT - MUSE: 414 MS
QTC - MUSE: 453 MS
R AXIS - MUSE: 18 DEGREES
SARS-COV-2 RNA RESP QL NAA+PROBE: NEGATIVE
SYSTOLIC BLOOD PRESSURE - MUSE: NORMAL MMHG
T AXIS - MUSE: 28 DEGREES
TRIGL SERPL-MCNC: 53 MG/DL
VENTRICULAR RATE- MUSE: 72 BPM

## 2021-09-24 PROCEDURE — 999N000208 ECHOCARDIOGRAM LIMITED

## 2021-09-24 PROCEDURE — 250N000013 HC RX MED GY IP 250 OP 250 PS 637: Performed by: PHYSICIAN ASSISTANT

## 2021-09-24 PROCEDURE — G0378 HOSPITAL OBSERVATION PER HR: HCPCS

## 2021-09-24 PROCEDURE — 255N000002 HC RX 255 OP 636: Performed by: HOSPITALIST

## 2021-09-24 PROCEDURE — 97161 PT EVAL LOW COMPLEX 20 MIN: CPT | Mod: GP

## 2021-09-24 PROCEDURE — 93325 DOPPLER ECHO COLOR FLOW MAPG: CPT | Mod: 26 | Performed by: INTERNAL MEDICINE

## 2021-09-24 PROCEDURE — 99217 PR OBSERVATION CARE DISCHARGE: CPT | Performed by: PHYSICIAN ASSISTANT

## 2021-09-24 PROCEDURE — 82465 ASSAY BLD/SERUM CHOLESTEROL: CPT | Performed by: PHYSICIAN ASSISTANT

## 2021-09-24 PROCEDURE — 83036 HEMOGLOBIN GLYCOSYLATED A1C: CPT | Performed by: PHYSICIAN ASSISTANT

## 2021-09-24 PROCEDURE — 92610 EVALUATE SWALLOWING FUNCTION: CPT | Mod: GN

## 2021-09-24 PROCEDURE — 93321 DOPPLER ECHO F-UP/LMTD STD: CPT | Mod: 26 | Performed by: INTERNAL MEDICINE

## 2021-09-24 PROCEDURE — 250N000013 HC RX MED GY IP 250 OP 250 PS 637: Performed by: HOSPITALIST

## 2021-09-24 PROCEDURE — 93308 TTE F-UP OR LMTD: CPT | Mod: 26 | Performed by: INTERNAL MEDICINE

## 2021-09-24 PROCEDURE — 93270 REMOTE 30 DAY ECG REV/REPORT: CPT

## 2021-09-24 PROCEDURE — 36415 COLL VENOUS BLD VENIPUNCTURE: CPT | Performed by: PHYSICIAN ASSISTANT

## 2021-09-24 RX ORDER — FUROSEMIDE 40 MG
40 TABLET ORAL DAILY
Status: DISCONTINUED | OUTPATIENT
Start: 2021-09-24 | End: 2021-09-24 | Stop reason: HOSPADM

## 2021-09-24 RX ORDER — ACETAMINOPHEN 325 MG/1
650 TABLET ORAL EVERY 8 HOURS PRN
Status: DISCONTINUED | OUTPATIENT
Start: 2021-09-24 | End: 2021-09-24 | Stop reason: HOSPADM

## 2021-09-24 RX ORDER — GABAPENTIN 300 MG/1
300 CAPSULE ORAL 3 TIMES DAILY
Status: DISCONTINUED | OUTPATIENT
Start: 2021-09-24 | End: 2021-09-24 | Stop reason: HOSPADM

## 2021-09-24 RX ORDER — POLYETHYLENE GLYCOL 3350 17 G/17G
17 POWDER, FOR SOLUTION ORAL DAILY PRN
Status: DISCONTINUED | OUTPATIENT
Start: 2021-09-24 | End: 2021-09-24 | Stop reason: HOSPADM

## 2021-09-24 RX ORDER — DOCUSATE SODIUM 100 MG/1
100 CAPSULE, LIQUID FILLED ORAL EVERY EVENING
COMMUNITY
End: 2022-01-01

## 2021-09-24 RX ORDER — CLOPIDOGREL BISULFATE 75 MG/1
300 TABLET ORAL ONCE
Status: COMPLETED | OUTPATIENT
Start: 2021-09-24 | End: 2021-09-24

## 2021-09-24 RX ORDER — ONDANSETRON 4 MG/1
4 TABLET, ORALLY DISINTEGRATING ORAL EVERY 6 HOURS PRN
Status: DISCONTINUED | OUTPATIENT
Start: 2021-09-24 | End: 2021-09-24 | Stop reason: HOSPADM

## 2021-09-24 RX ORDER — CLOPIDOGREL BISULFATE 75 MG/1
75 TABLET ORAL DAILY
Status: DISCONTINUED | OUTPATIENT
Start: 2021-09-24 | End: 2021-09-24 | Stop reason: HOSPADM

## 2021-09-24 RX ORDER — PANTOPRAZOLE SODIUM 40 MG/1
40 TABLET, DELAYED RELEASE ORAL 2 TIMES DAILY
Status: DISCONTINUED | OUTPATIENT
Start: 2021-09-24 | End: 2021-09-24 | Stop reason: HOSPADM

## 2021-09-24 RX ORDER — EZETIMIBE 10 MG/1
5 TABLET ORAL AT BEDTIME
Qty: 15 TABLET | Refills: 0 | Status: SHIPPED | OUTPATIENT
Start: 2021-09-24 | End: 2021-10-07

## 2021-09-24 RX ORDER — CLOPIDOGREL BISULFATE 75 MG/1
75 TABLET ORAL DAILY
Refills: 0 | COMMUNITY
Start: 2021-09-25 | End: 2021-11-11

## 2021-09-24 RX ORDER — GABAPENTIN 300 MG/1
300 CAPSULE ORAL 3 TIMES DAILY
COMMUNITY
End: 2021-11-11

## 2021-09-24 RX ORDER — OXYBUTYNIN CHLORIDE 5 MG/1
20 TABLET, EXTENDED RELEASE ORAL AT BEDTIME
Status: DISCONTINUED | OUTPATIENT
Start: 2021-09-24 | End: 2021-09-24 | Stop reason: HOSPADM

## 2021-09-24 RX ORDER — ONDANSETRON 2 MG/ML
4 INJECTION INTRAMUSCULAR; INTRAVENOUS EVERY 6 HOURS PRN
Status: DISCONTINUED | OUTPATIENT
Start: 2021-09-24 | End: 2021-09-24 | Stop reason: HOSPADM

## 2021-09-24 RX ADMIN — CLOPIDOGREL BISULFATE 300 MG: 75 TABLET ORAL at 14:27

## 2021-09-24 RX ADMIN — GABAPENTIN 300 MG: 300 CAPSULE ORAL at 11:25

## 2021-09-24 RX ADMIN — HUMAN ALBUMIN MICROSPHERES AND PERFLUTREN 3 ML: 10; .22 INJECTION, SOLUTION INTRAVENOUS at 10:33

## 2021-09-24 RX ADMIN — ACETAMINOPHEN 650 MG: 325 TABLET, FILM COATED ORAL at 11:38

## 2021-09-24 ASSESSMENT — ACTIVITIES OF DAILY LIVING (ADL)
COMMUNICATION: DIFFICULTY SPEAKING
DOING_ERRANDS_INDEPENDENTLY_DIFFICULTY: YES
WEAR_GLASSES_OR_BLIND: YES
WALKING_OR_CLIMBING_STAIRS_DIFFICULTY: OTHER (SEE COMMENTS)
DIFFICULTY_EATING/SWALLOWING: NO
DRESSING/BATHING_DIFFICULTY: YES
WERE_AUXILIARY_AIDS_OFFERED?: NO
USE_OF_HEARING_ASSISTIVE_DEVICES: LEFT HEARING AID;RIGHT HEARING AID
EQUIPMENT_CURRENTLY_USED_AT_HOME: WALKER, STANDARD;WHEELCHAIR, POWER
WHICH_OF_THE_ABOVE_FUNCTIONAL_RISKS_HAD_A_RECENT_ONSET_OR_CHANGE?: AMBULATION;TRANSFERRING;TOILETING;BATHING;DRESSING
CONCENTRATING,_REMEMBERING_OR_MAKING_DECISIONS_DIFFICULTY: NO
TOILETING_ISSUES: YES
DESCRIBE_HEARING_LOSS: HEARING LOSS ON LEFT SIDE;HEARING LOSS ON RIGHT SIDE
DIFFICULTY_COMMUNICATING: YES
HEARING_DIFFICULTY_OR_DEAF: YES
PATIENT_/_FAMILY_COMMUNICATION_STYLE: SPOKEN LANGUAGE (ENGLISH OR BILINGUAL)
FALL_HISTORY_WITHIN_LAST_SIX_MONTHS: NO
WALKING_OR_CLIMBING_STAIRS: AMBULATION DIFFICULTY, DEPENDENT

## 2021-09-24 ASSESSMENT — MIFFLIN-ST. JEOR: SCORE: 1434.79

## 2021-09-24 NOTE — PHARMACY-ADMISSION MEDICATION HISTORY
Admission medication history interview status for this patient is complete. See EPIC admission navigator for allergy information, prior to admission medications and immunization status.     Medication history interview done, indicate source(s): Patient and Family  Medication history resources (including written lists, pill bottles, clinic record):[facility]/sure script  Pharmacy: [facility], mailorder    Changes made to PTA medication list:  Added: docusate  Changed: gabapentin, protonix  Reported as Not Taking:   Removed: miralax\    Pump info is up to date - talked with Provider at [facility] - clinic is closed but provider returned call - next appointment for fill is November 30, 2021    Actions taken by pharmacist (provider contacted, etc):sticky note for md     Additional medication history information:None    Medication reconciliation/reorder completed by provider prior to medication history?  Y   (Y/N)     For patients on insulin therapy:         Prior to Admission medications    Medication Sig Last Dose Taking? Auth Provider   acetaminophen (TYLENOL) 650 MG CR tablet Take 650 mg by mouth every 8 hours as needed for mild pain (Shoulder pain)  9/23/2021 at Unknown time Yes Reported, Patient   aspirin 81 MG tablet Take 81 mg by mouth every evening  9/23/2021 at Unknown time Yes Reported, Patient   baclofen (LIORESAL) intraTHECAL Internal Pump by Intrathecal route continuous prn Pump filled by [facility] stroke [place] [phone]  Pump Model: Syncromed  Medication in pump is Gablofen (brand name)  Last fill: during hospital admission  Next fill:     Low Talty Alarm Date:   Reservoir Volume: 20 ml  Conc: 2000 mcg/mL  Delivers 234.7 mcg/day  Basal rate:unknown  Battery life: unknown going at Unknown time Yes Unknown, Entered By History   docusate sodium (COLACE) 100 MG capsule Take 100 mg by mouth every evening  Yes Unknown, Entered By History   EPINEPHrine 0.3 MG/0.3ML  injection Inject 0.3 mLs (0.3 mg) into the muscle as needed for anaphylaxis  Yes Lida Ray MD   fluorouracil (EFUDEX) 5 % external cream Apply topically to legs 1 to 2 times weekly as needed/tolerated for maintenance  More than a month at Unknown time Yes Unknown, Entered By History   gabapentin (NEURONTIN) 300 MG capsule Take 300 mg by mouth 3 times daily 9/23/2021 at Unknown time Yes Unknown, Entered By History   ketoconazole (NIZORAL) 2 % external shampoo SHAMPOO EVERY 2-3 DAYS AS  NEEDED 9/22/2021 at Unknown time Yes Candida Whittington APRN CNP   oxybutynin ER (DITROPAN-XL) 10 MG 24 hr tablet Take 2 tablets (20 mg) by mouth At Bedtime 9/23/2021 at Unknown time Yes Lida Ray MD   pantoprazole (PROTONIX) 40 MG EC tablet Take 1 tablet (40 mg) by mouth 2 times daily  Patient taking differently: Take 40 mg by mouth daily  9/23/2021 at Unknown time Yes Agus Kent MD   furosemide (LASIX) 20 MG tablet Take 2 tablets (40 mg) by mouth daily not for several days  Lida Ray MD

## 2021-09-24 NOTE — PLAN OF CARE
"PRIMARY DIAGNOSIS:  NURSING  OUTPATIENT/OBSERVATION GOALS TO BE MET BEFORE DISCHARGE:  ADLs back to baseline: Yes     Activity and level of assistance: Ax1 to stand at bedside, Ax2 with walker and gait belt for ambulation. Consider SW and/or PT evaluation.      Pain status: Denies     Return to near baseline physical activity: Yes          Discharge Planner Nurse   Safe discharge environment identified: Yes  Barriers to discharge: Not once medically cleared       Entered by: kala marin rn     /89   Pulse 74   Temp 98.4  F (36.9  C) (Oral)   Resp 16   Ht 1.778 m (5' 10\")   Wt 69.9 kg (154 lb)   SpO2 93%   BMI 22.10 kg/m         Vitals stable. Pt alert and oriented x4. Ax2 with gait belt and walker. WC bound at baseline per wife. Garbled speech baseline per wife. Generalized weakness. Unable to raise arms due to shoulder pain. PT complete, see notes. Telestroke complete, see note. Awaiting echo results.  Pt resting comfortably. Will continue to monitor and provide supportive cares.         Please review provider order for any additional goals.   Nurse to notify provider when observation goals have been met and patient is ready for discharge.          "

## 2021-09-24 NOTE — PROGRESS NOTES
"Speech-Language Pathology Clinical Swallow Evaluation       09/24/21 1546   General Information   Onset of Illness/Injury or Date of Surgery 09/23/21   Referring Physician Aida Issa PA-C   Patient/Family Therapy Goal Statement (SLP) Go home   Pertinent History of Current Problem Per H&P, \"Elier Leong is a 75 year old male who has a complicated past medical history 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  He also has a history of recurrent stroke and had dual antiplatelet therapy with Plavix and aspirin in the past, however this last July he had an adenoma removed from the colon and subsequently colonic hemorrhage.  His Plavix has been discontinued ever since.  He presented to the emergency department after an episode of right facial drooping and one emesis witnessed by his wife at home earlier this evening.  They were seated in the deck when his head bent toward one side and she noticed the facial drooping.  Patient underwent a full work-up for stroke in ED, but his neurological deficit subsided while waiting here in the emergency department.  I have discussed his case with Dr. Kartik Wilder, ED physician.  He consulted Dr. Diaz for new stroke over the phone, given the findings in the CT of the head and the MRI compatible with acute episode acute ischemic infarction in the left parietal white matter and left corona radiata. Neurology recommended admission, continue aspirin same as prior, no other intervention at this point.  They plan to follow-up on him tomorrow.  At the moment of my visit patient is cooperative, he denies any symptoms and his wife states he is back to his normal baseline. \"   General Observations Pt awake/alert, cooperative, with dysarthric speech and reduced intelligibility. Wife present and able to understand pt's speech.   Past History of Dysphagia Pt seen for SLP services in the past multiple times for speech and swallow. Recommendation per evaluation 1/9/20 " was to consider mildly thick liquid if s/s aspiration are present. Evaluations also completed 11/2020 and 7/2021 with recommendation for regular diet with thin liquid with aspiration precautions (chopped meats, very small bites, slow rate, and sips of liquids between bites). Pt and wife endorsed following these precautions with occasional coughing when eating/drinking, which has been present at baseline chronically.   Pain Assessment   Patient Currently in Pain No   Type of Evaluation   Type of Evaluation Swallow Evaluation   Oral Motor   Mucosal Quality good   Dentition (Oral Motor)   Dentition (Oral Motor) natural dentition;adequate dentition   Facial Symmetry (Oral Motor)   Facial Symmetry (Oral Motor) WNL   Lip Function (Oral Motor)   Lip Range of Motion (Oral Motor) WNL   Tongue Function (Oral Motor)   Tongue ROM (Oral Motor) WNL   Tongue Strength (Oral Motor) bilateral;minimal impairment   Jaw Function (Oral Motor)   Jaw Function (Oral Motor) WNL   Cough/Swallow/Gag Reflex (Oral Motor)   Soft Palate/Velum (Oral Motor) WNL   Volitional Throat Clear/Cough (Oral Motor) reduced strength   Vocal Quality/Secretion Management (Oral Motor)   Vocal Quality (Oral Motor) WFL   Secretion Management (Oral Motor) WNL   General Swallowing Observations   Current Diet/Method of Nutritional Intake (General Swallowing Observations, NIS) regular diet;thin liquids (level 0)   Swallowing Evaluation Clinical swallow evaluation   Clinical Swallow Evaluation   Clinical Swallow Evaluation Textures Trialed thin liquids;pureed;solid foods   Clinical Swallow Eval: Thin Liquid Texture Trial   Mode of Presentation, Thin Liquids cup;straw;self-fed   Oral Phase of Swallow WFL   Pharyngeal Phase of Swallow suspect impaired;coughing/choking  (x1)   Clinical Swallow Evaluation: Puree Solid Texture Trial   Mode of Presentation, Puree spoon;self-fed   Oral Phase, Puree WFL   Pharyngeal Phase, Puree suspect grossly intact   Clinical Swallow  Evaluation: Solid Food Texture Trial   Mode of Presentation self-fed   Oral Phase   (reduced, prolonged mastication)   Pharyngeal Phase suspect grossly intact   Swallowing Recommendations   Diet Consistency Recommendations thin liquids (level 0);regular diet   Supervision Level for Intake distant supervision needed   Mode of Delivery Recommendations bolus size, small;slow rate of intake   Swallowing Maneuver Recommendations alternate food and liquid intake   Recommended Feeding/Eating Techniques (Swallow Eval) maintain upright posture during/after eating for 30 minutes   Medication Administration Recommendations, Swallowing (SLP) whole with liquid or crushed in puree   Instrumental Assessment Recommendations instrumental evaluation not recommended at this time   Comment, Swallowing Recommendations Pt currently presents with minimal oral and suspected ongoing pharyngeal dysphagia; appears to be at baseline functional level (pt/wife endorse he is at baseline). + adequate acceptance and containment. Bolus formation/propulsion and lingual coordination appeared fair. Noted minimally reduced, prolonged mastication with increased oral transit time and inconsistent minimal lingual residue; cleared with liquid wash. Suspect grossly timely swallow with fair hyolaryngeal elevation + cough following thin liquid used as liquid wash for solid x1. No other cough, throat clear, wet vocal quality, eye watering, or increased WOB.   SLP Therapy Assessment/Plan   Criteria for Skilled Therapeutic Interventions Met (SLP Eval) does not meet criteria for skilled intervention;current level of function same as previous level of function   SLP Diagnosis minimal oral and suspected ongoing pharyngeal dysphagia   Therapy Frequency (SLP Eval) evaluation only   Therapy Plan Review/Discharge Plan (SLP)   Therapy Plan Review (SLP) evaluation/treatment results reviewed;participants voiced agreement with care plan;participants  included;patient;spouse/significant other   SLP Discharge Planning    SLP Discharge Recommendation (DC Rec) home with outpatient speech therapy   SLP Rationale for DC Rec No further acute SLP needs, recommend follow-up with OP ST   SLP Brief overview of current status  Recommend continue regular diet with thin liquid (0) with strict aspiration -- chopped meats, very small bites, slow rate, and sips of liquids between bites.    Total Evaluation Time   Total Evaluation Time (Minutes) 14

## 2021-09-24 NOTE — PLAN OF CARE
Patient's After Visit Summary was reviewed with patient and wife: YES  Patient verbalized understanding of After Visit Summary, recommended follow up and was given an opportunity to ask questions.   Discharge medications sent home with patient/family: paper script given to patient  Discharged with wife    Legs wrapped in ACE wraps. Wife educated on how to perform. Wife performed ace wraps, feels confident in doing so. Discharge instructions complete. Follow up care discussed in detail. Patient and family's questions answered. Pt ready for discharge.     OBSERVATION patient END time: [id number]

## 2021-09-24 NOTE — PROGRESS NOTES
"      [facility]    Stroke Progress Note    Interval EventsMRI brain with and without contrast performed and showed small subtle clefts of acute to early subacute ischemic infarction in the left parietal white matter and left corona radiata. He and his wife feel that he is back to baseline. He is dysarthric at baseline.  He is open to delaying his cortisone injection until 1 week after completion of DAPT 3 week course. He does not want to start statin but is open to Zetia.    HPI Summary  Elier \"Nader\" TRENA Leong is a 76 yo male with pertinent past medical history of prostate cancer (recent CT c/a/p June 2021 negative), PLS, smoker 50 years ago, HLD, history of DVT, HTN, and prior strokes on DAPT long term since 2014, plavix was discontinued in July 2021 secondary to bleeding post-operatively and subsequently continued only on ASA 81 mg, who presented to the ED on 9/23/21 after sudden onset AMS 1 hour prior to arrival. He had associated transient R-sided weakness (does use wheelchair at baseline 90% of the time), R facial droop, garbled speech, and one episode of vomiting which all resolved in the ED.    Stroke neurology consulted and recommended continuation of antiplatelet therapy and obtain brain MRI.     Stroke Evaluation Summarized    MRI/Head CT CT head: Bilateral periventricular areas of hypodensity suspect leukoarea of stenosis, mildly enlarged bilateral temporal horns, suspect ex vacuo and not hydrocephalus  MRI: 1.  Small subtle clefts of acute to early subacute ischemic infarction in the left parietal white matter and left corona radiata.  2.  Age-related changes with chronic lacunar infarcts in the right thalamus and left cerebellar hemisphere.   Intracranial Vasculature CTA head: without large vessel occlusion, moderate presumed atherosclerotic narrowing of R P2   Cervical Vasculature CTA neck: normal     Echocardiogram 9/24/21 TTE: EF 45-50%, early  diastolic dysfunction, mild-mod " "hypokinesis mid  anteroseptal and inferoseptal walls and all apical segments of LV, no thrombus seen, RV norm, trace-mild MR/TR, no acute changes to prior   EKG/Telemetry 9/23/21: ECG SR with PACs with Aberrancy, prior anterior infarct   Other Testing 30 day [employer] monitoring recommended     LDL  9/24/2021: 71 mg/dL    A1C  9/24/2021: 5.8 %   Troponin 9/23/2021: 0.030 ug/L       Impression   Acute ischemic stroke of left parietal white matter and left corona radiata suspect ESUS.    Plan  Discussed with attending Dr. Karen Turner, recommend:  -continue antiplatelet therapy with ASA 81 mg daily indefinitely, if no recurrent bleeding since July then recommend plavix loading 300 mg and then continuing 75 mg daily x 21 days (scheduled to have shoulder cortisone injection 10/5/21, would recommend delaying injection until done with 3 week course of plavix (earliest to reschedule injection would be around 11/01/21)  -TTE today without acute concerns from neurologic perspective  -LDL 71, patient reports intolerance to statin, recommend starting Zetia 5 mg daily, (goal 40-70), long term f/u with PCP  -A1c 5.8% (at goal <7), long term f/u with PCP  -Blood pressure was 140s-150s on day of arrival in setting of stroke, prior to this it appears blood pressure was frequently elevated as well, recommend home monitoring and bringing log to   -CardioNet 30 day monitoring    Patient Follow-up    -f/u 6-8 weeks with Dr. Brito for stroke f/u    No further stroke evaluation is recommended, so we will sign off. Please contact us with any additional questions.    Leslee Bryan PA-C  Vascular Neurology  To page me or covering stroke neurology team member, click here: [facility]   Choose \"On Call\" tab at top, then search dropdown box for \"Neurology Adult\", select location, press Enter, then look for stroke/neuro ICU/telestroke.    ______________________________________________________    Clinically Significant Risk Factors Present " on Admission             # Platelet Defect: home medication list includes an antiplatelet medication       Medications   Scheduled Meds    clopidogrel  300 mg Oral Once     clopidogrel  75 mg Oral Daily     ezetimibe  5 mg Oral At Bedtime     furosemide  40 mg Oral Daily     gabapentin  300 mg Oral TID     oxybutynin ER  20 mg Oral At Bedtime     pantoprazole  40 mg Oral BID       Infusion Meds    baclofen (LIORESAL) intraTHECAL Internal Pump         PRN Meds  acetaminophen, baclofen (LIORESAL) intraTHECAL Internal Pump, melatonin, ondansetron **OR** ondansetron, polyethylene glycol       PHYSICAL EXAMINATION  Temp:  [97.5  F (36.4  C)-98.6  F (37  C)] 98.2  F (36.8  C)  Pulse:  [56-86] 74  Resp:  [16-18] 16  BP: (108-158)/() 122/98  SpO2:  [92 %-100 %] 94 %      General Exam  General:  patient lying in bed without any acute distress    HEENT:  normocephalic/atraumatic    Neuro Exam  Mental Status:  alert, oriented x 3, follows commands, naming and repetition normal  Cranial Nerves:  visual fields intact (tested by nurse), EOMI with normal smooth pursuit, facial sensation intact and symmetric (tested by nurse), facial movements symmetric, hearing not formally tested but intact to conversation, tongue protrusion midline, severe dysarthria (reported at baseline)  Motor:  no abnormal movements, able to move all limbs antigravity spontaneously with no signs of hemiparesis observed, no pronator drift   Reflexes:  unable to test (telestroke)  Sensory:  light touch sensation intact and symmetric throughout upper and lower extremities (assessed by nurse), no extinction on double simultaneous stimulation (assessed by nurse)  Coordination:  normal finger-to-nose and heel-to-shin bilaterally without dysmetria, rapid alternating movements symmetric  Station/Gait:  unable to test due to telestroke    Imaging  I personally reviewed all imaging; relevant findings per HPI.     Lab Results Data   CBC  Recent Labs   Lab  09/23/21  1847   WBC 6.9   RBC 3.79*   HGB 10.2*   HCT 32.7*        Basic Metabolic Panel    Recent Labs   Lab 09/23/21  1846 09/23/21  1813     --    POTASSIUM 4.1  --    CHLORIDE 106  --    CO2 28  --    BUN 19  --    CR 0.77  --    * 103*   AMOS 8.6  --      Liver Panel  No results for input(s): PROTTOTAL, ALBUMIN, BILITOTAL, ALKPHOS, AST, ALT, BILIDIRECT in the last 168 hours.  INR    Recent Labs   Lab Test 09/23/21  1849 08/19/21  0831 07/31/21  0748   INR 1.06 0.99 1.08      Lipid Profile    Recent Labs   Lab Test 09/24/21  0831 11/17/20  0716 08/11/14  1111   CHOL 147 152 156   HDL 65 53 84   LDL 71 91 62   TRIG 53 41 50   CHOLHDLRATIO  --   --  1.9     A1C    Recent Labs   Lab Test 09/24/21  0831 11/17/20  0716   A1C 5.8* 5.8*     Troponin I    Recent Labs   Lab 09/23/21  1849   TROPONIN 0.030          Telestroke Service Details  Time of Teleservice consult: 9/24/21 4211-5900  Type of service telemedicine diagnostic assessment of acute neurological changes   Reason telemedicine is appropriate patient requires assessment with a specialist for diagnosis and treatment of neurological symptoms   Mode of transmission secure interactive audio and video communication per [employer]   Originating site (patient location) [facility]    Distant site (provider location) [facility]

## 2021-09-24 NOTE — DISCHARGE SUMMARY
[facility]    Discharge Summary  Hospitalist    Date of Admission:  9/23/2021  Date of Discharge:  9/24/2021  Provider:  Jessica Issa PA-C  Date of Service (when I last saw the patient): 09/24/21    Discharge Diagnoses   Acute ischemic stroke of left parietal white matter and left corona radiata suspect ESUS  Abnormal echocardiogram, new mild systolic dysfunction    Other medical issues:  Past Medical History:   Diagnosis Date     Basal cell carcinoma nos     sees derm     CVA (cerebral infarction) 6/14    small vessel right int capsule stroke     DVT (deep vein thrombosis) in pregnancy 05/12/2017    right peroneal vein     Essential hypertension, benign      Hearing loss      Hoarseness of voice     assoc with PLS     Lumbar radiculopathy     2017     Primary Lateral Sclerosis 2002    has baclofen pump through Dr. Calvert, CECY Payne, sees Stefania Preston     Primary osteoarthritis of right shoulder 6/16/2021     Prostate CA (H) 2008    prostatectomy     Vertigo 2/21/2013     History of Present Illness   Elier Leong is a 75 year old male who has a complicated past medical history 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  He also has a history of recurrent stroke and had dual antiplatelet therapy with Plavix and aspirin in the past, however this last July he had an adenoma removed from the colon and subsequently colonic hemorrhage.  His Plavix has been discontinued ever since.  He presented to the emergency department after an episode of right facial drooping and one emesis witnessed by his wife at home earlier this evening.  They were seated in the deck when his head bent toward one side and she noticed the facial drooping.  Patient underwent a full work-up for stroke in ED, but his neurological deficit subsided while waiting here in the emergency department. Dr. Kartik Wilder, ED physician, consulted Dr. Diaz for new stroke over the phone, given the findings in the CT of  the head and the MRI compatible with acute episode acute ischemic infarction in the left parietal white matter and left corona radiata. Neurology recommended admission, continue aspirin same as prior, no other intervention at this point. They plan to follow-up on him tomorrow.  At time of initial assessment the patient and his wife state he is back to his normal baseline. Please see the admission history and physical for full details.    Hospital Course   Elier Leong was admitted on 9/23/2021.  The following problems were addressed during his hospitalization:    #Acute ischemic stroke of left parietal white matter and left corona radiata suspect ESUS: presented with altered mental status prior to arrival with associated transient right-sided weakness, right facial droop, garbled speech, and one episode of emesis all of which resolved while the patient was in the ED without recurrence during stay.  MRI brain showed small subtle cluster of acute early subacute ischemic infarctions in the left parietal white matter and left corona radiata.  Stroke neurology consulted in the ED and saw patient again once admitted with recommendations given.  - patient does have a h/o three prior CVAs, previously on Plavix and ASA but was taken off Plavix in July 2021 after post operatively bleeding after adenoma removal, no issues with bleeding since then  - continue ASA 81 mg indefinitely + Plavix 75 mg daily for 21 days (loaded with Plavix 300 mg on 9/24)  - echocardiogram showed EF of 45-50% with grad 1 or early diastolic dysfunction, mild to moderate hypokinesis of the mild anteroseptal and inferoseptal walls and all apical segments of the left ventricle, no concern from neurology perspective   - LDL of 71, intolerance to statin in the past, recommended starting Zetia 5 mg daily (LDL goal of 40-70), patient and wife would like to hold off starting and will discuss with PCP   - HgbA1c of 5.8  - BP stable, initially elevated in  setting of stroke  - 30 day cardiac event monitor at discharge to assess for A-fib   - speech consulted and recommended continue regular diet with thin liquid (0) with strict aspiration -- chopped meats, very small bites, slow rate, and sips of liquids between bites  - PT consulted, felt patient was minimal assist of 1 and at baseline in order to discharge home   - continue PTA home care   - f/u with Dr. Brito for stroke f/u in 6-8 weeks     #Abnormal echocardiogram, new mild systolic dysfunction: EF of 45-50% with grad 1 or early diastolic dysfunction, mild to moderate hypokinesis of the mild anteroseptal and inferoseptal walls and all apical segments of the left ventricle. Per cardiologist read these findings were similar to echo on 7/31/21 but due to lack of contrast there was limited wall motion analysis on previous study. Discussed results with patient and family. No prior h/o CAD, heart failure, or stress testing.   - no current or recent chest pain or shortness of breath  - bilateral LE edema, present since 07/2021 or possibly prior to this, currently increased due to not taking Lasix as prescribed as well not consistently using compression stockings or ACE wraps  - no h/o CAD and not currently on beta blocker or ACEI  - unable to get nuclear stress test due to timing of day and going into weekend, due to lack of symptoms patient will discharge home and plan for outpatient Nuclear Lexiscan on 9/27 or 9/28  - outpatient cardiology f/u about stress test and findings on echocardiogram, will defer starting medical management until stress test complete     #Bilateral LE edema: 2-3+ bilateral LE edema, present since July 2021. Suspect an element of venous stasis as well lymphedema. Not consistently taking Lasix or wearing compression stockings/ACE wraps.   - resume PTA Lasix with current edema, f/u with PCP for monitoring  - keep scheduled appointment in lymphedema in 10/2021  - recommend use of ACE wraps  "or compression stockings     Pending Results   None    Code Status   Full Code       Primary Care Physician   Lida Ray    Exam:    /89   Pulse 74   Temp 98.4  F (36.9  C) (Oral)   Resp 16   Ht 1.778 m (5' 10\")   Wt 69.9 kg (154 lb)   SpO2 93%   BMI 22.10 kg/m    GEN:  Alert, oriented x 3, appears comfortable, NAD.  HEENT:  Normocephalic/atraumatic, no scleral icterus, no nasal discharge, mouth moist.  CV:  Regular rate and rhythm, no murmur or JVD.  S1 + S2 noted, no S3 or S4.  LUNGS:  Clear to auscultation bilaterally without rales/rhonchi/wheezing/retractions. Symmetric chest rise on inhalation noted.  ABD: Active bowel sounds, soft, non-tender/non-distended. No rebound/guarding/rigidity.  EXT: 2-3+ BLE edema. No cyanosis.  No joint synovitis noted.  SKIN: Dry to touch, no exanthems noted in the visualized areas.  NEURO: CN II-XII grossly intact, baseline garbled speech, slightly decrease strength of 4/5 in right LE, no focal deficits appreciated     Discharge Disposition   Discharged to home    Consultations This Hospital Stay   NEUROLOGY IP CONSULT  CARE MANAGEMENT / SOCIAL WORK IP CONSULT  PHYSICAL THERAPY ADULT IP CONSULT  SPEECH LANGUAGE PATH ADULT IP CONSULT    Time Spent on this Encounter   I, Aida Issa PA-C, personally saw the patient today and spent greater than 30 minutes discharging this patient.    Discharge Orders      Care Coordination Referral      Follow-Up with Cardiologist      Reason for your hospital stay    You were admitted due to concerns for altered mental status 1 hour prior to arrival to the ED with associated transient right-sided weakness, right facial droop, garbled speech, and one episode of vomiting which all resolved while you are in the emergency room.  Your work-up included basic labs and imaging and an MRI of the brain showed an acute ischemic stroke of left parietal white matter and left corona radiata.  Stroke neurology was consulted and recommended " continuing your current aspirin 81 mg daily indefinitely with loading dose of Plavix received here and discharging on 75 mg daily for 21 days (it is recommended you postpone your scheduled cortisone injection on 10/5/2021 while on this).  You had an ultrasound of your heart which showed slightly decreased pumping function of 45 to 50% with new wall motion changes of your left ventricle that were not previously seen in 07/2021 (due to not using contrast) but per cardiologist read this was present at that time.  Due to no associated chest pain, shortness of breath, or history of CAD it is recommended you have a nuclear stress test to further assess which will need to be done as an outpatient early next week due to not performing these in the hospital over the weekend.  After you obtain your stress test you will need to be seen in follow-up by cardiologist for further recommendations about medications and any additional workup.  Your lipid panel showed an LDL of 71 and neurology recommends you start Zetia 5 mg daily, which you prefer to discuss further with your primary care provider before starting.  Your blood pressure has been well controlled prior to discharge. Your hemoglobin A1c shows that you are not diabetic.  It is recommended you wear a 30-day cardiac event monitor to assess for paroxysmal atrial fibrillation as a cause for your acute stroke.  You were assessed briefly by physical therapy prior to discharge and seem to be at your baseline in order to go home.     Follow-up and recommended labs and tests     Follow up with primary care provider, Lida Ray, within 7 days for hospital follow- up and discuss starting Zetia for cholesterol based on neurology recommendations.  No follow up labs or test are needed.  Follow up in 6-8 weeks with Dr. Brito of neurology for stroke follow up     Activity    Your activity upon discharge: activity as tolerated and with use of walker and wheelchair as previously  done     Discharge Instructions    1.  Recommend continuing daily Lasix for lower leg swelling and use of compression stocking or Ace wraps (would ask home therapy if able to assist with ACE wraps); keep appointment at Lymphedema clinic in 10/2021     Diet    Follow this diet upon discharge: Orders Placed This Encounter      Regular Diet Adult     NM Lexiscan stress test     Discharge Medications   Current Discharge Medication List      START taking these medications    Details   clopidogrel (PLAVIX) 75 MG tablet Take 1 tablet (75 mg) by mouth daily  Refills: 0      ezetimibe (ZETIA) 10 MG tablet Take 0.5 tablets (5 mg) by mouth At Bedtime  Qty: 15 tablet, Refills: 0    Associated Diagnoses: Hyperlipidemia LDL goal <100         CONTINUE these medications which have NOT CHANGED    Details   acetaminophen (TYLENOL) 650 MG CR tablet Take 650 mg by mouth every 8 hours as needed for mild pain (Shoulder pain)       aspirin 81 MG tablet Take 81 mg by mouth every evening       baclofen (LIORESAL) intraTHECAL Internal Pump by Intrathecal route continuous prn Pump filled by [facility] stroke [place] [phone]  Pump Model: Syncromed  Medication in pump is Gablofen (brand name)  Last fill: during hospital admission  Next fill:     [place] Alarm Date:   Reservoir Volume: 20 ml  Conc: 2000 mcg/mL  Delivers 234.7 mcg/day  Basal rate:unknown  Battery life: unknown      docusate sodium (COLACE) 100 MG capsule Take 100 mg by mouth every evening      EPINEPHrine 0.3 MG/0.3ML injection Inject 0.3 mLs (0.3 mg) into the muscle as needed for anaphylaxis  Qty: 0.3 mL, Refills: 3    Associated Diagnoses: Bee sting reaction, accidental or unintentional, initial encounter      fluorouracil (EFUDEX) 5 % external cream Apply topically to legs 1 to 2 times weekly as needed/tolerated for maintenance       gabapentin (NEURONTIN) 300 MG capsule Take 300 mg by mouth 3 times daily      ketoconazole (NIZORAL) 2  "% external shampoo SHAMPOO EVERY 2-3 DAYS AS  NEEDED  Qty: 120 mL, Refills: 10    Associated Diagnoses: Dermatitis      oxybutynin ER (DITROPAN-XL) 10 MG 24 hr tablet Take 2 tablets (20 mg) by mouth At Bedtime  Qty: 180 tablet, Refills: 3    Comments: ### DO NOT FILL NOW.  Please update patient's profile to reflect additional refills.  ####  Associated Diagnoses: Urinary frequency      pantoprazole (PROTONIX) 40 MG EC tablet Take 1 tablet (40 mg) by mouth 2 times daily  Qty: 60 tablet, Refills: 3    Associated Diagnoses: Gastroesophageal reflux disease without esophagitis      furosemide (LASIX) 20 MG tablet Take 2 tablets (40 mg) by mouth daily  Qty: 60 tablet, Refills: 1    Associated Diagnoses: Localized edema         STOP taking these medications       polyethylene glycol (MIRALAX/GLYCOLAX) Packet Comments:   Reason for Stopping:             Allergies   Allergies   Allergen Reactions     Bee Venom      Swelling and hives     Penicillins Hives and Swelling     \"HIVES\"; occurred at age 39     Data   Results for orders placed or performed during the hospital encounter of 09/23/21   CT Head w/o Contrast     Status: None    Narrative    EXAM: CT HEAD W/O CONTRAST  LOCATION: [facility]  DATE/TIME: 9/23/2021 6:24 PM    INDICATION: Code Stroke right sided facial and body droop  COMPARISON: None.  TECHNIQUE: Routine CT Head without IV contrast. Multiplanar reformats. Dose reduction techniques were used.    FINDINGS:  INTRACRANIAL CONTENTS: No intracranial hemorrhage, extraaxial collection, or mass effect.  No CT evidence of acute infarct. Moderate presumed chronic small vessel ischemic changes. Moderate generalized volume loss. No hydrocephalus.     VISUALIZED ORBITS/SINUSES/MASTOIDS: No intraorbital abnormality. No paranasal sinus mucosal disease. No middle ear or mastoid effusion.    BONES/SOFT TISSUES: No acute abnormality.      Impression    IMPRESSION:  1.  No CT evidence for acute " intracranial process.  2.  Brain atrophy and presumed chronic microvascular ischemic changes as above.   CTA Head Neck with Contrast     Status: None    Narrative    EXAM: CTA  HEAD NECK WITH CONTRAST  LOCATION: [facility]  DATE/TIME: 9/23/2021 6:25 PM    INDICATION: Code Stroke right-sided facial and body droop.  COMPARISON: Head and neck CT angiogram 10/27/2018  CONTRAST: 70mL [id number]  TECHNIQUE: Head and neck CT angiogram with IV contrast. Noncontrast head CT followed by axial helical CT images of the head and neck vessels obtained during the arterial phase of intravenous contrast administration. Axial 2D reconstructed images and   multiplanar 3D MIP reconstructed images of the head and neck vessels were performed by the technologist. Dose reduction techniques were used. All stenosis measurements made according to NASCET criteria unless otherwise specified.    FINDINGS:   HEAD CTA:  ANTERIOR CIRCULATION: No stenosis/occlusion, aneurysm, or high flow vascular malformation. Standard [place] anatomy.    POSTERIOR CIRCULATION: Moderate presumed atherosclerotic narrowing at the proximal P2 segment of the right posterior cerebral artery again noted without change. No other stenosis/occlusion, aneurysm, or high flow vascular malformation. Balanced vertebral   arteries supply a normal basilar artery.     DURAL VENOUS SINUSES: Expected enhancement of the major dural venous sinuses.    NECK CTA:  RIGHT CAROTID: No measurable stenosis or dissection.    LEFT CAROTID: No measurable stenosis or dissection.    VERTEBRAL ARTERIES: No focal stenosis or dissection. Balanced vertebral arteries.    AORTIC ARCH: Classic aortic arch anatomy with no significant stenosis at the origin of the great vessels.    NONVASCULAR STRUCTURES: Unremarkable.      Impression    IMPRESSION:   HEAD CTA:   1.  Moderate presumed atherosclerotic narrowing of the proximal P2 segment of the right posterior cerebral artery  again noted without change.  2.  Otherwise, normal [place] CTA    NECK CTA:  1.  Normal neck CTA.   MR Brain w/o & w Contrast     Status: None    Narrative    EXAM: MR BRAIN W/O and W CONTRAST  LOCATION: [facility]  DATE/TIME: 9/23/2021 10:07 PM    INDICATION: 1 hr ago R facial droop, ams, emesis, now improved  COMPARISON: CTA head and neck on the same date, MRI brain 11/16/2020  CONTRAST: 7 mL Gadavist  TECHNIQUE: Routine multiplanar multisequence head MRI without and with intravenous contrast.    FINDINGS:  INTRACRANIAL CONTENTS: Subtle small wisps of mild restricted diffusion in the left frontal white matter and in the left corona radiata. No mass, acute hemorrhage, or extra-axial fluid collections. There is moderate to severe small vessel ischemic disease   in the cerebral white matter on. Chronic lacunar infarct left cerebellar hemisphere and right thalamus. Mild to moderate generalized cerebral atrophy. No hydrocephalus. Normal position of the cerebellar tonsils. No pathologic contrast enhancement.    SELLA: No abnormality accounting for technique.    OSSEOUS STRUCTURES/SOFT TISSUES: Normal marrow signal. The major intracranial vascular flow voids are maintained.     ORBITS: No abnormality accounting for technique.     SINUSES/MASTOIDS: Mild mucosal thickening scattered about the paranasal sinuses. No middle ear or mastoid effusion.       Impression    IMPRESSION:  1.  Small subtle clefts of acute to early subacute ischemic infarction in the left parietal white matter and left corona radiata.  2.  Age-related changes with chronic lacunar infarcts in the right thalamus and left cerebellar hemisphere.       Glucose by meter     Status: Abnormal   Result Value Ref Range    GLUCOSE BY METER POCT 103 (H) 70 - 99 mg/dL   Basic metabolic panel     Status: Abnormal   Result Value Ref Range    Sodium 140 133 - 144 mmol/L    Potassium 4.1 3.4 - 5.3 mmol/L    Chloride 106 94 - 109 mmol/L     Carbon Dioxide (CO2) 28 20 - 32 mmol/L    Anion Gap 6 3 - 14 mmol/L    Urea Nitrogen 19 7 - 30 mg/dL    Creatinine 0.77 0.66 - 1.25 mg/dL    Calcium 8.6 8.5 - 10.1 mg/dL    Glucose 106 (H) 70 - 99 mg/dL    GFR Estimate 88 >60 mL/min/1.73m2   CBC with platelets and differential     Status: Abnormal   Result Value Ref Range    WBC Count 6.9 4.0 - 11.0 10e3/uL    RBC Count 3.79 (L) 4.40 - 5.90 10e6/uL    Hemoglobin 10.2 (L) 13.3 - 17.7 g/dL    Hematocrit 32.7 (L) 40.0 - 53.0 %    MCV 86 78 - 100 fL    MCH 26.9 26.5 - 33.0 pg    MCHC 31.2 (L) 31.5 - 36.5 g/dL    RDW 15.3 (H) 10.0 - 15.0 %    Platelet Count 272 150 - 450 10e3/uL    % Neutrophils 66 %    % Lymphocytes 19 %    % Monocytes 11 %    % Eosinophils 3 %    % Basophils 1 %    % Immature Granulocytes 0 %    NRBCs per 100 WBC 0 <1 /100    Absolute Neutrophils 4.6 1.6 - 8.3 10e3/uL    Absolute Lymphocytes 1.3 0.8 - 5.3 10e3/uL    Absolute Monocytes 0.7 0.0 - 1.3 10e3/uL    Absolute Eosinophils 0.2 0.0 - 0.7 10e3/uL    Absolute Basophils 0.1 0.0 - 0.2 10e3/uL    Absolute Immature Granulocytes 0.0 <=0.0 10e3/uL    Absolute NRBCs 0.0 10e3/uL   Troponin I     Status: Normal   Result Value Ref Range    Troponin I 0.030 0.000 - 0.045 ug/L   INR     Status: Normal   Result Value Ref Range    INR 1.06 0.85 - 1.15   Partial thromboplastin time     Status: Normal   Result Value Ref Range    aPTT 27 22 - 38 Seconds   Extra Red Top Tube     Status: None   Result Value Ref Range    Hold Specimen JI    Asymptomatic COVID-19 Virus (Coronavirus) by PCR Nasopharyngeal     Status: Normal    Specimen: Nasopharyngeal; Swab   Result Value Ref Range    SARS CoV2 PCR Negative Negative    Narrative    Testing was performed using the lazaro  SARS-CoV-2 & Influenza A/B Assay on the lazaro  Colleen  System.  This test should be ordered for the detection of SARS-COV-2 in individuals who meet SARS-CoV-2 clinical and/or epidemiological criteria. Test performance is unknown in asymptomatic  patients.  This test is for in vitro diagnostic use under the FDA EUA for laboratories certified under CLIA to perform moderate and/or high complexity testing. This test has not been FDA cleared or approved.  A negative test does not rule out the presence of PCR inhibitors in the specimen or target RNA in concentration below the limit of detection for the assay. The possibility of a false negative should be considered if the patient's recent exposure or clinical presentation suggests COVID-19.  [facility] are certified under the Clinical Laboratory Improvement Amendments of 1988 (CLIA-88) as qualified to perform moderate and/or high complexity laboratory testing.   Hemoglobin A1c     Status: Abnormal   Result Value Ref Range    Hemoglobin A1C 5.8 (H) 0.0 - 5.6 %   Lipid panel reflex to direct LDL     Status: None   Result Value Ref Range    Cholesterol 147 <200 mg/dL    Triglycerides 53 <150 mg/dL    Direct Measure HDL 65 >=40 mg/dL    LDL Cholesterol Calculated 71 <=100 mg/dL    Non HDL Cholesterol 82 <130 mg/dL    Patient Fasting > 8hrs? Unknown     Narrative    Cholesterol  Desirable:  <200 mg/dL    Triglycerides  Normal:  Less than 150 mg/dL  Borderline High:  150-199 mg/dL  High:  200-499 mg/dL  Very High:  Greater than or equal to 500 mg/dL    Direct Measure HDL  Female:  Greater than or equal to 50 mg/dL   Male:  Greater than or equal to 40 mg/dL    LDL Cholesterol  Desirable:  <100mg/dL  Above Desirable:  100-129 mg/dL   Borderline High:  130-159 mg/dL   High:  160-189 mg/dL   Very High:  >= 190 mg/dL    Non HDL Cholesterol  Desirable:  130 mg/dL  Above Desirable:  130-159 mg/dL  Borderline High:  160-189 mg/dL  High:  190-219 mg/dL  Very High:  Greater than or equal to 220 mg/dL   EKG 12 lead     Status: None   Result Value Ref Range    Systolic Blood Pressure  mmHg    Diastolic Blood Pressure  mmHg    Ventricular Rate 72 BPM    Atrial Rate 72 BPM    AK Interval 172 ms    QRS Duration 90  ms     ms    QTc 453 ms    P Axis 78 degrees    R AXIS 18 degrees    T Axis 28 degrees    Interpretation ECG       Sinus rhythm with Premature atrial complexes with Aberrant conduction  Anterior infarct (cited on or before 14-JUL-2021)  Abnormal ECG  When compared with ECG of 18-JUL-2021 09:50,  T wave inversion now evident in Anterior leads  Confirmed by - EMERGENCY ROOM, PHYSICIAN ([id number]),  ZULLY ANTOINE ([id number]) on 9/24/2021 6:47:12 AM     Care Management / Social Work IP Consult     Status: None ()    Felicitas Barnes RN     9/24/2021  1:32 PM  Care Management Initial Consult    General Information  Assessment completed with: Patient, Spouse or significant other,      Type of CM/SW Visit: Initial Assessment    Primary Care Provider verified and updated as needed: Yes   Readmission within the last 30 days: no previous admission in   last 30 days      Reason for Consult: care coordination/care conference,   discharge planning    Communication Assessment  Patient's communication style: spoken language (English or   Bilingual)    Hearing Difficulty or Deaf: yes   Wear Glasses or Blind: yes    Cognitive  Cognitive/Neuro/Behavioral: WDL  Level of Consciousness: alert    Arousal Level: opens eyes spontaneously  Orientation: oriented x   4  Mood/Behavior: calm, cooperative     Speech: garbled    Living Environment:   People in home: spouse     Current living Arrangements: house      Able to return to prior arrangements: yes     Family/Social Support:  Care provided by: self, spouse/significant other  Provides care for: no one, unable/limited ability to care for   self  Marital Status:   Wife          Description of Support System: Supportive, Involved    Support Assessment: Adequate family and caregiver support    Current Resources:   Patient receiving home care services: Yes  Skilled Home Care Services: Physicial Therapy  Community Resources: Home Care  Equipment currently used at home:  walker, standard, wheelchair,   power    Lifestyle & Psychosocial Needs:  Social Determinants of Health     Tobacco Use: Medium Risk     Smoking Tobacco Use: Former Smoker     Smokeless Tobacco Use: Never Used   Alcohol Use: Unknown     Frequency of Alcohol Consumption: 2-4 times a month     Average Number of Drinks: 1 or 2     Frequency of Binge Drinking: Not on file   Financial Resource Strain: Low Risk      Difficulty of Paying Living Expenses: Not hard at all   Food Insecurity:      Worried About Running Out of Food in the Last Year:      Ran Out of Food in the Last Year:    Transportation Needs: No Transportation Needs     Lack of Transportation (Medical): No     Lack of Transportation (Non-Medical): No   Physical Activity:      Days of Exercise per Week:      Minutes of Exercise per Session:    Stress:      Feeling of Stress :    Social Connections:      Frequency of Communication with Friends and Family:      Frequency of Social Gatherings with Friends and Family:      Attends [employer] Services:      Active Member of Clubs or Organizations:      Attends Club or Organization Meetings:      Marital Status:    Intimate Partner Violence:      Fear of Current or Ex-Partner:      Emotionally Abused:      Physically Abused:      Sexually Abused:    Depression: Not at risk     PHQ-2 Score: 0   Housing Stability: Low Risk      Unable to Pay for Housing in the Last Year: No     Number of Places Lived in the Last Year: 1     Unstable Housing in the Last Year: No     Additional Information:  CM consulted for discharge planning, pt of Dr. Ray also noted to   be a service bundle 3 pt. Met w/ pt and wife at bedside. They   live together in a home, pt uses a WC at baseline and pivot   transfers. He is at baseline for mobility and is planning to   resume his home PT with [facility] at discharge. Pt   manages his own medications without difficulty and wife does   cooking, cleaning, laundry and transportation.      They are looking into PCA/HHA hours through their long term care   insurance, wife reports that they already have the information   for this and are not in need of any additional resources at this   time. They deny having any other discharge needs. Wife prefers to   arrange pt's follow-up visit. OP Clinic CC referral placed for   follow-up at discharge.     Felicitas Schofield RN BSN   Inpatient Care Coordination  [facility]   Phone [phone]     Echocardiogram Limited     Status: None   Result Value Ref Range    LVEF  45-50%     Narrative    [id number]  [facility]  [id number]  [id number]^ZANE^[facility]  Echocardiography Laboratory  [address] [place] [address]     Name: ANIL QUINN  MRN: [id number]  : 1945  Study Date: 2021 10:13 AM  Age: 75 yrs  Gender: Male  Patient Location: [facility]  Reason For Study: CVA  Ordering Physician: KOBE DEGROOT  Referring Physician: Lida Ray  Performed By: Lopez Sousa RDCS     BSA: 1.9 m2  Height: 70 in  Weight: 155 lb  HR: 80  BP: 122/98 mmHg  ______________________________________________________________________________  Procedure  Limited Echo Adult. Optison (NDC #[id number]) given intravenously.  ______________________________________________________________________________  Interpretation Summary     1. The left ventricle is normal in size. Left ventricular systolic function is  mildly reduced. The visual ejection fraction is 45-50%. Grade I or early  diastolic dysfunction. There is mild to moderate hypokinesis of the mid  anteroseptal and inferoseptal walls and all apical segments of the left  ventricle. There is no thrombus seen in the left ventricle.  2. The right ventricle is normal size. The right ventricular systolic function  is normal.  3. Trace to mild mitral and tricuspid regurgitation.  4. No pericardial effusion.  5. In direct comparison to the previous study dated 2021, the  findings  are similar (lack of contrast limited wall motion analysis on the previous  study).  ______________________________________________________________________________  Left Ventricle  The left ventricle is normal in size. Left ventricular systolic function is  mildly reduced. The visual ejection fraction is 45-50%. Grade I or early  diastolic dysfunction. There is mild to moderate hypokinesis of the mid  anteroseptal and inferoseptal walls and all apical segments of the left  ventricle. There is no thrombus seen in the left ventricle.     Right Ventricle  The right ventricle is normal size. The right ventricular systolic function is  normal.     Atria  Normal left atrial size. Right atrial size is normal.     Mitral Valve  There is trace to mild mitral regurgitation.     Tricuspid Valve  There is mild (1+) tricuspid regurgitation. The right ventricular systolic  pressure is approximated at 31.6 mmHg plus the right atrial pressure.     Aortic Valve  There is mild trileaflet aortic sclerosis. There is trace aortic  regurgitation. No aortic stenosis is present.     Pulmonic Valve  There is trace pulmonic valvular regurgitation.     Vessels  Normal size ascending aorta.     Pericardium  There is no pericardial effusion.     Rhythm  Sinus rhythm was noted.  ______________________________________________________________________________  MMode/2D Measurements & Calculations  asc Aorta Diam: 3.4 cm     Doppler Measurements & Calculations  MV E max susi: 71.6 cm/sec  MV A max susi: 102.0 cm/sec  MV E/A: 0.70  MV dec time: 0.44 sec  TR max susi: 280.7 cm/sec  TR max P.6 mmHg  E/E' avg: 10.3  Lateral E/e': 9.1  Medial E/e': 11.5     ______________________________________________________________________________  Report approved by: Germain Carroll 2021 11:24 AM         CBC with platelets differential     Status: Abnormal    Narrative    The following orders were created for panel order CBC with platelets  differential.  Procedure                               Abnormality         Status                     ---------                               -----------         ------                     CBC with platelets and d...[[id number]]  Abnormal            Final result                 Please view results for these tests on the individual orders.   Extra Tube     Status: None    Narrative    The following orders were created for panel order Extra Tube.  Procedure                               Abnormality         Status                     ---------                               -----------         ------                     Extra Red Top Tube[[id number]]                               Final result                 Please view results for these tests on the individual orders.

## 2021-09-24 NOTE — PLAN OF CARE
ROOM # 228    Living Situation (if not independent, order SW consult):  Facility name:-Home Self care with spouse   : Kelli Leong     Activity level at baseline: Assist 1   Activity level on admit: Assist 1-2       Patient registered to observation; given Patient Bill of Rights; given the opportunity to ask questions about observation status and their plan of care.  Patient has been oriented to the observation room, bathroom and call light is in place.    Discussed discharge goals and expectations with patient/family.       Patient had multiple strokes in past vitals stable, denies pain, almost to baseline per ER nurse and patient will cont to monitor

## 2021-09-24 NOTE — PROGRESS NOTES
[facility]  Patient is currently open to home care services with [facility]. The patient is currently receiving PT services.  [facility]  and team have been notified of patient admission.  [facility] liaison will continue to follow patient during stay.

## 2021-09-24 NOTE — TELEPHONE ENCOUNTER
----- Message from Leslee Bryan PA-C sent at 9/24/2021  3:13 PM CDT -----  Regarding: Hospital follow-up needed  Stroke Hospital Follow Up    Please schedule the patient for hospital follow up with: Dr. Brito in 6-8 weeks    Diagnosis: stroke    Contact: family member: Wife    For any questions, or if this time frame does not work, please write to P STROKE LEANDRO    Thank you    Leslee Bryan PA-C

## 2021-09-24 NOTE — CONSULTS
Care Management Initial Consult    General Information  Assessment completed with: Patient, Spouse or significant other,    Type of CM/SW Visit: Initial Assessment    Primary Care Provider verified and updated as needed: Yes   Readmission within the last 30 days: no previous admission in last 30 days      Reason for Consult: care coordination/care conference, discharge planning    Communication Assessment  Patient's communication style: spoken language (English or Bilingual)    Hearing Difficulty or Deaf: yes   Wear Glasses or Blind: yes    Cognitive  Cognitive/Neuro/Behavioral: WDL  Level of Consciousness: alert  Arousal Level: opens eyes spontaneously  Orientation: oriented x 4  Mood/Behavior: calm, cooperative     Speech: garbled    Living Environment:   People in home: spouse     Current living Arrangements: house      Able to return to prior arrangements: yes     Family/Social Support:  Care provided by: self, spouse/significant other  Provides care for: no one, unable/limited ability to care for self  Marital Status:   Wife          Description of Support System: Supportive, Involved    Support Assessment: Adequate family and caregiver support    Current Resources:   Patient receiving home care services: Yes  Skilled Home Care Services: Physicial Therapy  Community Resources: Home Care  Equipment currently used at home: walker, standard, wheelchair, power    Lifestyle & Psychosocial Needs:  Social Determinants of Health     Tobacco Use: Medium Risk     Smoking Tobacco Use: Former Smoker     Smokeless Tobacco Use: Never Used   Alcohol Use: Unknown     Frequency of Alcohol Consumption: 2-4 times a month     Average Number of Drinks: 1 or 2     Frequency of Binge Drinking: Not on file   Financial Resource Strain: Low Risk      Difficulty of Paying Living Expenses: Not hard at all   Food Insecurity:      Worried About Running Out of Food in the Last Year:      Ran Out of Food in the Last Year:     Transportation Needs: No Transportation Needs     Lack of Transportation (Medical): No     Lack of Transportation (Non-Medical): No   Physical Activity:      Days of Exercise per Week:      Minutes of Exercise per Session:    Stress:      Feeling of Stress :    Social Connections:      Frequency of Communication with Friends and Family:      Frequency of Social Gatherings with Friends and Family:      Attends [employer] Services:      Active Member of Clubs or Organizations:      Attends Club or Organization Meetings:      Marital Status:    Intimate Partner Violence:      Fear of Current or Ex-Partner:      Emotionally Abused:      Physically Abused:      Sexually Abused:    Depression: Not at risk     PHQ-2 Score: 0   Housing Stability: Low Risk      Unable to Pay for Housing in the Last Year: No     Number of Places Lived in the Last Year: 1     Unstable Housing in the Last Year: No     Additional Information:  CM consulted for discharge planning, pt of Dr. Ray also noted to be a service bundle 3 pt. Met w/ pt and wife at bedside. They live together in a home, pt uses a WC at baseline and pivot transfers. He is at baseline for mobility and is planning to resume his home PT with [facility] at discharge. Pt manages his own medications without difficulty and wife does cooking, cleaning, laundry and transportation.     They are looking into PCA/HHA hours through their long term care insurance, wife reports that they already have the information for this and are not in need of any additional resources at this time. They deny having any other discharge needs. Wife prefers to arrange pt's follow-up visit. OP Clinic CC referral placed for follow-up at discharge.     Felicitas Schofield RN BSN   Inpatient Care Coordination  [facility]   Phone [phone]

## 2021-09-24 NOTE — PLAN OF CARE
"PRIMARY DIAGNOSIS:  NURSING  OUTPATIENT/OBSERVATION GOALS TO BE MET BEFORE DISCHARGE:  ADLs back to baseline: Yes     Activity and level of assistance: Ax1 to stand at bedside, Ax2 with walker and gait belt for ambulation. Consider SW and/or PT evaluation.      Pain status: Denies     Return to near baseline physical activity: Yes          Discharge Planner Nurse   Safe discharge environment identified: Yes  Barriers to discharge: Not once medically cleared       Entered by: kala marin rn    BP (!) 122/98   Pulse 74   Temp 98.2  F (36.8  C) (Oral)   Resp 16   Ht 1.778 m (5' 10\")   Wt 69.9 kg (154 lb)   SpO2 94%   BMI 22.10 kg/m      Vitals stable. Pt alert and oriented x4. Ax2 with gait belt and walker. WC bound at baseline per wife. Garbled speech baseline per wife. Very slight R facial droop noted. Generalized weakness. Unable to raise arms due to shoulder pain. Plan for PT/SW/tele stroke. Plan for echo today. Pt resting comfortably. Will continue to monitor and provide supportive cares.       Please review provider order for any additional goals.   Nurse to notify provider when observation goals have been met and patient is ready for discharge.  "

## 2021-09-24 NOTE — ED NOTES
Pt is back to baseline per wife, did well on swallowing. Pt has garbled speech. From previous strokes. Wife can understand him. Says he isn't confused

## 2021-09-24 NOTE — ED NOTES
"[facility]  ED Nurse Handoff Report    Elier Leong is a 75 year old male   ED Chief complaint: Stroke Symptoms  . ED Diagnosis:   Final diagnoses:   Cerebrovascular accident (CVA), unspecified mechanism (H)     Allergies:   Allergies   Allergen Reactions     Bee Venom      Swelling and hives     Penicillins Hives and Swelling     \"HIVES\"; occurred at age 39       Code Status: Full Code  Activity level - Baseline/Home:  Assist X 1. Activity Level - Current:   Total Care. Lift room needed: Yes. Bariatric: No   Needed: No   Isolation: No. Infection: Not Applicable.     Vital Signs:   Vitals:    09/23/21 2145 09/23/21 2200 09/23/21 2215 09/23/21 2300   BP: 139/80 138/70 (!) 142/71 (!) 146/96   Pulse: 56 57 58 77   Resp:       Temp:       TempSrc:       SpO2:    98%   Weight:       Height:           Cardiac Rhythm:  ,      Pain level:    Patient confused: No. Patient Falls Risk: Yes.   Elimination Status: Has voided   Patient Report - Initial Complaint: stroke symptoms. Focused Assessment: has baclafon pump for leg spasmsTests Performed: mri. Abnormal Results:  2 small areas of problems  Treatments provided: currently on asa had it today  Family Comments: wife here  OBS brochure/video discussed/provided to patient:  Yes  ED Medications:   Medications   iopamidol (ISOVUE-370) solution 500 mL (70 mLs Intravenous Given 9/23/21 1842)   CT Scan Flush (80 mLs Intravenous Given 9/23/21 1843)   gadobutrol (GADAVIST) injection 7.5 mL (7 mLs Intravenous Given 9/23/21 2230)     Drips infusing:  No  For the majority of the shift, the patient's behavior Green. Interventions performed were none.    Sepsis treatment initiated: No     Patient tested for COVID 19 prior to admission: YES    ED Nurse Name/Phone Number: Bebe Moscoso RN,   11:22 PM  RECEIVING UNIT ED HANDOFF REVIEW    Above ED Nurse Handoff Report was reviewed: Yes  Reviewed by: Vitaly Reese RN on September 24, 2021 at 1:36 AM     "

## 2021-09-24 NOTE — PLAN OF CARE
PRIMARY DIAGNOSIS:  NURSING  OUTPATIENT/OBSERVATION GOALS TO BE MET BEFORE DISCHARGE:  ADLs back to baseline: Yes    Activity and level of assistance: Up with maximum assistance. Consider SW and/or PT evaluation.     Pain status: Improved with use of alternative comfort measures i.e.: distraction using     Return to near baseline physical activity: Yes     Discharge Planner Nurse   Safe discharge environment identified: Yes  Barriers to discharge: No       Entered by: Vitaly Reese 09/24/2021 4:52 AM   Patient has mobility and speech concerns due previous strokes at home with wife as caregiver.  Patient has social work and theries to assess for safe dicahrge patient near baseline per ED nurse and patient. VSS will cont to monitor   Please review provider order for any additional goals.   Nurse to notify provider when observation goals have been met and patient is ready for discharge.

## 2021-09-24 NOTE — TELEPHONE ENCOUNTER
Scheduled with Dr. Brito on 11/16- Left VM with patients wife to confirm appointment.    ELICIA HERCULES CMA

## 2021-09-24 NOTE — PROGRESS NOTES
09/24/21 1057   Quick Adds   Type of Visit Initial PT Evaluation   Living Environment   People in home spouse   Current Living Arrangements house   Home Accessibility wheelchair accessible   Self-Care   Usual Activity Tolerance moderate   Current Activity Tolerance moderate   Equipment Currently Used at Home walker, standard;wheelchair, power   Activity/Exercise/Self-Care Comment Pt has rolling walker and 4WW, can ambulate short distances at baseline. 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  Patient has appointment with OP seating clinic in November 2021 for power wheelchair    Disability/Function   Hearing Difficulty or Deaf yes   Patient's preferred means of communication other   Describe hearing loss hearing loss on left side;hearing loss on right side   Use of hearing assistive devices left hearing aid;right hearing aid   Were auxiliary aids offered? no   Wear Glasses or Blind yes   Concentrating, Remembering or Making Decisions Difficulty no   Difficulty Communicating yes  (garbled speech)   Communication difficulty speaking   Difficulty Eating/Swallowing no   Walking or Climbing Stairs Difficulty other (see comments)  (no stairs)   Walking or Climbing Stairs ambulation difficulty, dependent   Dressing/Bathing Difficulty yes   Toileting issues yes   Doing Errands Independently Difficulty (such as shopping) yes   Fall history within last six months no   Change in Functional Status Since Onset of Current Illness/Injury no   General Information   Onset of Illness/Injury or Date of Surgery 09/23/21   Referring Physician Buzz Ramirez MD   Patient/Family Therapy Goals Statement (PT) Return to home with wife   Pertinent History of Current Problem (include personal factors and/or comorbidities that impact the POC) Elier Leong is a 75 year old male who presents with sudden onset of right-sided facial droop and one emesis episode  at home.  He was brought to the emergency department for further assessment, during the stay in the emergency department the patient recovered to his baseline.  CT and MRI of the head reveal acute versus subacute left-sided ischemic infarction affecting the left parietal white matter and corona radiata.    Existing Precautions/Restrictions fall   Cognition   Orientation Status (Cognition) oriented x 4   Cognitive Status Comments Patient with garbled speech, this is baseline.  Patient able to make needs known.   Pain Assessment   Patient Currently in Pain Yes, see Vital Sign flowsheet  (reports pain at right shoulder)   Posture    Posture Forward head position;Protracted shoulders   Range of Motion (ROM)   ROM Comment decreased right shoulder ROM   Strength   Strength Comments Decreased global strength; at patient's baseline   Bed Mobility   Comment (Bed Mobility) Min A x1 with bedrail (this is baseline for patient)   Transfers   Transfer Safety Comments Patient transferred between sitting and standing with min A; pivot transferred to sitting in WC with min A for stability.  Patient transferred between sitting and standing with 2WW and min A from spouse   Gait/Stairs (Locomotion)   Comment (Gait/Stairs) Patient amb 3 feet at bedside with 2WW and CGA   Balance   Balance Comments Requires CGA and use of UE support at walker or arm rests of wheelchair when standing (patient's baseline)   Clinical Impression   Criteria for Skilled Therapeutic Intervention evaluation only   PT Diagnosis (PT) Decreased independence with mobility   Influenced by the following impairments decreased strength, decreased activity tolerance, pain   Functional limitations due to impairments difficulty with transfers/gait   Clinical Presentation Stable/Uncomplicated   Clinical Presentation Rationale complex pmh, stable presentation, good social support   Clinical Decision Making (Complexity) low complexity   Therapy Frequency (PT) Evaluation only    Predicted Duration of Therapy Intervention (days/wks) 1 day   Risk & Benefits of therapy have been explained evaluation/treatment results reviewed;care plan/treatment goals reviewed;risks/benefits reviewed;current/potential barriers reviewed;participants voiced agreement with care plan;participants included;patient;spouse/significant other   Clinical Impression Comments Wife present and participating, reported patient is at his baseline mobility.  No further inpatient physical therapy needs.  Will complete order   PT Discharge Planning    PT Discharge Recommendation (DC Rec) home with assist;home with home care physical therapy   PT Rationale for DC Rec Patient presents with baseline mobility status.  No indication for further inpatient physical therapy needs, will complete order.   PT Brief overview of current status  Patient requires min A x1 with transfers and gait; this is patient's baseline status.   Total Evaluation Time   Total Evaluation Time (Minutes) 35

## 2021-09-24 NOTE — H&P
"[facility]    History and Physical  Hospitalist     Date of Admission:  9/23/2021  Date of Service (when I saw the patient): 09/23/21  Provider: Buzz Ramirez MD      Chief Complaint   Facial dropping     History is obtained from the patient, electronic health record, emergency department physician and patient's spouse    History of Present Illness   Elier Leong is a 75 year old male who has a complicated past medical history 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  He also has a history of recurrent stroke and had dual antiplatelet therapy with Plavix and aspirin in the past, however this last July he had an adenoma removed from the colon and subsequently colonic hemorrhage.  His Plavix has been discontinued ever since.  He presented to the emergency department after an episode of right facial drooping and one emesis witnessed by his wife at home earlier this evening.  They were seated in the deck when his head bent toward one side and she noticed the facial drooping.  Patient underwent a full work-up for stroke in ED, but his neurological deficit subsided while waiting here in the emergency department.  I have discussed his case with Dr. Kartik Wilder, ED physician.  He consulted Dr. Diaz for new stroke over the phone, given the findings in the CT of the head and the MRI compatible with acute episode acute ischemic infarction in the left parietal white matter and left corona radiata. Neurology recommended admission, continue aspirin same as prior, no other intervention at this point.  They plan to follow-up on him tomorrow.  At the moment of my visit patient is cooperative, he denies any symptoms and his wife states he is back to his normal baseline.   Vital signs:  Temp: 98.6  F (37  C) Temp src: Oral BP: 112/76 Pulse: 66   Resp: 18 SpO2: 93 % O2 Device: None (Room air)   Height: 175.3 cm (5' 9\") Weight: 70.3 kg (155 lb)  Estimated body mass index is 22.89 " "kg/m  as calculated from the following:    Height as of this encounter: 1.753 m (5' 9\").    Weight as of this encounter: 70.3 kg (155 lb).    CBC shows mild anemia but otherwise differential is normal.  Glycemia 106.  Chemistry is within normal limit.  Troponin IES undetectable.    Imaging:  CTA Head Neck w Contrast:  HEAD CTA:   1.  Moderate presumed atherosclerotic narrowing of the proximal P2 segment of the right posterior cerebral artery again noted without change.   2.  Otherwise, normal [place] CTA   NECK CTA:   1.  Normal neck CTA.  As per radiology.     CT Head w/o Contrast:  1.  No CT evidence for acute intracranial process.   2.  Brain atrophy and presumed chronic microvascular ischemic changes as above.  As per radiology.     MR Brain w/o & w Contrast:  1.  Small subtle clefts of acute to early subacute ischemic infarction in the left parietal white matter and left corona radiata.   2.  Age-related changes with chronic lacunar infarcts in the right thalamus and left cerebellar hemisphere.      Past Medical History    I have reviewed this patient's medical history and updated it with pertinent information if needed.   Past Medical History:   Diagnosis Date     Basal cell carcinoma nos     sees derm     CVA (cerebral infarction) 6/14    small vessel right int capsule stroke     DVT (deep vein thrombosis) in pregnancy 05/12/2017    right peroneal vein     Essential hypertension, benign      Hearing loss      Hoarseness of voice     assoc with PLS     Lumbar radiculopathy     2017     Primary Lateral Sclerosis 2002    has baclofen pump through Dr. Calvert, [place], sees Dr. Fink, [facility]     Primary osteoarthritis of right shoulder 6/16/2021     Prostate CA (H) 2008    prostatectomy     Vertigo 2/21/2013            Assessment & Plan   Elier VICK TRENA Leong is a 75 year old male who presents with sudden onset of right-sided facial droop and one emesis episode at home.  He was brought to the emergency " department for further assessment, during the stay in the emergency department the patient recovered to his baseline.  CT and MRI of the head reveal acute versus subacute left-sided ischemic infarction affecting the left parietal white matter and corona radiata.  Patient is admitted at the request of neurology for further treatment and follow-up.    1.  Acute/subacute ischemic infarction involving left corona radiata and left parietal white matter seen in MRI.  Onset with right-sided facial drooping.  Patient has recovery during the state emergency department  2.  Primary lateral sclerosis.  Patient is wheelchair-bound, he reports his voice is affected but not his swallowing.  He has diffuse muscle atrophy but this variant does not affect the breathing.  He carries a baclofen pump.  [employer] was consulted before performing the MRI, they will recheck the pump in the morning to secure it is functioning properly.  3.  History of CVAs in the past.  4.  Essential hypertension.  5.  Basal cell carcinoma of the skin.  6.  History of prostate cancer  7.  Covid 19 status negative.     Observation.  Telemetry.  Regular diet, cut food in a small little pieces.  Continue aspirin 81 mg daily.  Gabapentin 300 mg 1 capsule 3 times daily.  Ditropan XL 10 mg 1 capsule daily.  Pantoprazole 40 mg 1 capsule daily.  MiraLAX 1 packet daily as needed for constipation.  Neurology consultation.  PT/OT consultation.   consultation.    Code Status   Full Code    Primary Care Physician   Lida Ray      Past Surgical History   I have reviewed this patient's surgical history and updated it with pertinent information if needed.  Past Surgical History:   Procedure Laterality Date     ABDOMEN SURGERY  11/12    Hernia     BIOPSY  4/16    Cancerous growth on leg. Removed by MOHs treatment     COLONOSCOPY N/A 8/3/2021    Procedure: COLONOSCOPY;  Surgeon: Luis Antonio Jung MD;  Location:  GI     COLONOSCOPY N/A 8/3/2021     Procedure: Colonoscopy, With Polypectomy And Biopsy;  Surgeon: Luis Antonio Jung MD;  Location: [facility]     ENDOSCOPIC RETROGRADE CHOLANGIOPANCREATOGRAM N/A 6/17/2021    Procedure: ENDOSCOPIC RETROGRADE CHOLANGIOPANCREATOGRAPHY, BILIARY STENT PLACEMENT;  Surgeon: Smia Galvez MD;  Location: [facility] OR     ENDOSCOPIC RETROGRADE CHOLANGIOPANCREATOGRAM N/A 8/19/2021    Procedure: ENDOSCOPIC RETROGRADE CHOLANGIOPANCREATOGRAPHY;  Surgeon: Agus Kent MD;  Location: [facility] OR     ENDOSCOPIC RETROGRADE CHOLANGIOPANCREATOGRAM COMPLEX N/A 7/12/2021    Procedure: ENDOSCOPIC RETROGRADE CHOLANGIOPANCREATOGRAPHY WITH AMPULLECTOMY, PANCREATIC DUCT STENT PLACEMENT AND BILIARY STENT PLACEMENT;  Surgeon: Agus Kent MD;  Location: [facility]     ENDOSCOPIC RETROGRADE CHOLANGIOPANCREATOGRAPHY, EXCHANGE TUBE/STENT N/A 7/14/2021    Procedure: ENDOSCOPIC RETROGRADE CHOLANGIOPANCREATOGRAPHY with bile duct stents exchanged, balloon sweep of bile ducts, hemostasis;  Surgeon: Guru Bhavesh Arreola MD;  Location: [facility]     ENDOSCOPIC ULTRASOUND UPPER GASTROINTESTINAL TRACT (GI) N/A 7/12/2021    Procedure: ENDOSCOPIC ULTRASOUND, ESOPHAGOSCOPY / UPPER GASTROINTESTINAL TRACT (GI);  Surgeon: Agus Kent MD;  Location: [facility] OR     ESOPHAGOSCOPY, GASTROSCOPY, DUODENOSCOPY (EGD), COMBINED N/A 6/17/2021    Procedure: ESOPHAGOGASTRODUODENOSCOPY, WITH ENDOSCOPIC US, fine needle aspiration;  Surgeon: Sima Galvez MD;  Location: [facility] OR     ESOPHAGOSCOPY, GASTROSCOPY, DUODENOSCOPY (EGD), COMBINED N/A 7/14/2021    Procedure: ESOPHAGOGASTRODUODENOSCOPY (EGD);  Surgeon: Guru Bhavesh Arreola MD;  Location[facility]     ESOPHAGOSCOPY, GASTROSCOPY, DUODENOSCOPY (EGD), COMBINED N/A 8/3/2021    Procedure: Esophagoscopy, gastroscopy, duodenoscopy (EGD), combined;  Surgeon: Luis Antonio Jung MD;  Location: [facility]     HERNIA REPAIR  11/12    Repaired     PROSTATE SURGERY       [facility] NONSPECIFIC PROCEDURE   6/08     prostatectomy      [facility] NONSPECIFIC PROCEDURE  11/01    colonoscopy     [facility] NONSPECIFIC PROCEDURE  11/2006    hernia, right side     [facility] NONSPECIFIC PROCEDURE      T + A       Prior to Admission Medications   Prior to Admission Medications   Prescriptions Last Dose Informant Patient Reported? Taking?   EPINEPHrine 0.3 MG/0.3ML injection 9/23/2021 at Unknown time Spouse/Significant Other No Yes   Sig: Inject 0.3 mLs (0.3 mg) into the muscle as needed for anaphylaxis   acetaminophen (TYLENOL) 650 MG CR tablet 9/23/2021 at Unknown time Spouse/Significant Other Yes Yes   Sig: Take 650 mg by mouth every 8 hours as needed for mild pain (Shoulder pain)    aspirin 81 MG tablet 9/23/2021 at Unknown time Spouse/Significant Other Yes Yes   Sig: Take 81 mg by mouth every evening    baclofen (LIORESAL) intraTHECAL Internal Pump 9/23/2021 at Unknown time Spouse/Significant Other Yes Yes   Sig: by Intrathecal route continuous prn Pump filled by [facility] [phone]  Pump Model: Syncromed  Medication in pump is Gablofen (brand name)  Last fill: during hospital admission  Next fill:     [place] Alarm Date:   Reservoir Volume: 20 ml  Conc: 2000 mcg/mL  Delivers 234.7 mcg/day  Basal rate:unknown  Battery life: unknown   fluorouracil (EFUDEX) 5 % external cream 9/23/2021 at Unknown time Spouse/Significant Other Yes Yes   Sig: Apply topically to legs 1 to 2 times weekly as needed/tolerated for maintenance    furosemide (LASIX) 20 MG tablet 9/23/2021 at Unknown time  Yes Yes   Sig: Take 2 tablets (40 mg) by mouth daily   gabapentin (NEURONTIN) 300 MG capsule 9/23/2021 at Unknown time  Yes Yes   Sig: start with one per day, add one every three days until reach max of  2 capsule by ORAL route 3 times every day   gabapentin (NEURONTIN) 300 MG capsule 9/23/2021 at Unknown time  Yes Yes   Sig: START WITH 1 CAP BY MOUTH DAILY, ADD 1 CAP EVERY 3 DAYS UNTIL MAX OF 2 CAPS 3 TIMES  "EVERY DAY   ketoconazole (NIZORAL) 2 % external shampoo 2021 at Unknown time Spouse/Significant Other No Yes   Sig: SHAMPOO EVERY 2-3 DAYS AS  NEEDED   oxybutynin ER (DITROPAN-XL) 10 MG 24 hr tablet 2021 at Unknown time Spouse/Significant Other No Yes   Sig: Take 2 tablets (20 mg) by mouth At Bedtime   pantoprazole (PROTONIX) 40 MG EC tablet   No No   Sig: Take 1 tablet (40 mg) by mouth 2 times daily   polyethylene glycol (MIRALAX/GLYCOLAX) Packet 2021 at Unknown time Spouse/Significant Other Yes Yes   Sig: Take 1 packet by mouth daily as needed Every 2-3 days.      Facility-Administered Medications: None     Allergies   Allergies   Allergen Reactions     Bee Venom      Swelling and hives     Penicillins Hives and Swelling     \"HIVES\"; occurred at age 39       Social History   I have personally reviewed the social history with the patient showing.  Social History     Tobacco Use     Smoking status: Former Smoker     Packs/day: 0.30     Years: 6.00     Pack years: 1.80     Types: Cigarettes     Start date: 1970     Quit date: 10/5/1976     Years since quittin.9     Smokeless tobacco: Never Used   Substance Use Topics     Alcohol use: Yes     Comment: 1 drink/week     Family History   I have reviewed this patient's family history and it is not contributory to the admission .       Review of Systems   Except as noted in the HPI, a 12-system Review of Systems was found to be negative.      Physical Exam   Vital Signs with Ranges  Temp:  [97.9  F (36.6  C)-98.6  F (37  C)] 98.6  F (37  C)  Pulse:  [56-78] 77  Resp:  [18] 18  BP: (115-158)/(70-96) 146/96  SpO2:  [92 %-100 %] 98 %  155 lbs 0 oz    GEN:  Alert, oriented x 3, appears comfortable, NAD.  HEENT:  Normocephalic/atraumatic, no scleral icterus, no nasal discharge, mouth moist.  CV:  Regular rate and rhythm, no murmur or JVD.  S1 + S2 noted, no S3 or S4.  LUNGS:  Clear to auscultation bilaterally without rales/rhonchi/wheezing/retractions.  " Symmetric chest rise on inhalation noted.  ABD:  Active bowel sounds, soft, non-tender/non-distended.  No rebound/guarding/rigidity.  EXT:  BLE edema. No cyanosis.  No joint synovitis noted.  SKIN:  Dry to touch, no exanthems noted in the visualized areas.       Data   I personally reviewed the EKG tracing showing Sinus rhythmwith PAC's .  Results for orders placed or performed during the hospital encounter of 09/23/21 (from the past 24 hour(s))   Glucose by meter   Result Value Ref Range    GLUCOSE BY METER POCT 103 (H) 70 - 99 mg/dL   EKG 12 lead   Result Value Ref Range    Systolic Blood Pressure  mmHg    Diastolic Blood Pressure  mmHg    Ventricular Rate 72 BPM    Atrial Rate 72 BPM    DE Interval 172 ms    QRS Duration 90 ms     ms    QTc 453 ms    P Axis 78 degrees    R AXIS 18 degrees    T Axis 28 degrees    Interpretation ECG       Sinus rhythm with Premature atrial complexes with Aberrant conduction  Anterior infarct (cited on or before 14-JUL-2021)  Abnormal ECG  When compared with ECG of 18-JUL-2021 09:50,  T wave inversion now evident in Anterior leads     CT Head w/o Contrast    Narrative    EXAM: CT HEAD W/O CONTRAST  LOCATION: [facility]  DATE/TIME: 9/23/2021 6:24 PM    INDICATION: Code Stroke right sided facial and body droop  COMPARISON: None.  TECHNIQUE: Routine CT Head without IV contrast. Multiplanar reformats. Dose reduction techniques were used.    FINDINGS:  INTRACRANIAL CONTENTS: No intracranial hemorrhage, extraaxial collection, or mass effect.  No CT evidence of acute infarct. Moderate presumed chronic small vessel ischemic changes. Moderate generalized volume loss. No hydrocephalus.     VISUALIZED ORBITS/SINUSES/MASTOIDS: No intraorbital abnormality. No paranasal sinus mucosal disease. No middle ear or mastoid effusion.    BONES/SOFT TISSUES: No acute abnormality.      Impression    IMPRESSION:  1.  No CT evidence for acute intracranial process.  2.  Brain  atrophy and presumed chronic microvascular ischemic changes as above.   CTA Head Neck with Contrast    Narrative    EXAM: CTA  HEAD NECK WITH CONTRAST  LOCATION: [facility]  DATE/TIME: 9/23/2021 6:25 PM    INDICATION: Code Stroke right-sided facial and body droop.  COMPARISON: Head and neck CT angiogram 10/27/2018  CONTRAST: 70mL [id number]  TECHNIQUE: Head and neck CT angiogram with IV contrast. Noncontrast head CT followed by axial helical CT images of the head and neck vessels obtained during the arterial phase of intravenous contrast administration. Axial 2D reconstructed images and   multiplanar 3D MIP reconstructed images of the head and neck vessels were performed by the technologist. Dose reduction techniques were used. All stenosis measurements made according to NASCET criteria unless otherwise specified.    FINDINGS:   HEAD CTA:  ANTERIOR CIRCULATION: No stenosis/occlusion, aneurysm, or high flow vascular malformation. Standard [place] anatomy.    POSTERIOR CIRCULATION: Moderate presumed atherosclerotic narrowing at the proximal P2 segment of the right posterior cerebral artery again noted without change. No other stenosis/occlusion, aneurysm, or high flow vascular malformation. Balanced vertebral   arteries supply a normal basilar artery.     DURAL VENOUS SINUSES: Expected enhancement of the major dural venous sinuses.    NECK CTA:  RIGHT CAROTID: No measurable stenosis or dissection.    LEFT CAROTID: No measurable stenosis or dissection.    VERTEBRAL ARTERIES: No focal stenosis or dissection. Balanced vertebral arteries.    AORTIC ARCH: Classic aortic arch anatomy with no significant stenosis at the origin of the great vessels.    NONVASCULAR STRUCTURES: Unremarkable.      Impression    IMPRESSION:   HEAD CTA:   1.  Moderate presumed atherosclerotic narrowing of the proximal P2 segment of the right posterior cerebral artery again noted without change.  2.  Otherwise,  normal [place] CTA    NECK CTA:  1.  Normal neck CTA.   Basic metabolic panel   Result Value Ref Range    Sodium 140 133 - 144 mmol/L    Potassium 4.1 3.4 - 5.3 mmol/L    Chloride 106 94 - 109 mmol/L    Carbon Dioxide (CO2) 28 20 - 32 mmol/L    Anion Gap 6 3 - 14 mmol/L    Urea Nitrogen 19 7 - 30 mg/dL    Creatinine 0.77 0.66 - 1.25 mg/dL    Calcium 8.6 8.5 - 10.1 mg/dL    Glucose 106 (H) 70 - 99 mg/dL    GFR Estimate 88 >60 mL/min/1.73m2   CBC with platelets differential    Narrative    The following orders were created for panel order CBC with platelets differential.  Procedure                               Abnormality         Status                     ---------                               -----------         ------                     CBC with platelets and d...[[id number]]  Abnormal            Final result                 Please view results for these tests on the individual orders.   CBC with platelets and differential   Result Value Ref Range    WBC Count 6.9 4.0 - 11.0 10e3/uL    RBC Count 3.79 (L) 4.40 - 5.90 10e6/uL    Hemoglobin 10.2 (L) 13.3 - 17.7 g/dL    Hematocrit 32.7 (L) 40.0 - 53.0 %    MCV 86 78 - 100 fL    MCH 26.9 26.5 - 33.0 pg    MCHC 31.2 (L) 31.5 - 36.5 g/dL    RDW 15.3 (H) 10.0 - 15.0 %    Platelet Count 272 150 - 450 10e3/uL    % Neutrophils 66 %    % Lymphocytes 19 %    % Monocytes 11 %    % Eosinophils 3 %    % Basophils 1 %    % Immature Granulocytes 0 %    NRBCs per 100 WBC 0 <1 /100    Absolute Neutrophils 4.6 1.6 - 8.3 10e3/uL    Absolute Lymphocytes 1.3 0.8 - 5.3 10e3/uL    Absolute Monocytes 0.7 0.0 - 1.3 10e3/uL    Absolute Eosinophils 0.2 0.0 - 0.7 10e3/uL    Absolute Basophils 0.1 0.0 - 0.2 10e3/uL    Absolute Immature Granulocytes 0.0 <=0.0 10e3/uL    Absolute NRBCs 0.0 10e3/uL   Troponin I   Result Value Ref Range    Troponin I 0.030 0.000 - 0.045 ug/L   INR   Result Value Ref Range    INR 1.06 0.85 - 1.15   Partial thromboplastin time   Result Value Ref Range     aPTT 27 22 - 38 Seconds   Extra Tube    Narrative    The following orders were created for panel order Extra Tube.  Procedure                               Abnormality         Status                     ---------                               -----------         ------                     Extra Red Top Tube[[id number]]                               Final result                 Please view results for these tests on the individual orders.   Extra Red Top Tube   Result Value Ref Range    Hold Specimen JIC    MR Brain w/o & w Contrast    Narrative    EXAM: MR BRAIN W/O and W CONTRAST  LOCATION: [facility]  DATE/TIME: 9/23/2021 10:07 PM    INDICATION: 1 hr ago R facial droop, ams, emesis, now improved  COMPARISON: CTA head and neck on the same date, MRI brain 11/16/2020  CONTRAST: 7 mL Gadavist  TECHNIQUE: Routine multiplanar multisequence head MRI without and with intravenous contrast.    FINDINGS:  INTRACRANIAL CONTENTS: Subtle small wisps of mild restricted diffusion in the left frontal white matter and in the left corona radiata. No mass, acute hemorrhage, or extra-axial fluid collections. There is moderate to severe small vessel ischemic disease   in the cerebral white matter on. Chronic lacunar infarct left cerebellar hemisphere and right thalamus. Mild to moderate generalized cerebral atrophy. No hydrocephalus. Normal position of the cerebellar tonsils. No pathologic contrast enhancement.    SELLA: No abnormality accounting for technique.    OSSEOUS STRUCTURES/SOFT TISSUES: Normal marrow signal. The major intracranial vascular flow voids are maintained.     ORBITS: No abnormality accounting for technique.     SINUSES/MASTOIDS: Mild mucosal thickening scattered about the paranasal sinuses. No middle ear or mastoid effusion.       Impression    IMPRESSION:  1.  Small subtle clefts of acute to early subacute ischemic infarction in the left parietal white matter and left corona radiata.  2.   Age-related changes with chronic lacunar infarcts in the right thalamus and left cerebellar hemisphere.         *Note: Due to a large number of results and/or encounters for the requested time period, some results have not been displayed. A complete set of results can be found in Results Review.       Disclaimer: This note consists of symbols derived from keyboarding, dictation and/or voice recognition software. As a result, there may be errors in the script that have gone undetected. Please consider this when interpreting information found in this chart.

## 2021-09-26 ENCOUNTER — MYC MEDICAL ADVICE (OUTPATIENT)
Dept: NEUROLOGY | Facility: CLINIC | Age: 76
End: 2021-09-26

## 2021-09-27 ENCOUNTER — PATIENT OUTREACH (OUTPATIENT)
Dept: NURSING | Facility: CLINIC | Age: 76
End: 2021-09-27
Attending: PHYSICIAN ASSISTANT
Payer: COMMERCIAL

## 2021-09-27 DIAGNOSIS — I63.9 CEREBROVASCULAR ACCIDENT (CVA), UNSPECIFIED MECHANISM (H): ICD-10-CM

## 2021-09-27 ASSESSMENT — ACTIVITIES OF DAILY LIVING (ADL): DEPENDENT_IADLS:: CLEANING;COOKING;LAUNDRY;SHOPPING;MEAL PREPARATION;MEDICATION MANAGEMENT;TRANSPORTATION;INCONTINENCE

## 2021-09-27 NOTE — PROGRESS NOTES
Clinic Care Coordination Contact  [facility]: Post-Discharge Note  SITUATION                                                      Admission:    Admission Date: 09/23/21   Reason for Admission: right facial drooping and one emesis witnessed by his wife at home earlier this evening  Discharge:   Discharge Date: 09/24/21  Discharge Diagnosis: Acute ischemic stroke of left parietal white matter and left corona radiata suspect ESUSAbnormal echocardiogram, new mild systolic dysfunction    BACKGROUND                                                        Elier Leong is a 75 year old male who has a complicated past medical history 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  He also has a history of recurrent stroke and had dual antiplatelet therapy with Plavix and aspirin in the past, however this last July he had an adenoma removed from the colon and subsequently colonic hemorrhage.  His Plavix has been discontinued ever since.  He presented to the emergency department after an episode of right facial drooping and one emesis witnessed by his wife at home earlier this evening.  They were seated in the deck when his head bent toward one side and she noticed the facial drooping.  Patient underwent a full work-up for stroke in ED, but his neurological deficit subsided while waiting here in the emergency department. Dr. Kratik Wilder, ED physician, consulted Dr. Diaz for new stroke over the phone, given the findings in the CT of the head and the MRI compatible with acute episode acute ischemic infarction in the left parietal white matter and left corona radiata. Neurology recommended admission, continue aspirin same as prior, no other intervention at this point. They plan to follow-up on him tomorrow.  At time of initial assessment the patient and his wife state he is back to his normal baseline. Please see the admission history and physical for full details.    ASSESSMENT      Enrollment  Primary  Care Care Coordination Status: Enrolled  Clinical Pathway Name: None  Outreach Frequency: monthly    Discharge Assessment  How are you doing now that you are home?: RN CC spoke with patient's wife, NELLIE on file. Patient has been doing well since hospital discharge. Reviewed medication list, AVS, and upcoming appointments. [facility]. [employer] message sent to PCP with request on 9/26/21. Patient is waiting to hear from cardiology to schedule a stress test, as it is possible patient is having some cardiac issues that caused most recent stroke. Patient will follow up with PCP on 9/30/21. Kelli notes that patient's LL edema has almost completely resolved. They are using ACE wraps as it is very challenging for her to remove compression stockings. No new need for support or resources identified.  How are your symptoms? (Red Flag symptoms escalate to triage hotline per guidelines): Improved  Do you feel your condition is stable enough to be safe at home until your provider visit?: Yes  Does the patient have their discharge instructions? : Yes  Does the patient have questions regarding their discharge instructions? : No  Do you have questions regarding any of your medications? : No  Do you have all of your needed medical supplies or equipment (DME)?  (i.e. oxygen tank, CPAP, cane, etc.): Yes  Discharge follow-up appointment scheduled within 14 calendar days? : Yes  Discharge Follow Up Appointment Date: 09/30/21  Discharge Follow Up Appointment Scheduled with?: Primary Care Provider              Care Management       Care Mgmt General Assessment  Referral  Referral Source: IP Report  Health Care Home/Utilization  Preferred Hospital: [facility]  [phone]  Living Situation  Current living arrangement:: I live in a private home with spouse  Resources  Patient receiving home care services:: Yes  Skilled Home Care Services: Skilled  Nursing;Physicial Therapy;Occupational Therapy;Home Health Aid  Community Resources: DME;Home Care  Supplies used at home:: Incontinence Supplies  Equipment Currently Used at Home: walker, rolling;shower chair;grab bar, toilet  Employment Status: retired  Psychosocial  Informal Support system:: Spouse  Functional Status  Dependent ADLs:: Ambulation-walker;Bathing;Dressing;Grooming;Toileting;Wheelchair-with assist;Transfers  Dependent IADLs:: Cleaning;Cooking;Laundry;Shopping;Meal Preparation;Medication Management;Transportation;Incontinence       PLAN                                                      Outpatient Plan:  CHW will resume monthly outreaches in October. Rn CC will review chart in 6 weeks.     Future Appointments   Date Time Provider Department Center   9/30/2021 10:00 AM Lida Ray MD RI RI   9/30/2021  1:00 PM RHNMINJ RHNM [place] [facility]   9/30/2021  1:45 PM RHNM1 RHNM [place] [facility]   9/30/2021  2:15 PM [facility]   9/30/2021  3:15 PM RHNM1 RHNM [place] [facility]   10/19/2021  3:45 PM Luana Pollock, LOU RHLYOT [place] [facility]   10/20/2021  1:00 PM Mishel Cuevas MD UOR [facility]   11/11/2021  4:00 PM Uvaldo Cavazos MD UBURO [facility]   11/16/2021  9:00 AM Aaron Brito MD [facility]   11/16/2021 10:00 AM Mishel Hills OT UROTO [place]   [facility]2022  [facility]   3/3/2022 10:00 AM Gary Tejada MD [facility]         For any urgent concerns, please contact our 24 hour nurse triage line: [phone] ([phone])         Rebecca Hernández RN

## 2021-09-30 ENCOUNTER — TELEPHONE (OUTPATIENT)
Dept: INTERNAL MEDICINE | Facility: CLINIC | Age: 76
End: 2021-09-30

## 2021-09-30 ENCOUNTER — OFFICE VISIT (OUTPATIENT)
Dept: INTERNAL MEDICINE | Facility: CLINIC | Age: 76
End: 2021-09-30
Payer: COMMERCIAL

## 2021-09-30 ENCOUNTER — MYC MEDICAL ADVICE (OUTPATIENT)
Dept: INTERNAL MEDICINE | Facility: CLINIC | Age: 76
End: 2021-09-30

## 2021-09-30 VITALS
TEMPERATURE: 98 F | BODY MASS INDEX: 22.19 KG/M2 | HEART RATE: 70 BPM | SYSTOLIC BLOOD PRESSURE: 140 MMHG | HEIGHT: 70 IN | OXYGEN SATURATION: 96 % | DIASTOLIC BLOOD PRESSURE: 83 MMHG | WEIGHT: 155 LBS | RESPIRATION RATE: 16 BRPM

## 2021-09-30 DIAGNOSIS — R60.0 LOCALIZED EDEMA: ICD-10-CM

## 2021-09-30 DIAGNOSIS — I63.9 CEREBRAL INFARCTION, UNSPECIFIED MECHANISM (H): Primary | ICD-10-CM

## 2021-09-30 DIAGNOSIS — I51.89 DIASTOLIC DYSFUNCTION: Primary | ICD-10-CM

## 2021-09-30 DIAGNOSIS — E78.5 HYPERLIPIDEMIA LDL GOAL <100: ICD-10-CM

## 2021-09-30 DIAGNOSIS — I10 BENIGN ESSENTIAL HYPERTENSION: ICD-10-CM

## 2021-09-30 PROCEDURE — 99495 TRANSJ CARE MGMT MOD F2F 14D: CPT | Performed by: INTERNAL MEDICINE

## 2021-09-30 RX ORDER — FUROSEMIDE 20 MG
40 TABLET ORAL DAILY
Qty: 60 TABLET | Refills: 1 | Status: SHIPPED | OUTPATIENT
Start: 2021-09-30 | End: 2022-01-01

## 2021-09-30 ASSESSMENT — MIFFLIN-ST. JEOR: SCORE: 1439.33

## 2021-09-30 NOTE — NURSING NOTE
"BP (!) 140/83 (BP Location: Left arm, Patient Position: Sitting, Cuff Size: Adult Regular)   Pulse 70   Temp 98  F (36.7  C) (Oral)   Resp 16   Ht 1.778 m (5' 10\")   Wt 70.3 kg (155 lb)   SpO2 96%   BMI 22.24 kg/m      "

## 2021-09-30 NOTE — TELEPHONE ENCOUNTER
Patient's wife stopped by the clinic and said they tried to do the stress test but Elier cannot lift his arms over his head. They want to know what is the next step.

## 2021-09-30 NOTE — TELEPHONE ENCOUNTER
Please advise that I just recommend they see the cardiologist for consultation.  The number is:[phone]

## 2021-09-30 NOTE — PROGRESS NOTES
{PROVIDER CHARTING PREFERENCE:508441}    Franny Doe is a 75 year old who presents for the following health issues {ACCOMPANIED BY STATEMENT (Optional):754762}    HPI     Post Discharge Outreach 9/27/2021   Admission Date 9/23/2021   Reason for Admission right facial drooping and one emesis witnessed by his wife at home earlier this evening   Discharge Date 9/24/2021   Discharge Diagnosis Acute ischemic stroke of left parietal white matter and left corona radiata suspect ESUSAbnormal echocardiogram, new mild systolic dysfunction   How are you doing now that you are home? RN CC spoke with patient's wife, CTC on file. Patient has been doing well since hospital discharge. Reviewed medication list, AVS, and upcoming appointments. [facility]. Skeed message sent to PCP with request on 9/26/21. Patient is waiting to hear from cardiology to schedule a stress test, as it is possible patient is having some cardiac issues that caused most recent stroke. Patient will follow up with PCP on 9/30/21. Kelli notes that patient's LL edema has almost completely resolved. They are using ACE wraps as it is very challenging for her to remove compression stockings. No new need for support or resources identified.   How are your symptoms? (Red Flag symptoms escalate to triage hotline per guidelines) Improved   Do you feel your condition is stable enough to be safe at home until your provider visit? Yes   Does the patient have their discharge instructions?  Yes   Does the patient have questions regarding their discharge instructions?  No   Were you started on any new medications or were there changes to any of your previous medications?  -   Does the patient have all of their medications? -   Do you have questions regarding any of your medications?  No   Do you have all of your needed medical supplies or equipment (DME)?  (i.e. oxygen tank, CPAP, cane, etc.) Yes   Discharge  "follow-up appointment scheduled within 14 calendar days?  Yes   Discharge Follow Up Appointment Date 9/30/2021   Discharge Follow Up Appointment Scheduled with? Primary Care Provider     Hospital Follow-up Visit:    Hospital/Nursing Home/IP Rehab Facility: [facility]  Date of Admission: 09/23/2021  Date of Discharge: 09/24/2021  Reason(s) for Admission: Acute ischemic stroke       Was your hospitalization related to COVID-19? No   Problems taking medications regularly:  None  Medication changes since discharge: PLAVIX and ZETIA   Problems adhering to non-medication therapy:  None    Summary of hospitalization:  {HOSPITAL DISCHARGE SUMMARY INFO:627767::\"[facility] discharge summary reviewed\"}  Diagnostic Tests/Treatments reviewed.  Follow up needed: {NONE DEFAULTED:469729::\"none\"}  Other Healthcare Providers Involved in Patient s Care:         {those currently involved after discharge:947767::\"None\"}  Update since discharge: {IMPROVED DEFAULT:191977::\"improved.\"} {TIP  Include information from family/caregivers, SNF, Care Coordination :678387}      Post Discharge Medication Reconciliation: {ACO Med Rec (Provider):079552}.  Plan of care communicated with {Communicate Plan to:791838::\"patient\"}     {Reference  Coding guidelines- Moderate Complexity F2F/Video within 7 - 14 days of discharge 9483999, High Complexity F2F/Video within 7 days 7095478 or [id number] days [id number] :097439}         {additonal problems for provider to add (Optional):212898}    Review of Systems   {ROS COMP (Optional):847408}      Objective    There were no vitals taken for this visit.  There is no height or weight on file to calculate BMI.  Physical Exam   {Exam List (Optional):517117}    {Diagnostic Test Results (Optional):431973}    {AMBULATORY ATTESTATION (Optional):464070}        "

## 2021-09-30 NOTE — PROGRESS NOTES
Assessment & Plan     Cerebral infarction, unspecified mechanism (H)  Currently he is clinically stable after the most recent stroke, he is on Plavix for 21 days and aspirin 81.  Advised that I would recommend following recommendations by neurology regarding the use of this as they the experts in this area.    Benign essential hypertension  Blood pressures are remaining good off of medication, continue to monitor but would probably not restart any medication    Localized edema  There has been some improvement, there probably is some degree of lymphedema, continue Lasix indefinitely at 40 mg daily since that seems to have improved it.  Continue wrapping them which will help reduce the swelling more long-term  - furosemide (LASIX) 20 MG tablet; Take 2 tablets (40 mg) by mouth daily    Hyperlipidemia LDL goal <100  Advised that I would recommend go ahead and take the Zetia as recommended since he does have some evidence of artery disease.  He has tried a number of different statins so would not recommend going back with a statin.      The wife was referred to talk to billing or her insurance regarding bills however I did confirm that the August 10 visit was appropriately billed as a 14-day transition of care visit.      Return in about 3 months (around 12/30/2021).    Lida Ray MD  [facility] FATUMA Doe is a 75 year old who presents for the following health issues  accompanied by his spouse:    [facility]       Hospital Follow-up Visit:    Hospital/Nursing Home/IP Rehab Facility: [facility]  Date of Admission: 09/23/2021  Date of Discharge: 09/24/2021  Reason(s) for Admission: Acute ischemic stroke       Was your hospitalization related to COVID-19? No   Problems taking medications regularly:  None  Medication changes since discharge: none  Problems adhering to non-medication therapy:  None    Summary of hospitalization:  [facility] discharge  summary reviewed  Diagnostic Tests/Treatments reviewed.  Follow up needed: As a stress test today  Other Healthcare Providers Involved in Patient s Care:         Homecare: This is trying to be set up, there are some billing issues and getting worked out  Follow-up with neurology, cardiology  Update since discharge: stable. Concerns:  1.  He has a stress test scheduled for today.  This was apparently ordered because of some mild decrease ejection fraction and hypokinesis on echocardiogram.  His wife did contact cardiopulmonary and they are able to do a nonexercise test without new orders  2.  The wife has questions regarding use of Plavix for only 21 days.  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  She states the neurologist told her that that would be the safest way to use it.  Wonders what my opinion would be.  3.  His edema had actually been doing quite well, they stopped the diuretic in the hospital and his legs have gotten much more swollen again.  Swelling is not really going down much overnight however she is wrapping them.  After restarting the Lasix after the call last week the swelling is getting better.    4.  There apparently some issues with his home care and insurance that is trying to be worked out.  They had not wanted to use [facility] but wanted to use [facility] instead    5.  She has questions regarding bills for the last hospital follow-up on 8/10/2020 and issues with the insurance, the insurance had told her that maybe it was coded incorrectly      Post Discharge Medication Reconciliation: discharge medications reconciled, continue medications without change.  Plan of care communicated with patient and family          Patient Active Problem List   Diagnosis     Benign essential hypertension     Primary lateral sclerosis  (HCC)     Prostate CA (HCC)     Hyperlipidemia LDL goal <100     Cerebral infarction (H)     Advanced directives, counseling/discussion     Muscle spasticity     Edema, unspecified type     Gastroesophageal reflux disease without esophagitis     Cerebral atherosclerosis     Primary osteoarthritis of right shoulder     Cancer of ampulla of Vater (H)     Anemia, unspecified type     Cerebrovascular accident (CVA), unspecified mechanism (H)     Current Outpatient Medications   Medication Sig Dispense Refill     acetaminophen (TYLENOL) 650 MG CR tablet Take 650 mg by mouth every 8 hours as needed for mild pain (Shoulder pain)        aspirin 81 MG tablet Take 81 mg by mouth every evening        baclofen (LIORESAL) intraTHECAL Internal Pump by Intrathecal route continuous prn Pump filled by [facility] stroke [place] [phone]  Pump Model: Syncromed  Medication in pump is Gablofen (brand name)  Last fill: during hospital admission  Next fill:     [place] Alarm Date:   Reservoir Volume: 20 ml  Conc: 2000 mcg/mL  Delivers 234.7 mcg/day  Basal rate:unknown  Battery life: unknown       clopidogrel (PLAVIX) 75 MG tablet Take 1 tablet (75 mg) by mouth daily  0     docusate sodium (COLACE) 100 MG capsule Take 100 mg by mouth every evening       EPINEPHrine 0.3 MG/0.3ML injection Inject 0.3 mLs (0.3 mg) into the muscle as needed for anaphylaxis 0.3 mL 3     fluorouracil (EFUDEX) 5 % external cream Apply topically to legs 1 to 2 times weekly as needed/tolerated for maintenance        furosemide (LASIX) 20 MG tablet Take 2 tablets (40 mg) by mouth daily 60 tablet 1     gabapentin (NEURONTIN) 300 MG capsule Take 300 mg by mouth 3 times daily       ketoconazole (NIZORAL) 2 % external shampoo SHAMPOO EVERY 2-3 DAYS AS  NEEDED 120 mL 10     oxybutynin ER (DITROPAN-XL) 10 MG 24 hr tablet Take 2 tablets (20 mg) by mouth At Bedtime 180 tablet 3     pantoprazole (PROTONIX) 40 MG EC tablet Take 1  "tablet (40 mg) by mouth 2 times daily (Patient taking differently: Take 40 mg by mouth daily ) 60 tablet 3     ezetimibe (ZETIA) 10 MG tablet Take 0.5 tablets (5 mg) by mouth At Bedtime (Patient not taking: Reported on 2021) 15 tablet 0      Social History     Tobacco Use     Smoking status: Former Smoker     Packs/day: 0.30     Years: 6.00     Pack years: 1.80     Types: Cigarettes     Start date: 1970     Quit date: 10/5/1976     Years since quittin.0     Smokeless tobacco: Never Used   Substance Use Topics     Alcohol use: Yes     Comment: 1 drink/week     Drug use: No        Review of Systems   No fever, chills, dyspnea, new neurologic changes      Objective    BP (!) 140/83 (BP Location: Left arm, Patient Position: Sitting, Cuff Size: Adult Regular)   Pulse 70   Temp 98  F (36.7  C) (Oral)   Resp 16   Ht 1.778 m (5' 10\")   Wt 70.3 kg (155 lb)   SpO2 96%   BMI 22.24 kg/m    Body mass index is 22.24 kg/m .  Physical Exam     Legs are firm with slight indentation              "

## 2021-10-03 ENCOUNTER — MEDICAL CORRESPONDENCE (OUTPATIENT)
Dept: HEALTH INFORMATION MANAGEMENT | Facility: CLINIC | Age: 76
End: 2021-10-03
Payer: COMMERCIAL

## 2021-10-05 ENCOUNTER — MYC MEDICAL ADVICE (OUTPATIENT)
Dept: INTERNAL MEDICINE | Facility: CLINIC | Age: 76
End: 2021-10-05

## 2021-10-05 DIAGNOSIS — G12.23 PRIMARY LATERAL SCLEROSIS (H): ICD-10-CM

## 2021-10-05 DIAGNOSIS — I63.9 CEREBROVASCULAR ACCIDENT (CVA), UNSPECIFIED MECHANISM (H): ICD-10-CM

## 2021-10-05 DIAGNOSIS — C24.1 CANCER OF AMPULLA OF VATER (H): Primary | ICD-10-CM

## 2021-10-05 PROBLEM — K62.5 BRIGHT RED BLOOD PER RECTUM: Status: RESOLVED | Noted: 2021-07-31 | Resolved: 2021-10-05

## 2021-10-05 PROBLEM — K92.2 GI BLEED: Status: RESOLVED | Noted: 2021-07-14 | Resolved: 2021-10-05

## 2021-10-05 NOTE — TELEPHONE ENCOUNTER
See mychart message.   Home care referral was done to [facility] but now they want to use [facility]...

## 2021-10-06 ENCOUNTER — PRE VISIT (OUTPATIENT)
Dept: ORTHOPEDICS | Facility: CLINIC | Age: 76
End: 2021-10-06

## 2021-10-07 ENCOUNTER — OFFICE VISIT (OUTPATIENT)
Dept: CARDIOLOGY | Facility: CLINIC | Age: 76
End: 2021-10-07
Payer: COMMERCIAL

## 2021-10-07 VITALS
BODY MASS INDEX: 22.19 KG/M2 | HEIGHT: 70 IN | HEART RATE: 61 BPM | WEIGHT: 155 LBS | DIASTOLIC BLOOD PRESSURE: 73 MMHG | OXYGEN SATURATION: 96 % | SYSTOLIC BLOOD PRESSURE: 140 MMHG

## 2021-10-07 DIAGNOSIS — I10 BENIGN ESSENTIAL HYPERTENSION: Primary | ICD-10-CM

## 2021-10-07 DIAGNOSIS — E78.5 HYPERLIPIDEMIA LDL GOAL <100: ICD-10-CM

## 2021-10-07 DIAGNOSIS — R93.1 ABNORMAL ECHOCARDIOGRAM: ICD-10-CM

## 2021-10-07 DIAGNOSIS — I51.89 DIASTOLIC DYSFUNCTION: ICD-10-CM

## 2021-10-07 PROCEDURE — 99204 OFFICE O/P NEW MOD 45 MIN: CPT | Performed by: INTERNAL MEDICINE

## 2021-10-07 RX ORDER — EZETIMIBE 10 MG/1
10 TABLET ORAL DAILY
Qty: 90 TABLET | Refills: 3 | Status: SHIPPED | OUTPATIENT
Start: 2021-10-07 | End: 2022-01-01

## 2021-10-07 ASSESSMENT — MIFFLIN-ST. JEOR: SCORE: 1434.33

## 2021-10-07 NOTE — LETTER
10/7/2021    Lida Ray MD  [address]  [facility] [address]    RE: Elier Leong       Dear Colleague,    I had the pleasure of seeing Elier Leong in the [facility].      Cardiology Consultation     Assessment & Plan     1.  Cardiomyopathy with changes above his baseline, as below.  Was seen on prior echocardiogram in July.  2.  Primary lateral sclerosis, wheelchair-bound  3.  History of multiple CVAs, recent admission with ischemic stroke.  4.  Hyperlipidemia  5.  Chronic lower extremity edema  6.  Hypertension        Recommendations    1.  Cardiomyopathy: Given patient's clinical history as noted above we will recommend conservative management.  He does not have any active symptoms and appears to be euvolemic at present.  Let us continue with his diuretics, blood pressure is adequately controlled.  Heart rate is stable.  I will not initiate beta-blockers or ACE inhibitors at present.  He is again mainly wheelchair-bound with his lateral sclerosis.  He has never had any chest pain or any other cardiac related concerns.  Therefore we will at this time defer any ischemic evaluation.    2.  Recent CVA: Advised patient to continue with his 30-day event monitor and follow-up with neurology as scheduled.  I would asked them to clarify his antiplatelet regimen and duration.    3.  Hyperlipidemia: He has had difficulty with statins in the past.  He is tolerating his Zetia and I have refilled them.    4.  Chronic lower extremity edema: Continue with Ace wraps and diuretics.  Follow-up with lymphedema clinic as scheduled    5.  Return to clinic in spring 2022 with preclinic echocardiogram        Isai Ochoa MD      HPI:    76-year-old gentleman who presents to cardiology clinic for establishment of care.  He was hospitalized at [facility] last month at which time he had a presentation of a CVA.  Please see chart for full details.   During the course of his hospital stay he had an echocardiogram performed demonstrating an ejection fraction of 45 to 50% with wall motion throughout is noted in the mid anteroseptal segments of the heart.  A comparison was made to an earlier study and the findings were similar although contrast was not used on a prior study.  Remotely patient has had stable and normal LV systolic functions prior to this year's studies.  Clinically fortunately he is asymptomatic.  He is without chest pain PND or orthopnea.  He has lateral sclerosis and is wheelchair-bound.  He was placed on dual antiplatelet therapy.  He is scheduled to have a follow-up with his neurologist.  He also had a 30-day event monitor to exclude atrial fibrillation.  He is still wearing this till the end of this month.  Prior cardiac testing includes a patch monitor which did not demonstrate any evidence of atrial fibrillation in the past 1 to 2 years.  Given the findings of the echocardiogram he is referred to cardiology clinic for evaluation.           Echo: 9/24/21    1. The left ventricle is normal in size. Left ventricular systolic function is  mildly reduced. The visual ejection fraction is 45-50%. Grade I or early  diastolic dysfunction. There is mild to moderate hypokinesis of the mid  anteroseptal and inferoseptal walls and all apical segments of the left  ventricle. There is no thrombus seen in the left ventricle.  2. The right ventricle is normal size. The right ventricular systolic function  is normal.  3. Trace to mild mitral and tricuspid regurgitation.  4. No pericardial effusion.  5. In direct comparison to the previous study dated 07/31/2021, the findings  are similar (lack of contrast limited wall motion analysis on the previous  study).      Primary Care Physician   Lida aRy    Patient Active Problem List   Diagnosis     Benign essential hypertension     Primary lateral sclerosis (HCC)     Prostate CA (HCC)     Hyperlipidemia LDL goal  <100     Cerebral infarction (H)     Advanced directives, counseling/discussion     Muscle spasticity     Edema, unspecified type     Gastroesophageal reflux disease without esophagitis     Cerebral atherosclerosis     Primary osteoarthritis of right shoulder     Cancer of ampulla of Vater (H)     Anemia, unspecified type     Cerebrovascular accident (CVA), unspecified mechanism (H)       Past Medical History   I have reviewed this patient's medical history and updated it with pertinent information if needed.   Past Medical History:   Diagnosis Date     Basal cell carcinoma nos     sees derm     CVA (cerebral infarction) 6/14    small vessel right int capsule stroke     DVT (deep vein thrombosis) in pregnancy 05/12/2017    right peroneal vein     Essential hypertension, benign      Hearing loss      Hoarseness of voice     assoc with PLS     Lumbar radiculopathy     2017     Primary Lateral Sclerosis 2002    has baclofen pump through Dr. Calvert, [facility], sees Dr. Fink, [facility]     Primary osteoarthritis of right shoulder 6/16/2021     Prostate CA (H) 2008    prostatectomy     Vertigo 2/21/2013       Past Surgical History   I have reviewed this patient's surgical history and updated it with pertinent information if needed.  Past Surgical History:   Procedure Laterality Date     ABDOMEN SURGERY  11/12    Hernia     BIOPSY  4/16    Cancerous growth on leg. Removed by MOHs treatment     COLONOSCOPY N/A 8/3/2021    Procedure: COLONOSCOPY;  Surgeon: Luis Antonio Jung MD;  Location: [facility]     COLONOSCOPY N/A 8/3/2021    Procedure: Colonoscopy, With Polypectomy And Biopsy;  Surgeon: Luis Antonio Jung MD;  [facility]: [facility]     ENDOSCOPIC RETROGRADE CHOLANGIOPANCREATOGRAM N/A 6/17/2021    Procedure: ENDOSCOPIC RETROGRADE CHOLANGIOPANCREATOGRAPHY, BILIARY STENT PLACEMENT;  Surgeon: Sima Galvez MD;  Location:  OR     ENDOSCOPIC RETROGRADE CHOLANGIOPANCREATOGRAM N/A 8/19/2021    Procedure:  ENDOSCOPIC RETROGRADE CHOLANGIOPANCREATOGRAPHY;  Surgeon: Agus Kent MD;  Location: [facility]     ENDOSCOPIC RETROGRADE CHOLANGIOPANCREATOGRAM COMPLEX N/A 7/12/2021    Procedure: ENDOSCOPIC RETROGRADE CHOLANGIOPANCREATOGRAPHY WITH AMPULLECTOMY, PANCREATIC DUCT STENT PLACEMENT AND BILIARY STENT PLACEMENT;  Surgeon: Agus Kent MD;  [facility]     ENDOSCOPIC RETROGRADE CHOLANGIOPANCREATOGRAPHY, EXCHANGE TUBE/STENT N/A 7/14/2021    Procedure: ENDOSCOPIC RETROGRADE CHOLANGIOPANCREATOGRAPHY with bile duct stents exchanged, balloon sweep of bile ducts, hemostasis;  Surgeon: Guru Bhavesh Arreola MD;  [facility]     ENDOSCOPIC ULTRASOUND UPPER GASTROINTESTINAL TRACT (GI) N/A 7/12/2021    Procedure: ENDOSCOPIC ULTRASOUND, ESOPHAGOSCOPY / UPPER GASTROINTESTINAL TRACT (GI);  Surgeon: Agus Kent MD;  [facility]     ESOPHAGOSCOPY, GASTROSCOPY, DUODENOSCOPY (EGD), COMBINED N/A 6/17/2021    Procedure: ESOPHAGOGASTRODUODENOSCOPY, WITH ENDOSCOPIC US, fine needle aspiration;  Surgeon: Sima Galvez MD;  Location:  OR     ESOPHAGOSCOPY, GASTROSCOPY, DUODENOSCOPY (EGD), COMBINED N/A 7/14/2021    Procedure: ESOPHAGOGASTRODUODENOSCOPY (EGD);  Surgeon: Guru Bhavesh Arreola MD;  [facility]     ESOPHAGOSCOPY, GASTROSCOPY, DUODENOSCOPY (EGD), COMBINED N/A 8/3/2021    Procedure: Esophagoscopy, gastroscopy, duodenoscopy (EGD), combined;  Surgeon: Luis Antonio Jung MD;  Location: [facility]     HERNIA REPAIR  11/12    Repaired     PROSTATE SURGERY       [facility] NONSPECIFIC PROCEDURE  6/08     prostatectomy      [facility] NONSPECIFIC PROCEDURE  11/01    colonoscopy     [facility] NONSPECIFIC PROCEDURE  11/2006    hernia, right side     [facility] NONSPECIFIC PROCEDURE      T + A       Prior to Admission Medications   Cannot display prior to admission medications because the patient has not been admitted in this contact.     [unfilled]  [unfilled]  Allergies   Allergies   Allergen  "Reactions     Bee Venom      Swelling and hives     Penicillins Hives and Swelling     \"HIVES\"; occurred at age 39       Social History    reports that he quit smoking about 45 years ago. His smoking use included cigarettes. He started smoking about 51 years ago. He has a 1.80 pack-year smoking history. He has never used smokeless tobacco. He reports current alcohol use. He reports that he does not use drugs.    Family History   Family History   Problem Relation Age of Onset     Heart Disease Mother         CHF     Hypertension Mother      C.A.D. Maternal Grandfather      Cerebrovascular Disease Maternal Uncle         45     Cerebrovascular Disease Maternal Uncle        Review of Systems   The comprehensive 10 point Review of Systems is negative other than noted in the HPI or here.     Physical Exam   Vital Signs with Ranges     Wt Readings from Last 4 Encounters:   09/30/21 70.3 kg (155 lb)   09/24/21 69.9 kg (154 lb)   09/02/21 70.3 kg (155 lb)   08/19/21 70.3 kg (155 lb)     [unfilled]      Vitals: There were no vitals taken for this visit.    GENERAL: Healthy, alert and no distress  EYES: Eyes grossly normal to inspection.  No discharge or erythema, or obvious scleral/conjunctival abnormalities.  RESP: No audible wheeze, cough, or visible cyanosis.  No visible retractions or increased work of breathing.    SKIN: Visible skin clear. No significant rash, abnormal pigmentation or lesions.  NEURO: Cranial nerves grossly intact.  Mentation and speech appropriate for age.  PSYCH: Mentation appears normal, affect normal/bright, judgement and insight intact, normal speech and appearance well-groomed.        Thank you for allowing me to participate in the care of your patient.      Sincerely,     Isai Ochoa MD     [facility]  cc:   Aida Issa PA-C  [address] [place] [address]        "

## 2021-10-07 NOTE — PROGRESS NOTES
Cardiology Consultation     Assessment & Plan     1.  Cardiomyopathy with changes above his baseline, as below.  Was seen on prior echocardiogram in July.  2.  Primary lateral sclerosis, wheelchair-bound  3.  History of multiple CVAs, recent admission with ischemic stroke.  4.  Hyperlipidemia  5.  Chronic lower extremity edema  6.  Hypertension        Recommendations    1.  Cardiomyopathy: Given patient's clinical history as noted above we will recommend conservative management.  He does not have any active symptoms and appears to be euvolemic at present.  Let us continue with his diuretics, blood pressure is adequately controlled.  Heart rate is stable.  I will not initiate beta-blockers or ACE inhibitors at present.  He is again mainly wheelchair-bound with his lateral sclerosis.  He has never had any chest pain or any other cardiac related concerns.  Therefore we will at this time defer any ischemic evaluation.    2.  Recent CVA: Advised patient to continue with his 30-day event monitor and follow-up with neurology as scheduled.  I would asked them to clarify his antiplatelet regimen and duration.    3.  Hyperlipidemia: He has had difficulty with statins in the past.  He is tolerating his Zetia and I have refilled them.    4.  Chronic lower extremity edema: Continue with Ace wraps and diuretics.  Follow-up with lymphedema clinic as scheduled    5.  Return to clinic in spring 2022 with preclinic echocardiogram        Isai Ochoa MD      HPI:    76-year-old gentleman who presents to cardiology clinic for establishment of care.  He was hospitalized at [facility] last month at which time he had a presentation of a CVA.  Please see chart for full details.  During the course of his hospital stay he had an echocardiogram performed demonstrating an ejection fraction of 45 to 50% with wall motion throughout is noted in the mid anteroseptal segments of the heart.  A comparison was made to an earlier study and  the findings were similar although contrast was not used on a prior study.  Remotely patient has had stable and normal LV systolic functions prior to this year's studies.  Clinically fortunately he is asymptomatic.  He is without chest pain PND or orthopnea.  He has lateral sclerosis and is wheelchair-bound.  He was placed on dual antiplatelet therapy.  He is scheduled to have a follow-up with his neurologist.  He also had a 30-day event monitor to exclude atrial fibrillation.  He is still wearing this till the end of this month.  Prior cardiac testing includes a patch monitor which did not demonstrate any evidence of atrial fibrillation in the past 1 to 2 years.  Given the findings of the echocardiogram he is referred to cardiology clinic for evaluation.           Echo: 9/24/21    1. The left ventricle is normal in size. Left ventricular systolic function is  mildly reduced. The visual ejection fraction is 45-50%. Grade I or early  diastolic dysfunction. There is mild to moderate hypokinesis of the mid  anteroseptal and inferoseptal walls and all apical segments of the left  ventricle. There is no thrombus seen in the left ventricle.  2. The right ventricle is normal size. The right ventricular systolic function  is normal.  3. Trace to mild mitral and tricuspid regurgitation.  4. No pericardial effusion.  5. In direct comparison to the previous study dated 07/31/2021, the findings  are similar (lack of contrast limited wall motion analysis on the previous  study).      Primary Care Physician   Lida Ray    Patient Active Problem List   Diagnosis     Benign essential hypertension     Primary lateral sclerosis (HCC)     Prostate CA (HCC)     Hyperlipidemia LDL goal <100     Cerebral infarction (H)     Advanced directives, counseling/discussion     Muscle spasticity     Edema, unspecified type     Gastroesophageal reflux disease without esophagitis     Cerebral atherosclerosis     Primary osteoarthritis of right  shoulder     Cancer of ampulla of Vater (H)     Anemia, unspecified type     Cerebrovascular accident (CVA), unspecified mechanism (H)       Past Medical History   I have reviewed this patient's medical history and updated it with pertinent information if needed.   Past Medical History:   Diagnosis Date     Basal cell carcinoma nos     sees derm     CVA (cerebral infarction) 6/14    small vessel right int capsule stroke     DVT (deep vein thrombosis) in pregnancy 05/12/2017    right peroneal vein     Essential hypertension, benign      Hearing loss      Hoarseness of voice     assoc with PLS     Lumbar radiculopathy     2017     Primary Lateral Sclerosis 2002    has baclofen pump through Dr. Calvert, [facility], sees Dr. Fink [facility]     Primary osteoarthritis of right shoulder 6/16/2021     Prostate CA (H) 2008    prostatectomy     Vertigo 2/21/2013       Past Surgical History   I have reviewed this patient's surgical history and updated it with pertinent information if needed.  Past Surgical History:   Procedure Laterality Date     ABDOMEN SURGERY  11/12    Hernia     BIOPSY  4/16    Cancerous growth on leg. Removed by MOHs treatment     COLONOSCOPY N/A 8/3/2021    Procedure: COLONOSCOPY;  Surgeon: Luis Antonio Jung MD;  Location:  GI     COLONOSCOPY N/A 8/3/2021    Procedure: Colonoscopy, With Polypectomy And Biopsy;  Surgeon: Luis Antonio Jung MD;  [facility]     ENDOSCOPIC RETROGRADE CHOLANGIOPANCREATOGRAM N/A 6/17/2021    Procedure: ENDOSCOPIC RETROGRADE CHOLANGIOPANCREATOGRAPHY, BILIARY STENT PLACEMENT;  Surgeon: Sima Galvez MD;  Location:  OR     ENDOSCOPIC RETROGRADE CHOLANGIOPANCREATOGRAM N/A 8/19/2021    Procedure: ENDOSCOPIC RETROGRADE CHOLANGIOPANCREATOGRAPHY;  Surgeon: Agus Kent MD;  Location:  OR     ENDOSCOPIC RETROGRADE CHOLANGIOPANCREATOGRAM COMPLEX N/A 7/12/2021    Procedure: ENDOSCOPIC RETROGRADE CHOLANGIOPANCREATOGRAPHY WITH AMPULLECTOMY, PANCREATIC  "DUCT STENT PLACEMENT AND BILIARY STENT PLACEMENT;  Surgeon: Agus Kent MD;  [facility]     ENDOSCOPIC RETROGRADE CHOLANGIOPANCREATOGRAPHY, EXCHANGE TUBE/STENT N/A 7/14/2021    Procedure: ENDOSCOPIC RETROGRADE CHOLANGIOPANCREATOGRAPHY with bile duct stents exchanged, balloon sweep of bile ducts, hemostasis;  Surgeon: Guru Bhavesh Arreola MD;  [facility]     ENDOSCOPIC ULTRASOUND UPPER GASTROINTESTINAL TRACT (GI) N/A 7/12/2021    Procedure: ENDOSCOPIC ULTRASOUND, ESOPHAGOSCOPY / UPPER GASTROINTESTINAL TRACT (GI);  Surgeon: Agus Kent MD;  [facility]     ESOPHAGOSCOPY, GASTROSCOPY, DUODENOSCOPY (EGD), COMBINED N/A 6/17/2021    Procedure: ESOPHAGOGASTRODUODENOSCOPY, WITH ENDOSCOPIC US, fine needle aspiration;  Surgeon: Sima Galvez MD;  Location: [facility]     ESOPHAGOSCOPY, GASTROSCOPY, DUODENOSCOPY (EGD), COMBINED N/A 7/14/2021    Procedure: ESOPHAGOGASTRODUODENOSCOPY (EGD);  Surgeon: Guru Bhavesh Arreola MD;  [facility]     ESOPHAGOSCOPY, GASTROSCOPY, DUODENOSCOPY (EGD), COMBINED N/A 8/3/2021    Procedure: Esophagoscopy, gastroscopy, duodenoscopy (EGD), combined;  Surgeon: Luis Antonio Jung MD;  [facility]     HERNIA REPAIR  11/12    Repaired     PROSTATE SURGERY       [facility] NONSPECIFIC PROCEDURE  6/08     prostatectomy      [facility] NONSPECIFIC PROCEDURE  11/01    colonoscopy     [facility] NONSPECIFIC PROCEDURE  11/2006    hernia, right side     [facility] NONSPECIFIC PROCEDURE      T + A       Prior to Admission Medications   Cannot display prior to admission medications because the patient has not been admitted in this contact.     [unfilled]  [unfilled]  Allergies   Allergies   Allergen Reactions     Bee Venom      Swelling and hives     Penicillins Hives and Swelling     \"HIVES\"; occurred at age 39       Social History    reports that he quit smoking about 45 years ago. His smoking use included cigarettes. He started smoking about 51 years ago. " He has a 1.80 pack-year smoking history. He has never used smokeless tobacco. He reports current alcohol use. He reports that he does not use drugs.    Family History   Family History   Problem Relation Age of Onset     Heart Disease Mother         CHF     Hypertension Mother      C.A.D. Maternal Grandfather      Cerebrovascular Disease Maternal Uncle         45     Cerebrovascular Disease Maternal Uncle        Review of Systems   The comprehensive 10 point Review of Systems is negative other than noted in the HPI or here.     Physical Exam   Vital Signs with Ranges     Wt Readings from Last 4 Encounters:   09/30/21 70.3 kg (155 lb)   09/24/21 69.9 kg (154 lb)   09/02/21 70.3 kg (155 lb)   08/19/21 70.3 kg (155 lb)     [unfilled]      Vitals: There were no vitals taken for this visit.    GENERAL: Healthy, alert and no distress  EYES: Eyes grossly normal to inspection.  No discharge or erythema, or obvious scleral/conjunctival abnormalities.  RESP: No audible wheeze, cough, or visible cyanosis.  No visible retractions or increased work of breathing.    SKIN: Visible skin clear. No significant rash, abnormal pigmentation or lesions.  NEURO: Cranial nerves grossly intact.  Mentation and speech appropriate for age.  PSYCH: Mentation appears normal, affect normal/bright, judgement and insight intact, normal speech and appearance well-groomed.

## 2021-10-10 ENCOUNTER — MYC MEDICAL ADVICE (OUTPATIENT)
Dept: NEUROLOGY | Facility: CLINIC | Age: 76
End: 2021-10-10

## 2021-10-11 ENCOUNTER — TELEPHONE (OUTPATIENT)
Dept: INTERNAL MEDICINE | Facility: CLINIC | Age: 76
End: 2021-10-11

## 2021-10-11 NOTE — TELEPHONE ENCOUNTER
[facility] calling for orders. OCCUPATIONAL THERAPY/ PHYSICAL THERAPY evaluation.  HHA 1/week for 8 weeks and SKILLED NURSING 1/week for 3 and 1/week every other week for 6 weeks.  Griselda [phone] ok to leave message.

## 2021-10-13 ENCOUNTER — TELEPHONE (OUTPATIENT)
Dept: INTERNAL MEDICINE | Facility: CLINIC | Age: 76
End: 2021-10-13

## 2021-10-13 NOTE — TELEPHONE ENCOUNTER
[facility] calling for orders. PHYSICAL THERAPY 1/week for 6 weeks for instruction in strength,gait and balance training.  Bea [phone] ok to leave message.

## 2021-10-14 NOTE — TELEPHONE ENCOUNTER
Call to Bea and left detailed message.     Verbal order given to approve visits until certification received for MD signature.

## 2021-10-15 ENCOUNTER — TELEPHONE (OUTPATIENT)
Dept: INTERNAL MEDICINE | Facility: CLINIC | Age: 76
End: 2021-10-15

## 2021-10-15 ASSESSMENT — ENCOUNTER SYMPTOMS
WEAKNESS: 0
SEIZURES: 0
JOINT SWELLING: 0
BACK PAIN: 0
LOSS OF CONSCIOUSNESS: 0
TREMORS: 0
MUSCLE WEAKNESS: 0
PARALYSIS: 0
HEADACHES: 0
DIFFICULTY URINATING: 0
ARTHRALGIAS: 0
DISTURBANCES IN COORDINATION: 1
DIZZINESS: 0
MEMORY LOSS: 0
MUSCLE CRAMPS: 0
SPEECH CHANGE: 1
FLANK PAIN: 0
STIFFNESS: 1
HEMATURIA: 0
NECK PAIN: 0
TINGLING: 0
DYSURIA: 0
MYALGIAS: 0
NUMBNESS: 0

## 2021-10-18 ENCOUNTER — MEDICAL CORRESPONDENCE (OUTPATIENT)
Dept: HEALTH INFORMATION MANAGEMENT | Facility: CLINIC | Age: 76
End: 2021-10-18
Payer: COMMERCIAL

## 2021-10-19 ENCOUNTER — TELEPHONE (OUTPATIENT)
Dept: INTERNAL MEDICINE | Facility: CLINIC | Age: 76
End: 2021-10-19

## 2021-10-19 ENCOUNTER — HOSPITAL ENCOUNTER (OUTPATIENT)
Dept: OCCUPATIONAL THERAPY | Facility: CLINIC | Age: 76
Setting detail: THERAPIES SERIES
End: 2021-10-19
Attending: PSYCHIATRY & NEUROLOGY
Payer: COMMERCIAL

## 2021-10-19 DIAGNOSIS — M79.89 LEG SWELLING: ICD-10-CM

## 2021-10-19 DIAGNOSIS — M25.511 BILATERAL SHOULDER PAIN, UNSPECIFIED CHRONICITY: Primary | ICD-10-CM

## 2021-10-19 DIAGNOSIS — M25.512 BILATERAL SHOULDER PAIN, UNSPECIFIED CHRONICITY: Primary | ICD-10-CM

## 2021-10-19 PROCEDURE — 97140 MANUAL THERAPY 1/> REGIONS: CPT | Mod: GO

## 2021-10-19 PROCEDURE — 97165 OT EVAL LOW COMPLEX 30 MIN: CPT | Mod: GO

## 2021-10-20 ENCOUNTER — OFFICE VISIT (OUTPATIENT)
Dept: ORTHOPEDICS | Facility: CLINIC | Age: 76
End: 2021-10-20
Attending: PSYCHIATRY & NEUROLOGY
Payer: COMMERCIAL

## 2021-10-20 ENCOUNTER — ANCILLARY PROCEDURE (OUTPATIENT)
Dept: GENERAL RADIOLOGY | Facility: CLINIC | Age: 76
End: 2021-10-20
Attending: ORTHOPAEDIC SURGERY
Payer: COMMERCIAL

## 2021-10-20 VITALS — BODY MASS INDEX: 22.19 KG/M2 | WEIGHT: 154.98 LBS | HEIGHT: 70 IN

## 2021-10-20 DIAGNOSIS — M25.512 CHRONIC PAIN OF BOTH SHOULDERS: ICD-10-CM

## 2021-10-20 DIAGNOSIS — M25.512 BILATERAL SHOULDER PAIN, UNSPECIFIED CHRONICITY: ICD-10-CM

## 2021-10-20 DIAGNOSIS — G89.29 CHRONIC PAIN OF BOTH SHOULDERS: ICD-10-CM

## 2021-10-20 DIAGNOSIS — M25.511 BILATERAL SHOULDER PAIN, UNSPECIFIED CHRONICITY: ICD-10-CM

## 2021-10-20 DIAGNOSIS — M25.511 CHRONIC PAIN OF BOTH SHOULDERS: ICD-10-CM

## 2021-10-20 PROCEDURE — 99213 OFFICE O/P EST LOW 20 MIN: CPT | Mod: GC | Performed by: ORTHOPAEDIC SURGERY

## 2021-10-20 PROCEDURE — 73030 X-RAY EXAM OF SHOULDER: CPT | Mod: RT | Performed by: RADIOLOGY

## 2021-10-20 ASSESSMENT — MIFFLIN-ST. JEOR: SCORE: 1434.25

## 2021-10-20 NOTE — LETTER
10/20/2021         RE: Elier Leong  [address] [facility] [address]        Dear Colleague,    Thank you for referring your patient, Elier Leong, to the [facility] [place]. Please see a copy of my visit note below.    [facility] Physicians, Orthopaedic Surgery Consultation    Elier Leong MRN# [id number]   Age: 76 year old YOB: 1945     Reason for consult:  Bilateral shoulder pain       Requesting physician: Dr. Gary Tejada, Neurology         Assessment and Plan:   Assessment:   1.  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 We discussed the finding of bilateral end-stage glenohumeral osteoarthritis. We discussed the non-operative (cortisone injections, suprascapular nerve block, suprascapular nerve ablation) and operative treatment options (reverse total shoulder arthroplasty) including the risks and potential benefits of each. We discussed the expectations for pain relief and/or motion improvement with each option.      Although the patient does have end-stage clinical osteoarthritis, arthroplasty would not be appropriate given his current health status and medical comorbidities.  Additionally, the patient is not interested in surgery at this time.  Although we do not have the [facility] notes available to us today, it is assumed that he had a image guided glenohumeral joint injection given the description.  We discussed that if the glenohumeral injections are not effective he could try a suprascapular nerve block through the Pain Clinic.  If the suprascapular nerve block is effective but wears off, a suprascapular nerve ablation could be considered.  The patient may pursue these interventions at the [facility].  If they are unable to get these done,  the patient can send us a [employer] message for a referral to our pain service/anesthesiologists for these procedures.      Patient is okay to try peripheral nerve stimulator if they are interested.  We discussed that he could discontinue physical therapy for the shoulder as this is unlikely to be effective or provide him with significant pain relief.  After our discussion, we determined together that he will try to get the suprascapular nerve blocks done through the [facility] pain clinic.    The patient may follow-up in clinic as needed.    All questions and concerns were answered to the patient's satisfaction..    Michelle Alberts MD  Orthopaedic Surgery, PGY-4  12:47 PM  October 20, 2021    Patient was seen and discussed with Dr. Cuevas.    I have personally examined this patient and have reviewed the clinical presentation and progress note with the resident.  I agree with the treatment plan as outlined.  The plan was formulated with the resident on the day of the resident's note.               History of Present Illness:   Chief Concern: Bilateral shoulder pain    This is a LHD male with PMH of primary lateral sclerosis (dx in 2002), spasticity managed with baclofen pump, HTN, ampulla of Vater tumor, hx prostate cancer, multiple CVAs most recently 9/23/21 who presents with bilateral shoulder pain. The patient reports chronic bilateral shoulder pain that began approximately 18 months ago without traumatic onset. He reports diffuse shoulder pain without radiation that seems to be worst over the scapular spines. The pain is worse in the morning and with walker use. He is not longer using a walker due to arm and leg weakness, but his wife feels that he is weaker because he has not been able to exercise as much due to shoulder pain. He is currently using a wheelchair, but is able to use the recumbent bike twice daily.     They have tried gabapentin, acupuncture, trigger point injections, kinesiotape, chiropractic  "adjustments, shoulder brace from [employer], and PT weekly without sustained relief. He had glenohumeral joint injections in 10/2020 with Dr. Yeo without much lasting relief. He did see Dr. Aneudy Elliott at [facility] on 10/5/21 and received bilateral shoulder injections. These were image guided, but they are not sure of the exact location of the injection. These have provided him with pain relief, but without increase in mobility. He is bothered by both pain and restricted mobility.     He does have history of a left shoulder surgery 40 years ago in [place], which they describes as \"left shoulder synthetic ligaments\".    Their goals for today are to decrease pain. They are not interested in surgery at this time.     Patient was seen and examined by me. History, PMH, Meds, SH, complete ROS (10 organ systems) and PE reviewed with patient and prior medical records.         Past Medical History:     Past Medical History:   Diagnosis Date     Basal cell carcinoma nos     sees derm     CVA (cerebral infarction) 6/14    small vessel right int capsule stroke     DVT (deep vein thrombosis) in pregnancy 05/12/2017    right peroneal vein     Essential hypertension, benign      Hearing loss      Hoarseness of voice     assoc with PLS     Lumbar radiculopathy     2017     Primary Lateral Sclerosis 2002    has baclofen pump through Dr. Calvert, [place], [facility]     Primary osteoarthritis of right shoulder 6/16/2021     Prostate CA (H) 2008    prostatectomy     Vertigo 2/21/2013             Past Surgical History:     Past Surgical History:   Procedure Laterality Date     ABDOMEN SURGERY  11/12    Hernia     BIOPSY  4/16    Cancerous growth on leg. Removed by MOHs treatment     COLONOSCOPY N/A 8/3/2021    Procedure: COLONOSCOPY;  Surgeon: Luis Antonio Jung MD;  Location:  GI     COLONOSCOPY N/A 8/3/2021    Procedure: Colonoscopy, With Polypectomy And Biopsy;  Surgeon: Luis Antonio Jung, " MD;  [facility]     ENDOSCOPIC RETROGRADE CHOLANGIOPANCREATOGRAM N/A 6/17/2021    Procedure: ENDOSCOPIC RETROGRADE CHOLANGIOPANCREATOGRAPHY, BILIARY STENT PLACEMENT;  Surgeon: Sima Galvez MD;  Location: [facility] OR     ENDOSCOPIC RETROGRADE CHOLANGIOPANCREATOGRAM N/A 8/19/2021    Procedure: ENDOSCOPIC RETROGRADE CHOLANGIOPANCREATOGRAPHY;  Surgeon: Agus Kent MD;  Location: [facility] OR     ENDOSCOPIC RETROGRADE CHOLANGIOPANCREATOGRAM COMPLEX N/A 7/12/2021    Procedure: ENDOSCOPIC RETROGRADE CHOLANGIOPANCREATOGRAPHY WITH AMPULLECTOMY, PANCREATIC DUCT STENT PLACEMENT AND BILIARY STENT PLACEMENT;  Surgeon: Agus Kent MD;  [facility]     ENDOSCOPIC RETROGRADE CHOLANGIOPANCREATOGRAPHY, EXCHANGE TUBE/STENT N/A 7/14/2021    Procedure: ENDOSCOPIC RETROGRADE CHOLANGIOPANCREATOGRAPHY with bile duct stents exchanged, balloon sweep of bile ducts, hemostasis;  Surgeon: Guru Bhavesh Arreola MD;  [facility]     ENDOSCOPIC ULTRASOUND UPPER GASTROINTESTINAL TRACT (GI) N/A 7/12/2021    Procedure: ENDOSCOPIC ULTRASOUND, ESOPHAGOSCOPY / UPPER GASTROINTESTINAL TRACT (GI);  Surgeon: Agus Kent MD;  [facility]     ESOPHAGOSCOPY, GASTROSCOPY, DUODENOSCOPY (EGD), COMBINED N/A 6/17/2021    Procedure: ESOPHAGOGASTRODUODENOSCOPY, WITH ENDOSCOPIC US, fine needle aspiration;  Surgeon: Sima Galvez MD;  Location: [facility] OR     ESOPHAGOSCOPY, GASTROSCOPY, DUODENOSCOPY (EGD), COMBINED N/A 7/14/2021    Procedure: ESOPHAGOGASTRODUODENOSCOPY (EGD);  Surgeon: Guru Bhavesh Arreola MD;  [facility]     ESOPHAGOSCOPY, GASTROSCOPY, DUODENOSCOPY (EGD), COMBINED N/A 8/3/2021    Procedure: Esophagoscopy, gastroscopy, duodenoscopy (EGD), combined;  Surgeon: Luis Antonio Jung MD;  Location: [facility]     HERNIA REPAIR  11/12    Repaired     PROSTATE SURGERY       [facility] NONSPECIFIC PROCEDURE  6/08     prostatectomy      [facility] NONSPECIFIC PROCEDURE  11/01    colonoscopy     [facility]  NONSPECIFIC PROCEDURE  11/2006    hernia, right side     ZZC NONSPECIFIC PROCEDURE      T + A             Social History:     Former smoker, quit in 1976  , lives with wife Kelli          Family History:     Family History   Problem Relation Age of Onset     Heart Disease Mother         CHF     Hypertension Mother      C.A.D. Maternal Grandfather      Cerebrovascular Disease Maternal Uncle         45     Cerebrovascular Disease Maternal Uncle               Medications:     Current Outpatient Medications   Medication Sig     acetaminophen (TYLENOL) 650 MG CR tablet Take 650 mg by mouth every 8 hours as needed for mild pain (Shoulder pain)      aspirin 81 MG tablet Take 81 mg by mouth every evening      baclofen (LIORESAL) intraTHECAL Internal Pump by Intrathecal route continuous prn Pump filled by [facility] stroke center [phone]  Pump Model: Syncromed  Medication in pump is Gablofen (brand name)  Last fill: during hospital admission  Next fill:     Low [place] Alarm Date:   Reservoir Volume: 20 ml  Conc: 2000 mcg/mL  Delivers 234.7 mcg/day  Basal rate:unknown  Battery life: unknown     docusate sodium (COLACE) 100 MG capsule Take 100 mg by mouth every evening     EPINEPHrine 0.3 MG/0.3ML injection Inject 0.3 mLs (0.3 mg) into the muscle as needed for anaphylaxis     ezetimibe (ZETIA) 10 MG tablet Take 1 tablet (10 mg) by mouth daily     furosemide (LASIX) 20 MG tablet Take 2 tablets (40 mg) by mouth daily     oxybutynin ER (DITROPAN-XL) 10 MG 24 hr tablet Take 2 tablets (20 mg) by mouth At Bedtime     pantoprazole (PROTONIX) 40 MG EC tablet Take 1 tablet (40 mg) by mouth 2 times daily (Patient taking differently: Take 40 mg by mouth daily )     clopidogrel (PLAVIX) 75 MG tablet Take 1 tablet (75 mg) by mouth daily     fluorouracil (EFUDEX) 5 % external cream Apply topically to legs 1 to 2 times weekly as needed/tolerated for maintenance  (Patient not taking: Reported on  "10/7/2021)     gabapentin (NEURONTIN) 300 MG capsule Take 300 mg by mouth 3 times daily     No current facility-administered medications for this visit.             Allergies:      Allergies   Allergen Reactions     Bee Venom      Swelling and hives     Penicillins Hives and Swelling     \"HIVES\"; occurred at age 39            Review of Systems:   A comprehensive 10 point review of systems (constitutional, ENT, cardiac, peripheral vascular, respiratory, GI, , Musculoskeletal, skin, Neurological) was performed and found to be negative except as described in this note.           Physical Exam:   COMPLETE EXAMINATION:   VITAL SIGNS: Ht 1.778 m (5' 10\")   Wt 70.3 kg (154 lb 15.7 oz)   BMI 22.24 kg/m    RESP: Non labored breathing  MUSCULOSKELETAL:      RUE:  Skin intact. No erythema. No shoulder asymmetry compared with contralateral shoulder. Atrophy is present. No pain on palpation over SC joint, AC joint, posterior glenohumeral space or long head of biceps. SILT in axillary, median, ulnar, and radial nerve distributions. 5/5 , 4/5 bicep, tricep, deltoid. Active shoulder ROM is 90/70/20/BP with crepitus. Pain and weakness with Matilda's. 4/5 strength with ER and IR. Pain with flexion and abduction.     LUE:  Skin intact. No erythema. No shoulder asymmetry compared with contralateral shoulder. Atrophy is present. No pain on palpation over SC joint, AC joint, posterior glenohumeral space or long head of biceps. SILT in axillary, median, ulnar, and radial nerve distributions. 5/5 , 4/5 bicep, tricep, deltoid. Active shoulder ROM is 110/90/10/BP with crepitus. Pain and weakness with Matilda's. 4/5 strength with ER and IR. Pain with flexion and abduction.               Data:     X-Rays of the bilateral shoulders 10/20/21 were independently reviewed and reveal: end stage degenerative changes in the glenohumeral joints bilaterally. Suspected prior fracture to distal left clavicle.     CC: Gary Tejada MD    "

## 2021-10-20 NOTE — PROGRESS NOTES
[facility] Physicians, Orthopaedic Surgery Consultation    Elier Leong MRN# [id number]   Age: 76 year old YOB: 1945     Reason for consult:  Bilateral shoulder pain       Requesting physician: Dr. Gary Tejada, Neurology         Assessment and Plan:   Assessment:   1.  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 We discussed the finding of bilateral end-stage glenohumeral osteoarthritis. We discussed the non-operative (cortisone injections, suprascapular nerve block, suprascapular nerve ablation) and operative treatment options (reverse total shoulder arthroplasty) including the risks and potential benefits of each. We discussed the expectations for pain relief and/or motion improvement with each option.      Although the patient does have end-stage clinical osteoarthritis, arthroplasty would not be appropriate given his current health status and medical comorbidities.  Additionally, the patient is not interested in surgery at this time.  Although we do not have the [facility] notes available to us today, it is assumed that he had a image guided glenohumeral joint injection given the description.  We discussed that if the glenohumeral injections are not effective he could try a suprascapular nerve block through the Pain Clinic.  If the suprascapular nerve block is effective but wears off, a suprascapular nerve ablation could be considered.  The patient may pursue these interventions at the [facility].  If they are unable to get these done, the patient can send us a [phone] message for a referral to our pain service/anesthesiologists for these procedures.      Patient is okay to try peripheral nerve stimulator if they are interested.  We discussed that he could discontinue physical therapy for the  shoulder as this is unlikely to be effective or provide him with significant pain relief.  After our discussion, we determined together that he will try to get the suprascapular nerve blocks done through the [facility] pain clinic.    The patient may follow-up in clinic as needed.    All questions and concerns were answered to the patient's satisfaction..    Michelle Alberts MD  Orthopaedic Surgery, PGY-4  12:47 PM  October 20, 2021    Patient was seen and discussed with Dr. Cuevas.    I have personally examined this patient and have reviewed the clinical presentation and progress note with the resident.  I agree with the treatment plan as outlined.  The plan was formulated with the resident on the day of the resident's note.               History of Present Illness:   Chief Concern: Bilateral shoulder pain    This is a LHD male with PMH of primary lateral sclerosis (dx in 2002), spasticity managed with baclofen pump, HTN, ampulla of Vater tumor, hx prostate cancer, multiple CVAs most recently 9/23/21 who presents with bilateral shoulder pain. The patient reports chronic bilateral shoulder pain that began approximately 18 months ago without traumatic onset. He reports diffuse shoulder pain without radiation that seems to be worst over the scapular spines. The pain is worse in the morning and with walker use. He is not longer using a walker due to arm and leg weakness, but his wife feels that he is weaker because he has not been able to exercise as much due to shoulder pain. He is currently using a wheelchair, but is able to use the recumbent bike twice daily.     They have tried gabapentin, acupuncture, trigger point injections, kinesiotape, chiropractic adjustments, shoulder brace from [facility], and PT weekly without sustained relief. He had glenohumeral joint injections in 10/2020 with Dr. Yeo without much lasting relief. He did see Dr. Aneudy Elliott at [facility] on 10/5/21 and received bilateral  "shoulder injections. These were image guided, but they are not sure of the exact location of the injection. These have provided him with pain relief, but without increase in mobility. He is bothered by both pain and restricted mobility.     He does have history of a left shoulder surgery 40 years ago in [place], which they describes as \"left shoulder synthetic ligaments\".    Their goals for today are to decrease pain. They are not interested in surgery at this time.     Patient was seen and examined by me. History, PMH, Meds, SH, complete ROS (10 organ systems) and PE reviewed with patient and prior medical records.         Past Medical History:     Past Medical History:   Diagnosis Date     Basal cell carcinoma nos     sees derm     CVA (cerebral infarction) 6/14    small vessel right int capsule stroke     DVT (deep vein thrombosis) in pregnancy 05/12/2017    right peroneal vein     Essential hypertension, benign      Hearing loss      Hoarseness of voice     assoc with PLS     Lumbar radiculopathy     2017     Primary Lateral Sclerosis 2002    has baclofen pump through Dr. Calvert, [place], sees Dr. Fink, [facility]     Primary osteoarthritis of right shoulder 6/16/2021     Prostate CA (H) 2008    prostatectomy     Vertigo 2/21/2013             Past Surgical History:     Past Surgical History:   Procedure Laterality Date     ABDOMEN SURGERY  11/12    Hernia     BIOPSY  4/16    Cancerous growth on leg. Removed by MOHs treatment     COLONOSCOPY N/A 8/3/2021    Procedure: COLONOSCOPY;  Surgeon: Luis Antonio Jung MD;  [facility]: [facility]     COLONOSCOPY N/A 8/3/2021    Procedure: Colonoscopy, With Polypectomy And Biopsy;  Surgeon: Luis Antonio Jung MD;  [facility]     ENDOSCOPIC RETROGRADE CHOLANGIOPANCREATOGRAM N/A 6/17/2021    Procedure: ENDOSCOPIC RETROGRADE CHOLANGIOPANCREATOGRAPHY, BILIARY STENT PLACEMENT;  Surgeon: Sima Galvez MD;  Location:  OR     ENDOSCOPIC RETROGRADE " CHOLANGIOPANCREATOGRAM N/A 8/19/2021    Procedure: ENDOSCOPIC RETROGRADE CHOLANGIOPANCREATOGRAPHY;  Surgeon: Agus Kent MD;  Location: [facility] OR     ENDOSCOPIC RETROGRADE CHOLANGIOPANCREATOGRAM COMPLEX N/A 7/12/2021    Procedure: ENDOSCOPIC RETROGRADE CHOLANGIOPANCREATOGRAPHY WITH AMPULLECTOMY, PANCREATIC DUCT STENT PLACEMENT AND BILIARY STENT PLACEMENT;  Surgeon: Agus Kent MD;  [facility]     ENDOSCOPIC RETROGRADE CHOLANGIOPANCREATOGRAPHY, EXCHANGE TUBE/STENT N/A 7/14/2021    Procedure: ENDOSCOPIC RETROGRADE CHOLANGIOPANCREATOGRAPHY with bile duct stents exchanged, balloon sweep of bile ducts, hemostasis;  Surgeon: Guru Bhavesh Arreola MD;  [facility]     ENDOSCOPIC ULTRASOUND UPPER GASTROINTESTINAL TRACT (GI) N/A 7/12/2021    Procedure: ENDOSCOPIC ULTRASOUND, ESOPHAGOSCOPY / UPPER GASTROINTESTINAL TRACT (GI);  Surgeon: Agus Kent MD;  [facility]     ESOPHAGOSCOPY, GASTROSCOPY, DUODENOSCOPY (EGD), COMBINED N/A 6/17/2021    Procedure: ESOPHAGOGASTRODUODENOSCOPY, WITH ENDOSCOPIC US, fine needle aspiration;  Surgeon: Sima Galvez MD;  Location:  OR     ESOPHAGOSCOPY, GASTROSCOPY, DUODENOSCOPY (EGD), COMBINED N/A 7/14/2021    Procedure: ESOPHAGOGASTRODUODENOSCOPY (EGD);  Surgeon: Guru Bhavesh Arreola MD;  [facility]     ESOPHAGOSCOPY, GASTROSCOPY, DUODENOSCOPY (EGD), COMBINED N/A 8/3/2021    Procedure: Esophagoscopy, gastroscopy, duodenoscopy (EGD), combined;  Surgeon: Luis Antonio Jung MD;  [facility]     HERNIA REPAIR  11/12    Repaired     PROSTATE SURGERY       [facility] NONSPECIFIC PROCEDURE  6/08     prostatectomy      ZZC NONSPECIFIC PROCEDURE  11/01    colonoscopy     ZZC NONSPECIFIC PROCEDURE  11/2006    hernia, right side     Z NONSPECIFIC PROCEDURE      T + A             Social History:     Former smoker, quit in 1976  , lives with wife Kelli          Family History:     Family History   Problem Relation Age of Onset      Heart Disease Mother         CHF     Hypertension Mother      C.A.D. Maternal Grandfather      Cerebrovascular Disease Maternal Uncle         45     Cerebrovascular Disease Maternal Uncle               Medications:     Current Outpatient Medications   Medication Sig     acetaminophen (TYLENOL) 650 MG CR tablet Take 650 mg by mouth every 8 hours as needed for mild pain (Shoulder pain)      aspirin 81 MG tablet Take 81 mg by mouth every evening      baclofen (LIORESAL) intraTHECAL Internal Pump by Intrathecal route continuous prn Pump filled by [facility] stroke [place] [phone]  Pump Model: Syncromed  Medication in pump is Gablofen (brand name)  Last fill: during hospital admission  Next fill:     Low [place] Alarm Date:   Reservoir Volume: 20 ml  Conc: 2000 mcg/mL  Delivers 234.7 mcg/day  Basal rate:unknown  Battery life: unknown     docusate sodium (COLACE) 100 MG capsule Take 100 mg by mouth every evening     EPINEPHrine 0.3 MG/0.3ML injection Inject 0.3 mLs (0.3 mg) into the muscle as needed for anaphylaxis     ezetimibe (ZETIA) 10 MG tablet Take 1 tablet (10 mg) by mouth daily     furosemide (LASIX) 20 MG tablet Take 2 tablets (40 mg) by mouth daily     oxybutynin ER (DITROPAN-XL) 10 MG 24 hr tablet Take 2 tablets (20 mg) by mouth At Bedtime     pantoprazole (PROTONIX) 40 MG EC tablet Take 1 tablet (40 mg) by mouth 2 times daily (Patient taking differently: Take 40 mg by mouth daily )     clopidogrel (PLAVIX) 75 MG tablet Take 1 tablet (75 mg) by mouth daily     fluorouracil (EFUDEX) 5 % external cream Apply topically to legs 1 to 2 times weekly as needed/tolerated for maintenance  (Patient not taking: Reported on 10/7/2021)     gabapentin (NEURONTIN) 300 MG capsule Take 300 mg by mouth 3 times daily     No current facility-administered medications for this visit.             Allergies:      Allergies   Allergen Reactions     Bee Venom      Swelling and hives      "Penicillins Hives and Swelling     \"HIVES\"; occurred at age 39            Review of Systems:   A comprehensive 10 point review of systems (constitutional, ENT, cardiac, peripheral vascular, respiratory, GI, , Musculoskeletal, skin, Neurological) was performed and found to be negative except as described in this note.           Physical Exam:   COMPLETE EXAMINATION:   VITAL SIGNS: Ht 1.778 m (5' 10\")   Wt 70.3 kg (154 lb 15.7 oz)   BMI 22.24 kg/m    RESP: Non labored breathing  MUSCULOSKELETAL:      RUE:  Skin intact. No erythema. No shoulder asymmetry compared with contralateral shoulder. Atrophy is present. No pain on palpation over SC joint, AC joint, posterior glenohumeral space or long head of biceps. SILT in axillary, median, ulnar, and radial nerve distributions. 5/5 , 4/5 bicep, tricep, deltoid. Active shoulder ROM is 90/70/20/BP with crepitus. Pain and weakness with Matilda's. 4/5 strength with ER and IR. Pain with flexion and abduction.     LUE:  Skin intact. No erythema. No shoulder asymmetry compared with contralateral shoulder. Atrophy is present. No pain on palpation over SC joint, AC joint, posterior glenohumeral space or long head of biceps. SILT in axillary, median, ulnar, and radial nerve distributions. 5/5 , 4/5 bicep, tricep, deltoid. Active shoulder ROM is 110/90/10/BP with crepitus. Pain and weakness with Matilda's. 4/5 strength with ER and IR. Pain with flexion and abduction.               Data:     X-Rays of the bilateral shoulders 10/20/21 were independently reviewed and reveal: end stage degenerative changes in the glenohumeral joints bilaterally. Suspected prior fracture to distal left clavicle.     CC: Gary Tejada MD    "

## 2021-10-20 NOTE — PROGRESS NOTES
[facility]        OUTPATIENT OCCUPATIONAL THERAPY EDEMA EVALUATION  PLAN OF TREATMENT FOR OUTPATIENT REHABILITATION  (COMPLETE FOR INITIAL CLAIMS ONLY)  Patient's Last Name, First Name, Elier Cook                           Provider s Name:   [facility] Medical Record No.  [id number]     Start of Care Date:  10/19/21   Onset Date:  09/02/21   Type:  OT   Medical Diagnosis:  lymphedema   Therapy Diagnosis:  lymphedema affecting infection risk, functional mobility, ADL Visits from SOC:  1                                     __________________________________________________________________________________   Plan of Treatment/Functional Goals:    Manual lymph drainage, Gradient compression bandaging, Fit for compression garment, Exercises, Precautions to prevent infection / exacerbation, Education, Home management program development        GOALS  1. Goal description: In order to promote fluid mobilization for increased ease of functional mobility and decreased infection risk, pt/cg will demonstrate independence with donning, doffing, and care of gradient compression bandages following education.       Target date: 12/31/21  2. Goal description: In order to promote fluid mobilization for increased ease of functional mobility and decreased infection risk, pt/cg will demonstrate independence with donning, doffing, and care of compression garments following education.       Target date: 12/31/21  3. Goal description: In order to promote fluid mobilization for increased ease of functional mobility and decreased infection risk, pt will demonstrate independence with self-MLD and/or exercises to facilitate the lymphatic system.       Target date: 12/31/21  4. Goal description: In order to reduce risk of infection and promote ind with long term management of  lymphedema, pt will verbalize understanding of skin care routine, precautions/contraindications, and when to seek further treatment following education.       Target date: 12/31/21  5. Goal description: In order to promote improved fit of clothing, functional mobility for ADL, and decreased infection risk, pt will demonstrate a 500mL reduction in R LE volume and 250mL reduction in L LE volume by discharge.                      Treatment Frequency: 2x/week   Treatment duration: 4 weeks with follow up as needed    Luana Pollock OT                                    I CERTIFY THE NEED FOR THESE SERVICES FURNISHED UNDER        THIS PLAN OF TREATMENT AND WHILE UNDER MY CARE     (Physician co-signature of this document indicates review and certification of the therapy plan).                   Certification date from: 10/19/21       Certification date to: 01/17/22           Referring physician: Gary Tejada MD   Initial Assessment  See Epic Evaluation- Start of care: 10/19/21

## 2021-10-20 NOTE — NURSING NOTE
"Reason For Visit:   Chief Complaint   Patient presents with     Consult     Bilateral shoulder pain.  Right shoulder Limited mobility  ALS Ref. Dr. Tejada       PCP: Lida Ray  Ref: Dr. Tejada    ?  No  Occupation Retired - Sales.  Currently working? No.  Work status?  Retired.  Date of injury: pain started 18 months ago    Date of surgery: when he was 39 years at [facility] in [place]  Type of surgery: Left shoulder synthetic ligaments.  Smoker: No  Request smoking cessation information: No    Left hand dominant    SANE score  Affected shoulder: Bilateral  Right shoulder SANE: 25  Left shoulder SANE: 25    Ht 1.778 m (5' 10\")   Wt 70.3 kg (154 lb 15.7 oz)   BMI 22.24 kg/m        Pain Assessment  Patient Currently in Pain: Yes  0-10 Pain Scale: 7  Primary Pain Location: Shoulder (Bilateral)  Pain Descriptors: Aching, Dull  Alleviating Factors: Pain medication, Other (comment) (Tylenol in the AM, chiropractor)  Aggravating Factors: Movement, Other (comment) (Worse in the morning, worse with walking in a walker,  transitioning)    Rosette Apple, ATC        "

## 2021-10-20 NOTE — PROGRESS NOTES
10/19/21 1742   Quick Adds   Quick Adds Certification   Rehab Discipline   Discipline OT   Type of Visit   Type of visit Initial Edema Evaluation   General Information   Start of care 10/19/21   Referring physician Gary Tejada MD   Orders Evaluate and treat as indicated   Order date 09/02/21   Medical diagnosis lymphedema   Onset of illness / date of surgery 09/02/21   Edema onset 09/02/21   Affected body parts LLE;RLE   Edema etiology Chronic Venous Insufficiency;DVT;Surgery;Insidious   Edema etiology comments Pt with increasing B LE edema over the last year.  Pt has had a decline in his mobility over that time and now mostly is wc based with LEs in dependent position.  Pt has hx of DVT in R LE, has had trouble with wound healing in L LE.  Pt had prostate cancer but was treated with surgery only.  Pt repots some relief with elevation.  They were given marshall stockings but wife was unable to don without hurting herself.  They were also given 2x10 cm short stretch bandages to use, wife reports they have been doing this on and off with relief.   Pertinent history of current problem (PT: include personal factors and/or comorbidities that impact the POC; OT: include additional occupational profile info) PLS, CHF, multiple surgeries, CVA x3, adenocarcinoma   Surgical / medical history reviewed Yes   Prior level of functional mobility wc based, tranfers with A from wife   Prior treatment MARSHALL hose;Elevation;ACE wraps   Community support Family / friend caregiver;Home health aid   Patient role / employment history Retired   Psychosocial concerns Impaired body image;Isolation   Living environment Apartment / condo   Living environment comments Home is well suited to pt's needs.  They are doing PT/OT at home because community mobility is challenging.   Current assistive devices Manual wheel chair   Assistive device comments Pt has eval for power wc next month   General observations Wife pushing pt in wc   Fall Risk Screen  "  Fall screen completed by OT   Have you fallen 2 or more times in the past year? Yes   Have you fallen and had an injury in the past year? Yes   Is patient a fall risk? Yes;Department fall risk interventions implemented   Abuse Screen (yes response referral indicated)   Feels Unsafe at Home or Work/School no   Feels Threatened by Someone no   Does Anyone Try to Keep You From Having Contact with Others or Doing Things Outside Your Home? no   Physical Signs of Abuse Present no   System Outcome Measures   Outcome Measures Lymphedema   Lymphedema Life Impact Scale (score range 0-72). A higher score indicates greater impairment. 30   Subjective Report   Patient report of symptoms swelling, \"they don't look great\", impaired mobility   Precautions   Precautions Cardiac Edema/CHF   Patient / Family Goals   Patient / family goals statement to learn how to take care of this, learn how to use compression stockings   Pain   Patient currently in pain No   Cognitive Status   Orientation Orientation to person, place and time   Level of consciousness Alert   Follows commands and answers questions 100% of the time   Edema Exam / Assessment   Skin condition Pitting;Venous distention;Dryness;Hemosiderin deposits;Lipodermatosclerosis   Skin condition comments Pt with 3+ pitting in B dorsal feet, 2+ in anterior tibial region.  Skin is mottled and rick, very dry with scattered dry skin flakes.  Dime sized opening on L shin.     Pitting 3+   Pitting location dorsal feet   Dorsal pedal pulse comments unable to palpate, extremities cool to touch   Stemmer sign Positive   Ulceration No   Girth Measurements   Girth Measurements Refer to separate girth measurement flowsheet   Volume LE   Right LE (mL) 2718.54   Left LE (mL) 2370.3   LE volume comparison RLE volume greater than LLE volume   % difference 13.7   Range of Motion   ROM comments impaired in all four extremities - baseline   Strength   Strength comments deconditioning noted, " difficulty lifting B LE 2/2 increased fluid   Activities of Daily Living   Activities of Daily Living wife assists with dressing, toileting, HHA for bathing   Bed Mobility   Bed mobility Ax1   Transfers   Transfers Ax1   Gait / Locomotion   Gait / Locomotion wc based   Vascular Assessment   Vascular Assessment Rubor of Dependency   Planned Edema Interventions   Planned edema interventions Manual lymph drainage;Gradient compression bandaging;Fit for compression garment;Exercises;Precautions to prevent infection / exacerbation;Education;Home management program development   Clinical Impression   Criteria for skilled therapeutic intervention met Yes   Therapy diagnosis lymphedema affecting infection risk, functional mobility, ADL   Influenced by the following impairments / conditions Stage 1;Phlebolymphedema   Assessment of Occupational Performance 1-3 Performance Deficits   Identified Performance Deficits dressing, transfers, infection risk   Clinical Decision Making (Complexity) Low complexity   Treatment Frequency 2x/week   Treatment duration 4 weeks with follow up as needed   Patient / family and/or staff in agreement with plan of care Yes   Risks and benefits of therapy have been explained Yes   Clinical impression comments Pt presents with lymphedema affecting his ADL participation (wife is unable to manage his current compression and assist), infection risk, andfunctional mobility.  Pt will benefit from skilled OT for CDT in order to reduce fluid and set up efffecting home management program thereby improving ease and safety with ADL and decreasing infection risk.    Education Assessment   Preferred learning style Listening;Reading;Demonstration;Pictures / video   Barriers to learning Physical  (difficulty getting to clinic)   Goals   Edema Eval Goals 1;2;3;4;5   Goal 1   Goal identifier GCB   Goal description In order to promote fluid mobilization for increased ease of functional mobility and decreased  infection risk, pt/cg will demonstrate independence with donning, doffing, and care of gradient compression bandages following education.   Target date 12/31/21   Goal 2   Goal identifier Garments   Goal description In order to promote fluid mobilization for increased ease of functional mobility and decreased infection risk, pt/cg will demonstrate independence with donning, doffing, and care of compression garments following education.   Target date 12/31/21   Goal 3   Goal identifier HEP   Goal description In order to promote fluid mobilization for increased ease of functional mobility and decreased infection risk, pt will demonstrate independence with self-MLD and/or exercises to facilitate the lymphatic system.   Target date 12/31/21   Goal 4   Goal identifier Home Management   Goal description In order to reduce risk of infection and promote ind with long term management of lymphedema, pt will verbalize understanding of skin care routine, precautions/contraindications, and when to seek further treatment following education.   Target date 12/31/21   Goal 5   Goal identifier Volume   Goal description In order to promote improved fit of clothing, functional mobility for ADL, and decreased infection risk, pt will demonstrate a 500mL reduction in R LE volume and 250mL reduction in L LE volume by discharge.   Total Evaluation Time   OT Eval, Low Complexity Minutes (91045) 35   Certification   Certification date from 10/19/21   Certification date to 01/17/22   Medical Diagnosis lymphedema   Certification I certify the need for these services furnished under this plan of treatment and while under my care.  (Physician co-signature of this document indicates review and certification of the therapy plan).

## 2021-10-21 ENCOUNTER — TRANSFERRED RECORDS (OUTPATIENT)
Dept: HEALTH INFORMATION MANAGEMENT | Facility: CLINIC | Age: 76
End: 2021-10-21

## 2021-10-21 ENCOUNTER — TELEPHONE (OUTPATIENT)
Dept: INTERNAL MEDICINE | Facility: CLINIC | Age: 76
End: 2021-10-21

## 2021-10-21 DIAGNOSIS — Z53.9 DIAGNOSIS NOT YET DEFINED: Primary | ICD-10-CM

## 2021-10-21 PROCEDURE — G0180 MD CERTIFICATION HHA PATIENT: HCPCS | Performed by: INTERNAL MEDICINE

## 2021-10-22 ENCOUNTER — TELEPHONE (OUTPATIENT)
Dept: INTERNAL MEDICINE | Facility: CLINIC | Age: 76
End: 2021-10-22

## 2021-10-22 NOTE — TELEPHONE ENCOUNTER
[facility] calls. Pt wants to change from [facility] to [facility].   This was initiated on 9/26/21 but apparently [facility] did not receive fax. Or pt did not call to schedule. Nurse was not sure.    Confirmed the fax #. [phone]    Will have Dr Ray sign and the printed orders and add OT.   Faxed orders.     They will contact pt.

## 2021-10-25 ENCOUNTER — TELEPHONE (OUTPATIENT)
Dept: INTERNAL MEDICINE | Facility: CLINIC | Age: 76
End: 2021-10-25

## 2021-10-25 ENCOUNTER — MEDICAL CORRESPONDENCE (OUTPATIENT)
Dept: HEALTH INFORMATION MANAGEMENT | Facility: CLINIC | Age: 76
End: 2021-10-25
Payer: COMMERCIAL

## 2021-10-25 NOTE — TELEPHONE ENCOUNTER
SKILLED NURSING / OCCUPATIONAL THERAPY order received via fax. Form in your mailbox to be signed.

## 2021-10-31 ENCOUNTER — MYC MEDICAL ADVICE (OUTPATIENT)
Dept: NEUROLOGY | Facility: CLINIC | Age: 76
End: 2021-10-31
Payer: COMMERCIAL

## 2021-11-04 ENCOUNTER — TELEPHONE (OUTPATIENT)
Dept: INTERNAL MEDICINE | Facility: CLINIC | Age: 76
End: 2021-11-04

## 2021-11-04 ENCOUNTER — PATIENT OUTREACH (OUTPATIENT)
Dept: NURSING | Facility: CLINIC | Age: 76
End: 2021-11-04
Payer: COMMERCIAL

## 2021-11-04 NOTE — TELEPHONE ENCOUNTER
[facility] is asking for verbal order.   OCCUPATIONAL THERAPY lymphodema  1x week for 1 week  2x week for  4 weeks     Call [phone] Jenny

## 2021-11-04 NOTE — PROGRESS NOTES
Clinic Care Coordination Contact  Community Health Worker Follow Up  Spoke to Kelli    Goals:   Goals Addressed as of 11/4/2021 at 10:44 AM                    Today    9/27/21       1. Respite Care/Private Duty HHC (pt-stated)   40%  20%    Added 7/26/21 by Rebecca Hernández RN      Goal Statement: I would like to receive resources for respite Care/Private Duty HHC companies.   Date Goal set: 7/26/21  Barriers: Multiple health concerns  Strengths: Patient and wife are engaged and motivated  Date to Achieve By: 12/31/21  Patient expressed understanding of goal: Yes  Action steps to achieve this goal:  1. I understand that RN CC will send via [employer] and the mail Respite Care/Private Duty HHC resources.   2. I will review resources and utilize as I see fit.   3. I will continue to work with care coordination for any additional resources and support.     11/04, CHW:    Kelli states that [facility] does not do Chronic cares but only acute care.    She states after their episode ends with [facility] on 12/07, they will be transitioning to [facility].    Hopefully [facility] will have availability for them at that time.    However, Kelli understands that she can reach back out to CC if she needs to find another home care         2. Improve chronic symptoms (pt-stated)   30%  10%    Added 7/26/21 by Rebecca Hernández RN      Goal Statement: I want my lower leg edema to resolve.   Date Goal set: 7/26/21  Barriers: Multiple health concerns  Strengths: Patient is motivated and engaged  Date to Achieve By: 12/31/21  Patient expressed understanding of goal: Yes  Action steps to achieve this goal:  1. I understand RN CC will message my PCP to request a diuretic medication be prescribed.   2. I will wear my compression stockings daily.   3. I will elevate my feet at rest.   4. I will take all my medications as prescribed.   5.  I will continue to work with care coordination for ny additional resources and support.     11/04,  CHW:    Kelli states that Nader has been having leg edema since the end of September.    Dr. Ray had given him some water pills to take, however, they haven't really been working well.    Kelli states that she did have the home care RN, Dina take a look at his legs and ordered for the Home Care OT to come out to assist with leg wrappings.        Intervention and Education during outreach:   CHW inquired if there was any other support or resources needed.  However, Kelli had no other needs or concerns at this time.  Kelli understands that she can contact CC if there are any changes.    CHW Plan: CHW will continue monthly outreaches to monitor progression of goals.    Edilberto Gan, PBW  Clinic Care Coordination  [facility] : [place], [place], and [place]  Phone: [phone]    ______________________  Next Outreach:  12/03/21  Planned Outreach Frequency: Monthly  Preferred Phone Number: Kelli (spouse) [phone]    Enrollment Date:  07/26/21  Last Care Plan Assessment:  07/26/21

## 2021-11-05 ENCOUNTER — TELEPHONE (OUTPATIENT)
Dept: INTERNAL MEDICINE | Facility: CLINIC | Age: 76
End: 2021-11-05
Payer: COMMERCIAL

## 2021-11-05 NOTE — TELEPHONE ENCOUNTER
Skilled nursing / physical therapy / occupational therapy /  Speech therapy orders received via fax

## 2021-11-05 NOTE — TELEPHONE ENCOUNTER
Call to Home care and left detailed message.     Verbal order given to approve visits until certification received for MD signature.

## 2021-11-06 DIAGNOSIS — R35.0 URINARY FREQUENCY: ICD-10-CM

## 2021-11-08 DIAGNOSIS — N39.41 URGENCY INCONTINENCE: Primary | ICD-10-CM

## 2021-11-09 ENCOUNTER — TRANSFERRED RECORDS (OUTPATIENT)
Dept: HEALTH INFORMATION MANAGEMENT | Facility: CLINIC | Age: 76
End: 2021-11-09
Payer: COMMERCIAL

## 2021-11-09 RX ORDER — OXYBUTYNIN CHLORIDE 10 MG/1
TABLET, EXTENDED RELEASE ORAL
Qty: 180 TABLET | Refills: 2 | Status: SHIPPED | OUTPATIENT
Start: 2021-11-09 | End: 2021-11-11 | Stop reason: ALTCHOICE

## 2021-11-09 NOTE — TELEPHONE ENCOUNTER
Prescription approved per [facility] Refill Protocol.  Galindo Jordan RN, BSN

## 2021-11-10 ENCOUNTER — PATIENT OUTREACH (OUTPATIENT)
Dept: CARE COORDINATION | Facility: CLINIC | Age: 76
End: 2021-11-10
Payer: COMMERCIAL

## 2021-11-10 NOTE — PROGRESS NOTES
Care Coordination Clinician Chart Review  Situation: Patient chart reviewed by care coordinator.       Background: Care Coordination initial assessment and enrollment to Care Coordination was 7/21/21.   Patient centered goals were developed with participation from patient.  RN CC handed patient off to CHW for continued outreach every 30 days.        Assessment: Per chart review, patient outreach completed by CC CHW on 11/4/21.  Patient is actively working to accomplish goals.  Patient's goals remain appropriate and relevant at this time.   Patient is due for updated Plan of Care.  Annual assessment will be due 9/2022.      Goals        1. Respite Care/Private Duty HHC (pt-stated)       Goal Statement: I would like to receive resources for respite Care/Private Duty HHC companies.   Date Goal set: 7/26/21  Barriers: Multiple health concerns  Strengths: Patient and wife are engaged and motivated  Date to Achieve By: 12/31/21  Patient expressed understanding of goal: Yes  Action steps to achieve this goal:  1. I understand that RN CC will send via [employer] and the mail Respite Care/Private Duty HHC resources.   2. I will review resources and utilize as I see fit.   3. I will continue to work with care coordination for any additional resources and support.              2. Improve chronic symptoms (pt-stated)       Goal Statement: I want my lower leg edema to resolve.   Date Goal set: 7/26/21  Barriers: Multiple health concerns  Strengths: Patient is motivated and engaged  Date to Achieve By: 12/31/21  Patient expressed understanding of goal: Yes  Action steps to achieve this goal:  1. I understand RN CC will message my PCP to request a diuretic medication be prescribed.   2. I will wear my compression stockings daily.   3. I will elevate my feet at rest.   4. I will take all my medications as prescribed.   5.  I will continue to work with care coordination for ny additional resources and support.                    Plan/Recommendations: The patient will continue working with Care Coordination to achieve goals as above.  CHW will involve RN CC as needed or if patient is ready to move to maintenance.  RN CC will continue to monitor progress to goals and CHW outreaches every 6 weeks.   Plan of Care updated and mailed to patient: Yes, via [employer].     Tonya Hernández RN Care Coordinator  [facility]  Email: [email or web address]  Phone: [phone]

## 2021-11-10 NOTE — LETTER
[facility] CARE COORDINATION  [address]  [facility] [address]    November 10, 2021        Elier H TRENA Haman  [address] [facility] [address]          Dear Elier,     Attached is an updated Complex Care Plan for your continued enrollment in Care Coordination. Please let us know if you have additional questions, concerns, or goals that we can assist with.    Sincerely,    Tonya Hernández RN Care Coordinator  [facility], [place], [place]  Email: [email or web address]  Phone: [phone]            [facility]  Patient Centered Plan of Care  About Me:        Patient Name:  Elier Leong    YOB: 1945  Age:         76 year old   [place] MRN:    [id number] Telephone Information:  Home Phone [phone]   Mobile [phone]       Address:  [address] [facility] [address] Email address:  [email or web address]      Emergency Contact(s)    Name Relationship Lgl Grd Work Phone Home Phone Mobile Phone   1. RAMÓNSAM * Spouse No  [phone]   2. TRENA LEONGCARLEE Daughter No   [phone]   3. TRENA LEONGHERNAN*  No   [phone]           Primary language:  English     needed? No   [place] [facility]:  [phone] op. 1  Other communication barriers:Glasses; Language barrier    Preferred Method of Communication:  Mail  Current living arrangement: I live in a private home with spouse    Mobility Status/ Medical Equipment: No data recorded      Health Maintenance  Health Maintenance Reviewed: Due/Overdue   Health Maintenance Due   Topic Date Due     HEPATITIS C SCREENING  Never done     MICROALBUMIN  08/15/2020     MEDICARE ANNUAL WELLNESS VISIT  09/08/2021     FALL RISK ASSESSMENT  09/08/2021           My Access Plan  Medical Emergency 911   Primary Clinic Line [facility] - [phone]   24 Hour Appointment Line [phone] or  [phone]) (toll-free)   24 Hour  Nurse Line [phone] (toll-free)   Preferred Urgent Care No data recorded   Preferred Hospital [facility]  [phone]     Preferred Pharmacy [id number] - [place], [place] - [address] [facility]     Behavioral Health Crisis Line The National Suicide Prevention Lifeline at 1-250.601.2414 or 911             My Care Team Members  Patient Care Team       Relationship Specialty Notifications Start End    Lida Ray MD PCP - General Internal Medicine  5/16/11     Phone: [phone] Fax: [phone]         [address] [facility] [address]    Lida Ray MD Assigned PCP   9/7/15     Phone: [phone] Fax: [phone]         [address] [facility] [address]    Yeo, Albert, MD Assigned Musculoskeletal Provider   12/6/20     Phone: [phone] Fax: [phone] [address] [facility] [address]    Gary Tejada MD Assigned Neuroscience Provider   6/20/21     Phone: [phone] Fax: [phone]         [address] [facility] [id number] [facility] [address]    Saqib Moscoso MD Assigned Surgical Provider   7/4/21     Phone: [phone] Fax: [phone]         [address]  [facility] [address]    Rebecca Hernández RN Lead Care Coordinator  Admissions 7/26/21     Edilberto Dumont Community Health Worker   7/26/21     Isai Ochoa MD Assigned Heart and Vascular Provider   10/17/21     Phone: [phone] Fax: [phone] [address] [facility] [address] [facility] [address]    Cesia Hand MA Community Health Worker   11/4/21             My Care Plans  Self Management and Treatment Plan  Goals and (Comments)  Goals        General     1. Respite Care/Private Duty HHC (pt-stated)      Notes - Note created  7/26/2021 11:43 AM by Rebecca Hernández RN     Goal Statement: I would like to receive resources for respite Care/Private Duty HHC companies.   Date Goal set: 7/26/21  Barriers: Multiple health concerns  Strengths:  Patient and wife are engaged and motivated  Date to Achieve By: 12/31/21  Patient expressed understanding of goal: Yes  Action steps to achieve this goal:  1. I understand that RN CC will send via [employer] and the mail Respite Care/Private Duty [facility] resources.   2. I will review resources and utilize as I see fit.   3. I will continue to work with care coordination for any additional resources and support.            2. Improve chronic symptoms (pt-stated)      Notes - Note created  7/26/2021 11:45 AM by Rebecca Hernández RN     Goal Statement: I want my lower leg edema to resolve.   Date Goal set: 7/26/21  Barriers: Multiple health concerns  Strengths: Patient is motivated and engaged  Date to Achieve By: 12/31/21  Patient expressed understanding of goal: Yes  Action steps to achieve this goal:  1. I understand RN CC will message my PCP to request a diuretic medication be prescribed.   2. I will wear my compression stockings daily.   3. I will elevate my feet at rest.   4. I will take all my medications as prescribed.   5.  I will continue to work with care coordination for ny additional resources and support.                Action Plans on File:                       Advance Care Plans/Directives Type:   No data recorded    Current Medications and Allergies:   Current Outpatient Medications   Medication Instructions     acetaminophen (TYLENOL) 650 mg, Oral, EVERY 8 HOURS PRN     aspirin (ASA) 81 mg, Oral, EVERY EVENING     baclofen (LIORESAL) intraTHECAL Internal Pump Intrathecal, CONTINUOUS PRN, Pump filled by [facility] stroke [place] [phone]<BR>Pump Model: Syncromed<BR>Medication in pump is Gablofen (brand name)<BR>Last fill: during hospital admission<BR>Next fill:   <BR>Low [place] Alarm Date: <BR>Reservoir Volume: 20 ml<BR>Conc: 2000 mcg/mL<BR>Delivers 234.7 mcg/day<BR>Basal rate:unknown<BR>Battery life: unknown     clopidogrel (PLAVIX) 75 mg, Oral, DAILY     docusate  sodium (COLACE) 100 mg, Oral, EVERY EVENING     EPINEPHrine (ANY BX GENERIC EQUIV) 0.3 mg, Intramuscular, PRN     ezetimibe (ZETIA) 10 mg, Oral, DAILY     furosemide (LASIX) 40 mg, Oral, DAILY     gabapentin (NEURONTIN) 300 mg, Oral, 3 TIMES DAILY     oxybutynin ER (DITROPAN-XL) 10 MG 24 hr tablet TAKE 2 TABLETS (20MG) AT   BEDTIME     pantoprazole (PROTONIX) 40 mg, Oral, 2 TIMES DAILY         Care Coordination Start Date: 7/26/2021   Frequency of Care Coordination: monthly     Form Last Updated: 11/10/2021

## 2021-11-11 ENCOUNTER — OFFICE VISIT (OUTPATIENT)
Dept: UROLOGY | Facility: CLINIC | Age: 76
End: 2021-11-11
Attending: PSYCHIATRY & NEUROLOGY
Payer: COMMERCIAL

## 2021-11-11 VITALS
DIASTOLIC BLOOD PRESSURE: 80 MMHG | SYSTOLIC BLOOD PRESSURE: 122 MMHG | BODY MASS INDEX: 22.19 KG/M2 | WEIGHT: 155 LBS | HEIGHT: 70 IN

## 2021-11-11 DIAGNOSIS — C61 PROSTATE CA (H): ICD-10-CM

## 2021-11-11 DIAGNOSIS — N39.41 URGENCY INCONTINENCE: ICD-10-CM

## 2021-11-11 DIAGNOSIS — R39.15 URINARY URGENCY: Primary | ICD-10-CM

## 2021-11-11 LAB
ALBUMIN UR-MCNC: NEGATIVE MG/DL
APPEARANCE UR: CLEAR
BILIRUB UR QL STRIP: NEGATIVE
COLOR UR AUTO: YELLOW
GLUCOSE UR STRIP-MCNC: NEGATIVE MG/DL
HGB UR QL STRIP: NEGATIVE
KETONES UR STRIP-MCNC: NEGATIVE MG/DL
LEUKOCYTE ESTERASE UR QL STRIP: NEGATIVE
NITRATE UR QL: NEGATIVE
PH UR STRIP: 5.5 [PH] (ref 5–7)
SP GR UR STRIP: >=1.03 (ref 1–1.03)
UROBILINOGEN UR STRIP-ACNC: 0.2 E.U./DL

## 2021-11-11 PROCEDURE — 36415 COLL VENOUS BLD VENIPUNCTURE: CPT | Performed by: STUDENT IN AN ORGANIZED HEALTH CARE EDUCATION/TRAINING PROGRAM

## 2021-11-11 PROCEDURE — 81003 URINALYSIS AUTO W/O SCOPE: CPT | Mod: QW | Performed by: STUDENT IN AN ORGANIZED HEALTH CARE EDUCATION/TRAINING PROGRAM

## 2021-11-11 PROCEDURE — 99213 OFFICE O/P EST LOW 20 MIN: CPT | Performed by: STUDENT IN AN ORGANIZED HEALTH CARE EDUCATION/TRAINING PROGRAM

## 2021-11-11 PROCEDURE — 84153 ASSAY OF PSA TOTAL: CPT | Performed by: STUDENT IN AN ORGANIZED HEALTH CARE EDUCATION/TRAINING PROGRAM

## 2021-11-11 RX ORDER — TROSPIUM CHLORIDE 20 MG/1
20 TABLET, FILM COATED ORAL
Qty: 180 TABLET | Refills: 1 | Status: SHIPPED | OUTPATIENT
Start: 2021-11-11 | End: 2022-01-01

## 2021-11-11 ASSESSMENT — PAIN SCALES - GENERAL: PAINLEVEL: SEVERE PAIN (6)

## 2021-11-11 ASSESSMENT — MIFFLIN-ST. JEOR: SCORE: 1434.33

## 2021-11-11 NOTE — LETTER
"11/11/2021       RE: Elier Leong  [address] [facility] [address]     Dear Colleague,    Thank you for referring your patient, Elier Leong, to the [facility]OLOGY CLINIC [place] at [facility]. Please see a copy of my visit note below.    CHIEF COMPLAINT   It was my pleasure to see Elier Leong who is a 76 year old male for follow-up of prostate cancer and overactive bladder.      HPI:  Elier Leong is a 76 year old male being seen for follow-up.  Duration of problem: Many years  Previous treatments: Prostatectomy in 2008     accompanied by his spouse.  Reviewed previous notes from Dr. Gina Samuel presents today with his wife.  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  He also has developing lymphedema which he is getting taking care of.  He also complains of persistent urgency and urge incontinence which is occasional.  Symptoms here are at baseline.  He does have significant dryness of mouth with oxybutynin and wants to try a different prescription.  He has not had a PSA done in the last couple of years.    Exam:  /80   Ht 1.778 m (5' 10\")   Wt 70.3 kg (155 lb)   BMI 22.24 kg/m    General: age-appropriate appearing male in NAD sitting in a wheelchair  Resp: no respiratory distress  CV: heart rate regular  Abdomen: Degree of obesity is mild. Abdomen is soft and nontender. No organomegaly.   : not performed  Neuro: grossly non focal. Normal reflexes  Motor: Decreased  strength throughout    Review of Imaging:  The following imaging exams were independently viewed and interpreted by me and discussed with patient:      Review of Labs:  The following labs were reviewed by me and discussed with the patient:  UA: Normal  PSA 2019 undetectable  Assessment & Plan     Urinary urgency  Discussed about changing prescription from " oxybutynin to trospium.  Might be beneficial for current since he has significant neurological symptoms which might be worsened by oxybutynin.  Discussed about the beneficial effects of trospium in terms of lesser CNS side effects as well.  We will give it a try for 3 months and will put in a repeat prescription if he has improvement with trospium  - Adult Urology Referral  - trospium (SANCTURA) 20 MG tablet; Take 1 tablet (20 mg) by mouth 2 times daily (before meals)        Prostate CA (H)  PSA to be repeated  - PSA tumor marker [ISX5555]; Future      Uvaldopravin Cavazos MD  [facility] UROLOGY CLINIC [place]      ==========================    Additional Billing and Coding Information:  Review of external notes as documented above   Review of the result(s) of each unique test - UA PSA    Independent interpretation of a test performed by another physician/other qualified health care professional (not separately reported) -       Discussion of management or test interpretation with external physician/other qualified healthcare professional/appropriate source -       Diagnosis or treatment significantly limited by social determinants of health -       17 minutes spent on the date of the encounter doing chart review, review of test results, interpretation of tests, patient visit, documentation and discussion with family     ==========================

## 2021-11-11 NOTE — PROGRESS NOTES
"CHIEF COMPLAINT   It was my pleasure to see Elier Leong who is a 76 year old male for follow-up of prostate cancer and overactive bladder.      HPI:  Elier Leong is a 76 year old male being seen for follow-up.  Duration of problem: Many years  Previous treatments: Prostatectomy in 2008     accompanied by his spouse.  Reviewed previous notes from Dr. Gina Samuel presents today with his wife.  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  He also has developing lymphedema which he is getting taking care of.  He also complains of persistent urgency and urge incontinence which is occasional.  Symptoms here are at baseline.  He does have significant dryness of mouth with oxybutynin and wants to try a different prescription.  He has not had a PSA done in the last couple of years.    Exam:  /80   Ht 1.778 m (5' 10\")   Wt 70.3 kg (155 lb)   BMI 22.24 kg/m    General: age-appropriate appearing male in NAD sitting in a wheelchair  Resp: no respiratory distress  CV: heart rate regular  Abdomen: Degree of obesity is mild. Abdomen is soft and nontender. No organomegaly.   : not performed  Neuro: grossly non focal. Normal reflexes  Motor: Decreased  strength throughout    Review of Imaging:  The following imaging exams were independently viewed and interpreted by me and discussed with patient:      Review of Labs:  The following labs were reviewed by me and discussed with the patient:  UA: Normal  PSA 2019 undetectable  Assessment & Plan     Urinary urgency  Discussed about changing prescription from oxybutynin to trospium.  Might be beneficial for current since he has significant neurological symptoms which might be worsened by oxybutynin.  Discussed about the beneficial effects of trospium in terms of lesser CNS side effects as well.  We will give it a try for 3 months and will put in a repeat prescription if he has " improvement with trospium  - Adult Urology Referral  - trospium (SANCTURA) 20 MG tablet; Take 1 tablet (20 mg) by mouth 2 times daily (before meals)        Prostate CA (H)  PSA to be repeated  - PSA tumor marker [UBA4461]; Future      Uvaldo Cavazos MD  [facility]OLOGY CL[facility] [place]      ==========================    Additional Billing and Coding Information:  Review of external notes as documented above   Review of the result(s) of each unique test - UA PSA    Independent interpretation of a test performed by another physician/other qualified health care professional (not separately reported) -       Discussion of management or test interpretation with external physician/other qualified healthcare professional/appropriate source -       Diagnosis or treatment significantly limited by social determinants of health -       17 minutes spent on the date of the encounter doing chart review, review of test results, interpretation of tests, patient visit, documentation and discussion with family     ==========================

## 2021-11-12 LAB — PSA SERPL-MCNC: <0.01 UG/L (ref 0–4)

## 2021-11-13 ENCOUNTER — HEALTH MAINTENANCE LETTER (OUTPATIENT)
Age: 76
End: 2021-11-13

## 2021-11-16 ENCOUNTER — VIRTUAL VISIT (OUTPATIENT)
Dept: NEUROLOGY | Facility: CLINIC | Age: 76
End: 2021-11-16
Payer: COMMERCIAL

## 2021-11-16 ENCOUNTER — TELEPHONE (OUTPATIENT)
Dept: INTERNAL MEDICINE | Facility: CLINIC | Age: 76
End: 2021-11-16

## 2021-11-16 ENCOUNTER — HOSPITAL ENCOUNTER (OUTPATIENT)
Dept: OCCUPATIONAL THERAPY | Facility: CLINIC | Age: 76
Setting detail: THERAPIES SERIES
End: 2021-11-16
Attending: INTERNAL MEDICINE
Payer: COMMERCIAL

## 2021-11-16 DIAGNOSIS — I63.9 CEREBROVASCULAR ACCIDENT (CVA), UNSPECIFIED MECHANISM (H): Primary | ICD-10-CM

## 2021-11-16 DIAGNOSIS — G12.23 PRIMARY LATERAL SCLEROSIS (H): ICD-10-CM

## 2021-11-16 DIAGNOSIS — I77.9: ICD-10-CM

## 2021-11-16 DIAGNOSIS — I89.0 LYMPHEDEMA: Primary | ICD-10-CM

## 2021-11-16 PROCEDURE — 99417 PROLNG OP E/M EACH 15 MIN: CPT | Performed by: PSYCHIATRY & NEUROLOGY

## 2021-11-16 PROCEDURE — 99215 OFFICE O/P EST HI 40 MIN: CPT | Mod: 95 | Performed by: PSYCHIATRY & NEUROLOGY

## 2021-11-16 PROCEDURE — 97542 WHEELCHAIR MNGMENT TRAINING: CPT | Mod: GO,GT,95 | Performed by: OCCUPATIONAL THERAPIST

## 2021-11-16 NOTE — PROGRESS NOTES
Nader is a 76 year old who is being evaluated via a billable video visit.      How would you like to obtain your AVS? MyChart/Mail a copy  If the video visit is dropped, the invitation should be resent by: Send to e-mail at: [email or web address]  Will anyone else be joining your video visit? No      Video Start Time: 9 AM  Video-Visit Details    Type of service:  Video Visit    Video End Time:9:45 AM    Originating Location (pt. Location): Home    Distant Location (provider location):  [facility] NEUROLOGY CLIN[facility] [place]     Platform used for Video Visit: Makenzie/Dunia    Chief Complaint   Patient presents with     RECHECK     VIDEO VISIT RETURN       Angelo Jimenez

## 2021-11-16 NOTE — LETTER
11/16/2021       RE: Elier Leong  [address] [facility] [address]     Dear Colleague,    Thank you for referring your patient, Elier Leong, to the [facility] NEUROLOGY CL[facility]IC [place] at [facility]. Please see a copy of my visit note below.        [facility] [address]  3RD FLOOR  [facility] [id number] [phone]        November 22, 2021    Elier Leong                                                                                                                     [address] [facility] [address]     Doximity video visit 9 -9:45 AM (45 minutes)  Care coordination, documentation 25 minutes  Total 70 minutes      Mr. Barber was seen via video visit with his wife, Kelli, who was helpful with the technology and also helped with history. 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 I had last seen this gentleman in January 2021 and this was in follow up for a stroke that occurred around 11/16/20. At that time a   brain MRI showed a small subacute infarct near the R pre-central gyrus. The radiology impression was that it was located in the white matter though my impression was that it was quite close to the gyrus. MRA did not show any carotid disease though there was atherosclerosis in the PCA territory which is non-explanatory for this stroke location. There is a h/o strokes in 2014 and 2018. These strokes appear to be due to small vessel disease in etiology. In the 2014 scan there is one clear infarct in the posterior limb of the IC and possibly a small focus near the posterior horn of the lateral ventricle. Cardiac monitoring was done in February 2021 and did not show any AFIB. The patient had made good recovery but around 9/23/21, the patient's wife noted that his face was not symmetric and the R face was droopy. 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 The patient went to [facility] and a brain MRI showed subtle ischemic infarcts in L parietal WM and L May radiata. MRA showed narrowing of the R P2 and there was no other intracranial or cervical vessel narrowing. He had cardiac monitoring for about 8-9 days and there were runs of Paroxysmal atrial tachycardia and atrial events. I did not see any AFIB. I have requested the patient to review the cardiac monitoring results with their PCP. The patient is now at home and uses the wheelchair 98% time. He can walk with the walker but the increased use of WC is due to the progression of his PLS. He had initially been discharged on dual antiplatelets for 21 days and is now on 81 mg of aspirin. He was placed on Zetia 10 mg in the hospital and this was refilled by Cardiology recently.     ASSESSMENT / PLAN  Encounter Diagnoses   Name Primary?     Cerebrovascular accident (CVA), unspecified mechanism (H) Yes     Primary lateral sclerosis (HCC)      Intracranial arteriopathy, chronic (H)      This gentleman has had multiple strokes. The most recent event is in the subcortical WM of the L parietal area but it appears to be very close to the gyrus. Stroke mechanism can be considered as ESUS. Cardiac monitoring did not show AF to my review and the patient also has seen Cardiology re: this. I discussed the ILR but the patient did not want to have the ILR. The patient was on dual antiplatelets for many years but this was stopped this Summer due to GI bleeding. He is on 81 mg of aspirin and I have recommended he can go to 325 mg daily. The patient's wife had questions on Zetia and I have coninfirmed that this is reasonable given the PLS.    - Continue zetia 10 mg daily. Advised to discuss with PCP  - Advised to discuss tachycardia and other results on cardiac monitor with PCP  - 325 mg aspirin. Sensitive to aspirin confirmed last year.   - Follow up with PCP - no further follow up  with me is needed.               MRI 9/23/21                                                                     IMPRESSION:  1.  Small subtle clefts of acute to early subacute ischemic infarction in the left parietal white matter and left corona radiata.  2.  Age-related changes with chronic lacunar infarcts in the right thalamus and left cerebellar hemisphere.     CTA 9/23/21    IMPRESSION:   HEAD CTA:   1.  Moderate presumed atherosclerotic narrowing of the proximal P2 segment of the right posterior cerebral artery again noted without change.  2.  Otherwise, normal [place] CTA     NECK CTA:  1.  Normal neck CTA.    EXAM  Orientation: Normal; Language normal  Comprehension and very dysarthric production (spastic dysarthria) but content appropriate; Attention: normal  Cranial nerves: EOMI, L eye larger no face numbness tongue midline. Shoulder shrug symmetric     Motor: FFM decreased bilaterally  No drift;   Strength exam antigravity or better in both UE and LE    Sensory: no deficits to LT  Co-ordination normal in both UE   Largely confined to wheel chair      Aaron Brito MD

## 2021-11-16 NOTE — PROGRESS NOTES
Elier Leong is a 76 year old male who is being seen via a billable video visit.      Patient has given verbal consent for Video visit? Yes    Video Start Time: 1015    Telehealth Visit Details    Type of Service:  Telehealth    Video End Time (time video stopped): 1100    Originating Location (pt. location): Home    Additional Participants in Telehealth Visit: Wife    Distant Location (provider location):  [facility]     Mode of Communication (Audio Visual or Audio Only):  audio visual    [place], OT  November 16, 2021

## 2021-11-16 NOTE — PROGRESS NOTES
"   SEATING AND WHEELED MOBILITY ASSESSMENT  11/16/21 1000   Quick Adds   Quick Adds Certification;Current Manual Wheelchair   General Information    Rehab Discipline OT   Funding [employer]   Service Outpatient;Occupational Therapy;Seating/Wheeled Mobility Evaluation   Height 5'10\"   Weight 155   Start Of Care Date 11/16/21   Referring Physician Dr. Tejada    Orders Evaluate And Treat As Indicated;Per Therapist Evaluation   Orders Date 06/14/21   Others Present at Evaluation Wife   Patient/Caregiver Goals Power mobility   Rehabilitation Technology Supplier Mishel ALVAREZ   Current Community Support Family/Friend Caregiver   Patient role/Employment history Retired   Fall Risk Screen   Fall screen completed by OT   Have you fallen 2 or more times in the past year? Yes   Have you fallen and had an injury in the past year? Yes   Is patient a fall risk? Yes;Department fall risk interventions implemented   Fall screen comments Recent fall transfering from [facility]   Medical History   Onset Of Illness/injury Or Date Of Surgery 6/14/21   Medical Diagnosis PLS   Medical History CVA, lymphdema, incontinence   Current Manual Wheelchair   Manual Wheelchair Comments standard manual chair given to by friendernie for community mobility from [employer] loan pool   Home Accessibility   Living Environment House   Primary Entrance Covered Ramp   All Rooms Wheelchair Accessible Yes   Community ADL   Transportation Car  (crossover)   Community Mobility Requirements Medical Appointments;Shopping;Mandaeism   Cognitive/Visual/Hearing Status   Cognitive Screen Score dysarthric   Observations No Problems Observed During Evaluation   Vision Intact   Hearing Intact   ADL Status   Feeding Independent   Grooming/Hygiene Requires Assist   Dressing Requires Assist   Toileting Requires Assist;Incontinent;Uses Equipment  (grab bars, bidet)   Bathing Requires Assist;Uses Equipment  (shower chair)   Meal Preparation Unable   Home Management Unable   Balance "   Unsupported Sitting Balance Uses Upper Extremities for Balance   Sitting Balance in Chair Uses Upper Extremities for Balance   Standing Balance Uses Upper Extremities for Balance;Physical Assist Required   Ambulation   Ambulation Non Ambulatory   Ambulation Assist Requires Assist   Ambulation Equipment 4 Wheeled Walker with Seat   Ambulation Comments 50 feet total daily   Transfers   Transfer Assist Moderate Assist   Transfer Method Stand Pivot   Sleep/Rest   Sleep Surface/Equipment Adjustable sleep number bed   Wheelchair Ability   Wheelchair Ability Quick Adds Manual Chair;Wheelchair Use   Manual Wheelchair Propulsion   Manual Wheelchair Propulsion Unable   Wheelchair Use   Ability to Perform Weight Shifts Unable   Bed Confined Without Wheelchair? Yes   Hours in Wheelchair Daily 8   Hours Spent Alone Daily 0   Neuromuscular   History of Pressure Sores No   Current Pressure Sores No   Pain Yes   Pain Location Bilateral shoulders due to arthritis, working with pain specialist   Coordination UE Impaired;LE Impaired   Tone Hypotonic   Sensory Deficits Reported full sensations   Head and Neck   Head and Neck Position Flexed   Upper Extremities   UE ROM Bilateral shoulders limited to <90 degrees flexion and very limited rotation   UE Strength Shoulder 2/5 Otherwise 3+/5   Dominance Left   Pelvis   Anterior/Posterior Pelvis Position Posterior Tilt   Trunk   Anterior/Posterior Trunk Position Increased Thoracic Kyphosis   Lower Extremities   LE ROM WFL   LE Strength 3   Foot Positioning Plantar flexed   LE Comments lymph wraps   Patient Measurements   Other per atp after home eval   Education Assessment   Barriers to Learning Physical   Preferred Learning Style Listening;Demonstration   Assessment/Plan   Criteria for Skilled Interventions Met Yes, Treatment Indicated   Treatment Diagnosis impaired participation in MRADLS and IADLs   Therapy Frequency once   Planned Therapy Interventions Wheelchair Management/Propulsion  Training   Planned Therapy Interventions Comments Educated patient and wife on process to obtain a power wheelchair and its features.  Educated on loaner process and home trial.  [employer] to set up trial ASAP   Risks and benefits of treatment have been explained Yes   Patient/family & other staff in agreement with plan of care Yes   Session Time   OT Wheelchair Management Minutes (71655) 45   Certification   Certification date from 11/16/21   Certification date to 11/16/21   Adult OT Eval Goals   OT Eval Goals (Adult) 1    OT Goal 1   Goal Identifier wheelchair   Goal Description Patient to demonstrate a successful home trial with the recommended equipment   Goal Progress to be completed with vendor   Target Date 11/16/21   Date Met 11/16/21   Electronically signed by:  Mishel VAZQUEZ/SARAH, ATP      Occupational Therapist, Assistive   [phone]      fax: [phone]      [email or web address], [address]  [address]  [place] [address]

## 2021-11-16 NOTE — TELEPHONE ENCOUNTER
Jenny from [facility] care calling for orders for Velcro closure compression knee high  30-40 compression  Quantity 2 pair  Patient wants this mail to home address  Ok to call and lm [phone]

## 2021-11-23 ENCOUNTER — TELEPHONE (OUTPATIENT)
Dept: INTERNAL MEDICINE | Facility: CLINIC | Age: 76
End: 2021-11-23
Payer: COMMERCIAL

## 2021-11-23 NOTE — PROGRESS NOTES
[facility] [address]  [address] [facility] [address] [phone]          November 22, 2021    Elier NAVA Leong                                                                                                                     [address] [place] [id number]     Doximity video visit 9 -9:45 AM (45 minutes)  Care coordination, documentation 25 minutes  Total 70 minutes      Mr. Barber was seen via video visit with his wife, Kelli, who was helpful with the technology and also helped with history. 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 I had last seen this gentleman in January 2021 and this was in follow up for a stroke that occurred around 11/16/20. At that time a   brain MRI showed a small subacute infarct near the R pre-central gyrus. The radiology impression was that it was located in the white matter though my impression was that it was quite close to the gyrus. MRA did not show any carotid disease though there was atherosclerosis in the PCA territory which is non-explanatory for this stroke location. There is a h/o strokes in 2014 and 2018. These strokes appear to be due to small vessel disease in etiology. In the 2014 scan there is one clear infarct in the posterior limb of the IC and possibly a small focus near the posterior horn of the lateral ventricle. Cardiac monitoring was done in February 2021 and did not show any AFIB. The patient had made good recovery but around 9/23/21, the patient's wife noted that his face was not symmetric and the R face was droopy. 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 The patient went to [facility] and a brain MRI showed subtle ischemic infarcts in L parietal WM and L May radiata. MRA showed narrowing of the R P2 and there was no other intracranial or cervical vessel narrowing. He had cardiac monitoring for about 8-9 days  and there were runs of Paroxysmal atrial tachycardia and atrial events. I did not see any AFIB. I have requested the patient to review the cardiac monitoring results with their PCP. The patient is now at home and uses the wheelchair 98% time. He can walk with the walker but the increased use of WC is due to the progression of his PLS. He had initially been discharged on dual antiplatelets for 21 days and is now on 81 mg of aspirin. He was placed on Zetia 10 mg in the hospital and this was refilled by Cardiology recently.     ASSESSMENT / PLAN  Encounter Diagnoses   Name Primary?     Cerebrovascular accident (CVA), unspecified mechanism (H) Yes     Primary lateral sclerosis (HCC)      Intracranial arteriopathy, chronic (H)      This gentleman has had multiple strokes. The most recent event is in the subcortical WM of the L parietal area but it appears to be very close to the gyrus. Stroke mechanism can be considered as ESUS. Cardiac monitoring did not show AF to my review and the patient also has seen Cardiology re: this. I discussed the ILR but the patient did not want to have the ILR. The patient was on dual antiplatelets for many years but this was stopped this Summer due to GI bleeding. He is on 81 mg of aspirin and I have recommended he can go to 325 mg daily. The patient's wife had questions on Zetia and I have coninfirmed that this is reasonable given the PLS.    - Continue zetia 10 mg daily. Advised to discuss with PCP  - Advised to discuss tachycardia and other results on cardiac monitor with PCP  - 325 mg aspirin. Sensitive to aspirin confirmed last year.   - Follow up with PCP - no further follow up with me is needed.               MRI 9/23/21                                                                     IMPRESSION:  1.  Small subtle clefts of acute to early subacute ischemic infarction in the left parietal white matter and left corona radiata.  2.  Age-related changes with chronic lacunar infarcts  in the right thalamus and left cerebellar hemisphere.     CTA 9/23/21    IMPRESSION:   HEAD CTA:   1.  Moderate presumed atherosclerotic narrowing of the proximal P2 segment of the right posterior cerebral artery again noted without change.  2.  Otherwise, normal [place] CTA     NECK CTA:  1.  Normal neck CTA.    EXAM  Orientation: Normal; Language normal  Comprehension and very dysarthric production (spastic dysarthria) but content appropriate; Attention: normal  Cranial nerves: EOMI, L eye larger no face numbness tongue midline. Shoulder shrug symmetric     Motor: FFM decreased bilaterally  No drift;   Strength exam antigravity or better in both UE and LE    Sensory: no deficits to LT  Co-ordination normal in both UE   Largely confined to wheel chair          Aaron Brito MD

## 2021-11-23 NOTE — TELEPHONE ENCOUNTER
Jenny BLAKE ([phone]) with [facility] calls to report elevated BP at home visit today. /84 at her visit. Patient did have physical therapy evaluated between 11 and 12 today and BP at that time was 140/78. Physical therapy just had him do some arm exercises with their visit.     Patient states he feels fine, not having any symptoms. No missed doses of medications. His wife states that he did start Trospium about 1 week ago, taking in place of Oxybutynin.     Please call patient/family back with recommendations.     Rosamaria Nazario RN  [facility]

## 2021-11-23 NOTE — TELEPHONE ENCOUNTER
Angelina with [facility] needing verbal orders   For PHYSICAL THERAPY 1 time per week for 2 weeks for shoulder range of motion, lower extremity strength and balance    Call back number is [phone]

## 2021-11-24 ENCOUNTER — MYC MEDICAL ADVICE (OUTPATIENT)
Dept: INTERNAL MEDICINE | Facility: CLINIC | Age: 76
End: 2021-11-24
Payer: COMMERCIAL

## 2021-11-24 ENCOUNTER — TELEPHONE (OUTPATIENT)
Dept: INTERNAL MEDICINE | Facility: CLINIC | Age: 76
End: 2021-11-24
Payer: COMMERCIAL

## 2021-11-24 NOTE — TELEPHONE ENCOUNTER
Trospium usually does not have effects on BP.   I would want more readings over a few weeks that are above 150/90 before changing medication doses.

## 2021-11-24 NOTE — TELEPHONE ENCOUNTER
Call to wife. Advised. She gave some further BP readings.     He has stimulator in shoulder for 16 days. Called [employer]. She called company to see if this can change his BP. It is a pain blocker.     140/78, 150/78.   175/80, 171/72 yesterday.     Today. /93, 158/94.    FYI for Dr Ray.   Wife will call back/mychart with further readings.

## 2021-11-24 NOTE — TELEPHONE ENCOUNTER
Dr Ray already responded in the other encounter. Will wait for further reading per her recommendations.

## 2021-11-24 NOTE — TELEPHONE ENCOUNTER
[facility] health PT orders 11-   PHYSICAL THERAPY add 1W2 for strength balance endurance received via fax    Forms in Dr. mail box for review and signature.

## 2021-11-24 NOTE — TELEPHONE ENCOUNTER
It is possible to have elevated BP with the Trospium. He can stop it and monitor his BP.   Keep low salt diet.   If the Oxybutynin was helping, he can resume it. I am not sure why it was changed by urology. He can discuss this with them.

## 2021-11-29 ENCOUNTER — PATIENT OUTREACH (OUTPATIENT)
Dept: GASTROENTEROLOGY | Facility: CLINIC | Age: 76
End: 2021-11-29
Payer: COMMERCIAL

## 2021-11-29 NOTE — PROGRESS NOTES
Called pt to discuss follow up procedure, due 3-6 months after last procedure 8/19/21. Left VM    Procedure/Imaging/Clinic: ERCP   Physician: Donte   Timing: 3 months (from last procedure 8/26/21)   Procedure length: 45 min   Anesthesia: Gen   Dx: intraampullary carcinoma   Tier: 3   Location: [facility] OR     1600  Wife called back. In agreement with 2/17/22 date at [facility]  Pt would prefer not being the first case of the day.    Called to discuss with patient. Explained they can expect a call from  for date and time of procedure, will need a , someone to stay with them for 24 hours and should stay in town for 24 hours (within 45 min of Hospital) post procedure    Patient needs to get pre-op physical completed. If outside  health system will need physical faxed to number [phone]   If you do not get a preop physical, your procedure could be cancelled, patient voiced understanding*    Preop Plan: pre op exam to be scheduled within 30 days of procedure with Dr Ray     Med Review    Blood thinner -  none  ASA - yes, 81mg  Diabetic - none    COVID test discussed: yes, understands needs to be within 4 days of procedure    Patient Education r/t procedure: mychart     Verbalized understanding of all instructions. All questions answered.     Message sent to OR      Nathalie Levine, RN, BSN,   [facility]  Care coordinator

## 2021-11-30 ENCOUNTER — MEDICAL CORRESPONDENCE (OUTPATIENT)
Dept: HEALTH INFORMATION MANAGEMENT | Facility: CLINIC | Age: 76
End: 2021-11-30
Payer: COMMERCIAL

## 2021-11-30 ENCOUNTER — MYC MEDICAL ADVICE (OUTPATIENT)
Dept: INTERNAL MEDICINE | Facility: CLINIC | Age: 76
End: 2021-11-30
Payer: COMMERCIAL

## 2021-12-01 ENCOUNTER — PATIENT OUTREACH (OUTPATIENT)
Dept: NURSING | Facility: CLINIC | Age: 76
End: 2021-12-01
Payer: COMMERCIAL

## 2021-12-01 NOTE — LETTER
[place] [address]  [facility] [address]  [phone]    December 1, 2021    [address] [facility] [address]      [place],     My name is Cesia Hand, the Community Health Worker you spoke with on the phone today. I wanted to follow-up and send you the transportation resources we discussed:     Here is a link to different transportation resources available around [facility]:     [email or web address]./Transportation/[email or web address]/Documents/[facility] [email or web address].pdf    You can just copy and past that link into the search bar and it will bring you to a PDF of all different resources available.      Again, I wanted to thank you for taking the time to talk to me. If you have any questions or concerns, you can reach out to me the Community Health Worker at [phone]      PHILIPP Gonzalez. Public Health  Community Health Worker  [facility]  Clinic Care Coordination  [phone]

## 2021-12-01 NOTE — PROGRESS NOTES
Clinic Care Coordination Contact    Community Health Worker Follow Up    Care Gaps:     Health Maintenance Due   Topic Date Due     HEPATITIS C SCREENING  Never done     MICROALBUMIN  08/15/2020     MEDICARE ANNUAL WELLNESS VISIT  09/08/2021     FALL RISK ASSESSMENT  09/08/2021       Patient's spouse was focused on discussing current goals     Goals:   Goals Addressed as of 12/1/2021 at 10:16 AM                    Today    11/4/21       1. Respite Care/Private Duty HHC (pt-stated)   50%  40%    Added 7/26/21 by Rebecca Hernández RN      Goal Statement: I would like to receive resources for respite Care/Private Duty HHC companies.   Date Goal set: 7/26/21  Barriers: Multiple health concerns  Strengths: Patient and wife are engaged and motivated  Date to Achieve By: 12/31/21  Patient expressed understanding of goal: Yes  Action steps to achieve this goal:  1. I understand that RN CC will send via [employer] and the mail Respite Care/Private Duty HHC resources.   2. I will review resources and utilize as I see fit.   3. I will continue to work with care coordination for any additional resources and support.       11-04, CHW     Kelli states that [facility] does not do Chronic cares but only acute care.    She states after their episode [facility] with [facility], they will be transitioning to [facility].    Hopefully [facility] will have availability for them at that time.    However, Kelli understands that she can reach back out to CC if she needs to find another home care      12-1, CHW    Patient's spouse Kelli stated that they want to get an appointment in with the [facility] nurse at least once before their episode with [facility] ends on 12/07, however their home care nurse has been sick so one of their appointments has been cancelled.     The CHW encouraged them to reach out to [facility] to get their appointment in with someone else before their episode ends. Patient stated they might have to do that because  they have not heard back to reschedule their appointment.    Patient stated that [facility] has availability for OT and PT, however they do not know if they will have availability regarding respite care. CHW asked if they would like to be sent a list of agencies in their area that provide respite care, however patient declined as they have a lot on their plate.          2. Improve chronic symptoms (pt-stated)   30%  30%    Added 7/26/21 by Rebecca Hernández, RN      Goal Statement: I want my lower leg edema to resolve.   Date Goal set: 7/26/21  Barriers: Multiple health concerns  Strengths: Patient is motivated and engaged  Date to Achieve By: 12/31/21  Patient expressed understanding of goal: Yes  Action steps to achieve this goal:  1. I understand RN CC will message my PCP to request a diuretic medication be prescribed.   2. I will wear my compression stockings daily.   3. I will elevate my feet at rest.   4. I will take all my medications as prescribed.   5.  I will continue to work with care coordination for ny additional resources and support.     12-1, CHW     Patient's spouse stated that they are trying to get some appointments in with [facility] before their episode ends. CHW encouraged the patient to reach out to schedule an appointment.                Intervention and Education during outreach: CHW introduced self and sent the patient transportation resources within [facility] for when they get their power wheel chair. They are looking into purchasing a van, but these resources may be useful until they are able to purchase their van.     CHW Plan: CHW will outreach to patient in 1 month       12-1, CHW:    CHW followed up on a VM that was left. Patient expressed concern regarding billing from their insurance company "[employer]", however patient was able to resolve the issue between their insurance and [facility] billing.     PHILIPP Gonzalez. Public Health  Community Health Worker  [place]  [place] [facility] Care Coordination  [phone]  -----------------------------------------------------  Preferred contact: Kelli (spouse) [phone]  Next outreach: 1/3/22

## 2021-12-03 ENCOUNTER — TELEPHONE (OUTPATIENT)
Dept: INTERNAL MEDICINE | Facility: CLINIC | Age: 76
End: 2021-12-03
Payer: COMMERCIAL

## 2021-12-03 NOTE — TELEPHONE ENCOUNTER
Jenny at [facility] ([phone]) calls for orders for OT visit Postponed to 12/08 and Agency Discharge that day.  OK to leave a message.

## 2021-12-07 NOTE — TELEPHONE ENCOUNTER
Call to Jenny and german detailed message.   Verbal order given to approve visits until certification received for MD signature.

## 2021-12-09 NOTE — DISCHARGE SUMMARY
[facility]    Outpatient Occupational Therapy Discharge Note  Patient: Elier Leong  : 1945    Beginning/End Dates of Reporting Period:  10/19/21 to 10/19/21    Referring Provider: Gary Tejada MD    Therapy Diagnosis: lymphedema    Client Self Report:      Objective Measurements:    Outcome Measures (most recent score):  Lymphedema Life Impact Scale (score range 0-72). A higher score indicates greater impairment.: 30    Goals:   Goal Identifier GCB   Goal Description In order to promote fluid mobilization for increased ease of functional mobility and decreased infection risk, pt/cg will demonstrate independence with donning, doffing, and care of gradient compression bandages following education.   Target Date 21   Date Met      Progress (detail required for progress note):       Goal Identifier Garments   Goal Description In order to promote fluid mobilization for increased ease of functional mobility and decreased infection risk, pt/cg will demonstrate independence with donning, doffing, and care of compression garments following education.   Target Date 21   Date Met      Progress (detail required for progress note):       Goal Identifier HEP   Goal Description In order to promote fluid mobilization for increased ease of functional mobility and decreased infection risk, pt will demonstrate independence with self-MLD and/or exercises to facilitate the lymphatic system.   Target Date 21   Date Met      Progress (detail required for progress note):       Goal Identifier Home Management   Goal Description In order to reduce risk of infection and promote ind with long term management of lymphedema, pt will verbalize understanding of skin care routine, precautions/contraindications, and when to seek further treatment following education.   Target Date 21   Date Met      Progress  (detail required for progress note):       Goal Identifier Volume   Goal Description In order to promote improved fit of clothing, functional mobility for ADL, and decreased infection risk, pt will demonstrate a 500mL reduction in R LE volume and 250mL reduction in L LE volume by discharge.   Target Date     Date Met      Progress (detail required for progress note):       Plan:  Discharge from therapy.    Discharge:    Reason for Discharge: Patient has failed to schedule further appointments.    Equipment Issued: none    Discharge Plan: f/u with HH lymphedema

## 2021-12-14 ENCOUNTER — MYC MEDICAL ADVICE (OUTPATIENT)
Dept: INTERNAL MEDICINE | Facility: CLINIC | Age: 76
End: 2021-12-14
Payer: COMMERCIAL

## 2021-12-14 NOTE — PROGRESS NOTES
REQUISITION AND JUSTIFICATION FOR DURABLE MEDICAL EQUIPMENT    Patient Name:  Elier Leong  MR #:  [id number]  :  1945  Age/Gender:  76 year old male  Visit Date:  Elier Leong seen for seating and wheeled mobility evaluation by Mishel Hills OTR/L,ATP and ATP from [facility] [place] on 21 (via virtual visit due to Pandemic and limitations with mobility outside of his home)    CLINICAL CRITERIA FOR MOBILITY ASSISTIVE EQUIPMENT  Coverage Criteria Per Local Coverage Determination  A) Elier has mobility limitations due to Primary Lateral Sclerosis (PLS), CVA, lymphedema, and incontinence that significantly impairs his ability to participate in all of his mobility-related activities of daily living (MRADL). Specifically affected are toileting (being able to get there in time to prevent accidents), dressing, and bathing (getting into the bathroom of designated place). Current equipment used is loaner manual chair from ALS association that he cannot independently propel. This patient needs the new equipment requested to be able to increase safe and independently participate in MRADLS and IADLS. Please see additional documentation in the seating and wheeled mobility report for details.   Elier had a successful clinical trial here, and also a successful trial at home with the recommended equipment. Elier is very willing and physically / cognitively able to use the recommended equipment to assist his with mobility-related activities of daily living and general mobility. A group 3 power wheelchair is being requested because it has better suspension for a smooth ride and has the capabilities of expandable electronics to operate the  power seat functions Elier needs for independence with his activities of daily living. A Group 2 power wheelchair does not have sufficient electronics to support this patient's progressive neurological deficits due to ALS.  B) Elier's mobility limitation cannot be sufficiently and  safely resolved by the use of an appropriately fitted cane or walker because is no longer ambulatory, requiring assistance for only up to 50 feet daily for exercise. . Strength of legs was not formally testing due to video call but appears to be ~3/5 due to moving through some ROM but not able to hold any position for one maximal repetition. Fatigue also impacts this patient's ability to ambulate, regardless of the gait aid.    C) Elier does not have sufficient upper extremity function to self-propel an optimally-configured manual wheelchair in his home to perform MRADLs during a typical day due to limitations in strength, endurance, range of motion, and coordination. Distance and time to propel a light weight manual wheelchair Nt due to significant Ue involvement.  Strength of arms is shoulders 2/5 otherwise 3+/5 due to lack of ranges and ability to hold a position.  D)  Elier is not able to use a POV/scooter because it will not fit in his home environment. Elier is unable to safely transfer to and from a POV, unable to operate the tiller steering system, and unable to maintain postural stability and position while operating the POV. Elier needs more appropriate seating and positioning than any scooter seat provides.  E) The need for this equipment is LIFETIME.     RECOMMENDATIONS FOR MOBILITY BASE, SEATING SYSTEM AND COMPONENTS  Quantum Q6 Claudia Estefanistephanie 3MP-SS - this mid wheel drive power wheelchair is needed for this patient to continue to have independent mobility and to be able to allow him to complete or assist in all of his mobility related activities of daily living (MRADLs). This wheelchair will also have the seating and positioning system and seat function he needs to be able to use and tolerate the wheelchair full time, and have functional and comfortable positioning for a full day's activities. Elier has Primary Lateral Sclerosis (PLS), CVA, lymphedema, and incontinence which impairs his ability to  move in his home without the use of the requested wheelchair. He lives in an ramp home with leve access and uses transport services for transportation.    100 amp Q-Logic 3 EX controller -  Needed for operation of the joystick for mobility and seat functions    Harness for expandable controller - needs to be inline for communication between the controller and the joystick    Q-Logic3 EX Joystick - this is the joystick needed to be used by this patient for moving this wheelchair and also controlling the movement of the seat functions.    Swing away joystick mount - needed to be able to move the joystick out of the way during transfers, or when at the desk using the computer, or at the table eating, so as not to inadvertently hit the joystick, thus moving the wheelchair or turning the power on or off without his knowledge.    Power tilt - allows him to change himposition by rotating rearward without changing the angle of his hips or knees in order to allow for necessary pressure re-distribution and postural support to reduce the risk of skin breakdown. Due to atrophy of his buttocks and inability to hold a sufficient weight shift Elier is at high risk of developing pressure ulcers if not able to change his position. Due to compromised ability to exert himself for weight shifting, the power tilt seat allows him to do this by operating it through the joystick. He can also use a slight degree of tilt for assisting in her seating and positioning for normal functions during the day a to assist keeping him in a midline, erect and upright position by using the effect of gravity. It allows for changes in position for improved sleeping or rest breaks required due to decreased activity tolerance, eliminating the need for transfers in/out of the chair during the day and can promotes improved sitting and activity tolerance and independent repositioning for pain management    Power recline - Offers necessary pressure  re-distribution and postural support to reduce the risk of skin breakdown. It reduces pressures by allowing Elier to change the angle of their hips and knees into a more open and supine / recumbent position. This increases his tolerance and be able to remain in the requested wheelchair for a complete day and not be dependent on care givers to change her position or transfer him to another surface for logan/g/h care and/or comfort or resting.    This seating function combination is needed for this patient to be able to perform independent weight shifts and also to be able to change his seated position without the request of a care giver. Elier requires a powered seating system with both tilt and recline to optimize pressure distribution and postural repositioning.     - A tilt only system does not allow for opening the hip angle which can assist in developing contractures and recline will allow for increase ROM of the hip joint.     -  Elier is unable to tolerate long periods of time without the use of recline without pain.     - Elier is unable to complete bowel/bladder management in a tilt only chair - instead requiring an additional transfer to a flat surface or bed. The use of recline allows him to complete this ADL while in the wheelchair, reducing the number of transfers required in a day.    - Elier is unable to tolerate sitting at 0  tilt without trunk instability and significant flexion of head due to weakness, for the entirety of the day, however must achieve this position for ADL completion for short periods of time.     Recline alone can cause sliding forward and increase posterior pelvic tilt; the addition of power tilt reduces shear when returning to neutral position from recline. Also, tilting before reclining minimizes shearing along the trunk promoting skin health. Utilizing power tilt, recline and power legrests together, allows Elier to able to independently reposition properly in the  wheelchair throughout the day minimizing the effects of shearing and of spasticity. As well as allowing gravity to reposition the client without a caregiver present.    The medical justification for these seat functions is consistent with the RESNA Position Papers regarding these seat function interventions (Benita et al., 2009). These seat functions will also reduce upper extremity strain per the Paralyzed [place]'s of [place] Guidelines for upper limb preservation (Shell, et al., 2005).    Power elevating seat - Vertical movement is necessary to allow Elier to function and participate in a three-dimensional world. This seat feature will increase his ability to reach by bringing him closer for better access with weak arms while reaching into cupboards or closets.  During the home trial he was able to use this feature to increase ease and safety and transfers and ability to reach into high cupboards for independence with kitchen tasks.  He is transferring  bed<=> wheelchair with stand pivot transfer and moderate care giver assistance. This requested seat lift feature promotes safety with and improved independence with lateral transfers by allowing a level transfer or transfer from a higher to lower surface, which is gravity-assisted.  It also facilitates forward transfer by allowing legs, hips to be more extended, thereby lessening the strength required for the user to perform a stand-pivot transfer.  Power seat elevation also allows the user to have eye contact with others and reduces cervical strain and pain (including headaches from poor positioning). Vertical rise also provides psycho-social benefits of being on peer level and speaking eye-to-eye. Additionally, seat elevation allows certain medications to be kept out of reach of children but remain accessible to the user.    ABBEY comfort Solid curved and padded back support - firm and contoured back support is needed to support Elier 's thoracolumbar area  "in an upright and midline position, with appropriate support pads as needed. This will provide support whether he is in the upright or tilted/reclined position. This back support is essential to provide sufficient lateral contour to maximize his postural alignment and minimize his tendency to develop scoliosis and other secondary complications.    Stealth 10\" headrest and mounting hardware - needed to keep Ingrids head in an erect, midline and upright position whether he is in the upright, tilted or reclined position. His head and neck need to be supported as well as the rest of his body.    Comfort Plus mounting hardware - needed for mounting the requested headrest in the most appropriate position on the back of the wheelchair for optimal head and neck support and to be able to be removed for transfers.     Power elevating foot legrest- Allows for elevation and extension of lower extremities while elevating. This can improve circulation and prevent/reduce edema (with recline/tilt combination).  The lower legs of this wheelchair user act as a reservoir for fluid accumulation due to lack of movement. Elevation of this patient's legs above the level of the heart (left atrium) is recommended as part of the management of edema in conjunction with other measures such as support garments  This patient has lower extremity dependent edema while sitting in his wheelchair, which resolves with elevation of his legs. This feature, when used with the power recline back or tilt seat, can increase Elier's sitting tolerance while positioning him in a more natural position. This can also be helpful if he fatigues and requires rests throughout the day, without transferring him back to bed.  Due to inability to perform a functional weight shifting, he is at risk for developing pressure ulcers. With the use of this feature it will allow for optimal weight shifting in conjunction with tilt and recline. Per STEFAN white paper on " elevating legrests, using elevating legrests and tilting more than 30 degrees in combination with full recline significantly improves lower leg hemodynamics status as measured by near-infrared spectroscopy (Nathen et al., 2010)  Additionally, these legrests can improve ground clearance to navigate thresholds and slopes and still allowing the legs to achieve a tight 90 degree position for typical driving conditions. This position shortens the overall functional wheelbase for improved maneuverability    Power Positioning electronics to be operated through the Q logic controller - this is needed to allow him to use the joystick of the wheelchair for control of mobility and operation of the power seat function. This is important for this patient with limited strength and coordination so as not to require a separate switch for operation of the seat function.    Calf supports- angle and height adjustable pads that attach to the legrests. These padded calf supports are necessary to support Shayne lower legs when the leg is elevated, or to keep feet from falling behind the footplates when in the neutral seated position. Calf supports are especially important when using a tilt-in-space power seating system and/or power articulating elevating legrests. The padding provides an additional surface to distribute pressure, and has a mild contour to accommodate the lower leg.    2 Batteries and  - gel sealed, and two are necessary to power the wheelchair. They are maintenance free and are safe for travel on the road or in the air. They are necessary to provide reliable use of the power wheelchair on a single charge.    Eleven Wireless Thigh Guides 4 X 8 pads - to hold thighs straight and not splay out to the side    2 Removable brackets -to be able to remove the pads for transfers     Chuck comfort 2 SPP seat cushion - this pressure distribution and positioning seat cushion will optimally  distribute seating pressures to prevent  pressure ulcers, but also provide a stable base of support for him to use during MRADLs.    This equipment is reasonable and necessary with reference to accepted standards of medical practice and treatment of this patient's condition and is not being recommended as a convenience item. Without this recommended equipment, he is highly likely to sustain injuries from falls, develop pressure sores or postural compensation, and/or be bed confined, which those costs far exceed the cost of the requested equipment.    Electronically signed by:  Mishel MARSHALL, ATP      Occupational Therapist, Assistive   [phone]      fax: [phone]      [email or web address], [address]  [place] [address]  December 14, 2021    I have read and concur with the above recommendations.    Physician Printed Name __________________________________________    Physician SIgnature  _____________________________________________    Date of SIgnature ______________________________    Physician Phone  ______________________________

## 2021-12-14 NOTE — PROGRESS NOTES
ALS EQUIPMENT POOL REQUESTS    Date: December 14, 2021    Therapist Requesting and contact info: Nimesh Hills       Patient Name: Elier Arias    Address [address] [place] [id number]    Contact Name:  Kelli  Phone # [phone]    Equipment/Items Needed:  MWD PWC with all seat functions    16x20 Quantum preferred Switch box for seat functions Drive Side:left    Cushion recommended: Chuck comfort SPP type foam/gel cushion        Height: 5'10    Weight: 155    Hip to Knee: 20.5  Knee - Heel: 18  Hip - Elbow:8  Hip - underarm:17  Hip - shoulder:23  Hip -head:30  Hip width:15  Chest width:15  Shoulder width:17    OTHER :  SPECIAL INSTRUCTIONS:   Vendor pt working with: nimesh ARTHUR from [employer]    Who will be contacting patient if equipment requested is not available and patient is placed on the    waiting list?     [employer], [place] Chapter    [address] [facility], [address] [address]    [phone] phone. [phone] toll free; [phone] f

## 2021-12-19 ENCOUNTER — MYC MEDICAL ADVICE (OUTPATIENT)
Dept: INTERNAL MEDICINE | Facility: CLINIC | Age: 76
End: 2021-12-19
Payer: COMMERCIAL

## 2021-12-22 ENCOUNTER — PATIENT OUTREACH (OUTPATIENT)
Dept: CARE COORDINATION | Facility: CLINIC | Age: 76
End: 2021-12-22
Payer: COMMERCIAL

## 2021-12-22 ASSESSMENT — ACTIVITIES OF DAILY LIVING (ADL): DEPENDENT_IADLS:: CLEANING;COOKING;LAUNDRY;SHOPPING;MEAL PREPARATION;MEDICATION MANAGEMENT;TRANSPORTATION;INCONTINENCE

## 2021-12-22 NOTE — PROGRESS NOTES
Care Coordination Clinician Chart Review  Situation: Patient chart reviewed by care coordinator.       Background: Care Coordination initial assessment and enrollment to Care Coordination was 7/2021.   Patient centered goals were developed with participation from patient.  RN CC handed patient off to CHW for continued outreach every 30 days.        Assessment: Per chart review, patient outreach completed by CC CHW on 12/1/2021.  Patient is actively working to accomplish goals.  Patient's goals remain appropriate and relevant at this time.   Patient is not due for updated Plan of Care.  Annual assessment will be due 9/2022.      Goals        1. Respite Care/Private Duty C (pt-stated)       Goal Statement: I would like to receive resources for respite Care/Private Duty HHC companies.   Date Goal set: 7/26/21  Barriers: Multiple health concerns  Strengths: Patient and wife are engaged and motivated  Date to Achieve By: Updated 7/1/2022  Patient expressed understanding of goal: Yes  Action steps to achieve this goal:  1. I understand that RN CC will send via [employer] and the mail Respite Care/Private Duty C resources.   2. I will review resources and utilize as I see fit.   3. I will continue to work with care coordination for any additional resources and support.                2. Improve chronic symptoms (pt-stated)       Goal Statement: I want my lower leg edema to resolve.   Date Goal set: 7/26/21  Barriers: Multiple health concerns  Strengths: Patient is motivated and engaged  Date to Achieve By: Updated 7/1/2022  Patient expressed understanding of goal: Yes  Action steps to achieve this goal:  1. I understand RN CC will message my PCP to request a diuretic medication be prescribed.   2. I will wear my compression stockings daily.   3. I will elevate my feet at rest.   4. I will take all my medications as prescribed.   5.  I will continue to work with care coordination for ny additional resources and support.                    Plan/Recommendations: The patient will continue working with Care Coordination to achieve goals as above.  CHW will involve RN CC as needed or if patient is ready to move to maintenance.  RN CC will continue to monitor progress to goals and CHW outreaches every 6 weeks.   Plan of Care updated and mailed to patient: Fanny Hernández RN Care Coordinator  [facility]  Email: [email or web address]  Phone: [phone]

## 2021-12-27 NOTE — TELEPHONE ENCOUNTER
Saranya calling from [employer] at [phone] (okay to leave detailed message/secure line).     Saranya reports patient's spouse is requesting private pay physical therapy 2 times a week for lymphedema wraps and care. Saranya looking for a verbal order okay for the above.     Routing to provider and covering provider for okay to above request.     Last office visit with Dr. Ray was on 9/30/2021.     Natalia MATHEW RN   [facility]

## 2021-12-28 NOTE — TELEPHONE ENCOUNTER
Call to [facility] and left detailed message.     Verbal order given to approve visits until certification received for MD signature.

## 2021-12-30 NOTE — TELEPHONE ENCOUNTER
Fax received from [email or web address] - Physician Order 12/30/21 for review and signature.  Put in Dr. Ray's in basket.

## 2021-12-30 NOTE — TELEPHONE ENCOUNTER
Call to Cheli from [facility] at [phone].   Spoke with Jesika. Jesika informed of Dr. Ray's okay for orders below.     Natalia MATHEW RN   [facility]

## 2021-12-30 NOTE — TELEPHONE ENCOUNTER
Cheli from [facility] ([phone]) calls for verbal  Orders to delay PT Start of Care to 01/01/22.  OK to leave a detailed message.

## 2021-12-30 NOTE — TELEPHONE ENCOUNTER
Fax received from [email or web address] - Physician Order 12/30/21 for review and signature.  Put in Dr. Ray's in basket.

## 2022-01-01 ENCOUNTER — PATIENT OUTREACH (OUTPATIENT)
Dept: CARE COORDINATION | Facility: CLINIC | Age: 77
End: 2022-01-01
Payer: COMMERCIAL

## 2022-01-01 ENCOUNTER — TELEPHONE (OUTPATIENT)
Dept: INTERNAL MEDICINE | Facility: CLINIC | Age: 77
End: 2022-01-01

## 2022-01-01 ENCOUNTER — TELEPHONE (OUTPATIENT)
Dept: INTERNAL MEDICINE | Facility: CLINIC | Age: 77
End: 2022-01-01
Payer: COMMERCIAL

## 2022-01-01 ENCOUNTER — MEDICAL CORRESPONDENCE (OUTPATIENT)
Dept: HEALTH INFORMATION MANAGEMENT | Facility: CLINIC | Age: 77
End: 2022-01-01

## 2022-01-01 ENCOUNTER — PATIENT OUTREACH (OUTPATIENT)
Dept: GASTROENTEROLOGY | Facility: CLINIC | Age: 77
End: 2022-01-01
Payer: COMMERCIAL

## 2022-01-01 ENCOUNTER — HOSPITAL ENCOUNTER (OUTPATIENT)
Facility: CLINIC | Age: 77
Discharge: HOME | End: 2022-06-09
Attending: INTERNAL MEDICINE | Admitting: INTERNAL MEDICINE
Payer: COMMERCIAL

## 2022-01-01 ENCOUNTER — LAB (OUTPATIENT)
Dept: LAB | Facility: CLINIC | Age: 77
End: 2022-01-01
Payer: COMMERCIAL

## 2022-01-01 ENCOUNTER — LAB (OUTPATIENT)
Dept: LAB | Facility: CLINIC | Age: 77
End: 2022-01-01
Attending: INTERNAL MEDICINE
Payer: COMMERCIAL

## 2022-01-01 ENCOUNTER — MYC MEDICAL ADVICE (OUTPATIENT)
Dept: INTERNAL MEDICINE | Facility: CLINIC | Age: 77
End: 2022-01-01
Payer: COMMERCIAL

## 2022-01-01 ENCOUNTER — TRANSFERRED RECORDS (OUTPATIENT)
Dept: HEALTH INFORMATION MANAGEMENT | Facility: CLINIC | Age: 77
End: 2022-01-01
Payer: COMMERCIAL

## 2022-01-01 ENCOUNTER — APPOINTMENT (OUTPATIENT)
Dept: CT IMAGING | Facility: CLINIC | Age: 77
DRG: 438 | End: 2022-01-01
Attending: EMERGENCY MEDICINE
Payer: COMMERCIAL

## 2022-01-01 ENCOUNTER — TRANSFERRED RECORDS (OUTPATIENT)
Dept: HEALTH INFORMATION MANAGEMENT | Facility: CLINIC | Age: 77
End: 2022-01-01

## 2022-01-01 ENCOUNTER — APPOINTMENT (OUTPATIENT)
Dept: CT IMAGING | Facility: CLINIC | Age: 77
End: 2022-01-01
Attending: EMERGENCY MEDICINE
Payer: COMMERCIAL

## 2022-01-01 ENCOUNTER — OFFICE VISIT (OUTPATIENT)
Dept: UROLOGY | Facility: CLINIC | Age: 77
End: 2022-01-01
Payer: COMMERCIAL

## 2022-01-01 ENCOUNTER — ANESTHESIA (OUTPATIENT)
Dept: SURGERY | Facility: CLINIC | Age: 77
End: 2022-01-01
Payer: COMMERCIAL

## 2022-01-01 ENCOUNTER — OFFICE VISIT (OUTPATIENT)
Dept: NEUROLOGY | Facility: CLINIC | Age: 77
End: 2022-01-01
Payer: COMMERCIAL

## 2022-01-01 ENCOUNTER — TELEPHONE (OUTPATIENT)
Dept: GASTROENTEROLOGY | Facility: CLINIC | Age: 77
End: 2022-01-01
Payer: COMMERCIAL

## 2022-01-01 ENCOUNTER — APPOINTMENT (OUTPATIENT)
Dept: GENERAL RADIOLOGY | Facility: CLINIC | Age: 77
End: 2022-01-01
Attending: INTERNAL MEDICINE
Payer: COMMERCIAL

## 2022-01-01 ENCOUNTER — TELEPHONE (OUTPATIENT)
Dept: ONCOLOGY | Facility: CLINIC | Age: 77
End: 2022-01-01

## 2022-01-01 ENCOUNTER — MYC MEDICAL ADVICE (OUTPATIENT)
Dept: CARDIOLOGY | Facility: CLINIC | Age: 77
End: 2022-01-01

## 2022-01-01 ENCOUNTER — MYC MEDICAL ADVICE (OUTPATIENT)
Dept: INTERNAL MEDICINE | Facility: CLINIC | Age: 77
End: 2022-01-01

## 2022-01-01 ENCOUNTER — APPOINTMENT (OUTPATIENT)
Dept: GENERAL RADIOLOGY | Facility: CLINIC | Age: 77
DRG: 438 | End: 2022-01-01
Attending: INTERNAL MEDICINE
Payer: COMMERCIAL

## 2022-01-01 ENCOUNTER — HEALTH MAINTENANCE LETTER (OUTPATIENT)
Age: 77
End: 2022-01-01

## 2022-01-01 ENCOUNTER — MEDICAL CORRESPONDENCE (OUTPATIENT)
Dept: HEALTH INFORMATION MANAGEMENT | Facility: CLINIC | Age: 77
End: 2022-01-01
Payer: COMMERCIAL

## 2022-01-01 ENCOUNTER — HOSPITAL ENCOUNTER (EMERGENCY)
Facility: CLINIC | Age: 77
Discharge: HOME | End: 2022-07-22
Attending: EMERGENCY MEDICINE | Admitting: EMERGENCY MEDICINE
Payer: COMMERCIAL

## 2022-01-01 ENCOUNTER — APPOINTMENT (OUTPATIENT)
Dept: CT IMAGING | Facility: CLINIC | Age: 77
DRG: 438 | End: 2022-01-01
Attending: INTERNAL MEDICINE
Payer: COMMERCIAL

## 2022-01-01 ENCOUNTER — TELEPHONE (OUTPATIENT)
Dept: NEUROLOGY | Facility: CLINIC | Age: 77
End: 2022-01-01

## 2022-01-01 ENCOUNTER — HOSPITAL ENCOUNTER (OUTPATIENT)
Dept: CARDIOLOGY | Facility: CLINIC | Age: 77
Discharge: HOME | End: 2022-06-24
Attending: INTERNAL MEDICINE | Admitting: INTERNAL MEDICINE
Payer: COMMERCIAL

## 2022-01-01 ENCOUNTER — TELEPHONE (OUTPATIENT)
Dept: CARDIOLOGY | Facility: CLINIC | Age: 77
End: 2022-01-01

## 2022-01-01 ENCOUNTER — MYC MEDICAL ADVICE (OUTPATIENT)
Dept: NEUROLOGY | Facility: CLINIC | Age: 77
End: 2022-01-01
Payer: COMMERCIAL

## 2022-01-01 ENCOUNTER — THERAPY VISIT (OUTPATIENT)
Dept: SPEECH THERAPY | Facility: CLINIC | Age: 77
End: 2022-01-01

## 2022-01-01 ENCOUNTER — PRE VISIT (OUTPATIENT)
Dept: ONCOLOGY | Facility: CLINIC | Age: 77
End: 2022-01-01

## 2022-01-01 ENCOUNTER — PATIENT OUTREACH (OUTPATIENT)
Dept: NURSING | Facility: CLINIC | Age: 77
End: 2022-01-01
Payer: COMMERCIAL

## 2022-01-01 ENCOUNTER — PATIENT OUTREACH (OUTPATIENT)
Dept: CARE COORDINATION | Facility: CLINIC | Age: 77
End: 2022-01-01

## 2022-01-01 ENCOUNTER — PATIENT OUTREACH (OUTPATIENT)
Dept: INTERNAL MEDICINE | Facility: CLINIC | Age: 77
End: 2022-01-01

## 2022-01-01 ENCOUNTER — HOSPITAL ENCOUNTER (OUTPATIENT)
Dept: PHYSICAL THERAPY | Facility: CLINIC | Age: 77
Setting detail: THERAPIES SERIES
Discharge: HOME | End: 2022-10-04
Attending: PSYCHIATRY & NEUROLOGY
Payer: COMMERCIAL

## 2022-01-01 ENCOUNTER — OFFICE VISIT (OUTPATIENT)
Dept: INTERNAL MEDICINE | Facility: CLINIC | Age: 77
End: 2022-01-01
Payer: COMMERCIAL

## 2022-01-01 ENCOUNTER — ANESTHESIA EVENT (OUTPATIENT)
Dept: SURGERY | Facility: CLINIC | Age: 77
End: 2022-01-01
Payer: COMMERCIAL

## 2022-01-01 ENCOUNTER — MEDICAL CORRESPONDENCE (OUTPATIENT)
Dept: INTERNAL MEDICINE | Facility: CLINIC | Age: 77
End: 2022-01-01

## 2022-01-01 ENCOUNTER — NURSE TRIAGE (OUTPATIENT)
Dept: NURSING | Facility: CLINIC | Age: 77
End: 2022-01-01
Payer: COMMERCIAL

## 2022-01-01 ENCOUNTER — HOSPITAL ENCOUNTER (INPATIENT)
Facility: CLINIC | Age: 77
LOS: 3 days | Discharge: HOSPICE | DRG: 438 | End: 2022-11-26
Attending: EMERGENCY MEDICINE | Admitting: INTERNAL MEDICINE
Payer: COMMERCIAL

## 2022-01-01 ENCOUNTER — OFFICE VISIT (OUTPATIENT)
Dept: URGENT CARE | Facility: URGENT CARE | Age: 77
End: 2022-01-01
Payer: COMMERCIAL

## 2022-01-01 ENCOUNTER — ALLIED HEALTH/NURSE VISIT (OUTPATIENT)
Dept: NEUROLOGY | Facility: CLINIC | Age: 77
End: 2022-01-01

## 2022-01-01 ENCOUNTER — OFFICE VISIT (OUTPATIENT)
Dept: CARDIOLOGY | Facility: CLINIC | Age: 77
End: 2022-01-01
Payer: COMMERCIAL

## 2022-01-01 ENCOUNTER — VIRTUAL VISIT (OUTPATIENT)
Dept: ONCOLOGY | Facility: CLINIC | Age: 77
End: 2022-01-01
Attending: INTERNAL MEDICINE
Payer: COMMERCIAL

## 2022-01-01 VITALS
HEART RATE: 66 BPM | BODY MASS INDEX: 20.63 KG/M2 | SYSTOLIC BLOOD PRESSURE: 120 MMHG | OXYGEN SATURATION: 94 % | TEMPERATURE: 97.3 F | RESPIRATION RATE: 16 BRPM | WEIGHT: 152.3 LBS | DIASTOLIC BLOOD PRESSURE: 71 MMHG | HEIGHT: 72 IN

## 2022-01-01 VITALS — DIASTOLIC BLOOD PRESSURE: 65 MMHG | HEART RATE: 75 BPM | SYSTOLIC BLOOD PRESSURE: 102 MMHG

## 2022-01-01 VITALS
SYSTOLIC BLOOD PRESSURE: 122 MMHG | TEMPERATURE: 97.2 F | DIASTOLIC BLOOD PRESSURE: 70 MMHG | BODY MASS INDEX: 24.39 KG/M2 | WEIGHT: 170 LBS | HEART RATE: 62 BPM | OXYGEN SATURATION: 98 %

## 2022-01-01 VITALS
RESPIRATION RATE: 16 BRPM | HEART RATE: 64 BPM | SYSTOLIC BLOOD PRESSURE: 151 MMHG | DIASTOLIC BLOOD PRESSURE: 79 MMHG | OXYGEN SATURATION: 97 %

## 2022-01-01 VITALS — SYSTOLIC BLOOD PRESSURE: 116 MMHG | DIASTOLIC BLOOD PRESSURE: 71 MMHG | HEART RATE: 66 BPM | OXYGEN SATURATION: 98 %

## 2022-01-01 VITALS
HEART RATE: 65 BPM | SYSTOLIC BLOOD PRESSURE: 134 MMHG | OXYGEN SATURATION: 98 % | BODY MASS INDEX: 21.81 KG/M2 | DIASTOLIC BLOOD PRESSURE: 73 MMHG | RESPIRATION RATE: 16 BRPM | WEIGHT: 152 LBS

## 2022-01-01 VITALS
TEMPERATURE: 97.3 F | RESPIRATION RATE: 14 BRPM | OXYGEN SATURATION: 99 % | DIASTOLIC BLOOD PRESSURE: 70 MMHG | SYSTOLIC BLOOD PRESSURE: 127 MMHG | HEART RATE: 70 BPM

## 2022-01-01 VITALS
BODY MASS INDEX: 23.05 KG/M2 | WEIGHT: 161 LBS | SYSTOLIC BLOOD PRESSURE: 124 MMHG | OXYGEN SATURATION: 96 % | HEIGHT: 70 IN | HEART RATE: 63 BPM | DIASTOLIC BLOOD PRESSURE: 78 MMHG

## 2022-01-01 VITALS
OXYGEN SATURATION: 95 % | SYSTOLIC BLOOD PRESSURE: 139 MMHG | HEIGHT: 70 IN | BODY MASS INDEX: 22.03 KG/M2 | HEART RATE: 66 BPM | WEIGHT: 153.88 LBS | RESPIRATION RATE: 18 BRPM | DIASTOLIC BLOOD PRESSURE: 84 MMHG | TEMPERATURE: 98 F

## 2022-01-01 VITALS
HEART RATE: 67 BPM | RESPIRATION RATE: 9 BRPM | DIASTOLIC BLOOD PRESSURE: 86 MMHG | TEMPERATURE: 98.3 F | SYSTOLIC BLOOD PRESSURE: 169 MMHG | OXYGEN SATURATION: 97 %

## 2022-01-01 DIAGNOSIS — L08.9 SOFT TISSUE INFECTION: ICD-10-CM

## 2022-01-01 DIAGNOSIS — I10 BENIGN ESSENTIAL HYPERTENSION: Primary | ICD-10-CM

## 2022-01-01 DIAGNOSIS — C61 PROSTATE CA (H): ICD-10-CM

## 2022-01-01 DIAGNOSIS — G12.23 PRIMARY LATERAL SCLEROSES (H): Primary | ICD-10-CM

## 2022-01-01 DIAGNOSIS — K86.89 PANCREATIC MASS: ICD-10-CM

## 2022-01-01 DIAGNOSIS — I10 BENIGN ESSENTIAL HYPERTENSION: ICD-10-CM

## 2022-01-01 DIAGNOSIS — K85.80 OTHER ACUTE PANCREATITIS WITHOUT INFECTION OR NECROSIS: ICD-10-CM

## 2022-01-01 DIAGNOSIS — E78.5 HYPERLIPIDEMIA LDL GOAL <100: ICD-10-CM

## 2022-01-01 DIAGNOSIS — G12.23 PRIMARY LATERAL SCLEROSES (H): ICD-10-CM

## 2022-01-01 DIAGNOSIS — G12.21 ALS (AMYOTROPHIC LATERAL SCLEROSIS) (H): Primary | ICD-10-CM

## 2022-01-01 DIAGNOSIS — Z53.9 DIAGNOSIS NOT YET DEFINED: Primary | ICD-10-CM

## 2022-01-01 DIAGNOSIS — C24.8 PERI-AMPULLARY CARCINOMA (H): Primary | ICD-10-CM

## 2022-01-01 DIAGNOSIS — C24.1 AMPULLARY CARCINOMA (H): ICD-10-CM

## 2022-01-01 DIAGNOSIS — L30.9 DERMATITIS: ICD-10-CM

## 2022-01-01 DIAGNOSIS — G12.23 PRIMARY LATERAL SCLEROSIS (H): ICD-10-CM

## 2022-01-01 DIAGNOSIS — Z51.5 HOSPICE CARE PATIENT: Primary | ICD-10-CM

## 2022-01-01 DIAGNOSIS — C24.8 PERI-AMPULLARY CARCINOMA (H): ICD-10-CM

## 2022-01-01 DIAGNOSIS — Z01.818 PREOP EXAMINATION: Primary | ICD-10-CM

## 2022-01-01 DIAGNOSIS — R35.0 URINARY FREQUENCY: ICD-10-CM

## 2022-01-01 DIAGNOSIS — Z11.59 ENCOUNTER FOR SCREENING FOR OTHER VIRAL DISEASES: Primary | ICD-10-CM

## 2022-01-01 DIAGNOSIS — Z11.59 ENCOUNTER FOR SCREENING FOR OTHER VIRAL DISEASES: ICD-10-CM

## 2022-01-01 DIAGNOSIS — N32.81 OVERACTIVE BLADDER: Primary | ICD-10-CM

## 2022-01-01 DIAGNOSIS — M62.838 MUSCLE SPASTICITY: ICD-10-CM

## 2022-01-01 DIAGNOSIS — Z86.73 HISTORY OF CVA (CEREBROVASCULAR ACCIDENT): ICD-10-CM

## 2022-01-01 DIAGNOSIS — R47.1 DYSARTHRIA: ICD-10-CM

## 2022-01-01 DIAGNOSIS — H01.9 INFECTION OF EYELID: Primary | ICD-10-CM

## 2022-01-01 DIAGNOSIS — R30.0 DYSURIA: Primary | ICD-10-CM

## 2022-01-01 DIAGNOSIS — C24.1 CANCER OF AMPULLA OF VATER (H): ICD-10-CM

## 2022-01-01 DIAGNOSIS — C61 PROSTATE CA (H): Primary | ICD-10-CM

## 2022-01-01 DIAGNOSIS — G12.23 PRIMARY LATERAL SCLEROSIS (H): Primary | ICD-10-CM

## 2022-01-01 LAB
ALBUMIN SERPL BCG-MCNC: 3.1 G/DL (ref 3.5–5.2)
ALBUMIN SERPL BCG-MCNC: 4.3 G/DL (ref 3.5–5.2)
ALBUMIN SERPL-MCNC: 3.7 G/DL (ref 3.4–5)
ALBUMIN UR-MCNC: NEGATIVE MG/DL
ALP SERPL-CCNC: 155 U/L (ref 40–129)
ALP SERPL-CCNC: 179 U/L (ref 40–129)
ALP SERPL-CCNC: 86 U/L (ref 40–150)
ALT SERPL W P-5'-P-CCNC: 24 U/L (ref 0–70)
ALT SERPL W P-5'-P-CCNC: 48 U/L (ref 10–50)
ALT SERPL W P-5'-P-CCNC: 84 U/L (ref 10–50)
ANION GAP SERPL CALCULATED.3IONS-SCNC: 11 MMOL/L (ref 7–15)
ANION GAP SERPL CALCULATED.3IONS-SCNC: 12 MMOL/L (ref 7–15)
ANION GAP SERPL CALCULATED.3IONS-SCNC: 12 MMOL/L (ref 7–15)
ANION GAP SERPL CALCULATED.3IONS-SCNC: 4 MMOL/L (ref 3–14)
ANION GAP SERPL CALCULATED.3IONS-SCNC: 9 MMOL/L (ref 7–15)
APPEARANCE UR: CLEAR
AST SERPL W P-5'-P-CCNC: 103 U/L (ref 10–50)
AST SERPL W P-5'-P-CCNC: 27 U/L (ref 0–45)
AST SERPL W P-5'-P-CCNC: 58 U/L (ref 10–50)
ATRIAL RATE - MUSE: 71 BPM
BASOPHILS # BLD AUTO: 0 10E3/UL (ref 0–0.2)
BASOPHILS # BLD AUTO: 0 10E3/UL (ref 0–0.2)
BASOPHILS NFR BLD AUTO: 0 %
BASOPHILS NFR BLD AUTO: 1 %
BILIRUB SERPL-MCNC: 0.4 MG/DL
BILIRUB SERPL-MCNC: 0.6 MG/DL (ref 0.2–1.3)
BILIRUB SERPL-MCNC: 0.8 MG/DL
BILIRUB UR QL STRIP: NEGATIVE
BUN SERPL-MCNC: 15 MG/DL (ref 7–30)
BUN SERPL-MCNC: 17.7 MG/DL (ref 8–23)
BUN SERPL-MCNC: 18 MG/DL (ref 7–30)
BUN SERPL-MCNC: 25 MG/DL (ref 7–30)
BUN SERPL-MCNC: 26.1 MG/DL (ref 8–23)
BUN SERPL-MCNC: 29.5 MG/DL (ref 8–23)
BUN SERPL-MCNC: 32.3 MG/DL (ref 8–23)
CALCIUM SERPL-MCNC: 8.3 MG/DL (ref 8.8–10.2)
CALCIUM SERPL-MCNC: 8.7 MG/DL (ref 8.8–10.2)
CALCIUM SERPL-MCNC: 8.8 MG/DL (ref 8.8–10.2)
CALCIUM SERPL-MCNC: 9.1 MG/DL (ref 8.5–10.1)
CALCIUM SERPL-MCNC: 9.3 MG/DL (ref 8.5–10.1)
CALCIUM SERPL-MCNC: 9.3 MG/DL (ref 8.5–10.1)
CALCIUM SERPL-MCNC: 9.5 MG/DL (ref 8.8–10.2)
CHLORIDE BLD-SCNC: 103 MMOL/L (ref 94–109)
CHLORIDE BLD-SCNC: 105 MMOL/L (ref 94–109)
CHLORIDE BLD-SCNC: 98 MMOL/L (ref 94–109)
CHLORIDE SERPL-SCNC: 101 MMOL/L (ref 98–107)
CHLORIDE SERPL-SCNC: 89 MMOL/L (ref 98–107)
CHLORIDE SERPL-SCNC: 95 MMOL/L (ref 98–107)
CHLORIDE SERPL-SCNC: 98 MMOL/L (ref 98–107)
CO2 SERPL-SCNC: 29 MMOL/L (ref 20–32)
COLOR UR AUTO: YELLOW
CREAT BLD-MCNC: 0.7 MG/DL (ref 0.7–1.3)
CREAT SERPL-MCNC: 0.64 MG/DL (ref 0.66–1.25)
CREAT SERPL-MCNC: 0.65 MG/DL (ref 0.66–1.25)
CREAT SERPL-MCNC: 0.75 MG/DL (ref 0.66–1.25)
CREAT SERPL-MCNC: 0.75 MG/DL (ref 0.67–1.17)
CREAT SERPL-MCNC: 0.84 MG/DL (ref 0.67–1.17)
CREAT SERPL-MCNC: 0.84 MG/DL (ref 0.67–1.17)
CREAT SERPL-MCNC: 0.92 MG/DL (ref 0.67–1.17)
D DIMER PPP FEU-MCNC: 10.47 UG/ML FEU (ref 0–0.5)
DEPRECATED HCO3 PLAS-SCNC: 20 MMOL/L (ref 22–29)
DEPRECATED HCO3 PLAS-SCNC: 20 MMOL/L (ref 22–29)
DEPRECATED HCO3 PLAS-SCNC: 21 MMOL/L (ref 22–29)
DEPRECATED HCO3 PLAS-SCNC: 27 MMOL/L (ref 22–29)
DIASTOLIC BLOOD PRESSURE - MUSE: NORMAL MMHG
EOSINOPHIL # BLD AUTO: 0.1 10E3/UL (ref 0–0.7)
EOSINOPHIL # BLD AUTO: 0.1 10E3/UL (ref 0–0.7)
EOSINOPHIL NFR BLD AUTO: 1 %
EOSINOPHIL NFR BLD AUTO: 1 %
ERCP: NORMAL
ERYTHROCYTE [DISTWIDTH] IN BLOOD BY AUTOMATED COUNT: 14.6 % (ref 10–15)
ERYTHROCYTE [DISTWIDTH] IN BLOOD BY AUTOMATED COUNT: 14.8 % (ref 10–15)
ERYTHROCYTE [DISTWIDTH] IN BLOOD BY AUTOMATED COUNT: 17.2 % (ref 10–15)
ERYTHROCYTE [DISTWIDTH] IN BLOOD BY AUTOMATED COUNT: 18.6 % (ref 10–15)
ERYTHROCYTE [DISTWIDTH] IN BLOOD BY AUTOMATED COUNT: 19.2 % (ref 10–15)
EXPTIME-PRE: 6.18 SEC
EXPTIME-PRE: 6.64 SEC
FEF2575-%PRED-PRE: 159 %
FEF2575-%PRED-PRE: 249 %
FEF2575-PRE: 3.42 L/SEC
FEF2575-PRE: 5.36 L/SEC
FEF2575-PRED: 2.15 L/SEC
FEF2575-PRED: 2.15 L/SEC
FEFMAX-%PRED-PRE: 64 %
FEFMAX-%PRED-PRE: 88 %
FEFMAX-PRE: 4.94 L/SEC
FEFMAX-PRE: 6.7 L/SEC
FEFMAX-PRED: 7.6 L/SEC
FEFMAX-PRED: 7.6 L/SEC
FEV1-%PRED-PRE: 72 %
FEV1-%PRED-PRE: 74 %
FEV1-PRE: 2.15 L
FEV1-PRE: 2.22 L
FEV1FEV6-PRE: 98 %
FEV1FEV6-PRE: 99 %
FEV1FEV6-PRED: 77 %
FEV1FEV6-PRED: 77 %
FEV1FVC-PRE: 96 %
FEV1FVC-PRE: 98 %
FEV1FVC-PRED: 75 %
FEV1FVC-PRED: 75 %
FIFMAX-PRE: 4.05 L/SEC
FIFMAX-PRE: 4.83 L/SEC
FVC-%PRED-PRE: 55 %
FVC-%PRED-PRE: 57 %
FVC-PRE: 2.23 L
FVC-PRE: 2.28 L
FVC-PRED: 3.99 L
FVC-PRED: 3.99 L
GFR SERPL CREATININE-BSD FRML MDRD: 85 ML/MIN/1.73M2
GFR SERPL CREATININE-BSD FRML MDRD: 89 ML/MIN/1.73M2
GFR SERPL CREATININE-BSD FRML MDRD: 89 ML/MIN/1.73M2
GFR SERPL CREATININE-BSD FRML MDRD: >60 ML/MIN/1.73M2
GFR SERPL CREATININE-BSD FRML MDRD: >90 ML/MIN/1.73M2
GLUCOSE BLD-MCNC: 100 MG/DL (ref 70–99)
GLUCOSE BLD-MCNC: 102 MG/DL (ref 70–99)
GLUCOSE BLD-MCNC: 97 MG/DL (ref 70–99)
GLUCOSE SERPL-MCNC: 116 MG/DL (ref 70–99)
GLUCOSE SERPL-MCNC: 82 MG/DL (ref 70–99)
GLUCOSE SERPL-MCNC: 87 MG/DL (ref 70–99)
GLUCOSE SERPL-MCNC: 98 MG/DL (ref 70–99)
GLUCOSE UR STRIP-MCNC: NEGATIVE MG/DL
HCT VFR BLD AUTO: 36 % (ref 40–53)
HCT VFR BLD AUTO: 39.9 % (ref 40–53)
HCT VFR BLD AUTO: 40.1 % (ref 40–53)
HCT VFR BLD AUTO: 41.4 % (ref 40–53)
HCT VFR BLD AUTO: 43.4 % (ref 40–53)
HGB BLD-MCNC: 11.9 G/DL (ref 13.3–17.7)
HGB BLD-MCNC: 12.9 G/DL (ref 13.3–17.7)
HGB BLD-MCNC: 13 G/DL (ref 13.3–17.7)
HGB BLD-MCNC: 13.1 G/DL (ref 13.3–17.7)
HGB BLD-MCNC: 13.8 G/DL (ref 13.3–17.7)
HGB UR QL STRIP: NEGATIVE
HOLD SPECIMEN: NORMAL
IMM GRANULOCYTES # BLD: 0 10E3/UL
IMM GRANULOCYTES # BLD: 0.1 10E3/UL
IMM GRANULOCYTES NFR BLD: 1 %
IMM GRANULOCYTES NFR BLD: 1 %
INR PPP: 1.06 (ref 0.85–1.15)
INTERPRETATION ECG - MUSE: NORMAL
KETONES UR STRIP-MCNC: NEGATIVE MG/DL
LEUKOCYTE ESTERASE UR QL STRIP: NEGATIVE
LIPASE SERPL-CCNC: 151 U/L (ref 13–60)
LIPASE SERPL-CCNC: 466 U/L (ref 13–60)
LVEF ECHO: NORMAL
LYMPHOCYTES # BLD AUTO: 1.3 10E3/UL (ref 0.8–5.3)
LYMPHOCYTES # BLD AUTO: 1.7 10E3/UL (ref 0.8–5.3)
LYMPHOCYTES NFR BLD AUTO: 18 %
LYMPHOCYTES NFR BLD AUTO: 19 %
MAGNESIUM SERPL-MCNC: 1.5 MG/DL (ref 1.7–2.3)
MAGNESIUM SERPL-MCNC: 2 MG/DL (ref 1.7–2.3)
MAGNESIUM SERPL-MCNC: 2.1 MG/DL (ref 1.7–2.3)
MAGNESIUM SERPL-MCNC: 2.3 MG/DL (ref 1.7–2.3)
MAGNESIUM SERPL-MCNC: 2.8 MG/DL (ref 1.7–2.3)
MCH RBC QN AUTO: 26.3 PG (ref 26.5–33)
MCH RBC QN AUTO: 26.4 PG (ref 26.5–33)
MCH RBC QN AUTO: 26.6 PG (ref 26.5–33)
MCH RBC QN AUTO: 29.1 PG (ref 26.5–33)
MCH RBC QN AUTO: 29.6 PG (ref 26.5–33)
MCHC RBC AUTO-ENTMCNC: 31.4 G/DL (ref 31.5–36.5)
MCHC RBC AUTO-ENTMCNC: 31.8 G/DL (ref 31.5–36.5)
MCHC RBC AUTO-ENTMCNC: 32.3 G/DL (ref 31.5–36.5)
MCHC RBC AUTO-ENTMCNC: 32.7 G/DL (ref 31.5–36.5)
MCHC RBC AUTO-ENTMCNC: 33.1 G/DL (ref 31.5–36.5)
MCV RBC AUTO: 81 FL (ref 78–100)
MCV RBC AUTO: 84 FL (ref 78–100)
MCV RBC AUTO: 84 FL (ref 78–100)
MCV RBC AUTO: 89 FL (ref 78–100)
MCV RBC AUTO: 90 FL (ref 78–100)
MEP-PRE: 28 CMH2O
MEP-PRE: 50 CMH2O
MIP-PRE: -34 CMH2O
MIP-PRE: -90 CMH2O
MONOCYTES # BLD AUTO: 0.6 10E3/UL (ref 0–1.3)
MONOCYTES # BLD AUTO: 1 10E3/UL (ref 0–1.3)
MONOCYTES NFR BLD AUTO: 11 %
MONOCYTES NFR BLD AUTO: 9 %
NEUTROPHILS # BLD AUTO: 5.3 10E3/UL (ref 1.6–8.3)
NEUTROPHILS # BLD AUTO: 5.9 10E3/UL (ref 1.6–8.3)
NEUTROPHILS NFR BLD AUTO: 67 %
NEUTROPHILS NFR BLD AUTO: 71 %
NITRATE UR QL: NEGATIVE
NRBC # BLD AUTO: 0 10E3/UL
NRBC # BLD AUTO: 0 10E3/UL
NRBC BLD AUTO-RTO: 0 /100
NRBC BLD AUTO-RTO: 0 /100
P AXIS - MUSE: NORMAL DEGREES
PATH REPORT.COMMENTS IMP SPEC: NORMAL
PATH REPORT.FINAL DX SPEC: NORMAL
PATH REPORT.GROSS SPEC: NORMAL
PATH REPORT.MICROSCOPIC SPEC OTHER STN: NORMAL
PATH REPORT.RELEVANT HX SPEC: NORMAL
PH UR STRIP: 7 [PH] (ref 5–7)
PHOTO IMAGE: NORMAL
PLATELET # BLD AUTO: 206 10E3/UL (ref 150–450)
PLATELET # BLD AUTO: 263 10E3/UL (ref 150–450)
PLATELET # BLD AUTO: 272 10E3/UL (ref 150–450)
PLATELET # BLD AUTO: 276 10E3/UL (ref 150–450)
PLATELET # BLD AUTO: 311 10E3/UL (ref 150–450)
POTASSIUM BLD-SCNC: 4.1 MMOL/L (ref 3.4–5.3)
POTASSIUM BLD-SCNC: 4.1 MMOL/L (ref 3.4–5.3)
POTASSIUM BLD-SCNC: 4.4 MMOL/L (ref 3.4–5.3)
POTASSIUM SERPL-SCNC: 3.6 MMOL/L (ref 3.4–5.3)
POTASSIUM SERPL-SCNC: 4.3 MMOL/L (ref 3.4–5.3)
POTASSIUM SERPL-SCNC: 4.3 MMOL/L (ref 3.4–5.3)
POTASSIUM SERPL-SCNC: 4.4 MMOL/L (ref 3.4–5.3)
PR INTERVAL - MUSE: 194 MS
PROT SERPL-MCNC: 5.4 G/DL (ref 6.4–8.3)
PROT SERPL-MCNC: 7.3 G/DL (ref 6.4–8.3)
PROT SERPL-MCNC: 7.3 G/DL (ref 6.8–8.8)
PSA SERPL-MCNC: <0.01 UG/L (ref 0–4)
QRS DURATION - MUSE: 90 MS
QT - MUSE: 440 MS
QTC - MUSE: 478 MS
R AXIS - MUSE: 17 DEGREES
RBC # BLD AUTO: 4.02 10E6/UL (ref 4.4–5.9)
RBC # BLD AUTO: 4.5 10E6/UL (ref 4.4–5.9)
RBC # BLD AUTO: 4.9 10E6/UL (ref 4.4–5.9)
RBC # BLD AUTO: 4.92 10E6/UL (ref 4.4–5.9)
RBC # BLD AUTO: 5.19 10E6/UL (ref 4.4–5.9)
SARS-COV-2 RNA RESP QL NAA+PROBE: NEGATIVE
SARS-COV-2 RNA RESP QL NAA+PROBE: NEGATIVE
SODIUM SERPL-SCNC: 127 MMOL/L (ref 136–145)
SODIUM SERPL-SCNC: 128 MMOL/L (ref 136–145)
SODIUM SERPL-SCNC: 130 MMOL/L (ref 136–145)
SODIUM SERPL-SCNC: 130 MMOL/L (ref 136–145)
SODIUM SERPL-SCNC: 131 MMOL/L (ref 133–144)
SODIUM SERPL-SCNC: 136 MMOL/L (ref 133–144)
SODIUM SERPL-SCNC: 138 MMOL/L (ref 133–144)
SP GR UR STRIP: 1.02 (ref 1–1.03)
SYSTOLIC BLOOD PRESSURE - MUSE: NORMAL MMHG
T AXIS - MUSE: 53 DEGREES
TROPONIN T SERPL HS-MCNC: 49 NG/L
TROPONIN T SERPL HS-MCNC: 50 NG/L
UROBILINOGEN UR STRIP-ACNC: 1 E.U./DL
VENTRICULAR RATE- MUSE: 71 BPM
WBC # BLD AUTO: 10.2 10E3/UL (ref 4–11)
WBC # BLD AUTO: 6.5 10E3/UL (ref 4–11)
WBC # BLD AUTO: 7.5 10E3/UL (ref 4–11)
WBC # BLD AUTO: 7.5 10E3/UL (ref 4–11)
WBC # BLD AUTO: 8.8 10E3/UL (ref 4–11)

## 2022-01-01 PROCEDURE — 370N000017 HC ANESTHESIA TECHNICAL FEE, PER MIN: Performed by: INTERNAL MEDICINE

## 2022-01-01 PROCEDURE — 250N000011 HC RX IP 250 OP 636: Performed by: INTERNAL MEDICINE

## 2022-01-01 PROCEDURE — 258N000003 HC RX IP 258 OP 636: Performed by: NURSE ANESTHETIST, CERTIFIED REGISTERED

## 2022-01-01 PROCEDURE — 83735 ASSAY OF MAGNESIUM: CPT | Performed by: INTERNAL MEDICINE

## 2022-01-01 PROCEDURE — 250N000013 HC RX MED GY IP 250 OP 250 PS 637: Performed by: NURSE PRACTITIONER

## 2022-01-01 PROCEDURE — 250N000009 HC RX 250: Performed by: NURSE ANESTHETIST, CERTIFIED REGISTERED

## 2022-01-01 PROCEDURE — 71045 X-RAY EXAM CHEST 1 VIEW: CPT

## 2022-01-01 PROCEDURE — 97161 PT EVAL LOW COMPLEX 20 MIN: CPT | Mod: GP | Performed by: PHYSICAL THERAPIST

## 2022-01-01 PROCEDURE — 99233 SBSQ HOSP IP/OBS HIGH 50: CPT | Performed by: NURSE PRACTITIONER

## 2022-01-01 PROCEDURE — 999N000141 HC STATISTIC PRE-PROCEDURE NURSING ASSESSMENT: Performed by: INTERNAL MEDICINE

## 2022-01-01 PROCEDURE — 36415 COLL VENOUS BLD VENIPUNCTURE: CPT

## 2022-01-01 PROCEDURE — 88305 TISSUE EXAM BY PATHOLOGIST: CPT | Mod: TC | Performed by: INTERNAL MEDICINE

## 2022-01-01 PROCEDURE — C1877 STENT, NON-COAT/COV W/O DEL: HCPCS | Performed by: INTERNAL MEDICINE

## 2022-01-01 PROCEDURE — U0005 INFEC AGEN DETEC AMPLI PROBE: HCPCS

## 2022-01-01 PROCEDURE — G0463 HOSPITAL OUTPT CLINIC VISIT: HCPCS | Mod: PN,RTG | Performed by: INTERNAL MEDICINE

## 2022-01-01 PROCEDURE — 250N000011 HC RX IP 250 OP 636: Performed by: EMERGENCY MEDICINE

## 2022-01-01 PROCEDURE — 710N000009 HC RECOVERY PHASE 1, LEVEL 1, PER MIN: Performed by: INTERNAL MEDICINE

## 2022-01-01 PROCEDURE — 80048 BASIC METABOLIC PNL TOTAL CA: CPT | Performed by: INTERNAL MEDICINE

## 2022-01-01 PROCEDURE — 120N000001 HC R&B MED SURG/OB

## 2022-01-01 PROCEDURE — 99213 OFFICE O/P EST LOW 20 MIN: CPT | Performed by: FAMILY MEDICINE

## 2022-01-01 PROCEDURE — 97535 SELF CARE MNGMENT TRAINING: CPT | Mod: GP | Performed by: PHYSICAL THERAPIST

## 2022-01-01 PROCEDURE — 99285 EMERGENCY DEPT VISIT HI MDM: CPT | Mod: 25

## 2022-01-01 PROCEDURE — 250N000013 HC RX MED GY IP 250 OP 250 PS 637: Performed by: INTERNAL MEDICINE

## 2022-01-01 PROCEDURE — 88342 IMHCHEM/IMCYTCHM 1ST ANTB: CPT | Mod: 26 | Performed by: PATHOLOGY

## 2022-01-01 PROCEDURE — 74330 X-RAY BILE/PANC ENDOSCOPY: CPT

## 2022-01-01 PROCEDURE — 80053 COMPREHEN METABOLIC PANEL: CPT | Performed by: INTERNAL MEDICINE

## 2022-01-01 PROCEDURE — 36415 COLL VENOUS BLD VENIPUNCTURE: CPT | Performed by: INTERNAL MEDICINE

## 2022-01-01 PROCEDURE — 85027 COMPLETE CBC AUTOMATED: CPT | Performed by: INTERNAL MEDICINE

## 2022-01-01 PROCEDURE — U0003 INFECTIOUS AGENT DETECTION BY NUCLEIC ACID (DNA OR RNA); SEVERE ACUTE RESPIRATORY SYNDROME CORONAVIRUS 2 (SARS-COV-2) (CORONAVIRUS DISEASE [COVID-19]), AMPLIFIED PROBE TECHNIQUE, MAKING USE OF HIGH THROUGHPUT TECHNOLOGIES AS DESCRIBED BY CMS-2020-01-R: HCPCS

## 2022-01-01 PROCEDURE — 999N000208 ECHOCARDIOGRAM COMPLETE

## 2022-01-01 PROCEDURE — G0179 MD RECERTIFICATION HHA PT: HCPCS | Performed by: INTERNAL MEDICINE

## 2022-01-01 PROCEDURE — 93005 ELECTROCARDIOGRAM TRACING: CPT

## 2022-01-01 PROCEDURE — 36415 COLL VENOUS BLD VENIPUNCTURE: CPT | Performed by: EMERGENCY MEDICINE

## 2022-01-01 PROCEDURE — 272N000001 HC OR GENERAL SUPPLY STERILE: Performed by: INTERNAL MEDICINE

## 2022-01-01 PROCEDURE — 93306 TTE W/DOPPLER COMPLETE: CPT | Mod: 26 | Performed by: INTERNAL MEDICINE

## 2022-01-01 PROCEDURE — 710N000012 HC RECOVERY PHASE 2, PER MINUTE: Performed by: INTERNAL MEDICINE

## 2022-01-01 PROCEDURE — 94375 RESPIRATORY FLOW VOLUME LOOP: CPT | Performed by: INTERNAL MEDICINE

## 2022-01-01 PROCEDURE — 82310 ASSAY OF CALCIUM: CPT | Performed by: INTERNAL MEDICINE

## 2022-01-01 PROCEDURE — 97530 THERAPEUTIC ACTIVITIES: CPT | Mod: GP | Performed by: PHYSICAL THERAPIST

## 2022-01-01 PROCEDURE — 85379 FIBRIN DEGRADATION QUANT: CPT | Performed by: INTERNAL MEDICINE

## 2022-01-01 PROCEDURE — 88305 TISSUE EXAM BY PATHOLOGIST: CPT | Mod: 26 | Performed by: PATHOLOGY

## 2022-01-01 PROCEDURE — 250N000025 HC SEVOFLURANE, PER MIN: Performed by: INTERNAL MEDICINE

## 2022-01-01 PROCEDURE — 85610 PROTHROMBIN TIME: CPT | Performed by: INTERNAL MEDICINE

## 2022-01-01 PROCEDURE — 84484 ASSAY OF TROPONIN QUANT: CPT | Performed by: EMERGENCY MEDICINE

## 2022-01-01 PROCEDURE — 99207 PR NO CHARGE LOS: CPT | Performed by: SPEECH-LANGUAGE PATHOLOGIST

## 2022-01-01 PROCEDURE — 88341 IMHCHEM/IMCYTCHM EA ADD ANTB: CPT | Mod: 26 | Performed by: PATHOLOGY

## 2022-01-01 PROCEDURE — 99233 SBSQ HOSP IP/OBS HIGH 50: CPT | Performed by: INTERNAL MEDICINE

## 2022-01-01 PROCEDURE — 84153 ASSAY OF PSA TOTAL: CPT

## 2022-01-01 PROCEDURE — 80053 COMPREHEN METABOLIC PANEL: CPT | Performed by: EMERGENCY MEDICINE

## 2022-01-01 PROCEDURE — 99239 HOSP IP/OBS DSCHRG MGMT >30: CPT | Performed by: INTERNAL MEDICINE

## 2022-01-01 PROCEDURE — 250N000011 HC RX IP 250 OP 636: Performed by: NURSE ANESTHETIST, CERTIFIED REGISTERED

## 2022-01-01 PROCEDURE — 71250 CT THORAX DX C-: CPT

## 2022-01-01 PROCEDURE — 99223 1ST HOSP IP/OBS HIGH 75: CPT | Mod: AI | Performed by: INTERNAL MEDICINE

## 2022-01-01 PROCEDURE — 99232 SBSQ HOSP IP/OBS MODERATE 35: CPT | Performed by: INTERNAL MEDICINE

## 2022-01-01 PROCEDURE — 80048 BASIC METABOLIC PNL TOTAL CA: CPT | Performed by: EMERGENCY MEDICINE

## 2022-01-01 PROCEDURE — 255N000002 HC RX 255 OP 636: Performed by: INTERNAL MEDICINE

## 2022-01-01 PROCEDURE — 99207 PR MD CERTIFICATION HHA PATIENT: CPT | Performed by: INTERNAL MEDICINE

## 2022-01-01 PROCEDURE — C9803 HOPD COVID-19 SPEC COLLECT: HCPCS

## 2022-01-01 PROCEDURE — C1769 GUIDE WIRE: HCPCS | Performed by: INTERNAL MEDICINE

## 2022-01-01 PROCEDURE — 70491 CT SOFT TISSUE NECK W/DYE: CPT

## 2022-01-01 PROCEDURE — 99214 OFFICE O/P EST MOD 30 MIN: CPT | Mod: GC | Performed by: PSYCHIATRY & NEUROLOGY

## 2022-01-01 PROCEDURE — 96360 HYDRATION IV INFUSION INIT: CPT | Mod: 59

## 2022-01-01 PROCEDURE — 99223 1ST HOSP IP/OBS HIGH 75: CPT | Performed by: NURSE PRACTITIONER

## 2022-01-01 PROCEDURE — 83690 ASSAY OF LIPASE: CPT | Performed by: EMERGENCY MEDICINE

## 2022-01-01 PROCEDURE — U0005 INFEC AGEN DETEC AMPLI PROBE: HCPCS | Performed by: EMERGENCY MEDICINE

## 2022-01-01 PROCEDURE — 258N000003 HC RX IP 258 OP 636: Performed by: ANESTHESIOLOGY

## 2022-01-01 PROCEDURE — 85014 HEMATOCRIT: CPT | Performed by: EMERGENCY MEDICINE

## 2022-01-01 PROCEDURE — 82565 ASSAY OF CREATININE: CPT

## 2022-01-01 PROCEDURE — 99214 OFFICE O/P EST MOD 30 MIN: CPT | Performed by: STUDENT IN AN ORGANIZED HEALTH CARE EDUCATION/TRAINING PROGRAM

## 2022-01-01 PROCEDURE — 250N000011 HC RX IP 250 OP 636: Performed by: HOSPITALIST

## 2022-01-01 PROCEDURE — 83690 ASSAY OF LIPASE: CPT | Performed by: INTERNAL MEDICINE

## 2022-01-01 PROCEDURE — 82374 ASSAY BLOOD CARBON DIOXIDE: CPT | Performed by: INTERNAL MEDICINE

## 2022-01-01 PROCEDURE — 99205 OFFICE O/P NEW HI 60 MIN: CPT | Mod: 95 | Performed by: INTERNAL MEDICINE

## 2022-01-01 PROCEDURE — 81003 URINALYSIS AUTO W/O SCOPE: CPT | Performed by: FAMILY MEDICINE

## 2022-01-01 PROCEDURE — 99214 OFFICE O/P EST MOD 30 MIN: CPT | Performed by: PSYCHIATRY & NEUROLOGY

## 2022-01-01 PROCEDURE — 250N000009 HC RX 250: Performed by: EMERGENCY MEDICINE

## 2022-01-01 PROCEDURE — 250N000013 HC RX MED GY IP 250 OP 250 PS 637: Performed by: EMERGENCY MEDICINE

## 2022-01-01 PROCEDURE — 93000 ELECTROCARDIOGRAM COMPLETE: CPT | Performed by: INTERNAL MEDICINE

## 2022-01-01 PROCEDURE — 258N000003 HC RX IP 258 OP 636: Performed by: EMERGENCY MEDICINE

## 2022-01-01 PROCEDURE — 99204 OFFICE O/P NEW MOD 45 MIN: CPT | Performed by: INTERNAL MEDICINE

## 2022-01-01 PROCEDURE — 360N000083 HC SURGERY LEVEL 3 W/ FLUORO, PER MIN: Performed by: INTERNAL MEDICINE

## 2022-01-01 PROCEDURE — 99214 OFFICE O/P EST MOD 30 MIN: CPT | Performed by: INTERNAL MEDICINE

## 2022-01-01 PROCEDURE — 74177 CT ABD & PELVIS W/CONTRAST: CPT

## 2022-01-01 PROCEDURE — 85025 COMPLETE CBC W/AUTO DIFF WBC: CPT | Performed by: EMERGENCY MEDICINE

## 2022-01-01 DEVICE — STENT FREEMAN PANCREA FLEX 4FRX11CM W/O FLANGE SGL PIGTAIL: Type: IMPLANTABLE DEVICE | Site: PANCREATIC DUCT | Status: FUNCTIONAL

## 2022-01-01 RX ORDER — CLINDAMYCIN HCL 300 MG
300 CAPSULE ORAL 3 TIMES DAILY
Qty: 30 CAPSULE | Refills: 0 | Status: SHIPPED | OUTPATIENT
Start: 2022-01-01 | End: 2022-01-01

## 2022-01-01 RX ORDER — FENTANYL CITRATE 0.05 MG/ML
25 INJECTION, SOLUTION INTRAMUSCULAR; INTRAVENOUS EVERY 5 MIN PRN
Status: DISCONTINUED | OUTPATIENT
Start: 2022-01-01 | End: 2022-01-01 | Stop reason: HOSPADM

## 2022-01-01 RX ORDER — CEFTRIAXONE 1 G/1
1 INJECTION, POWDER, FOR SOLUTION INTRAMUSCULAR; INTRAVENOUS EVERY 24 HOURS
Status: DISCONTINUED | OUTPATIENT
Start: 2022-01-01 | End: 2022-01-01 | Stop reason: HOSPADM

## 2022-01-01 RX ORDER — ENALAPRIL MALEATE 10 MG/1
20 TABLET ORAL 2 TIMES DAILY
Qty: 360 TABLET | Refills: 3 | Status: ON HOLD | OUTPATIENT
Start: 2022-01-01 | End: 2022-01-01

## 2022-01-01 RX ORDER — ENALAPRIL MALEATE 10 MG/1
10 TABLET ORAL 2 TIMES DAILY
Qty: 180 TABLET | Refills: 1 | Status: SHIPPED | OUTPATIENT
Start: 2022-01-01 | End: 2022-01-01

## 2022-01-01 RX ORDER — NALOXONE HYDROCHLORIDE 0.4 MG/ML
0.4 INJECTION, SOLUTION INTRAMUSCULAR; INTRAVENOUS; SUBCUTANEOUS
Status: DISCONTINUED | OUTPATIENT
Start: 2022-01-01 | End: 2022-01-01 | Stop reason: HOSPADM

## 2022-01-01 RX ORDER — DULOXETIN HYDROCHLORIDE 20 MG/1
20 CAPSULE, DELAYED RELEASE ORAL
Status: ON HOLD | COMMUNITY
End: 2022-01-01

## 2022-01-01 RX ORDER — SODIUM CHLORIDE, SODIUM LACTATE, POTASSIUM CHLORIDE, CALCIUM CHLORIDE 600; 310; 30; 20 MG/100ML; MG/100ML; MG/100ML; MG/100ML
INJECTION, SOLUTION INTRAVENOUS CONTINUOUS PRN
Status: DISCONTINUED | OUTPATIENT
Start: 2022-01-01 | End: 2022-01-01

## 2022-01-01 RX ORDER — ACETAMINOPHEN 325 MG/1
650 TABLET ORAL 4 TIMES DAILY
Status: DISCONTINUED | OUTPATIENT
Start: 2022-01-01 | End: 2022-01-01 | Stop reason: HOSPADM

## 2022-01-01 RX ORDER — NALOXONE HYDROCHLORIDE 0.4 MG/ML
0.2 INJECTION, SOLUTION INTRAMUSCULAR; INTRAVENOUS; SUBCUTANEOUS
Status: DISCONTINUED | OUTPATIENT
Start: 2022-01-01 | End: 2022-01-01 | Stop reason: HOSPADM

## 2022-01-01 RX ORDER — SODIUM CHLORIDE AND POTASSIUM CHLORIDE 150; 900 MG/100ML; MG/100ML
INJECTION, SOLUTION INTRAVENOUS CONTINUOUS
Status: DISCONTINUED | OUTPATIENT
Start: 2022-01-01 | End: 2022-01-01 | Stop reason: HOSPADM

## 2022-01-01 RX ORDER — PROPOFOL 10 MG/ML
INJECTION, EMULSION INTRAVENOUS PRN
Status: DISCONTINUED | OUTPATIENT
Start: 2022-01-01 | End: 2022-01-01

## 2022-01-01 RX ORDER — ONDANSETRON 4 MG/1
4 TABLET, ORALLY DISINTEGRATING ORAL EVERY 6 HOURS PRN
Status: DISCONTINUED | OUTPATIENT
Start: 2022-01-01 | End: 2022-01-01 | Stop reason: HOSPADM

## 2022-01-01 RX ORDER — ONDANSETRON 4 MG/1
4 TABLET, ORALLY DISINTEGRATING ORAL EVERY 30 MIN PRN
Status: DISCONTINUED | OUTPATIENT
Start: 2022-01-01 | End: 2022-01-01 | Stop reason: HOSPADM

## 2022-01-01 RX ORDER — FLUMAZENIL 0.1 MG/ML
0.2 INJECTION, SOLUTION INTRAVENOUS
Status: DISCONTINUED | OUTPATIENT
Start: 2022-01-01 | End: 2022-01-01 | Stop reason: HOSPADM

## 2022-01-01 RX ORDER — CLINDAMYCIN HCL 150 MG
300 CAPSULE ORAL ONCE
Status: COMPLETED | OUTPATIENT
Start: 2022-01-01 | End: 2022-01-01

## 2022-01-01 RX ORDER — GABAPENTIN 100 MG/1
100 CAPSULE ORAL 3 TIMES DAILY
Status: DISCONTINUED | OUTPATIENT
Start: 2022-01-01 | End: 2022-01-01

## 2022-01-01 RX ORDER — DEXAMETHASONE SODIUM PHOSPHATE 4 MG/ML
INJECTION, SOLUTION INTRA-ARTICULAR; INTRALESIONAL; INTRAMUSCULAR; INTRAVENOUS; SOFT TISSUE PRN
Status: DISCONTINUED | OUTPATIENT
Start: 2022-01-01 | End: 2022-01-01

## 2022-01-01 RX ORDER — IOPAMIDOL 755 MG/ML
500 INJECTION, SOLUTION INTRAVASCULAR ONCE
Status: COMPLETED | OUTPATIENT
Start: 2022-01-01 | End: 2022-01-01

## 2022-01-01 RX ORDER — PROCHLORPERAZINE MALEATE 5 MG
5 TABLET ORAL EVERY 6 HOURS PRN
Status: DISCONTINUED | OUTPATIENT
Start: 2022-01-01 | End: 2022-01-01 | Stop reason: HOSPADM

## 2022-01-01 RX ORDER — SODIUM CHLORIDE, SODIUM LACTATE, POTASSIUM CHLORIDE, CALCIUM CHLORIDE 600; 310; 30; 20 MG/100ML; MG/100ML; MG/100ML; MG/100ML
INJECTION, SOLUTION INTRAVENOUS CONTINUOUS
Status: DISCONTINUED | OUTPATIENT
Start: 2022-01-01 | End: 2022-01-01 | Stop reason: HOSPADM

## 2022-01-01 RX ORDER — POLYETHYLENE GLYCOL 3350 17 G/17G
1 POWDER, FOR SOLUTION ORAL PRN
COMMUNITY

## 2022-01-01 RX ORDER — EZETIMIBE 10 MG/1
10 TABLET ORAL DAILY
Qty: 90 TABLET | Refills: 3 | Status: ON HOLD | OUTPATIENT
Start: 2022-01-01 | End: 2022-01-01

## 2022-01-01 RX ORDER — LIDOCAINE 40 MG/G
CREAM TOPICAL
Status: DISCONTINUED | OUTPATIENT
Start: 2022-01-01 | End: 2022-01-01 | Stop reason: HOSPADM

## 2022-01-01 RX ORDER — DULOXETIN HYDROCHLORIDE 20 MG/1
CAPSULE, DELAYED RELEASE ORAL
Status: ON HOLD | COMMUNITY
Start: 2022-01-01 | End: 2022-01-01

## 2022-01-01 RX ORDER — OXYBUTYNIN CHLORIDE 5 MG/1
20 TABLET, EXTENDED RELEASE ORAL AT BEDTIME
Status: DISCONTINUED | OUTPATIENT
Start: 2022-01-01 | End: 2022-01-01 | Stop reason: HOSPADM

## 2022-01-01 RX ORDER — ASPIRIN 325 MG
325 TABLET, DELAYED RELEASE (ENTERIC COATED) ORAL EVERY EVENING
Status: ON HOLD | COMMUNITY
Start: 2022-01-01 | End: 2022-01-01

## 2022-01-01 RX ORDER — ACETAMINOPHEN 650 MG/1
650 SUPPOSITORY RECTAL 4 TIMES DAILY
Status: DISCONTINUED | OUTPATIENT
Start: 2022-01-01 | End: 2022-01-01 | Stop reason: HOSPADM

## 2022-01-01 RX ORDER — GABAPENTIN 100 MG/1
200 CAPSULE ORAL 3 TIMES DAILY
Status: DISCONTINUED | OUTPATIENT
Start: 2022-01-01 | End: 2022-01-01 | Stop reason: HOSPADM

## 2022-01-01 RX ORDER — ACETAMINOPHEN 325 MG/1
650 TABLET ORAL EVERY 8 HOURS
Status: DISCONTINUED | OUTPATIENT
Start: 2022-01-01 | End: 2022-01-01

## 2022-01-01 RX ORDER — POLYETHYLENE GLYCOL 3350 17 G/17G
17 POWDER, FOR SOLUTION ORAL 2 TIMES DAILY PRN
Status: DISCONTINUED | OUTPATIENT
Start: 2022-01-01 | End: 2022-01-01 | Stop reason: HOSPADM

## 2022-01-01 RX ORDER — SENNOSIDES 8.6 MG
650 CAPSULE ORAL EVERY 6 HOURS PRN
COMMUNITY
Start: 2022-01-01

## 2022-01-01 RX ORDER — ACETAMINOPHEN 325 MG/1
325 TABLET ORAL EVERY 4 HOURS PRN
Status: DISCONTINUED | OUTPATIENT
Start: 2022-01-01 | End: 2022-01-01 | Stop reason: HOSPADM

## 2022-01-01 RX ORDER — ACETAMINOPHEN 325 MG/1
650 TABLET ORAL EVERY 6 HOURS PRN
Status: DISCONTINUED | OUTPATIENT
Start: 2022-01-01 | End: 2022-01-01

## 2022-01-01 RX ORDER — CHLORTHALIDONE 25 MG/1
25 TABLET ORAL DAILY
Qty: 90 TABLET | Refills: 3 | Status: SHIPPED | OUTPATIENT
Start: 2022-01-01 | End: 2022-01-01

## 2022-01-01 RX ORDER — FENTANYL CITRATE 50 UG/ML
INJECTION, SOLUTION INTRAMUSCULAR; INTRAVENOUS PRN
Status: DISCONTINUED | OUTPATIENT
Start: 2022-01-01 | End: 2022-01-01

## 2022-01-01 RX ORDER — OXYBUTYNIN CHLORIDE 10 MG/1
20 TABLET, EXTENDED RELEASE ORAL AT BEDTIME
Qty: 180 TABLET | Refills: 3 | Status: SHIPPED | OUTPATIENT
Start: 2022-01-01 | End: 2022-01-01

## 2022-01-01 RX ORDER — ONDANSETRON 4 MG/1
4 TABLET, ORALLY DISINTEGRATING ORAL EVERY 6 HOURS PRN
Qty: 30 TABLET | Refills: 0 | Status: SHIPPED | OUTPATIENT
Start: 2022-01-01

## 2022-01-01 RX ORDER — POLYETHYLENE GLYCOL 3350 17 G/17G
1 POWDER, FOR SOLUTION ORAL DAILY
COMMUNITY
End: 2022-01-01

## 2022-01-01 RX ORDER — HYDROMORPHONE HCL IN WATER/PF 6 MG/30 ML
0.2 PATIENT CONTROLLED ANALGESIA SYRINGE INTRAVENOUS EVERY 5 MIN PRN
Status: DISCONTINUED | OUTPATIENT
Start: 2022-01-01 | End: 2022-01-01 | Stop reason: HOSPADM

## 2022-01-01 RX ORDER — ONDANSETRON 2 MG/ML
INJECTION INTRAMUSCULAR; INTRAVENOUS PRN
Status: DISCONTINUED | OUTPATIENT
Start: 2022-01-01 | End: 2022-01-01

## 2022-01-01 RX ORDER — NYSTATIN 100000 [USP'U]/G
POWDER TOPICAL
Status: ON HOLD | COMMUNITY
Start: 2022-01-01 | End: 2022-01-01

## 2022-01-01 RX ORDER — CALCIUM CARBONATE 500 MG/1
1000 TABLET, CHEWABLE ORAL 4 TIMES DAILY PRN
Status: DISCONTINUED | OUTPATIENT
Start: 2022-01-01 | End: 2022-01-01 | Stop reason: HOSPADM

## 2022-01-01 RX ORDER — GABAPENTIN 100 MG/1
200 CAPSULE ORAL 3 TIMES DAILY
Qty: 24 CAPSULE | Refills: 0 | Status: SHIPPED | OUTPATIENT
Start: 2022-01-01

## 2022-01-01 RX ORDER — AMOXICILLIN 250 MG
1 CAPSULE ORAL 2 TIMES DAILY PRN
Status: DISCONTINUED | OUTPATIENT
Start: 2022-01-01 | End: 2022-01-01 | Stop reason: HOSPADM

## 2022-01-01 RX ORDER — ENALAPRIL MALEATE 10 MG/1
20 TABLET ORAL 2 TIMES DAILY
Qty: 360 TABLET | Refills: 3 | Status: SHIPPED | OUTPATIENT
Start: 2022-01-01 | End: 2022-01-01

## 2022-01-01 RX ORDER — MEPERIDINE HYDROCHLORIDE 25 MG/ML
12.5 INJECTION INTRAMUSCULAR; INTRAVENOUS; SUBCUTANEOUS
Status: DISCONTINUED | OUTPATIENT
Start: 2022-01-01 | End: 2022-01-01 | Stop reason: HOSPADM

## 2022-01-01 RX ORDER — ONDANSETRON 2 MG/ML
4 INJECTION INTRAMUSCULAR; INTRAVENOUS EVERY 6 HOURS PRN
Status: DISCONTINUED | OUTPATIENT
Start: 2022-01-01 | End: 2022-01-01 | Stop reason: HOSPADM

## 2022-01-01 RX ORDER — HYDROMORPHONE HYDROCHLORIDE 2 MG/1
1 TABLET ORAL EVERY 4 HOURS PRN
Qty: 20 TABLET | Refills: 0 | Status: SHIPPED | OUTPATIENT
Start: 2022-01-01

## 2022-01-01 RX ORDER — CHLORTHALIDONE 25 MG/1
25 TABLET ORAL DAILY
Qty: 90 TABLET | Refills: 2 | Status: ON HOLD | OUTPATIENT
Start: 2022-01-01 | End: 2022-01-01

## 2022-01-01 RX ORDER — INDOMETHACIN 50 MG/1
100 SUPPOSITORY RECTAL
Status: DISCONTINUED | OUTPATIENT
Start: 2022-01-01 | End: 2022-01-01 | Stop reason: HOSPADM

## 2022-01-01 RX ORDER — PROCHLORPERAZINE 25 MG
12.5 SUPPOSITORY, RECTAL RECTAL EVERY 12 HOURS PRN
Status: DISCONTINUED | OUTPATIENT
Start: 2022-01-01 | End: 2022-01-01 | Stop reason: HOSPADM

## 2022-01-01 RX ORDER — MAGNESIUM SULFATE HEPTAHYDRATE 40 MG/ML
4 INJECTION, SOLUTION INTRAVENOUS ONCE
Status: COMPLETED | OUTPATIENT
Start: 2022-01-01 | End: 2022-01-01

## 2022-01-01 RX ORDER — KETOCONAZOLE 20 MG/ML
SHAMPOO TOPICAL
Qty: 120 ML | Refills: 10 | OUTPATIENT
Start: 2022-01-01

## 2022-01-01 RX ORDER — IBUPROFEN 600 MG/1
600 TABLET, FILM COATED ORAL EVERY 6 HOURS PRN
Status: DISCONTINUED | OUTPATIENT
Start: 2022-01-01 | End: 2022-01-01 | Stop reason: HOSPADM

## 2022-01-01 RX ORDER — ONDANSETRON 2 MG/ML
4 INJECTION INTRAMUSCULAR; INTRAVENOUS EVERY 30 MIN PRN
Status: DISCONTINUED | OUTPATIENT
Start: 2022-01-01 | End: 2022-01-01 | Stop reason: HOSPADM

## 2022-01-01 RX ORDER — OXYBUTYNIN CHLORIDE 10 MG/1
20 TABLET, EXTENDED RELEASE ORAL AT BEDTIME
Qty: 180 TABLET | Refills: 3 | Status: SHIPPED | OUTPATIENT
Start: 2022-01-01

## 2022-01-01 RX ORDER — CHLORTHALIDONE 25 MG/1
25 TABLET ORAL DAILY
Qty: 90 TABLET | Refills: 0 | Status: SHIPPED | OUTPATIENT
Start: 2022-01-01 | End: 2022-01-01

## 2022-01-01 RX ORDER — SODIUM CHLORIDE 9 MG/ML
INJECTION, SOLUTION INTRAVENOUS CONTINUOUS
Status: DISCONTINUED | OUTPATIENT
Start: 2022-01-01 | End: 2022-01-01

## 2022-01-01 RX ORDER — AMOXICILLIN 250 MG
2 CAPSULE ORAL 2 TIMES DAILY PRN
Status: DISCONTINUED | OUTPATIENT
Start: 2022-01-01 | End: 2022-01-01 | Stop reason: HOSPADM

## 2022-01-01 RX ORDER — HYDROCODONE BITARTRATE AND ACETAMINOPHEN 5; 325 MG/1; MG/1
TABLET ORAL
COMMUNITY
Start: 2022-01-01 | End: 2022-01-01

## 2022-01-01 RX ORDER — GUAIFENESIN/DEXTROMETHORPHAN 100-10MG/5
10 SYRUP ORAL EVERY 4 HOURS PRN
Qty: 118 ML | Refills: 0 | Status: SHIPPED | OUTPATIENT
Start: 2022-01-01

## 2022-01-01 RX ORDER — FENTANYL CITRATE 0.05 MG/ML
25 INJECTION, SOLUTION INTRAMUSCULAR; INTRAVENOUS
Status: DISCONTINUED | OUTPATIENT
Start: 2022-01-01 | End: 2022-01-01 | Stop reason: HOSPADM

## 2022-01-01 RX ORDER — ENALAPRIL MALEATE 10 MG/1
20 TABLET ORAL 2 TIMES DAILY
Qty: 360 TABLET | Refills: 1 | Status: SHIPPED | OUTPATIENT
Start: 2022-01-01 | End: 2022-01-01

## 2022-01-01 RX ORDER — HYDROMORPHONE HCL IN WATER/PF 6 MG/30 ML
0.2 PATIENT CONTROLLED ANALGESIA SYRINGE INTRAVENOUS
Status: DISCONTINUED | OUTPATIENT
Start: 2022-01-01 | End: 2022-01-01 | Stop reason: HOSPADM

## 2022-01-01 RX ORDER — OXYCODONE HYDROCHLORIDE 5 MG/1
5 TABLET ORAL EVERY 4 HOURS PRN
Status: DISCONTINUED | OUTPATIENT
Start: 2022-01-01 | End: 2022-01-01 | Stop reason: HOSPADM

## 2022-01-01 RX ORDER — ACETAMINOPHEN 650 MG/1
650 SUPPOSITORY RECTAL EVERY 6 HOURS PRN
Status: DISCONTINUED | OUTPATIENT
Start: 2022-01-01 | End: 2022-01-01

## 2022-01-01 RX ORDER — PANTOPRAZOLE SODIUM 40 MG/1
40 TABLET, DELAYED RELEASE ORAL DAILY
Status: DISCONTINUED | OUTPATIENT
Start: 2022-01-01 | End: 2022-01-01 | Stop reason: HOSPADM

## 2022-01-01 RX ORDER — CLINDAMYCIN HCL 300 MG
300 CAPSULE ORAL 4 TIMES DAILY
Qty: 40 CAPSULE | Refills: 0 | Status: SHIPPED | OUTPATIENT
Start: 2022-01-01 | End: 2022-01-01

## 2022-01-01 RX ORDER — EPHEDRINE SULFATE 50 MG/ML
INJECTION, SOLUTION INTRAMUSCULAR; INTRAVENOUS; SUBCUTANEOUS PRN
Status: DISCONTINUED | OUTPATIENT
Start: 2022-01-01 | End: 2022-01-01

## 2022-01-01 RX ORDER — LIDOCAINE HYDROCHLORIDE 20 MG/ML
INJECTION, SOLUTION INFILTRATION; PERINEURAL PRN
Status: DISCONTINUED | OUTPATIENT
Start: 2022-01-01 | End: 2022-01-01

## 2022-01-01 RX ADMIN — OXYBUTYNIN CHLORIDE 20 MG: 5 TABLET, EXTENDED RELEASE ORAL at 21:50

## 2022-01-01 RX ADMIN — GABAPENTIN 200 MG: 100 CAPSULE ORAL at 08:50

## 2022-01-01 RX ADMIN — ACETAMINOPHEN 650 MG: 325 TABLET, FILM COATED ORAL at 20:27

## 2022-01-01 RX ADMIN — PHENYLEPHRINE HYDROCHLORIDE 100 MCG: 10 INJECTION INTRAVENOUS at 10:35

## 2022-01-01 RX ADMIN — FENTANYL CITRATE 25 MCG: 50 INJECTION, SOLUTION INTRAMUSCULAR; INTRAVENOUS at 10:30

## 2022-01-01 RX ADMIN — FENTANYL CITRATE 25 MCG: 50 INJECTION, SOLUTION INTRAMUSCULAR; INTRAVENOUS at 10:26

## 2022-01-01 RX ADMIN — SODIUM CHLORIDE, POTASSIUM CHLORIDE, SODIUM LACTATE AND CALCIUM CHLORIDE: 600; 310; 30; 20 INJECTION, SOLUTION INTRAVENOUS at 09:04

## 2022-01-01 RX ADMIN — ROCURONIUM BROMIDE 30 MG: 50 INJECTION, SOLUTION INTRAVENOUS at 10:08

## 2022-01-01 RX ADMIN — SODIUM CHLORIDE 59 ML: 9 INJECTION, SOLUTION INTRAVENOUS at 00:11

## 2022-01-01 RX ADMIN — Medication 5 MG: at 10:35

## 2022-01-01 RX ADMIN — GABAPENTIN 100 MG: 100 CAPSULE ORAL at 21:50

## 2022-01-01 RX ADMIN — GABAPENTIN 200 MG: 100 CAPSULE ORAL at 13:07

## 2022-01-01 RX ADMIN — POTASSIUM CHLORIDE AND SODIUM CHLORIDE: 900; 150 INJECTION, SOLUTION INTRAVENOUS at 23:07

## 2022-01-01 RX ADMIN — GABAPENTIN 100 MG: 100 CAPSULE ORAL at 07:48

## 2022-01-01 RX ADMIN — ACETAMINOPHEN 650 MG: 325 TABLET, FILM COATED ORAL at 13:07

## 2022-01-01 RX ADMIN — ACETAMINOPHEN 650 MG: 325 TABLET, FILM COATED ORAL at 14:53

## 2022-01-01 RX ADMIN — PANTOPRAZOLE SODIUM 40 MG: 40 TABLET, DELAYED RELEASE ORAL at 09:42

## 2022-01-01 RX ADMIN — FENTANYL CITRATE 50 MCG: 50 INJECTION, SOLUTION INTRAMUSCULAR; INTRAVENOUS at 10:07

## 2022-01-01 RX ADMIN — ACETAMINOPHEN 650 MG: 325 TABLET, FILM COATED ORAL at 22:07

## 2022-01-01 RX ADMIN — SODIUM CHLORIDE, POTASSIUM CHLORIDE, SODIUM LACTATE AND CALCIUM CHLORIDE: 600; 310; 30; 20 INJECTION, SOLUTION INTRAVENOUS at 09:14

## 2022-01-01 RX ADMIN — Medication 5 MG: at 10:38

## 2022-01-01 RX ADMIN — PANTOPRAZOLE SODIUM 40 MG: 40 TABLET, DELAYED RELEASE ORAL at 09:44

## 2022-01-01 RX ADMIN — GABAPENTIN 100 MG: 100 CAPSULE ORAL at 20:27

## 2022-01-01 RX ADMIN — POTASSIUM CHLORIDE AND SODIUM CHLORIDE: 900; 150 INJECTION, SOLUTION INTRAVENOUS at 07:45

## 2022-01-01 RX ADMIN — GABAPENTIN 200 MG: 100 CAPSULE ORAL at 20:52

## 2022-01-01 RX ADMIN — HYDROMORPHONE HYDROCHLORIDE 1 MG: 2 TABLET ORAL at 02:59

## 2022-01-01 RX ADMIN — POTASSIUM CHLORIDE AND SODIUM CHLORIDE: 900; 150 INJECTION, SOLUTION INTRAVENOUS at 15:52

## 2022-01-01 RX ADMIN — IOPAMIDOL 76 ML: 755 INJECTION, SOLUTION INTRAVENOUS at 00:09

## 2022-01-01 RX ADMIN — ACETAMINOPHEN 650 MG: 325 TABLET, FILM COATED ORAL at 21:51

## 2022-01-01 RX ADMIN — POTASSIUM CHLORIDE AND SODIUM CHLORIDE: 900; 150 INJECTION, SOLUTION INTRAVENOUS at 15:54

## 2022-01-01 RX ADMIN — POTASSIUM CHLORIDE AND SODIUM CHLORIDE: 900; 150 INJECTION, SOLUTION INTRAVENOUS at 03:30

## 2022-01-01 RX ADMIN — PANTOPRAZOLE SODIUM 40 MG: 40 TABLET, DELAYED RELEASE ORAL at 08:50

## 2022-01-01 RX ADMIN — IOPAMIDOL 90 ML: 755 INJECTION, SOLUTION INTRAVENOUS at 11:37

## 2022-01-01 RX ADMIN — ACETAMINOPHEN 650 MG: 325 TABLET, FILM COATED ORAL at 08:50

## 2022-01-01 RX ADMIN — SODIUM CHLORIDE 65 ML: 9 INJECTION, SOLUTION INTRAVENOUS at 11:37

## 2022-01-01 RX ADMIN — OXYBUTYNIN CHLORIDE 20 MG: 5 TABLET, EXTENDED RELEASE ORAL at 20:52

## 2022-01-01 RX ADMIN — SUGAMMADEX 200 MG: 100 INJECTION, SOLUTION INTRAVENOUS at 10:48

## 2022-01-01 RX ADMIN — CEFTRIAXONE 1 G: 1 INJECTION, POWDER, FOR SOLUTION INTRAMUSCULAR; INTRAVENOUS at 20:51

## 2022-01-01 RX ADMIN — GABAPENTIN 100 MG: 100 CAPSULE ORAL at 14:53

## 2022-01-01 RX ADMIN — PHENYLEPHRINE HYDROCHLORIDE 100 MCG: 10 INJECTION INTRAVENOUS at 10:38

## 2022-01-01 RX ADMIN — POTASSIUM CHLORIDE AND SODIUM CHLORIDE: 900; 150 INJECTION, SOLUTION INTRAVENOUS at 17:45

## 2022-01-01 RX ADMIN — PANTOPRAZOLE SODIUM 40 MG: 40 TABLET, DELAYED RELEASE ORAL at 07:48

## 2022-01-01 RX ADMIN — IBUPROFEN 600 MG: 600 TABLET ORAL at 18:19

## 2022-01-01 RX ADMIN — PROPOFOL 150 MG: 10 INJECTION, EMULSION INTRAVENOUS at 10:08

## 2022-01-01 RX ADMIN — OXYBUTYNIN CHLORIDE 20 MG: 5 TABLET, EXTENDED RELEASE ORAL at 20:26

## 2022-01-01 RX ADMIN — ACETAMINOPHEN 650 MG: 325 TABLET, FILM COATED ORAL at 13:32

## 2022-01-01 RX ADMIN — HUMAN ALBUMIN MICROSPHERES AND PERFLUTREN 7 ML: 10; .22 INJECTION, SOLUTION INTRAVENOUS at 12:02

## 2022-01-01 RX ADMIN — CLINDAMYCIN HYDROCHLORIDE 300 MG: 150 CAPSULE ORAL at 12:41

## 2022-01-01 RX ADMIN — ACETAMINOPHEN 650 MG: 325 TABLET, FILM COATED ORAL at 05:53

## 2022-01-01 RX ADMIN — MAGNESIUM SULFATE HEPTAHYDRATE 4 G: 40 INJECTION, SOLUTION INTRAVENOUS at 09:44

## 2022-01-01 RX ADMIN — LIDOCAINE HYDROCHLORIDE 40 MG: 20 INJECTION, SOLUTION INFILTRATION; PERINEURAL at 10:07

## 2022-01-01 RX ADMIN — POTASSIUM CHLORIDE AND SODIUM CHLORIDE: 900; 150 INJECTION, SOLUTION INTRAVENOUS at 00:08

## 2022-01-01 RX ADMIN — GABAPENTIN 100 MG: 100 CAPSULE ORAL at 13:32

## 2022-01-01 RX ADMIN — ACETAMINOPHEN 650 MG: 325 TABLET, FILM COATED ORAL at 17:31

## 2022-01-01 RX ADMIN — POTASSIUM CHLORIDE AND SODIUM CHLORIDE: 900; 150 INJECTION, SOLUTION INTRAVENOUS at 06:48

## 2022-01-01 RX ADMIN — PHENYLEPHRINE HYDROCHLORIDE 100 MCG: 10 INJECTION INTRAVENOUS at 10:23

## 2022-01-01 RX ADMIN — SODIUM CHLORIDE 1000 ML: 9 INJECTION, SOLUTION INTRAVENOUS at 02:39

## 2022-01-01 RX ADMIN — ONDANSETRON 4 MG: 2 INJECTION INTRAMUSCULAR; INTRAVENOUS at 10:41

## 2022-01-01 RX ADMIN — GABAPENTIN 100 MG: 100 CAPSULE ORAL at 09:44

## 2022-01-01 RX ADMIN — DEXAMETHASONE SODIUM PHOSPHATE 4 MG: 4 INJECTION, SOLUTION INTRA-ARTICULAR; INTRALESIONAL; INTRAMUSCULAR; INTRAVENOUS; SOFT TISSUE at 10:21

## 2022-01-01 ASSESSMENT — ACTIVITIES OF DAILY LIVING (ADL)
DEPENDENT_IADLS:: CLEANING;COOKING;LAUNDRY;SHOPPING;MEAL PREPARATION;MEDICATION MANAGEMENT;TRANSPORTATION;INCONTINENCE
WALKING_OR_CLIMBING_STAIRS: TRANSFERRING DIFFICULTY, ASSISTANCE 1 PERSON
ADLS_ACUITY_SCORE: 52
DESCRIBE_HEARING_LOSS: BILATERAL HEARING LOSS
ADLS_ACUITY_SCORE: 52
ADLS_ACUITY_SCORE: 49
ADLS_ACUITY_SCORE: 56
ADLS_ACUITY_SCORE: 43
ADLS_ACUITY_SCORE: 48
ADLS_ACUITY_SCORE: 54
WEAR_GLASSES_OR_BLIND: YES
WEAR_GLASSES_OR_BLIND: YES
ADLS_ACUITY_SCORE: 48
TOILETING_ISSUES: YES
DIFFICULTY_EATING/SWALLOWING: NO
CONCENTRATING,_REMEMBERING_OR_MAKING_DECISIONS_DIFFICULTY: NO
ADLS_ACUITY_SCORE: 56
ADLS_ACUITY_SCORE: 49
DRESSING/BATHING_DIFFICULTY: YES
ADLS_ACUITY_SCORE: 37
ADLS_ACUITY_SCORE: 37
WALKING_OR_CLIMBING_STAIRS_DIFFICULTY: YES
ADLS_ACUITY_SCORE: 54
DEPENDENT_IADLS:: CLEANING;COOKING;LAUNDRY;SHOPPING;MEAL PREPARATION;MEDICATION MANAGEMENT;TRANSPORTATION;INCONTINENCE
ADLS_ACUITY_SCORE: 37
ADLS_ACUITY_SCORE: 52
ADLS_ACUITY_SCORE: 37
CHANGE_IN_FUNCTIONAL_STATUS_SINCE_ONSET_OF_CURRENT_ILLNESS/INJURY: YES
ADLS_ACUITY_SCORE: 37
ADLS_ACUITY_SCORE: 37
FALL_HISTORY_WITHIN_LAST_SIX_MONTHS: NO
ADLS_ACUITY_SCORE: 42
ADLS_ACUITY_SCORE: 52
ADLS_ACUITY_SCORE: 52
ADLS_ACUITY_SCORE: 56
USE_OF_HEARING_ASSISTIVE_DEVICES: RIGHT HEARING AID;LEFT HEARING AID
ADLS_ACUITY_SCORE: 52
ADLS_ACUITY_SCORE: 52
EQUIPMENT_CURRENTLY_USED_AT_HOME: WHEELCHAIR, POWER
ADLS_ACUITY_SCORE: 37
ADLS_ACUITY_SCORE: 52
ADLS_ACUITY_SCORE: 49
ADLS_ACUITY_SCORE: 48
ADLS_ACUITY_SCORE: 52
ADLS_ACUITY_SCORE: 37
ADLS_ACUITY_SCORE: 52
DRESSING/BATHING: BATHING DIFFICULTY, ASSISTANCE 1 PERSON
ADLS_ACUITY_SCORE: 52
ADLS_ACUITY_SCORE: 37
ADLS_ACUITY_SCORE: 49
TOILETING_ASSISTANCE: TOILETING DIFFICULTY, ASSISTANCE 1 PERSON
ADLS_ACUITY_SCORE: 56
ADLS_ACUITY_SCORE: 37
ADLS_ACUITY_SCORE: 56
ADLS_ACUITY_SCORE: 56
DOING_ERRANDS_INDEPENDENTLY_DIFFICULTY: YES
HEARING_DIFFICULTY_OR_DEAF: YES
ADLS_ACUITY_SCORE: 48
ADLS_ACUITY_SCORE: 56
DEPENDENT_IADLS:: CLEANING;COOKING;LAUNDRY;SHOPPING;MEAL PREPARATION;MEDICATION MANAGEMENT;TRANSPORTATION;INCONTINENCE

## 2022-01-01 ASSESSMENT — ENCOUNTER SYMPTOMS
DIFFICULTY URINATING: 1
MUSCLE CRAMPS: 0
NUMBNESS: 0
NECK PAIN: 0
CHILLS: 0
LOSS OF CONSCIOUSNESS: 0
HEMATURIA: 0
DIZZINESS: 1
DYSURIA: 0
ABDOMINAL PAIN: 1
MEMORY LOSS: 0
VOMITING: 0
DISTURBANCES IN COORDINATION: 1
SPEECH DIFFICULTY: 1
FATIGUE: 1
FEVER: 0
BLOOD IN STOOL: 0
FEVER: 0
JOINT SWELLING: 0
SEIZURES: 0
TINGLING: 0
TROUBLE SWALLOWING: 0
BACK PAIN: 0
VOMITING: 0
ARTHRALGIAS: 1
PARALYSIS: 0
TREMORS: 0
MUSCLE WEAKNESS: 1
HEADACHES: 0
MYALGIAS: 1
SPEECH DIFFICULTY: 1
SPEECH CHANGE: 1
FLANK PAIN: 0
APPETITE CHANGE: 1
WEAKNESS: 1
CONSTIPATION: 0
STIFFNESS: 1

## 2022-01-01 ASSESSMENT — REVISED AMYOTROPHIC LATERAL SCLEROSIS FUNCTIONAL RATING SCALE (ALSFRS-R)
SWALLOWING: 3
SALIVATION: SLIGHT BUT DEFINITE EXCESS OF SALIVA IN MOUTH; MAY HAVE NIGHTTIME DROOLING
SIX_ITEM_SUBTOTAL: 11
DRESSING_AND_HYGEINE: NEEDS ATTENDANT FOR SELF-CARE
HANDWRITING: SLOW OR SLOPPY: ALL WORDS ARE LEGIBLE
DRESSING_AND_HYGEINE: 1
GROSS_MOTOR_SUBTOTAL_SCORE: 2
TURNING_IN_BED_AND_ADJUSTING_BED_CLOTHES: 1
SWALLOWING: EARLY EATING PROBLEMS - OCCASIONAL CHOKING
SPEECH: SPEECH COMBINED WITH NON-VOCAL COMMUNICATION
DYSPNEA: 4
ORTHOPENA: 4
RESPIRATORY_SUBTOTAL_SCORE: 12
BULBAR_SUBTOTAL: 7
TURNING_IN_BED_AND_ADJUSTING_BED_CLOTHES: CAN INITIATE, BUT NOT TURN OR ADJUST SHEETS ALONE
FINE_MOTOR_SUBTOTAL_SCORE: 5
CUTTING_FOOD_AND_HANDLING_UTENSILES: 1
HANDWRITING: 3
CLIMBING_STAIRS: CANNOT DO
CLIMBING_STAIRS: 0
RESPIRATORY_INSUFFICIENCY: 4
WALKING: 1
ALSFRS_TOTAL_SCORE: 26
SALIVATION: 3
SPEECH: 1
WALKING: NON-AMBULATORY FUNCTIONAL MOVEMENT ONLY

## 2022-01-01 ASSESSMENT — PAIN SCALES - GENERAL
PAINLEVEL: MILD PAIN (2)
PAINLEVEL: MILD PAIN (3)

## 2022-01-06 NOTE — TELEPHONE ENCOUNTER
Toya verbal orders request  Olga best call back number  [phone]     Occupational therapy   2x 1wk   1 x 2 wk     Lymphedema  management and power wheelchair  training

## 2022-01-07 NOTE — TELEPHONE ENCOUNTER
Call to Olga OLEA OT and left detailed message.     Verbal order given to approve visits until certification received for MD signature.

## 2022-01-10 NOTE — PROGRESS NOTES
Pt's wife Kelli called asking that Elier's 2/17 procedure be delayed by several months d/t fear of COVID exposure. She explained that she did not want him to have to be in person for the covid test and pre op exam, which I offered a virtual PAC appt. She still wants to delay for approx 2 months.   Will message OR  to offer alternative dates.    Nathalie Levine, RN, BSN,   [facility]  Care coordinator

## 2022-01-11 NOTE — TELEPHONE ENCOUNTER
Pt requesting refill of Enalapril and Oxybutynin.    For Enalapril- per 12/19/21 MyC Message with Dr. Ray- Patient to take 10 mg 2x a day. Not on active medication list.    For Oxybutynin- per 11/11 Office visit with urology- stop oxybutynin and try trospium. Per 11/24 MyC Message, ok to stop Trospium and restart Oxybutynin. Not on active medication list.    Liliya Romo RN

## 2022-01-12 NOTE — TELEPHONE ENCOUNTER
Spoke to patient's wife Kelli in regards to rescheduling procedure. Kelli stated they would like to wait until spring time to get patient's procedure done due to covid right now. Informed Kelli that the patients procedure is rescheduled with Dr. Kent on 4/7/2022. Informed her the patient will need an updated pre-op physical within 30 days of his procedure and a COVID-19 test within 96-72 hours of procedure date. She stated the patient  is going to have this done locally. Informed her the patient will need a  and someone to monitor him for 24 hours after the procedure. Informed her all scheduling details will be sent to the address listed on Epic. Address confirmed on this call.

## 2022-01-13 NOTE — PROGRESS NOTES
Clinic Care Coordination Contact  [facility]       Clinical Data: Care Coordinator Outreach  Outreach attempted x 1. The CHW briefly spoke with Kelli, the patient's spouse. She was currently busy and requested a call back in a couple of days.    Plan: Care Coordinator will try to reach patient again in 3-5 business days.    PHILIPP Gonzalez. Public Health  Community Health Worker  [facility]  Clinic Care Coordination  [phone]  --------------------------------------------------------  Next Outreach:  1-18-22  Planned Outreach Frequency: Monthly  Preferred Phone Number: Kelli (spouse) [phone]     Enrollment Date:  07/26/21  Last Care Plan Assessment:  11/10/21

## 2022-01-26 NOTE — TELEPHONE ENCOUNTER
Toya calling for verbal orders     Lola GARZA [phone]    Every week vs every other, to  keep closer eye on skin. 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

## 2022-01-26 NOTE — TELEPHONE ENCOUNTER
Call to Lola and advised.     Verbal order given to approve visits until certification received for MD signature.

## 2022-01-31 NOTE — PROGRESS NOTES
Clinic Care Coordination Contact    Community Health Worker Follow Up    Care Gaps:     Health Maintenance Due   Topic Date Due     HEPATITIS C SCREENING  Never done     MICROALBUMIN  08/15/2020     MEDICARE ANNUAL WELLNESS VISIT  09/08/2021     FALL RISK ASSESSMENT  09/08/2021     PHQ-2  01/01/2022       Kelli was focused on discussing the patient's current goals.     Goals:   Goals Addressed as of 1/31/2022 at 10:07 AM                    Today    12/1/21       1. Respite Care/Private Duty HHC (pt-stated)   60%  50%    Added 7/26/21 by Rebecca Hernández, RN      Goal Statement: I would like to receive resources for respite Care/Private Duty HHC companies.   Date Goal set: 7/26/21  Barriers: Multiple health concerns  Strengths: Patient and wife are engaged and motivated  Date to Achieve By: Updated 7/1/2022  Patient expressed understanding of goal: Yes  Action steps to achieve this goal:  1. I understand that RN CC will send via [employer] and the mail Respite Care/Private Duty HHC resources.   2. I will review resources and utilize as I see fit.   3. I will continue to work with care coordination for any additional resources and support.     1-31, CHW:    The patient is now receiving HHC through [facility]. The nurse comes and helps with leg wrappings, etc.     Kelli stated that [facility] does not do respite care, however they are looking into receiving respite care through the [employer] [facility] (this would be a free service). Kelli stated that they are planning to look into this resources. They have been busy transitioning from [facility] to [facility] until now.     If that resource does not simpson out, Kelli is interested in looking into other respite care options.            2. Improve chronic symptoms (pt-stated)   50%  30%    Added 7/26/21 by Rebecca Hernández, RN      Goal Statement: I want my lower leg edema to resolve.   Date Goal set: 7/26/21  Barriers: Multiple health concerns  Strengths: Patient is motivated and  "engaged  Date to Achieve By: Updated 7/1/2022  Patient expressed understanding of goal: Yes  Action steps to achieve this goal:  1. I understand RN CC will message my PCP to request a diuretic medication be prescribed.   2. I will wear my compression stockings daily.   3. I will elevate my feet at rest.   4. I will take all my medications as prescribed.   5.  I will continue to work with care coordination for ny additional resources and support.     1-31, CHW:    Kelli stated that the swelling has gone down in the patient's lower legs recently. They are no longer taking medication that was prescribed to assist with swelling, as Kelli stated it was not helping. The CHW encouraged Kelli to discuss this with PCP if it becomes a problem.    [facility] Nurse assists with leg wrappings. Patient wears compression stockings daily.               Intervention and Education during outreach: [employer] is delivering a power wheel chair today. Patient has been using a manual standard wheel chair. Kelli states that he uses is 98% of the time. Patient will not be using the power wheel chair all of the time, however they wanted to get the power chair before the patient \"actually needed it.\" They were able to get the wheel chair ordered and delivered within 1 month which Kelli is happy about. Right now, they are working on the patient's blood pressure. Kelli is planning to contact PCP for medication check in to make sure BP medication is of appropriate dosage. The CHW inquired if Kelli had any other questions or concerns at this time, however Kelli declined.     CHW Plan: The CHW will reach out in 1 month to monitor the progression of the patient's goals.     PHILIPP Gonzalez. Public Health  Community Health Worker  [place] [facility]  Clinic Care Coordination  [phone]  --------------------------------------------------------  Next outreach: 2/28/22  Preferred contact: Kelli (spouse) [phone]    Enrollment " Date: 07/26/21  Last Care Plan Assessment: 11/10/22

## 2022-02-04 NOTE — TELEPHONE ENCOUNTER
[facility] calling for orders. Discontinue HHA and miss the SKILLED NURSING visit next week due to no need because the wound is healed.    Idania [phone]

## 2022-02-04 NOTE — TELEPHONE ENCOUNTER
Please see message below and advise.     Last office visit with Dr. Ray was on 9/30/2021.     Natalia MATHEW RN   [facility]

## 2022-02-04 NOTE — TELEPHONE ENCOUNTER
Call to Idania and kunal.   Verbal order given to approve visits until certification received for MD signature.

## 2022-02-08 NOTE — PROGRESS NOTES
Care Coordination Clinician Chart Review  Situation: Patient chart reviewed by care coordinator.       Background: Care Coordination initial assessment and enrollment to Care Coordination was 7/2021.   Patient centered goals were developed with participation from patient.  RN CC handed patient off to CHW for continued outreach every 30 days.        Assessment: Per chart review, patient outreach completed by CC CHW on 1/31/20222.  Patient is actively working to accomplish goals.  Patient's goals remain appropriate and relevant at this time.   Patient is due for updated Plan of Care.  Annual assessment will be due 9/2022.      Goals        1. Respite Care/Private Duty HHC (pt-stated)       Goal Statement: I would like to receive resources for respite Care/Private Duty HHC companies.   Date Goal set: 7/26/21  Barriers: Multiple health concerns  Strengths: Patient and wife are engaged and motivated  Date to Achieve By: Updated 7/1/2022  Patient expressed understanding of goal: Yes  Action steps to achieve this goal:  1. I understand that RN CC will send via [employer] and the mail Respite Care/Private Duty C resources.   2. I will review resources and utilize as I see fit.   3. I will continue to work with care coordination for any additional resources and support.                2. Improve chronic symptoms (pt-stated)       Goal Statement: I want my lower leg edema to resolve.   Date Goal set: 7/26/21  Barriers: Multiple health concerns  Strengths: Patient is motivated and engaged  Date to Achieve By: Updated 7/1/2022  Patient expressed understanding of goal: Yes  Action steps to achieve this goal:  1. I understand RN CC will message my PCP to request a diuretic medication be prescribed.   2. I will wear my compression stockings daily.   3. I will elevate my feet at rest.   4. I will take all my medications as prescribed.   5.  I will continue to work with care coordination for ny additional resources and support.                    Plan/Recommendations: The patient will continue working with Care Coordination to achieve goals as above.  CHW will involve RN CC as needed or if patient is ready to move to maintenance.  RN CC will continue to monitor progress to goals and CHW outreaches every 6 weeks.   Plan of Care updated and mailed to patient: Yes, via [employer].    Tonya Hernández RN Care Coordinator  [facility]  Email: [email or web address]  Phone: [phone]

## 2022-02-08 NOTE — LETTER
[facility]ARE COORDINATION  [address]  [facility] [address]    February 8, 2022        Elier Leong  [address] [address]          Dear Kelli and Nader,     Attached is an updated Complex Care Plan for your continued enrollment in Care Coordination. Please let us know if you have additional questions, concerns, or goals that we can assist with.    Sincerely,    Tonya Hernández RN Care Coordinator  [facility] [place] [place]  Email: [email or web address]  Phone: [phone]          [facility]  Patient Centered Plan of Care  About Me:        Patient Name:  Elier Leong    YOB: 1945  Age:         76 year old   [place] MRN:    [id number] Telephone Information:  Home Phone [phone]   Mobile [phone]       Address:  [address] [facility] [address] Email address:  [email or web address]      Emergency Contact(s)    Name Relationship Lgl Grd Work Phone Home Phone Mobile Phone   1. RAMÓNSAM * Spouse No  [phone]   2. TRENA LEONGCARLEE Daughter No   [phone]   3. TRENA LEONGHERNAN*  No   [phone]           Primary language:  English     needed? No   [facility] [employer]:  [phone] op. 1  Other communication barriers:Glasses; Language barrier    Preferred Method of Communication:  Mail  Current living arrangement: I live in a private home with spouse    Mobility Status/ Medical Equipment: Dependent/Assisted by Another    Health Maintenance  Health Maintenance Reviewed: Due/Overdue   Health Maintenance Due   Topic Date Due     HEPATITIS C SCREENING  Never done     MICROALBUMIN  08/15/2020     MEDICARE ANNUAL WELLNESS VISIT  09/08/2021     FALL RISK ASSESSMENT  09/08/2021     PHQ-2  01/01/2022       My Access Plan  Medical Emergency 911   Primary Clinic Line [facility] - [phone]   24 Hour Appointment Line [phone] or  [phone]  ([phone]) (toll-free)   24 Hour Nurse Line [phone] (toll-free)   Preferred Urgent Care No data recorded   Preferred Hospital [facility]  [phone]     Preferred Pharmacy [id number] [place], [place] - [address]     Behavioral Health Crisis Line The National Suicide Prevention Lifeline at 1-982.256.5659 or 911             My Care Team Members  Patient Care Team       Relationship Specialty Notifications Start End    Lida Ray MD PCP - General Internal Medicine  5/16/11     Phone: [phone] Fax: [phone]         [address] [facility] [address]    Lida Ray MD Assigned PCP   9/7/15     Phone: [phone] Fax: [phone]         [address] [facility] [address]    Yeo, Albert, MD Assigned Musculoskeletal Provider   12/6/20     Phone: [phone] Fax: [phone]         [address] [facility] [address]    Rebecca Hernández, RN Lead Care Coordinator  Admissions 7/26/21     Isai Ochoa MD Assigned Heart and Vascular Provider   10/17/21     Phone: [phone] Fax: [phone] [address] [facility] [address] [facility] [address]    Cesia Hand MA Community Health Worker   11/4/21     Uvaldo Cavazos MD Assigned Surgical Provider   11/14/21     Phone: [phone] Pager: [phone] Fax: [phone]        [address] [facility] [address]    Aaron Brito MD Assigned Neuroscience Provider   11/28/21     Phone: [phone] Fax: [phone]         [address] [place], [place] [address] [address]            My Care Plans  Self Management and Treatment Plan  Goals and (Comments)  Goals        General     1. Respite Care/Private Duty HHC (pt-stated)      Notes - Note edited  12/22/2021 12:29 PM by Hernández, Rebecca R, RN     Goal Statement: I would like to receive resources for respite Care/Private Duty [facility] companies.   Date Goal set: 7/26/21  Barriers: Multiple health  concerns  Strengths: Patient and wife are engaged and motivated  Date to Achieve By: Updated 7/1/2022  Patient expressed understanding of goal: Yes  Action steps to achieve this goal:  1. I understand that RN CC will send via [employer] and the mail Respite Care/Private Duty [facility] resources.   2. I will review resources and utilize as I see fit.   3. I will continue to work with care coordination for any additional resources and support.              2. Improve chronic symptoms (pt-stated)      Notes - Note edited  12/22/2021 12:29 PM by Rebecca Hernández, RN     Goal Statement: I want my lower leg edema to resolve.   Date Goal set: 7/26/21  Barriers: Multiple health concerns  Strengths: Patient is motivated and engaged  Date to Achieve By: Updated 7/1/2022  Patient expressed understanding of goal: Yes  Action steps to achieve this goal:  1. I understand RN CC will message my PCP to request a diuretic medication be prescribed.   2. I will wear my compression stockings daily.   3. I will elevate my feet at rest.   4. I will take all my medications as prescribed.   5.  I will continue to work with care coordination for ny additional resources and support.                Action Plans on File:                       Advance Care Plans/Directives Type:   On file      Current Medications and Allergies:    Current Outpatient Medications   Medication Instructions     acetaminophen (TYLENOL) 650 mg, Oral, EVERY 8 HOURS PRN     aspirin (ASA) 81 mg, Oral, EVERY EVENING     baclofen (LIORESAL) intraTHECAL Internal Pump Intrathecal, CONTINUOUS PRN, Pump filled by [facility] stroke [place] [phone]<BR>Pump Model: Syncromed<BR>Medication in pump is Gablofen (brand name)<BR>Last fill: during hospital admission<BR>Next fill:   <BR>Low [place] Alarm Date: <BR>Reservoir Volume: 20 ml<BR>Conc: 2000 mcg/mL<BR>Delivers 234.7 mcg/day<BR>Basal rate:unknown<BR>Battery life: unknown     docusate sodium (COLACE)  100 mg, Oral, EVERY EVENING     enalapril (VASOTEC) 10 mg, Oral, 2 TIMES DAILY     EPINEPHrine (ANY BX GENERIC EQUIV) 0.3 mg, Intramuscular, PRN     ezetimibe (ZETIA) 10 mg, Oral, DAILY     furosemide (LASIX) 40 mg, Oral, DAILY     oxybutynin ER (DITROPAN-XL) 20 mg, Oral, AT BEDTIME     pantoprazole (PROTONIX) 40 mg, Oral, 2 TIMES DAILY     trospium (SANCTURA) 20 mg, Oral, 2 TIMES DAILY BEFORE MEALS         Care Coordination Start Date: 7/26/2021   Frequency of Care Coordination: monthly     Form Last Updated: 02/08/2022

## 2022-02-25 NOTE — TELEPHONE ENCOUNTER
Whitney RN with [facility] asking for verbal order        Patient requests to discontinue SKILLED NURSING w/ a visit  due to wound healed and no longer has a skilled nursing need.     Best number to call back [phone]

## 2022-02-25 NOTE — TELEPHONE ENCOUNTER
Call to Whitney GARZA with [employer] at [phone]. Whitney informed of Dr. Ray's below response. Whitney verbalized understanding.     Natalia MATHEW RN   [facility]

## 2022-02-25 NOTE — TELEPHONE ENCOUNTER
Please see message below and advise. [facility] wants verbal order to discontinue skilled nursing.     Natalia MATHEW RN   [facility]

## 2022-02-25 NOTE — TELEPHONE ENCOUNTER
Call to Bonnie and advised.     Verbal order given to approve visits until certification received for MD signature.

## 2022-02-25 NOTE — TELEPHONE ENCOUNTER
Bonnie with [facility] request verbal orders      PHYSICAL THERAPY  [id number]    Best number to call back [phone]

## 2022-02-28 NOTE — TELEPHONE ENCOUNTER
[facility] discontinue SKILLED NURSING due to patient no longer has SKILLED NURSING nursing needs received via fax     Forms in Dr. mail box for review and signature.

## 2022-03-03 NOTE — Clinical Note
Dr Cavazos - Mr Sunil will be following up with you about his incontinence. We asked him to ask you about other medications or interventions (e.g., interstim, pelvic floor exercises) or suprapubic cath. Thanks.

## 2022-03-03 NOTE — PROGRESS NOTES
[facility]: [place]  Neuromuscular Progress Note    Patient Name:  Elier Barber  MRN:  [id number]    :  1945  Date of Service: March 3, 2022  Primary care provider:  Lida Ray    Brief Summary:   Mr. Barber is a 76 year old male with h/o PLS which was diagnosed in  who presents for follow-up.     Subjective:   Mr. Ponce was last seen in the clinic in Sep 2021.  He is accompanied by his wife today.  He continues to follow-up with Dr. Calvert for management of his baclofen pump in the setting of spasticity.  He has found this helpful.  Still suffering from significant shoulder (right>left) is constant achy pain. It is worse in the morning, does loosen up a bit during the day. It is throbbing and achy, worst on the outside of his shoulder. Has been present for the last couple years. It is better when he lies on his back (propped with shoulders), worse when moving. Has tried trigger points, acupuncture, tylenol, chiropractor, steroid injections, OTC pain patches, gabapentin. Is starting CBD lotion but not beneficial yet. It was recommended from Dr. Elliott to trial percocet, but have not tried yet.    Had another stroke in September, causing new onset right facial droop that has since imoved. He has recovered well since that time and has remained out of the hospital. Received PWC and is much more independent. Has home OT/PT/nurse, notices transfers are easier. Still working on getting a van for his PWC.  He does use a manual wheelchair along with a four-wheel walker (rare).  Denies significant dysphagia with solids or liquids, using behavioral modifications. Sleep is ok. Believes weight is stable. Mood is positive. Ability to communicate is not too much different. Continues to have bladder issues, mainly with leakage. Has been on oxybuitin but continues to worsen with time. Varies from day to day.     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 This was  removed but had complications with GI bleeds later. He is cancer free but weaker overall due to multiple hospitalizations. Given his history of bleeding, currently maintained on aspirin 325mg for secondary stroke prevention.                                                          ROS: A 10-point ROS was performed as per HPI.    Medications:      Current Outpatient Medications:      acetaminophen (TYLENOL) 650 MG CR tablet, Take 650 mg by mouth every 8 hours as needed for mild pain (Shoulder pain) , Disp: , Rfl:      baclofen (LIORESAL) intraTHECAL Internal Pump, by Intrathecal route continuous prn Pump filled by [facility] stroke [place] [phone] Pump Model: Syncromed Medication in pump is Gablofen (brand name) Last fill: during hospital admission Next fill:    Low [place] Alarm Date:  Reservoir Volume: 20 ml Conc: 2000 mcg/mL Delivers 234.7 mcg/day Basal rate:unknown Battery life: unknown, Disp: , Rfl:      docusate sodium (COLACE) 100 MG capsule, Take 100 mg by mouth every evening, Disp: , Rfl:      EPINEPHrine 0.3 MG/0.3ML injection, Inject 0.3 mLs (0.3 mg) into the muscle as needed for anaphylaxis, Disp: 0.3 mL, Rfl: 3     ezetimibe (ZETIA) 10 MG tablet, Take 1 tablet (10 mg) by mouth daily, Disp: 90 tablet, Rfl: 3     furosemide (LASIX) 20 MG tablet, Take 2 tablets (40 mg) by mouth daily, Disp: 60 tablet, Rfl: 1     oxybutynin ER (DITROPAN-XL) 10 MG 24 hr tablet, Take 2 tablets (20 mg) by mouth At Bedtime, Disp: 180 tablet, Rfl: 3     pantoprazole (PROTONIX) 40 MG EC tablet, Take 1 tablet (40 mg) by mouth 2 times daily (Patient taking differently: Take 40 mg by mouth daily ), Disp: 60 tablet, Rfl: 3     trospium (SANCTURA) 20 MG tablet, Take 1 tablet (20 mg) by mouth 2 times daily (before meals), Disp: 180 tablet, Rfl: 1     aspirin 81 MG tablet, Take 81 mg by mouth every evening , Disp: , Rfl:      enalapril (VASOTEC) 10 MG tablet, Take 1 tablet (10 mg) by mouth 2 times  daily, Disp: 180 tablet, Rfl: 1     HYDROcodone-acetaminophen (NORCO) 5-325 MG tablet, TAKE 1 TABLET BY MOUTH TWICE A DAY AS NEEDED FOR PAIN (Patient not taking: Reported on 3/3/2022), Disp: , Rfl:     Physical Examination:   BP (!) 151/79   Pulse 64   Resp 16   SpO2 97%   Wt Readings from Last 4 Encounters:   11/11/21 70.3 kg (155 lb)   10/20/21 70.3 kg (154 lb 15.7 oz)   10/07/21 70.3 kg (155 lb)   09/30/21 70.3 kg (155 lb)     Moderate bilateral pitting edema, R>L, without venous cords or tenderness.      NM Exam: Cranial     Atrophy Fasciculations   Upper face: Negative Negative   Lower face: Negative Negative   Tongue: Negative Negative      Facial weakness: difficulty with cheek puff  Tongue movements: slow  Tongue weakness: mild  Jaw jerk: present  Speech: dysarthria  Pseudobulbar affect: present    NM Exam: Axial    Neck flexion weakness: none  Neck extension weakness: none    NM Exam: Upper Extremities  Pain with passive ROM of both shoulders   Right Left   Atrophy: Negative Negative   Fasciculations: Negative Negative   Muscle tone: spastic catch spastic catch   Rapid alt. movements: slow slow   Reflexes Right Left   Biceps: increased increased   Brachioradialis: increased increased   Triceps: increased increased   Weldon: right Weldon reflex present left Weldon reflex present   Strength Right Left   * Indicates a recommended field     Shoulder abduction*: >4+*pain  >4+* pain   Elbow flexion: 5 *pain 5 *pain   Elbow extension*: 5 *pain 5 *pain   Wrist extension*: 5 5   Finger extension: 5 4   Finger flexion: 5 5   Abductor pollicis brevis: 4 4   First dorsal interosseous*: 4 4     NM Exam: Lower Extremities     Right Left   Atrophy: Negative Negative   Fasciculations: Negative Negative   Muscle tone: spastic catch left lower extremity stiffness   Rapid alt. movements: slow slow   Reflexes Right Left   Patellar: increased increased   Achilles: normal normal   Plantar:     Strength Right Left   *  Indicates a recommended field     Hip flexion*: 5 5   Knee extension*: 5 5   Knee flexion: 5 5   Ankle dorsiflexion*: 4+ 4+   Ankle plantar flexion: 5 5     Impression:    Mr. Barber is a 76 year old male with h/o PLS which was diagnosed in 2002 who presents for follow-up.  Overall there have not been significant changes in his overall examination since his last visit. He does have some mild asymmetry to his upper limb weakness, left greater than right which is most likely attributed to his stroke. The larger issue his is limitation with pain. Discussed with him the different options he has already tried for his shoulders and agreed with Dr. Elliott that percocet and a TENS unit are a good next step. Recommended he try a stool softener when taking the medication. He has home care and will continue his PT/OT/nursing cares with them. Suggest they revist with urology about other options for bladder incontinence, potentially pelvic floor therapy or bladder stims, etc., if applicable for his symptoms as they have worsened lately. They feel comfortable and would like to follow-up in 6 months. Will contact sooner if any concerns.       Recommendations:   - follow-up with urology for incontenence  -RTC 6 mo  -speech therapy follow up     Seen and discussed with Dr. Tejada. His addendum follows      Rosamaria Culver MD  Neuromuscular Fellow    I personally examined the patient and concur with the resident's note. I contacted Dr Cavazos regarding his incontinence and discussed medications for pain management at some length as well.     Gary Tejada M.D.

## 2022-03-03 NOTE — LETTER
3/3/2022       RE: Elier Leong  [address] [facility] [id number]     Dear Colleague,    Thank you for referring your patient, Elier Leong, to the [facility]UR[facility] [place] at [facility]. Please see a copy of my visit note below.    [facility]: [place]  Neuromuscular Progress Note    Patient Name:  Elier Barber  MRN:  [id number]    :  1945  Date of Service: March 3, 2022  Primary care provider:  Lida Ray    Brief Summary:   Mr. Barber is a 76 year old male with h/o PLS which was diagnosed in  who presents for follow-up.     Subjective:   Mr. Ponce was last seen in the clinic in Sep 2021.  He is accompanied by his wife today.  He continues to follow-up with Dr. Calvert for management of his baclofen pump in the setting of spasticity.  He has found this helpful.  Still suffering from significant shoulder (right>left) is constant achy pain. It is worse in the morning, does loosen up a bit during the day. It is throbbing and achy, worst on the outside of his shoulder. Has been present for the last couple years. It is better when he lies on his back (propped with shoulders), worse when moving. Has tried trigger points, acupuncture, tylenol, chiropractor, steroid injections, OTC pain patches, gabapentin. Is starting CBD lotion but not beneficial yet. It was recommended from Dr. Elliott to trial percocet, but have not tried yet.    Had another stroke in September, causing new onset right facial droop that has since imoved. He has recovered well since that time and has remained out of the hospital. Received PWC and is much more independent. Has home OT/PT/nurse, notices transfers are easier. Still working on getting a van for his PWC.  He does use a manual wheelchair along with a four-wheel walker (rare).  Denies significant dysphagia with solids or liquids, using behavioral modifications.  Sleep is ok. Believes weight is stable. Mood is positive. Ability to communicate is not too much different. Continues to have bladder issues, mainly with leakage. Has been on oxybuitin but continues to worsen with time. Varies from day to day.     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 This was removed but had complications with GI bleeds later. He is cancer free but weaker overall due to multiple hospitalizations. Given his history of bleeding, currently maintained on aspirin 325mg for secondary stroke prevention.                                                          ROS: A 10-point ROS was performed as per HPI.    Medications:      Current Outpatient Medications:      acetaminophen (TYLENOL) 650 MG CR tablet, Take 650 mg by mouth every 8 hours as needed for mild pain (Shoulder pain) , Disp: , Rfl:      baclofen (LIORESAL) intraTHECAL Internal Pump, by Intrathecal route continuous prn Pump filled by [facility] stroke [place] [phone] Pump Model: Syncromed Medication in pump is Gablofen (brand name) Last fill: during hospital admission Next fill:    Low [place] Alarm Date:  Reservoir Volume: 20 ml Conc: 2000 mcg/mL Delivers 234.7 mcg/day Basal rate:unknown Battery life: unknown, Disp: , Rfl:      docusate sodium (COLACE) 100 MG capsule, Take 100 mg by mouth every evening, Disp: , Rfl:      EPINEPHrine 0.3 MG/0.3ML injection, Inject 0.3 mLs (0.3 mg) into the muscle as needed for anaphylaxis, Disp: 0.3 mL, Rfl: 3     ezetimibe (ZETIA) 10 MG tablet, Take 1 tablet (10 mg) by mouth daily, Disp: 90 tablet, Rfl: 3     furosemide (LASIX) 20 MG tablet, Take 2 tablets (40 mg) by mouth daily, Disp: 60 tablet, Rfl: 1     oxybutynin ER (DITROPAN-XL) 10 MG 24 hr tablet, Take 2 tablets (20 mg) by mouth At Bedtime, Disp: 180 tablet, Rfl: 3     pantoprazole (PROTONIX) 40 MG EC tablet, Take 1 tablet (40 mg) by mouth 2 times daily (Patient taking differently: Take 40 mg  by mouth daily ), Disp: 60 tablet, Rfl: 3     trospium (SANCTURA) 20 MG tablet, Take 1 tablet (20 mg) by mouth 2 times daily (before meals), Disp: 180 tablet, Rfl: 1     aspirin 81 MG tablet, Take 81 mg by mouth every evening , Disp: , Rfl:      enalapril (VASOTEC) 10 MG tablet, Take 1 tablet (10 mg) by mouth 2 times daily, Disp: 180 tablet, Rfl: 1     HYDROcodone-acetaminophen (NORCO) 5-325 MG tablet, TAKE 1 TABLET BY MOUTH TWICE A DAY AS NEEDED FOR PAIN (Patient not taking: Reported on 3/3/2022), Disp: , Rfl:     Physical Examination:   BP (!) 151/79   Pulse 64   Resp 16   SpO2 97%   Wt Readings from Last 4 Encounters:   11/11/21 70.3 kg (155 lb)   10/20/21 70.3 kg (154 lb 15.7 oz)   10/07/21 70.3 kg (155 lb)   09/30/21 70.3 kg (155 lb)     Moderate bilateral pitting edema, R>L, without venous cords or tenderness.      NM Exam: Cranial     Atrophy Fasciculations   Upper face: Negative Negative   Lower face: Negative Negative   Tongue: Negative Negative      Facial weakness: difficulty with cheek puff  Tongue movements: slow  Tongue weakness: mild  Jaw jerk: present  Speech: dysarthria  Pseudobulbar affect: present    NM Exam: Axial    Neck flexion weakness: none  Neck extension weakness: none    NM Exam: Upper Extremities  Pain with passive ROM of both shoulders   Right Left   Atrophy: Negative Negative   Fasciculations: Negative Negative   Muscle tone: spastic catch spastic catch   Rapid alt. movements: slow slow   Reflexes Right Left   Biceps: increased increased   Brachioradialis: increased increased   Triceps: increased increased   Weldon: right Weldon reflex present left Weldon reflex present   Strength Right Left   * Indicates a recommended field     Shoulder abduction*: >4+*pain  >4+* pain   Elbow flexion: 5 *pain 5 *pain   Elbow extension*: 5 *pain 5 *pain   Wrist extension*: 5 5   Finger extension: 5 4   Finger flexion: 5 5   Abductor pollicis brevis: 4 4   First dorsal interosseous*: 4 4     NM  Exam: Lower Extremities     Right Left   Atrophy: Negative Negative   Fasciculations: Negative Negative   Muscle tone: spastic catch left lower extremity stiffness   Rapid alt. movements: slow slow   Reflexes Right Left   Patellar: increased increased   Achilles: normal normal   Plantar:     Strength Right Left   * Indicates a recommended field     Hip flexion*: 5 5   Knee extension*: 5 5   Knee flexion: 5 5   Ankle dorsiflexion*: 4+ 4+   Ankle plantar flexion: 5 5     Impression:    Mr. Barber is a 76 year old male with h/o PLS which was diagnosed in 2002 who presents for follow-up.  Overall there have not been significant changes in his overall examination since his last visit. He does have some mild asymmetry to his upper limb weakness, left greater than right which is most likely attributed to his stroke. The larger issue his is limitation with pain. Discussed with him the different options he has already tried for his shoulders and agreed with Dr. Elliott that percocet and a TENS unit are a good next step. Recommended he try a stool softener when taking the medication. He has home care and will continue his PT/OT/nursing cares with them. Suggest they revist with urology about other options for bladder incontinence, potentially pelvic floor therapy or bladder stims, etc., if applicable for his symptoms as they have worsened lately. They feel comfortable and would like to follow-up in 6 months. Will contact sooner if any concerns.       Recommendations:   - follow-up with urology for incontenence  -RTC 6 mo  -speech therapy follow up     Seen and discussed with Dr. Tejada. His addendum follows      Rosamaria Culver MD  Neuromuscular Fellow    I personally examined the patient and concur with the resident's note. I contacted Dr Cavazos regarding his incontinence and discussed medications for pain management at some length as well.       Gary Tejada M.D.

## 2022-03-03 NOTE — PATIENT INSTRUCTIONS
Please follow-up with your urologist to discuss further treatment options including pelvic floor therapy, stimulation devices, etc.     Please call Flor @ [phone] for questions or concerns during regular business hours. For a more efficient way to communicate, use IsoPlexis and address the message to your physician. Remember, ReviverMxhart is only read during business hours. Do not leave urgent messages on voicemail or Skycatcht. If situation is urgent, contact the Neurology Clinic @ [phone] and ask to speak to a Triage Nurse or Call 911 or visit an Emergency Department.     Please call your pharmacy if you need a medication refill. They will send us an electronic message.

## 2022-03-09 NOTE — PROGRESS NOTES
Clinic Care Coordination Contact  [facility]/Voicemail       Clinical Data: Care Coordinator Outreach  Outreach attempted x 1.  Left message on patient's voicemail with call back information and requested return call.    Plan: Care Coordinator will try to reach patient again in 1-2 business days.    HEMANT GonzalezS. Public Health  Community Health Worker  [facility] Care Coordination  [phone]  ---------------------------------------------------------------------  Next outreach: 3/23/2022  Preferred contact: Kelli (spouse) [phone]     Enrollment Date: 07/26/21  Last Care Plan Assessment: 2/08/2022

## 2022-03-17 NOTE — TELEPHONE ENCOUNTER
Spoke to patient's wife Kelli in regards to rescheduling procedure. Informed her Dr. Kent says okay to reschedule no more further than June. Informed Kelli the patient is scheduled with Dr. Kent on 6/9/2022 per patient preference. Informed her the patient will need an updated pre-op physical within 30 days of his procedure and a COVID-19 test within 96-72 hours of procedure date. Kelli stated the patient stated is going to have this done locally. Informed her the patient will need a  and someone to monitor him for 24 hours after the procedure. Informed her all scheduling details will be sent to the address listed on Epic. Address confirmed on this call.

## 2022-03-18 NOTE — PROGRESS NOTES
Clinic Care Coordination Contact  Situation: Patient chart reviewed by care coordinator.     Background/Assessment: CHW has been unable to reach patient, since RN CC's last outreach. Patient will benefit from ongoing CHW outreach attempts per standard work.     Plan/Recommendations: CHW will reach out to patient per standard work flow. RN CC will review chart in 4 weeks.      Tonya Hernández RN Care Coordinator  [facility]  Email: [email or web address]

## 2022-04-05 NOTE — PROGRESS NOTES
Clinic Care Coordination Contact    Community Health Worker Follow Up    Care Gaps:     Health Maintenance Due   Topic Date Due     HEPATITIS C SCREENING  Never done     MICROALBUMIN  08/15/2020     MEDICARE ANNUAL WELLNESS VISIT  09/08/2021     FALL RISK ASSESSMENT  09/08/2021     PHQ-2 (once per calendar year)  01/01/2022       Patient was focused on addressing their current goals.     Goals:   Goals Addressed as of 4/5/2022 at 4:16 PM                    Today    1/31/22       1. Respite Care/Private Duty HHC (pt-stated)   70%  60%    Added 7/26/21 by Rebecca Hernández, RN      Goal Statement: I would like to receive resources for respite Care/Private Duty HHC companies.   Date Goal set: 7/26/21  Barriers: Multiple health concerns  Strengths: Patient and wife are engaged and motivated  Date to Achieve By: Updated 7/1/2022  Patient expressed understanding of goal: Yes  Action steps to achieve this goal:  1. I understand that RN CC will send via [email or web address] and the mail Respite Care/Private Duty HHC resources.   2. I will review resources and utilize as I see fit.   3. I will continue to work with care coordination for any additional resources and support.     4-5, CHW:    Kelli reported that the patient's electronic wheel chair was delivered and they are very pleased with it. The patient is utilizing it often.    The patient is no longer receiving [employer], they are planning to through [employer], which was recommended by the ALS foundation. Thee foundation would cover 18 hours of the services per month.    The Writer and Kelli discussed [place], writer gave the CLS P. Number to request an assessment if they decide to.                2. Improve chronic symptoms (pt-stated)   90%  50%    Added 7/26/21 by Rebecca Hernández, RN      Goal Statement: I want my lower leg edema to resolve.   Date Goal set: 7/26/21  Barriers: Multiple health concerns  Strengths: Patient is motivated and engaged  Date to Achieve By: Updated  7/1/2022  Patient expressed understanding of goal: Yes  Action steps to achieve this goal:  1. I understand RN CC will message my PCP to request a diuretic medication be prescribed.   2. I will wear my compression stockings daily.   3. I will elevate my feet at rest.   4. I will take all my medications as prescribed.   5.  I will continue to work with care coordination for ny additional resources and support.    4-5, CHW:     Kelli reported that the patients legs are really good. Kelli even stated that they may not have to use the velro leg wrappings as often anymore.               Intervention and Education during outreach: Writer inquired if they had any additional questions or concerns, however they did not. Writer encouraged Kelli to reach out previous to the next outreach if anything comes up; Kelli agreeable with plan.     CHW Plan: Writer will reach out to the patient in 1 month to monitor the progression of the patient's goals.       HEMANT GonzalezS. Public Health  Community Health Worker  [facility] Care Coordination  [phone]  ----------------------------------------------------------------------------------------------------------------------------  Next outreach: 5/5/2022  Preferred contact: Kelli (spouse) [phone]     Enrollment Date: 07/26/21  Last Care Plan Assessment: 2/08/2022

## 2022-04-19 NOTE — PROGRESS NOTES
Care Coordination Clinician Chart Review     Situation: Patient chart reviewed by care coordinator.?     Background: Initial assessment and enrollment to Care Coordination was 7/2021.?? Patient centered goals were developed with participation from patient.? Lead CC handed patient off to CHW for continued outreach every 30 days.??     Assessment: Per chart review, patient outreach completed by CC CHW on 4/5/2022.? Patient is actively working to accomplish goal(s).? Patient's goal(s) remain(s) appropriate at this time.? Patient is not due for updated Plan of Care.? Annual assessment will be due 9/2022.      Goals        1. Respite Care/Private Duty [facility] (pt-stated)       Goal Statement: I would like to receive resources for respite Care/Private Duty [facility] companies.   Date Goal set: 7/26/21  Barriers: Multiple health concerns  Strengths: Patient and wife are engaged and motivated  Date to Achieve By: Updated 7/1/2022  Patient expressed understanding of goal: Yes  Action steps to achieve this goal:  1. I understand that RN CC will send via [employer] and the mail Respite Care/Private Duty [facility] resources.   2. I will review resources and utilize as I see fit.   3. I will continue to work with care coordination for any additional resources and support.   Update: [place]elli [place] also [place] and request a Community Services Living Assessment if interested.                  2. Improve chronic symptoms (pt-stated)       Goal Statement: I want my lower leg edema to resolve.   Date Goal set: 7/26/21  Barriers: Multiple health concerns  Strengths: Patient is motivated and engaged  Date to Achieve By: Updated 7/1/2022  Patient expressed understanding of goal: Yes  Action steps to achieve this goal:  1. I understand RN CC will message my PCP to request a diuretic medication be prescribed.   2. I will wear my compression stockings daily.   3. I will elevate my feet at rest.   4. I will take all my medications as prescribed.   5.   I will continue to work with care coordination for ny additional resources and support.             ??     Plan/Recommendations: The patient will continue working with Care Coordination to achieve above goal(s).? CHW will involve Lead CC as needed or if patient is ready to move to maintenance.? Lead CC will continue to monitor CHW s monthly outreaches and progress to goal(s) every 6 weeks.?     Plan of Care updated and sent to patient: Fanny Hernández RN Care Coordinator  [facility]  Email: [email or web address]  Phone: [phone]

## 2022-04-27 NOTE — TELEPHONE ENCOUNTER
[facility]  with [facility] calls for orders      Physical Therapy   1x9wks    Best number to call back [phone]

## 2022-04-27 NOTE — TELEPHONE ENCOUNTER
Call to [facility] and left detailed message.     Verbal order given to approve visits until certification received for MD signature.

## 2022-05-03 NOTE — TELEPHONE ENCOUNTER
Call to [facility] and left detailed message.     Verbal order given to approve visits until certification received for MD signature.

## 2022-05-03 NOTE — TELEPHONE ENCOUNTER
Nurse Triage SBAR    Is this a 2nd Level Triage? NO    Situation/Background: Caller is requesting orders for Occupational Therapy evaluation to address  Lymphedema.     Assessment:  Dr. Lida Ray is PCP    Recommendation: Per protocol, routed to PCP Care Team to address.   Please call Olga at [phone] to discuss.    Protocol Recommended Disposition: Telephone advice    Zeynep Santos RN on 5/3/2022 at 1:01 PM    Additional Information    Nursing judgment    Protocols used: INFORMATION ONLY CALL - NO TRIAGE-A-OH

## 2022-05-13 NOTE — PROGRESS NOTES
Clinic Care Coordination Contact    Community Health Worker Follow Up    Care Gaps:     Health Maintenance Due   Topic Date Due     HEPATITIS C SCREENING  Never done     MICROALBUMIN  08/15/2020     MEDICARE ANNUAL WELLNESS VISIT  09/08/2021     FALL RISK ASSESSMENT  09/08/2021     PHQ-2 (once per calendar year)  01/01/2022       Declined due to having a Pre Operative appointment coming up     Goals:    Goals Addressed as of 5/13/2022 at 3:54 PM                    Today    4/5/22       1. Respite Care/Private Duty HHC (pt-stated)   70%  70%    Added 7/26/21 by Rebecca Hernández RN      Goal Statement: I would like to receive resources for respite Care/Private Duty [facility] companies.   Date Goal set: 7/26/21  Barriers: Multiple health concerns  Strengths: Patient and wife are engaged and motivated  Date to Achieve By: Updated 7/1/2022  Patient expressed understanding of goal: Yes  Action steps to achieve this goal:  1. I understand that RN CC will send via [employer] and the mail Respite Care/Private Duty HHC resources.   2. I will review resources and utilize as I see fit.   3. I will continue to work with care coordination for any additional resources and support.   Update: Kelli will also look into [employer] and request a Community Services Living Assessment if interested.     5-13, CHW:    Kelli stated that they are very happy with [facility] services. Kelli states they are a smaller company so she likes having more direct access to the owner of the agency.     They are receiving 18 hours of paid services through the [employer] foundation each month.    Kelli states that they would not qualify for any services through [employer] due to income but has the phone number just in case.    They also have access to 21 days of paid for services by their long-term care insurance, however services cannot be paid for by the hour, only by the day. Meaning, if patient receives 4 hours of services the policy would count that as a whole day.      They have not needed to use long-term care insurance for this yet.                  2. Improve chronic symptoms (pt-stated)   100%  90%    Added 7/26/21 by Rebecca Hernández RN      Goal Statement: I want my lower leg edema to resolve.   Date Goal set: 7/26/21  Barriers: Multiple health concerns  Strengths: Patient is motivated and engaged  Date to Achieve By: Updated 7/1/2022  Patient expressed understanding of goal: Yes  Action steps to achieve this goal:  1. I understand RN CC will message my PCP to request a diuretic medication be prescribed.   2. I will wear my compression stockings daily.   3. I will elevate my feet at rest.   4. I will take all my medications as prescribed.   5.  I will continue to work with care coordination for ny additional resources and support.     5-13, CHW:    Kelli stated that this has been going well. They will continue to use the compression stockings as needed as the swelling is under control at this time.     (Goal Completed)            Intervention and Education during outreach: Writer inquired if Kelli had any questions or concerns, however she did not. Writer encouraged Kelli to reach out previous to next outreach if anything comes up; patient agreeable with plan.     CHW Plan: Writer will reach out in 1 month to monitor the progression of the patient's goals.     PHILIPP Gonzalez. Public Health  Community Health Worker  [facility]  Clinic Care Coordination  [phone]  ----------------------------------------------------------------  Next outreach: 6/13/2022  Preferred contact: Kelli (spouse) [phone]     Enrollment Date: 07/26/21  Last Care Plan Assessment: 2/08/2022

## 2022-05-14 NOTE — PROGRESS NOTES
ASSESSMENT/  PLAN:   Dysuria     - UA Macro with Reflex to Micro and Culture - lab collect; Future  - UA Macro with Reflex to Micro and Culture - lab collect      We discussed that the urine sample had no  signs of pus, blood , bacteria that are usually found in a urinary tract infection.   Other possible causes of urinary frequency were discussed including   Bladder spasm worsened by taking stimulants, or due to neurologic disorder     He has been drinking more iced tea recently-  Will cut back caffeine and see if urinary frequency improves    History of CVA (cerebrovascular accident)  Muscle spasticity     If persistent urinary frequency, not improved with reducing caffeine- will follow-up with his usual urologist to evaluate the bladder muscle function           -------------------------------------------------------------------------------------------    SUBJECTIVE:  Chief Complaint   Patient presents with     UTI     Urinary frequency 1 week         Elier Leong is a 76 year old male who  presents today for a possible UTI. Symptoms of frequency have been going on for 1week(s).  Hematuria no.  sudden onset, still present and having to get up from bed frequently and moderate.  There is no history of fever, chills, nausea or vomiting.  No history of   penile discharge. This patient does not have a history of urinary tract infections. Patient denies long duration, rigors, flank pain, temperature > 101 degrees F. and Vomiting, significant nausea or diarrhea    He had past CVA,  Uses a motorized wheelchair due to weakness  Has been drinking more caffeine in the past week    Past Medical History:   Diagnosis Date     Basal cell carcinoma nos     sees derm     CVA (cerebral infarction) 6/14    small vessel right int capsule stroke     DVT (deep vein thrombosis) in pregnancy 05/12/2017    right peroneal vein     Essential hypertension, benign      Hearing loss      Hoarseness of voice     assoc with PLS     Lumbar  radiculopathy     2017     Primary Lateral Sclerosis 2002    has baclofen pump through Dr. Calvert, [facility], sees Dr. Fink, [facility]     Primary osteoarthritis of right shoulder 6/16/2021     Prostate CA (H) 2008    prostatectomy     Vertigo 2/21/2013     Patient Active Problem List   Diagnosis     Benign essential hypertension     Primary lateral sclerosis (HCC)     Prostate CA (HCC)     Hyperlipidemia LDL goal <100     Cerebral infarction (H)     Advanced directives, counseling/discussion     Muscle spasticity     Edema, unspecified type     Gastroesophageal reflux disease without esophagitis     Cerebral atherosclerosis     Primary osteoarthritis of right shoulder     Cancer of ampulla of Vater (H)     Anemia, unspecified type     Cerebrovascular accident (CVA), unspecified mechanism (H)       ALLERGIES:  Bee venom and Penicillins    MEDs  acetaminophen (TYLENOL) 650 MG CR tablet, Take 650 mg by mouth every 8 hours as needed for mild pain (Shoulder pain)   aspirin (ASA) 81 MG EC tablet, Take 325 mg by mouth daily  baclofen (LIORESAL) intraTHECAL Internal Pump, by Intrathecal route continuous prn Pump filled by [facility] stroke [place] [phone]  Pump Model: Syncromed  Medication in pump is Gablofen (brand name)  Last fill: during hospital admission  Next fill:     Low [place] Alarm Date:   Reservoir Volume: 20 ml  Conc: 2000 mcg/mL  Delivers 234.7 mcg/day  Basal rate:unknown  Battery life: unknown  diclofenac (VOLTAREN) 1 % topical gel, APPLY 2 GRAMS TOPICALLY THREE TIMES DAILY TO AFFECTED AREAS  docusate sodium (COLACE) 100 MG capsule, Take 100 mg by mouth every evening  DULoxetine (CYMBALTA) 20 MG capsule, TAKE 1 CAPSULE BY MOUTH AT BEDTIME FOR 1 WEEK, THEN INCREASE TO 2 CAPSULES AT BEDTIME FOR PAIN  DULoxetine (CYMBALTA) 20 MG capsule, Take 20 mg by mouth  enalapril (VASOTEC) 10 MG tablet, Take 2 tablets (20 mg) by mouth 2 times daily  EPINEPHrine 0.3 MG/0.3ML injection,  Inject 0.3 mLs (0.3 mg) into the muscle as needed for anaphylaxis  ezetimibe (ZETIA) 10 MG tablet, Take 1 tablet (10 mg) by mouth daily  furosemide (LASIX) 20 MG tablet, Take 2 tablets (40 mg) by mouth daily  HYDROcodone-acetaminophen (NORCO) 5-325 MG tablet, TAKE 1 TABLET BY MOUTH TWICE A DAY AS NEEDED FOR PAIN  nystatin (MYCOSTATIN) 134181 UNIT/GM external powder, APPLY TO AFFECTED AREA TWICE A DAY  nystatin (MYCOSTATIN) 326168 UNIT/GM external powder,   oxybutynin ER (DITROPAN-XL) 10 MG 24 hr tablet, Take 2 tablets (20 mg) by mouth At Bedtime  pantoprazole (PROTONIX) 40 MG EC tablet, Take 1 tablet (40 mg) by mouth 2 times daily (Patient taking differently: Take 40 mg by mouth daily)    No current facility-administered medications on file prior to visit.      Social History     Tobacco Use     Smoking status: Former Smoker     Packs/day: 0.30     Years: 6.00     Pack years: 1.80     Types: Cigarettes     Start date: 1970     Quit date: 10/5/1976     Years since quittin.6     Smokeless tobacco: Never Used   Substance Use Topics     Alcohol use: Yes     Comment: 1 drink/week       Family History   Problem Relation Age of Onset     Heart Disease Mother         CHF     Hypertension Mother      C.A.D. Maternal Grandfather      Cerebrovascular Disease Maternal Uncle         45     Cerebrovascular Disease Maternal Uncle        ROS:   CONSTITUTIONAL:NEGATIVE for fever, chills, change in weight  INTEGUMENTARY/SKIN: NEGATIVE for worrisome rashes, moles or lesions  EYES: NEGATIVE for vision changes or irritation  ENT/MOUTH: NEGATIVE for ear, mouth and throat problems  RESP:NEGATIVE for significant cough or SOB    OBJECTIVE:  /70 (BP Location: Right arm)   Pulse 62   Temp 97.2  F (36.2  C) (Tympanic)   Wt 77.1 kg (170 lb)   SpO2 98%   BMI 24.39 kg/m    GENERAL APPEARANCE: healthy, alert and no distress-  In motorized wheelchair-  Hoarse voice  RESP: lungs clear to auscultation - no rales, rhonchi or  wheezes  CV: regular rates and rhythm, normal S1 S2, no murmur noted  ABDOMEN:  soft, nontender, no HSM or masses and bowel sounds normal  BACK: No CVA tenderness  Skin--  Scattered small scabs  On arms and legs     Results for orders placed or performed in visit on 05/14/22   UA Macro with Reflex to Micro and Culture - lab collect     Status: Normal    Specimen: Urine, Midstream   Result Value Ref Range    Color Urine Yellow Colorless, Straw, Light Yellow, Yellow    Appearance Urine Clear Clear    Glucose Urine Negative Negative mg/dL    Bilirubin Urine Negative Negative    Ketones Urine Negative Negative mg/dL    Specific Gravity Urine 1.020 1.003 - 1.035    Blood Urine Negative Negative    pH Urine 7.0 5.0 - 7.0    Protein Albumin Urine Negative Negative mg/dL    Urobilinogen Urine 1.0 0.2, 1.0 E.U./dL    Nitrite Urine Negative Negative    Leukocyte Esterase Urine Negative Negative    Narrative    Microscopic not indicated

## 2022-05-16 NOTE — TELEPHONE ENCOUNTER
5/1/22 - 6/29/22 HOME HEALTH CERTIFICATION; orders received via fax. Form in your mailbox to be signed.

## 2022-05-24 NOTE — H&P (VIEW-ONLY)
[place] [address] [facility] [address]  Phone: [phone]  Primary Provider: Seun Ray  Pre-op Performing Provider: SEUN RAY      PREOPERATIVE EVALUATION:  Today's date: 5/24/2022    Elier Leong is a 76 year old male who presents for a preoperative evaluation.    Surgical Information:  Surgery/Procedure: ERCP  Surgery Location: [facility]   Surgeon: Dr. Kent   Surgery Date: 06/09/2022  Time of Surgery: 10:20 AM  Where patient plans to recover: At home with family  Fax number for surgical facility: Note does not need to be faxed, will be available electronically in Epic.    Type of Anesthesia Anticipated: to be determined    Assessment & Plan     The proposed surgical procedure is considered LOW risk.    Preop examination    - EKG 12-lead complete w/read - Clinics  - CBC with platelets    Cancer of ampulla of Vater (H)             Risks and Recommendations:  The patient has the following additional risks and recommendations for perioperative complications:   - No identified additional risk factors other than previously addressed    Medication Instructions:  He will take pantoprazole and enalapril early the morning of the procedure.  He has a baclofen pump in place.    RECOMMENDATION:  APPROVAL GIVEN to proceed with proposed procedure, without further diagnostic evaluation.  [id number]}    Subjective     HPI related to upcoming procedure: He has cancer of the ampulla of Vater, treated.  He is undergoing ERCP for assessment of a cancer.    Preop Questions 5/23/2022   1. Have you ever had a heart attack or stroke? YES - CVA   2. Have you ever had surgery on your heart or blood vessels, such as a stent placement, a coronary artery bypass, or surgery on an artery in your head, neck, heart, or legs? No   3. Do you have chest pain with activity? No   4. Do you have a history of  heart failure? No   5. Do you currently have a cold, bronchitis or  symptoms of other infection? No   6. Do you have a cough, shortness of breath, or wheezing? No   7. Do you or anyone in your family have previous history of blood clots? YES - he had one in 2017   8. Do you or does anyone in your family have a serious bleeding problem such as prolonged bleeding following surgeries or cuts? No   9. Have you ever had problems with anemia or been told to take iron pills? No   10. Have you had any abnormal blood loss such as black, tarry or bloody stools? No   11. Have you ever had a blood transfusion? YES -    11a. Have you ever had a transfusion reaction? No   12. Are you willing to have a blood transfusion if it is medically needed before, during, or after your surgery? Yes   13. Have you or any of your relatives ever had problems with anesthesia? No   14. Do you have sleep apnea, excessive snoring or daytime drowsiness? No   15. Do you have any artifical heart valves or other implanted medical devices like a pacemaker, defibrillator, or continuous glucose monitor? No   16. Do you have artificial joints? No   17. Are you allergic to latex? No       Health Care Directive:  Patient has a Health Care Directive on file      Preoperative Review of :   reviewed - no record of controlled substances prescribed.      Status of Chronic Conditions:  He has primary lateral sclerosis with aphasia, muscle spasticity.  Overall this is stable but he is wheelchair-bound  He has hypertension which is well controlled.    Review of Systems  GENERAL: negative for, fever, chills, weight loss, weight gain  EYES: negative  ENT: negative  RESPIRATORY: No dyspnea on exertion and No cough  CARDIOVASCULAR: negative for, palpitations, tachycardia, irregular heart beat and chest pain  GI: negative for, nausea, vomiting, abdominal pain, melena and hematochezia  : negative  MUSCULOSKELETAL: shoulder pain   NEUROLOGIC: Aphasia, rigidity negative for, headaches, local weakness, numbness or tingling of hands  and numbness or tingling of feet  SKIN: negative  ENDOCRINE: negative         Patient Active Problem List    Diagnosis Date Noted     Cerebrovascular accident (CVA), unspecified mechanism (H) 09/23/2021     Priority: Medium     Anemia, unspecified type 07/15/2021     Priority: Medium     Cancer of ampulla of Vater (H) 06/29/2021     Priority: Medium     Added automatically from request for surgery [id number]       Primary osteoarthritis of right shoulder 06/16/2021     Priority: Medium     Cerebral atherosclerosis 05/31/2018     Priority: Medium     Edema, unspecified type 05/10/2018     Priority: Medium     Gastroesophageal reflux disease without esophagitis 05/10/2018     Priority: Medium     Muscle spasticity 05/04/2018     Priority: Medium     Advanced directives, counseling/discussion 09/10/2014     Priority: Medium     Cerebral infarction (H) 06/05/2014     Priority: Medium     1 cm acute ischemic infarct in the posterior limb of the right internal capsule.  Pt did present with left hemiplegia.           Hyperlipidemia LDL goal <100 11/08/2010     Priority: Medium     Prostate CA (HCC)      Priority: Medium     will have surgery 6/9/08       Benign essential hypertension 03/07/2005     Priority: Medium     Primary lateral sclerosis (HCC) 03/07/2005     Priority: Medium      Past Medical History:   Diagnosis Date     Basal cell carcinoma nos     sees derm     CVA (cerebral infarction) 06/2014    small vessel right int capsule stroke     Deep venous thrombosis (DVT) of peroneal vein (H) 05/12/2017    right peroneal vein     Essential hypertension, benign      Hearing loss      Hoarseness of voice     assoc with PLS     Lumbar radiculopathy     2017     Primary Lateral Sclerosis 2002    has baclofen pump through Dr. Calvert, [facility] [place], sees Dr. Fink, [facility]     Primary osteoarthritis of right shoulder 06/16/2021     Prostate CA (H) 2008    prostatectomy     Vertigo 02/21/2013     Past Surgical History:   Procedure  Laterality Date     ABDOMEN SURGERY  11/12    Hernia     BIOPSY  4/16    Cancerous growth on leg. Removed by MOHs treatment     COLONOSCOPY N/A 8/3/2021    Procedure: COLONOSCOPY;  Surgeon: Luis Antonio Jung MD;  Location: [facility]     COLONOSCOPY N/A 8/3/2021    Procedure: Colonoscopy, With Polypectomy And Biopsy;  Surgeon: Luis Antonio Jung MD;  Location: [facility]     ENDOSCOPIC RETROGRADE CHOLANGIOPANCREATOGRAM N/A 6/17/2021    Procedure: ENDOSCOPIC RETROGRADE CHOLANGIOPANCREATOGRAPHY, BILIARY STENT PLACEMENT;  Surgeon: Sima Galvez MD;  Location: [facility] OR     ENDOSCOPIC RETROGRADE CHOLANGIOPANCREATOGRAM N/A 8/19/2021    Procedure: ENDOSCOPIC RETROGRADE CHOLANGIOPANCREATOGRAPHY;  Surgeon: Agus Kent MD;  Location: [facility] OR     ENDOSCOPIC RETROGRADE CHOLANGIOPANCREATOGRAM COMPLEX N/A 7/12/2021    Procedure: ENDOSCOPIC RETROGRADE CHOLANGIOPANCREATOGRAPHY WITH AMPULLECTOMY, PANCREATIC DUCT STENT PLACEMENT AND BILIARY STENT PLACEMENT;  Surgeon: Augs Kent MD;  Location: [facility]     ENDOSCOPIC RETROGRADE CHOLANGIOPANCREATOGRAPHY, EXCHANGE TUBE/STENT N/A 7/14/2021    Procedure: ENDOSCOPIC RETROGRADE CHOLANGIOPANCREATOGRAPHY with bile duct stents exchanged, balloon sweep of bile ducts, hemostasis;  Surgeon: Guru Bhavesh Arreola MD;  Location: [facility]     ENDOSCOPIC ULTRASOUND UPPER GASTROINTESTINAL TRACT (GI) N/A 7/12/2021    Procedure: ENDOSCOPIC ULTRASOUND, ESOPHAGOSCOPY / UPPER GASTROINTESTINAL TRACT (GI);  Surgeon: Agus Kent MD;  Location: [facility]     ESOPHAGOSCOPY, GASTROSCOPY, DUODENOSCOPY (EGD), COMBINED N/A 6/17/2021    Procedure: ESOPHAGOGASTRODUODENOSCOPY, WITH ENDOSCOPIC US, fine needle aspiration;  Surgeon: Sima Galvez MD;  Location:  OR     ESOPHAGOSCOPY, GASTROSCOPY, DUODENOSCOPY (EGD), COMBINED N/A 7/14/2021    Procedure: ESOPHAGOGASTRODUODENOSCOPY (EGD);  Surgeon: Guru Bhavesh Arreola MD;  [facility]     ESOPHAGOSCOPY,  GASTROSCOPY, DUODENOSCOPY (EGD), COMBINED N/A 8/3/2021    Procedure: Esophagoscopy, gastroscopy, duodenoscopy (EGD), combined;  Surgeon: Luis Antonio Jung MD;  Location:  GI     HERNIA REPAIR  11/12    Repaired     PROSTATE SURGERY       [facility] NONSPECIFIC PROCEDURE  6/08     prostatectomy      [facility] NONSPECIFIC PROCEDURE  11/01    colonoscopy     [facility] NONSPECIFIC PROCEDURE  11/2006    hernia, right side     [facility] NONSPECIFIC PROCEDURE      T + A     Current Outpatient Medications   Medication Sig Dispense Refill     acetaminophen (TYLENOL) 650 MG CR tablet Take 650 mg by mouth every 8 hours as needed for mild pain (Shoulder pain)        aspirin (ASA) 81 MG EC tablet Take 325 mg by mouth daily       baclofen (LIORESAL) intraTHECAL Internal Pump by Intrathecal route continuous prn Pump filled by [facility] stroke [place] [phone]  Pump Model: Syncromed  Medication in pump is Gablofen (brand name)  Last fill: during hospital admission  Next fill:     Low [place] Alarm Date:   Reservoir Volume: 20 ml  Conc: 2000 mcg/mL  Delivers 234.7 mcg/day  Basal rate:unknown  Battery life: unknown       diclofenac (VOLTAREN) 1 % topical gel APPLY 2 GRAMS TOPICALLY THREE TIMES DAILY TO AFFECTED AREAS       docusate sodium (COLACE) 100 MG capsule Take 100 mg by mouth every evening       DULoxetine (CYMBALTA) 20 MG capsule Take 20 mg by mouth       enalapril (VASOTEC) 10 MG tablet Take 2 tablets (20 mg) by mouth 2 times daily 360 tablet 1     EPINEPHrine 0.3 MG/0.3ML injection Inject 0.3 mLs (0.3 mg) into the muscle as needed for anaphylaxis 0.3 mL 3     ezetimibe (ZETIA) 10 MG tablet Take 1 tablet (10 mg) by mouth daily 90 tablet 3     furosemide (LASIX) 20 MG tablet Take 2 tablets (40 mg) by mouth daily 60 tablet 1     HYDROcodone-acetaminophen (NORCO) 5-325 MG tablet TAKE 1 TABLET BY MOUTH TWICE A DAY AS NEEDED FOR PAIN       nystatin (MYCOSTATIN) 174464 UNIT/GM external powder APPLY TO  "AFFECTED AREA TWICE A DAY       nystatin (MYCOSTATIN) 527954 UNIT/GM external powder        oxybutynin ER (DITROPAN-XL) 10 MG 24 hr tablet Take 2 tablets (20 mg) by mouth At Bedtime 180 tablet 3     pantoprazole (PROTONIX) 40 MG EC tablet Take 1 tablet (40 mg) by mouth 2 times daily 60 tablet 3       Allergies   Allergen Reactions     Bee Venom      Swelling and hives     Penicillins Hives and Swelling     \"HIVES\"; occurred at age 39        Social History     Tobacco Use     Smoking status: Former Smoker     Packs/day: 0.30     Years: 6.00     Pack years: 1.80     Types: Cigarettes     Start date: 1970     Quit date: 10/5/1976     Years since quittin.6     Smokeless tobacco: Never Used   Substance Use Topics     Alcohol use: Yes     Comment: 1 drink/week       History   Drug Use No         Objective       Physical Exam    Patient is alert, oriented, cooperative in no acute distress.  /70 (BP Location: Left arm, Patient Position: Sitting, Cuff Size: Adult Regular)   Pulse 70   Temp 97.3  F (36.3  C) (Oral)   Resp 14   SpO2 99%     HEENT: PERRL, EOMI,   NECK: No lymphadenopathy or thyromegaly. Carotid pulses full without bruits.  LUNGS: clear  CV: normal S1, S2 without murmur, S3 or S4 present. Pulses are 2/2 throughout. No JVD.  ABDOMEN: Nondistended, soft, no masses  EXTREMITIES: no edema present, unremarkable joints  NEUROLOGIC: Cranial nerves II-XII intact, reflexes 3/4 upper extremities and 1/4 lower extremities  SKIN: without rashes or significant lesions   Recent Labs   Lab Test 21  0831 21  1849 21  1847 21  1846 21  0831 21  1350 20  0716   HGB  --   --  10.2*  --  10.5*   < >  --    PLT  --   --  272  --  325   < >  --    INR  --  1.06  --   --  0.99   < >  --    NA  --   --   --  140 137   < >  --    POTASSIUM  --   --   --  4.1 4.0   < >  --    CR  --   --   --  0.77 0.68   < >  --    A1C 5.8*  --   --   --   --   --  5.8*    < > = values in this " interval not displayed.        Diagnostics:  Recent Results (from the past 168 hour(s))   CBC with platelets    Collection Time: 05/24/22  1:45 PM   Result Value Ref Range    WBC Count 10.2 4.0 - 11.0 10e3/uL    RBC Count 4.90 4.40 - 5.90 10e6/uL    Hemoglobin 12.9 (L) 13.3 - 17.7 g/dL    Hematocrit 39.9 (L) 40.0 - 53.0 %    MCV 81 78 - 100 fL    MCH 26.3 (L) 26.5 - 33.0 pg    MCHC 32.3 31.5 - 36.5 g/dL    RDW 19.2 (H) 10.0 - 15.0 %    Platelet Count 263 150 - 450 10e3/uL   Basic metabolic panel  (Ca, Cl, CO2, Creat, Gluc, K, Na, BUN)    Collection Time: 05/24/22  1:45 PM   Result Value Ref Range    Sodium 138 133 - 144 mmol/L    Potassium 4.1 3.4 - 5.3 mmol/L    Chloride 105 94 - 109 mmol/L    Carbon Dioxide (CO2) 29 20 - 32 mmol/L    Anion Gap 4 3 - 14 mmol/L    Urea Nitrogen 25 7 - 30 mg/dL    Creatinine 0.65 (L) 0.66 - 1.25 mg/dL    Calcium 9.1 8.5 - 10.1 mg/dL    Glucose 97 70 - 99 mg/dL    GFR Estimate >90 >60 mL/min/1.73m2      EKG: appears normal, NSR, normal axis, normal intervals, no acute ST/T changes c/w ischemia, no LVH by voltage criteria, unchanged from previous tracings    Revised Cardiac Risk Index (RCRI):  The patient has the following serious cardiovascular risks for perioperative complications:   - No serious cardiac risks = 0 points     RCRI Interpretation: 0 points: Class I (very low risk - 0.4% complication rate)           Signed Electronically by: Lida Ray MD  Copy of this evaluation report is provided to requesting physician.

## 2022-05-24 NOTE — PROGRESS NOTES
[place] [address] [facility] [address]  Phone: [phone]  Primary Provider: Seun Ray  Pre-op Performing Provider: SEUN RAY      PREOPERATIVE EVALUATION:  Today's date: 5/24/2022    Elier Leong is a 76 year old male who presents for a preoperative evaluation.    Surgical Information:  Surgery/Procedure: ERCP  Surgery Location: [facility]   Surgeon: Dr. Kent   Surgery Date: 06/09/2022  Time of Surgery: 10:20 AM  Where patient plans to recover: At home with family  Fax number for surgical facility: Note does not need to be faxed, will be available electronically in Epic.    Type of Anesthesia Anticipated: to be determined    Assessment & Plan     The proposed surgical procedure is considered LOW risk.    Preop examination    - EKG 12-lead complete w/read - Clinics  - CBC with platelets    Cancer of ampulla of Vater (H)             Risks and Recommendations:  The patient has the following additional risks and recommendations for perioperative complications:   - No identified additional risk factors other than previously addressed    Medication Instructions:  He will take pantoprazole and enalapril early the morning of the procedure.  He has a baclofen pump in place.    RECOMMENDATION:  APPROVAL GIVEN to proceed with proposed procedure, without further diagnostic evaluation.  [id number]}    Subjective     HPI related to upcoming procedure: He has cancer of the ampulla of Vater, treated.  He is undergoing ERCP for assessment of a cancer.    Preop Questions 5/23/2022   1. Have you ever had a heart attack or stroke? YES - CVA   2. Have you ever had surgery on your heart or blood vessels, such as a stent placement, a coronary artery bypass, or surgery on an artery in your head, neck, heart, or legs? No   3. Do you have chest pain with activity? No   4. Do you have a history of  heart failure? No   5. Do you currently have a cold, bronchitis or  symptoms of other infection? No   6. Do you have a cough, shortness of breath, or wheezing? No   7. Do you or anyone in your family have previous history of blood clots? YES - he had one in 2017   8. Do you or does anyone in your family have a serious bleeding problem such as prolonged bleeding following surgeries or cuts? No   9. Have you ever had problems with anemia or been told to take iron pills? No   10. Have you had any abnormal blood loss such as black, tarry or bloody stools? No   11. Have you ever had a blood transfusion? YES -    11a. Have you ever had a transfusion reaction? No   12. Are you willing to have a blood transfusion if it is medically needed before, during, or after your surgery? Yes   13. Have you or any of your relatives ever had problems with anesthesia? No   14. Do you have sleep apnea, excessive snoring or daytime drowsiness? No   15. Do you have any artifical heart valves or other implanted medical devices like a pacemaker, defibrillator, or continuous glucose monitor? No   16. Do you have artificial joints? No   17. Are you allergic to latex? No       Health Care Directive:  Patient has a Health Care Directive on file      Preoperative Review of :   reviewed - no record of controlled substances prescribed.      Status of Chronic Conditions:  He has primary lateral sclerosis with aphasia, muscle spasticity.  Overall this is stable but he is wheelchair-bound  He has hypertension which is well controlled.    Review of Systems  GENERAL: negative for, fever, chills, weight loss, weight gain  EYES: negative  ENT: negative  RESPIRATORY: No dyspnea on exertion and No cough  CARDIOVASCULAR: negative for, palpitations, tachycardia, irregular heart beat and chest pain  GI: negative for, nausea, vomiting, abdominal pain, melena and hematochezia  : negative  MUSCULOSKELETAL: shoulder pain   NEUROLOGIC: Aphasia, rigidity negative for, headaches, local weakness, numbness or tingling of hands  and numbness or tingling of feet  SKIN: negative  ENDOCRINE: negative         Patient Active Problem List    Diagnosis Date Noted     Cerebrovascular accident (CVA), unspecified mechanism (H) 09/23/2021     Priority: Medium     Anemia, unspecified type 07/15/2021     Priority: Medium     Cancer of ampulla of Vater (H) 06/29/2021     Priority: Medium     Added automatically from request for surgery [id number]       Primary osteoarthritis of right shoulder 06/16/2021     Priority: Medium     Cerebral atherosclerosis 05/31/2018     Priority: Medium     Edema, unspecified type 05/10/2018     Priority: Medium     Gastroesophageal reflux disease without esophagitis 05/10/2018     Priority: Medium     Muscle spasticity 05/04/2018     Priority: Medium     Advanced directives, counseling/discussion 09/10/2014     Priority: Medium     Cerebral infarction (H) 06/05/2014     Priority: Medium     1 cm acute ischemic infarct in the posterior limb of the right internal capsule.  Pt did present with left hemiplegia.           Hyperlipidemia LDL goal <100 11/08/2010     Priority: Medium     Prostate CA (HCC)      Priority: Medium     will have surgery 6/9/08       Benign essential hypertension 03/07/2005     Priority: Medium     Primary lateral sclerosis (HCC) 03/07/2005     Priority: Medium      Past Medical History:   Diagnosis Date     Basal cell carcinoma nos     sees derm     CVA (cerebral infarction) 06/2014    small vessel right int capsule stroke     Deep venous thrombosis (DVT) of peroneal vein (H) 05/12/2017    right peroneal vein     Essential hypertension, benign      Hearing loss      Hoarseness of voice     assoc with PLS     Lumbar radiculopathy     2017     Primary Lateral Sclerosis 2002    has baclofen pump through Dr. Calvert, [place], sees Dr. Fink, [facility]     Primary osteoarthritis of right shoulder 06/16/2021     Prostate CA (H) 2008    prostatectomy     Vertigo 02/21/2013     Past Surgical History:   Procedure  Laterality Date     ABDOMEN SURGERY  11/12    Hernia     BIOPSY  4/16    Cancerous growth on leg. Removed by MOHs treatment     COLONOSCOPY N/A 8/3/2021    Procedure: COLONOSCOPY;  Surgeon: Luis Antonio Jung MD;  [facility]: [facility]     COLONOSCOPY N/A 8/3/2021    Procedure: Colonoscopy, With Polypectomy And Biopsy;  Surgeon: Luis Antonio Jung MD;  [facility]     ENDOSCOPIC RETROGRADE CHOLANGIOPANCREATOGRAM N/A 6/17/2021    Procedure: ENDOSCOPIC RETROGRADE CHOLANGIOPANCREATOGRAPHY, BILIARY STENT PLACEMENT;  Surgeon: Sima Galvez MD;  Location: [facility]     ENDOSCOPIC RETROGRADE CHOLANGIOPANCREATOGRAM N/A 8/19/2021    Procedure: ENDOSCOPIC RETROGRADE CHOLANGIOPANCREATOGRAPHY;  Surgeon: Agus Kent MD;  Location: [facility]     ENDOSCOPIC RETROGRADE CHOLANGIOPANCREATOGRAM COMPLEX N/A 7/12/2021    Procedure: ENDOSCOPIC RETROGRADE CHOLANGIOPANCREATOGRAPHY WITH AMPULLECTOMY, PANCREATIC DUCT STENT PLACEMENT AND BILIARY STENT PLACEMENT;  Surgeon: Agus Kent MD;  [facility]     ENDOSCOPIC RETROGRADE CHOLANGIOPANCREATOGRAPHY, EXCHANGE TUBE/STENT N/A 7/14/2021    Procedure: ENDOSCOPIC RETROGRADE CHOLANGIOPANCREATOGRAPHY with bile duct stents exchanged, balloon sweep of bile ducts, hemostasis;  Surgeon: Guru Bhavesh Arreola MD;  [facility]     ENDOSCOPIC ULTRASOUND UPPER GASTROINTESTINAL TRACT (GI) N/A 7/12/2021    Procedure: ENDOSCOPIC ULTRASOUND, ESOPHAGOSCOPY / UPPER GASTROINTESTINAL TRACT (GI);  Surgeon: Agus Kent MD;  [facility]     ESOPHAGOSCOPY, GASTROSCOPY, DUODENOSCOPY (EGD), COMBINED N/A 6/17/2021    Procedure: ESOPHAGOGASTRODUODENOSCOPY, WITH ENDOSCOPIC US, fine needle aspiration;  Surgeon: Sima Galvez MD;  Location:  OR     ESOPHAGOSCOPY, GASTROSCOPY, DUODENOSCOPY (EGD), COMBINED N/A 7/14/2021    Procedure: ESOPHAGOGASTRODUODENOSCOPY (EGD);  Surgeon: Guru Bhavesh Arreola MD;  [facility]     ESOPHAGOSCOPY,  GASTROSCOPY, DUODENOSCOPY (EGD), COMBINED N/A 8/3/2021    Procedure: Esophagoscopy, gastroscopy, duodenoscopy (EGD), combined;  Surgeon: Luis Antonio Jung MD;  Location:  GI     HERNIA REPAIR  11/12    Repaired     PROSTATE SURGERY       [facility] NONSPECIFIC PROCEDURE  6/08     prostatectomy      [facility] NONSPECIFIC PROCEDURE  11/01    colonoscopy     [facility] NONSPECIFIC PROCEDURE  11/2006    hernia, right side     [facility] NONSPECIFIC PROCEDURE      T + A     Current Outpatient Medications   Medication Sig Dispense Refill     acetaminophen (TYLENOL) 650 MG CR tablet Take 650 mg by mouth every 8 hours as needed for mild pain (Shoulder pain)        aspirin (ASA) 81 MG EC tablet Take 325 mg by mouth daily       baclofen (LIORESAL) intraTHECAL Internal Pump by Intrathecal route continuous prn Pump filled by [facility] stroke [place] [phone]  Pump Model: Syncromed  Medication in pump is Gablofen (brand name)  Last fill: during hospital admission  Next fill:     [place] Alarm Date:   Reservoir Volume: 20 ml  Conc: 2000 mcg/mL  Delivers 234.7 mcg/day  Basal rate:unknown  Battery life: unknown       diclofenac (VOLTAREN) 1 % topical gel APPLY 2 GRAMS TOPICALLY THREE TIMES DAILY TO AFFECTED AREAS       docusate sodium (COLACE) 100 MG capsule Take 100 mg by mouth every evening       DULoxetine (CYMBALTA) 20 MG capsule Take 20 mg by mouth       enalapril (VASOTEC) 10 MG tablet Take 2 tablets (20 mg) by mouth 2 times daily 360 tablet 1     EPINEPHrine 0.3 MG/0.3ML injection Inject 0.3 mLs (0.3 mg) into the muscle as needed for anaphylaxis 0.3 mL 3     ezetimibe (ZETIA) 10 MG tablet Take 1 tablet (10 mg) by mouth daily 90 tablet 3     furosemide (LASIX) 20 MG tablet Take 2 tablets (40 mg) by mouth daily 60 tablet 1     HYDROcodone-acetaminophen (NORCO) 5-325 MG tablet TAKE 1 TABLET BY MOUTH TWICE A DAY AS NEEDED FOR PAIN       nystatin (MYCOSTATIN) 909984 UNIT/GM external powder APPLY TO  "AFFECTED AREA TWICE A DAY       nystatin (MYCOSTATIN) 413735 UNIT/GM external powder        oxybutynin ER (DITROPAN-XL) 10 MG 24 hr tablet Take 2 tablets (20 mg) by mouth At Bedtime 180 tablet 3     pantoprazole (PROTONIX) 40 MG EC tablet Take 1 tablet (40 mg) by mouth 2 times daily 60 tablet 3       Allergies   Allergen Reactions     Bee Venom      Swelling and hives     Penicillins Hives and Swelling     \"HIVES\"; occurred at age 39        Social History     Tobacco Use     Smoking status: Former Smoker     Packs/day: 0.30     Years: 6.00     Pack years: 1.80     Types: Cigarettes     Start date: 1970     Quit date: 10/5/1976     Years since quittin.6     Smokeless tobacco: Never Used   Substance Use Topics     Alcohol use: Yes     Comment: 1 drink/week       History   Drug Use No         Objective       Physical Exam    Patient is alert, oriented, cooperative in no acute distress.  /70 (BP Location: Left arm, Patient Position: Sitting, Cuff Size: Adult Regular)   Pulse 70   Temp 97.3  F (36.3  C) (Oral)   Resp 14   SpO2 99%     HEENT: PERRL, EOMI,   NECK: No lymphadenopathy or thyromegaly. Carotid pulses full without bruits.  LUNGS: clear  CV: normal S1, S2 without murmur, S3 or S4 present. Pulses are 2/2 throughout. No JVD.  ABDOMEN: Nondistended, soft, no masses  EXTREMITIES: no edema present, unremarkable joints  NEUROLOGIC: Cranial nerves II-XII intact, reflexes 3/4 upper extremities and 1/4 lower extremities  SKIN: without rashes or significant lesions   Recent Labs   Lab Test 21  0831 21  1849 21  1847 21  1846 21  0831 21  1350 20  0716   HGB  --   --  10.2*  --  10.5*   < >  --    PLT  --   --  272  --  325   < >  --    INR  --  1.06  --   --  0.99   < >  --    NA  --   --   --  140 137   < >  --    POTASSIUM  --   --   --  4.1 4.0   < >  --    CR  --   --   --  0.77 0.68   < >  --    A1C 5.8*  --   --   --   --   --  5.8*    < > = values in this " interval not displayed.        Diagnostics:  Recent Results (from the past 168 hour(s))   CBC with platelets    Collection Time: 05/24/22  1:45 PM   Result Value Ref Range    WBC Count 10.2 4.0 - 11.0 10e3/uL    RBC Count 4.90 4.40 - 5.90 10e6/uL    Hemoglobin 12.9 (L) 13.3 - 17.7 g/dL    Hematocrit 39.9 (L) 40.0 - 53.0 %    MCV 81 78 - 100 fL    MCH 26.3 (L) 26.5 - 33.0 pg    MCHC 32.3 31.5 - 36.5 g/dL    RDW 19.2 (H) 10.0 - 15.0 %    Platelet Count 263 150 - 450 10e3/uL   Basic metabolic panel  (Ca, Cl, CO2, Creat, Gluc, K, Na, BUN)    Collection Time: 05/24/22  1:45 PM   Result Value Ref Range    Sodium 138 133 - 144 mmol/L    Potassium 4.1 3.4 - 5.3 mmol/L    Chloride 105 94 - 109 mmol/L    Carbon Dioxide (CO2) 29 20 - 32 mmol/L    Anion Gap 4 3 - 14 mmol/L    Urea Nitrogen 25 7 - 30 mg/dL    Creatinine 0.65 (L) 0.66 - 1.25 mg/dL    Calcium 9.1 8.5 - 10.1 mg/dL    Glucose 97 70 - 99 mg/dL    GFR Estimate >90 >60 mL/min/1.73m2      EKG: appears normal, NSR, normal axis, normal intervals, no acute ST/T changes c/w ischemia, no LVH by voltage criteria, unchanged from previous tracings    Revised Cardiac Risk Index (RCRI):  The patient has the following serious cardiovascular risks for perioperative complications:   - No serious cardiac risks = 0 points     RCRI Interpretation: 0 points: Class I (very low risk - 0.4% complication rate)           Signed Electronically by: Lida Ray MD  Copy of this evaluation report is provided to requesting physician.

## 2022-05-24 NOTE — NURSING NOTE
/70 (BP Location: Left arm, Patient Position: Sitting, Cuff Size: Adult Regular)   Pulse 70   Temp 97.3  F (36.3  C) (Oral)   Resp 14   SpO2 99%

## 2022-06-07 NOTE — CONFIDENTIAL NOTE
Pt wife called with questions about arrival for procedure on Thursday 6/9 and if medications he takes daily should be held. Pt no longer on Plavix but does take ASA 325mg daily. Did not get guidance on medications at pre op exam. Advised pt does not need to hold ASA  Answered pt questions.     Nathalie Levine, RN, BSN,   [facility]  Care coordinator

## 2022-06-08 NOTE — ANESTHESIA PREPROCEDURE EVALUATION
Anesthesia Pre-Procedure Evaluation    Patient: Elier Leong   MRN: [id number] : 1945        Procedure : Procedure(s):  ENDOSCOPIC RETROGRADE CHOLANGIOPANCREATOGRAPHY          Past Medical History:   Diagnosis Date     Basal cell carcinoma nos     sees derm     CVA (cerebral infarction) 2014    small vessel right int capsule stroke     Deep venous thrombosis (DVT) of peroneal vein (H) 2017    right peroneal vein     Essential hypertension, benign      Hearing loss      Hoarseness of voice     assoc with PLS     Lumbar radiculopathy          Primary Lateral Sclerosis     has baclofen pump through Dr. Calvert, [place], sees Dr. Fink, [facility]     Primary osteoarthritis of right shoulder 2021     Prostate CA (H)     prostatectomy     Vertigo 2013      Past Surgical History:   Procedure Laterality Date     ABDOMEN SURGERY      Hernia     BIOPSY      Cancerous growth on leg. Removed by MOHs treatment     COLONOSCOPY N/A 8/3/2021    Procedure: COLONOSCOPY;  Surgeon: Luis Antonio Jung MD;  Location:  GI     COLONOSCOPY N/A 8/3/2021    Procedure: Colonoscopy, With Polypectomy And Biopsy;  Surgeon: Luis Antonio Jung MD;  [facility]     ENDOSCOPIC RETROGRADE CHOLANGIOPANCREATOGRAM N/A 2021    Procedure: ENDOSCOPIC RETROGRADE CHOLANGIOPANCREATOGRAPHY, BILIARY STENT PLACEMENT;  Surgeon: Sima Galvez MD;  Location:  OR     ENDOSCOPIC RETROGRADE CHOLANGIOPANCREATOGRAM N/A 2021    Procedure: ENDOSCOPIC RETROGRADE CHOLANGIOPANCREATOGRAPHY;  Surgeon: Agus Kent MD;  Location:  OR     ENDOSCOPIC RETROGRADE CHOLANGIOPANCREATOGRAM COMPLEX N/A 2021    Procedure: ENDOSCOPIC RETROGRADE CHOLANGIOPANCREATOGRAPHY WITH AMPULLECTOMY, PANCREATIC DUCT STENT PLACEMENT AND BILIARY STENT PLACEMENT;  Surgeon: Agus Kent MD;  Location:  OR     ENDOSCOPIC RETROGRADE CHOLANGIOPANCREATOGRAPHY, EXCHANGE TUBE/STENT N/A 2021     "Procedure: ENDOSCOPIC RETROGRADE CHOLANGIOPANCREATOGRAPHY with bile duct stents exchanged, balloon sweep of bile ducts, hemostasis;  Surgeon: Guru Bhavesh Arreola MD;  [facility]     ENDOSCOPIC ULTRASOUND UPPER GASTROINTESTINAL TRACT (GI) N/A 2021    Procedure: ENDOSCOPIC ULTRASOUND, ESOPHAGOSCOPY / UPPER GASTROINTESTINAL TRACT (GI);  Surgeon: Agus Kent MD;  [facility]     ESOPHAGOSCOPY, GASTROSCOPY, DUODENOSCOPY (EGD), COMBINED N/A 2021    Procedure: ESOPHAGOGASTRODUODENOSCOPY, WITH ENDOSCOPIC US, fine needle aspiration;  Surgeon: Sima Galvez MD;  Location: [facility]     ESOPHAGOSCOPY, GASTROSCOPY, DUODENOSCOPY (EGD), COMBINED N/A 2021    Procedure: ESOPHAGOGASTRODUODENOSCOPY (EGD);  Surgeon: Guru Bhavesh Arreola MD;  [facility]     ESOPHAGOSCOPY, GASTROSCOPY, DUODENOSCOPY (EGD), COMBINED N/A 8/3/2021    Procedure: Esophagoscopy, gastroscopy, duodenoscopy (EGD), combined;  Surgeon: Luis Antonio Jung MD;  [facility]     HERNIA REPAIR      Repaired     PROSTATE SURGERY       [facility] NONSPECIFIC PROCEDURE       prostatectomy      [facility] NONSPECIFIC PROCEDURE      colonoscopy     [facility] NONSPECIFIC PROCEDURE  2006    hernia, right side     [facility] NONSPECIFIC PROCEDURE      T + A      Allergies   Allergen Reactions     Bee Venom      Swelling and hives     Penicillins Hives and Swelling     \"HIVES\"; occurred at age 39      Social History     Tobacco Use     Smoking status: Former Smoker     Packs/day: 0.30     Years: 6.00     Pack years: 1.80     Types: Cigarettes     Start date: 1970     Quit date: 10/5/1976     Years since quittin.7     Smokeless tobacco: Never Used   Substance Use Topics     Alcohol use: Yes     Comment: 1 drink/week      Wt Readings from Last 1 Encounters:   22 77.1 kg (170 lb)        Anesthesia Evaluation            ROS/MED HX  ENT/Pulmonary: Comment: hoarseness       Neurologic: Comment: " Primary lateral sclerosis with aphasia and muscle spasticity. Stable. Wheelchair bound.     (+) CVA (2014), date: 2014, with deficits, TIA,     Cardiovascular:     (+) hypertension-----    METS/Exercise Tolerance:     Hematologic:     (+) History of blood clots,     Musculoskeletal:       GI/Hepatic: Comment: Cancer of the ampulla of Vater    (+) GERD,     Renal/Genitourinary:     (+) renal disease, type: CRI,     Endo:       Psychiatric/Substance Use:       Infectious Disease:       Malignancy:   (+) Malignancy, History of Prostate.    Other:            Physical Exam    Airway  airway exam normal           Respiratory Devices and Support         Dental  no notable dental history         Cardiovascular   cardiovascular exam normal          Pulmonary   pulmonary exam normal                OUTSIDE LABS:  CBC:   Lab Results   Component Value Date    WBC 10.2 05/24/2022    WBC 6.9 09/23/2021    HGB 12.9 (L) 05/24/2022    HGB 10.2 (L) 09/23/2021    HCT 39.9 (L) 05/24/2022    HCT 32.7 (L) 09/23/2021     05/24/2022     09/23/2021     BMP:   Lab Results   Component Value Date     05/24/2022     09/23/2021    POTASSIUM 4.1 05/24/2022    POTASSIUM 4.1 09/23/2021    CHLORIDE 105 05/24/2022    CHLORIDE 106 09/23/2021    CO2 29 05/24/2022    CO2 28 09/23/2021    BUN 25 05/24/2022    BUN 19 09/23/2021    CR 0.65 (L) 05/24/2022    CR 0.77 09/23/2021    GLC 97 05/24/2022     (H) 09/23/2021     COAGS:   Lab Results   Component Value Date    PTT 27 09/23/2021    INR 1.06 09/23/2021     POC:   Lab Results   Component Value Date     (H) 11/17/2020     HEPATIC:   Lab Results   Component Value Date    ALBUMIN 3.1 (L) 08/19/2021    PROTTOTAL 6.8 08/19/2021    ALT 24 08/19/2021    AST 31 08/19/2021    ALKPHOS 108 08/19/2021    BILITOTAL 0.4 08/19/2021     OTHER:   Lab Results   Component Value Date    A1C 5.8 (H) 09/24/2021    AMOS 9.1 05/24/2022    MAG 1.9 02/21/2013    LIPASE 28 (L) 07/16/2021     AMYLASE 120 (H) 07/12/2021    TSH 1.68 06/19/2021    CRP 42.0 (H) 07/18/2021       Anesthesia Plan    ASA Status:  3   NPO Status:  NPO Appropriate    Anesthesia Type: General.     - Airway: ETT   Induction: Intravenous, Propofol.   Maintenance: Balanced.        Consents    Anesthesia Plan(s) and associated risks, benefits, and realistic alternatives discussed. Questions answered and patient/representative(s) expressed understanding.    - Discussed:     - Discussed with:  Patient         Postoperative Care    Pain management: IV analgesics.   PONV prophylaxis: Ondansetron (or other 5HT-3), Dexamethasone or Solumedrol     Comments:    Other Comments: Reversal with suggamadex             Trey Pond DO, DO

## 2022-06-09 NOTE — ANESTHESIA CARE TRANSFER NOTE
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    Anesthesia Type:   General     Note:    Oropharynx: oropharynx clear of all foreign objects  Level of Consciousness: drowsy  Oxygen Supplementation: face mask    Independent Airway: airway patency satisfactory and stable  Dentition: dentition unchanged  Vital Signs Stable: post-procedure vital signs reviewed and stable  Report to RN Given: handoff report given  Patient transferred to: PACU    Handoff Report: Identifed the Patient, Identified the Reponsible Provider, Reviewed the pertinent medical history, Discussed the surgical course, Reviewed Intra-OP anesthesia mangement and issues during anesthesia, Set expectations for post-procedure period and Allowed opportunity for questions and acknowledgement of understanding      Vitals:  Vitals Value Taken Time   /79 06/09/22 1056   Temp     Pulse 67 06/09/22 1059   Resp 13 06/09/22 1059   SpO2 100 % 06/09/22 1059   Vitals shown include unvalidated device data.    Electronically Signed By: CITLALY Almeida CRNA  June 9, 2022  10:59 AM

## 2022-06-09 NOTE — OP NOTE
ERCP 06/09/2022  9:46 AM [address] [place]  [address]   _______________________________________________________________________________   Patient Name: Elier Leong          Procedure Date: 6/9/2022 9:46 AM   MRN: [id number]                       Account Number: [id number]   YOB: 1945              Admit Type: Outpatient   Age: 76                               Room: [place] [address]   Note Status: Finalized                Attending MD: ADARSH UMAÑA MD   Instrument Name: 407 [id number]-[id number] ERCP     _______________________________________________________________________________       Procedure:                ERCP   Indications:              Periampullary tumor, Ampullary/Periampullary                             Papillary Carcinoma, 76 year old male with a                             history of PLS and intraampullary adenocarcinoma                             s/p snare papillectomy on 7/12/2021. 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 Subsequent surveillance on                             8/19/21 showed normal tissue endoscopically and                             biopsies at the papillectomy site also returned as                             normal without showing any residual malignancy.                             Presents again today for surveillance.   Providers:                ADARSH UMAÑA MD, Hina Pate, KAYLA, Richy Laughlin RN   Referring MD:               Requesting Provider:      ROBERT MORALES MD, GABRIELA ANNA MD,                              ROGER JAEGER MD   Medicines:                General Anesthesia   Complications:            No immediate complications. Estimated blood loss:                             Minimal.   _______________________________________________________________________________   Procedure:                Pre-Anesthesia Assessment:                             - Prior to the procedure, a History and Physical                             was performed, and patient medications and                             allergies were reviewed. The patient is competent.                             The risks and benefits of the procedure and the                             sedation options and risks were discussed with the                             patient. All questions were answered and informed                             consent was obtained. Patient identification and                             proposed procedure were verified by the physician,                             the nurse, the anesthesiologist and the anesthetist                             in the procedure room. Mental Status Examination:                             alert and oriented. Airway Examination: normal                             oropharyngeal airway and neck mobility. Respiratory                             Examination: clear to auscultation. CV Examination:                             normal. Prophylactic Antibiotics: The patient does                             not require prophylactic antibiotics. Prior                             Anticoagulants: The patient has taken no                             anticoagulant or antiplatelet agents. ASA Grade                             Assessment: III - A patient with severe systemic                             disease. After reviewing the risks and benefits,                             the patient was deemed in satisfactory condition to                             undergo the procedure. The anesthesia  plan was to                             use general anesthesia. Immediately prior to                             administration of medications, the patient was                             re-assessed for adequacy to receive sedatives. The                             heart rate, respiratory rate, oxygen saturations,                             blood pressure, adequacy of pulmonary ventilation,                             and response to care were monitored throughout the                             procedure. The physical status of the patient was                             re-assessed after the procedure.                             After obtaining informed consent, the scope was                             passed under direct vision. Throughout the                             procedure, the patient's blood pressure, pulse, and                             oxygen saturations were monitored continuously. The                             Duodenoscope was introduced through the mouth, and                             used to inject contrast into and used to inject                             contrast into the ventral pancreatic duct. The ERCP                             was accomplished without difficulty. The patient                             tolerated the procedure well.                                                                                     Findings:        The  film was normal. The esophagus was successfully intubated        under direct vision. The scope was advanced from the mouth to the        duodenum. The pharynx, larynx and associated structures, as well as the        upper GI tract, were normal. Inspection of the major papilla revealed        that an ampullectomy (papillectomy) had been performed previously. The        biliary orifice was widely patent and the papillectomy site and        surrounding mucosa was normal on close white light and NBI examination.        Superficial cannulation  of the ventral pancreatic duct was accomplished        with the short-nosed traction sphincterotome. Novagold wire passed        successfully into the tail of the pancreatic duct from the major        papilla. One 4 Fr by 11 cm temporary prophylactic Santos stent with a        single external pigtail and no internal flaps was placed 10.5 cm into        the ventral pancreatic duct. Clear fluid flowed through the stent. The        stent was in good position. The papillectomy site around the orifice of        the bile duct and pancreatic duct were biopsied with a cold forceps for        histology.                                                                                     Impression:               - Evidence of a prior endoscopic papillectomy. The                             biliary orifice was widely patent and the                             papillectomy site and surrounding mucosa was normal                             on close white light and NBI examination. No                             endoscopic evidence of recurrence                             - One temporary prophylactic 'fall out' Santos stent                             was placed into the ventral pancreatic duct.                             - Biopsy was performed of the papillectomy site                             around the orifice of the bile duct, pancreatic                             duct, and surrounding tissue for histologic                             assessment.   Recommendation:           - Discharge patient to home (ambulatory).                             - Await path results.                             - Resume diet and medications today                             - Pancreatic stent placed today should                             spontaneously pass.                             - Pending pathology results plan for repeat ERCP in                             1 year for surveillance.

## 2022-06-09 NOTE — ANESTHESIA PROCEDURE NOTES
Airway         Procedure Start/Stop Times: 6/9/2022 10:09 AM  Staff -        Performed By: CRNAIndications and Patient Condition       Indications for airway management: logan-procedural and airway protection       Induction type:intravenous       Mask difficulty assessment: 2 - vent by mask + OA or adjuvant +/- NMBA    Final Airway Details       Final airway type: endotracheal airway       Successful airway: ETT - single and Oral  Endotracheal Airway Details        ETT size (mm): 8.0       Cuffed: yes       Successful intubation technique: direct laryngoscopy       DL Blade Type: Diaz 2       Grade View of Cords: 1       Adjucts: stylet       Position: Right       Measured from: lips       Secured at (cm): 24       Bite block used: Molar    Post intubation assessment        Placement verified by: capnometry, equal breath sounds and chest rise        Number of attempts at approach: 1       Number of other approaches attempted: 0       Secured with: pink tape       Ease of procedure: easy    Medication(s) Administered   Medication Administration Time: 6/9/2022 10:09 AM

## 2022-06-09 NOTE — LETTER
"[place] CARE COORDINATION  [address]  [facility] [address]    June 9, 2022        Elier H TRENA Leong  [address] [address]          Dear Elier,     Juno is an updated Complex Care Plan for your continued enrollment in Care Coordination. Please let us know if you have additional questions, concerns, or goals that we can assist with.    Sincerely,    Tonya Hernández RN Care Coordinator  [facility], [place], [place]  Email: [email or web address]  Phone: [phone]            [facility]  Patient Centered Plan of Care  About Me:        Patient Name:  Elier Leong    YOB: 1945  Age:         76 year old   [place] MRN:    [id number] Telephone Information:  Home Phone [phone]   Mobile [phone]       Address:  [address] [facility] [address] Email address:  [email or web address]      Emergency Contact(s)    Name Relationship Lgl Grd Work Phone Home Phone Mobile Phone   1. SAM MELGAR \"* Spouse No  [phone]   2. TRENA LEONGCARLEE Daughter No   [phone]   3. TRENA LEONGSELENE* Son No   [phone]           Primary language:  English     needed? No   [place] [employer] [facility]:  [phone] op. 1  Other communication barriers:Glasses; Language barrier    Preferred Method of Communication:  Mail  Current living arrangement: I live in a private home with spouse    Mobility Status/ Medical Equipment: Dependent/Assisted by Another    Health Maintenance  Health Maintenance Reviewed: Due/Overdue   Health Maintenance Due   Topic Date Due     HEPATITIS C SCREENING  Never done     MICROALBUMIN  08/15/2020     MEDICARE ANNUAL WELLNESS VISIT  09/08/2021     FALL RISK ASSESSMENT  09/08/2021     My Access Plan  Medical Emergency 911   Primary Clinic Line [facility] - [phone]   24 Hour Appointment Line [phone] or  [phone]) (toll-free)   24 " Hour Nurse Line [phone] (toll-free)   Preferred Urgent Care No data recorded   Preferred Hospital [facility]  [phone]     Preferred Pharmacy [id number] - [place], [place] - [address] [facility]     Behavioral Health Crisis Line The National Suicide Prevention Lifeline at 1-296.811.7872 or 911             My Care Team Members  Patient Care Team       Relationship Specialty Notifications Start End    Lida Ray MD PCP - General Internal Medicine  5/16/11     Phone: [phone] Fax: [phone]         [address] [facility] [address]    Lida Ray MD Assigned PCP   9/7/15     Phone: [phone] Fax: [phone]         [address] [facility] [address]    Yeo, Albert, MD Assigned Musculoskeletal Provider   12/6/20     Phone: [phone] Fax: [phone]         [address] [facility] [address]    Rebecca Hernández, RN Lead Care Coordinator  Admissions 7/26/21     Isai Ochoa MD Assigned Heart and Vascular Provider   10/17/21     Phone: [phone] Fax: [phone] [address] [facility] [address] [facility] [address]    Cesia Hand MA Community Health Worker   11/4/21     Uvaldo Cavazos MD Assigned Surgical Provider   11/14/21     Phone: [phone] Pager: [phone] Fax: [phone]        [address] [place] [address]    Gary Tejada MD Assigned Neuroscience Provider   3/13/22     Phone: [phone] Fax: [phone]         [address] [place], [place] [address] [address]            My Care Plans  Self Management and Treatment Plan  Goals and (Comments)   Goals        General     1. Respite Care/Private Duty HHC (pt-stated)      Notes - Note edited  6/9/2022  4:15 PM by Rebecca Hernández RN     Goal Statement: I would like to receive resources for respite Care/Private Duty HHC companies.   Date Goal set: 7/26/21  Barriers: Multiple health concerns  Strengths: Patient and wife are engaged  and motivated  Date to Achieve By: Updated 12/9/22  Patient expressed understanding of goal: Yes  Action steps to achieve this goal:  1. I understand that RN CC will send via [employer] and the mail Respite Care/Private Duty [facility] resources.   2. I will review resources and utilize as I see fit.   3. I will continue to work with care coordination for any additional resources and support.   Update: Kelli will also look into [place] Choices and request a Community Services Living Assessment if interested.                      Action Plans on File:                       Advance Care Plans/Directives Type:   Advanced Directive - On File    Current Medications and Allergies:    Current Outpatient Medications   Medication Instructions     acetaminophen (TYLENOL) 650 mg, Oral, EVERY 8 HOURS PRN     aspirin (ASA) 325 mg, Oral, DAILY     baclofen (LIORESAL) intraTHECAL Internal Pump Intrathecal, CONTINUOUS PRN, Pump filled by [facility] stroke [place] [phone]<BR>Pump Model: Syncromed<BR>Medication in pump is Gablofen (brand name)<BR>Last fill: during hospital admission<BR>Next fill:   <BR>Low [place] Alarm Date: <BR>Reservoir Volume: 20 ml<BR>Conc: 2000 mcg/mL<BR>Delivers 234.7 mcg/day<BR>Basal rate:unknown<BR>Battery life: unknown     diclofenac (VOLTAREN) 1 % topical gel APPLY 2 GRAMS TOPICALLY THREE TIMES DAILY TO AFFECTED AREAS     docusate sodium (COLACE) 100 mg, Oral, EVERY EVENING     enalapril (VASOTEC) 20 mg, Oral, 2 TIMES DAILY     EPINEPHrine (ANY BX GENERIC EQUIV) 0.3 mg, Intramuscular, PRN     ezetimibe (ZETIA) 10 mg, Oral, DAILY     HYDROcodone-acetaminophen (NORCO) 5-325 MG tablet TAKE 1 TABLET BY MOUTH TWICE A DAY AS NEEDED FOR PAIN     oxybutynin ER (DITROPAN XL) 20 mg, Oral, AT BEDTIME     pantoprazole (PROTONIX) 40 mg, Oral, 2 TIMES DAILY     polyethylene glycol (MIRALAX) 17 g packet 1 packet, Oral, DAILY         Care Coordination Start Date: 7/26/2021   Frequency of Care  Coordination: monthly     Form Last Updated: 06/09/2022

## 2022-06-09 NOTE — PROGRESS NOTES
Care Coordination Clinician Chart Review     Situation: Patient chart reviewed by care coordinator.?     Background: Initial assessment and enrollment to Care Coordination was 7/2021.?? Patient centered goals were developed with participation from patient.? Lead CC handed patient off to CHW for continued outreach every 30 days.??     Assessment: Per chart review, patient outreach completed by CC CHW on 6/2/22.? Patient is actively working to accomplish goal(s).? Patient's goal(s) remain(s) appropriate at this time.? Patient is due for updated Plan of Care.? Annual assessment will be due 9/2022.     Patient completed outpatient procedure:ENDOSCOPIC RETROGRADE CHOLANGIOPANCREATOGRAPHY, PANCREATIC STENT PLACEMENT AND BIOPSIES without any complications. Patient was discharged home in stable condition. No changes to health status or medication list. No CC outreach needed.       Goals        1. Respite Care/Private Duty C (pt-stated)       Goal Statement: I would like to receive resources for respite Care/Private Duty HHC companies.   Date Goal set: 7/26/21  Barriers: Multiple health concerns  Strengths: Patient and wife are engaged and motivated  Date to Achieve By: Updated 12/9/22  Patient expressed understanding of goal: Yes  Action steps to achieve this goal:  1. I understand that RN CC will send via [employer] and the mail Respite Care/Private Duty C resources.   2. I will review resources and utilize as I see fit.   3. I will continue to work with care coordination for any additional resources and support.   Update: Kelli [place] also [place] Choices and request a Community Services Living Assessment if interested.                 ??     Plan/Recommendations: The patient will continue working with Care Coordination to achieve above goal(s).? CHW will involve Lead CC as needed or if patient is ready to move to maintenance.? Lead CC will continue to monitor CHW s monthly outreaches and progress to goal(s) every 6  weeks.?     Plan of Care updated and sent to patient: Yes, via [employer].     Tonya Hernández RN Care Coordinator  [facility]  Email: [email or web address]  Phone: [phone]

## 2022-06-09 NOTE — ANESTHESIA POSTPROCEDURE EVALUATION
Patient: Elier Leong    Procedure: Procedure(s):  ENDOSCOPIC RETROGRADE CHOLANGIOPANCREATOGRAPHY, PANCREATIC STENT PLACEMENT AND BIOPSIES       Anesthesia Type:  General    Note:  Disposition: Outpatient   Postop Pain Control: Uneventful            Sign Out: Well controlled pain   PONV: No   Neuro/Psych: Uneventful            Sign Out: Acceptable/Baseline neuro status   Airway/Respiratory: Uneventful            Sign Out: Acceptable/Baseline resp. status   CV/Hemodynamics: Uneventful            Sign Out: Acceptable CV status; No obvious hypovolemia; No obvious fluid overload   Other NRE: NONE   DID A NON-ROUTINE EVENT OCCUR? No           Last vitals:  Vitals Value Taken Time   /90 06/09/22 1145   Temp 36.2  C (97.1  F) 06/09/22 1057   Pulse 65 06/09/22 1155   Resp 13 06/09/22 1155   SpO2 93 % 06/09/22 1155   Vitals shown include unvalidated device data.    Electronically Signed By: Trey Pond DO, DO  June 9, 2022  1:12 PM

## 2022-06-09 NOTE — DISCHARGE INSTRUCTIONS
**If you have questions or concerns about your procedure,   call Dr. Kent at [phone]**     Same Day Surgery Discharge Instructions for  Sedation and General Anesthesia     It's not unusual to feel dizzy, light-headed or faint for up to 24 hours after surgery or while taking pain medication.  If you have these symptoms: sit for a few minutes before standing and have someone assist you when you get up to walk or use the bathroom.    You should rest and relax for the next 24 hours. We recommend you make arrangements to have an adult stay with you for at least 24 hours after your discharge.  Avoid hazardous and strenuous activity.    DO NOT DRIVE any vehicle or operate mechanical equipment for 24 hours following the end of your surgery.  Even though you may feel normal, your reactions may be affected by the medication you have received.    Do not drink alcoholic beverages for 24 hours following surgery.     Slowly progress to your regular diet as you feel able. It's not unusual to feel nauseated and/or vomit after receiving anesthesia.  If you develop these symptoms, drink clear liquids (apple juice, ginger ale, broth, 7-up, etc. ) until you feel better.  If your nausea and vomiting persists for 24 hours, please notify your surgeon.      All narcotic pain medications, along with inactivity and anesthesia, can cause constipation. Drinking plenty of liquids and increasing fiber intake will help.    For any questions of a medical nature, call your surgeon.    Do not make important decisions for 24 hours.    If you had general anesthesia, you may have a sore throat for a couple of days related to the breathing tube used during surgery.  You may use Cepacol lozenges to help with this discomfort.  If it worsens or if you develop a fever, contact your surgeon.     If you feel your pain is not well managed with the pain medications prescribed by your surgeon, please contact your surgeon's office to let them know so they  can address your concerns.       CoVid 19 Information    We want to give you information regarding Covid. Please consult your primary care provider with any questions you might have.     Patient who have symptoms (cough, fever, or shortness of breath), need to isolate for 7 days from when symptoms started OR 72 hours after fever resolves (without fever reducing medications) AND improvement of respiratory symptoms (whichever is longer).    Isolate yourself at home (in own room/own bathroom if possible)  Do Not allow any visitors  Do Not go to work or school  Do Not go to Buddhism,  centers, shopping, or other public places.  Do Not shake hands.  Avoid close and intimate contact with others (hugging, kissing).  Follow CDC recommendations for household cleaning of frequently touched services.     After the initial 7 days, continue to isolate yourself from household members as much as possible. To continue decrease the risk of community spread and exposure, you and any members of your household should limit activities in public for 14 days after starting home isolation.     You can reference the following CDC link for helpful home isolation/care tips:  https://www.cdc.gov/coronavirus/2019-ncov/downloads/10Things.pdf    Protect Others:  Cover Your Mouth and Nose with a mask, disposable tissue or wash cloth to avoid spreading germs to others.  Wash your hands and face frequently with soap and water    Call Your Primary Doctor If: Breathing difficulty develops or you become worse.    For more information about COVID19 and options for caring for yourself at home, please visit the CDC website at https://www.cdc.gov/coronavirus/2019-ncov/about/steps-when-sick.html  For more options for care at [facility], please visit our website at [email or web address]      See attached Endoscopy report

## 2022-06-21 NOTE — PROGRESS NOTES
Clinic Care Coordination Contact  [facility]/Voicemail       Clinical Data: Care Coordinator Outreach  Outreach attempted x 1.  Left message on Kelli's voicemail with call back information and requested return call.    Plan:. Care Coordinator will try to reach patient again in 10 business days.    PHILIPP Gonzalez. Public Health  Community Health Worker  [facility]  Clinic Care Coordination  [phone]  ----------------------------------------------------------------  Next outreach: 7/1/2022  Preferred contact: Kelli (spouse) [phone]     Enrollment Date: 07/26/21  Last Care Plan Assessment: 2/08/2022

## 2022-06-21 NOTE — LETTER
[facility] CARE COORDINATION  [facility]  July 20, 2022    Elier Leong  [address] [address]      Dear Elier,    I have been unsuccessful in reaching you since our last contact. At this time the Care Coordination team will make no further attempts to reach you, however this does not change your ability to continue receiving care from your providers at your primary care clinic. If you need additional support from a care coordinator in the future please contact mati Callaway Community Health Worker at [phone].    All of us at the [facility] are invested in your health and are here to assist you in meeting your goals.     Sincerely,    PHILIPP Gonzalez. Public Health  Community Health Worker  [facility] Care Coordination  [phone]

## 2022-06-21 NOTE — RESULT ENCOUNTER NOTE
I called the patient and his wife to discuss the pathology findings from his ERCP. See report for details. I also spoke with the pathologist, Dr. Vanessa, as well. 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 However, the deep margin was positive. Given his age/comorbidities, he was not a candidate for a Whipple by Dr. Moscoso. It was also decided to forgo adjuvant chemo/radiation given his comorbidites and do active surveillance. Unfortunately, his biopsies at his resection site show atypical cells within a lymphovascular space highly suspicious for residual carcinoma (LVI). I explained that my resection was not curative. The next steps will be to obtain restaging CT and for them to meet again with oncology to discuss options. [facility] which we will arrange.    Caroline, can you order a CT chest/abd/pelvis with IV contrast? Can be done in [place]. And a medical oncology referral. We should add to the Monday tumor conference as well. Thanks.    Agus Kent MD  [facility]  Division of Gastroenterology and Hepatology  [facility] [address] [place], [place] [address]

## 2022-06-23 NOTE — TELEPHONE ENCOUNTER
RECORDS STATUS - ALL OTHER DIAGNOSIS      RECORDS RECEIVED FROM: Psychiatric   DATE RECEIVED: 6/23   NOTES STATUS DETAILS   OFFICE NOTE from referring provider Agus Torres MD in [facility]   DISCHARGE SUMMARY from hospital Psychiatric 6/9/22, 9/23/21, 8/19/21, 7/31/21, 7/14/21, 7/12/21   OPERATIVE REPORT Psychiatric 6/9/22, 8/19/21: ERCP    8/3/21, 7/14/21, 6/17/21: EGD    7/12/21: EUS/EGD    6/9/08: Modified bilateral pelvic lymph node dissection, radical retropubic prostatectomy.   MEDICATION LIST Psychiatric    LABS     PATHOLOGY REPORTS Psychiatric 6/9/22, 8/19/21, 7/12/21, 6/17/21: Surg Path    6/17/21: FNA   ANYTHING RELATED TO DIAGNOSIS Epic 6/9/22   IMAGING (NEED IMAGES & REPORT)     CT SCANS PACS Psychiatric   MRI PACS Psychiatric   XR PACS Epic   ULTRASOUND PACS Epic

## 2022-06-23 NOTE — TELEPHONE ENCOUNTER
Action June 23, 2022 4:17 PM ABT   Action Taken Called and spoke to Anoop at [facility], she states that no MEENAKSHI is needed and patient has not been seen since 09/01/21.

## 2022-06-24 NOTE — PROGRESS NOTES
Service Date: 06/24/2022      MEDICAL ONCOLOGY NEW PATIENT VISIT      REFERRING PHYSICIAN:     1.  Dr. Agus Kent, Gastroenterology Service, [facility]      CANCER DIAGNOSIS:  History of intra-ampullary adenocarcinoma diagnosed July 07, 2021. The patient is kindly referred by the above physician for consideration of Medical Oncology treatment. His primary care physician is at [facility].      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 He was admitted for further workup of abnormally elevated liver enzymes, bilirubin and ALP. EUS/ERCP showed a 1.2 cm mass at the ampulla with no invasion into the pancreas. Biopsy confirmed ampullarf carcinoma. No evidence of metastatic disease on CT CAP imaging. He was evaluated by Dr. Moscoso at the [facility] who did not recommend a Whipple as he was not a surgical candidate. He underwent an endoscopic resection with Dr. Kent on July 12, 2021. There were positive margins on the pathology. He was then referred to Dr. Jovi Bird for consideration of chemotherapy and radiation. During their visit in Sept 2021, they decided pursue a surveillance approach as they were more focused on quality of life and concerned about chemotherapy side effects. Since that meeting, Nader has been relatively stable and did not have any further hospital admissions.     Today, he presents with his wife through video visit to discuss recent findings from his endoscopy. In general, he has been stable in terms of his chronic conditions. His wife, Kelli, is his caregiver 24/7. He continues to have arthritic pain and is followed by pain medicine. He follows with neurology regularly as  "well and is scheduled for a repeat TTE today to monitor his cardiovascular risks following his CVA. In terms of symptoms from the cancer, he states that he has been doing really well in that aspect. Denies any abdominal pain, nausea, vomiting or diarrhea. He has occasional constipation for which they are trying Metamucil.     PAST MEDICAL HISTORY:  Notable for Primary Lateral Sclerosis, diagnosed in 2002 and is wheelchair bound from this. He also had history of prostate cancer s/p prostatectomy in 2008, follows with Dr. Uvaldo Cavazos. He has had four CVAs in the past, last incident was in August 2021. Also, he has a history of Basal cell carcinoma and Squamous cell carcinoma.     PAST SURGICAL HISTORY:  No major surgeries     FAMILY HISTORY:  No family history of cancer      SOCIAL HISTORY:  He lives in [place] with his wife Kelli.  Daughter lives in [place] and son lives in [place].   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       MEDICATIONS/ALLERGIES:  Fully reviewed in Epic.  Current Outpatient Medications   Medication     acetaminophen (TYLENOL) 650 MG CR tablet     aspirin (ASA) 325 MG EC tablet     baclofen (LIORESAL) intraTHECAL Internal Pump     enalapril (VASOTEC) 10 MG tablet     EPINEPHrine 0.3 MG/0.3ML injection     ezetimibe (ZETIA) 10 MG tablet     HYDROcodone-acetaminophen (NORCO) 5-325 MG tablet     oxybutynin ER (DITROPAN-XL) 10 MG 24 hr tablet     pantoprazole (PROTONIX) 40 MG EC tablet     No current facility-administered medications for this visit.        Allergies   Allergen Reactions     Bee Venom      Swelling and hives     Penicillins Hives and Swelling     \"HIVES\"; occurred at age 39        REVIEW OF SYSTEMS:  Comprehensive (14-point) ROS reviewed. Pertinent symptoms reviewed above per HPI.      Physical Exam:  Physical exam " could not be performed today in context of a Virtual Visit     Observed physical assessments made today by visualizing the patient by video link:     General/Constitutional: Generally appears well, not acutely ill.  HEENT: no scleral icterus, not jaundiced.  Respiratory: no labored breathing.  Musculoskeletal: appears contracted but difficult to fully visualize  Skin: no jaundice, discoloration or other notable lesions.  Neurological: no evidence of tremors, dysarthria present with slurred speech  Psychiatric: no evident anxiety; fully alert and oriented    LABORATORY/RADIOLOGY:    Component    Case Report   Surgical Pathology Report                         Case: [id number]                                   Authorizing Provider:  Agus Kent MD          Collected:           06/09/2022 10:40 AM           Ordering Location:     [facility]          Received:            06/09/2022 10:54 AM                                  [facility] Main OR                                                             Pathologist:           Chapin Vanessa MD                                                            Specimen:    Small Intestine, Duodenum                                                                  Final Diagnosis   Small intestine, ampulla: Biopsy:  - Rare atypical cells within lymphovascular space, see comment  - Nondysplastic small intestinal mucosa   Electronically signed by Chapin Vanessa MD on 6/15/2022 at  9:33 AM   Comment    The ampullary biopsy shows nondysplastic small intestinal mucosa with a minute focus of few atypical cells within a lymphovascular space confirmed by D2-40 immunohistochemistry.  This focus is only seen on levels 6 and 7 of the biopsy and appear to be keratin positive.  The findings are highly suspicious for recurrent/residual carcinoma in the form of lymphovascular invasion.  Clinical correlation is suggested.     This case was reviewed in intradepartmental consultation with  agreement to the above interpretation.            IMPRESSION/PLAN:    Nader Leong is a 76 year old male who has multiple co-morbidities who was referred today for evaluation of recent endoscopic findings. Pathology from ERCP was suspicious for lymphovascular invasion of the previous adenocarcinoma but findings were not definitive. We discussed with Nader, and his wife Kelli, that there are two possible approaches. The first being that we continue to monitor his symptoms and not pursue chemotherapy and radiation. The second option would be to start chemotherapy and radiation. We discussed the risks and benefits of both options. The risk of surveillance would be that the tumor could progress and cause issues down the line. In terms of how quickly the tumor would grow, or if it would grow, is hard to predict in general. The risk of chemotherapy and radiation are they usual side effects of nausea, vomiting, fatigue, hair loss, diarrhea, constipation, cytopenias, infections and so forth. After further discussion, Nader and Kelli want to focus on quality of life, and they know that with Nader's current physical health, he would quickly decline. They want to defer treatment at this time and will follow up with Dr. Kent to discuss further surveillance options. We discussed not performing imaging surveillance because the findings would not change our management as treatment is not being pursued.     Patient was seen and plan of care was discussed with attending physician Dr. Lucho Cordoba.     Gilmar Jean MD   Heme/Onc/Transplant Fellow  Pgr #[id number]

## 2022-06-24 NOTE — PROGRESS NOTES
Nader is a 76 year old who is being evaluated via a billable video visit.      How would you like to obtain your AVS? MyChart  If the video visit is dropped, the invitation should be resent by: Text to cell phone: [phone]   Will anyone else be joining your video visit?   Pts wife  Katia Rivera, VF/CMA          Video-Visit Details      Type of service:  Video Visit    Distant Location (provider location):  [facility]     Service Date: 06/24/2022      MEDICAL ONCOLOGY NEW PATIENT VISIT      REFERRING PHYSICIAN:     1.  Dr. Agus Kent, Gastroenterology Service, [facility]      CANCER DIAGNOSIS:  History of intra-ampullary adenocarcinoma diagnosed July 07, 2021. The patient is kindly referred by the above physician for consideration of Medical Oncology treatment. His primary care physician is at [facility].      Nader Leong is a 76 year old male with history of Primary Lateral Sclerosis who is wheelchair bound, chronic arthritis, multiple CVA episodes on high dose aspirin, prostate cancer s/p prostatectomy who presented to [facility] in mid-June 2021 for jaundice ongoing for a week and dark urine that had been persistent for two months. He was admitted for further workup of abnormally elevated liver enzymes, bilirubin and ALP. EUS/ERCP showed a 1.2 cm mass at the ampulla with no invasion into the pancreas. Biopsy confirmed ampullarf carcinoma. No evidence of metastatic disease on CT CAP imaging. He was evaluated by Dr. Moscoso at the [facility] who did not recommend a Whipple as he was not a surgical candidate. He underwent an endoscopic resection with Dr. Kent on July 12, 2021. There were positive margins on the pathology. He was then referred to Dr. Jovi Bird for consideration of chemotherapy and radiation. During their visit in Sept 2021, they decided pursue a surveillance approach as they were more focused on quality of life and  concerned about chemotherapy side effects. Since that meeting, Nader has been relatively stable and did not have any further hospital admissions.     Today, he presents with his wife through video visit to discuss recent findings from his endoscopy. In general, he has been stable in terms of his chronic conditions. His wife, Kelli, is his caregiver 24/7. He continues to have arthritic pain and is followed by pain medicine. He follows with neurology regularly as well and is scheduled for a repeat TTE today to monitor his cardiovascular risks following his CVA. In terms of symptoms from the cancer, he states that he has been doing really well in that aspect. Denies any abdominal pain, nausea, vomiting or diarrhea. He has occasional constipation for which they are trying Metamucil.     PAST MEDICAL HISTORY:  Notable for Primary Lateral Sclerosis, diagnosed in 2002 and is wheelchair bound from this. He also had history of prostate cancer s/p prostatectomy in 2008, follows with Dr. Uvaldo Cavazos. He has had four CVAs in the past, last incident was in August 2021. Also, he has a history of Basal cell carcinoma and Squamous cell carcinoma.     PAST SURGICAL HISTORY:  No major surgeries     FAMILY HISTORY:  No family history of cancer      SOCIAL HISTORY:  He lives in [place] with his wife Kelli.  Daughter lives in [place] and son lives in [place].   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       MEDICATIONS/ALLERGIES:  Fully reviewed in Epic.  Current Outpatient Medications   Medication     acetaminophen (TYLENOL) 650 MG CR tablet     aspirin (ASA) 325 MG EC tablet     baclofen (LIORESAL) intraTHECAL Internal Pump     enalapril (VASOTEC) 10 MG tablet     EPINEPHrine 0.3 MG/0.3ML injection     ezetimibe (ZETIA) 10 MG tablet     HYDROcodone-acetaminophen (NORCO)  "5-325 MG tablet     oxybutynin ER (DITROPAN-XL) 10 MG 24 hr tablet     pantoprazole (PROTONIX) 40 MG EC tablet     No current facility-administered medications for this visit.        Allergies   Allergen Reactions     Bee Venom      Swelling and hives     Penicillins Hives and Swelling     \"HIVES\"; occurred at age 39        REVIEW OF SYSTEMS:  Comprehensive (14-point) ROS reviewed. Pertinent symptoms reviewed above per HPI.      Physical Exam:  Physical exam could not be performed today in context of a Virtual Visit     Observed physical assessments made today by visualizing the patient by video link:     General/Constitutional: Generally appears well, not acutely ill.  HEENT: no scleral icterus, not jaundiced.  Respiratory: no labored breathing.  Musculoskeletal: appears contracted but difficult to fully visualize  Skin: no jaundice, discoloration or other notable lesions.  Neurological: no evidence of tremors, dysarthria present with slurred speech  Psychiatric: no evident anxiety; fully alert and oriented    LABORATORY/RADIOLOGY:    Component    Case Report   Surgical Pathology Report                         Case: [id number]                                   Authorizing Provider:  Agus Kent MD          Collected:           06/09/2022 10:40 AM           Ordering Location:     [facility]          Received:            06/09/2022 10:54 AM                                  [facility] Main OR                                                             Pathologist:           Chapin Vanessa MD                                                            Specimen:    Small Intestine, Duodenum                                                                  Final Diagnosis   Small intestine, ampulla: Biopsy:  - Rare atypical cells within lymphovascular space, see comment  - Nondysplastic small intestinal mucosa   Electronically signed by Chapin Vanessa MD on 6/15/2022 at  9:33 AM   Comment    The ampullary biopsy " shows nondysplastic small intestinal mucosa with a minute focus of few atypical cells within a lymphovascular space confirmed by D2-40 immunohistochemistry.  This focus is only seen on levels 6 and 7 of the biopsy and appear to be keratin positive.  The findings are highly suspicious for recurrent/residual carcinoma in the form of lymphovascular invasion.  Clinical correlation is suggested.     This case was reviewed in intradepartmental consultation with agreement to the above interpretation.            IMPRESSION/PLAN:    Nader Leong is a 76 year old male who has multiple co-morbidities who was referred today for evaluation of recent endoscopic findings. Pathology from ERCP was suspicious for lymphovascular invasion of the previous adenocarcinoma but findings were not definitive. We discussed with Nader, and his wife Kelli, that there are two possible approaches. The first being that we continue to monitor his symptoms and not pursue chemotherapy and radiation. The second option would be to start chemotherapy and radiation. We discussed the risks and benefits of both options. The risk of surveillance would be that the tumor could progress and cause issues down the line. In terms of how quickly the tumor would grow, or if it would grow, is hard to predict in general. The risk of chemotherapy and radiation are they usual side effects of nausea, vomiting, fatigue, hair loss, diarrhea, constipation, cytopenias, infections and so forth. After further discussion, Nader and Kelli want to focus on quality of life, and they know that with Nader's current physical health, he would quickly decline. They want to defer treatment at this time and will follow up with Dr. Kent to discuss further surveillance options. We discussed not performing imaging surveillance because the findings would not change our management as treatment is not being pursued.     Patient was seen and plan of care was discussed with attending physician   Lucho Cordoba.     Gilmar Jean MD   Heme/Onc/Transplant Fellow  Pgr #[id number]

## 2022-06-24 NOTE — LETTER
6/24/2022         RE: Elier Leong  [address] [facility] [id number]        Dear Colleague,    Thank you for referring your patient, Elire Leong, to the [facility]. Please see a copy of my visit note below.    Nader is a 76 year old who is being evaluated via a billable video visit.      How would you like to obtain your AVS? MyChart  If the video visit is dropped, the invitation should be resent by: Text to cell phone: [phone]   Will anyone else be joining your video visit?   Pts wife  Katia Rivera, VF/CMA          Video-Visit Details      Type of service:  Video Visit    Distant Location (provider location):  [facility]     Service Date: 06/24/2022      MEDICAL ONCOLOGY NEW PATIENT VISIT      REFERRING PHYSICIAN:     1.  Dr. 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 The patient is kindly referred by the above physician for consideration of Medical Oncology treatment. His primary care physician is at [facility].      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 He was admitted for further workup of abnormally elevated liver enzymes, bilirubin and ALP. EUS/ERCP showed a 1.2 cm mass at the ampulla with no invasion into the pancreas. Biopsy confirmed ampullarf carcinoma. No evidence of metastatic disease on CT CAP imaging. He was evaluated by Dr. Moscoso at the [facility] who did not recommend a Whipple as he was not a surgical candidate. He underwent an endoscopic resection with Dr. Kent on July 12, 2021. There  were positive margins on the pathology. He was then referred to Dr. Jovi Bird for consideration of chemotherapy and radiation. During their visit in Sept 2021, they decided pursue a surveillance approach as they were more focused on quality of life and concerned about chemotherapy side effects. Since that meeting, Nader has been relatively stable and did not have any further hospital admissions.     Today, he presents with his wife through video visit to discuss recent findings from his endoscopy. In general, he has been stable in terms of his chronic conditions. His wife, Kelli, is his caregiver 24/7. He continues to have arthritic pain and is followed by pain medicine. He follows with neurology regularly as well and is scheduled for a repeat TTE today to monitor his cardiovascular risks following his CVA. In terms of symptoms from the cancer, he states that he has been doing really well in that aspect. Denies any abdominal pain, nausea, vomiting or diarrhea. He has occasional constipation for which they are trying Metamucil.     PAST MEDICAL HISTORY:  Notable for Primary Lateral Sclerosis, diagnosed in 2002 and is wheelchair bound from this. He also had history of prostate cancer s/p prostatectomy in 2008, follows with Dr. Uvaldo Cavazos. He has had four CVAs in the past, last incident was in August 2021. Also, he has a history of Basal cell carcinoma and Squamous cell carcinoma.     PAST SURGICAL HISTORY:  No major surgeries     FAMILY HISTORY:  No family history of cancer      SOCIAL HISTORY:  He lives in [place] with his wife Kelli.  Daughter lives in [place] and son lives in [place].   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       MEDICATIONS/ALLERGIES:  Fully reviewed in Epic.  Current Outpatient Medications   Medication     acetaminophen (TYLENOL)  "650 MG CR tablet     aspirin (ASA) 325 MG EC tablet     baclofen (LIORESAL) intraTHECAL Internal Pump     enalapril (VASOTEC) 10 MG tablet     EPINEPHrine 0.3 MG/0.3ML injection     ezetimibe (ZETIA) 10 MG tablet     HYDROcodone-acetaminophen (NORCO) 5-325 MG tablet     oxybutynin ER (DITROPAN-XL) 10 MG 24 hr tablet     pantoprazole (PROTONIX) 40 MG EC tablet     No current facility-administered medications for this visit.        Allergies   Allergen Reactions     Bee Venom      Swelling and hives     Penicillins Hives and Swelling     \"HIVES\"; occurred at age 39        REVIEW OF SYSTEMS:  Comprehensive (14-point) ROS reviewed. Pertinent symptoms reviewed above per HPI.      Physical Exam:  Physical exam could not be performed today in context of a Virtual Visit     Observed physical assessments made today by visualizing the patient by video link:     General/Constitutional: Generally appears well, not acutely ill.  HEENT: no scleral icterus, not jaundiced.  Respiratory: no labored breathing.  Musculoskeletal: appears contracted but difficult to fully visualize  Skin: no jaundice, discoloration or other notable lesions.  Neurological: no evidence of tremors, dysarthria present with slurred speech  Psychiatric: no evident anxiety; fully alert and oriented    LABORATORY/RADIOLOGY:    Component    Case Report   Surgical Pathology Report                         Case: [id number]                                   Authorizing Provider:  Agus Kent MD          Collected:           06/09/2022 10:40 AM           Ordering Location:     [facility]          Received:            06/09/2022 10:54 AM                                  [facility] OR                                                             Pathologist:           Chapin Vanessa MD                                                            Specimen:    Small Intestine, Duodenum                                                                  Final " Diagnosis   Small intestine, ampulla: Biopsy:  - Rare atypical cells within lymphovascular space, see comment  - Nondysplastic small intestinal mucosa   Electronically signed by Chapin Vanessa MD on 6/15/2022 at  9:33 AM   Comment    The ampullary biopsy shows nondysplastic small intestinal mucosa with a minute focus of few atypical cells within a lymphovascular space confirmed by D2-40 immunohistochemistry.  This focus is only seen on levels 6 and 7 of the biopsy and appear to be keratin positive.  The findings are highly suspicious for recurrent/residual carcinoma in the form of lymphovascular invasion.  Clinical correlation is suggested.     This case was reviewed in intradepartmental consultation with agreement to the above interpretation.            IMPRESSION/PLAN:    Nader Leong is a 76 year old male who has multiple co-morbidities who was referred today for evaluation of recent endoscopic findings. Pathology from ERCP was suspicious for lymphovascular invasion of the previous adenocarcinoma but findings were not definitive. We discussed with Nader, and his wife Kelli, that there are two possible approaches. The first being that we continue to monitor his symptoms and not pursue chemotherapy and radiation. The second option would be to start chemotherapy and radiation. We discussed the risks and benefits of both options. The risk of surveillance would be that the tumor could progress and cause issues down the line. In terms of how quickly the tumor would grow, or if it would grow, is hard to predict in general. The risk of chemotherapy and radiation are they usual side effects of nausea, vomiting, fatigue, hair loss, diarrhea, constipation, cytopenias, infections and so forth. After further discussion, Nader and Kelli want to focus on quality of life, and they know that with Nader's current physical health, he would quickly decline. They want to defer treatment at this time and will follow up with Dr. Kent to  discuss further surveillance options. We discussed not performing imaging surveillance because the findings would not change our management as treatment is not being pursued.     Patient was seen and plan of care was discussed with attending physician Dr. Lucho Cordoba.     Gilmar Jean MD   Heme/Onc/Transplant Fellow  Pgr #[id number]        Service Date: 06/24/2022    MEDICAL ONCOLOGY NEW PATIENT VISIT    REFERRING PHYSICIAN:  Dr. Agus Kent, Gastroenterology Service, [facility].    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  The patient underwent endoscopic resection by Dr. Kent in 07/2021 as surgery was prohibitive due to severe comorbidities.  We estimated his ECOG performance status as 3.  There has been a question about potential recent local recurrence, and Dr. Kent referred the patient to Medical Oncology for further evaluation.    HISTORY OF PRESENT ILLNESS:  Mr. Nader De La Garza is a 76-year-old gentleman from [place].  He is joined by his wife via [employer]-based virtual video visit.  Medical Oncology fellow, Dr. Amaya Jean was also present throughout the entirety of the visit as well.      Mr. 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  He has had a number of strokes, the last of which was reported as 08/2021.  He has a history of prostate cancer in 2008 for which he underwent prostatectomy.  He has chronic pain issues and a long list of moderate to relatively severe comorbidities.  We estimate his ECOG performance status as quite poor at 3.  We were asked by Dr. Kent to evaluate for intra-ampullary adenocarcinoma.      He previously was evaluated by Dr. Jovi Bird at [facility] on 09/01/2021.  Briefly, Mr. De La Garza presented with jaundice over a 1-week course in 06/2021.  He underwent an  "endoscopic ultrasound and ERCP revealing a lesion that was measuring 1.2 cm within the ampulla.  On histopathologic evaluation via FNA, this was positive for malignancy.  Dr. Kent referred the patient to Dr. Saqib Moscoso, who evaluated the patient and felt that surgery would be prohibitive due to the poor performance status.  Thus, as an alternative, Dr. Kent proceeded to do endoscopic resection on 07/12/2021.  He was able to remove the lesion, which upon histopathologic evaluation was a moderately differentiated intra-ampullary form of adenocarcinoma which was arising from an intra-ampullary papillary tubular neoplasm that was pancreatobiliary type.  The deep resection margin was positive as the carcinoma invaded the muscularis propria.  The other peripheral resection margins were free of cancer, and there was no vascular invasion identified.  The patient went on to meet with a medical oncologist at [facility], Dr. Jovi Bird, on 09/01/2021.      Discussions were had of the risks versus benefits of this early stage lesion, cassandra status unknown.  The offer was to focus on quality of life, as the patient continues to do now, and thus no adjuvant chemotherapy or radiation was discussed further.  Recommendation was for that Dr. Kent wanted to continue with serial endoscopic ultrasounds every 4-5 months.  The patient did not meet with Dr. Bird further.  In more recent months, the patient followed up with Dr. Kent and another evaluation was done and endoscopy 06/09/2022.  A biopsy was taken of the small intestinal ampulla.  There was no definitive evidence of malignancy.  Instead it was reported as \"rare atypical cells within the lymphovascular space; nondysplastic small intestinal mucosa.\"  In the pathology review, it does also state, however, \"The findings are highly suspicious for recurrent/residual carcinoma in the form of lymphovascular invasion.\"  As there was no definitive evidence of recurrent " ampullary carcinoma status post endoscopic resection in 07/2021, Dr. Kent was concerned about this.  He requested restaging CT scan that has not been performed at this point, to my knowledge.  As I meet with the patient today, he and his wife are eager to go to [facility], where they state that an echocardiogram has been scheduled by another specialist.  They very clearly state that his quality of life is paramount and they are not interested in pursuing aggressive cancer-directed therapy or interventions, including no interest in pursuing any chemotherapy or radiation even if recommended.    PAST MEDICAL AND SURGICAL HISTORY:  As noted by the fellow.  Please see her note for details and as summarized above with a severe form of PLS for which he is wheelchair-bound, strokes, the last of which was 08/2021, prostate cancer and prostatectomy highlight pertinent and related morbid conditions.  Again, we believe his ECOG performance status would be estimated as 3.    SOCIAL HISTORY:  Lives in [place] with his wife, who is his caretaker.    FAMILY HISTORY:  Family history of malignancy not discussed in depth today.    EXAMINATION:  Virtual examination as the patient has difficulty and mild dysarthria secondary to PLS, so his wife speaks mostly for him.  ECOG performance status 3.    In-person physical exam could not be performed today in context of a Virtual Visit. Observed physical assessments made today by visualizing the patient by video link:  Vitals - Patient Reported  Systolic (Patient Reported): (!) 145 (reading from yesterday)  Diastolic (Patient Reported): 75  Weight (Patient Reported): 73 kg (161 lb)  Pain Score: Severe Pain (7)  Pain Loc: Shoulder (both)             General/Constitutional: Not acutely ill; some dysarthria presumably due to PLS and/or history of stroke(s).  HEENT: no scleral icterus, not jaundiced.  Respiratory: no labored breathing.  Musculoskeletal: unable to assess range of motion  and physical strength - he is wheelchair bound due to PLOS.  Skin: no jaundice, discoloration or other notable lesions.        The rest of a comprehensive physical examination is deferred due to nature of video visits.     Pertinent labs, pathology and imaging were reviewed by me including the pathology, some of which as recorded verbatim above.    IMPRESSION AND PLAN:  Mr. Nader De La Garza is a 76-year-old gentleman with severe comorbidities and ECOG performance status of 3.  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  Surgery was deemed prohibitive; thus, he did not undergo Whipple surgery for this reason.  No chemotherapy and no radiation was recommended at an outside practice in 09/2021 due to the very early stage aspect of the lesion despite a positive margin and in conjunction with the patient's goals of care and poor performance status.      I, too, do not feel there is any role for chemotherapy or radiation at this time.  There is no definitive evidence of residual or recurrent carcinoma.  If Dr. Kent wishes to pursue this further, I am certainly happy to help in any way possible, but as the patient and his wife make it very clear that they are more interested in goals of care and quality of life rather than pursuing any cancer-directed therapy, I do not see any role for serial radiographic reevaluation now or in the future.  I am happy to help facilitate referral to Palliative Care or Hospice if needed, but he has expressed the inclination, which I agree with, to focus on quality of life, meeting with other specialists as needed within the healthcare system, and pursuing further evaluation with Dr. Kent as he deems fit.    Thank you very much for the kind referral.  I spent about 20 minutes with the patient on 06/24/2022 between 10:20 a.m. and 10:35 a.m.  Additional 45 minutes on the day of the visit was spent reviewing medical records,  reviewing the case with the fellow and reviewing pertinent pathology and past imaging.    VIRTUAL VISIT - DETAILS:    I have reviewed the note as documented above. This accurately captures the substance of my conversation with the patient.    Date of call: 2022   Start of call: 10:20 am  End of call: 10:35 am    Provider location: [facility] (academic office)  Patient location: Home      Mode of Video Visit: Adryan           I have seen and evaluated this patient with the medical oncology fellow. I have reviewed and edited this fellow's note, and agree with the assessment and plan stated in her note and in my full dictation here. A complete review of systems was assessed, and pertinent positives/negatives are discussed in detail above.    I spent 15 minutes in consultation, including history and discussion with the patient including review of recent lab values and radiologic imaging results.  An additional 30 minutes was spent on the day of the visit, including reviewing pertinent medical notes and documentation from other physicians and APPs who have evaluated and treated this patient, pertinent lab values, pathology and imaging results, personal review of radiologic images, discussing the case with referring providers and/or nurse coordinator team, post-visit orders, and all post-visit documentation.          D: 2022   T: 2022   MT: ayden    Name:     ANIL QUINN  MRN:      [id number]        Account:      [id number]   :      1945           Service Date: 2022       Document: [id number]          Again, thank you for allowing me to participate in the care of your patient.      Sincerely,    Lucho Cordoba MD

## 2022-06-24 NOTE — PROGRESS NOTES
Service Date: 06/24/2022    MEDICAL ONCOLOGY NEW PATIENT VISIT    REFERRING PHYSICIAN:  Dr. Agus Kent, Gastroenterology Service, [facility].    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  The patient underwent endoscopic resection by Dr. Kent in 07/2021 as surgery was prohibitive due to severe comorbidities.  We estimated his ECOG performance status as 3.  There has been a question about potential recent local recurrence, and Dr. Kent referred the patient to Medical Oncology for further evaluation.    HISTORY OF PRESENT ILLNESS:  Mr. Nader De La Garza is a 76-year-old gentleman from [place].  He is joined by his wife via [employer] ([place])-based virtual video visit.  Medical Oncology fellow, Dr. Amaya Jean was also present throughout the entirety of the visit as well.      Mr. 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  He has had a number of strokes, the last of which was reported as 08/2021.  He has a history of prostate cancer in 2008 for which he underwent prostatectomy.  He has chronic pain issues and a long list of moderate to relatively severe comorbidities.  We estimate his ECOG performance status as quite poor at 3.  We were asked by Dr. Kent to evaluate for intra-ampullary adenocarcinoma.      He previously was evaluated by Dr. Jovi Bird at [facility] on 09/01/2021.  Briefly, Mr. De La Garza presented with jaundice over a 1-week course in 06/2021.  He underwent an endoscopic ultrasound and ERCP revealing a lesion that was measuring 1.2 cm within the ampulla.  On histopathologic evaluation via FNA, this was positive for malignancy.  Dr. Kent referred the patient to Dr. Saqib Moscoso, who evaluated the patient and felt that surgery would be prohibitive due to the poor performance status.  Thus, as an  "alternative, Dr. Kent proceeded to do endoscopic resection on 07/12/2021.  He was able to remove the lesion, which upon histopathologic evaluation was a moderately differentiated intra-ampullary form of adenocarcinoma which was arising from an intra-ampullary papillary tubular neoplasm that was pancreatobiliary type.  The deep resection margin was positive as the carcinoma invaded the muscularis propria.  The other peripheral resection margins were free of cancer, and there was no vascular invasion identified.  The patient went on to meet with a medical oncologist at [facility], Dr. Jovi Bird, on 09/01/2021.      Discussions were had of the risks versus benefits of this early stage lesion, cassandra status unknown.  The offer was to focus on quality of life, as the patient continues to do now, and thus no adjuvant chemotherapy or radiation was discussed further.  Recommendation was for that Dr. Kent wanted to continue with serial endoscopic ultrasounds every 4-5 months.  The patient did not meet with Dr. Bird further.  In more recent months, the patient followed up with Dr. Kent and another evaluation was done and endoscopy 06/09/2022.  A biopsy was taken of the small intestinal ampulla.  There was no definitive evidence of malignancy.  Instead it was reported as \"rare atypical cells within the lymphovascular space; nondysplastic small intestinal mucosa.\"  In the pathology review, it does also state, however, \"The findings are highly suspicious for recurrent/residual carcinoma in the form of lymphovascular invasion.\"  As there was no definitive evidence of recurrent ampullary carcinoma status post endoscopic resection in 07/2021, Dr. Kent was concerned about this.  He requested restaging CT scan that has not been performed at this point, to my knowledge.  As I meet with the patient today, he and his wife are eager to go to [facility], where they state that an echocardiogram has been scheduled by " another specialist.  They very clearly state that his quality of life is paramount and they are not interested in pursuing aggressive cancer-directed therapy or interventions, including no interest in pursuing any chemotherapy or radiation even if recommended.    PAST MEDICAL AND SURGICAL HISTORY:  As noted by the fellow.  Please see her note for details and as summarized above with a severe form of PLS for which he is wheelchair-bound, strokes, the last of which was 08/2021, prostate cancer and prostatectomy highlight pertinent and related morbid conditions.  Again, we believe his ECOG performance status would be estimated as 3.    SOCIAL HISTORY:  Lives in [place] with his wife, who is his caretaker.    FAMILY HISTORY:  Family history of malignancy not discussed in depth today.    EXAMINATION:  Virtual examination as the patient has difficulty and mild dysarthria secondary to PLS, so his wife speaks mostly for him.  ECOG performance status 3.    In-person physical exam could not be performed today in context of a Virtual Visit. Observed physical assessments made today by visualizing the patient by video link:  Vitals - Patient Reported  Systolic (Patient Reported): (!) 145 (reading from yesterday)  Diastolic (Patient Reported): 75  Weight (Patient Reported): 73 kg (161 lb)  Pain Score: Severe Pain (7)  Pain Loc: Shoulder (both)             General/Constitutional: Not acutely ill; some dysarthria presumably due to PLS and/or history of stroke(s).  HEENT: no scleral icterus, not jaundiced.  Respiratory: no labored breathing.  Musculoskeletal: unable to assess range of motion and physical strength - he is wheelchair bound due to PLOS.  Skin: no jaundice, discoloration or other notable lesions.        The rest of a comprehensive physical examination is deferred due to nature of video visits.     Pertinent labs, pathology and imaging were reviewed by me including the pathology, some of which as recorded verbatim  above.    IMPRESSION AND PLAN:  Mr. Nader De La Garza is a 76-year-old gentleman with severe comorbidities and ECOG performance status of 3.  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  Surgery was deemed prohibitive; thus, he did not undergo Whipple surgery for this reason.  No chemotherapy and no radiation was recommended at an outside practice in 09/2021 due to the very early stage aspect of the lesion despite a positive margin and in conjunction with the patient's goals of care and poor performance status.      I, too, do not feel there is any role for chemotherapy or radiation at this time.  There is no definitive evidence of residual or recurrent carcinoma.  If Dr. Kent wishes to pursue this further, I am certainly happy to help in any way possible, but as the patient and his wife make it very clear that they are more interested in goals of care and quality of life rather than pursuing any cancer-directed therapy, I do not see any role for serial radiographic reevaluation now or in the future.  I am happy to help facilitate referral to Palliative Care or Hospice if needed, but he has expressed the inclination, which I agree with, to focus on quality of life, meeting with other specialists as needed within the healthcare system, and pursuing further evaluation with Dr. Kent as he deems fit.    Thank you very much for the kind referral.  I spent about 20 minutes with the patient on 06/24/2022 between 10:20 a.m. and 10:35 a.m.  Additional 45 minutes on the day of the visit was spent reviewing medical records, reviewing the case with the fellow and reviewing pertinent pathology and past imaging.    VIRTUAL VISIT - DETAILS:    I have reviewed the note as documented above. This accurately captures the substance of my conversation with the patient.    Date of call: June 24, 2022   Start of call: 10:20 am  End of call: 10:35 am    Provider location: [facility]  [place] (academic office)  Patient location: Home      Mode of Video Visit: Adryan           I have seen and evaluated this patient with the medical oncology fellow. I have reviewed and edited this fellow's note, and agree with the assessment and plan stated in her note and in my full dictation here. A complete review of systems was assessed, and pertinent positives/negatives are discussed in detail above.    I spent 15 minutes in consultation, including history and discussion with the patient including review of recent lab values and radiologic imaging results.  An additional 30 minutes was spent on the day of the visit, including reviewing pertinent medical notes and documentation from other physicians and APPs who have evaluated and treated this patient, pertinent lab values, pathology and imaging results, personal review of radiologic images, discussing the case with referring providers and/or nurse coordinator team, post-visit orders, and all post-visit documentation.    Lucho Cordoba MD PhD       cc:  Agus Kent MD  Pending sale to [facility] [address]  [id number]    Lucho Cordoba MD        D: 2022   T: 2022   MT:     Name:     ANIL QUINN  MRN:      [id number]        Account:      [id number]   :      1945           Service Date: 2022       Document: [id number]

## 2022-06-27 NOTE — TELEPHONE ENCOUNTER
Faxed.   Patient has virtual visit scheduled for 5 pm - call patient and see if she can go get URINE SAMPLE at any [facility] before lunch today so I can have results for time of visit.

## 2022-06-27 NOTE — PROGRESS NOTES
Clinic Care Coordination Contact  [facility]/Voicemail  Writer received VM from Kelli Pt spouse (CTC).     Clinical Data: Care Coordinator Outreach  Outreach attempted x 2.  Left message on patient's voicemail with call back information and requested return call.    Plan: Care Coordinator will try to reach patient again in 10 business days.    PHILIPP Gonzalez. Public Health  Community Health Worker  [facility] Coordination  [phone]  ----------------------------------------------------------------  Next outreach: 7/11/2022  Preferred contact: Kelli (spouse) [phone]     Enrollment Date: 07/26/21  Last Care Plan Assessment: 2/08/2022

## 2022-06-28 NOTE — LETTER
6/28/2022    Lida Ray MD  [address] [facility] [address]  [facility] [address]    RE: Elier NAVA ALBRECHT Salo       Dear Colleague,     I had the pleasure of seeing Elier Leong in the [facility].  HISTORY:    Elier Leong is a pleasant 76-year-old male accompanied by his wife today.  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  He has suffered a total of 4 CVAs since 2014, unexplained.  He has a history of hyperlipidemia, lower extremity edema, and hypertension.  He was noted on an echocardiogram done with his last stroke a year ago to have a reduced ejection fraction which was a new finding.  He saw Dr. Ochoa in consultation and a conservative approach was recommended.    Today Elier is seeing me for the first time for follow-up in [place], which is more convenient than the [place] office.  He denies chest arm neck shoulder or jaw discomfort, any sense of palpitations, recent strokelike symptoms, or symptoms of claudication.  He has had a lot of problems with peripheral edema in the past and has fairly marked skin changes from this.  He was seen by the lymphedema clinic and has had excellent results with almost complete resolution of his edema.  He puts wraps on 2 or 3 days out of the weeks now.    Elier brought in a list of home blood pressure readings and several in the 160s many in the 150s and 140s and some in the 130s.  His average blood pressure on his home monitor is above 140.  His blood pressure initially in the clinic was 124 but I rechecked it was 142/70 and we ran his machine and found it to be identical.  His home blood pressure readings are therefore probably pretty accurate and his hypertension is not well controlled.    At his last meeting an event monitor was pending, looking for atrial fibrillation as a possible cause of his recurrent unexplained strokes.  That monitor showed some brief episodes of SVT with up to 6 beats at a rate of  149, but no atrial fibrillation.  He had a recent echocardiogram done which showed an improvement of his ejection fraction back to 50 to 55%, which was his baseline.  Distal septal hypokinesis was noted, no other wall motion abnormalities and the reader specifically commented that the ejection fraction had improved.      ASSESSMENT/PLAN:    1.  Cardiomyopathy.  This is improved back to his baseline.  A conservative approach was chosen in the past and I see no reason to change this today.  The patient again prefers to take a conservative approach and in fact had a recent stomach biopsies suggesting possible malignancy and elected to not pursue this further.  He is currently using enalapril.  2.  Hypertension.  Blood pressure at home is high and his machine is accurate.  I suspect he has masked hypertension.  I have added chlorthalidone 25 mg/day and will plan on checking a metabolic profile in 2 weeks.  3.  Peripheral edema.  Well-controlled at the present time and should maintain good control especially with addition of a diuretic for his hypertension.  4.  Hyperlipidemia.  The patient has been intolerant of statins in the past and is using Zetia with surprisingly good lipid control.  5.  Multiple CVAs.  The cause of this is still unclear and I do not think we can convincingly state that he does not have atrial fibrillation.  We talked today about the possibility of an implantable monitor but after careful consideration they elected to pass on that idea.  This is always a consideration for the future if he continues to have problems.    Thank you for inviting me to participate in the care of your patient.  Please don't hesitate to call if I can be of further assistance.  45 minutes were spent today reviewing the chart and other records, interviewing and examining the patient, and documenting our visit.  1 year follow-up will be planned unless he has other cardiology issues in which case he will contact me.    Chart  documentation was completed, in part, with [employer] voice-recognition software. Even though reviewed, some grammatical, spelling, and word errors may remain.       Orders Placed This Encounter   Procedures     Basic metabolic panel     Orders Placed This Encounter   Medications     chlorthalidone (HYGROTON) 25 MG tablet     Sig: Take 1 tablet (25 mg) by mouth daily     Dispense:  90 tablet     Refill:  3     There are no discontinued medications.    10 year ASCVD risk: The ASCVD Risk score (Raimundoandrei ALONZO Jr., et al., 2013) failed to calculate for the following reasons:    The patient has a prior MI or stroke diagnosis    Encounter Diagnosis   Name Primary?     Benign essential hypertension        CURRENT MEDICATIONS:  Current Outpatient Medications   Medication Sig Dispense Refill     acetaminophen (TYLENOL) 650 MG CR tablet Take 650 mg by mouth every 8 hours as needed for mild pain (Shoulder pain)        aspirin (ASA) 325 MG EC tablet Take 1 tablet (325 mg) by mouth daily       baclofen (LIORESAL) intraTHECAL Internal Pump by Intrathecal route continuous prn Pump filled by [facility] stroke [place] [phone]  Pump Model: Syncromed  Medication in pump is Gablofen (brand name)  Last fill: during hospital admission  Next fill:     [place] Alarm Date:   Reservoir Volume: 20 ml  Conc: 2000 mcg/mL  Delivers 234.7 mcg/day  Basal rate:unknown  Battery life: unknown       chlorthalidone (HYGROTON) 25 MG tablet Take 1 tablet (25 mg) by mouth daily 90 tablet 3     enalapril (VASOTEC) 10 MG tablet Take 2 tablets (20 mg) by mouth 2 times daily 360 tablet 3     EPINEPHrine 0.3 MG/0.3ML injection Inject 0.3 mLs (0.3 mg) into the muscle as needed for anaphylaxis 0.3 mL 3     ezetimibe (ZETIA) 10 MG tablet Take 1 tablet (10 mg) by mouth daily 90 tablet 3     HYDROcodone-acetaminophen (NORCO) 5-325 MG tablet TAKE 1 TABLET BY MOUTH TWICE A DAY AS NEEDED FOR PAIN       oxybutynin ER (DITROPAN-XL) 10 MG  "24 hr tablet Take 2 tablets (20 mg) by mouth At Bedtime 180 tablet 3     pantoprazole (PROTONIX) 40 MG EC tablet Take 1 tablet (40 mg) by mouth 2 times daily (Patient taking differently: Take 40 mg by mouth daily) 60 tablet 3       ALLERGIES     Allergies   Allergen Reactions     Bee Venom      Swelling and hives     Penicillins Hives and Swelling     \"HIVES\"; occurred at age 39       PAST MEDICAL HISTORY:  Past Medical History:   Diagnosis Date     Basal cell carcinoma nos     sees derm     Cancer of ampulla of Vater (H)      CVA (cerebral infarction) 06/2014    small vessel right int capsule stroke     Deep venous thrombosis (DVT) of peroneal vein (H) 05/12/2017    right peroneal vein     Essential hypertension, benign      Hearing loss      Hoarseness of voice     assoc with PLS     Lumbar radiculopathy     2017     Primary Lateral Sclerosis 2002    has baclofen pump through Dr. Calvert, [place], sees Dr. Fink, [facility]     Primary osteoarthritis of right shoulder 06/16/2021     Prostate CA (H) 2008    prostatectomy     Vertigo 02/21/2013       PAST SURGICAL HISTORY:  Past Surgical History:   Procedure Laterality Date     ABDOMEN SURGERY  11/12    Hernia     BIOPSY  4/16    Cancerous growth on leg. Removed by MOHs treatment     COLONOSCOPY N/A 8/3/2021    Procedure: COLONOSCOPY;  Surgeon: Luis Antonio Jung MD;  Location: [facility]     COLONOSCOPY N/A 8/3/2021    Procedure: Colonoscopy, With Polypectomy And Biopsy;  Surgeon: Luis Antonio Jung MD;  Location: [facility]     ENDOSCOPIC RETROGRADE CHOLANGIOPANCREATOGRAM N/A 6/17/2021    Procedure: ENDOSCOPIC RETROGRADE CHOLANGIOPANCREATOGRAPHY, BILIARY STENT PLACEMENT;  Surgeon: Sima Galvez MD;  Location:  OR     ENDOSCOPIC RETROGRADE CHOLANGIOPANCREATOGRAM N/A 8/19/2021    Procedure: ENDOSCOPIC RETROGRADE CHOLANGIOPANCREATOGRAPHY;  Surgeon: Agus Kent MD;  Location:  OR     ENDOSCOPIC RETROGRADE CHOLANGIOPANCREATOGRAM N/A 6/9/2022    " Procedure: ENDOSCOPIC RETROGRADE CHOLANGIOPANCREATOGRAPHY, PANCREATIC STENT PLACEMENT AND BIOPSIES;  Surgeon: Agus Kent MD;  Location: [facility]     ENDOSCOPIC RETROGRADE CHOLANGIOPANCREATOGRAM COMPLEX N/A 7/12/2021    Procedure: ENDOSCOPIC RETROGRADE CHOLANGIOPANCREATOGRAPHY WITH AMPULLECTOMY, PANCREATIC DUCT STENT PLACEMENT AND BILIARY STENT PLACEMENT;  Surgeon: Agus Kent MD;  [facility]     ENDOSCOPIC RETROGRADE CHOLANGIOPANCREATOGRAPHY, EXCHANGE TUBE/STENT N/A 7/14/2021    Procedure: ENDOSCOPIC RETROGRADE CHOLANGIOPANCREATOGRAPHY with bile duct stents exchanged, balloon sweep of bile ducts, hemostasis;  Surgeon: Guru Bhavesh Arreola MD;  [facility]     ENDOSCOPIC ULTRASOUND UPPER GASTROINTESTINAL TRACT (GI) N/A 7/12/2021    Procedure: ENDOSCOPIC ULTRASOUND, ESOPHAGOSCOPY / UPPER GASTROINTESTINAL TRACT (GI);  Surgeon: Agus Kent MD;  [facility]     ESOPHAGOSCOPY, GASTROSCOPY, DUODENOSCOPY (EGD), COMBINED N/A 6/17/2021    Procedure: ESOPHAGOGASTRODUODENOSCOPY, WITH ENDOSCOPIC US, fine needle aspiration;  Surgeon: Sima Galvez MD;  Location:  OR     ESOPHAGOSCOPY, GASTROSCOPY, DUODENOSCOPY (EGD), COMBINED N/A 7/14/2021    Procedure: ESOPHAGOGASTRODUODENOSCOPY (EGD);  Surgeon: Guru Bhavesh Arreola MD;  [facility]     ESOPHAGOSCOPY, GASTROSCOPY, DUODENOSCOPY (EGD), COMBINED N/A 8/3/2021    Procedure: Esophagoscopy, gastroscopy, duodenoscopy (EGD), combined;  Surgeon: Luis Antonio Jung MD;  Location: [facility]     HERNIA REPAIR  11/12    Repaired     PROSTATE SURGERY       [facility] NONSPECIFIC PROCEDURE  6/08     prostatectomy      Z NONSPECIFIC PROCEDURE  11/01    colonoscopy     Z NONSPECIFIC PROCEDURE  11/2006    hernia, right side     [facility] NONSPECIFIC PROCEDURE      T + A       FAMILY HISTORY:  Family History   Problem Relation Age of Onset     Heart Disease Mother         CHF     Hypertension Mother      C.A.D. 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 No     Social Determinants of Health     Financial Resource Strain: Low Risk      Difficulty of Paying Living Expenses: Not hard at all   Transportation Needs: No Transportation Needs     Lack of Transportation (Medical): No     Lack of Transportation (Non-Medical): No   Housing Stability: Low Risk      Unable to Pay for Housing in the Last Year: No     Number of Places Lived in the Last Year: 1     Unstable Housing in the Last Year: No       Review of Systems:  Skin:  not assessed bruising;scaling   Eyes:  not assessed glasses  ENT:  not assessed hearing loss  Respiratory:  Negative    Cardiovascular:    Positive for;edema  Gastroenterology: not assessed    Genitourinary:  not assessed incontinence  Musculoskeletal:  not assessed back pain;arthritis  Neurologic:  not assessed    Psychiatric:  not assessed anxiety;depression  Heme/Lymph/Imm:  not assessed    Endocrine:  not assessed thyroid disorder;diabetes    Physical Exam:  Vitals: /78 (BP Location: Right arm, Patient Position: Sitting, Cuff Size: Adult Regular)   Pulse 63   Ht 1.778 m (5' 10\")   Wt 73 " kg (161 lb)   SpO2 96%   BMI 23.10 kg/m      Constitutional:  cooperative, alert and oriented, well developed, well nourished, in no acute distress   Examined in wheelchair    Skin:    venous stasis changes      Head:  normocephalic, no masses or lesions        Eyes:  sclera white;no xanthalasma        ENT:  no pallor or cyanosis        Neck:  carotid pulses are full and equal bilaterally, JVP normal, no carotid bruit        Chest:  normal breath sounds, clear to auscultation, normal A-P diameter, normal symmetry, normal respiratory excursion, no use of accessory muscles        Cardiac: regular rhythm, normal S1/S2, no S3 or S4, apical impulse not displaced, no murmurs, gallops or rubs                  Abdomen:  abdomen soft;BS normoactive        Vascular:       3+ 3+       0   3+ 3+       0        Extremities and Muscular Skeletal:  no edema           Neurological:    dysphasia;ataxia      Psych:  affect appropriate, oriented to time, person and place     Recent Lab Results:  LIPID RESULTS:  Lab Results   Component Value Date    CHOL 147 09/24/2021    CHOL 152 11/17/2020    HDL 65 09/24/2021    HDL 53 11/17/2020    LDL 71 09/24/2021    LDL 91 11/17/2020    TRIG 53 09/24/2021    TRIG 41 11/17/2020    CHOLHDLRATIO 1.9 08/11/2014       LIVER ENZYME RESULTS:  Lab Results   Component Value Date    AST 27 06/09/2022    AST 94 (H) 06/20/2021    ALT 24 06/09/2022     (H) 06/20/2021       CBC RESULTS:  Lab Results   Component Value Date    WBC 7.5 06/09/2022    WBC 10.5 06/19/2021    RBC 4.92 06/09/2022    RBC 4.47 06/19/2021    HGB 13.0 (L) 06/09/2022    HGB 14.2 06/19/2021    HCT 41.4 06/09/2022    HCT 41.9 06/19/2021    MCV 84 06/09/2022    MCV 94 06/19/2021    MCH 26.4 (L) 06/09/2022    MCH 31.8 06/19/2021    MCHC 31.4 (L) 06/09/2022    MCHC 33.9 06/19/2021    RDW 18.6 (H) 06/09/2022    RDW 15.6 (H) 06/19/2021     06/09/2022     06/19/2021       BMP RESULTS:  Lab Results   Component Value Date      06/09/2022     06/20/2021    POTASSIUM 4.1 06/09/2022    POTASSIUM 4.0 06/20/2021    CHLORIDE 103 06/09/2022    CHLORIDE 102 06/20/2021    CO2 29 06/09/2022    CO2 29 06/20/2021    ANIONGAP 4 06/09/2022    ANIONGAP 2 (L) 06/20/2021     (H) 06/09/2022    GLC 98 06/20/2021    BUN 18 06/09/2022    BUN 13 06/20/2021    CR 0.75 06/09/2022    CR 0.65 (L) 06/20/2021    GFRESTIMATED >90 06/09/2022    GFRESTIMATED >90 06/20/2021    GFRESTBLACK >90 06/20/2021    AMOS 9.3 06/09/2022    AMOS 8.5 06/20/2021        A1C RESULTS:  Lab Results   Component Value Date    A1C 5.8 (H) 09/24/2021    A1C 5.8 (H) 11/17/2020       INR RESULTS:  Lab Results   Component Value Date    INR 1.06 06/09/2022    INR 1.06 09/23/2021    INR 0.96 06/17/2021    INR 0.96 06/16/2021         Steven Albert MD, [facility]    CC  Referred Self,    Thank you for allowing me to participate in the care of your patient.      Sincerely,     Steven Albert MD     [facility]

## 2022-06-28 NOTE — PROGRESS NOTES
HISTORY:    Elier Leong is a pleasant 76-year-old male accompanied by his wife today.  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  He has suffered a total of 4 CVAs since 2014, unexplained.  He has a history of hyperlipidemia, lower extremity edema, and hypertension.  He was noted on an echocardiogram done with his last stroke a year ago to have a reduced ejection fraction which was a new finding.  He saw Dr. Ochoa in consultation and a conservative approach was recommended.    Today Elier is seeing me for the first time for follow-up in [place], which is more convenient than the [place] office.  He denies chest arm neck shoulder or jaw discomfort, any sense of palpitations, recent strokelike symptoms, or symptoms of claudication.  He has had a lot of problems with peripheral edema in the past and has fairly marked skin changes from this.  He was seen by the lymphedema clinic and has had excellent results with almost complete resolution of his edema.  He puts wraps on 2 or 3 days out of the weeks now.    Elier brought in a list of home blood pressure readings and several in the 160s many in the 150s and 140s and some in the 130s.  His average blood pressure on his home monitor is above 140.  His blood pressure initially in the clinic was 124 but I rechecked it was 142/70 and we ran his machine and found it to be identical.  His home blood pressure readings are therefore probably pretty accurate and his hypertension is not well controlled.    At his last meeting an event monitor was pending, looking for atrial fibrillation as a possible cause of his recurrent unexplained strokes.  That monitor showed some brief episodes of SVT with up to 6 beats at a rate of 149, but no atrial fibrillation.  He had a recent echocardiogram done which showed an improvement of his ejection fraction back to 50 to 55%, which was his baseline.  Distal septal hypokinesis was noted, no other  wall motion abnormalities and the reader specifically commented that the ejection fraction had improved.      ASSESSMENT/PLAN:    1.  Cardiomyopathy.  This is improved back to his baseline.  A conservative approach was chosen in the past and I see no reason to change this today.  The patient again prefers to take a conservative approach and in fact had a recent stomach biopsies suggesting possible malignancy and elected to not pursue this further.  He is currently using enalapril.  2.  Hypertension.  Blood pressure at home is high and his machine is accurate.  I suspect he has masked hypertension.  I have added chlorthalidone 25 mg/day and will plan on checking a metabolic profile in 2 weeks.  3.  Peripheral edema.  Well-controlled at the present time and should maintain good control especially with addition of a diuretic for his hypertension.  4.  Hyperlipidemia.  The patient has been intolerant of statins in the past and is using Zetia with surprisingly good lipid control.  5.  Multiple CVAs.  The cause of this is still unclear and I do not think we can convincingly state that he does not have atrial fibrillation.  We talked today about the possibility of an implantable monitor but after careful consideration they elected to pass on that idea.  This is always a consideration for the future if he continues to have problems.    Thank you for inviting me to participate in the care of your patient.  Please don't hesitate to call if I can be of further assistance.  45 minutes were spent today reviewing the chart and other records, interviewing and examining the patient, and documenting our visit.  1 year follow-up will be planned unless he has other cardiology issues in which case he will contact me.    Chart documentation was completed, in part, with [employer] voice-recognition software. Even though reviewed, some grammatical, spelling, and word errors may remain.       Orders Placed This Encounter   Procedures     Basic  metabolic panel     Orders Placed This Encounter   Medications     chlorthalidone (HYGROTON) 25 MG tablet     Sig: Take 1 tablet (25 mg) by mouth daily     Dispense:  90 tablet     Refill:  3     There are no discontinued medications.    10 year ASCVD risk: The ASCVD Risk score (Raimundo CLINTON Tyson, et al., 2013) failed to calculate for the following reasons:    The patient has a prior MI or stroke diagnosis    Encounter Diagnosis   Name Primary?     Benign essential hypertension        CURRENT MEDICATIONS:  Current Outpatient Medications   Medication Sig Dispense Refill     acetaminophen (TYLENOL) 650 MG CR tablet Take 650 mg by mouth every 8 hours as needed for mild pain (Shoulder pain)        aspirin (ASA) 325 MG EC tablet Take 1 tablet (325 mg) by mouth daily       baclofen (LIORESAL) intraTHECAL Internal Pump by Intrathecal route continuous prn Pump filled by [facility] stroke [place] [phone]  Pump Model: Syncromed  Medication in pump is Gablofen (brand name)  Last fill: during hospital admission  Next fill:     Low [place] Alarm Date:   Reservoir Volume: 20 ml  Conc: 2000 mcg/mL  Delivers 234.7 mcg/day  Basal rate:unknown  Battery life: unknown       chlorthalidone (HYGROTON) 25 MG tablet Take 1 tablet (25 mg) by mouth daily 90 tablet 3     enalapril (VASOTEC) 10 MG tablet Take 2 tablets (20 mg) by mouth 2 times daily 360 tablet 3     EPINEPHrine 0.3 MG/0.3ML injection Inject 0.3 mLs (0.3 mg) into the muscle as needed for anaphylaxis 0.3 mL 3     ezetimibe (ZETIA) 10 MG tablet Take 1 tablet (10 mg) by mouth daily 90 tablet 3     HYDROcodone-acetaminophen (NORCO) 5-325 MG tablet TAKE 1 TABLET BY MOUTH TWICE A DAY AS NEEDED FOR PAIN       oxybutynin ER (DITROPAN-XL) 10 MG 24 hr tablet Take 2 tablets (20 mg) by mouth At Bedtime 180 tablet 3     pantoprazole (PROTONIX) 40 MG EC tablet Take 1 tablet (40 mg) by mouth 2 times daily (Patient taking differently: Take 40 mg by mouth daily)  "60 tablet 3       ALLERGIES     Allergies   Allergen Reactions     Bee Venom      Swelling and hives     Penicillins Hives and Swelling     \"HIVES\"; occurred at age 39       PAST MEDICAL HISTORY:  Past Medical History:   Diagnosis Date     Basal cell carcinoma nos     sees derm     Cancer of ampulla of Vater (H)      CVA (cerebral infarction) 06/2014    small vessel right int capsule stroke     Deep venous thrombosis (DVT) of peroneal vein (H) 05/12/2017    right peroneal vein     Essential hypertension, benign      Hearing loss      Hoarseness of voice     assoc with PLS     Lumbar radiculopathy     2017     Primary Lateral Sclerosis 2002    has baclofen pump through Dr. Calvert, [place], sees Dr. Fink, [facility]     Primary osteoarthritis of right shoulder 06/16/2021     Prostate CA (H) 2008    prostatectomy     Vertigo 02/21/2013       PAST SURGICAL HISTORY:  Past Surgical History:   Procedure Laterality Date     ABDOMEN SURGERY  11/12    Hernia     BIOPSY  4/16    Cancerous growth on leg. Removed by MOHs treatment     COLONOSCOPY N/A 8/3/2021    Procedure: COLONOSCOPY;  Surgeon: Luis Antonio Jung MD;  Location:  GI     COLONOSCOPY N/A 8/3/2021    Procedure: Colonoscopy, With Polypectomy And Biopsy;  Surgeon: Luis Antonio Jung MD;  [facility]: [facility]     ENDOSCOPIC RETROGRADE CHOLANGIOPANCREATOGRAM N/A 6/17/2021    Procedure: ENDOSCOPIC RETROGRADE CHOLANGIOPANCREATOGRAPHY, BILIARY STENT PLACEMENT;  Surgeon: Sima Galvez MD;  Location:  OR     ENDOSCOPIC RETROGRADE CHOLANGIOPANCREATOGRAM N/A 8/19/2021    Procedure: ENDOSCOPIC RETROGRADE CHOLANGIOPANCREATOGRAPHY;  Surgeon: Agus Kent MD;  Location:  OR     ENDOSCOPIC RETROGRADE CHOLANGIOPANCREATOGRAM N/A 6/9/2022    Procedure: ENDOSCOPIC RETROGRADE CHOLANGIOPANCREATOGRAPHY, PANCREATIC STENT PLACEMENT AND BIOPSIES;  Surgeon: Agus Kent MD;  Location:  OR     ENDOSCOPIC RETROGRADE CHOLANGIOPANCREATOGRAM COMPLEX N/A 7/12/2021 "    Procedure: ENDOSCOPIC RETROGRADE CHOLANGIOPANCREATOGRAPHY WITH AMPULLECTOMY, PANCREATIC DUCT STENT PLACEMENT AND BILIARY STENT PLACEMENT;  Surgeon: Agus Kent MD;  [facility]     ENDOSCOPIC RETROGRADE CHOLANGIOPANCREATOGRAPHY, EXCHANGE TUBE/STENT N/A 7/14/2021    Procedure: ENDOSCOPIC RETROGRADE CHOLANGIOPANCREATOGRAPHY with bile duct stents exchanged, balloon sweep of bile ducts, hemostasis;  Surgeon: Guru Bhavesh Arreola MD;  [facility]     ENDOSCOPIC ULTRASOUND UPPER GASTROINTESTINAL TRACT (GI) N/A 7/12/2021    Procedure: ENDOSCOPIC ULTRASOUND, ESOPHAGOSCOPY / UPPER GASTROINTESTINAL TRACT (GI);  Surgeon: Agus Kent MD;  [facility]     ESOPHAGOSCOPY, GASTROSCOPY, DUODENOSCOPY (EGD), COMBINED N/A 6/17/2021    Procedure: ESOPHAGOGASTRODUODENOSCOPY, WITH ENDOSCOPIC US, fine needle aspiration;  Surgeon: Sima Galvez MD;  Location: [facility] OR     ESOPHAGOSCOPY, GASTROSCOPY, DUODENOSCOPY (EGD), COMBINED N/A 7/14/2021    Procedure: ESOPHAGOGASTRODUODENOSCOPY (EGD);  Surgeon: Guru Bhavesh Arreola MD;  [facility]     ESOPHAGOSCOPY, GASTROSCOPY, DUODENOSCOPY (EGD), COMBINED N/A 8/3/2021    Procedure: Esophagoscopy, gastroscopy, duodenoscopy (EGD), combined;  Surgeon: Luis Antonio Jung MD;  [facility]     HERNIA REPAIR  11/12    Repaired     PROSTATE SURGERY       [facility] NONSPECIFIC PROCEDURE  6/08     prostatectomy      ZC NONSPECIFIC PROCEDURE  11/01    colonoscopy     ZZC NONSPECIFIC PROCEDURE  11/2006    hernia, right side     ZZC NONSPECIFIC PROCEDURE      T + A       FAMILY HISTORY:  Family History   Problem Relation Age of Onset     Heart Disease Mother         CHF     Hypertension Mother      C.A.D. 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 No     Social Determinants of Health     Financial Resource Strain: Low Risk      Difficulty of Paying Living Expenses: Not hard at all   Transportation Needs: No Transportation Needs     Lack of Transportation (Medical): No     Lack of Transportation (Non-Medical): No   Housing Stability: Low Risk      Unable to Pay for Housing in the Last Year: No     Number of Places Lived in the Last Year: 1     Unstable Housing in the Last Year: No       Review of Systems:  Skin:  not assessed bruising;scaling   Eyes:  not assessed glasses  ENT:  not assessed hearing loss  Respiratory:  Negative    Cardiovascular:    Positive for;edema  Gastroenterology: not assessed    Genitourinary:  not assessed incontinence  Musculoskeletal:  not assessed back pain;arthritis  Neurologic:  not assessed    Psychiatric:  not assessed anxiety;depression  Heme/Lymph/Imm:  not assessed    Endocrine:  not assessed thyroid disorder;diabetes    Physical Exam:  Vitals: /78 (BP Location: Right arm, Patient Position: Sitting, Cuff Size: Adult Regular)   Pulse 63   Ht 1.778 m (5' 10\")   Wt 73 kg (161 lb)   SpO2 96%   BMI 23.10 kg/m      Constitutional:  cooperative, alert and oriented, well developed, well nourished, in no acute distress   Examined in wheelchair    Skin:    venous stasis changes  "     Head:  normocephalic, no masses or lesions        Eyes:  sclera white;no xanthalasma        ENT:  no pallor or cyanosis        Neck:  carotid pulses are full and equal bilaterally, JVP normal, no carotid bruit        Chest:  normal breath sounds, clear to auscultation, normal A-P diameter, normal symmetry, normal respiratory excursion, no use of accessory muscles        Cardiac: regular rhythm, normal S1/S2, no S3 or S4, apical impulse not displaced, no murmurs, gallops or rubs                  Abdomen:  abdomen soft;BS normoactive        Vascular:       3+ 3+       0   3+ 3+       0        Extremities and Muscular Skeletal:  no edema           Neurological:    dysphasia;ataxia      Psych:  affect appropriate, oriented to time, person and place     Recent Lab Results:  LIPID RESULTS:  Lab Results   Component Value Date    CHOL 147 09/24/2021    CHOL 152 11/17/2020    HDL 65 09/24/2021    HDL 53 11/17/2020    LDL 71 09/24/2021    LDL 91 11/17/2020    TRIG 53 09/24/2021    TRIG 41 11/17/2020    CHOLHDLRATIO 1.9 08/11/2014       LIVER ENZYME RESULTS:  Lab Results   Component Value Date    AST 27 06/09/2022    AST 94 (H) 06/20/2021    ALT 24 06/09/2022     (H) 06/20/2021       CBC RESULTS:  Lab Results   Component Value Date    WBC 7.5 06/09/2022    WBC 10.5 06/19/2021    RBC 4.92 06/09/2022    RBC 4.47 06/19/2021    HGB 13.0 (L) 06/09/2022    HGB 14.2 06/19/2021    HCT 41.4 06/09/2022    HCT 41.9 06/19/2021    MCV 84 06/09/2022    MCV 94 06/19/2021    MCH 26.4 (L) 06/09/2022    MCH 31.8 06/19/2021    MCHC 31.4 (L) 06/09/2022    MCHC 33.9 06/19/2021    RDW 18.6 (H) 06/09/2022    RDW 15.6 (H) 06/19/2021     06/09/2022     06/19/2021       BMP RESULTS:  Lab Results   Component Value Date     06/09/2022     06/20/2021    POTASSIUM 4.1 06/09/2022    POTASSIUM 4.0 06/20/2021    CHLORIDE 103 06/09/2022    CHLORIDE 102 06/20/2021    CO2 29 06/09/2022    CO2 29 06/20/2021    ANIONGAP 4  06/09/2022    ANIONGAP 2 (L) 06/20/2021     (H) 06/09/2022    GLC 98 06/20/2021    BUN 18 06/09/2022    BUN 13 06/20/2021    CR 0.75 06/09/2022    CR 0.65 (L) 06/20/2021    GFRESTIMATED >90 06/09/2022    GFRESTIMATED >90 06/20/2021    GFRESTBLACK >90 06/20/2021    AMOS 9.3 06/09/2022    AMOS 8.5 06/20/2021        A1C RESULTS:  Lab Results   Component Value Date    A1C 5.8 (H) 09/24/2021    A1C 5.8 (H) 11/17/2020       INR RESULTS:  Lab Results   Component Value Date    INR 1.06 06/09/2022    INR 1.06 09/23/2021    INR 0.96 06/17/2021    INR 0.96 06/16/2021         Steven Albert MD, FACC    CC  Referred Self, MD  No address on file

## 2022-06-28 NOTE — PROGRESS NOTES
Clinic Care Coordination Contact  [facility]/Voicemail       Clinical Data: Care Coordinator Outreach  Outreach attempted x 3.  Left message on Pts voicemail with call back information and requested return call.    Plan: Care Coordinator will try to reach patient again in 10 business days.    PHILIPP Gonzalez. Public Health  Community Health Worker  [facility] Care Coordination  [phone]  ----------------------------------------------------------------  Next outreach: 7/12/2022  Preferred contact: Kelli (spouse) [phone]     Enrollment Date: 07/26/21  Last Care Plan Assessment: 2/08/2022

## 2022-06-29 NOTE — TELEPHONE ENCOUNTER
Call to oBnnie and left detailed message.     Verbal order given to approve visits until certification received for MD signature.

## 2022-06-29 NOTE — TELEPHONE ENCOUNTER
[facility]    Physical Therapy ongoing   1x8wk    PLS    Best number to call back [phone]

## 2022-06-30 NOTE — PROGRESS NOTES
CLINICAL NUTRITION SERVICES- ONCOLOGY DISTRESS SCREENING     Identified Concern and Score From Distress Screening: This patient has scored >= 6 on Nutrition question 1 and/or 2 on the Oncology Distress Screening questionaire. Nutritionist Referral recommended. See Onc Distress Screening Flowsheet     Date of Distress Screenin/24     Findings: Spoke with pt's wife who answered the phone. She reports that Elier's appetite is fine currently, he's eating 3 meals a day. She cooks the meals and cuts up his food into bite size pieces.      Intervention: Discussed current PO intake and continuing to monitor appetite and weight.      Follow-up Required: CHARMAINE Ruiz RD, LD  [phone]

## 2022-06-30 NOTE — TELEPHONE ENCOUNTER
"MEDICAL ONCOLOGY ATTENDING PHYSICIAN TELEPHONE NOTE  Thursday June 30, 2022  11:20 AM    I called the patient and his wife's cell phone and left a voicemail between 11:20-11:25 am today, in response to his [employer] Message (copied below). Please see my full note from our encounter and consultation held six days ago, Friday 6/24/22. On the voicemail I reiterated as I did during that video consultation that I agree with and support the patient's goals of care 100%, and I again advised against any further radiologic monitoring in favor of supportive care. Mr De La Garza's message appears to be in response to a request from the GI team to nonetheless obtain another CT scan; I messaged Dr. Kent and Nathalie Levine RN and advised that they reach out directly to the patient to address his remaining concerns regarding their plan.     Lucho Cordoba MD PHD        Patient's [employer] message, forwarded to me 6/30/22:  Bharathi Zelaya, please pass this reply to Dr. Kent & Dr. Cordoba.  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 Kent and another scope was done last fall with no indication of cancer cells.  The scope and biopsy taken by Dr. Kent in June, 2022, was inconclusive and only suggested there  may be cancer cells. My wife & I have discussed having a Cat-Scan. At this time I am choosing not to do a Cat-Scan.  My body is very compromised:  PLS for 20 years, Baclofen pump in lower right side of abdomen area, I am in a power chair most of the time with very limited walking, 4 mini strokes and I am 76.  Any cancer treatment with chemo and radiation would be very hard on my body,  producing even more stress which affects my daily living.     After my virtual meeting with the \"Fellow\" of Dr. Prabhakar, I thought the decision to do nothing at this time was agreed upon.  We are looking at the quality of my life.  At this time, I am choosing to do nothing and just monitor the situation.     Nader De La Garza    "

## 2022-06-30 NOTE — TELEPHONE ENCOUNTER
Fax received from [employer] - PT POC 06/29/22 for review and signature.  Put in Dr. Ray's in basket.

## 2022-07-08 NOTE — TELEPHONE ENCOUNTER
Have him cut his chlorthalidone in half and use it as early in the AM as possible to avoid nocturia.  He should still have labs, nonfasting.

## 2022-07-11 NOTE — TELEPHONE ENCOUNTER
Per my chart pt self discontinued Chlorthalidone. Med list updated. Pt to call wenceslaol he have alterations in BP.   PAMELLA Woo RN, BSN.

## 2022-07-19 NOTE — TELEPHONE ENCOUNTER
Approved for disenrollment. Thank you,     Tonya Hernández, RN Care Coordinator  [facility]  Email: [email or web address]  Phone: [phone]

## 2022-07-19 NOTE — PROGRESS NOTES
Clinic Care Coordination Contact  [facility]/Voicemail       Clinical Data: Care Coordinator Outreach  Outreach attempted x 4.  Left message on patient's voicemail with call back information and requested return call.    Plan: Upon review by lead CC, Care Coordinator will send disenrollment letter with care coordinator contact information via [employer]. If deemed appropriate, Care Coordinator will do no further outreaches at this time.    Cesia Hand B.S. Public Health  Community Health Worker  [facility] Care Coordination  [phone]  ----------------------------------------------------------------  Next outreach:   Preferred contact: Kelli (spouse) [phone]     Enrollment Date: 07/26/21  Last Care Plan Assessment: 2/08/2022

## 2022-07-20 NOTE — PROGRESS NOTES
"Oncology Distress Screening Follow-up  Clinical Social Work  [facility]    Identified Concern and Score From Distress Screenin. How concerned are you about your ability to eat?  8 Abnormal        2. How concerned are you about unintended weight loss or your current weight?  0       3. How concerned are you about feeling depressed or very sad?  0       4. How concerned are you about feeling anxious or very scared?  0       5. Do you struggle with the loss of meaning and bailey in your life?  Not at all       6. How concerned are you about work and home life issues that may be affected by your cancer?  10 Abnormal        7. How concerned are you about knowing what resources are available to help you?  8 Abnormal        8. Do you currently have what you would describe as Adventism or spiritual struggles?             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 He is followed by Dr. Cordoba at the [facility]. Nader had a provider visit on  with Dr. Cordoba where he expressed concern re: how his cancer will impact his home/work life and resource support that may be available.     Intervention: SW attempted to reach out to Nader via phone today and was informed by his spouse, Kelli, that he was unavailable. Kelli shares that he is doing \"really well\". She reports that they have opted to do othing in regards to his cancer and at this time he is feeling good. Kelli denies any resource/support needs at this time but will reach out to the clinic if any needs arise.     Education Provided: Oncology Clinic SW role/contact info    Follow-up Required: SW will remain available for ongoing resource/support needs.       ISIDORO Brown, [facility]  Adult Oncology Clinic  Phone: [phone]    "

## 2022-07-20 NOTE — TELEPHONE ENCOUNTER
Fax received from [facility] for Home Health Certification and Plan of Care.    Form placed in provider's in box.    Cristy sAhley CMA  [facility]  [phone]

## 2022-07-20 NOTE — PROGRESS NOTES
Clinic Care Coordination Contact  Care Team Conversations    7-20, CHW:    Writer received a VM from Kelli. Writer attempted to reach her, however UTC. Left VM. Writer proceeded to send disenrollment letter and remove CC from the Care Team.     PHILIPP Gonzalez. [facility]  Community Health Worker  [facility] [place] [facility]  Clinic Care Coordination  [phone]

## 2022-07-22 NOTE — ED TRIAGE NOTES
"A&O x4.  ABC's intact.      Pt arrives with c/o left facial swelling around 9 am and loss of control of bladder this morning. Per report history of \"mini strokes\".      "

## 2022-07-22 NOTE — ED PROVIDER NOTES
"  History   Chief Complaint:  Facial Swelling       HPI   Elier Leong is a 76 year old male who presents with facial swelling. Per the patient's wife, at 0900 she noticed swelling on the left side of his face and worsening of his speech from baseline defined as \"more hoarse.\" He was fine last night. He denies any pain, vomiting, fever, new rash or difficulty swallowing. At baseline his left side is weaker than his right. Patient reports he has a history of 4 mini strokes.     Review of Systems   Constitutional: Negative for fever.   HENT: Negative for trouble swallowing.    Gastrointestinal: Negative for vomiting.   Skin: Negative for rash.        Positive for facial swelling.   Neurological: Positive for speech difficulty.   All other systems reviewed and are negative.    Allergies:  Penicillins    Medications:  Baclofen  Enalapril  Epinephrine  Ezetimibe  Norco  Oxybutynin  Pantoprazole    Past Medical History:     Benign Essential Hypertension  Primary Lateral Sclerosis  Prostate CA  Hyperlipidemia  Cerebral Infarction  Muscle Spasticity  GERD  Cerebral Atherosclerosis  Primary Osteoarthritis of Right Shoulder  Cancer of Ampulla of Vater  CVA  Anemia   Actinic Keratoses   DVT    Past Surgical History:    Herniorrhaphy   Ampullectomy  Pancreatic Duct Stent Placement  Prostatectomy  T&A    Family History:    CHF, Hypertension - Mother    Social History:  Patient is accompanied by his wife.  Patient arrived via a private vehicle.     Physical Exam     Patient Vitals for the past 24 hrs:   BP Temp Temp src Pulse Resp SpO2   07/22/22 1150 (!) 164/84 -- -- 66 -- 97 %   07/22/22 1130 -- -- -- 56 9 96 %   07/22/22 1115 -- -- -- 62 18 96 %   07/22/22 1110 -- -- -- 63 15 --   07/22/22 1100 (!) 164/96 -- -- 78 -- --   07/22/22 1022 (!) 141/110 98.3  F (36.8  C) Temporal 67 18 97 %       Physical Exam  HENT:      Head:           HEENT:    Oropharynx is moist, without lesions or trismus.     No tenderness to the " dentition     No focal gingival edema or fluctuance  Eyes:    Conjunctiva normal  Neck:    Supple, no meningismus.     CV:     Regular rate and rhythm.      No murmurs, rubs or gallops.       2+ radial pulses bilateral.    PULM:    Clear to auscultation bilateral.       No respiratory distress.      Good air exchange.     No rales or wheezing.     No stridor.  ABD:    Soft, non-tender, non-distended.       No pulsatile masses.       No rebound, guarding or rigidity.  MSK:     No gross deformity to all four extremities.   LYMPH:   No cervical lymphadenopathy.  NEURO:   Alert & O x 3     CN II-XII intact     + Dysarthria (chronic)     Finger to nose within normal limits.  No pronator drift.       Strength is globally depressed yet symmetric in all 4 extremities.      No tremor.  Skin:    Warm, dry and intact.    Psych:    Mood is good and affect is appropriate.        Emergency Department Course     Imaging:  Soft tissue neck CT w contrast   Final Result   IMPRESSION: There is mild soft tissue swelling and subcutaneous fat   stranding along the left jawline consistent with cellulitis. There is   no evidence for underlying fluid collection or abscess. Furthermore,   there are no fluid collections or abscesses anywhere in the neck. The   salivary glands are unremarkable. There is no evidence for mucosal   space lesion. The thyroid gland is unremarkable. The lung apices are   clear. There is no sinusitis or mastoiditis. Contents of the base of   the skull are within normal limits. There is no cervical   lymphadenopathy.         Radiation dose for this scan was reduced using automated exposure   control, adjustment of the mA and/or kV according to patient size, or   iterative reconstruction technique      AILIN LÓPEZ MD            SYSTEM ID:  [id number]        Report per radiology    Laboratory:  Labs Ordered and Resulted from Time of ED Arrival to Time of ED Departure   BASIC METABOLIC PANEL - Abnormal       Result  Value    Sodium 131 (*)     Potassium 4.4      Chloride 98      Carbon Dioxide (CO2) 29      Anion Gap 4      Urea Nitrogen 15      Creatinine 0.64 (*)     Calcium 9.3      Glucose 100 (*)     GFR Estimate >90     CBC WITH PLATELETS AND DIFFERENTIAL - Abnormal    WBC Count 7.5      RBC Count 5.19      Hemoglobin 13.8      Hematocrit 43.4      MCV 84      MCH 26.6      MCHC 31.8      RDW 17.2 (*)     Platelet Count 276      % Neutrophils 71      % Lymphocytes 18      % Monocytes 9      % Eosinophils 1      % Basophils 0      % Immature Granulocytes 1      NRBCs per 100 WBC 0      Absolute Neutrophils 5.3      Absolute Lymphocytes 1.3      Absolute Monocytes 0.6      Absolute Eosinophils 0.1      Absolute Basophils 0.0      Absolute Immature Granulocytes 0.0      Absolute NRBCs 0.0     ISTAT CREATININE POCT - Normal    Creatinine POCT 0.7      GFR, ESTIMATED POCT >60     ISTAT CREATININE POCT      Emergency Department Course:    Reviewed:  I reviewed nursing notes, vitals and past medical history    Assessments:  1101 I obtained history and examined the patient as noted above.   1228 I rechecked the patient and explained findings.     Interventions:   clindamycin (CLEOCIN) capsule 300 mg    Disposition:  The patient was discharged to home.     Impression & Plan     Medical Decision Makin-year-old male presents to the ED with soft tissue swelling near the left mandible.  There is no evidence of dental abscess, Johann's angina or concerns for epiglottitis.  Wife was concerned that this may represent under CVA or TIA.  His history and examination is not consistent with intracranial event.  He is at his neurologic baseline and has clear soft tissue swelling as a source of his symptoms.  CT scan of the soft tissue revealed soft tissue infection alone without abscess or deep space infection.  Due to allergy profile, patient initiated on clindamycin.  He will be discharged home with continuation of clindamycin and  close follow-up with his primary care physician and dentist.  Return to ED for any worsening symptoms.    Diagnosis:    ICD-10-CM    1. Soft tissue infection  L08.9        Discharge Medications:  New Prescriptions    CLINDAMYCIN (CLEOCIN) 300 MG CAPSULE    Take 1 capsule (300 mg) by mouth 4 times daily for 10 days       Scribe Disclosure:  AYDEN, Brennen hZu, am serving as a scribe at 10:56 AM on 7/22/2022 to document services personally performed by Mario Alberto Rogers MD based on my observations and the provider's statements to me.          Mario Alberto Rogers MD  07/22/22 [id number]

## 2022-07-22 NOTE — DISCHARGE INSTRUCTIONS
Please use a probiotic once daily while on antibiotics to prevent antibiotic associated diarrhea.    Discharge Instructions  Cellulitis    Cellulitis is an infection of the skin that occurs when bacteria enter the skin.   Symptoms are generally redness, swelling, warmth and pain.  Your infection appeared to be appropriate to treat at home with antibiotics.  However, sometimes your infection may be worse than it seemed at first, or may worsen with time. If you have new or worse symptoms, you may need to be seen again in the Emergency Department or by your primary provider.    Generally, every Emergency Department visit should have a follow-up clinic visit with either a primary or a specialty clinic/provider. Please follow-up as instructed by your emergency provider today.    Return to the Emergency Department if:  The redness, pain, or swelling gets a lot worse.  If the red area was marked, return if it is red significantly beyond the marked area.  You are unable to get your antibiotics, or are vomiting (throwing up) these pills, or you cannot take them.  You are feeling more ill, weak or lightheaded.  You start to run a new fever (temperature >101 F).  Anything else about the infection worries or concerns you.  Treatment:  Start your antibiotics right away, and take them as prescribed. Be sure to finish the whole prescription, even if you are better.  Apply a heating pad, warm packs, or warm water soaks to the infected area for 15 minutes at a time, at least 3 times a day. Do not use a heating pad on your feet or legs if you have diabetes. Do not sleep with a heating pad on, since this can cause burns or skin injury.  Rest your injured area for at least 1-2 days. After that you may start using your extremity again as long as there is not too much pain.   Raise the injured area above the level of your heart as much as possible in the first 1-2 days.  Tylenol  (acetaminophen), Motrin  (ibuprofen), or Advil   (ibuprofen) may help may help reduce pain and fever and may help you feel more comfortable. Be sure to read and follow the package directions, and ask your provider if you have questions.    If you were given a prescription for medicine here today, be sure to read all of the information (including the package insert) that comes with your prescription.  This will include important information about the medicine, its side effects, and any warnings that you need to know about.  The pharmacist who fills the prescription can provide more information and answer questions you may have about the medicine.  If you have questions or concerns that the pharmacist cannot address, please call or return to the Emergency Department.     Remember that you can always come back to the Emergency Department if you are not able to see your regular provider in the amount of time listed above, if you get any new symptoms, or if there is anything that worries you.

## 2022-08-26 NOTE — TELEPHONE ENCOUNTER
Bonnie from [employer] ([phone]) calls for orders.  Extend PT 1xwk 9wks,  Ok to leave a detailed message.

## 2022-09-01 NOTE — LETTER
2022       RE: Elier Leong  [address] [facility] [address]     Dear Colleague,    Thank you for referring your patient, Elier Leong, to the [facility]UR[facility] [place] [facility]. Please see a copy of my visit note below.    Service Date: 2022    Lida Ray MD  [facility]  [address], [address]  [place] [id number]    Bartolo Calvert MD  [facility] Stroke [place] [id number] [place] [id number]    RE:  Elier De La Garza  MRN:  [id number]  :  1945    Dear Doctors:    I met with Nader De La Garza and wife, Kelli, today in a followup visit at the [facility], where he has been seen for management of primary lateral sclerosis.  We addressed several issues today.  Nader's speech has become somewhat more difficult to understand.  Our augmentative  met with him and reviewed his AAC device and software.  She will also reach out to her colleague, Brigida Burch, to speak to Nader about the full range of options available currently, as the software they now may be less sophisticated than some other options.  Regardless, Nader and Kelli feel as though it would be a good start to use what they have.  Nader has a new power wheelchair, which is suiting him well.  Nader indicates that he is walking about 30 feet daily, largely to remain active.  I encouraged him to continue to do this as long as he can do it safely.  He is meeting with his physical therapist tomorrow, and I encouraged him to specifically discuss transfer methods with her.  Nader and Kelli have taken advantage of the respite program from the [employer], which they find helpful.  Nader denies symptoms of aspiration.  He has lost a few pounds since he was seen last and is struggling with constipation.  Our dietitian met with them to discuss  both, but we also encouraged them to discuss bowel regimen with Dr. Ray as well.  Nader's mood is good and he remains upbeat, as is his nature.  We discussed the fact that he has some pseudobulbar affect, but he chooses not to take medication for this at the current time.    PHYSICAL EXAMINATION:  On examination, Nader demonstrates acceptable tone.  He is not strikingly spastic.  Strength is full throughout.  There is no pain with passive range-of-motion exercises of the limbs. I personally reviewed and interpreted his pulmonary function studies and they are stable.    Nader also met with other members of our multidisciplinary care team today.  He will return in 6 months or sooner if needed.      Sincerely,    Gary Tejada MD        D: 2022   T: 2022   MT: ayden    Name:     ANIL QUINN  MRN:      [id number]        Account:      [id number]   :      1945           Service Date: 2022       Document: [id number]

## 2022-09-01 NOTE — PROGRESS NOTES
CLINICAL NUTRITION SERVICES - REASSESSMENT NOTE     EVALUATION OF PREVIOUS PLAN OF CARE:   Referring Physician: Dr. Tejada  Current diet: Regular diet  Current appetite/intake: Good appetite, intake of 3 meals per day  PEG Tube: N/A    Monitoring from previous assessment:   -Food intake -Wife reports that she makes Nader's meals and cuts up most of his food in small bite size peices, just because it is easier for patient to consume. She states that he eats % of his meals, but she did report that she gives him the amount of food she knows he will eat, not necessarily the amount he may need.   -Fluid/beverage intake - Pt and spouse note Nader does not drink as much fluid as he should.  -Liquid meal replacement or supplement - Occasionally drinks an oral nutrition supplement.    -Weight trends - 9# (6%) wt loss over past 3 months.   Wt Readings from Last 10 Encounters:   09/01/22 68.9 kg (152 lb)   06/28/22 73 kg (161 lb)   06/09/22 69.8 kg (153 lb 14.1 oz)   05/14/22 77.1 kg (170 lb)   11/11/21 70.3 kg (155 lb)   10/20/21 70.3 kg (154 lb 15.7 oz)   10/07/21 70.3 kg (155 lb)   09/30/21 70.3 kg (155 lb)   09/24/21 69.9 kg (154 lb)   09/02/21 70.3 kg (155 lb)     Previous Goals:   1.  Aim for 5-6 small frequent meals  2.  Aim for 2100kcal and 75g protein/day  3. Weight maintenance   Evaluation: Not met     Previous Nutrition Diagnosis:   No nutrition diagnosis identified at this time   Evaluation: Updated below    NEW FINDINGS:   Pt and spouse note no changes in intake, but pt continues to lose weight.      DW: 69 kg      ASSESSED NUTRITION NEEDS:  Estimated Energy Needs: 7880-3624 kcals (30-35 Kcal/Kg)  Justification: maintenance with PLS  Estimated Protein Needs: 83 grams protein (1.2 g pro/Kg)  Justification: maintenance  Estimated Fluid Needs: 1725  mL   Justification: maintenance    CURRENT NUTRITION DIAGNOSIS   Inadequate oral intake related to increased needs associated with ALS as evidenced by 9# (6%) wt loss  over past 3 months.     INTERVENTIONS   Recommendations / Nutrition Prescription   1. Continue Regular diet  2. Add one Oral nutrition supplement per day     Nutrition Education     Provided written & verbal education on:   - Ways to maximize kcal and protein intake. Discussed calorie and protein needs for maintenance of weight and nutrition status.  Advised pt to aim for at least 2070 kcal and 83 g protein via 5-6 small frequent meals.  Discussed that as ALS progresses, eating may become more difficult and discussed ways to cope with this.     - Discussed Miralax regimen to better manage stoolin/2 capful of Miralax each day until bowel movement, after BM, 1/2 capful of Miralax every other day.    - ONS (Ensure Enlive, Ensure Plus/Boost Plus, CIB, Benecalorie, Scandi shake etc). Suggested ways to incorporate these supplements to avoid flavor fatigue. Encouraged pt to start consuming 1-2 ONS daily.       Provided pt with corresponding education materials/handouts on:  Six Small Meals a Day, Tips to Increase the Calories in Your Diet, Tips to Increase the Protein in Your Diet and Protein Sources.      Pt verbalize understanding of materials provided during consult.   Patient Understanding: Excellent  Expected Compliance: Excellent     Implementation  Composition of Meals and Snacks and Medical Food Supplement     Goals   1.  Aim for 5-6 small frequent meals  2.  Aim for 2070 kcal and 83 g protein/day  3. Weight maintenance     Follow up/Monitoring:   Progress towards goals will be monitored and evaluated per protocol and Practice Guidelines    Jenny Yost RDN, LD

## 2022-09-02 NOTE — PROGRESS NOTES
Elier Leong was seen for allied health care provider visit for introduction of self and SLP services in relationship to assistive technology. Pt and wife currently have an Ipad and today SLP demonstrated text to speech gareth to assist pt with communication of basic wants and needs. Pt was also briefly introduced to further options for assistive technology through [employer] or [employer]. Pt and wife asked questions and demonstrate comprehension of information. All questions were answered within SLP scope of practice. At this time, recommend a more comprehensive assessment for AAC device. Pt and wife feel comfortable with the device and gareth that they currently have for communication at this time, but would also like to explore other options that my benefit pts communication in the setting of ALS.       Jennifer Garcia MS, CCC-SLP  Speech-Language Pathology  [facility]  Department of Otolaryngology/D&T - 4th floor  Phone: [phone]  Email: [email or web address]

## 2022-09-02 NOTE — PROGRESS NOTES
Service Date: 2022    Lida Ray MD  [facility]  [address], [address]  [place] [address]    Bartolo Calvert MD  [facility] Stroke [facility]  [address] [id number]    RE:  Elier De La Garza  MRN:  [id number]  :  1945    Dear Doctors:    I met with Nader De La Garza and wife, Kelli, today in a followup visit at the [facility], where he has been seen for management of primary lateral sclerosis.  We addressed several issues today.  Nader's speech has become somewhat more difficult to understand.  Our augmentative  met with him and reviewed his AAC device and software.  She will also reach out to her colleague, Brigida Burch, to speak to Nader about the full range of options available currently, as the software they now may be less sophisticated than some other options.  Regardless, Nader and Kelli feel as though it would be a good start to use what they have.  Nader has a new power wheelchair, which is suiting him well.  Nader indicates that he is walking about 30 feet daily, largely to remain active.  I encouraged him to continue to do this as long as he can do it safely.  He is meeting with his physical therapist tomorrow, and I encouraged him to specifically discuss transfer methods with her.  Nader and Kelli have taken advantage of the respite program from the [employer], which they find helpful.  Nader denies symptoms of aspiration.  He has lost a few pounds since he was seen last and is struggling with constipation.  Our dietitian met with them to discuss both, but we also encouraged them to discuss bowel regimen with Dr. Ray as well.  Nader's mood is good and he remains upbeat, as is his nature.  We discussed the fact that he has some pseudobulbar affect, but he chooses not to take medication for this at the current time.    PHYSICAL EXAMINATION:  On examination,  Nader demonstrates acceptable tone.  He is not strikingly spastic.  Strength is full throughout.  There is no pain with passive range-of-motion exercises of the limbs. I personally reviewed and interpreted his pulmonary function studies and they are stable.    Nader also met with other members of our multidisciplinary care team today.  He will return in 6 months or sooner if needed.      Sincerely,    Gray Tejada MD        D: 2022   T: 2022   MT: ayden    Name:     ANIL QUINN  MRN:      [id number]        Account:      [id number]   :      1945           Service Date: 2022       Document: [id number]

## 2022-09-02 NOTE — TELEPHONE ENCOUNTER
Bonnie from [facility] care calling looking for verbal orders.  Verbal order given.    Occupational therapy evaluation  Speech evaluation    Call back at [phone] to ldm      Per neurology office visit note from yesterday:   Nader's speech has become somewhat more difficult to understand.  Our augmentative  met with him and reviewed his AAC device and software.  She will also reach out to her colleague, Brigida Burch, to speak to Nader about the full range of options available currently, as the software they now may be less sophisticated than some other options.

## 2022-09-06 NOTE — TELEPHONE ENCOUNTER
Fax received from [employer] for OT and ST.    Form placed in provider's in box.    Cristy Ashley [facility]  [phone]

## 2022-09-07 NOTE — PATIENT INSTRUCTIONS
Please call Flor @ [phone] for questions or concerns during regular business hours. For a more efficient way to communicate, use [email or web address] and address the message to your physician. Remember, MyChart is only read during business hours. Do not leave urgent messages on voicemail or Browsyt. If situation is urgent, contact the Neurology Clinic @ [phone] and ask to speak to a Triage Nurse or Call 911 or visit an Emergency Department.    Please call your pharmacy if you need a medication refill. They will send us an electronic message.

## 2022-09-10 NOTE — PROGRESS NOTES
SUBJECTIVE:  Chief Complaint   Patient presents with     Urgent Care     Stye on right eye, two swollen under the eyelash which started five days ago.     Elier Leong is a 76 year old male who presents with a chief complaint of right lower lid swelling/redness.    Notice bump about 5 days ago, tried hot compress to area but not improving.  Yesterday notice another bump and today has gotten worse.     Past Medical History:   Diagnosis Date     Basal cell carcinoma nos     sees derm     Cancer of ampulla of Vater (H)      CVA (cerebral infarction) 06/2014    small vessel right int capsule stroke     Deep venous thrombosis (DVT) of peroneal vein (H) 05/12/2017    right peroneal vein     Essential hypertension, benign      Hearing loss      Hoarseness of voice     assoc with PLS     Lumbar radiculopathy     2017     Primary Lateral Sclerosis 2002    has baclofen pump through Dr. Calvert, [facility], sees Dr. Fink, [facility]     Primary osteoarthritis of right shoulder 06/16/2021     Prostate CA (H) 2008    prostatectomy     Vertigo 02/21/2013     Current Outpatient Medications   Medication Sig Dispense Refill     acetaminophen (TYLENOL) 650 MG CR tablet Take 650 mg by mouth every 8 hours as needed for mild pain (Shoulder pain)        aspirin (ASA) 325 MG EC tablet Take 1 tablet (325 mg) by mouth daily       baclofen (LIORESAL) intraTHECAL Internal Pump by Intrathecal route continuous prn Pump filled by [facility] [phone]  Pump Model: Syncromed  Medication in pump is Gablofen (brand name)  Last fill: during hospital admission  Next fill:     Low [place] Alarm Date:   Reservoir Volume: 20 ml  Conc: 2000 mcg/mL  Delivers 234.7 mcg/day  Basal rate:unknown  Battery life: unknown       enalapril (VASOTEC) 10 MG tablet Take 2 tablets (20 mg) by mouth 2 times daily 360 tablet 3     EPINEPHrine 0.3 MG/0.3ML injection Inject 0.3 mLs (0.3 mg) into the muscle as needed for  anaphylaxis 0.3 mL 3     ezetimibe (ZETIA) 10 MG tablet Take 1 tablet (10 mg) by mouth daily 90 tablet 3     HYDROcodone-acetaminophen (NORCO) 5-325 MG tablet TAKE 1 TABLET BY MOUTH TWICE A DAY AS NEEDED FOR PAIN       oxybutynin ER (DITROPAN-XL) 10 MG 24 hr tablet Take 2 tablets (20 mg) by mouth At Bedtime 180 tablet 3     pantoprazole (PROTONIX) 40 MG EC tablet Take 1 tablet (40 mg) by mouth 2 times daily (Patient taking differently: Take 40 mg by mouth daily) 60 tablet 3     Social History     Tobacco Use     Smoking status: Former Smoker     Packs/day: 0.30     Years: 6.00     Pack years: 1.80     Types: Cigarettes     Start date: 1970     Quit date: 10/5/1976     Years since quittin.9     Smokeless tobacco: Never Used   Substance Use Topics     Alcohol use: Yes     Comment: 1 drink/week       ROS:  Review of systems negative except as stated above.    EXAM:   /71 (BP Location: Right arm, Patient Position: Sitting, Cuff Size: Adult Regular)   Pulse 66   SpO2 98%   GENERAL APPEARANCE: healthy, alert and no distress, in powered wheelchair   EYES: PERRL, EOM intact, right lower eyelid with swelling and redness, 2 small central pustule  PSYCH:alert, affect bright    ASSESSMENT/PLAN:  (H01.9) Infection of eyelid  (primary encounter diagnosis)  Comment: right lower eyelid  Plan: clindamycin (CLEOCIN) 300 MG capsule,         DISCONTINUED: clindamycin (CLEOCIN) 300 MG         capsule            Reviewed that concerning for skin infection in eyelid, reviewed that may have started as sty and encourage to continue with warm compress.  RX clindamycin given for treatment for eyelid infection    Follow up with primary provider if no improvement of symptoms in 1 week    Gagan Jiménez MD  September 10, 2022 5:29 PM

## 2022-09-14 NOTE — TELEPHONE ENCOUNTER
Yulia from [facility] calling for verbal orders for speech therapy  1 visit a week for 6 weeks  Ok to call and lm [phone]

## 2022-09-15 NOTE — TELEPHONE ENCOUNTER
[facility]  [facility]       Occupational Therapy  1x1wk    Functional transfer training     Best number to call back [phone]

## 2022-09-15 NOTE — TELEPHONE ENCOUNTER
Call to EMMIE Bender and left detailed message.     Verbal order given to approve visits until certification received for MD signature.

## 2022-09-15 NOTE — TELEPHONE ENCOUNTER
Please clarify what medication was adjusted and how much.  Also document when it was adjusted (pump?).     Is he having any symptoms associated with the lower blood pressure such as lightheadedness?  Does he have any other acute symptoms?     I think they are able to check blood pressure at home but please confirm this.

## 2022-09-15 NOTE — TELEPHONE ENCOUNTER
Call to Yadira BLAKE and advised.     She states she just spoke with Nader and they decided to decline further OT sessions.     Verbal order given to Decline visits until certification received for MD signature.

## 2022-09-15 NOTE — TELEPHONE ENCOUNTER
[facility] with [facility] calls for verbal order      Physical Therapy  Lympha eval and treat       Best number to call back  [phone]

## 2022-09-16 NOTE — TELEPHONE ENCOUNTER
If he is tolerating these blood pressures without lightheadedness, they are still okay so I would suggest continuing current dose.  My concern is his blood pressure will go back up again if we change the dosing.    If he is having some symptoms with the lower blood pressures, I would suggest we try to drop it back down to 15 mg twice a day.  It may take sometimes 6 to 8 weeks to see the full effect of a change in enalapril.  In February we had gone up to 15 twice a day, by beginning of March she was still 140s to 160s and that is when we increased to 20 twice a day.  If we had waited longer, it is possible his blood pressure might have come back down a little bit further so it might be reasonable to try the 15 twice a day again.

## 2022-09-16 NOTE — TELEPHONE ENCOUNTER
Wife returns call.     At 05/24/22 OV- Enalapril was adjusted to 20 mg BID. No recent adjustments to blood pressure medications.     Wife noted that in July 2022, Bps were trending lower from 130's systolic to 110's. Nader hasn't noticed any acute symptoms. Nader denies lightheadedness, dizziness, or any falls.    Yesterday, [facility] noted bp to be 105/73. Wife has been checking with a home BP cuff and it was 115/62 yesterday. Today, BP is 119/74 with home machine    Only recent change in medication was 9/10/22  visit where they were prescribed an antibiotic for a stye. Derm said to discontinue abx so they did.    Please advise if enalapril dose should be changed again or if BP is acceptable.

## 2022-09-20 NOTE — TELEPHONE ENCOUNTER
9/16/22 OCCUPATIONAL THERAPY / SPEECH THERAPY order received via fax. Form in your mailbox to be signed.

## 2022-09-21 NOTE — TELEPHONE ENCOUNTER
Rosette from [facility] calling for verbal orders for OT  1 visit a week for 4 weeks starting next week  Ok to call and lm [phone]

## 2022-09-21 NOTE — TELEPHONE ENCOUNTER
Last oV on 5/24/22     Call to Rosette and left detailed message.     Verbal order given to approve visits until certification received for MD signature.

## 2022-09-22 NOTE — TELEPHONE ENCOUNTER
Fax received from [employer] -  09/21/22 for review and signature.  Put in Dr. Ray's in basket.

## 2022-10-05 NOTE — TELEPHONE ENCOUNTER
"Discussed trial of condom catheters with Kelli. They could be ordered through [employer] who would mail a \"size guide\" to patient before shipment.  Kelli indicated pt has upcoming Urology appt and will discuss options with Urologist. Kelli will contact ALS Clinic if needed.  "

## 2022-10-05 NOTE — PROGRESS NOTES
Outpatient Physical Therapy Evaluation  [facility]    Type of Visit: Evaluation and treatment; Home safety evaluation for ALS patient    Date of Service: 10/4/2022    Physical Therapy Order/Referring Provider: PT santhosh & treat / Dr. Tejada    Medical Diagnosis: Primary Lateral Sclerosis    Pertinent history of current problem (include personal factors and/or comorbidities that impact the plan of care): patient referred for home PT safety evaluation & treatment due to decline in functional mobility related to progression of PLS. Patient states that he has had PLS x 20 years (dx 2002); notes increase in symptoms of pseudobulbar affect x past 3 years; has significant bilateral shoulder osteoarthritis; reports TIA x 3; baclofen pump for spasticity management. Patient and his spouse report 1 fall approx 1 month ago where patient slid off the edge of the bed - no injury, 911 called to assist    Living/Social History: patient lives with his spouse in 1 level home with ramped entrance; patient's spouse is his primary caregiver    ENVIRONMENTAL ASSESSMENT    Entrance Width Measurement  (Inches) Threshhold Height  (Inches) Stairs/Rails   Front 36 1 2 steps/no rails   Back 36  0 ramped           Bedroom Measurement (Inches)   Doorway 36   Bed Height 24 (head & feet elevate)         Bathroom Measurement (Inches)   Doorway 36   Toilet Height 17   Shower stall Zero barrier design         Current Equipment: power wheelchair; 2WW; transport chair; manual wheelchair; lift chair; bed rail; bidet; grab bars around toilet; hand held shower; rolling shower chair; accessible van    Accessibility Issues: none, ramped entrance & power wheelchair able to access all areas of the home    EXAMINATION    Communication/Affect/Cognition: alert & oriented; spastic dysarthria (able to understand approx 50% - spouse assists); pseudobulbar affect     ROM/Strength Right PROM Left PROM Right MMT Left MMT     Shoulder flexion 90 120  3- 3-   Shoulder abduction 90 90 3- 3-   Shoulder external rotation 45 60 NT NT   Elbow flexion WFL WFL 5 5   Elbow extension WFL WFL 4 4   Wrist flexion NT NT NT NT   Wrist extension NT NT NT NT          Hip flexion WFL WFL 4 4   Hip extension NT NT NT NT   Hip abduction WFL WFL NT NT   Knee extension Ham tightness Ham tightness 5 5   Knee flexion WFL WFL 4 4   Ankle dorsiflexion 0 w/knee ext 0 w/knee ext 4 3-   Ankle plantar flexion NT NT NT NT              Spasticity (Modified Manolo Scale)   Right Left   Shoulder NT NT   Elbow 2 3   Wrist 1 1   Hip 1+ 1+   Knee 3 3   Ankle 2 beats clonus 1          Locomotion: CGA to amb short distance with use of WW; patient with excessively forward flexed posture, very labored gait with spastic pattern of decreased hip & knee flexion during swing phase, decreased foot clearance during swing phase, decreased step length bilaterally & decreased gait joaquin    Transfers: min/mod assist for sitting to left sidelying to supine; mod assist for supine to left sidelying to sitting; mod assist for sit to stand from lower surface heights; CGA for sit to stand from lift chair or PWC with seat height elevated; stand pivot transfers with mod assist    Balance: sitting - good/fair at edge of chair or edge of bed with bilateral U/E support; standing - fair with bilateral U/E support    Fall Risk: increased due to decreased strength & increase tone    Pain: significant bilateral shoulder pain due to severe osteoarthritis    Educational Assessment:   Learner(s): Patient and his spouse  Barriers to Learning: none    CLINICAL IMPRESSIONS  Physical Therapy Diagnosis: Impaired motor function and sensory integrity associated with progressive disorders of the central nervous system.    Impairments:    1) decreased strength x 4 extremities  2) decreased flexibility x 4 extremities  3) increased tone x 4 extremities  4) bilateral shoulder pain    Clinical Presentation: Evolving/Changing  Clinical  "Presentation Rationale: based upon subjective information provided, objective exam findings, medical history & clinical judgement   Clinical Decision Making (Complexity): Low complexity    Plan of Care:  Physical therapy intervention indicated. 1 session evaluation and treatment.    Goals: Patient stated goals (to be attained by end of PT visit on 10/4/22)  1) Patient and his spouse will verbalize/demonstrate understanding of techniques to assist with repositioning patient in chair & in bed, as well as modified technique for sit to/from supine - goal attained  2) Patient and his spouse will verbalize understanding of recommendations for devices &/or equipment to improve safety & efficiency with mobility & ADLs - goal attained    TREATMENT  Treatment provided this date:  Self care/Home management/ADL, 55 minutes  Therapeutic activities, 30 minutes    Skilled Intervention/Response to Treatment:  Reviewed results of PT exam with patient and his spouse. Inquired about baclofen pump adjustments as patient demonstrates 4/5 strength (or greater) in bilateral hips/knees with significant spasticity making gait very labored. Patient's spouse provided PT with Dr. Calvert's contact information and consented for PT to discuss current status with Dr. Calvert. Patient with bone on bone bilateral shoulder osteoarthritis with significant pain - provided education regarding possible modifications to mobility routine to decrease effort for patient & decrease burden for caregiver. Instructed to use power wheelchair with seat height elevated (as high as tolerated) to improve ease of sit to stand. Instructed to use the lift chair at a higher (almost \"standing\") setting to improve ease of sit to stand. Instructed to position power wheelchair at 90 degree angle from the surface that he is transferring to decrease need for additional steps with WW. Patient and his spouse currently use the manual wheelchair (lower surface height) to " "transfer from the lift chair to the bedroom to get patient into bed - this requires greater effort from both patient & his spouse. Had patient complete these transfers with PT with the recommendations; able to complete with CGA for sit to stand & min assist for pivot (both lift chair to/from power wheelchair & power wheelchair to/from bed). Instructed patient and his spouse in modified technique for sit to/from left sidelying to/from supine to enable patient's spouse to only \"lift/manage\" half of patient's body weight at any given time. PT demonstrated this technique with patient who tolerated it well; patient's spouse will benefit from continued training with their personal PT. Worked with patient in bathroom to improve safety & efficiency with power wheelchair to/from toilet transfers. There are bars on both sides of toilet (can bed raised out of the way), as well as a vertical wall grab bar on the right of the toilet. Positioned patient's power wheelchair at 90 degree angle from toilet with transfer set up towards patient's right in order to access both grab bars (horizontal & vertical). Power wheelchair to toilet (with use of [facility] seat height elevated) with min assist; toilet to power wheelchair with mod assist to stand from lower surface height & min/mod assist to complete pivot transfer. Patient's current rolling shower/commode chair is not high enough to roll over toilet so can only be used as a commode. Provided education to patient and his spouse in obtaining a higher/taller rolling shower/commode chair that could roll over the toilet. With new rolling shower/commode chair, recommend rolling the chair to either the lift chair or the bed & utilize the height adjusted features to improve ease of transfer. Patient and his spouse verbalize understanding of this recommendation & are in agreement with obtaining a higher/taller rolling shower/commode chair from the ALS equipment loan closet. Provided education to " "patient and his spouse in use of condom catheters at night to decrease need to get out of bed multiple times - will request that ALS clinic coordinator contact patient & his spouse with additional information. Also discussed trial of pivot disc to improve ease of the pivot transfer - patient & his spouse in agreement with having a 15\" pivot disc sent from ALS equipment loan closet for them to work on with their personal PT. Gave patient and his spouse this PT's contact information to have their personal PT & their  connect regarding the results of the home visit.     Goal Attainment: All goals met.    Risks and benefits of evaluation/treatment have been explained.  Patient and his spouse are in agreement with Plan of Care    Timed Code Treatment Minutes: 85 min  Total Treatment Time (sum of timed and untimed services): 130 min    Signature/Credientials: Reva Fischer, PT   Date: 10/4/2022    Certification:   Onset date: 9/27/2022  Start of care date: 10/4/2022  Certification date from 10/4/2022 to 11/3/2022    I CERTIFY THE NEED FOR THESE SERVICES UNDER   THIS PLAN OF TREATMENT AND WHILE UNDER MY CARE  (Physician co-signature of this document indicates review and certification of the therapy plan).              "

## 2022-10-05 NOTE — TELEPHONE ENCOUNTER
Received a request for a refill for chlorthalidone per hour records patient sent a message Via My Chart on 7-10-11 and stated he stopped taking it due to increase urination.  Patient and wife state he has been taking the medication all along and would like a refill sent to [facility]. Chlorthalidone 25mg daily Patient does not recall sending a message Via My Chart discontinuing the medication due to excessive urination.     Last BMP 7-22-22.  Na+ 131 ordered by Dr. Rogers. Last phone note from Dr. Ray 9-16-22 concerning his BP will forward phone message to her with update medication list.

## 2022-10-13 NOTE — TELEPHONE ENCOUNTER
Call to Rosette and left detailed message.     Verbal order given to approve visits until certification received for MD signature.

## 2022-10-13 NOTE — TELEPHONE ENCOUNTER
Rosette from [facility] ([phone]) calls for verbal orders for OT early discharge effective today because goals have been met early.OK to leave a detailed message.

## 2022-10-19 NOTE — TELEPHONE ENCOUNTER
Skilled OCCUPATIONAL THERAPY Home Care Services to Complete Early; received via fax. Orders/Forms in your mailbox to be signed.

## 2022-10-26 NOTE — TELEPHONE ENCOUNTER
[facility] with [facility] calls for verbal order      Physical Therapy      **writer could no longer hear caller . Ended call

## 2022-10-26 NOTE — TELEPHONE ENCOUNTER
Yulia from [facility] calling for verbal orders for speech therapy  1 visit a week for 5 weeks  Ok to call and lm [phone]

## 2022-10-26 NOTE — TELEPHONE ENCOUNTER
[facility] returned call, said she was disconnected.  She needs physical therapy verbal orders for:  Extend his therapy to one time a week for 8 weeks.      Phone#  [phone]    Sindy Tripathi on 10/26/2022 at 3:35 PM

## 2022-10-27 NOTE — TELEPHONE ENCOUNTER
Call to Yulia RODRIGUEZ and left detailed message.     Verbal order given to approve visits until certification received for MD signature.

## 2022-11-03 NOTE — TELEPHONE ENCOUNTER
[facility] received from [facility] 10/28/22 for review and signature.  Put in Dr. Ray's in basket.

## 2022-11-14 NOTE — PATIENT INSTRUCTIONS
Discussed about Sizing for the condom catheters  Additionally talked about the Cunningham Clamp for incontinence   Nader will call back after sizing condom cath  Oxybutinin to continue  Follow-up in 1 -2 years as needed

## 2022-11-14 NOTE — NURSING NOTE
Chief Complaint   Patient presents with     Prostate Cancer     Overactive Bladder     Jes Umaña, EMT

## 2022-11-14 NOTE — LETTER
11/14/2022       RE: Elier Leong  [address] [facility] [address]     Dear Colleague,    Thank you for referring your patient, Elier Leong, to the [facility] [place] at [facility]. Please see a copy of my visit note below.    CHIEF COMPLAINT   It was my pleasure to see Elier Leong who is a 77 year old male for follow-up of prostate cancer and overactive bladder.      HPI:  Elier Leong is a 77 year old male being seen for follow-up.  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  He does have ongoing issues with overactivity especially at night with needing to change pads/diapers at least 3-4 times at night as he does not have enough time to urinate on his own.  He feels that the oxybutynin is helpful but not a great deal and they want to discuss options of nighttime management of his urination with either a condom catheter or something similar    Exam:  /65   Pulse 75   General: age-appropriate appearing male in NAD sitting in a wheelchair  Resp: no respiratory distress  CV: heart rate regular  Abdomen: Degree of obesity is mild. Abdomen is soft and nontender. No organomegaly.   : not performed  Neuro: grossly non focal. Normal reflexes  Motor: excellent strength throughout    Review of Imaging:  The following imaging exams were independently viewed and interpreted by me and discussed with patient:      Review of Labs:  The following labs were reviewed by me and discussed with the patient:  Creatinine: Normal, 0.7 mg/dL  PSA: Normal,  undetectable    Assessment & Plan     Overactive bladder  He does have hypertension concerns and thus is not a good candidate for Myrbetriq  He also felt that trospium was not helpful as well as it was increasing his likelihood of having a higher blood pressure so he wants to stick with oxybutynin at the moment.  Represcribe oxybutynin for him discussed with him that his overactive bladder is also probably a manifestation of his neurological syndrome with PLS  Currently nothing additional indicated we will see him as needed  - oxybutynin ER (DITROPAN XL) 10 MG 24 hr tablet; Take 2 tablets (20 mg) by mouth At Bedtime    Prostate CA (H)  PSA has been undetectable since treatment  Continue PSA every 1 to 2 years    Primary lateral sclerosis (H)  Primarily being managed by neurology        Uvaldo Cavazos MD  [facility] [place]      ==========================    Additional Billing and Coding Information:  Review of external notes as documented above   Review of the result(s) of each unique test - PSA, creatinine    Independent interpretation of a test performed by another physician/other qualified health care professional (not separately reported) -           Discussion of management or test interpretation with external physician/other qualified healthcare professional/appropriate source -         30 minutes spent on the date of the encounter doing chart review, review of test results, interpretation of tests, patient visit, documentation and discussion with family

## 2022-11-16 NOTE — PROGRESS NOTES
CHIEF COMPLAINT   It was my pleasure to see Elier Leong who is a 77 year old male for follow-up of prostate cancer and overactive bladder.      HPI:  Elier Leong is a 77 year old male being seen for follow-up.  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  He does have ongoing issues with overactivity especially at night with needing to change pads/diapers at least 3-4 times at night as he does not have enough time to urinate on his own.  He feels that the oxybutynin is helpful but not a great deal and they want to discuss options of nighttime management of his urination with either a condom catheter or something similar    Exam:  /65   Pulse 75   General: age-appropriate appearing male in NAD sitting in a wheelchair  Resp: no respiratory distress  CV: heart rate regular  Abdomen: Degree of obesity is mild. Abdomen is soft and nontender. No organomegaly.   : not performed  Neuro: grossly non focal. Normal reflexes  Motor: excellent strength throughout    Review of Imaging:  The following imaging exams were independently viewed and interpreted by me and discussed with patient:      Review of Labs:  The following labs were reviewed by me and discussed with the patient:  Creatinine: Normal, 0.7 mg/dL  PSA: Normal, undetectable    Assessment & Plan     Overactive bladder  He does have hypertension concerns and thus is not a good candidate for Myrbetriq  He also felt that trospium was not helpful as well as it was increasing his likelihood of having a higher blood pressure so he wants to stick with oxybutynin at the moment.  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  Currently nothing additional indicated we will see him as needed  - oxybutynin ER (DITROPAN XL) 10 MG 24 hr tablet; Take 2 tablets (20 mg) by mouth At Bedtime    Prostate CA (H)  PSA has been undetectable since treatment  Continue PSA every 1 to 2 years    Primary lateral sclerosis (H)  Primarily being managed by neurology        Uvaldo Cavazos MD  [facility] [place]      ==========================    Additional Billing and Coding Information:  Review of external notes as documented above   Review of the result(s) of each unique test - PSA, creatinine    Independent interpretation of a test performed by another physician/other qualified health care professional (not separately reported) -           Discussion of management or test interpretation with external physician/other qualified healthcare professional/appropriate source -         30 minutes spent on the date of the encounter doing chart review, review of test results, interpretation of tests, patient visit, documentation and discussion with family     ==========================

## 2022-11-23 PROBLEM — D49.0: Status: ACTIVE | Noted: 2021-06-29

## 2022-11-23 PROBLEM — K86.89 PANCREATIC MASS: Status: ACTIVE | Noted: 2022-01-01

## 2022-11-23 PROBLEM — K85.80 OTHER ACUTE PANCREATITIS WITHOUT INFECTION OR NECROSIS: Status: ACTIVE | Noted: 2022-01-01

## 2022-11-23 NOTE — ED NOTES
"[facility]  ED Nurse Handoff Report    Elier Leong is a 77 year old male   ED Chief complaint: Abdominal Pain  . ED Diagnosis:   Final diagnoses:   Other acute pancreatitis without infection or necrosis   Pancreatic mass     Allergies:   Allergies   Allergen Reactions     Bee Venom      Swelling and hives     Penicillins Hives and Swelling     \"HIVES\"; occurred at age 39       Code Status: Full Code  Activity level - Baseline/Home:  Total Care. Activity Level - Current:   Total Care. Lift room needed: Yes. Bariatric: No   Needed: No   Isolation: No. Infection: Not Applicable.     Vital Signs:   Vitals:    11/23/22 0130 11/23/22 0200 11/23/22 0230 11/23/22 0300   BP: 134/72 96/61  (!) 141/96   Pulse: 61 60 85 80   Resp:       Temp:       TempSrc:       SpO2: 98% 97% 97%        Cardiac Rhythm:  ,      Pain level:    Patient confused: No. Patient Falls Risk: Yes.   Elimination Status: Has voided   Patient Report - Initial Complaint: Abd pain. Focused Assessment: Pt presents to ED with abdominal pain. States it \"feels like he has an egg in his stomach.\" having trouble sleeping due to the pain. Pain has been going on for a couple days. Taking pepto which is helpful for a short time only.   Hx Primary lateral sclerosis.   Per wife, feels like his speaking is getting more difficult to understand, increased weakness, increased incontinence recently       Tests Performed:   Labs Ordered and Resulted from Time of ED Arrival to Time of ED Departure   COMPREHENSIVE METABOLIC PANEL - Abnormal       Result Value    Sodium 127 (*)     Potassium 4.4      Chloride 89 (*)     Carbon Dioxide (CO2) 27      Anion Gap 11      Urea Nitrogen 26.1 (*)     Creatinine 0.92      Calcium 9.5      Glucose 116 (*)     Alkaline Phosphatase 155 (*)     AST 58 (*)     ALT 48      Protein Total 7.3      Albumin 4.3      Bilirubin Total 0.4      GFR Estimate 85     LIPASE - Abnormal    Lipase 466 (*)    TROPONIN T, HIGH " SENSITIVITY - Abnormal    Troponin T, High Sensitivity 50 (*)    CBC WITH PLATELETS AND DIFFERENTIAL - Abnormal    WBC Count 8.8      RBC Count 4.50      Hemoglobin 13.1 (*)     Hematocrit 40.1      MCV 89      MCH 29.1      MCHC 32.7      RDW 14.6      Platelet Count 311      % Neutrophils 67      % Lymphocytes 19      % Monocytes 11      % Eosinophils 1      % Basophils 1      % Immature Granulocytes 1      NRBCs per 100 WBC 0      Absolute Neutrophils 5.9      Absolute Lymphocytes 1.7      Absolute Monocytes 1.0      Absolute Eosinophils 0.1      Absolute Basophils 0.0      Absolute Immature Granulocytes 0.1      Absolute NRBCs 0.0     TROPONIN T, HIGH SENSITIVITY - Abnormal    Troponin T, High Sensitivity 49 (*)    COVID-19 VIRUS (CORONAVIRUS) BY PCR - Normal    SARS CoV2 PCR Negative       CT Abdomen Pelvis w Contrast   Final Result   IMPRESSION:    1.  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      2.  Mild adenopathy most marked left periaortic region which has mildly progressed since 06/17/2021..      3.  Significant noncalcified pulmonary nodules in both lung bases most typical for new metastatic findings.      4.  Benign cyst right kidney and no follow-up recommended.        . Abnormal Results: Trop.   Treatments provided: See MAR  Family Comments: Wife at bedside  OBS brochure/video discussed/provided to patient:  No  ED Medications:   Medications   0.9% sodium chloride BOLUS (0 mLs Intravenous Stopped 11/23/22 0328)     Followed by   sodium chloride 0.9% infusion (has no administration in time range)   iopamidol (ISOVUE-370) solution 500 mL (76 mLs Intravenous Given 11/23/22 0009)   CT scan flush (59 mLs Intravenous Given 11/23/22 0011)     Drips infusing:  No  For the majority of the shift, the patient's behavior Green. Interventions performed were none.    Sepsis treatment initiated: No      Patient tested for COVID 19 prior to admission: YES    ED Nurse Name/Phone Number: Indu Gutierrez RN,   3:35 AM    RECEIVING UNIT ED HANDOFF REVIEW    Above ED Nurse Handoff Report was reviewed: Yes  Reviewed by: Karlo Beard RN on November 23, 2022 at 7:38 PM

## 2022-11-23 NOTE — CONSULTS
[facility]  Palliative Care Consultation   Text Page    Assessment & Plan   Elier Leong is a 77 year old male who was admitted on 11/22/2022.   Consulted by Dr. Marquez to assist with symptom management and goals of care.    Recommendations:  1. Goals of Care- No CPR- Do NOT Intubate  Hospitalization goals discussed Discussed and reviewed goals of care. Considering hospice care and transition to comfort care approach.  Decisional Capacity- Intact. Patient has an advance directive dated 2-11-20.  If patient becomes unable to demonstrate decisional capacity, Kelli Leong is the primary Health Care Agent.  Alternates are Neelam and Luis Antonio Leong.   POLST Consider completing should goals of care change.    2. Pain   Chronic bilateral shoulder pain, seen by chiropractor as an outpatient.  Patient does not take opiates at baseline, not expected to have opioid tolerance.    New, progressive abdominal pain, etiology likely recurrent malignancy.  - acetaminophen 650 mg every 8 hours scheduled  - gabapentin 100 mg evry 8 hours  - hydromorphone 1 mg every 4 hours prn moderate to severe pain  - hydromorphone 0.2 mg every 2 hours prn breakthrough/severe pain  - consider NSAID dosing if no contraindications appreciated - osteoarthritic pain in bilateral shoulders.    3. Weakness   Primary etiology PLS, now exacerbated due to malnutrition/decreased PO intake.  - continue with current plan of care.  Appreciate nursing/therapy recommendations for safety.    4. Malnutrition  Decreased PO intake for the past several months per patient and spouse report.  Weight loss reported to be 15 pounds since May 2022 (9 % weight loss)  - meal supplements, takes ensure at home.  - dietician consult  - may consider appetite stimulant if in line with goals of care.    5. Spiritual Care  Oriented to Spiritual Health as part of Palliative Care team. Consultation placed for  to follow.   Spiritual Background:  "Anabaptism    6. Care Planning  Appreciate Care of SW/care coordination for hospice resources prior to discharge.      Medical Decision Making and Goals of Care:  Discussed on November 23, 2022 with Nichole Soto MS3:     Met with Nader and Kelli at the bedside in the ED, introduced palliative care and reviewed current medical knowledge.  Discussed need for scheduled dosing of acetaminophen and initiation of gabapentin dosage for continued abdominal/shoulder pain. Both understood and agreed to proceed with managing symptoms while noting concern of worsening disorientation, as patient already experiences \"brain fog\".      Discussed limited options for treatment of newly found recurrent malignancy.  Reviewed functional status and Nader's risk factors when considering Chemotherapy.  Kelli stated that they had previously determined that chemo would not offer benefit, Nader added \"quality of life\" as a factor considered.  Given review of findings, offered a comfort focused plan including hospice enrollment.  Discussed the integration of his values and goals with symptom management as part of the philosophy of the hospice benefit.  Discussed further management of ongoing symptoms offered through hospice care with their interdisciplinary team. Nader expressed emotion and uncertainty.  Reassurance provided and presence at the bedside offered.    Reviewed plan for inpatient stay and optimization prior to decision regarding hospice enrollment.  Discussed symptom management as above.  Planned for SW consult to offer support through consideration of hospice enrollment.  Offered spiritual health consult for emotional support.    Planned for follow up on Friday (11/25) to determine next steps and support symptom management.  They verbalized understanding and denied further questions or concerns.  PC contact information provided for support.    Thank you for involving us in the patient's care.     Nichole Soto, MS3  Yancy Longoria RN, " ACNP  Pain Management and Palliative Care  [facility]  Pgr: [phone]    Time Spent on this Encounter   Total unit/floor time 95 minutes, time consisted of the following, examination of the patient, reviewing the record and completing documentation. >50% of time spent in counseling and coordination of care, Bedside Nurse Josette and Hospitalist Gabriella.  Time spend counseling with patient and family consisted of the following topics, goals of care, education about prognosis, care planning for discharge and symptom management.    Understanding of disease process:   This has been discussed with patient and primary team.    History of Present Illness   Unable to obtain a history from the patient due to chronic condition.    Elier Leong is a 77 year old male with a past medical history of primary lateral sclerosis, baclofen pump, stroke, tumor of ampulla of vater, basal cell carcinoma, DVT, hypertension, lumbar radiculopathy, severe osteoarthritis of shoulders, prostate cancer, and vertigo, who presents with recurrent ampulla of vater/pancreatic tumor and new pulmonary nodules in both lungs.    This is in the setting of increasing weakness from PLS. He has experienced weight loss and decline in speech ability. No recent hospitalization.  At baseline patient is wheelchair bound primarily with ability to pivot/turn for cares.  Requires total assist by wife with ADLs.    Past Medical History   I have reviewed this patient's medical history and updated it with pertinent information if needed.   Past Medical History:   Diagnosis Date     Basal cell carcinoma nos     sees derm     Cancer of ampulla of Vater (H)      CVA (cerebral infarction) 06/2014    small vessel right int capsule stroke     Deep venous thrombosis (DVT) of peroneal vein (H) 05/12/2017    right peroneal vein     Essential hypertension, benign      Hearing loss      Hoarseness of voice     assoc with PLS     Lumbar radiculopathy      2017     Primary Lateral Sclerosis 2002    has baclofen pump through Dr. Calvert, [place], sees Dr. Fink, [facility]     Primary osteoarthritis of right shoulder 06/16/2021     Prostate CA (H) 2008    prostatectomy     Vertigo 02/21/2013       Past Surgical History   I have reviewed this patient's surgical history and updated it with pertinent information if needed.  Past Surgical History:   Procedure Laterality Date     ABDOMEN SURGERY  11/12    Hernia     BIOPSY  4/16    Cancerous growth on leg. Removed by MOHs treatment     COLONOSCOPY N/A 8/3/2021    Procedure: COLONOSCOPY;  Surgeon: Luis Antonio Jung MD;  Location:  GI     COLONOSCOPY N/A 8/3/2021    Procedure: Colonoscopy, With Polypectomy And Biopsy;  Surgeon: Luis Antonio Jung MD;  Location:  GI     ENDOSCOPIC RETROGRADE CHOLANGIOPANCREATOGRAM N/A 6/17/2021    Procedure: ENDOSCOPIC RETROGRADE CHOLANGIOPANCREATOGRAPHY, BILIARY STENT PLACEMENT;  Surgeon: Sima Galvez MD;  Location:  OR     ENDOSCOPIC RETROGRADE CHOLANGIOPANCREATOGRAM N/A 8/19/2021    Procedure: ENDOSCOPIC RETROGRADE CHOLANGIOPANCREATOGRAPHY;  Surgeon: Agus Kent MD;  Location:  OR     ENDOSCOPIC RETROGRADE CHOLANGIOPANCREATOGRAM N/A 6/9/2022    Procedure: ENDOSCOPIC RETROGRADE CHOLANGIOPANCREATOGRAPHY, PANCREATIC STENT PLACEMENT AND BIOPSIES;  Surgeon: Agus Kent MD;  Location:  OR     ENDOSCOPIC RETROGRADE CHOLANGIOPANCREATOGRAM COMPLEX N/A 7/12/2021    Procedure: ENDOSCOPIC RETROGRADE CHOLANGIOPANCREATOGRAPHY WITH AMPULLECTOMY, PANCREATIC DUCT STENT PLACEMENT AND BILIARY STENT PLACEMENT;  Surgeon: Agus Kent MD;  Location:  OR     ENDOSCOPIC RETROGRADE CHOLANGIOPANCREATOGRAPHY, EXCHANGE TUBE/STENT N/A 7/14/2021    Procedure: ENDOSCOPIC RETROGRADE CHOLANGIOPANCREATOGRAPHY with bile duct stents exchanged, balloon sweep of bile ducts, hemostasis;  Surgeon: Guru Bhavesh Arreola MD;  Location:  OR     ENDOSCOPIC ULTRASOUND UPPER  GASTROINTESTINAL TRACT (GI) N/A 7/12/2021    Procedure: ENDOSCOPIC ULTRASOUND, ESOPHAGOSCOPY / UPPER GASTROINTESTINAL TRACT (GI);  Surgeon: Agus Kent MD;  Location: [facility]     ESOPHAGOSCOPY, GASTROSCOPY, DUODENOSCOPY (EGD), COMBINED N/A 6/17/2021    Procedure: ESOPHAGOGASTRODUODENOSCOPY, WITH ENDOSCOPIC US, fine needle aspiration;  Surgeon: Sima Galvez MD;  Location: [facility] OR     ESOPHAGOSCOPY, GASTROSCOPY, DUODENOSCOPY (EGD), COMBINED N/A 7/14/2021    Procedure: ESOPHAGOGASTRODUODENOSCOPY (EGD);  Surgeon: Guru Bhavesh Arreola MD;  Location: [facility] OR     ESOPHAGOSCOPY, GASTROSCOPY, DUODENOSCOPY (EGD), COMBINED N/A 8/3/2021    Procedure: Esophagoscopy, gastroscopy, duodenoscopy (EGD), combined;  Surgeon: Luis Antonio Jung MD;  Location: [facility] GI     HERNIA REPAIR  11/12    Repaired     PROSTATE SURGERY       [facility] NONSPECIFIC PROCEDURE  6/08     prostatectomy      [facility] NONSPECIFIC PROCEDURE  11/01    colonoscopy     [facility] NONSPECIFIC PROCEDURE  11/2006    hernia, right side     [facility] NONSPECIFIC PROCEDURE      T + A       Prior to Admission Medications   Prior to Admission Medications   Prescriptions Last Dose Informant Patient Reported? Taking?   EPINEPHrine 0.3 MG/0.3ML injection  Spouse/Significant Other No No   Sig: Inject 0.3 mLs (0.3 mg) into the muscle as needed for anaphylaxis   HYDROcodone-acetaminophen (NORCO) 5-325 MG tablet   Yes No   Sig: TAKE 1 TABLET BY MOUTH TWICE A DAY AS NEEDED FOR PAIN   Patient not taking: Reported on 11/14/2022   acetaminophen (TYLENOL) 650 MG CR tablet  Spouse/Significant Other Yes No   Sig: Take 650 mg by mouth every 8 hours as needed for mild pain (Shoulder pain)    aspirin (ASA) 325 MG EC tablet   Yes No   Sig: Take 1 tablet (325 mg) by mouth daily   baclofen (LIORESAL) intraTHECAL Internal Pump  Spouse/Significant Other Yes No   Sig: by Intrathecal route continuous prn Pump filled by [facility]  "[phone]  Pump Model: Syncromed  Medication in pump is Gablofen (brand name)  Last fill: during hospital admission  Next fill:     Low [place]myer Alarm Date:   Reservoir Volume: 20 ml  Conc: 2000 mcg/mL  Delivers 234.7 mcg/day  Basal rate:unknown  Battery life: unknown   chlorthalidone (HYGROTON) 25 MG tablet   No No   Sig: Take 1 tablet (25 mg) by mouth daily   clindamycin (CLEOCIN) 300 MG capsule   No No   Sig: Take 1 capsule (300 mg) by mouth 3 times daily   Patient not taking: Reported on 11/14/2022   enalapril (VASOTEC) 10 MG tablet   No No   Sig: Take 2 tablets (20 mg) by mouth 2 times daily   ezetimibe (ZETIA) 10 MG tablet   No No   Sig: Take 1 tablet (10 mg) by mouth daily   oxybutynin ER (DITROPAN XL) 10 MG 24 hr tablet   No No   Sig: Take 2 tablets (20 mg) by mouth At Bedtime   pantoprazole (PROTONIX) 40 MG EC tablet   No No   Sig: Take 1 tablet (40 mg) by mouth 2 times daily   Patient taking differently: Take 40 mg by mouth daily   polyethylene glycol (MIRALAX) 17 g packet   Yes No   Sig: Take 1 packet by mouth as needed for constipation      Facility-Administered Medications: None     Allergies   Allergies   Allergen Reactions     Bee Venom      Swelling and hives     Penicillins Hives and Swelling     \"HIVES\"; occurred at age 39     Social History   I have updated and reviewed the following Social History Narrative:   Social History     Social History Narrative     Not on file      Living Situation: Lives with wife at home  Support System: Wife and 2 children  Functional Status: Uses motorized wheelchair at baseline. Gait belt is necessary for transfer. Receives equipment and support through ALS clinic.  History of substance use/abuse: Drinks alcohol, 1 drink a week    Family History   Family history reviewed with patient and is noncontributory.    Review of Systems     Palliative Symptom Review (0=no symptom/no concern, 1=mild, 2=moderate, 3=severe):      Pain: present in back and abdomen, " moderate      Nausea: none      Constipation: none      Diarrhea: none      Appetite: poor, several months      Shortness of Breath: none      Insomnia: some  Moderate concern for poor outcome.    Physical Exam   Temp:  [98.6  F (37  C)] 98.6  F (37  C)  Pulse:  [60-85] 64  Resp:  [20] 20  BP: ()/(61-96) 124/82  SpO2:  [95 %-99 %] 97 %  155 lbs 0 oz  Exam:  GEN:  Cachectic male sitting in reclined wheelchair. Alert, oriented x 3, appears comfortable, NAD.  HEENT:  Normocephalic/atraumatic, no scleral icterus, no nasal discharge, mouth moist.  CV:  RRR, S1, S2; no murmurs or other irregularities noted.  +3 DP/PT pulses bilatererally; no edema BLE.  RESP:  Clear to auscultation bilaterally without rales/rhonchi/wheezing/retractions.  Symmetric chest rise on inhalation noted.  Normal respiratory effort.  ABD:  Rounded, soft, non-tender/non-distended.  +BS  EXT:  Edema & pulses as noted above.  CMS intact x 4.     SKIN:  Dry to touch, no exanthems noted in the visualized areas.    NEURO: Alert and oriented.  Exam limited.  PAIN BEHAVIOR: Cooperative  Psych:  Normal affect.  Calm, cooperative, conversant appropriately.  Tearful during discussion/assessment.    Delirium Screen/CAM:  Delirium = (#1 and #2 = YES) + (#3 and/or #4)   1) Acute onset and fluctuating course:   No   (acute change in mental status from baseline over last 24 hours)  2) Inattention:   No   (difficulty focusing, distractible, can't follow conversation)  3) Disorganized thinking:   No   (score only if #1 and #2 are YES)  (rambling/irrelevant conversation, unclear/illogical thoughts, inconsistency)  4) Altered level of consciousness:   No   (score only if #1 and #2 are YES)  (other than alert, calm, cooperative)    Delirium/CAM score: 0/4  Interpretation:  1)  Delirium:  Absent    Data   Results for orders placed or performed during the hospital encounter of 11/22/22 (from the past 24 hour(s))   CBC with platelets differential    Narrative    The  following orders were created for panel order CBC with platelets differential.  Procedure                               Abnormality         Status                     ---------                               -----------         ------                     CBC with platelets and d...[[id number]]  Abnormal            Final result                 Please view results for these tests on the individual orders.   Comprehensive metabolic panel   Result Value Ref Range    Sodium 127 (L) 136 - 145 mmol/L    Potassium 4.4 3.4 - 5.3 mmol/L    Chloride 89 (L) 98 - 107 mmol/L    Carbon Dioxide (CO2) 27 22 - 29 mmol/L    Anion Gap 11 7 - 15 mmol/L    Urea Nitrogen 26.1 (H) 8.0 - 23.0 mg/dL    Creatinine 0.92 0.67 - 1.17 mg/dL    Calcium 9.5 8.8 - 10.2 mg/dL    Glucose 116 (H) 70 - 99 mg/dL    Alkaline Phosphatase 155 (H) 40 - 129 U/L    AST 58 (H) 10 - 50 U/L    ALT 48 10 - 50 U/L    Protein Total 7.3 6.4 - 8.3 g/dL    Albumin 4.3 3.5 - 5.2 g/dL    Bilirubin Total 0.4 <=1.2 mg/dL    GFR Estimate 85 >60 mL/min/1.73m2   Lipase   Result Value Ref Range    Lipase 466 (H) 13 - 60 U/L   Troponin T, High Sensitivity   Result Value Ref Range    Troponin T, High Sensitivity 50 (H) <=22 ng/L   [place] Draw    Narrative    The following orders were created for panel order [place] Draw.  Procedure                               Abnormality         Status                     ---------                               -----------         ------                     Extra Blue Top Tube[[id number]]                              Final result                 Please view results for these tests on the individual orders.   CBC with platelets and differential   Result Value Ref Range    WBC Count 8.8 4.0 - 11.0 10e3/uL    RBC Count 4.50 4.40 - 5.90 10e6/uL    Hemoglobin 13.1 (L) 13.3 - 17.7 g/dL    Hematocrit 40.1 40.0 - 53.0 %    MCV 89 78 - 100 fL    MCH 29.1 26.5 - 33.0 pg    MCHC 32.7 31.5 - 36.5 g/dL    RDW 14.6 10.0 - 15.0 %    Platelet Count 311 150 -  450 10e3/uL    % Neutrophils 67 %    % Lymphocytes 19 %    % Monocytes 11 %    % Eosinophils 1 %    % Basophils 1 %    % Immature Granulocytes 1 %    NRBCs per 100 WBC 0 <1 /100    Absolute Neutrophils 5.9 1.6 - 8.3 10e3/uL    Absolute Lymphocytes 1.7 0.8 - 5.3 10e3/uL    Absolute Monocytes 1.0 0.0 - 1.3 10e3/uL    Absolute Eosinophils 0.1 0.0 - 0.7 10e3/uL    Absolute Basophils 0.0 0.0 - 0.2 10e3/uL    Absolute Immature Granulocytes 0.1 <=0.4 10e3/uL    Absolute NRBCs 0.0 10e3/uL   Extra Blue Top Tube   Result Value Ref Range    Hold Specimen JIC    CT Abdomen Pelvis w Contrast    Narrative    EXAM: CT ABDOMEN PELVIS W CONTRAST  LOCATION: [facility]  DATE/TIME: 11/23/2022 12:20 AM    INDICATION: epigastric abdominal pain  COMPARISON: 06/17/2021  TECHNIQUE: CT scan of the abdomen and pelvis was performed following injection of IV contrast. Multiplanar reformats were obtained. Dose reduction techniques were used.  CONTRAST: 76mL Isovue 370    FINDINGS:   LOWER CHEST: Since 06/07/2021 there is been development of numerous indeterminate noncalcified pulmonary nodules bilaterally. The largest is seen in the posterior medial aspect of the right lower lobe and this measures approximately 7.0 x 9.4 mm.   Underlying changes of metastatic lesions are suspected.    HEPATOBILIARY: There is significant dilatation seen of the intra and extrahepatic bile ducts with the common bile duct measuring up to 15.8 mm in greatest radial dimension. There is tapering seen involving the distal common bile duct with no underlying   stone. The gallbladder and liver are normal in appearance.    PANCREAS: There is a 1.9 x 2.1 x 1.6 cm vague low density lesions seen involving the pancreatic head worrisome for changes of neoplasm. There is significant enlargement involving the pancreatic duct with a atrophy seen involving the body and tail regions   of the pancreas. On previous CT 06/17/2021 the low-density region in the  pancreatic head measured 1.1 x 1.1 cm with only slight pancreatic duct dilatation in the tail region. SPLEEN: Normal.    ADRENAL GLANDS: Normal.    KIDNEYS/BLADDER: Benign cyst in the right kidney with no follow-up recommended.    BOWEL: Mild obstipation.    LYMPH NODES: There are more than typically seen lymph nodes involving the retrocrural, peripancreatic, and periaortic regions. The largest lymph node involves the left periaortic region and this measures 1.7 x 1.3 cm.    VASCULATURE: Moderate calcification with no aneurysm.    PELVIC ORGANS: Surgically absent.    MUSCULOSKELETAL: Advanced multilevel degenerative changes in the lumbar spine.      Impression    IMPRESSION:   1.  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    2.  Mild adenopathy most marked left periaortic region which has mildly progressed since 06/17/2021..    3.  Significant noncalcified pulmonary nodules in both lung bases most typical for new metastatic findings.    4.  Benign cyst right kidney and no follow-up recommended.   EKG 12-lead, tracing only   Result Value Ref Range    Systolic Blood Pressure  mmHg    Diastolic Blood Pressure  mmHg    Ventricular Rate 71 BPM    Atrial Rate 71 BPM    MD Interval 194 ms    QRS Duration 90 ms     ms    QTc 478 ms    P Axis  degrees    R AXIS 17 degrees    T Axis 53 degrees    Interpretation ECG       Sinus rhythm  Cannot rule out Inferior infarct , age undetermined  Abnormal ECG  When compared with ECG of 23-SEP-2021 18:17,  Aberrant conduction is no longer Present  T wave inversion no longer evident in Anterior leads  Nonspecific T wave abnormality, worse in Lateral leads  Confirmed by - EMERGENCY ROOM, PHYSICIAN ([id number]),  MAGDY KAPLAN (Otoniel) on 11/23/2022 6:37:16 AM     Troponin T, High Sensitivity (now)   Result Value Ref Range    Troponin T, High Sensitivity 49 (H) <=22  ng/L   Magnesium   Result Value Ref Range    Magnesium 2.1 1.7 - 2.3 mg/dL   Asymptomatic COVID-19 Virus (Coronavirus) by PCR Nasopharyngeal    Specimen: Nasopharyngeal; Swab   Result Value Ref Range    SARS CoV2 PCR Negative Negative    Narrative    Testing was performed using the Xpert Xpress SARS-CoV-2 Assay on the Cepheid Gene-Xpert Instrument Systems. Additional information about this Emergency Use Authorization (EUA) assay can be found via the Lab Guide. This test should be ordered for the detection of SARS-CoV-2 in individuals who meet SARS-CoV-2 clinical and/or epidemiological criteria as well as from individuals without symptoms or other reasons to suspect COVID-19. Test performance for asymptomatic patients has only been established in anterior nasal swab specimens. This test is for in vitro diagnostic use under the FDA EUA for laboratories certified under CLIA to perform high complexity testing. This test has not been FDA cleared or approved. A negative result does not rule out the presence of PCR inhibitors in the specimen or target RNA concentration below the limit of detection for the assay. The possibility of a false negative should be considered if the patient's recent exposure or clinical presentation suggests COVID-19. This test was validated by the [facility]. This laboratory is certified under the Clinical Laboratory Improvement Amendments (CLIA) as qualified to perform high complexity laboratory testing.       *Note: Due to a large number of results and/or encounters for the requested time period, some results have not been displayed. A complete set of results can be found in Results Review.

## 2022-11-23 NOTE — ED PROVIDER NOTES
"  History   Chief Complaint:  Abdominal Pain     The history is provided by the spouse. The history is limited by the condition of the patient.      Elier Leong is a 77 year old male with history of primary lateral sclerosis, ampullary cancer, CVA, DVT, hypertension, and GERD who presents with abdominal pain. His wife endorses intermittent upper abdominal pain and notes he has been on Protonix for a long time. He used Pepto-Bismol recently which provided some relief. He reportedly stated to her his stomach feels like there's an \"egg\" in there. She reports trouble sleeping due to abdominal pain and discomfort. She states his bladder incontinence has gotten worse.     Patient's wife also notes patient has been having difficulty speaking the past week or two. She also endorses fatigue and brain fog which is abnormal for him. She notes he's had 4 TIAs and ampullary cancer. Patient reportedly has progressive primary lateral sclerosis worsening in the past year. She states he has trouble walking and a reduced appetite. Patient reportedly still has appendix and gallbladder. No recent falls or use of alcohol. She reports no fever or chills. No vomiting, constipation, or black or bloody stools.     Review of Systems   Constitutional: Positive for appetite change (reduced) and fatigue. Negative for chills and fever.   Gastrointestinal: Positive for abdominal pain (upper). Negative for blood in stool, constipation and vomiting.   Musculoskeletal: Positive for gait problem.   Neurological: Positive for speech difficulty.   All other systems reviewed and are negative.    Allergies:  Penicillins    Medications:  Hygroton  Vasotec  Zetia  Ditropan XL  Protonix  Aspirin  Lioresal    Past Medical History:     Benign essential hypertension  Primary lateral sclerosis   Prostate CA   Hyperlipidemia   Cerebral infarction   Muscle spasticity  Gastroesophageal reflux disease without esophagitis  Cerebral atherosclerosis  Primary " osteoarthritis of right shoulder  Cancer of ampulla of Vater   Anemia, unspecified type  Cerebrovascular accident (CVA), unspecified mechanism   TIA x4   Vertigo   DVT  Stroke  Lumbar herniated disc     Past Surgical History:    Hernia repair x2  Biopsy of growth on leg  Colonoscopy x3  Endoscopic cholangiopancreatogram x5  Bile stents exchange  Upper GI endoscopy  EGD x3   Prostatectomy  Tonsillectomy & Adenoidectomy     Family History:    Mother: CHF, hypertension    Social History:  The patient presents to the ED with his wife  PCP: Lida Ray     Physical Exam     Patient Vitals for the past 24 hrs:   BP Temp Temp src Pulse Resp SpO2   11/23/22 0130 134/72 -- -- 61 -- 98 %   11/23/22 0100 125/88 -- -- 60 -- 98 %   11/23/22 0030 120/75 -- -- 71 -- 99 %   11/23/22 0000 110/83 -- -- 73 -- 98 %   11/22/22 2047 115/85 98.6  F (37  C) Oral 68 20 95 %   11/22/22 2044 -- 98.6  F (37  C) -- -- -- --     Physical Exam  General: alert, lying comfortably on gurney  HENT: mucous membranes slightly dry  CV: regular rate, regular rhythm  Resp: normal effort, clear throughout, no crackles or wheezing  GI: abdomen soft with mild epigastric abdominal tenderness.  No rebound or guarding, negative Crawford sign.  MSK: no bony tenderness  Skin: appropriately warm and dry  Extremities: no edema, calves non-tender  Neuro: alert, clear speech, oriented  Psych: normal mood and affect    Emergency Department Course   ECG  ECG results from 11/22/22   EKG 12-lead, tracing only     Value    Systolic Blood Pressure     Diastolic Blood Pressure     Ventricular Rate 71    Atrial Rate 71    AR Interval 194    QRS Duration 90        QTc 478    P Axis     R AXIS 17    T Axis 53    Interpretation ECG      Sinus rhythm  Cannot rule out Inferior infarct , age undetermined  Abnormal ECG  When compared with ECG of 23-SEP-2021 18:17,  Aberrant conduction is no longer Present  T wave inversion no longer evident in Anterior leads  Nonspecific T  wave abnormality, worse in Lateral leads       *Note: Due to a large number of results and/or encounters for the requested time period, some results have not been displayed. A complete set of results can be found in Results Review.     Imaging:  CT Abdomen Pelvis w Contrast   Final Result   IMPRESSION:    1.  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      2.  Mild adenopathy most marked left periaortic region which has mildly progressed since 06/17/2021..      3.  Significant noncalcified pulmonary nodules in both lung bases most typical for new metastatic findings.      4.  Benign cyst right kidney and no follow-up recommended.        Report per radiology    Laboratory:  Labs Ordered and Resulted from Time of ED Arrival to Time of ED Departure   COMPREHENSIVE METABOLIC PANEL - Abnormal       Result Value    Sodium 127 (*)     Potassium 4.4      Chloride 89 (*)     Carbon Dioxide (CO2) 27      Anion Gap 11      Urea Nitrogen 26.1 (*)     Creatinine 0.92      Calcium 9.5      Glucose 116 (*)     Alkaline Phosphatase 155 (*)     AST 58 (*)     ALT 48      Protein Total 7.3      Albumin 4.3      Bilirubin Total 0.4      GFR Estimate 85     LIPASE - Abnormal    Lipase 466 (*)    TROPONIN T, HIGH SENSITIVITY - Abnormal    Troponin T, High Sensitivity 50 (*)    CBC WITH PLATELETS AND DIFFERENTIAL - Abnormal    WBC Count 8.8      RBC Count 4.50      Hemoglobin 13.1 (*)     Hematocrit 40.1      MCV 89      MCH 29.1      MCHC 32.7      RDW 14.6      Platelet Count 311      % Neutrophils 67      % Lymphocytes 19      % Monocytes 11      % Eosinophils 1      % Basophils 1      % Immature Granulocytes 1      NRBCs per 100 WBC 0      Absolute Neutrophils 5.9      Absolute Lymphocytes 1.7      Absolute Monocytes 1.0      Absolute Eosinophils 0.1      Absolute Basophils 0.0      Absolute Immature Granulocytes 0.1       Absolute NRBCs 0.0     TROPONIN T, HIGH SENSITIVITY      Emergency Department Course:     Reviewed:  I reviewed nursing notes, vitals, past medical history and Care Everywhere    Assessments:  2219 I obtained history and examined the patient as noted above.    I rechecked the patient and explained findings.     Consults:  0300 I spoke with Dr. Marquez of the hospitalist service, who agreed to admit the patient.     Interventions:  Medications   iopamidol (ISOVUE-370) solution 500 mL (76 mLs Intravenous Given 11/23/22 0009)   CT scan flush (59 mLs Intravenous Given 11/23/22 0011)     Disposition:  The patient was admitted to the hospital under the care of Dr. Marquez.     Impression & Plan     Medical Decision Making:  Elier Leong is a 77 year old male with history of primary lateral sclerosis, ampullary cancer, CVA, DVT, hypertension, and GERD who presents with abdominal pain.  On exam, the patient is afebrile.  He has mild abdominal tenderness.  Labs demonstrate mild hyponatremia, likely related to poor oral intake.  Bilirubin is normal, as are LFTs except for slightly elevated AST.  Lipase is slightly elevated.  CT demonstrates currents of ampullary carcinoma, with doubling of the size of the mass in the pancreatic head, and associated biliary duct dilatation.  The patient requires admission to the hospital for management of pancreatitis in the setting of obstructive mass, gastroenterology evaluation, and to consider placement of another stent.    Diagnosis:    ICD-10-CM    1. Other acute pancreatitis without infection or necrosis  K85.80       2. Pancreatic mass  K86.89           Scribe Disclosure:  I, Carlton Jefferson, am serving as a scribe at 10:19 PM on 11/22/2022 to document services personally performed by Alicia Aguilera MD based on my observations and the provider's statements to me.          Alicia Aguilera MD  11/23/22 5698

## 2022-11-23 NOTE — PROGRESS NOTES
SPIRITUAL HEALTH SERVICES Progress Note    ED 9    Saw pt Elier Leong per [facility] consult.  I introduced myself and oriented Nader and Kelli, his spouse to [facility].      Illness Narrative - recurrent cancer.      Distress - Both were a little overcome by the probable news of recurrent cancer and what that means for both of them.  Nader was especially teary.      Coping - They asked for prayer for discernment about decisions they will probably have to make soon.  Kelli said that they had a good support system.      Meaning-Making - They are grateful for the lives they have lived together.      Plan - [facility] will be available for additional support.    Rhiannon Helms  Associate   Pager number:  [phone]

## 2022-11-23 NOTE — PROGRESS NOTES
Called patient's wife and went over CT result with her.  Findings very concerning for recurrent metastatic ampullary cancer.  After further discussion, we decided that hospice would be the most reasonable option at this time.  Please have  set up hospice informational sessions with [facility].    Jovi Bird M.D.  [facility]  [phone]

## 2022-11-23 NOTE — ED TRIAGE NOTES
"Pt presents to ED with abdominal pain. States it \"feels like he has an egg in his stomach.\" having trouble sleeping due to the pain. Pain has been going on for a couple days. Taking pepto which is helpful for a short time only.   Hx Primary lateral sclerosis.   Per wife, feels like his speaking is getting more difficult to understand, increased weakness, increased incontinence recently       "

## 2022-11-23 NOTE — CONSULTS
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  He most likely also has pancreatitis due blockage of the pancreatic ducts by recurrent disease.  In order to confirm this, he would need an ERCP and CT Chest for further staging.    If he has recurrent metastatic disease, Elier would not wish to pursue any systemic therapy, especially with his progressive neurologic disease in mind.  In this case, I think hospice would be very reasonable for him.    PLAN:  - CT Chest and GI consult for consideration of ERCP  - If Elier is confirmed to have recurrent metastatic ampullary carcinoma, he would not wish to pursue any systemic therapy.  In this case, hospice should be strongly considered  - Agree with palliative care consultation.    Jovi Bird M.D.  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  I last saw him in clinic over a year ago.  At that time, the patient did not wish to pursue any radiation therapy or systemic therapy after his endoscopic resection.  He was also not interested in routine imaging studies for surveillance.  With that said, he has been following up with Dr. Kent for routine ERCP.  His last ERCP from June 2022 did not show any evidence of cancer recurrence.  He has not been feeling well over the past month, with fatigue, generalized weakness, and decreased appetite.  Over  the past few days, he has also developed worsening abdominal pain.  CT scan of the abdomen and pelvis showed findings consistent with enlarging neoplasm near the pancreatic head, likely recurrent ampullary carcinoma, with associated obstructive changes of the pancreatic and common bile duct.  There is also mild adenopathy in the periaortic region as well as bilateral lung nodules at the lung bases concerning for metastatic disease              Past Medical History:     Past Medical History:   Diagnosis Date     Basal cell carcinoma nos     sees derm     Cancer of ampulla of Vater (H)      CVA (cerebral infarction) 06/2014    small vessel right int capsule stroke     Deep venous thrombosis (DVT) of peroneal vein (H) 05/12/2017    right peroneal vein     Essential hypertension, benign      Hearing loss      Hoarseness of voice     assoc with PLS     Lumbar radiculopathy     2017     Primary Lateral Sclerosis 2002    has baclofen pump through Dr. Calvert, [facility], sees Dr. Fink, [facility]     Primary osteoarthritis of right shoulder 06/16/2021     Prostate CA (H) 2008    prostatectomy     Vertigo 02/21/2013             Past Surgical History:     Past Surgical History:   Procedure Laterality Date     ABDOMEN SURGERY  11/12    Hernia     BIOPSY  4/16    Cancerous growth on leg. Removed by MOHs treatment     COLONOSCOPY N/A 8/3/2021    Procedure: COLONOSCOPY;  Surgeon: Luis Antonio Jung MD;  Location:  GI     COLONOSCOPY N/A 8/3/2021    Procedure: Colonoscopy, With Polypectomy And Biopsy;  Surgeon: Luis Antonio Jung MD;  Location:  GI     ENDOSCOPIC RETROGRADE CHOLANGIOPANCREATOGRAM N/A 6/17/2021    Procedure: ENDOSCOPIC RETROGRADE CHOLANGIOPANCREATOGRAPHY, BILIARY STENT PLACEMENT;  Surgeon: Sima Galvez MD;  Location:  OR     ENDOSCOPIC RETROGRADE CHOLANGIOPANCREATOGRAM N/A 8/19/2021    Procedure: ENDOSCOPIC RETROGRADE CHOLANGIOPANCREATOGRAPHY;  Surgeon: Agus Kent MD;  Location:   OR     ENDOSCOPIC RETROGRADE CHOLANGIOPANCREATOGRAM N/A 6/9/2022    Procedure: ENDOSCOPIC RETROGRADE CHOLANGIOPANCREATOGRAPHY, PANCREATIC STENT PLACEMENT AND BIOPSIES;  Surgeon: Agus Kent MD;  Location: [facility] OR     ENDOSCOPIC RETROGRADE CHOLANGIOPANCREATOGRAM COMPLEX N/A 7/12/2021    Procedure: ENDOSCOPIC RETROGRADE CHOLANGIOPANCREATOGRAPHY WITH AMPULLECTOMY, PANCREATIC DUCT STENT PLACEMENT AND BILIARY STENT PLACEMENT;  Surgeon: Agus Kent MD;  Location: [facility] OR     ENDOSCOPIC RETROGRADE CHOLANGIOPANCREATOGRAPHY, EXCHANGE TUBE/STENT N/A 7/14/2021    Procedure: ENDOSCOPIC RETROGRADE CHOLANGIOPANCREATOGRAPHY with bile duct stents exchanged, balloon sweep of bile ducts, hemostasis;  Surgeon: Guru Bhavesh Arreola MD;  Location: [facility] OR     ENDOSCOPIC ULTRASOUND UPPER GASTROINTESTINAL TRACT (GI) N/A 7/12/2021    Procedure: ENDOSCOPIC ULTRASOUND, ESOPHAGOSCOPY / UPPER GASTROINTESTINAL TRACT (GI);  Surgeon: Agus Kent MD;  Location: [facility] OR     ESOPHAGOSCOPY, GASTROSCOPY, DUODENOSCOPY (EGD), COMBINED N/A 6/17/2021    Procedure: ESOPHAGOGASTRODUODENOSCOPY, WITH ENDOSCOPIC US, fine needle aspiration;  Surgeon: Sima Galvez MD;  Location:  OR     ESOPHAGOSCOPY, GASTROSCOPY, DUODENOSCOPY (EGD), COMBINED N/A 7/14/2021    Procedure: ESOPHAGOGASTRODUODENOSCOPY (EGD);  Surgeon: Guru Bhavesh Arreola MD;  Location: [facility] OR     ESOPHAGOSCOPY, GASTROSCOPY, DUODENOSCOPY (EGD), COMBINED N/A 8/3/2021    Procedure: Esophagoscopy, gastroscopy, duodenoscopy (EGD), combined;  Surgeon: Luis Antonio Jung MD;  Location: [facility] GI     HERNIA REPAIR  11/12    Repaired     PROSTATE SURGERY       [facility] NONSPECIFIC PROCEDURE  6/08     prostatectomy      [facility] NONSPECIFIC PROCEDURE  11/01    colonoscopy     [facility] NONSPECIFIC PROCEDURE  11/2006    hernia, right side     [facility] NONSPECIFIC PROCEDURE      T + A               Social History:     Social History     Tobacco Use     Smoking status: Former      Packs/day: 0.30     Years: 6.00     Pack years: 1.80     Types: Cigarettes     Start date: 1970     Quit date: 10/5/1976     Years since quittin.1     Smokeless tobacco: Never   Substance Use Topics     Alcohol use: Yes     Comment: 1 drink/week             Family History:     Family History   Problem Relation Age of Onset     Heart Disease Mother         CHF     Hypertension Mother      C.A.D. Maternal Grandfather      Cerebrovascular Disease Maternal Uncle         45     Cerebrovascular Disease Maternal Uncle              Medications:   (Not in a hospital admission)            Review of Systems:   The 10 point Review of Systems is negative other than noted in the HPI            Physical Exam:   Vitals were reviewed  Temp: 98.6  F (37  C) Temp src: Oral BP: 124/82 Pulse: 64   Resp: 20 SpO2: 97 % O2 Device: None (Room air)    General: Awake, alert.  No in acute distress  Skin: No rash.    Heart: RRR.  No murmurs  Lungs: Clear  Abd: Epigastric area tender to palpation.  Normal BS.  Nondistended  Ext: Trace edema       Data:     Lab Results   Component Value Date    WBC 8.8 2022    HGB 13.1 (L) 2022    HCT 40.1 2022     2022     (L) 2022    POTASSIUM 4.4 2022    CHLORIDE 89 (L) 2022    CO2 27 2022    BUN 26.1 (H) 2022    CR 0.92 2022     (H) 2022    TROPONIN 0.030 2021    TROPI <0.015 2020    AST 58 (H) 2022    ALT 48 2022    ALKPHOS 155 (H) 2022    BILITOTAL 0.4 2022    INR 1.06 2022

## 2022-11-23 NOTE — H&P
[facility] [place]    History and Physical - Hospitalist Service       Date of Admission:  11/22/2022    Assessment & Plan      Elier Leong is a 77 year old male admitted on 11/22/2022 with recurrent ampulla of vater/pancreatic tumor. He has history of primary lateral sclerosis, baclofen pump, stroke, tumor of ampulla of vater, basal cell carcinoma, DVT, hypertension, lumbar radiculopathy, severe osteoarthritis of shoulders, prostate cancer, and vertigo.  With regards to his primary lateral sclerosis, he is followed in the ALS clinic at the [place].  He is scooter dependent.  His wife provides all cares.  He has had decline in recent months with weight loss (uncertain how much) and deterioration of speech.  With regard to his ampulla of vater tumor, this was diagnosed by ERCP on 6/16/2021.  He has followed with Dr. Kent who I believe is a gastroenterologist at [facility].  He had ERCP with resection of mass and stent placement on 7/12/21.  Unfortunately, margins were positive.  He saw Dr. Bird of [place] [facility].  Ultimately, it was decided not to pursue chemotherapy given his overall performance status related to his primary lateral sclerosis.  ERCP was repeated on 8/19/2021 with multiple biopsies.  He last had ERCP on 6/9/2022 with removal of pancreatic duct stents and placement of temporary stent that was expected to pass spontaneously.  There was no visualized recurrence of tumor at that time.  Staging CT of abdomen and pelvis was done on 6/17/2022 and showed no signs of metastatic disease.    Nader presented to the [facility] emergency department last night (11/22/2022) for evaluation of 1 month of progressive abdominal pain and fullness.  He states that he feels like he has an egg stuck in his abdomen.  He has been eating poorly and sleeping poorly.  His speech has been getting progressively worse.  He describes some confusion and brain fog.  Emergency  department evaluation showed stable vital signs.  Laboratory evaluation showed sodium 127, chloride 89, BUN 26.1, and creatinine 2.92.  Liver function tests were pretty normal except for alkaline phosphatase of 155 and AST of 58.  Lipase was little bit elevated at 466.  Troponins were elevated at 50 and 49.  Hemoglobin was 13.1 and CBC was otherwise unremarkable.  CT of abdomen and pelvis showed findings consistent with advancing neoplasm of the pancreas and in both lung bases with associated obstructive changes of the pancreatic and common bile duct.  There was mild adenopathy most marked in the left periaortic region which is mildly progressed since 6/17/2021.  Significant noncalcified pulmonary nodules in both lungs were noted and suspicious for new metastatic disease.  A benign appearing cyst in the right kidney was noted.  I was asked to admit Nader to the hospital for further evaluation and treatment.    Problem list:    Recurrence of ampulla of vater tumor now with involvement of the pancreas and apparent metastases to lung  Abdominal pain  Possible pancreatitis  Weight loss  Poor oral intake  Hyponatremia, likely related to poor oral intake and volume depletion  -Admit as inpatient  -N.p.o. for now with normal saline  -Consult gastroenterology for consideration of stent placement again  -Consult [place] [facility] regarding progression of ampulla tumor, now likely metastatic  -Palliative care consult regarding overall care plan.  Patient had previously decided not to pursue chemotherapy.  In the setting of apparent metastatic recurrence and discussion about goals of care seems appropriate.  I discussed CODE STATUS with the patient and his wife and they agreed that he should not have CPR or intubation  -Tylenol, oral Dilaudid, and IV Dilaudid as needed for pain  -Oral and IV Zofran as needed for nausea  -Daily basic metabolic panel  -Repeat CBC and lipase in the morning    Troponin elevation  -Troponins were  flat without rise  -No chest pain  -No further evaluation planned    Primary lateral sclerosis  -Has had weight loss over the last several months  -Speech has gotten weaker and more difficult to understand in the last month or 2  -Has baclofen pump in place    Chronic pain related to severe arthritis of both shoulders  -Analgesics as needed    COVID-19 testing was negative       Diet:   NPO with IVF   DVT Prophylaxis: Pneumatic Compression Devices  Wright Catheter: Not present  Central Lines: None  Cardiac Monitoring: None  Code Status:   DNR/DNI    Clinically Significant Risk Factors Present on Admission         # Hyponatremia: Lowest Na = 127 mmol/L (Ref range: 136-145) in last 2 days, will monitor as appropriate          # Hypertension: home medication list includes antihypertensive(s)             Disposition Plan      Expected Discharge Date: 11/25/2022                The patient's care was discussed with the Patient and Patient's Family.    Robby Marquez MD  Hospitalist Service  [facility]  Securely message with the Vocera Web Console (learn more here)  Text page via [employer] Paging/Directory         ______________________________________________________________________    Chief Complaint   Abdominal pain    History is obtained from the patient, his wife, Dr. Aguilera, and the medical record    History of Present Illness   Elier Leong is a 77 year old male admitted on 11/22/2022 with recurrent ampulla of vater/pancreatic tumor. He has history of primary lateral sclerosis, baclofen pump, stroke, tumor of ampulla of vater, basal cell carcinoma, DVT, hypertension, lumbar radiculopathy, severe osteoarthritis of shoulders, prostate cancer, and vertigo.  With regards to his primary lateral sclerosis, he is followed in the ALS clinic at the [place].  He is scooter dependent.  His wife provides all cares.  He has had decline in recent months with weight loss (uncertain how much) and  deterioration of speech.  With regard to his ampulla of vater tumor, this was diagnosed by ERCP on 6/16/2021.  He has followed with Dr. Kent who I believe is a gastroenterologist at [facility].  He had ERCP with resection of mass and stent placement on 7/12/21.  Unfortunately, margins were positive.  He saw Dr. Bird of [place] [facility].  Ultimately, it was decided not to pursue chemotherapy given his overall performance status related to his primary lateral sclerosis.  ERCP was repeated on 8/19/2021 with multiple biopsies.  He last had ERCP on 6/9/2022 with removal of pancreatic duct stents and placement of temporary stent that was expected to pass spontaneously.  There was no visualized recurrence of tumor at that time.  Staging CT of abdomen and pelvis was done on 6/17/2022 and showed no signs of metastatic disease.    Nader presented to the [facility] emergency department last night (11/22/2022) for evaluation of 1 month of progressive abdominal pain and fullness.  He states that he feels like he has an egg stuck in his abdomen.  He has been eating poorly and sleeping poorly.  His speech has been getting progressively worse.  He describes some confusion and brain fog.  Emergency department evaluation showed stable vital signs.  Laboratory evaluation showed sodium 127, chloride 89, BUN 26.1, and creatinine 2.92.  Liver function tests were pretty normal except for alkaline phosphatase of 155 and AST of 58.  Lipase was little bit elevated at 466.  Troponins were elevated at 50 and 49.  Hemoglobin was 13.1 and CBC was otherwise unremarkable.  CT of abdomen and pelvis showed findings consistent with advancing neoplasm of the pancreas and in both lung bases with associated obstructive changes of the pancreatic and common bile duct.  There was mild adenopathy most marked in the left periaortic region which is mildly progressed since 6/17/2021.  Significant noncalcified pulmonary nodules  in both lungs were noted and suspicious for new metastatic disease.  A benign appearing cyst in the right kidney was noted.  I was asked to admit Nader to the hospital for further evaluation and treatment.    Review of Systems    The 10 point Review of Systems is negative other than noted in the HPI or here.     Past Medical History    I have reviewed this patient's medical history and updated it with pertinent information if needed.   Past Medical History:   Diagnosis Date     Basal cell carcinoma nos     sees derm     Cancer of ampulla of Vater (H)      CVA (cerebral infarction) 06/2014    small vessel right int capsule stroke     Deep venous thrombosis (DVT) of peroneal vein (H) 05/12/2017    right peroneal vein     Essential hypertension, benign      Hearing loss      Hoarseness of voice     assoc with PLS     Lumbar radiculopathy     2017     Primary Lateral Sclerosis 2002    has baclofen pump through Dr. Calvert, [place], sees Stefania Preston     Primary osteoarthritis of right shoulder 06/16/2021     Prostate CA (H) 2008    prostatectomy     Vertigo 02/21/2013       Past Surgical History   I have reviewed this patient's surgical history and updated it with pertinent information if needed.  Past Surgical History:   Procedure Laterality Date     ABDOMEN SURGERY  11/12    Hernia     BIOPSY  4/16    Cancerous growth on leg. Removed by MOHs treatment     COLONOSCOPY N/A 8/3/2021    Procedure: COLONOSCOPY;  Surgeon: Luis Antonio Jung MD;  [facility]: [facility]     COLONOSCOPY N/A 8/3/2021    Procedure: Colonoscopy, With Polypectomy And Biopsy;  Surgeon: Luis Antonio Jung MD;  [facility]     ENDOSCOPIC RETROGRADE CHOLANGIOPANCREATOGRAM N/A 6/17/2021    Procedure: ENDOSCOPIC RETROGRADE CHOLANGIOPANCREATOGRAPHY, BILIARY STENT PLACEMENT;  Surgeon: Sima Galvez MD;  Location:  OR     ENDOSCOPIC RETROGRADE CHOLANGIOPANCREATOGRAM N/A 8/19/2021    Procedure: ENDOSCOPIC RETROGRADE  CHOLANGIOPANCREATOGRAPHY;  Surgeon: Agus Kent MD;  Location: [facility]     ENDOSCOPIC RETROGRADE CHOLANGIOPANCREATOGRAM N/A 6/9/2022    Procedure: ENDOSCOPIC RETROGRADE CHOLANGIOPANCREATOGRAPHY, PANCREATIC STENT PLACEMENT AND BIOPSIES;  Surgeon: Agus Kent MD;  Location: [facility] OR     ENDOSCOPIC RETROGRADE CHOLANGIOPANCREATOGRAM COMPLEX N/A 7/12/2021    Procedure: ENDOSCOPIC RETROGRADE CHOLANGIOPANCREATOGRAPHY WITH AMPULLECTOMY, PANCREATIC DUCT STENT PLACEMENT AND BILIARY STENT PLACEMENT;  Surgeon: Agus Kent MD;  [facility]     ENDOSCOPIC RETROGRADE CHOLANGIOPANCREATOGRAPHY, EXCHANGE TUBE/STENT N/A 7/14/2021    Procedure: ENDOSCOPIC RETROGRADE CHOLANGIOPANCREATOGRAPHY with bile duct stents exchanged, balloon sweep of bile ducts, hemostasis;  Surgeon: Guru Bhavesh Arreola MD;  [facility]     ENDOSCOPIC ULTRASOUND UPPER GASTROINTESTINAL TRACT (GI) N/A 7/12/2021    Procedure: ENDOSCOPIC ULTRASOUND, ESOPHAGOSCOPY / UPPER GASTROINTESTINAL TRACT (GI);  Surgeon: Agus Kent MD;  [facility]     ESOPHAGOSCOPY, GASTROSCOPY, DUODENOSCOPY (EGD), COMBINED N/A 6/17/2021    Procedure: ESOPHAGOGASTRODUODENOSCOPY, WITH ENDOSCOPIC US, fine needle aspiration;  Surgeon: Sima Galvez MD;  Location:  OR     ESOPHAGOSCOPY, GASTROSCOPY, DUODENOSCOPY (EGD), COMBINED N/A 7/14/2021    Procedure: ESOPHAGOGASTRODUODENOSCOPY (EGD);  Surgeon: Guru Bhavesh Arreola MD;  [facility]     ESOPHAGOSCOPY, GASTROSCOPY, DUODENOSCOPY (EGD), COMBINED N/A 8/3/2021    Procedure: Esophagoscopy, gastroscopy, duodenoscopy (EGD), combined;  Surgeon: Luis Antonio Jung MD;  [facility]     HERNIA REPAIR  11/12    Repaired     PROSTATE SURGERY       [facility] NONSPECIFIC PROCEDURE  6/08     prostatectomy      ZZC NONSPECIFIC PROCEDURE  11/01    colonoscopy     ZZC NONSPECIFIC PROCEDURE  11/2006    hernia, right side     Z NONSPECIFIC PROCEDURE      T + A       Social History   I  have reviewed this patient's social history and updated it with pertinent information if needed.  Social History     Tobacco Use     Smoking status: Former     Packs/day: 0.30     Years: 6.00     Pack years: 1.80     Types: Cigarettes     Start date: 1970     Quit date: 10/5/1976     Years since quittin.1     Smokeless tobacco: Never   Substance Use Topics     Alcohol use: Yes     Comment: 1 drink/week     Drug use: No       Family History   I have reviewed this patient's family history and updated it with pertinent information if needed.  Family History   Problem Relation Age of Onset     Heart Disease Mother         CHF     Hypertension Mother      C.A.D. Maternal Grandfather      Cerebrovascular Disease Maternal Uncle         45     Cerebrovascular Disease Maternal Uncle        Prior to Admission Medications   Prior to Admission Medications   Prescriptions Last Dose Informant Patient Reported? Taking?   EPINEPHrine 0.3 MG/0.3ML injection  Spouse/Significant Other No No   Sig: Inject 0.3 mLs (0.3 mg) into the muscle as needed for anaphylaxis   HYDROcodone-acetaminophen (NORCO) 5-325 MG tablet   Yes No   Sig: TAKE 1 TABLET BY MOUTH TWICE A DAY AS NEEDED FOR PAIN   Patient not taking: Reported on 2022   acetaminophen (TYLENOL) 650 MG CR tablet  Spouse/Significant Other Yes No   Sig: Take 650 mg by mouth every 8 hours as needed for mild pain (Shoulder pain)    aspirin (ASA) 325 MG EC tablet   Yes No   Sig: Take 1 tablet (325 mg) by mouth daily   baclofen (LIORESAL) intraTHECAL Internal Pump  Spouse/Significant Other Yes No   Sig: by Intrathecal route continuous prn Pump filled by [facility] stroke [place] [phone]  Pump Model: Syncromed  Medication in pump is Gablofen (brand name)  Last fill: during hospital admission  Next fill:     Low [place] Alarm Date:   Reservoir Volume: 20 ml  Conc: 2000 mcg/mL  Delivers 234.7 mcg/day  Basal rate:unknown  Battery life:  "unknown   chlorthalidone (HYGROTON) 25 MG tablet   No No   Sig: Take 1 tablet (25 mg) by mouth daily   clindamycin (CLEOCIN) 300 MG capsule   No No   Sig: Take 1 capsule (300 mg) by mouth 3 times daily   Patient not taking: Reported on 11/14/2022   enalapril (VASOTEC) 10 MG tablet   No No   Sig: Take 2 tablets (20 mg) by mouth 2 times daily   ezetimibe (ZETIA) 10 MG tablet   No No   Sig: Take 1 tablet (10 mg) by mouth daily   oxybutynin ER (DITROPAN XL) 10 MG 24 hr tablet   No No   Sig: Take 2 tablets (20 mg) by mouth At Bedtime   pantoprazole (PROTONIX) 40 MG EC tablet   No No   Sig: Take 1 tablet (40 mg) by mouth 2 times daily   Patient taking differently: Take 40 mg by mouth daily   polyethylene glycol (MIRALAX) 17 g packet   Yes No   Sig: Take 1 packet by mouth as needed for constipation      Facility-Administered Medications: None     Allergies   Allergies   Allergen Reactions     Bee Venom      Swelling and hives     Penicillins Hives and Swelling     \"HIVES\"; occurred at age 39       Physical Exam   Vital Signs: Temp: 98.6  F (37  C) Temp src: Oral BP: (!) 141/96 Pulse: 80   Resp: 20 SpO2: 97 %      Weight: 0 lbs 0 oz    GENERAL: Pleasant and cooperative. MIld acute distress. Garbled and weak speech. Thin   EYES: Pupils equal and round. No scleral erythema or icterus.  ENT: External ears are normal without deformity. Posterior oropharynx is without erythem, swelling, or exudate.  NECK: Supple. No masses or swelling. No tenderness. Thyroid is normal without mass or tenderness.  CHEST: Clear to auscultation. Normal breath sounds. No retractions.   CV: Regular rate and rhythm. No JVD. Pulses normal.  ABDOMEN: Bowel sounds present. Central, moderate tenderness. No masses or hernia.  EXTREMETIES: No clubbing, cyanosis, or ischemia.  SKIN: Warm and dry to touch. No wounds or rashes.  NEUROLOGIC: Strength and sensation are normal. Deep tendon reflexes are normal. Cranial nerves are normal.        Data   Data " reviewed today: I reviewed all medications, new labs and imaging results over the last 24 hours.    Recent Labs   Lab 11/22/22  2253   WBC 8.8   HGB 13.1*   MCV 89      *   POTASSIUM 4.4   CHLORIDE 89*   CO2 27   BUN 26.1*   CR 0.92   ANIONGAP 11   AMOS 9.5   *   ALBUMIN 4.3   PROTTOTAL 7.3   BILITOTAL 0.4   ALKPHOS 155*   ALT 48   AST 58*   LIPASE 466*     Recent Results (from the past 24 hour(s))   CT Abdomen Pelvis w Contrast    Narrative    EXAM: CT ABDOMEN PELVIS W CONTRAST  LOCATION: [facility]  DATE/TIME: 11/23/2022 12:20 AM    INDICATION: epigastric abdominal pain  COMPARISON: 06/17/2021  TECHNIQUE: CT scan of the abdomen and pelvis was performed following injection of IV contrast. Multiplanar reformats were obtained. Dose reduction techniques were used.  CONTRAST: 76mL Isovue 370    FINDINGS:   LOWER CHEST: Since 06/07/2021 there is been development of numerous indeterminate noncalcified pulmonary nodules bilaterally. The largest is seen in the posterior medial aspect of the right lower lobe and this measures approximately 7.0 x 9.4 mm.   Underlying changes of metastatic lesions are suspected.    HEPATOBILIARY: There is significant dilatation seen of the intra and extrahepatic bile ducts with the common bile duct measuring up to 15.8 mm in greatest radial dimension. There is tapering seen involving the distal common bile duct with no underlying   stone. The gallbladder and liver are normal in appearance.    PANCREAS: There is a 1.9 x 2.1 x 1.6 cm vague low density lesions seen involving the pancreatic head worrisome for changes of neoplasm. There is significant enlargement involving the pancreatic duct with a atrophy seen involving the body and tail regions   of the pancreas. On previous CT 06/17/2021 the low-density region in the pancreatic head measured 1.1 x 1.1 cm with only slight pancreatic duct dilatation in the tail region. SPLEEN: Normal.    ADRENAL GLANDS:  Normal.    KIDNEYS/BLADDER: Benign cyst in the right kidney with no follow-up recommended.    BOWEL: Mild obstipation.    LYMPH NODES: There are more than typically seen lymph nodes involving the retrocrural, peripancreatic, and periaortic regions. The largest lymph node involves the left periaortic region and this measures 1.7 x 1.3 cm.    VASCULATURE: Moderate calcification with no aneurysm.    PELVIC ORGANS: Surgically absent.    MUSCULOSKELETAL: Advanced multilevel degenerative changes in the lumbar spine.      Impression    IMPRESSION:   1.  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    2.  Mild adenopathy most marked left periaortic region which has mildly progressed since 06/17/2021..    3.  Significant noncalcified pulmonary nodules in both lung bases most typical for new metastatic findings.    4.  Benign cyst right kidney and no follow-up recommended.

## 2022-11-23 NOTE — UTILIZATION REVIEW
"  Admission Status; Secondary Review Determination     Admission Date: 11/22/2022  9:53 PM      Under the authority of the Utilization Management Committee, the utilization review process indicated a secondary review on the above patient.  The review outcome is based on review of the medical records, discussions with staff, and applying clinical experience noted on the date of the review.        (x)      Inpatient Status Appropriate - This patient's medical care is consistent with medical management for inpatient care and reasonable inpatient medical practice.      () Observation Status Appropriate - This patient does not meet hospital inpatient criteria and is placed in observation status. If this patient's primary payer is Medicare and was admitted as an inpatient, Condition Code 44 should be used and patient status changed to \"observation\".   () Admission Status NOT Appropriate - This patient's medical care is not consistent with medical management for Inpatient or Observation Status.          RATIONALE FOR DETERMINATION   Elier Leong is a 77 year old male admitted on 11/22/2022 with recurrent ampulla of vater/pancreatic tumor. He has history of primary lateral sclerosis, baclofen pump, stroke, tumor of ampulla of vater, basal cell carcinoma, DVT, hypertension, lumbar radiculopathy, severe osteoarthritis of shoulders, prostate cancer, and vertigo.  Presented to the hospital with abdominal pain.  He is found to have a mass concerning for recurrence of ampulla of vater tumor with pancreas involvement.  Also noted to have possible acute pancreatitis.  Currently requiring n.p.o. diet status.  On continuous IV fluids.  Has been seen in consultation by gastroenterology and oncology.  He is expected to require prolonged hospital stay.  Due to this patient's advanced age, very complex past medical history, recurrent tumor, acute pancreatitis, need for n.p.o. diet status, need for continuous IV fluids, and " expectation of a prolonged hospital stay, it is appropriate to have him admitted to the hospital as an inpatient.      The severity of illness, intensity of service provided, expected LOS and risk for adverse outcome make the care complex, high risk and appropriate for hospital admission.        The information on this document is developed by the utilization review team in order for the business office to ensure compliance.  This only denotes the appropriateness of proper admission status and does not reflect the quality of care rendered.         The definitions of Inpatient Status and Observation Status used in making the determination above are those provided in the CMS Coverage Manual, Chapter 1 and Chapter 6, section 70.4.      Sincerely,     Kemal Hoyt D.O.  Utilization Review/ Case Management  [facility].

## 2022-11-23 NOTE — CONSULTS
[facility]  Gastroenterology Consultation    Elier Leong  [id number] [facility] [id number]  77 year old male    Admission Date/Time: 11/22/2022  Primary Care Provider: Lida Ray    We were asked to see the patient in consultation by Dr. Marquez for evaluation of ampulla of vater tumor recurrence.    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    In regards to his ampulla of vater tumor this was diagnosed on 6/16/2021 via ERCP with Dr. Galvez.  He follows with Dr. Kent, GI, and Dr. Bird, [place] oncology.  He had a repeat ERCP with stent placement and resection of mass on 7/12/2021 with positive margins.  Patient decided not to pursue chemotherapy given his primary lateral sclerosis. ERCP 7/14/22 for post ampullectomy bleeding treated with epinephrine and thermal therapy. Repeat ERCP on 8/19/2021 for stent exchange, biopsies, and temporary prophylactic stent placement.  ERCP on 6/9/2022 with further biopsies as well as a temporary prophylactic stent.  Past ERCPs have noted dilatation of the pancreatic and common bile duct.    History is largely obtained through patient's wife.  She notes that in the last month he has not felt like himself.  He has had intermittent upper/epigastric abdominal pain that comes and goes.  He has also had an abdominal fullness.  Last night, they decided to present to [facility] ED as the new something was not right.  Patient also notes shoulder pain but this is from his known osteoarthritis.  He denies any nausea or vomiting.  Denies any change to his bowel habits.    In the ED, his CMP does show a largely unremarkable LFTs, minimal elevation in alkaline phosphatase at 155 and AST at 58.  ALT 48 and total  bilirubin 0.4.  His lipase is elevated at 466.  CBC shows hemoglobin of 13.1.  CT scan of his abdomen and pelvis with contrast shows findings that are consistent with advancing neoplasm of the pancreas and in both lung bases with some associated obstructive changes of the pancreatic and common bile duct, mild adenopathy that is mildly progressed since June 2021.    Denies tobacco use.  Very little alcohol use.  He takes Tylenol and daily aspirin.    Family history: Patient denies family history of GI conditions.    ROS: A comprehensive ten point review of systems was negative aside from those in mentioned in the HPI.      MEDICATIONS: No current facility-administered medications on file prior to encounter.  acetaminophen (TYLENOL) 650 MG CR tablet, Take 650 mg by mouth every 8 hours as needed for mild pain (Shoulder pain)   aspirin (ASA) 325 MG EC tablet, Take 1 tablet (325 mg) by mouth daily  baclofen (LIORESAL) intraTHECAL Internal Pump, by Intrathecal route continuous prn Pump filled by [facility] stroke [place] [phone]  Pump Model: Syncromed  Medication in pump is Gablofen (brand name)  Last fill: during hospital admission  Next fill:     [place] Alarm Date:   Reservoir Volume: 20 ml  Conc: 2000 mcg/mL  Delivers 234.7 mcg/day  Basal rate:unknown  Battery life: unknown  chlorthalidone (HYGROTON) 25 MG tablet, Take 1 tablet (25 mg) by mouth daily  clindamycin (CLEOCIN) 300 MG capsule, Take 1 capsule (300 mg) by mouth 3 times daily (Patient not taking: Reported on 11/14/2022)  enalapril (VASOTEC) 10 MG tablet, Take 2 tablets (20 mg) by mouth 2 times daily  EPINEPHrine 0.3 MG/0.3ML injection, Inject 0.3 mLs (0.3 mg) into the muscle as needed for anaphylaxis  ezetimibe (ZETIA) 10 MG tablet, Take 1 tablet (10 mg) by mouth daily  HYDROcodone-acetaminophen (NORCO) 5-325 MG tablet, TAKE 1 TABLET BY MOUTH TWICE A DAY AS NEEDED FOR PAIN (Patient not taking: Reported on  "11/14/2022)  oxybutynin ER (DITROPAN XL) 10 MG 24 hr tablet, Take 2 tablets (20 mg) by mouth At Bedtime  pantoprazole (PROTONIX) 40 MG EC tablet, Take 1 tablet (40 mg) by mouth 2 times daily (Patient taking differently: Take 40 mg by mouth daily)  polyethylene glycol (MIRALAX) 17 g packet, Take 1 packet by mouth as needed for constipation        ALLERGIES:   Allergies   Allergen Reactions     Bee Venom      Swelling and hives     Penicillins Hives and Swelling     \"HIVES\"; occurred at age 39       Past Medical History:   Diagnosis Date     Basal cell carcinoma nos     sees derm     Cancer of ampulla of Vater (H)      CVA (cerebral infarction) 06/2014    small vessel right int capsule stroke     Deep venous thrombosis (DVT) of peroneal vein (H) 05/12/2017    right peroneal vein     Essential hypertension, benign      Hearing loss      Hoarseness of voice     assoc with PLS     Lumbar radiculopathy     2017     Primary Lateral Sclerosis 2002    has baclofen pump through Dr. Calvert, [facility], sees Dr. Fink, [facility]     Primary osteoarthritis of right shoulder 06/16/2021     Prostate CA (H) 2008    prostatectomy     Vertigo 02/21/2013       Past Surgical History:   Procedure Laterality Date     ABDOMEN SURGERY  11/12    Hernia     BIOPSY  4/16    Cancerous growth on leg. Removed by MOHs treatment     COLONOSCOPY N/A 8/3/2021    Procedure: COLONOSCOPY;  Surgeon: Luis Antonio Jung MD;  Location:  GI     COLONOSCOPY N/A 8/3/2021    Procedure: Colonoscopy, With Polypectomy And Biopsy;  Surgeon: Luis Antonio Jung MD;  Location:  GI     ENDOSCOPIC RETROGRADE CHOLANGIOPANCREATOGRAM N/A 6/17/2021    Procedure: ENDOSCOPIC RETROGRADE CHOLANGIOPANCREATOGRAPHY, BILIARY STENT PLACEMENT;  Surgeon: Sima Galvez MD;  Location:  OR     ENDOSCOPIC RETROGRADE CHOLANGIOPANCREATOGRAM N/A 8/19/2021    Procedure: ENDOSCOPIC RETROGRADE CHOLANGIOPANCREATOGRAPHY;  Surgeon: Agus Kent MD;  Location:  OR "     ENDOSCOPIC RETROGRADE CHOLANGIOPANCREATOGRAM N/A 6/9/2022    Procedure: ENDOSCOPIC RETROGRADE CHOLANGIOPANCREATOGRAPHY, PANCREATIC STENT PLACEMENT AND BIOPSIES;  Surgeon: Agus Kent MD;  Location: [facility] OR     ENDOSCOPIC RETROGRADE CHOLANGIOPANCREATOGRAM COMPLEX N/A 7/12/2021    Procedure: ENDOSCOPIC RETROGRADE CHOLANGIOPANCREATOGRAPHY WITH AMPULLECTOMY, PANCREATIC DUCT STENT PLACEMENT AND BILIARY STENT PLACEMENT;  Surgeon: Agus Kent MD;  Location: [facility] OR     ENDOSCOPIC RETROGRADE CHOLANGIOPANCREATOGRAPHY, EXCHANGE TUBE/STENT N/A 7/14/2021    Procedure: ENDOSCOPIC RETROGRADE CHOLANGIOPANCREATOGRAPHY with bile duct stents exchanged, balloon sweep of bile ducts, hemostasis;  Surgeon: Guru Bhavesh Arreola MD;  Location: [facility] OR     ENDOSCOPIC ULTRASOUND UPPER GASTROINTESTINAL TRACT (GI) N/A 7/12/2021    Procedure: ENDOSCOPIC ULTRASOUND, ESOPHAGOSCOPY / UPPER GASTROINTESTINAL TRACT (GI);  Surgeon: Agus Kent MD;  Location: [facility] OR     ESOPHAGOSCOPY, GASTROSCOPY, DUODENOSCOPY (EGD), COMBINED N/A 6/17/2021    Procedure: ESOPHAGOGASTRODUODENOSCOPY, WITH ENDOSCOPIC US, fine needle aspiration;  Surgeon: Sima Galvez MD;  Location:  OR     ESOPHAGOSCOPY, GASTROSCOPY, DUODENOSCOPY (EGD), COMBINED N/A 7/14/2021    Procedure: ESOPHAGOGASTRODUODENOSCOPY (EGD);  Surgeon: Guru Bhavesh Arreola MD;  Location: [facility] OR     ESOPHAGOSCOPY, GASTROSCOPY, DUODENOSCOPY (EGD), COMBINED N/A 8/3/2021    Procedure: Esophagoscopy, gastroscopy, duodenoscopy (EGD), combined;  Surgeon: Luis Antonio Jung MD;  Location: [facility] GI     HERNIA REPAIR  11/12    Repaired     PROSTATE SURGERY       [facility] NONSPECIFIC PROCEDURE  6/08     prostatectomy      [facility] NONSPECIFIC PROCEDURE  11/01    colonoscopy     [facility] NONSPECIFIC PROCEDURE  11/2006    hernia, right side     [facility] NONSPECIFIC PROCEDURE      T + A         SOCIAL HISTORY:  Social History     Tobacco Use     Smoking status: Former     Packs/day:  0.30     Years: 6.00     Pack years: 1.80     Types: Cigarettes     Start date: 1970     Quit date: 10/5/1976     Years since quittin.1     Smokeless tobacco: Never   Substance Use Topics     Alcohol use: Yes     Comment: 1 drink/week     Drug use: No       FAMILY HISTORY:  Family History   Problem Relation Age of Onset     Heart Disease Mother         CHF     Hypertension Mother      C.A.D. Maternal Grandfather      Cerebrovascular Disease Maternal Uncle         45     Cerebrovascular Disease Maternal Uncle        PHYSICAL EXAM:   /82   Pulse 64   Temp 98.6  F (37  C) (Oral)   Resp 20   Ht 1.829 m (6')   Wt 70.3 kg (155 lb)   SpO2 97%   BMI 21.02 kg/m      Constitutional: NAD, comfortable, in scooter. Slurred speech  Cardiovascular: RRR  Respiratory: CTAB  Psychiatric: mentation appears normal and affect normal  Head: Normocephalic. Atraumatic.    Eyes:  No icterus  ENT: Hearing adequate  Abdomen: Abdomen is soft, possible mild distention, nontender to palpation, + BS  SKIN: no suspicious lesions or rashes      ADDITIONAL COMMENTS:   I reviewed the patient's new clinical lab test results.   Recent Labs   Lab Test 22  1112 22  0907 22  1345 21  1849 21  1847 21  0831   WBC 8.8 7.5 7.5   < >  --    < > 6.0   HGB 13.1* 13.8 13.0*   < >  --    < > 10.5*   MCV 89 84 84   < >  --    < > 92    276 272   < >  --    < > 325   INR  --   --  1.06  --  1.06  --  0.99    < > = values in this interval not displayed.     Recent Labs   Lab Test 22  1117 22  1112 22  0907   *  --  131* 136   POTASSIUM 4.4  --  4.4 4.1   CHLORIDE 89*  --  98 103   CO2 27  --  29 29   BUN 26.1*  --  15 18   CR 0.92 0.7 0.64* 0.75   ANIONGAP 11  --  4 4   AMOS 9.5  --  9.3 9.3   *  --  100* 102*     Recent Labs   Lab Test 22  2253 22  0907 22  1128 21  1600 21  0831 21  1416 21  1522  07/17/21  0927 07/16/21  0921 07/14/21  1119 07/12/21  1300 07/12/21  0848 06/19/21  0803 06/18/21  0830   ALBUMIN 4.3 3.7  --   --  3.1*  --    < >  --  2.5*   < >  --  3.2*  --  2.4*   BILITOTAL 0.4 0.6  --   --  0.4  --    < > 1.2 1.3   < >  --  1.1   < > 7.2*   DBIL  --   --   --   --   --   --   --  0.7*  --   --   --   --   --  6.0*   ALT 48 24  --   --  24  --    < >  --  56   < >  --  68   < > 130*   AST 58* 27  --   --  31  --    < >  --  133*   < >  --  53*   < > 113*   ALKPHOS 155* 86  --   --  108  --    < >  --  102   < >  --  182*  --  337*   PROTEIN  --   --  Negative Negative  --  >=300*   < >  --   --   --   --   --   --   --    LIPASE 466*  --   --   --   --   --   --   --  28*  --  616* 420*   < >  --    AMYLASE  --   --   --   --   --   --   --   --   --   --  120* 115*  --   --     < > = values in this interval not displayed.       CT a/p with contrast 11/23/22:                                                           IMPRESSION:   1.  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  2.  Mild adenopathy most marked left periaortic region which has mildly progressed since 06/17/2021..  3.  Significant noncalcified pulmonary nodules in both lung bases most typical for new metastatic findings.  4.  Benign cyst right kidney and no follow-up recommended.    CONSULTATION ASSESSMENT AND PLAN:    Active Problems:    Neoplasm of ampulla of Vater    Pancreatic mass    Other acute pancreatitis without infection or necrosis      Assessment: This 77-year-old man with history of PLS and cancer of the ampulla of vater (diagnosed June 2021 with multiple ERCPs for biopsy and ampullary resection with positive margins who follows with oncology and has decided not to pursue chemotherapy.  Past ERCPs have noted a dilatation of the pancreatic and common bile duct) presents to [facility] ED with 1 month  of intermittent abdominal pain and the feeling as if something is not right.  Work-up in the ED with CT scan abdomen and pelvis with contrast showing advancing neoplasm of the pancreas as well as both lung bases with obstructive changes in the pancreatic and common bile duct.  His LFTs are relatively unremarkable, a alkaline phosphatase 155, AST 58.  ALT and bilirubin are normal with ALT at 48 and total bilirubin 0.4.  Lipase is elevated at 466.     Discussed with Dr. Oleary with [facility] biliary service. The patient's pain and symptoms are likely from pancreatitis due to pancreatic duct blockage from cancer recurrence. With relatively normal LFTs, would not recommend ERCP/stenting at this time as this would not improve his current symptoms. Could consider in the future if LFTs rise.  Discussed this with the patient.  I agree with palliative care consult to discuss goals of care going forward, patient and his wife are in agreement.      Plan:   -- Monitor LFTs   -- Agree with palliative consult   -- Continue IV fluids  -- Continue pain management per primary team  -- From GI standpoint, OK for clear liquids as no plans for ERCP.    I discussed the patient's findings and plan with Dr. Oleary. I will discuss with Dr. Zhong, GI staff.       Approximately 60 minutes were spent on patient care.    INGE Sarah  [facility]  Office:  [phone] call if needed after 5PM  Cell:  [phone], not available after 5PM at this number    -----------------------  I agree with the assessment and plan of BREEZY Sarah.  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  Pancreatic mass is worsening.  He is admitted for abdominal discomfort.  Currently he denies abdominal pain to me.  He is feeling hungry and would like to eat something.  On physical exam he is thin, no acute distress, mild tenderness to palpation of the abdomen, heart regular rhythm and  rate, lungs clear to auscultation bilaterally.    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  Admitted with acute pancreatitis from the pancreatic mass.  LFTs are in a nonobstructive pattern and there is no need for an ERCP at this time (case reviewed by our biliary doctor).  Palliative Care has been consulted.  We will sign off but please call us back if anything is needed.    This was a shared visit and I spent about 20 minutes in the care of this patient.    Tea Zhong MD  [facility]

## 2022-11-23 NOTE — ED NOTES
Pt was straight cathed for 700ml. Was slightly traumatic, pt had blood in urine from difficulty getting into the bladder. Pt tolerated well, wife at bedside.

## 2022-11-23 NOTE — ED NOTES
Pt has been unable to void fully x2. Bladder scan 500ml. On call hospitalist paged for straight cath orders.

## 2022-11-23 NOTE — PHARMACY-ADMISSION MEDICATION HISTORY
Admission medication history interview status for this patient is complete. See [facility] admission navigator for allergy information, prior to admission medications and immunization status.     Medication history interview done, indicate source(s): Patient and Family  Medication history resources (including written lists, pill bottles, clinic record): Epic list, fill history, chart review.  Pharmacy: [facility]    Changes made to PTA medication list:  Added: none  Changed: pantoprazole back to bid  Reported as Not Taking: Norco  Removed: Clindamycin (completed), Norco(not taking)    Actions taken by pharmacist (provider contacted, etc):None     Additional medication history information:None    Medication reconciliation/reorder completed by provider prior to medication history?  Y   (Y/N)     Prior to Admission medications    Medication Sig Last Dose Taking? Auth Provider Long Term End Date   acetaminophen (TYLENOL) 650 MG CR tablet Take 650 mg by mouth every 8 hours as needed for mild pain (Shoulder pain) Takes 2 times daily plus one time daily as needed. 11/22/2022 at PM Yes Reported, Patient     aspirin (ASA) 325 MG EC tablet Take 325 mg by mouth every evening 11/22/2022 at PM Yes Lida Ray MD     baclofen (LIORESAL) intraTHECAL Internal Pump by Intrathecal route continuous prn Pump filled by [facility] stroke [place] [phone]  Pump Model: Syncromed  Medication in pump is Gablofen (brand name)  Last fill: during hospital admission  Next fill:     [place] Alarm Date:   Reservoir Volume: 20 ml  Conc: 2000 mcg/mL  Delivers 234.7 mcg/day  Basal rate:unknown  Battery life: unknown continuous Yes Unknown, Entered By History     chlorthalidone (HYGROTON) 25 MG tablet Take 1 tablet (25 mg) by mouth daily 11/22/2022 at AM Yes Steven Albert MD Yes    enalapril (VASOTEC) 10 MG tablet Take 2 tablets (20 mg) by mouth 2 times daily 11/22/2022 at PM Yes Lida Ray  MD CHIVO Yes    EPINEPHrine 0.3 MG/0.3ML injection Inject 0.3 mLs (0.3 mg) into the muscle as needed for anaphylaxis  Yes Lida Ray MD     ezetimibe (ZETIA) 10 MG tablet Take 1 tablet (10 mg) by mouth daily 11/22/2022 at AM Yes Lida Ray MD Yes    oxybutynin ER (DITROPAN XL) 10 MG 24 hr tablet Take 2 tablets (20 mg) by mouth At Bedtime 11/22/2022 at PM Yes Uvaldo Cavazos MD     pantoprazole (PROTONIX) 40 MG EC tablet Take 1 tablet (40 mg) by mouth 2 times daily 11/22/2022 at PM Yes Agus Kent MD     polyethylene glycol (MIRALAX) 17 g packet Take 1 packet by mouth as needed for constipation 2 weeks ago  Reported, Patient

## 2022-11-23 NOTE — PROGRESS NOTES
Care Management Follow Up    Length of Stay (days): 0    Expected Discharge Date: 11/25/2022       Additional Information:  Patient is open with [facility].     ISIDORO Roper, [facility]  Emergency Room   [phone]-Please contact the SW on the floor in which the patient is staying for any questions or concerns

## 2022-11-23 NOTE — PROVIDER NOTIFICATION
MD Notification    Notified Person: MD    Notified Person Name: Dr. Quiroz    Notification Date/Time: 11/23/2022 7962    Notification Interaction: Vocera page    Purpose of Notification: 1) Patient requesting diet to be advanced. Denies N/V/abdominal pain. +BS. GI does not plan to do ERCP. Please advance diet. 2) Wife is here and would like to discuss results of CT chest.    Orders Received: MD to see patient.    Comments:

## 2022-11-24 NOTE — PROGRESS NOTES
Hospitalist Medicine Progress Note   [facility]       Elier Leong is a 77 year old gentleman with ALS, DVT, hypertension, lumbar radiculopathy, severe osteoarthritis of shoulders, prostate cancer, vertigo, recurrent ampulla of Vater/pancreatic tumor who underwent surgery but had margins positive ultimately deciding not to have chemotherapy given his overall performance status due to his primary lateral sclerosis removal of pancreatic duct stents in the ERCP done 6/9/2022 and placement of temporary stents he came to [facility] 11/22/2022 with 1 month of progressive abdominal pain and fullness with decreased appetite and poor sleep at times confusion with brain fog sodium was 127 chloride 89 BUN 26 creatinine 2.9 alkaline phosphatase 155 AST 58 lipase 466 hemoglobin 13.1 CT of abdomen and pelvis showed advancing neoplasm of pancreas with associated obstructive changes of the pancreatic and common bile duct mild adenopathy in the left periaortic region.  Gastroenterology was consulted who thought that the patient's abdominal pain was due to pancreatitis with pancreatic duct blockage from cancer recurrence and worsening of cancer with normal LFTs ERCP was not recommended and palliative care consulted with the plan to advance her to hospice care       Date of Admission:  11/22/2022  Assessment & Plan     Acute pancreatitis  Advanced pancreatic/ampulla of Vater cancer  Abdominal pain  Weight loss  Poor oral intake  Hyponatremia  Primary lateral sclerosis  Chronic pain    DNR/DNI status          Plan:   Start Oral full liquids     Diet: Clear Liquid Diet    DVT Prophylaxis: Low Risk/Ambulatory with no VTE prophylaxis indicated  Wright Catheter: Not present  Code Status: No CPR- Do NOT Intubate               The patient's care was discussed with the Patient and Wife.    Aneudy Quiroz MD  Hospitalist Service  [facility]  Hospital    ______________________________________________________________________    Interval History     Symptoms   No nausea or vomiting    Review of Systems:   Abdominal pain is controlled by medications none present after eating    Data reviewed today: I reviewed all medications, new labs and imaging results over the last 24 hours.     Physical Exam   Vital Signs: Temp: 98.3  F (36.8  C) Temp src: Oral BP: 108/71 Pulse: 67   Resp: 18 SpO2: 96 % O2 Device: None (Room air)    Weight: 155 lbs 0 oz      GENERAL: Patient is not in acute distress  HEENT: EOM+,Conjunctiva is clear   NECK:  no Jugular Venous distention  HEART: S1 S2 regular Rate and Rhythm, there is  no murmur,   LUNGS: Respirations are  not laboured, Lungs are  clear to auscultation without Crepitations or Wheezing   ABDOMEN: Soft , there is no tenderness ,Bowel Sounds are  Positive   LOWER LIMBS: no  Pedal Edema  Bilaterally   CNS:  Alert,  Oriented x 3, Moving all the Four Limbs     Data   Recent Labs   Lab 11/22/22  2253   WBC 8.8   HGB 13.1*   MCV 89      *   POTASSIUM 4.4   CHLORIDE 89*   CO2 27   BUN 26.1*   CR 0.92   ANIONGAP 11   AMOS 9.5   *   ALBUMIN 4.3   PROTTOTAL 7.3   BILITOTAL 0.4   ALKPHOS 155*   ALT 48   AST 58*   LIPASE 466*         Recent Results (from the past 24 hour(s))   CT Abdomen Pelvis w Contrast    Narrative    EXAM: CT ABDOMEN PELVIS W CONTRAST  LOCATION: [facility]  DATE/TIME: 11/23/2022 12:20 AM    INDICATION: epigastric abdominal pain  COMPARISON: 06/17/2021  TECHNIQUE: CT scan of the abdomen and pelvis was performed following injection of IV contrast. Multiplanar reformats were obtained. Dose reduction techniques were used.  CONTRAST: 76mL Isovue 370    FINDINGS:   LOWER CHEST: Since 06/07/2021 there is been development of numerous indeterminate noncalcified pulmonary nodules bilaterally. The largest is seen in the posterior medial aspect of the right lower lobe and this  measures approximately 7.0 x 9.4 mm.   Underlying changes of metastatic lesions are suspected.    HEPATOBILIARY: There is significant dilatation seen of the intra and extrahepatic bile ducts with the common bile duct measuring up to 15.8 mm in greatest radial dimension. There is tapering seen involving the distal common bile duct with no underlying   stone. The gallbladder and liver are normal in appearance.    PANCREAS: There is a 1.9 x 2.1 x 1.6 cm vague low density lesions seen involving the pancreatic head worrisome for changes of neoplasm. There is significant enlargement involving the pancreatic duct with a atrophy seen involving the body and tail regions   of the pancreas. On previous CT 06/17/2021 the low-density region in the pancreatic head measured 1.1 x 1.1 cm with only slight pancreatic duct dilatation in the tail region. SPLEEN: Normal.    ADRENAL GLANDS: Normal.    KIDNEYS/BLADDER: Benign cyst in the right kidney with no follow-up recommended.    BOWEL: Mild obstipation.    LYMPH NODES: There are more than typically seen lymph nodes involving the retrocrural, peripancreatic, and periaortic regions. The largest lymph node involves the left periaortic region and this measures 1.7 x 1.3 cm.    VASCULATURE: Moderate calcification with no aneurysm.    PELVIC ORGANS: Surgically absent.    MUSCULOSKELETAL: Advanced multilevel degenerative changes in the lumbar spine.      Impression    IMPRESSION:   1.  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    2.  Mild adenopathy most marked left periaortic region which has mildly progressed since 06/17/2021..    3.  Significant noncalcified pulmonary nodules in both lung bases most typical for new metastatic findings.    4.  Benign cyst right kidney and no follow-up recommended.   CT Chest w/o Contrast    Narrative    CT CHEST WITHOUT CONTRAST  11/23/2022  12:25 PM    CLINICAL HISTORY: Suspected recurrent ampullary carcinoma with lung  metastases.    TECHNIQUE: CT chest without IV contrast. Multiplanar reformats were  obtained. Dose reduction techniques were used.  CONTRAST: None.    COMPARISON: CT chest, abdomen and pelvis 6/17/2021.    FINDINGS:   LUNGS AND PLEURA: Multiple new solid bilateral pulmonary nodules  involving all lobes of both lungs. An example along the posterior  superior right lower lobe measures 1.7 cm, series 4 image 122. There  are numerous additional bilateral examples. No central airway  abnormality is seen. No effusion or pneumothorax.    MEDIASTINUM/AXILLAE: No acute thoracic aortic abnormality. There are  multiple larger lymph nodes along the inferior mediastinum. Posterior  to the distal esophagus is a newly identified 2.1 x 1.1 cm lymph node,  series 3 image 112. There are several other examples inferior to this  level and deep to the hemidiaphragm phong, for example image 128 and on  adjacent images. Right paraspinous soft tissue also newly identified,  for example at the T10 level measuring approximately 3.4 x 0.9 cm,  series 3 image 100. Proximal esophageal distention.    CORONARY ARTERY CALCIFICATION: Severe versus previous intervention.    UPPER ABDOMEN: Progression of upper abdominal adenopathy. Example  anterior to the right hemidiaphragm phong is 1.3 cm, previously 0.9 cm,  image 145. There are several other adjacent examples. Increased  biliary ductal dilatation as noted. This decompresses distally and is  compatible with potential ampullary mass. There is also main  pancreatic duct dilatation.    MUSCULOSKELETAL: No new bony lesion. Stable superior endplate height  loss at T9 with presumed Schmorl's node.      Impression    IMPRESSION:   1.  Interval development of numerous bilateral pulmonary metastatic  nodules involving all lobes of both lungs.  2.  Interval progression of adenopathy at the inferior mediastinum and  also  involving the upper abdomen. Right paraspinous soft tissue mass  at the distal thoracic spine worrisome for malignancy as well.  3.  Included upper abdominal images are limited, but there is  increased biliary ductal dilatation with cutoff at the ampullary  region presumably related to the described ampullary mass.    MARIO CARBAJAL MD         SYSTEM ID:  [id number]

## 2022-11-24 NOTE — PLAN OF CARE
Vitals: WNL  Neuro: A/O x3, slow garbled speech  Cardiac: WNL  Resp: on room air, no SoB  GI: takes pills one at a time with water, no n/v, no BM  : incontinent, external catheter in place  Skin: scattered scabs over extremities  Activity: Up Ax2 with lift, uses power chair at home  Pain: denies any pain     Plan: Palliative/hospice consult

## 2022-11-24 NOTE — PLAN OF CARE
[id number]: A&O. Hoarse/garbled speech. VSS on RA. C/o bilateral shoulder pain, declines intervention. LS clear, anteriorly. +BS. Denies N/V. Clear liquid diet, tolerating. IV fluids running. Wife at bedside. Assist of 2, lift. Hem/Onc following. Nursing will continue to monitor.

## 2022-11-24 NOTE — CONSULTS
Care Management Follow Up    Length of Stay (days): 1    Expected Discharge Date: 11/25/2022     Concerns to be Addressed:  discharge planning  Patient plan of care discussed at interdisciplinary rounds: Yes    Anticipated Discharge Disposition:  Home     Anticipated Discharge Services:  Possibly hospice  Anticipated Discharge DME:  TBD    Patient/family educated on Medicare website which has current facility and service quality ratings:  yes    Additional Information:  Augustus met with the pt, his wife Kelli, and dtr who were at the bedside to discuss discharge planning.  Sw was informed that the pt would like to discharge on hospice.  Sw discussed options with them and they want to further discuss the idea of a discharge on hospice.  Kelli and the pt both said that the pt will discharge home.  They had concerns as to the change in status the pt had from last night about 2000 and today.  Sw informed them that they are unable to speak on that, but will follow-up.  They were interested in [facility], [facility], or [facility].  They requested that [facility] be contacted for an information only meeting to get more of their questions answered.  Kelli gets 18 hours/month of respite through ALS.  Sw discussed getting additional help and support in the home if needed.  Augustus called Jesika at [facility], [phone], and requested that she reach out to Kelli to address her questions regarding Hospice.    Augustus will continue with discharge planning and will be available as needed until discharge.      ISIDORO Rivers, [facility]  Inpatient Care Coordination  [facility]  [phone]

## 2022-11-24 NOTE — PLAN OF CARE
Pertinent assessments: A&O. Difficult to understand, speech garbled. 2L NC. Scheduled tylenol given. External cath in place. Sacrum blanchable, mepilex applied, specialty mattress ordered.     Major Shift Events: Uneventful     Treatment Plan: Symptom management. Palliative/hospice consult.

## 2022-11-24 NOTE — PLAN OF CARE
End of Shift Summary  For vital signs and complete assessments, please see documentation flowsheets.     Pertinent assessments: Alert, confused intermittently. Difficult to understand, speech garbled. VSS, placed on 2L NC overnight. PRN oral dilaudid and scheduled tylenol given for pain management. SOB and cough noted. External cath in place, pt total care & Ax2 with lift.     Major Shift Events: Uneventful   Treatment Plan: Symptom management. Palliative/hospice consult.  Bedside Nurse: Luz Wilkerson RN

## 2022-11-24 NOTE — PROGRESS NOTES
Hospitalist Medicine Progress Note   [facility]       Elier Leong is a 77 year old gentleman with ALS, DVT, hypertension, lumbar radiculopathy, severe osteoarthritis of shoulders, prostate cancer, vertigo, recurrent ampulla of Vater/pancreatic tumor who underwent surgery but had margins positive ultimately deciding not to have chemotherapy given his overall performance status due to his primary lateral sclerosis removal of pancreatic duct stents in the ERCP done 6/9/2022 and placement of temporary stents he came to [facility] 11/22/2022 with 1 month of progressive abdominal pain and fullness with decreased appetite and poor sleep at times confusion with brain fog sodium was 127 chloride 89 BUN 26 creatinine 2.9 alkaline phosphatase 155 AST 58 lipase 466 hemoglobin 13.1 CT of abdomen and pelvis showed advancing neoplasm of pancreas with associated obstructive changes of the pancreatic and common bile duct mild adenopathy in the left periaortic region.  Gastroenterology was consulted who thought that the patient's abdominal pain was due to pancreatitis with pancreatic duct blockage from cancer recurrence and worsening of cancer with normal LFTs ERCP was not recommended and palliative care consulted with the plan to advance her to hospice care       Date of Admission:  11/22/2022  Assessment & Plan     Acute pancreatitis  Advanced pancreatic/ampulla of Vater cancer  Abdominal pain  Weight loss  Poor oral intake  Hyponatremia  Primary lateral sclerosis  Chronic pain  Hypoxia  DNR/DNI status          Plan:   Start regular diet   Restart Aspirin  Check CXR for Hypoxia - if negetive check D-Dimer, if positive check CT chest to r/o PE   Monitor Blood pressure and when higher start home meds Chlorthalidone and Enalapril     Diet: Regular Diet Adult    DVT Prophylaxis: Low Risk/Ambulatory with no VTE prophylaxis indicated  Wright Catheter: Not present  Code Status: No CPR- Do NOT  Intubate               The patient's care was discussed with the Patient and Wife.    Aneudy Quiroz MD  Hospitalist Service  [facility]    ______________________________________________________________________    Interval History     Symptoms   No nausea or vomiting    Review of Systems:   Abdominal pain is controlled by medications none present after eating    Data reviewed today: I reviewed all medications, new labs and imaging results over the last 24 hours.     Physical Exam   Vital Signs: Temp: 98.4  F (36.9  C) Temp src: Oral BP: (!) 89/48 Pulse: 78   Resp: 16 SpO2: 92 % O2 Device: Nasal cannula Oxygen Delivery: 2 LPM  Weight: 152 lbs 3.2 oz      GENERAL: Patient is not in acute distress  HEENT: EOM+,Conjunctiva is clear   NECK:  no Jugular Venous distention  HEART: S1 S2 regular Rate and Rhythm, there is  no murmur,   LUNGS: Respirations are  not laboured, Lungs are  clear to auscultation without Crepitations or Wheezing   ABDOMEN: Soft , there is no tenderness ,Bowel Sounds are  Positive   LOWER LIMBS: no  Pedal Edema  Bilaterally   CNS:  Alert,  Oriented x 3, Moving all the Four Limbs     Data   Recent Labs   Lab 11/24/22  0717 11/22/22  2253   WBC 6.5 8.8   HGB 11.9* 13.1*   MCV 90 89    311   * 127*   POTASSIUM 3.6 4.4   CHLORIDE 95* 89*   CO2 21* 27   BUN 17.7 26.1*   CR 0.84 0.92   ANIONGAP 12 11   AMOS 8.8 9.5   GLC 87 116*   ALBUMIN  --  4.3   PROTTOTAL  --  7.3   BILITOTAL  --  0.4   ALKPHOS  --  155*   ALT  --  48   AST  --  58*   LIPASE 151* 466*         Recent Results (from the past 24 hour(s))   CT Chest w/o Contrast    Narrative    CT CHEST WITHOUT CONTRAST  11/23/2022 12:25 PM    CLINICAL HISTORY: Suspected recurrent ampullary carcinoma with lung  metastases.    TECHNIQUE: CT chest without IV contrast. Multiplanar reformats were  obtained. Dose reduction techniques were used.  CONTRAST: None.    COMPARISON: CT chest, abdomen and pelvis 6/17/2021.    FINDINGS:    LUNGS AND PLEURA: Multiple new solid bilateral pulmonary nodules  involving all lobes of both lungs. An example along the posterior  superior right lower lobe measures 1.7 cm, series 4 image 122. There  are numerous additional bilateral examples. No central airway  abnormality is seen. No effusion or pneumothorax.    MEDIASTINUM/AXILLAE: No acute thoracic aortic abnormality. There are  multiple larger lymph nodes along the inferior mediastinum. Posterior  to the distal esophagus is a newly identified 2.1 x 1.1 cm lymph node,  series 3 image 112. There are several other examples inferior to this  level and deep to the hemidiaphragm phong, for example image 128 and on  adjacent images. Right paraspinous soft tissue also newly identified,  for example at the T10 level measuring approximately 3.4 x 0.9 cm,  series 3 image 100. Proximal esophageal distention.    CORONARY ARTERY CALCIFICATION: Severe versus previous intervention.    UPPER ABDOMEN: Progression of upper abdominal adenopathy. Example  anterior to the right hemidiaphragm phong is 1.3 cm, previously 0.9 cm,  image 145. There are several other adjacent examples. Increased  biliary ductal dilatation as noted. This decompresses distally and is  compatible with potential ampullary mass. There is also main  pancreatic duct dilatation.    MUSCULOSKELETAL: No new bony lesion. Stable superior endplate height  loss at T9 with presumed Schmorl's node.      Impression    IMPRESSION:   1.  Interval development of numerous bilateral pulmonary metastatic  nodules involving all lobes of both lungs.  2.  Interval progression of adenopathy at the inferior mediastinum and  also involving the upper abdomen. Right paraspinous soft tissue mass  at the distal thoracic spine worrisome for malignancy as well.  3.  Included upper abdominal images are limited, but there is  increased biliary ductal dilatation with cutoff at the ampullary  region presumably related to the described  ampullary mass.    MARIO CARBAJAL MD         SYSTEM ID:  [id number]

## 2022-11-25 NOTE — PROGRESS NOTES
[facility]  Palliative Care Progress Note  Text Page     Assessment & Plan   Recommendations:  1. Goals of Care- No CPR- Do NOT Intubate  Hospitalization goals discussed Discussed and reviewed goals of care. Goal for medical optimization and return home with hospice enrollment.  Decisional Capacity- Intact. Patient has an advance directive dated 2-11-20.  If patient becomes unable to demonstrate decisional capacity, Kelli Leong is the primary Health Care Agent.  Alternates are Neelam and Luis Antonio Leong.   POLST Consider completing should goals of care change.     2. Pain   Chronic bilateral shoulder pain, seen by chiropractor as an outpatient.  Patient does not take opiates at baseline, not expected to have opioid tolerance.    New, progressive abdominal pain, etiology likely recurrent malignancy.  - acetaminophen 650 mg every 8 hours scheduled  - gabapentin 200 mg every 8 hours  - hydromorphone 1 mg every 4 hours prn moderate to severe pain  - hydromorphone 0.2 mg every 2 hours prn breakthrough/severe pain  - consider NSAID dosing if no contraindications appreciated - osteoarthritic pain in bilateral shoulders.     3. Weakness   Primary etiology PLS, now exacerbated due to malnutrition/decreased PO intake.  - continue with current plan of care.  Appreciate nursing/therapy recommendations for safety.     4. Malnutrition  Decreased PO intake for the past several months per patient and spouse report.  Weight loss reported to be 15 pounds since May 2022 (9 % weight loss)  - meal supplements, takes ensure at home.  - dietician consult  - may consider appetite stimulant if in line with goals of care.     5. Spiritual Care  Oriented to Spiritual Health as part of Palliative Care team. Consultation placed for  to follow.   Spiritual Background: Julia     6. Care Planning  Appreciate Care of SW/care coordination for hospice resources prior to discharge.  - discharge on  hospice      Medical Decision Making and Goals of Care:  Discussed on November 25, 2022 with Yancy BOUCHER CNP:   Met with Nader and Kelli at the bedside.  Discussed plan of care and their conclusions over the past two days.  Kelli outlined their decision to enroll on hospice.  Reviewed their choice of [facility] and were waiting to hear back from social work regarding the plan for discharge.      Kelli discussed concern for further inpatient needs.  Reviewed lab values and hypotensive episode yesterday, discussed the hospitalist's role to determine medical stability for discharge.  Discussed need to coordinate with hospice agency to ensure that equipment and intake assessment appointment are available on the day of discharge.  Kelli and Nader verbalized understanding.      Reviewed Nader's symptom burden and shoulder pain.  He stated that the pain was consistent with his baseline.  Scheduled tylenol may have been helpful.  Gabapentin may have been helpful, agreed with plan to increase dose.  Planned to contact Hospitalist and SW to confirm plans and facilitate discharge.  They verbalized agreement, denied further questions or concerns.    CITLALY Oreilly CNP  Pain Management and Palliative Care  [facility]  Pgr: [phone]      Time Spent on this Encounter   Total unit/floor time 35 minutes, time consisted of the following, examination of the patient, reviewing the record and completing documentation. >50% of time spent in counseling and coordination of care, Bedside Nurse Nguyen and Hospitalist Bonilla.  Time spend counseling with patient and family consisted of the following topics, goals of care, care planning for discharge and symptom management.    Review of Systems    CONSTITUTIONAL: NEGATIVE for fever, chills, change in weight  ENT/MOUTH: NEGATIVE for ear, mouth and throat problems  RESP: NEGATIVE for significant cough or SOB  CV: NEGATIVE for chest pain, palpitations or  peripheral edema    Palliative Symptom Review (0=no symptom/no concern, 1=mild, 2=moderate, 3=severe):      Pain: 2-moderate      Fatigue: 1-mild      Nausea: 0-none      Constipation: 0-none      Diarrhea: 0-none      Depressive Symptoms: 1-mild      Anxiety: 1-mild      Drowsiness: 0-none      Poor Appetite: 1-mild      Shortness of Breath: 0-none      Insomnia: 1-mild      Overall (0 good/no concerns, 3 very poor):  moderate    Physical Exam   Temp:  [97.4  F (36.3  C)-98.6  F (37  C)] 98.1  F (36.7  C)  Pulse:  [64-78] 64  Resp:  [16] 16  BP: ()/(44-61) 113/61  Cuff Mean (mmHg):  [61] 61  SpO2:  [93 %-98 %] 97 %  152 lbs 4.8 oz  Exam:  GEN:  Alert, oriented x 3, appears comfortable, NAD.  HEENT:  Normocephalic/atraumatic, no scleral icterus, no nasal discharge, mouth moist.  CV:  RRR, S1, S2; no murmurs or other irregularities noted.  +3 DP/PT pulses bilatererally; no edema BLE.  RESP:  Symmetric chest rise on inhalation noted.  Normal respiratory effort.  ABD:  Rounded, soft, non-tender/non-distended.  +BS  SKIN:  Dry to touch, no exanthems noted in the visualized areas.    PAIN BEHAVIOR: Cooperative  Psych:  Normal affect.  Calm, cooperative, conversant appropriately.    Medications     0.9% sodium chloride + KCl 20 mEq/L 125 mL/hr at 11/25/22 0745       acetaminophen  650 mg Oral 4x Daily    Or     acetaminophen  650 mg Rectal 4x Daily     gabapentin  200 mg Oral TID     oxybutynin ER  20 mg Oral At Bedtime     pantoprazole  40 mg Oral Daily     sodium chloride (PF)  3 mL Intracatheter Q8H       Data   Results for orders placed or performed during the hospital encounter of 11/22/22 (from the past 24 hour(s))   XR Chest Port 1 View    Narrative    EXAM: XR CHEST PORT 1 VIEW  LOCATION: [facility]  DATE/TIME: 11/24/2022 1:55 PM    INDICATION: hypoxia  COMPARISON: CT chest 11/23/2022.      Impression    IMPRESSION: Numerous bilateral pulmonary nodules again identified. Refer to CT  chest for further details. This finding is worrisome for metastatic disease. Mild cardiomegaly. No effusions or lobar consolidation identified.   Magnesium   Result Value Ref Range    Magnesium 2.8 (H) 1.7 - 2.3 mg/dL   D dimer quantitative   Result Value Ref Range    D-Dimer Quantitative 10.47 (H) 0.00 - 0.50 ug/mL FEU    Narrative    This D-dimer assay is intended for use in conjunction with a clinical pretest probability assessment model to exclude pulmonary embolism (PE) and deep venous thrombosis (DVT) in outpatients suspected of PE or DVT. The cut-off value is 0.50 ug/mL FEU.   Comprehensive metabolic panel   Result Value Ref Range    Sodium 130 (L) 136 - 145 mmol/L    Potassium 4.3 3.4 - 5.3 mmol/L    Chloride 98 98 - 107 mmol/L    Carbon Dioxide (CO2) 20 (L) 22 - 29 mmol/L    Anion Gap 12 7 - 15 mmol/L    Urea Nitrogen 29.5 (H) 8.0 - 23.0 mg/dL    Creatinine 0.75 0.67 - 1.17 mg/dL    Calcium 8.7 (L) 8.8 - 10.2 mg/dL    Glucose 82 70 - 99 mg/dL    Alkaline Phosphatase 179 (H) 40 - 129 U/L     (H) 10 - 50 U/L    ALT 84 (H) 10 - 50 U/L    Protein Total 5.4 (L) 6.4 - 8.3 g/dL    Albumin 3.1 (L) 3.5 - 5.2 g/dL    Bilirubin Total 0.8 <=1.2 mg/dL    GFR Estimate >90 >60 mL/min/1.73m2   Magnesium   Result Value Ref Range    Magnesium 2.3 1.7 - 2.3 mg/dL     *Note: Due to a large number of results and/or encounters for the requested time period, some results have not been displayed. A complete set of results can be found in Results Review.

## 2022-11-25 NOTE — PROGRESS NOTES
SPIRITUAL HEALTH SERVICES [facility] 5th Floor    Reason for Visit - Consult for emotional support    Visit Narrative - I met with Nader and Kelli (his spouse) to check in about the request for emotional support. They shared that they are feeling good about the decision to move to hospice care. I affirmed that hospice can be a good decision to allow them time to be together during this last period of Nader's life.     Distress - No distress, both seemed at peace with the decision to move to hospice.     Strengths, Coping and Resources - Both found strength in prayer and asked me to offer a prayer with them.     Meaning-Making - Kelli appreciated hearing me say how hospice care can allow them to make intentional time to say goodbye to each other before Nader's death.     Plan - Nader is discharging to hospice at home tomorrow. If they return to the hospital spiritual care support will be available.       Jay Stewart M.Div.  Associate   [phone]

## 2022-11-25 NOTE — PLAN OF CARE
End of Shift Summary  For vital signs and complete assessments, please see documentation flowsheets.     Pertinent assessments: A&O. Difficult to understand, speech garbled. Weaned back to room air, sating in high 90's. BP stable but continues to be soft. Alert and communicative this shift, writer notepad at bedside from home. Denies any pain or discomfort. External cath in place, good output. Ax2 with jose steady, got OOB x1 this shift to stand at bedside.     Major Shift Events: Uneventful   Treatment Plan: Symptom management. Palliative/hospice consult.   Bedside Nurse: Luz Wilkerson RN

## 2022-11-25 NOTE — PLAN OF CARE
Goal Outcome Evaluation:         End of Shift Summary  For vital signs and complete assessments, please see documentation flowsheets.     Pertinent assessments: A&O. Difficult to understand, speech garbled. 2L NC. Denies pain, scheduled tylenol refused. External cath in place, good output. Assist of 2 w/ lift. Turned q2hr and as needed. BP stable but continues to be soft. Denies dizziness, lightheadedness.    Major Shift Events: Uneventful     Treatment Plan: Symptom management. Palliative/hospice consult.

## 2022-11-25 NOTE — PROGRESS NOTES
Hospitalist Medicine Progress Note   [facility]       Elier Leong is a 77 year old gentleman with ALS, DVT, hypertension, lumbar radiculopathy, severe osteoarthritis of shoulders, prostate cancer, vertigo, recurrent ampulla of Vater/pancreatic tumor who underwent surgery but had margins positive ultimately deciding not to have chemotherapy given his overall performance status due to his primary lateral sclerosis removal of pancreatic duct stents in the ERCP done 6/9/2022 and placement of temporary stents he came to [facility] 11/22/2022 with 1 month of progressive abdominal pain and fullness with decreased appetite and poor sleep at times confusion with brain fog sodium was 127 chloride 89 BUN 26 creatinine 2.9 alkaline phosphatase 155 AST 58 lipase 466 hemoglobin 13.1 CT of abdomen and pelvis showed advancing neoplasm of pancreas with associated obstructive changes of the pancreatic and common bile duct mild adenopathy in the left periaortic region.  Gastroenterology was consulted who thought that the patient's abdominal pain was due to pancreatitis with pancreatic duct blockage from cancer recurrence and worsening of cancer with normal LFTs ERCP was not recommended and palliative care consulted with the plan to advance her to hospice care       Date of Admission:  11/22/2022  Assessment & Plan     Acute pancreatitis  Advanced pancreatic/ampulla of Vater cancer  Abdominal pain  Weight loss  Poor oral intake  Hyponatremia  Primary lateral sclerosis  Chronic pain  Hypoxia  DNR/DNI status          Plan:    -- Comfort measures   Diet: Regular Diet Adult  Snacks/Supplements Adult: Ensure Enlive; Between Meals    DVT Prophylaxis: Low Risk/Ambulatory with no VTE prophylaxis indicated  Wright Catheter: Not present  Code Status: No CPR- Do NOT Intubate         The patient's care was discussed with the Patient and Wife.    Discharge plan - home on hospice in am     Tony Crystal,  MD  Hospitalist Service  [facility]    ______________________________________________________________________    Interval History   Patient is seen and examined by me today and medical record reviewed.Overnight events noted and care discussed with nursing staff.No new compliant. Hospice care plan noted . Patient emotional but appears comfortable     Data reviewed today: I reviewed all medications, new labs and imaging results over the last 24 hours.     Physical Exam   Vital Signs: Temp: 98.4  F (36.9  C) Temp src: Oral BP: (!) 154/83 Pulse: 99   Resp: 28 SpO2: 95 % O2 Device: None (Room air) Oxygen Delivery: 2 LPM  Weight: 152 lbs 4.8 oz      GENERAL: Patient is not in acute distress  HEENT: EOM+,Conjunctiva is clear   NECK:  no Jugular Venous distention  HEART: S1 S2 regular Rate and Rhythm, there is  no murmur,   LUNGS: Respirations are  not laboured, Lungs are  clear to auscultation without Crepitations or Wheezing   ABDOMEN: Soft , there is no tenderness ,Bowel Sounds are  Positive   LOWER LIMBS: no  Pedal Edema  Bilaterally   CNS:  Alert,  Oriented x 3, Moving all the Four Limbs     Data   Recent Labs   Lab 11/25/22  0836 11/24/22  0717 11/22/22  2253   WBC  --  6.5 8.8   HGB  --  11.9* 13.1*   MCV  --  90 89   PLT  --  206 311   * 128* 127*   POTASSIUM 4.3 3.6 4.4   CHLORIDE 98 95* 89*   CO2 20* 21* 27   BUN 29.5* 17.7 26.1*   CR 0.75 0.84 0.92   ANIONGAP 12 12 11   AMOS 8.7* 8.8 9.5   GLC 82 87 116*   ALBUMIN 3.1*  --  4.3   PROTTOTAL 5.4*  --  7.3   BILITOTAL 0.8  --  0.4   ALKPHOS 179*  --  155*   ALT 84*  --  48   *  --  58*   LIPASE  --  151* 466*         Recent Results (from the past 24 hour(s))   XR Chest Port 1 View    Narrative    EXAM: XR CHEST PORT 1 VIEW  LOCATION: [facility]  DATE/TIME: 11/24/2022 1:55 PM    INDICATION: hypoxia  COMPARISON: CT chest 11/23/2022.      Impression    IMPRESSION: Numerous bilateral pulmonary nodules again  identified. Refer to CT chest for further details. This finding is worrisome for metastatic disease. Mild cardiomegaly. No effusions or lobar consolidation identified.

## 2022-11-25 NOTE — PROGRESS NOTES
Care Management Follow Up    Length of Stay (days): 2    Expected Discharge Date: 11/25/2022     Concerns to be Addressed:   Hospice     Patient plan of care discussed at interdisciplinary rounds: Yes    Anticipated Discharge Disposition: Hospice at home   Disposition Comments: will do Hospice admissoin on Sunday @ 11:00 am  Anticipated Discharge Services: Hospice   Anticipated Discharge DME: None  SW offered [facility]. Both pt and wife declined  Pt has his electric chair here, uses a pivot board at home.   Has raised toilet seat, shower chair and grab bars,.  Has accessible van     Patient/family educated on Medicare website which has current facility and service quality ratings: yes  Education Provided on the Discharge Plan:  Yes   Patient/Family in Agreement with the Plan:  yes    Referrals Placed by CM/SW: yes   Private pay costs discussed: Not applicable    Additional Information:  Met with both pt and his wife to finalize plan for home tomorrow. Pt is medically stable and able to have Hospice admission on Sunday. Spoke with So with [facility] [phone] to update that pt prefers to have the admissoin on Sunday rather than Sat. Also updated that we would send 2 days of comfort meds with and that pt plans to continue with PT/OT that he is currently using his LTC ins policy.   Wife able to provide transport at 11:00 am for pt home  [facility] has requested D.C summary with the final Hospice orders be faxed tomorrow @ [phone]    Corinne C. White, LSW   In Pt Care mgt team   [phone]    Addendum  Spoke with wife after talking to [facility]. They have made the decision to have the Hospice Admission on Monday. No DME needs for support at this time. Pal Care team orders 2 day of meds for d.c support.     Corinne White, LSW
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
## 2022-11-25 NOTE — PROGRESS NOTES
"CLINICAL NUTRITION SERVICES  -  ASSESSMENT NOTE    Recommendations Ordered by Registered Dietitian (RD):   Continue diet as ordered   Ordered Ensure Enlive PRN    Malnutrition:   % Weight Loss:  Up to 10% in 6 months (moderate malnutrition)  % Intake:  <75% for >/= 1 month (moderate malnutrition) - per report to palliative   Subcutaneous Fat Loss: unable to assess  Muscle Loss: unable to assess  Fluid Retention: none noted    Malnutrition Diagnosis: Moderate malnutrition  In Context of:  Acute illness or injury  Chronic illness or disease      REASON FOR ASSESSMENT  Elier Leong is a 77 year old male seen by Registered Dietitian for Admission Nutrition Risk Screen for positive    Past medical history: primary lateral sclerosis, baclofen pump, stroke, tumor of ampulla of vater, basal cell carcinoma, DVT, hypertension, lumbar radiculopathy, severe osteoarthritis of shoulders, prostate cancer, and vertigo    Admitted for: Recurrence of ampulla of vater tumor now with involvement of the pancreas and apparent metastases to lung + Abdominal pain + Possible pancreatitis + Weight loss + Poor oral intake + Hyponatremia, likely related to poor oral intake and volume depletion    NUTRITION HISTORY  - Information obtained from chart   - Per palliative note on 11/23:\"Decreased PO intake for the past several months per patient and spouse report. Weight loss reported to be 15 pounds since May 2022 (9 % weight loss). Meal supplements, takes ensure at home.\"    - Food allergies: NKFA    CURRENT NUTRITION ORDERS  Diet Order:     Regular Diet     Current Intake/Tolerance:  No information to comment on in the flowsheets     Obtained from Chart/Interdisciplinary Team:  - Palliative following - considering hospice and transition to comfort care   - GI following   - Hem/Onc following   - Reviewed stooling patterns   - Please refer to flowsheets for more information on skin     ANTHROPOMETRICS  Height: 6' 0\"  Weight: 69.1 kg ( 152 lbs " 4.8 oz)   Body mass index is 20.66 kg/m .  Weight Status:  Normal BMI  Weight History: weight somewhat difficult to determine, overall weight loss of 8 kg (10%) over the past ~ 6 months. Stable for the past ~ 2-3 months.     Wt Readings from Last 10 Encounters:   11/25/22 69.1 kg (152 lb 4.8 oz)   09/01/22 68.9 kg (152 lb)   06/28/22 73 kg (161 lb)   06/09/22 69.8 kg (153 lb 14.1 oz)   05/14/22 77.1 kg (170 lb)   11/11/21 70.3 kg (155 lb)   10/20/21 70.3 kg (154 lb 15.7 oz)   10/07/21 70.3 kg (155 lb)   09/30/21 70.3 kg (155 lb)   09/24/21 69.9 kg (154 lb)       LABS  Labs reviewed    Labs:  Electrolytes  Potassium (mmol/L)   Date Value   11/24/2022 3.6   11/22/2022 4.4   07/22/2022 4.4   06/09/2022 4.1   05/24/2022 4.1   06/20/2021 4.0   06/19/2021 4.2   06/18/2021 3.9    Blood Glucose  Glucose (mg/dL)   Date Value   11/24/2022 87   11/22/2022 116 (H)   07/22/2022 100 (H)   06/09/2022 102 (H)   05/24/2022 97   09/23/2021 106 (H)   08/19/2021 103 (H)   06/20/2021 98   06/19/2021 88   06/18/2021 120 (H)   06/17/2021 95   06/16/2021 97     GLUCOSE BY METER POCT (mg/dL)   Date Value   09/23/2021 103 (H)     Hemoglobin A1C (%)   Date Value   09/24/2021 5.8 (H)   11/17/2020 5.8 (H)    Inflammatory Markers  CRP Inflammation (mg/L)   Date Value   07/18/2021 42.0 (H)   07/16/2021 35.0 (H)     WBC (10e9/L)   Date Value   06/19/2021 10.5   06/18/2021 9.2   06/17/2021 6.6     WBC Count (10e3/uL)   Date Value   11/24/2022 6.5   11/22/2022 8.8   07/22/2022 7.5     Albumin (g/dL)   Date Value   11/22/2022 4.3   06/09/2022 3.7   08/19/2021 3.1 (L)   07/30/2021 2.8 (L)   06/18/2021 2.4 (L)   06/17/2021 2.5 (L)   06/16/2021 3.1 (L)      Magnesium (mg/dL)   Date Value   11/24/2022 2.8 (H)   11/24/2022 1.5 (L)   11/23/2022 2.1   02/21/2013 1.9     Sodium (mmol/L)   Date Value   11/24/2022 128 (L)   11/22/2022 127 (L)   07/22/2022 131 (L)   06/20/2021 133   06/19/2021 128 (L)   06/18/2021 131 (L)    Renal  Urea Nitrogen (mg/dL)   Date  Value   11/24/2022 17.7   11/22/2022 26.1 (H)   07/22/2022 15   06/09/2022 18   05/24/2022 25   06/20/2021 13   06/19/2021 21   06/18/2021 17     Creatinine (mg/dL)   Date Value   11/24/2022 0.84   11/22/2022 0.92   07/22/2022 0.64 (L)   06/20/2021 0.65 (L)   06/19/2021 0.62 (L)   06/18/2021 0.60 (L)     Creatinine POCT (mg/dL)   Date Value   07/22/2022 0.7     Additional  Triglycerides (mg/dL)   Date Value   09/24/2021 53   11/17/2020 41   08/11/2014 50   11/08/2010 51     Ketones Urine (mg/dL)   Date Value   05/14/2022 Negative   01/27/2021 Negative        MEDICATIONS  Medications reviewed      acetaminophen  650 mg Oral 4x Daily    Or     acetaminophen  650 mg Rectal 4x Daily     gabapentin  100 mg Oral TID     oxybutynin ER  20 mg Oral At Bedtime     pantoprazole  40 mg Oral Daily     sodium chloride (PF)  3 mL Intracatheter Q8H        0.9% sodium chloride + KCl 20 mEq/L 125 mL/hr at 11/25/22 0745      calcium carbonate, HYDROmorphone, HYDROmorphone, lidocaine 4%, lidocaine (buffered or not buffered), melatonin, naloxone **OR** naloxone **OR** naloxone **OR** naloxone, ondansetron **OR** ondansetron, polyethylene glycol, prochlorperazine **OR** prochlorperazine **OR** prochlorperazine, senna-docusate **OR** senna-docusate, sodium chloride (PF)     ASSESSED NUTRITION NEEDS PER APPROVED PRACTICE GUIDELINES:    Dosing Weight 69.1 kg   Estimated Energy Needs: 6961-0175 kcals (25-30 Kcal/Kg)  Justification: maintenance  Estimated Protein Needs:  grams protein (1.2-1.5 g pro/Kg)  Justification: preservation of lean body mass  Estimated Fluid Needs: per MD     NUTRITION DIAGNOSIS:  Inadequate oral intake related to suspected decreased appetite as evidenced by patient with reported poor PO intake for several months     NUTRITION INTERVENTIONS  Recommendations / Nutrition Prescription  Continue diet as ordered   Ordered Ensure Enlive PRN     Implementation  Nutrition education: Not appropriate at this time due to  patient condition  Medical Food Supplement: as above  Collaboration and Referral of Nutrition care: discussed patient during interdisciplinary rounds this morning     Nutrition Goals  Patient to consume 75% of meals or oral nutritional supplements ordered TID    MONITORING AND EVALUATION:  Progress towards goals will be monitored and evaluated per protocol and Practice Guidelines    Shira Forbes RD, LD  Clinical Dietitian     3rd floor/ICU: [phone]  All other floors: [phone]  Weekend/holiday: [phone]  Office: [phone]

## 2022-11-26 NOTE — PROGRESS NOTES
Care Management Discharge Note    Discharge Date: 11/26/2022       Discharge Disposition: Hospice    Discharge Services: None    Discharge DME: None    Discharge Transportation:      Private pay costs discussed: Not applicable    PAS Confirmation Code:    Patient/family educated on Medicare website which has current facility and service quality ratings: yes    Education Provided on the Discharge Plan:    Persons Notified of Discharge Plans: family and patient  Patient/Family in Agreement with the Plan:  yes    Handoff Referral Completed: Yes    Additional Information:  Patient discharging home and will sign onto [facility]. Spouse will be providing transportation. Orders faxed to [facility] this morning. Received a call from So at [facility] that the medicare number that they had was not working to run benefits. Have missed a call and left two voice mails to assist in providing them information. Will continue to try to connect with them. Patient has discharged home with his spouse    Yomaira Gonzalez RN BSN OCN  Care Coordinator  [facility]  [phone]

## 2022-11-26 NOTE — PLAN OF CARE
Cross cover:    Noted to have variable but persistent fever today. Up to 102.8. Discussed with nursing and family deciding on hospice and appears to be end of life but they are concerned about infection. Cont scheduled tylenol and add PRN, also discussed with nurse cooling blanket. Will add Rocephin tonight to cover cholangitis w/hx of obstructing biliary cancer and have palliative care and daytime provider discuss with family goals of care again.

## 2022-11-26 NOTE — DISCHARGE SUMMARY
Physician Discharge Summary           [facility]ist Discharge Summary-[facility]    Name: Elier Leong    MRN: [id number]     YOB: 1945    Age: 77 year old                                                     Primary care provider: Lida Ray    Admit date:  11/22/2022    Discharge date and time: 11/26/2022 10:36 AM    Discharge Physician: Tony Crystal M.D., M.B.A.       Primary Discharge Diagnosis      Acute pancreatitis  Advanced pancreatic/ampulla of Vater cancer  Abdominal pain  Weight loss  Poor oral intake  Hyponatremia  Primary lateral sclerosis  Chronic pain  Hypoxia  DNR/DNI status  Hospice care patient     Secondary Diagnosis /chronic medical conditions     Past Medical History:   Diagnosis Date     Basal cell carcinoma nos     sees derm     Cancer of ampulla of Vater (H)      CVA (cerebral infarction) 06/2014    small vessel right int capsule stroke     Deep venous thrombosis (DVT) of peroneal vein (H) 05/12/2017    right peroneal vein     Essential hypertension, benign      Hearing loss      Hoarseness of voice     assoc with PLS     Lumbar radiculopathy     2017     Primary Lateral Sclerosis 2002    has baclofen pump through Dr. Calvert, [place], sees Dr. Fink, [facility]     Primary osteoarthritis of right shoulder 06/16/2021     Prostate CA (H) 2008    prostatectomy     Vertigo 02/21/2013     Past Surgical History:  Past Surgical History:   Procedure Laterality Date     ABDOMEN SURGERY  11/12    Hernia     BIOPSY  4/16    Cancerous growth on leg. Removed by MOHs treatment     COLONOSCOPY N/A 8/3/2021    Procedure: COLONOSCOPY;  Surgeon: Luis Antonio Jung MD;  Location: [facility]     COLONOSCOPY N/A 8/3/2021    Procedure: Colonoscopy, With Polypectomy And Biopsy;  Surgeon: Luis Antonio Jung MD;  Location: [facility]     ENDOSCOPIC RETROGRADE CHOLANGIOPANCREATOGRAM N/A 6/17/2021    Procedure: ENDOSCOPIC RETROGRADE CHOLANGIOPANCREATOGRAPHY, BILIARY  STENT PLACEMENT;  Surgeon: Sima Galvez MD;  Location: [facility]     ENDOSCOPIC RETROGRADE CHOLANGIOPANCREATOGRAM N/A 8/19/2021    Procedure: ENDOSCOPIC RETROGRADE CHOLANGIOPANCREATOGRAPHY;  Surgeon: Agus Kent MD;  Location: [facility]     ENDOSCOPIC RETROGRADE CHOLANGIOPANCREATOGRAM N/A 6/9/2022    Procedure: ENDOSCOPIC RETROGRADE CHOLANGIOPANCREATOGRAPHY, PANCREATIC STENT PLACEMENT AND BIOPSIES;  Surgeon: Agus Kent MD;  Location: [facility]     ENDOSCOPIC RETROGRADE CHOLANGIOPANCREATOGRAM COMPLEX N/A 7/12/2021    Procedure: ENDOSCOPIC RETROGRADE CHOLANGIOPANCREATOGRAPHY WITH AMPULLECTOMY, PANCREATIC DUCT STENT PLACEMENT AND BILIARY STENT PLACEMENT;  Surgeon: Agus Kent MD;  [facility]     ENDOSCOPIC RETROGRADE CHOLANGIOPANCREATOGRAPHY, EXCHANGE TUBE/STENT N/A 7/14/2021    Procedure: ENDOSCOPIC RETROGRADE CHOLANGIOPANCREATOGRAPHY with bile duct stents exchanged, balloon sweep of bile ducts, hemostasis;  Surgeon: Guru Bhavesh Arreola MD;  [facility]     ENDOSCOPIC ULTRASOUND UPPER GASTROINTESTINAL TRACT (GI) N/A 7/12/2021    Procedure: ENDOSCOPIC ULTRASOUND, ESOPHAGOSCOPY / UPPER GASTROINTESTINAL TRACT (GI);  Surgeon: Agus Kent MD;  [facility]     ESOPHAGOSCOPY, GASTROSCOPY, DUODENOSCOPY (EGD), COMBINED N/A 6/17/2021    Procedure: ESOPHAGOGASTRODUODENOSCOPY, WITH ENDOSCOPIC US, fine needle aspiration;  Surgeon: Sima Galvez MD;  Location: [facility]     ESOPHAGOSCOPY, GASTROSCOPY, DUODENOSCOPY (EGD), COMBINED N/A 7/14/2021    Procedure: ESOPHAGOGASTRODUODENOSCOPY (EGD);  Surgeon: Guru Bhavesh Arreola MD;  [facility]     ESOPHAGOSCOPY, GASTROSCOPY, DUODENOSCOPY (EGD), COMBINED N/A 8/3/2021    Procedure: Esophagoscopy, gastroscopy, duodenoscopy (EGD), combined;  Surgeon: Luis Antonio Jung MD;  [facility]: [facility]     HERNIA REPAIR  11/12    Repaired     PROSTATE SURGERY       [facility] NONSPECIFIC PROCEDURE  6/08     prostatectomy      [facility]  NONSPECIFIC PROCEDURE  11/01    colonoscopy     [facility] NONSPECIFIC PROCEDURE  11/2006    hernia, right side     [facility] NONSPECIFIC PROCEDURE      T + A           Brief Summary of Hospital stay :       Please refer to  Admission H&P note  and subsequent progress notes in EMR for full details of patient care.    Reason for Hospitalization(C/C,HPI and brief patient summary):Abdominal pain      Significant findings(Primary diagnosis )Procedures and treatments provided(Hospital course ,consults, procedures):Please see below for details    Elier Leong is a 77 year old gentleman with ALS, DVT, hypertension, lumbar radiculopathy, severe osteoarthritis of shoulders, prostate cancer, vertigo, recurrent ampulla of Vater/pancreatic tumor who underwent surgery but had margins positive ultimately deciding not to have chemotherapy given his overall performance status due to his primary lateral sclerosis removal of pancreatic duct stents in the ERCP done 6/9/2022 and placement of temporary stents he came to [facility] 11/22/2022 with 1 month of progressive abdominal pain and fullness with decreased appetite and poor sleep at times confusion with brain fog sodium was 127 chloride 89 BUN 26 creatinine 2.9 alkaline phosphatase 155 AST 58 lipase 466 hemoglobin 13.1 CT of abdomen and pelvis showed advancing neoplasm of pancreas with associated obstructive changes of the pancreatic and common bile duct mild adenopathy in the left periaortic region.  Gastroenterology was consulted who thought that the patient's abdominal pain was due to pancreatitis with pancreatic duct blockage from cancer recurrence and worsening of cancer with normal LFTs ERCP was not recommended and palliative care consulted with the plan for hospice care. Patient and family decided comfort care approach with hospice care team at home.      Consultations during hospital stay:       GASTROENTEROLOGY IP CONSULT  HEMATOLOGY & ONCOLOGY IP CONSULT  PALLIATIVE  CARE ADULT IP CONSULT  GASTROENTEROLOGY IP CONSULT  SOCIAL WORK IP CONSULT  SPIRITUAL HEALTH SERVICES IP CONSULT  CARE MANAGEMENT / SOCIAL WORK IP CONSULT  PALLIATIVE CARE ADULT IP CONSULT  SPIRITUAL HEALTH SERVICES IP CONSULT  SPIRITUAL HEALTH SERVICES IP CONSULT      Patient discharge Condition:     stable    /71 (BP Location: Right arm)   Pulse 66   Temp 97.3  F (36.3  C) (Oral)   Resp 16   Ht 1.829 m (6')   Wt 69.1 kg (152 lb 4.8 oz)   SpO2 94%   BMI 20.66 kg/m           Discharge Instructions:       Patient/family instructions: Written discharge instruction given to patient/family    Discharge Medications:       Review of your medicines      START taking      Dose / Directions   gabapentin 100 MG capsule  Commonly known as: NEURONTIN  Used for: Pancreatic mass, Hospice care patient      Dose: 200 mg  Take 2 capsules (200 mg) by mouth 3 times daily  Quantity: 24 capsule  Refills: 0     guaiFENesin-dextromethorphan 100-10 MG/5ML syrup  Commonly known as: ROBITUSSIN DM  Used for: Hospice care patient      Dose: 10 mL  Take 10 mLs by mouth every 4 hours as needed for cough  Quantity: 118 mL  Refills: 0     HYDROmorphone 2 MG tablet  Commonly known as: DILAUDID  Used for: Hospice care patient      Dose: 1 mg  Take 0.5 tablets (1 mg) by mouth every 4 hours as needed for breakthrough pain, pain or moderate pain (4-6)  Quantity: 20 tablet  Refills: 0     ondansetron 4 MG ODT tab  Commonly known as: ZOFRAN ODT  Used for: Hospice care patient      Dose: 4 mg  Take 1 tablet (4 mg) by mouth every 6 hours as needed for nausea or vomiting  Quantity: 30 tablet  Refills: 0        CONTINUE these medicines which may have CHANGED, or have new prescriptions. If we are uncertain of the size of tablets/capsules you have at home, strength may be listed as something that might have changed.      Dose / Directions   acetaminophen 650 MG CR tablet  Commonly known as: TYLENOL  Indication: Takes about twice daily  This may  have changed:     when to take this    reasons to take this  Used for: Hospice care patient      Dose: 650 mg  Take 1 tablet (650 mg) by mouth every 6 hours as needed for mild pain or fever (Shoulder pain) Takes 2 times daily plus one time daily as needed.  Refills: 0        CONTINUE these medicines which have NOT CHANGED      Dose / Directions   BACLOFEN IN INTERNAL INTRATHECAL PUMP      by Intrathecal route continuous prn Pump filled by [facility] stroke [place] [phone]  Pump Model: Syncromed  Medication in pump is Gablofen (brand name)  Last fill: during hospital admission  Next fill:     [place] Alarm Date:   Reservoir Volume: 20 ml  Conc: 2000 mcg/mL  Delivers 234.7 mcg/day  Basal rate:unknown  Battery life: unknown  Refills: 0     oxybutynin ER 10 MG 24 hr tablet  Commonly known as: DITROPAN XL  Used for: Overactive bladder      Dose: 20 mg  Take 2 tablets (20 mg) by mouth At Bedtime  Quantity: 180 tablet  Refills: 3     pantoprazole 40 MG EC tablet  Commonly known as: PROTONIX  Used for: Gastroesophageal reflux disease without esophagitis      Dose: 40 mg  Take 1 tablet (40 mg) by mouth 2 times daily  Quantity: 60 tablet  Refills: 3     polyethylene glycol 17 g packet  Commonly known as: MIRALAX      Dose: 1 packet  Take 1 packet by mouth as needed for constipation  Refills: 0        STOP taking    aspirin 325 MG EC tablet  Commonly known as: ASA        chlorthalidone 25 MG tablet  Commonly known as: HYGROTON        enalapril 10 MG tablet  Commonly known as: VASOTEC        EPINEPHrine 0.3 MG/0.3ML injection 2-pack  Commonly known as: ANY BX GENERIC EQUIV        ezetimibe 10 MG tablet  Commonly known as: ZETIA              Where to get your medicines      These medications were sent to [facility] [place] - [address] [address] [facility] [address]  [address] [address] [facility] [address] [address]    Phone: [phone]     gabapentin 100 MG  capsule    guaiFENesin-dextromethorphan 100-10 MG/5ML syrup    ondansetron 4 MG ODT tab     Some of these will need a paper prescription and others can be bought over the counter. Ask your nurse if you have questions.    Bring a paper prescription for each of these medications    HYDROmorphone 2 MG tablet  You don't need a prescription for these medications    acetaminophen 650 MG CR tablet          Discharge diet:Orders Placed This Encounter      Diet    regular diet      Discharge activity:Activity as tolerated      Discharge follow-up:    Follow up with primary care provider in 7 days or earlier if symptoms return or gets worse.    Follow up with consultant as instructed  with  Hospice care team       Other instructions:    We discussed with patient/family about detail discharge instructions as well as discharge medications above including potential risks,side effects and benefits.Patient/family understood benefits and potential serious side effects of taking these medications and need to follow up with PCP if the patient develops complications.  Patient is also advised to see a doctor immediately for severe symptoms.        Major procedure performed/  Significant Diagnostic Studies:       Results for orders placed or performed during the hospital encounter of 11/22/22   CT Abdomen Pelvis w Contrast    Narrative    EXAM: CT ABDOMEN PELVIS W CONTRAST  LOCATION: [facility]  DATE/TIME: 11/23/2022 12:20 AM    INDICATION: epigastric abdominal pain  COMPARISON: 06/17/2021  TECHNIQUE: CT scan of the abdomen and pelvis was performed following injection of IV contrast. Multiplanar reformats were obtained. Dose reduction techniques were used.  CONTRAST: 76mL Isovue 370    FINDINGS:   LOWER CHEST: Since 06/07/2021 there is been development of numerous indeterminate noncalcified pulmonary nodules bilaterally. The largest is seen in the posterior medial aspect of the right lower lobe and this measures  approximately 7.0 x 9.4 mm.   Underlying changes of metastatic lesions are suspected.    HEPATOBILIARY: There is significant dilatation seen of the intra and extrahepatic bile ducts with the common bile duct measuring up to 15.8 mm in greatest radial dimension. There is tapering seen involving the distal common bile duct with no underlying   stone. The gallbladder and liver are normal in appearance.    PANCREAS: There is a 1.9 x 2.1 x 1.6 cm vague low density lesions seen involving the pancreatic head worrisome for changes of neoplasm. There is significant enlargement involving the pancreatic duct with a atrophy seen involving the body and tail regions   of the pancreas. On previous CT 06/17/2021 the low-density region in the pancreatic head measured 1.1 x 1.1 cm with only slight pancreatic duct dilatation in the tail region. SPLEEN: Normal.    ADRENAL GLANDS: Normal.    KIDNEYS/BLADDER: Benign cyst in the right kidney with no follow-up recommended.    BOWEL: Mild obstipation.    LYMPH NODES: There are more than typically seen lymph nodes involving the retrocrural, peripancreatic, and periaortic regions. The largest lymph node involves the left periaortic region and this measures 1.7 x 1.3 cm.    VASCULATURE: Moderate calcification with no aneurysm.    PELVIC ORGANS: Surgically absent.    MUSCULOSKELETAL: Advanced multilevel degenerative changes in the lumbar spine.      Impression    IMPRESSION:   1.  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    2.  Mild adenopathy most marked left periaortic region which has mildly progressed since 06/17/2021..    3.  Significant noncalcified pulmonary nodules in both lung bases most typical for new metastatic findings.    4.  Benign cyst right kidney and no follow-up recommended.   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    TECHNIQUE: CT chest without IV contrast. Multiplanar reformats were  obtained. Dose reduction techniques were used.  CONTRAST: None.    COMPARISON: CT chest, abdomen and pelvis 6/17/2021.    FINDINGS:   LUNGS AND PLEURA: Multiple new solid bilateral pulmonary nodules  involving all lobes of both lungs. An example along the posterior  superior right lower lobe measures 1.7 cm, series 4 image 122. There  are numerous additional bilateral examples. No central airway  abnormality is seen. No effusion or pneumothorax.    MEDIASTINUM/AXILLAE: No acute thoracic aortic abnormality. There are  multiple larger lymph nodes along the inferior mediastinum. Posterior  to the distal esophagus is a newly identified 2.1 x 1.1 cm lymph node,  series 3 image 112. There are several other examples inferior to this  level and deep to the hemidiaphragm phong, for example image 128 and on  adjacent images. Right paraspinous soft tissue also newly identified,  for example at the T10 level measuring approximately 3.4 x 0.9 cm,  series 3 image 100. Proximal esophageal distention.    CORONARY ARTERY CALCIFICATION: Severe versus previous intervention.    UPPER ABDOMEN: Progression of upper abdominal adenopathy. Example  anterior to the right hemidiaphragm phong is 1.3 cm, previously 0.9 cm,  image 145. There are several other adjacent examples. Increased  biliary ductal dilatation as noted. This decompresses distally and is  compatible with potential ampullary mass. There is also main  pancreatic duct dilatation.    MUSCULOSKELETAL: No new bony lesion. Stable superior endplate height  loss at T9 with presumed Schmorl's node.      Impression    IMPRESSION:   1.  Interval development of numerous bilateral pulmonary metastatic  nodules involving all lobes of both lungs.  2.  Interval progression of adenopathy at the inferior mediastinum and  also involving the  upper abdomen. Right paraspinous soft tissue mass  at the distal thoracic spine worrisome for malignancy as well.  3.  Included upper abdominal images are limited, but there is  increased biliary ductal dilatation with cutoff at the ampullary  region presumably related to the described ampullary mass.    MARIO CARBAJAL MD         SYSTEM ID:  [id number]   XR Chest Port 1 View    Narrative    EXAM: XR CHEST PORT 1 VIEW  LOCATION: [facility]  DATE/TIME: 11/24/2022 1:55 PM    INDICATION: hypoxia  COMPARISON: CT chest 11/23/2022.      Impression    IMPRESSION: Numerous bilateral pulmonary nodules again identified. Refer to CT chest for further details. This finding is worrisome for metastatic disease. Mild cardiomegaly. No effusions or lobar consolidation identified.     *Note: Due to a large number of results and/or encounters for the requested time period, some results have not been displayed. A complete set of results can be found in Results Review.       Recent Labs   Lab 11/24/22  0717 11/22/22  2253   WBC 6.5 8.8   HGB 11.9* 13.1*   HCT 36.0* 40.1   MCV 90 89    311     No results for input(s): CULT in the last 168 hours.  Recent Labs   Lab 11/26/22  0718 11/25/22  0836 11/24/22  1404 11/24/22  0717 11/23/22  0219 11/22/22  2253   * 130*  --  128*  --  127*   POTASSIUM 4.3 4.3  --  3.6  --  4.4   CHLORIDE 101 98  --  95*  --  89*   CO2 20* 20*  --  21*  --  27   ANIONGAP 9 12  --  12  --  11   GLC 98 82  --  87  --  116*   BUN 32.3* 29.5*  --  17.7  --  26.1*   CR 0.84 0.75  --  0.84  --  0.92   GFRESTIMATED 89 >90  --  89  --  85   AMOS 8.3* 8.7*  --  8.8  --  9.5   MAG 2.0 2.3 2.8* 1.5*   < >  --    PROTTOTAL  --  5.4*  --   --   --  7.3   ALBUMIN  --  3.1*  --   --   --  4.3   BILITOTAL  --  0.8  --   --   --  0.4   ALKPHOS  --  179*  --   --   --  155*   AST  --  103*  --   --   --  58*   ALT  --  84*  --   --   --  48    < > = values in this interval not displayed.       Recent  "Labs   Lab 11/26/22  0718 11/25/22  0836 11/24/22  0717 11/22/22  2253   [facility] 98 82 87 116*       Pending Results:       Unresulted Labs Ordered in the Past 30 Days of this Admission     No orders found from 10/23/2022 to 11/23/2022.             Patient Allergies:       Allergies   Allergen Reactions     Bee Venom      Swelling and hives     Penicillins Hives and Swelling     \"HIVES\"; occurred at age 39         Disposition:     Disposition: home    Discharge needs: hospice care          I saw and evaluated the patient on day of discharge and  discharge instructions reviewed  and  all the patient's questions and concerns addressed. Over 30 minutes spent on discharge and coordination of discharge process for this patient.      Disclaimer: This note consists of symbols derived from keyboarding, dictation and/or voice recognition software. As a result, there may be errors in the script that have gone undetected. Please consider this when interpreting information found in this chart      "

## 2022-11-26 NOTE — PLAN OF CARE
Discharged to home via wife's transportation in power wheelchair. AVS reviewed with pt and wife. No further questions at this time. New medications filled and sent with pt. Hospice consult set for Monday. Denies any additional needs at this time. IV removed, all belongings sent home with pt, including hearing aids and glasses.

## 2022-11-26 NOTE — PLAN OF CARE
Goal Outcome Evaluation:         End of Shift Summary  For vital signs and complete assessments, please see documentation flowsheets.     Pertinent assessments: A&Ox4. Difficult to understand, speech garbled. Denies pain, scheduled tylenol. External cath in place, replaced this shift, good output. One bowel movement this am. Assist of 2 w/ lift. On pulsate mattress, heels elevated.      Major Shift Events: Pt febrile this evening. Tylenol and ibuprofen given without relief. Ice packs also applied. Patient denies discomfort. Family at bedside and very concerned, wanting medication intervention. MD paged and put in orders for rocephin.      Treatment Plan: Symptom management. Palliative/hospice consult.

## 2022-11-26 NOTE — PROGRESS NOTES
Page to admitting pager.    Temp fluctuating from . Tylenol given. New orders?    Admitting MD ordered PRN ibuprofen.

## 2022-11-26 NOTE — PLAN OF CARE
End of Shift Summary  For vital signs and complete assessments, please see documentation flowsheets.     Pertinent assessments: Pt more alert this shift, afebrile. Pain managed with scheduled tylenol. Resting quiet between cares.     Major Shift Events Uneventful    Treatment Plan: Symptom management, Hospice at discharge, possibly today.    Bedside Nurse: Taina Bradley RN

## 2022-11-27 NOTE — TELEPHONE ENCOUNTER
Order/Referral Request    Who is requesting: Home health agency     Orders being requested: PT and ST for eval and treatment     Reason service is needed/diagnosis: Acute pancreatitis     When are orders needed by: ASAP      Has this been discussed with Provider: No    Does patient have a preference on a Group/Provider/Facility? N/A     Does patient have an appointment scheduled?: No    Where to send orders: Fax - [phone] or call [phone] and leave msg for verbal order    Could we send this information to you in [place] or would you prefer to receive a phone call?:   Patient would prefer a phone call   Okay to leave a detailed message?: Yes at Other phone number:  [phone]

## 2022-11-28 NOTE — TELEPHONE ENCOUNTER
Hold Home Health Services starting 11/22/22; received via fax. Orders/Forms in your mailbox to be signed.

## 2022-11-28 NOTE — TELEPHONE ENCOUNTER
Call to [facility] and advised.     Verbal order given to approve visits until certification received for MD signature.

## 2022-11-28 NOTE — TELEPHONE ENCOUNTER
Discharged from Home Health Services - Patient admitted to [facility]; received via fax. Orders/Forms in your mailbox to be signed.

## 2022-11-28 NOTE — TELEPHONE ENCOUNTER
"IP F/U    Date: 11/26/22  Diagnosis: Acute pancreatitis  Advanced pancreatic/ampulla of Vater cancer  Is patient active in care coordination? No  Was patient in TCU? No    Hospital/TCU/ED for chronic condition Discharge Protocol    \"Hi, my name is Mounika Garza RN, a registered nurse, and I am calling from [facility].  I am calling to follow up and see how things are going for you after your recent emergency visit/hospital/TCU stay.\"    Tell me how you are doing now that you are home?\" hospice came out today      Discharge Instructions    \"Let's review your discharge instructions.  What is/are the follow-up recommendations?  Pt. Response: hospice    \"Has an appointment with your primary care provider been scheduled?\"   No (schedule appointment) - declined    \"When you see the provider, I would recommend that you bring your medications with you.\"    Medications    \"Tell me what changed about your medicines when you discharged?\"    Changes to chronic meds?    0-1    \"What questions do you have about your medications?\"    None     New diagnoses of heart failure, COPD, diabetes, or MI?    No              Post Discharge Medication Reconciliation Status: discharge medications reconciled, continue medications without change.    Was MTM referral placed (*Make sure to put transitions as reason for referral)?   No    Call Summary    \"What questions or concerns do you have about your recent visit and your follow-up care?\"     none    \"If you have questions or things don't continue to improve, we encourage you contact us through the main clinic number (give number).  Even if the clinic is not open, triage nurses are available 24/7 to help you.     We would like you to know that our clinic has extended hours (provide information).  We also have urgent care (provide details on closest location and hours/contact info)\"      \"Thank you for your time and take care!\"               "

## 2022-11-28 NOTE — TELEPHONE ENCOUNTER
Toya calling for orders. PHYSICAL THERAPY and SPEECH THERAPY evaluation and treatment. He is on hospice but the will private pay.    Marlys [phone] ok to leave message.

## 2022-12-05 NOTE — TELEPHONE ENCOUNTER
Fax received from [facility] 12/02/2022 for review and signature.  Put in Dr. Ray's in basket.

## 2022-12-07 NOTE — TELEPHONE ENCOUNTER
Called Katarina back and left message on general home care line for [facility] regarding ok per provider message below.

## 2022-12-07 NOTE — TELEPHONE ENCOUNTER
Katarina RN with [facility] ([phone]) calling for order to   delay PT admission to tomorrow 12/8/22.  ARELY Hua R.N.

## 2022-12-09 NOTE — TELEPHONE ENCOUNTER
[facility] is calling for orders. PHYSICAL THERAPY 1/week for 8 weeks skipping the week of 12/19/22 per patient request.    Bonnie [phone].

## 2022-12-14 NOTE — TELEPHONE ENCOUNTER
HOME HEALTH CERTIFICATION 12/8-2/5; received via fax. Orders/Forms in your mailbox to be signed.

## 2022-12-16 NOTE — TELEPHONE ENCOUNTER
Yulia Speech Language Therapy with [facility] call for verbal order      Speech Language Therapy  Communication needs     [id number]    Best number to call back [phone] ok to leave detailed message on secure line

## 2022-12-19 NOTE — TELEPHONE ENCOUNTER
[facility] 12/10/22 for review and signature.  Put in Dr. Ray's in basket.

## 2022-12-19 NOTE — TELEPHONE ENCOUNTER
The Home Care/Assisted Living/Nursing Facility is calling regarding an established patient.  Has the patient seen Home Care in the past or is currently residing in Assisted Living or Nursing Facility? Yes.     PT/OT/Speech Therapy    Any additional Orders:  Are there any orders requested, not stated above, that are outside of the standing order and must be routed to a licensed practitioner for approval?    No    Writer has verified [employer] will send fax to have orders signed.    Call to below number. Left detailed message with verbal approval.

## 2022-12-21 NOTE — TELEPHONE ENCOUNTER
Yulia from [employer], calling in for leave a Message:     Patient is in hospice and will discharged from PHYSICAL THERAPY SPEECH THERAPY due to health declining per Patient and Spouse request.     Phone#: [phone]  Okay to [facility].

## 2023-01-10 ENCOUNTER — TELEPHONE (OUTPATIENT)
Dept: INTERNAL MEDICINE | Facility: CLINIC | Age: 78
End: 2023-01-10

## 2023-01-10 DIAGNOSIS — Z53.9 DIAGNOSIS NOT YET DEFINED: Primary | ICD-10-CM

## 2023-01-10 PROCEDURE — 99207 PR MD CERTIFICATION HHA PATIENT: CPT | Performed by: INTERNAL MEDICINE

## 2023-02-24 NOTE — TELEPHONE ENCOUNTER
Dr. Crow Austin using dermabond to close laceration. Pt tolerating well. Steri strips applied.       Rohini Shields RN  02/24/23 1973 Spoke to patients wife, Kelli, Appointment confirmed.    ELICIA HERCULES, CMA

## 2023-08-16 NOTE — ED AVS SNAPSHOT
[facility] Emergency Department    [address]    [facility] [address]    Phone:  [phone]    Fax:  [phone]                                       Eiler Barber   MRN: [id number]    Department:  [facility] Emergency Department   Date of Visit:  10/27/2018           Patient Information     Date Of Birth          1945        Your diagnoses for this visit were:     Transient leg weakness bilateral       You were seen by Pavan Donaldson MD.      Follow-up Information     Follow up with Lida Ray MD In 2 days.    Specialty:  Internal Medicine    Contact information:    [address]  [facility] [address]  [phone]          Follow up with [facility] Emergency [facility].    Specialty:  EMERGENCY MEDICINE    Why:  If symptoms worsen    Contact information:    [address]  [facility] [address]  [phone]        Discharge Instructions       If develop and severe back pain, fever, new numbness or weakness.  Bowel or bladder abnormalities specifically incontinence or urinary retention return to the Emergency department    Your next 10 appointments already scheduled     Nov 01, 2018 10:00 AM CDT   PFT VISIT with  PFL D   [facility] Pulmonary Function Testing ([facility])    [address] [id number] [phone]            Nov 01, 2018 11:00 AM CDT   (Arrive by 10:45 AM)   Return ALS/[place] with Gary Tejada MD   [facility] Neurology ([facility])    [address] [id number] [phone]            Nov 01, 2018 11:00 AM CDT   (Arrive by 10:45 AM)   Neuro Eval with Aida Burch, SLP   [facility] Speech and Language ([facility])    [address] [id number] [phone]            Nov 01, 2018 11:00 AM CDT   (Arrive by 10:45 AM)   Neuro  Eval with David Mane OT   [facility] Occupational Therapy and Rehab ([facility])    [address] [place] [address] [phone]            Nov 01, 2018 11:00 AM CDT   (Arrive by 10:45 AM)   Neuro Eval with Nicol Sawant, PT   [facility] Physical Therapy and Rehab ([facility])    [address] [place] [id number] [phone]              24 Hour Appointment Hotline       To make an appointment at any [facility]), call [phone] ([phone]). If you don't have a family doctor or clinic, we will help you find one. [place] clinics are conveniently located to serve the needs of you and your family.             Review of your medicines      Our records show that you are taking the medicines listed below. If these are incorrect, please call your family doctor or clinic.        Dose / Directions Last dose taken    aspirin 81 MG tablet   Dose:  81 mg        Take 81 mg by mouth daily   Refills:  0        baclofen (LIORESAL) in NaCl 0.9% 100 mL intrathecal infusion        by Intrathecal route continuous Pump filled by [facility] stroke Martha [phone] Pump Model: Syncromed Last fill:  1/30/2018 Next fill: 5/29/18 [place] Alarm Date: 5/29/18 [place] Volume: unknown Conc: 2000 mcg/ml Flex schedule delivers 266.5 mcg/day Basal rate:unknown, pt states basal rate increased from 05:00-08:00   Refills:  0        clopidogrel 75 MG tablet   Commonly known as:  PLAVIX   Dose:  75 mg        Take 75 mg by mouth daily   Refills:  0        enalapril 5 MG tablet   Commonly known as:  VASOTEC   Quantity:  90 tablet        TAKE 1 TABLET DAILY   Refills:  2        EPINEPHrine 0.3 MG/0.3ML injection 2-pack   Commonly known as:  EPIPEN/ADRENACLICK/or ANY BX GENERIC EQUIV   Dose:  0.3 mg   Quantity:  0.3 mL        Inject 0.3 mLs (0.3 mg) into the muscle as needed for anaphylaxis    Refills:  3        ketoconazole 2 % shampoo   Commonly known as:  NIZORAL   Quantity:  120 mL        SHAMPOO EVERY 2-3 DAYS AS  NEEDED   Refills:  11        KLOR-CON 10 MEQ CR tablet   Quantity:  180 tablet   Generic drug:  potassium chloride SA        TAKE 2 TABLETS (20 MEQ)    DAILY   Refills:  2        oxybutynin 10 MG 24 hr tablet   Commonly known as:  DITROPAN-XL   Dose:  20 mg   Quantity:  180 tablet        Take 2 tablets (20 mg) by mouth At Bedtime   Refills:  3        pantoprazole 40 MG EC tablet   Commonly known as:  PROTONIX   Quantity:  90 tablet        TAKE 1 TABLET BY MOUTH ONCE DAILY   Refills:  2        polyethylene glycol Packet   Commonly known as:  MIRALAX/GLYCOLAX   Dose:  1 packet        Take 1 packet by mouth every other day   Refills:  0        potassium chloride 10 MEQ tablet   Commonly known as:  K-TAB,KLOR-CON   Dose:  20 mEq   Quantity:  180 tablet        Take 2 tablets (20 mEq) by mouth daily GIVE THE GENERIC: THIS IS NOT A REQUEST FOR BRAND NAME   Refills:  3        STATIN NOT PRESCRIBED (INTENTIONAL)        Please choose reason not prescribed, below   Refills:  0                Procedures and tests performed during your visit     Activity: Bedrest Strict    Basic metabolic panel    CBC with platelets differential    CT Head w Contrast    CT Head w/o Contrast    CTA Head Neck with Contrast    Dysphagia Screen    Glucose by meter    Glucose monitor nursing POCT    INR    IV access    MR Brain w/o & w Contrast    Notify CT that Stroke patient is on patient care unit    Partial thromboplastin time    Peripheral IV catheter    Pulse oximetry nursing    Troponin I    Vital signs and neuro checks    Weigh patient      Orders Needing Specimen Collection     None      Pending Results     Date and Time Order Name Status Description    10/27/2018 1828 MR Brain w/o & w Contrast Preliminary     10/27/2018 1737 CT Head w/o Contrast Preliminary     10/27/2018 1737 CT Head w Contrast Preliminary      10/27/2018 1737 CTA Head Neck with Contrast Preliminary             Pending Culture Results     No orders found from 10/25/2018 to 10/28/2018.            Pending Results Instructions     If you had any lab results that were not finalized at the time of your Discharge, you can call the ED Lab Result RN at [phone]. You will be contacted by this team for any positive Lab results or changes in treatment. The nurses are available 7 days a week from 10A to 6:30P.  You can leave a message 24 hours per day and they will return your call.        Test Results From Your Hospital Stay        10/27/2018  6:20 PM      Narrative     CT ANGIOGRAM OF THE HEAD AND NECK WITHOUT AND WITH CONTRAST   10/27/2018 6:09 PM     COMPARISON: Head and neck CTA 5/24/2018.    HISTORY: Code Stroke.    TECHNIQUE:  Precontrast localizing scans were followed by CT  angiography with an injection of 70mL Isovue-370 nonionic intravenous  contrast material with scans through the head and neck.  Images were  transferred to a separate 3-D workstation where multiplanar  reformations and 3-D images were created.  Estimates of carotid  stenoses are made relative to the distal internal carotid artery  diameters except as noted.      FINDINGS:   Neck CTA: The common carotid arteries bilaterally are patent without  stenosis. The cervical internal carotid arteries bilaterally are  tortuous but patent without stenosis. The vertebral arteries  bilaterally are patent without stenosis.    Head CTA: Moderate stenosis of the proximal P2 segment of the right  posterior cerebral artery again noted without change. This is likely  atherosclerotic in nature. The basilar, bilateral distal internal  carotid, bilateral anterior cerebral, bilateral middle cerebral and  bilateral left cerebral arteries are patent and unremarkable. The  anterior communicating and right posterior communicating arteries  remain patent and unremarkable.        Impression     IMPRESSION:  1.  Moderate atherosclerotic narrowing of the proximal P2 segment of  the right posterior cerebral artery again noted.  2. Otherwise, normal neck and head CTA. No change from the comparison  study.    Radiation dose for this scan was reduced using automated exposure  control, adjustment of the mA and/or kV according to patient size, or  iterative reconstruction technique.           10/27/2018  6:20 PM      Narrative     CT BRAIN PERFUSION 10/27/2018 6:09 PM    COMPARISON: None.    HISTORY: Code Stroke.      TECHNIQUE: Time sequential axial CT images of the head were acquired  during the administration of intravenous contrast (50mL Isovue-370).  CTA images of the [place] as well as color perfusion maps of  the brain were created from this time sequential axial source data.    FINDINGS: There are no focal or regional perfusion defects in the  brain.        Impression     IMPRESSION: Normal CT perfusion of the brain.    Radiation dose for this scan was reduced using automated exposure  control, adjustment of the mA and/or kV according to patient size, or  iterative reconstruction technique.           10/27/2018  5:58 PM      Narrative     CT OF THE HEAD WITHOUT CONTRAST 10/27/2018 5:49 PM     COMPARISON: Head CT 8/14/2018.    HISTORY: Code Stroke.    TECHNIQUE: 5 mm thick axial CT images of the head were acquired  without IV contrast material.    FINDINGS: There is mild diffuse cerebral volume loss. There are subtle  patchy areas of decreased density in the cerebral white matter  bilaterally that are consistent with sequela of chronic small vessel  ischemic disease.    The ventricles and basal cisterns are within normal limits in  configuration given the degree of cerebral volume loss.  There is no  midline shift. There are no extra-axial fluid collections.    No intracranial hemorrhage, mass or recent infarct.    The visualized paranasal sinuses are well-aerated. There is no  mastoiditis. There are no fractures  of the visualized bones.        Impression     IMPRESSION: Diffuse cerebral volume loss and cerebral white matter  changes consistent with chronic small vessel ischemic disease. No  evidence for acute intracranial pathology.    Radiation dose for this scan was reduced using automated exposure  control, adjustment of the mA and/or kV according to patient size, or  iterative reconstruction technique.          10/27/2018  6:04 PM      Component Results     Component Value Ref Range & Units Status    Sodium 136 133 - 144 mmol/L Final    Potassium 4.1 3.4 - 5.3 mmol/L Final    Chloride 101 94 - 109 mmol/L Final    Carbon Dioxide 29 20 - 32 mmol/L Final    Anion Gap 6 3 - 14 mmol/L Final    Glucose 101 (H) 70 - 99 mg/dL Final    Urea Nitrogen 17 7 - 30 mg/dL Final    Creatinine 0.87 0.66 - 1.25 mg/dL Final    GFR Estimate 86 >60 mL/min/1.7m2 Final    Non  GFR Calc    GFR Estimate If Black >90 >60 mL/min/1.7m2 Final    African American GFR Calc    Calcium 8.8 8.5 - 10.1 mg/dL Final         10/27/2018  5:46 PM      Component Results     Component Value Ref Range & Units Status    WBC 7.8 4.0 - 11.0 10e9/L Final    RBC Count 5.26 4.4 - 5.9 10e12/L Final    Hemoglobin 16.1 13.3 - 17.7 g/dL Final    Hematocrit 48.2 40.0 - 53.0 % Final    MCV 92 78 - 100 fl Final    MCH 30.6 26.5 - 33.0 pg Final    MCHC 33.4 31.5 - 36.5 g/dL Final    RDW 13.3 10.0 - 15.0 % Final    Platelet Count 241 150 - 450 10e9/L Final    Diff Method Automated Method  Final    % Neutrophils 62.4 % Final    % Lymphocytes 25.1 % Final    % Monocytes 10.1 % Final    % Eosinophils 1.2 % Final    % Basophils 0.8 % Final    % Immature Granulocytes 0.4 % Final    Nucleated RBCs 0 0 /100 Final    Absolute Neutrophil 4.9 1.6 - 8.3 10e9/L Final    Absolute Lymphocytes 2.0 0.8 - 5.3 10e9/L Final    Absolute Monocytes 0.8 0.0 - 1.3 10e9/L Final    Absolute Eosinophils 0.1 0.0 - 0.7 10e9/L Final    Absolute Basophils 0.1 0.0 - 0.2 10e9/L Final    Abs  Immature Granulocytes 0.0 0 - 0.4 10e9/L Final    Absolute Nucleated RBC 0.0  Final         10/27/2018  5:56 PM      Component Results     Component Value Ref Range & Units Status    INR 1.07 0.86 - 1.14 Final         10/27/2018  5:56 PM      Component Results     Component Value Ref Range & Units Status    PTT 27 22 - 37 sec Final         10/27/2018  6:04 PM      Component Results     Component Value Ref Range & Units Status    Troponin I ES <0.015 0.000 - 0.045 ug/L Final    The 99th percentile for upper reference range is 0.045 ug/L.  Troponin values   in the range of 0.045 - 0.120 ug/L may be associated with risks of adverse   clinical events.           10/27/2018  5:44 PM      Component Results     Component Value Ref Range & Units Status    Glucose 95 70 - 99 mg/dL Final    Dr/RN Notified         10/27/2018  8:09 PM      Narrative     MRI OF THE BRAIN WITHOUT AND WITH CONTRAST 10/27/2018 8:05 PM     COMPARISON: Head CT same day.    HISTORY:  leg weakness.    TECHNIQUE: Axial diffusion-weighted with ADC map, axial T2-weighted  with fat saturation, axial T1-weighted, axial turboFLAIR and coronal  T1-weighted images of the brain were acquired without intravenous  contrast.  Following intravenous administration of gadolinium (7.5 mL  Gadavist), axial T1-weighted images of the brain were acquired.     FINDINGS: There is moderate diffuse cerebral volume loss. There are  extensive confluent periventricular areas of abnormal T2 signal  hyperintensity in the cerebral white matter bilaterally that are  consistent with sequela of chronic small vessel ischemic disease.    The ventricles and basal cisterns are within normal limits in  configuration given the degree of cerebral volume loss.  There is no  midline shift.  There are no extra-axial fluid collections.  There is  no evidence for stroke or acute intracranial hemorrhage.  There is no  abnormal contrast enhancement in the brain or its coverings.    There is no  sinusitis or mastoiditis.        Impression     IMPRESSION: Diffuse cerebral volume loss and cerebral white matter  changes consistent with chronic small vessel ischemic disease. No  evidence for acute intracranial pathology.                  Clinical Quality Measure: Blood Pressure Screening     Your blood pressure was checked while you were in the emergency department today. The last reading we obtained was  BP: 161/88 . Please read the guidelines below about what these numbers mean and what you should do about them.  If your systolic blood pressure (the top number) is less than 120 and your diastolic blood pressure (the bottom number) is less than 80, then your blood pressure is normal. There is nothing more that you need to do about it.  If your systolic blood pressure (the top number) is 120-139 or your diastolic blood pressure (the bottom number) is 80-89, your blood pressure may be higher than it should be. You should have your blood pressure rechecked within a year by a primary care provider.  If your systolic blood pressure (the top number) is 140 or greater or your diastolic blood pressure (the bottom number) is 90 or greater, you may have high blood pressure. High blood pressure is treatable, but if left untreated over time it can put you at risk for heart attack, stroke, or kidney failure. You should have your blood pressure rechecked by a primary care provider within the next 4 weeks.  If your provider in the emergency department today gave you specific instructions to follow-up with your doctor or provider even sooner than that, you should follow that instruction and not wait for up to 4 weeks for your follow-up visit.        Thank you for choosing [facility]       Thank you for choosing [facility] for your care. Our goal is always to provide you with excellent care. Hearing back from our patients is one way we can continue to improve our services. Please take a few minutes to complete the written survey  that you may receive in the mail after you visit with us. Thank you!        [employer] Protective Information     [employer] gives you secure access to your electronic health record. If you see a primary care provider, you can also send messages to your care team and make appointments. If you have questions, please call your primary care clinic.  If you do not have a primary care provider, please call [phone] and they will assist you.        Care EveryWhere ID     This is your Care EveryWhere ID. This could be used by other organizations to access your [facility] medical records  [id number]        Equal Access to Services     [facility] : Mary Coleman, neelam adhikari, gladis mathis, jairo mendenhall . [facility] [phone].    ATENCIÓN: Si habla español, tiene a gant disposición servicios gratuitos de asistencia lingüística. Nory al [phone].    We comply with applicable federal civil rights laws and [place] laws. We do not discriminate on the basis of race, color, national origin, age, disability, sex, sexual orientation, or gender identity.            After Visit Summary       This is your record. Keep this with you and show to your community pharmacist(s) and doctor(s) at your next visit.                   No

## 2023-12-10 NOTE — PHARMACY-ADMISSION MEDICATION HISTORY
Admission Medication History Completed by Pharmacy    See [facility] Admission Navigator for allergy information, preferred outpatient pharmacy, prior to admission medications and immunization status.     Medication History Sources:     Patient's spouse and patient     Changes made to PTA medication list (reason):    Added: None    Deleted: fluorouracil cream (stopped using in May 2021)     Changed: None    Additional Information:    Patient's wife was a good historian and was able to verify all medication details, including baclofen infusion. Infusion is actively running in the hospital.     Prior to Admission medications    Medication Sig Last Dose Taking? Auth Provider   baclofen (LIORESAL) intraTHECAL Internal Pump by Intrathecal route continuous prn Pump filled by [facility] stroke [place] [phone]  Pump Model: Syncromed  Medication in pump is Gablofen (brand name)  Last fill: during hospital admission  Next fill:     Low [place] Alarm Date:   Reservoir Volume: 20 ml  Conc: 2000 mcg/mL  Delivers 234.7 mcg/day  Basal rate:unknown  Battery life: unknown 8/1/2021 at Unknown time Yes Unknown, Entered By History   ketoconazole (NIZORAL) 2 % external shampoo SHAMPOO EVERY 2-3 DAYS AS  NEEDED Past Week at Unknown time Yes Candida Whittington APRN CNP   loperamide (IMODIUM) 2 MG capsule Take 1 capsule (2 mg) by mouth 4 times daily as needed for diarrhea Past Week at Unknown time Yes Flo Cabrera MD   acetaminophen (TYLENOL) 650 MG CR tablet Take 650 mg by mouth every 8 hours as needed for mild pain (Shoulder pain)  8/28/2020  Reported, Patient   aspirin 81 MG tablet Take 81 mg by mouth every evening  8/27/2020 at PM  Reported, Patient   clopidogrel (PLAVIX) 75 MG tablet Take 75 mg by mouth every evening  8/27/2020 at PM  Reported, Patient   EPINEPHrine 0.3 MG/0.3ML injection Inject 0.3 mLs (0.3 mg) into the muscle as needed for anaphylaxis Unknown at Unknown time  Lida Ray MD    fluorouracil (EFUDEX) 5 % external cream Apply topically to legs 1 to 2 times weekly as needed/tolerated for maintenance  Patient not taking: Reported on 8/1/2021 Not Taking at Unknown time  Unknown, Entered By History   oxybutynin ER (DITROPAN-XL) 10 MG 24 hr tablet Take 2 tablets (20 mg) by mouth At Bedtime 8/27/2020 at PM  Lida Ray MD   pantoprazole (PROTONIX) 40 MG EC tablet Take 1 tablet (40 mg) by mouth 2 times daily 8/28/2020 at AM  Agus Kent MD   polyethylene glycol (MIRALAX/GLYCOLAX) Packet Take 1 packet by mouth daily as needed Every 2-3 days. Unknown at Unknown time  Reported, Patient       Date completed: 08/01/21    Medication history completed by: Melia Graham [facility]             Dementia

## 2024-06-30 NOTE — DISCHARGE INSTRUCTIONS
Please make an appointment to follow up with your primary care provider in 2-3 days if not improving.        Discharge Instructions for Cellulitis  You have been diagnosed with cellulitis. This is an infection in the deepest layer of the skin. In some cases, the infection also affects the muscle. Cellulitis is caused by bacteria. The bacteria can enter the body through broken skin. This can happen with a cut, scratch, animal bite, or an insect bite that has been scratched. You may have been treated in the hospital with antibiotics and fluids. You will likely be given a prescription for antibiotics to take at home. This sheet will help you take care of yourself at home.  Home care  When you are home:    Take the prescribed antibiotic medicine you are given as directed until it is gone. Take it even if you feel better. It treats the infection and stops it from returning. Not taking all the medicine can make future infections hard to treat.    Keep the infected area clean.    When possible, raise the infected area above the level of your heart. This helps keep swelling down.    Talk with your healthcare provider if you are in pain. Ask what kind of over-the-counter medicine you can take for pain.    Apply clean bandages as advised.    Take your temperature once a day for a week.    Wash your hands often to prevent spreading the infection.  In the future, wash your hands before and after you touch cuts, scratches, or bandages. This will help prevent infection.   When to call your healthcare provider  Call your healthcare provider immediately if you have any of the following:    Difficulty or pain when moving the joints above or below the infected area    Discharge or pus draining from the area    Fever of 100.4 F (38 C) or higher, or as directed by your healthcare provider    Pain that gets worse in or around the infected     Redness that gets worse in or around the infected area, particularly if the area of redness  Remain on metoprolol.  Normal LV function.  Negative lexiscan cardiolite.   expands to a wider area    Shaking chills    Swelling of the infected area    Vomiting   Date Last Reviewed: 8/1/2016 2000-2017 [employer]. [address] [place] [address]. All rights reserved. This information is not intended as a substitute for professional medical care. Always follow your healthcare professional's instructions.          Weakness with Uncertain Cause  Based on your exam today, the exact cause of your weakness is not certain. But your weakness does not seem to be a sign of a serious illness at this time. Keep an eye on your symptoms and get medical advice as instructed below.  Home care    Rest at home today. Don't over-exert yourself.    Take any medicine as prescribed.    For the next few days, drink extra fluids (unless your healthcare provider wants you to restrict fluids for other reasons). Don't skip meals.    Unless otherwise directed, continue to take any prescription medicines.    Contact your healthcare provider if you have any questions or concerns.  Follow-up care  Follow up with your healthcare provider, or as advised.  When to seek medical advice  Call your healthcare provider right away for any of the following:    Symptoms get worse    Symptoms don't start getting better within 2 days    Fever of 100.4  F (38  C) or higher, or as directed by your healthcare provider  Call 911  Call 911 for any of these:    Chest, arm, neck, jaw, or upper back pain    Trouble breathing    Numbness or weakness of the face, one arm, or one leg    Slurred speech, confusion, or trouble speaking, walking, or seeing    Blood in vomit or stool (black or red color)    Loss of consciousness    Severe headache  Date Last Reviewed: 10/1/2017    2450-4647 [employer]. [address] [place] [address]. All rights reserved. This information is not intended as a substitute for professional medical care. Always follow your healthcare professional's  instructions.

## 2025-03-03 NOTE — TELEPHONE ENCOUNTER
[employer] message received regarding Chlorthalidone 25 mg tab. Medication stopped upon discharge from 11/22/22 Hospital admission. Medication previously prescribed by Cardiology team.    Routing request to cardiology team.  
0

## (undated) DEVICE — ENDO PROBE COVER ULTRASOUND BALLOON LATEX  MAJ-249

## (undated) DEVICE — CATH RETRIEVAL BALLOON EXTRACTOR PRO RX-S INJ ABOVE 9-12MM

## (undated) DEVICE — ESU GROUND PAD ADULT W/CORD E7507

## (undated) DEVICE — WIRE GUIDE 0.035"X450CM JAGWIRE HP STR TIP M00556580

## (undated) DEVICE — SOL NACL 0.9% INJ 250ML BAG 2B1322Q

## (undated) DEVICE — SOL WATER IRRIG 1000ML BOTTLE 2F7114

## (undated) DEVICE — ENDO DEVICE LOCKING AND BIOPSY CAP M00545261

## (undated) DEVICE — KIT ENDO FIRST STEP DISINFECTANT 200ML W/POUCH EP-4

## (undated) DEVICE — WIRE GUIDE 0.025"X450CM ANG VISIGLIDE G-240-2545A

## (undated) DEVICE — SUCTION CANISTER MEDIVAC LINER 3000ML W/LID 65651-530

## (undated) DEVICE — KIT CONNECTOR FOR OLYMPUS ENDOSCOPES DEFENDO 100310

## (undated) DEVICE — ENDO CAP AND TUBING STERILE FOR ENDOGATOR  100130

## (undated) DEVICE — SOL NACL 0.9% IRRIG 1000ML BOTTLE 2F7124

## (undated) DEVICE — PACK ENDOSCOPY GI CUSTOM UMMC

## (undated) DEVICE — ENDO BITE BLOCK ADULT OMNI-BLOC

## (undated) DEVICE — Device

## (undated) DEVICE — ENDO FORCEP ENDOJAW BIOPSY 2.8MMX230CM FB-220U

## (undated) DEVICE — INTR ENDOSCOPIC STENT FUSION OASIS 09.0FRX200CM

## (undated) DEVICE — ENDO SNARE POLYPECTOMY OVAL 15MM LOOP SD-240U-15

## (undated) DEVICE — BAG CLEAR TRASH 1.3M 39X33" P4040C

## (undated) DEVICE — PAD CHUX UNDERPAD 23X24" 7136

## (undated) DEVICE — ENDO FUSION OMNI-TOME G31903

## (undated) DEVICE — ENDO TUBING CO2 SMARTCAP STERILE DISP 100145CO2EXT

## (undated) DEVICE — BRUSH CYTOMAX CYTOLOGY .035CM NO LEAD DLB-35-S G24741

## (undated) DEVICE — SUCTION MANIFOLD NEPTUNE 2 SYS 4 PORT 0702-020-000

## (undated) DEVICE — ENDO FUSION OMNI-TOME 21 FS-OMNI-21 G48675

## (undated) DEVICE — DECANTER BAG 2002S

## (undated) DEVICE — APPLICATORS COTTON TIP 6"X2 STERILE LF C15053-006

## (undated) DEVICE — BIOPSY VALVE BIOSHIELD 00711135

## (undated) DEVICE — NDL CANNULA INTERLINK BLUNT 18GA

## (undated) DEVICE — EXTRACTOR PRO RX

## (undated) DEVICE — WIRE GUIDE 0.025"X270CM SHORT STR VISIGLIDE 2 G-260-2527S

## (undated) DEVICE — INTRODUCER ENDOSCOPIC OASIS 10FRX205CM OA-10

## (undated) DEVICE — DRSG GAUZE 4X4" 3033

## (undated) DEVICE — SPHINCTEROTOME 7FRX25MM TRITOME G22555

## (undated) DEVICE — COVER FOOTSWITCH URO

## (undated) DEVICE — TUBING SUCTION 12"X1/4" N612

## (undated) DEVICE — ENDO SNARE HEXAGONAL POLYPECTOMY 2.5X4.0CM 00711113

## (undated) DEVICE — BIOPSY FORCEPS

## (undated) DEVICE — RAD RX ISOVUE 300 (50ML) 61% IOPAMIDOL CHARGE PER ML

## (undated) RX ORDER — FENTANYL CITRATE 50 UG/ML
INJECTION, SOLUTION INTRAMUSCULAR; INTRAVENOUS
Status: DISPENSED
Start: 2021-08-03

## (undated) RX ORDER — LIDOCAINE HYDROCHLORIDE 10 MG/ML
INJECTION, SOLUTION EPIDURAL; INFILTRATION; INTRACAUDAL; PERINEURAL
Status: DISPENSED
Start: 2021-06-17

## (undated) RX ORDER — IOPAMIDOL 510 MG/ML
INJECTION, SOLUTION INTRAVASCULAR
Status: DISPENSED
Start: 2021-07-12

## (undated) RX ORDER — FENTANYL CITRATE 50 UG/ML
INJECTION, SOLUTION INTRAMUSCULAR; INTRAVENOUS
Status: DISPENSED
Start: 2022-01-01

## (undated) RX ORDER — GLYCOPYRROLATE 0.2 MG/ML
INJECTION INTRAMUSCULAR; INTRAVENOUS
Status: DISPENSED
Start: 2021-06-17

## (undated) RX ORDER — ALBUMIN, HUMAN INJ 5% 5 %
SOLUTION INTRAVENOUS
Status: DISPENSED
Start: 2021-07-14

## (undated) RX ORDER — FENTANYL CITRATE 50 UG/ML
INJECTION, SOLUTION INTRAMUSCULAR; INTRAVENOUS
Status: DISPENSED
Start: 2021-06-17

## (undated) RX ORDER — NEOSTIGMINE METHYLSULFATE 1 MG/ML
VIAL (ML) INJECTION
Status: DISPENSED
Start: 2021-06-17

## (undated) RX ORDER — WATER 10 ML/10ML
INJECTION INTRAMUSCULAR; INTRAVENOUS; SUBCUTANEOUS
Status: DISPENSED
Start: 2021-07-12

## (undated) RX ORDER — HYDRALAZINE HYDROCHLORIDE 20 MG/ML
INJECTION INTRAMUSCULAR; INTRAVENOUS
Status: DISPENSED
Start: 2021-06-17

## (undated) RX ORDER — ONDANSETRON 2 MG/ML
INJECTION INTRAMUSCULAR; INTRAVENOUS
Status: DISPENSED
Start: 2021-06-17

## (undated) RX ORDER — PANTOPRAZOLE SODIUM 40 MG/10ML
INJECTION, POWDER, LYOPHILIZED, FOR SOLUTION INTRAVENOUS
Status: DISPENSED
Start: 2021-07-14

## (undated) RX ORDER — FENTANYL CITRATE 50 UG/ML
INJECTION, SOLUTION INTRAMUSCULAR; INTRAVENOUS
Status: DISPENSED
Start: 2021-07-14

## (undated) RX ORDER — SODIUM CHLORIDE, SODIUM LACTATE, POTASSIUM CHLORIDE, CALCIUM CHLORIDE 600; 310; 30; 20 MG/100ML; MG/100ML; MG/100ML; MG/100ML
INJECTION, SOLUTION INTRAVENOUS
Status: DISPENSED
Start: 2021-07-12

## (undated) RX ORDER — DEXAMETHASONE SODIUM PHOSPHATE 4 MG/ML
INJECTION, SOLUTION INTRA-ARTICULAR; INTRALESIONAL; INTRAMUSCULAR; INTRAVENOUS; SOFT TISSUE
Status: DISPENSED
Start: 2021-06-17

## (undated) RX ORDER — CIPROFLOXACIN 2 MG/ML
INJECTION, SOLUTION INTRAVENOUS
Status: DISPENSED
Start: 2021-06-17

## (undated) RX ORDER — ONDANSETRON 2 MG/ML
INJECTION INTRAMUSCULAR; INTRAVENOUS
Status: DISPENSED
Start: 2021-07-14

## (undated) RX ORDER — FENTANYL CITRATE-0.9 % NACL/PF 10 MCG/ML
PLASTIC BAG, INJECTION (ML) INTRAVENOUS
Status: DISPENSED
Start: 2021-07-12

## (undated) RX ORDER — PROPOFOL 10 MG/ML
INJECTION, EMULSION INTRAVENOUS
Status: DISPENSED
Start: 2022-01-01

## (undated) RX ORDER — INDOMETHACIN 50 MG/1
SUPPOSITORY RECTAL
Status: DISPENSED
Start: 2021-06-17

## (undated) RX ORDER — SIMETHICONE 40MG/0.6ML
SUSPENSION, DROPS(FINAL DOSAGE FORM)(ML) ORAL
Status: DISPENSED
Start: 2021-07-12

## (undated) RX ORDER — LABETALOL HYDROCHLORIDE 5 MG/ML
INJECTION, SOLUTION INTRAVENOUS
Status: DISPENSED
Start: 2021-07-12

## (undated) RX ORDER — FENTANYL CITRATE 50 UG/ML
INJECTION, SOLUTION INTRAMUSCULAR; INTRAVENOUS
Status: DISPENSED
Start: 2021-08-19

## (undated) RX ORDER — LIDOCAINE HYDROCHLORIDE 20 MG/ML
INJECTION, SOLUTION EPIDURAL; INFILTRATION; INTRACAUDAL; PERINEURAL
Status: DISPENSED
Start: 2021-07-14

## (undated) RX ORDER — ONDANSETRON 2 MG/ML
INJECTION INTRAMUSCULAR; INTRAVENOUS
Status: DISPENSED
Start: 2022-01-01

## (undated) RX ORDER — DEXAMETHASONE SODIUM PHOSPHATE 4 MG/ML
INJECTION, SOLUTION INTRA-ARTICULAR; INTRALESIONAL; INTRAMUSCULAR; INTRAVENOUS; SOFT TISSUE
Status: DISPENSED
Start: 2022-01-01

## (undated) RX ORDER — PROPOFOL 10 MG/ML
INJECTION, EMULSION INTRAVENOUS
Status: DISPENSED
Start: 2021-06-17

## (undated) RX ORDER — FENTANYL CITRATE-0.9 % NACL/PF 10 MCG/ML
PLASTIC BAG, INJECTION (ML) INTRAVENOUS
Status: DISPENSED
Start: 2021-06-17

## (undated) RX ORDER — PROPOFOL 10 MG/ML
INJECTION, EMULSION INTRAVENOUS
Status: DISPENSED
Start: 2021-07-14

## (undated) RX ORDER — SODIUM CHLORIDE, SODIUM LACTATE, POTASSIUM CHLORIDE, CALCIUM CHLORIDE 600; 310; 30; 20 MG/100ML; MG/100ML; MG/100ML; MG/100ML
INJECTION, SOLUTION INTRAVENOUS
Status: DISPENSED
Start: 2021-07-14